# Patient Record
Sex: MALE | Race: WHITE | NOT HISPANIC OR LATINO | Employment: OTHER | URBAN - NONMETROPOLITAN AREA
[De-identification: names, ages, dates, MRNs, and addresses within clinical notes are randomized per-mention and may not be internally consistent; named-entity substitution may affect disease eponyms.]

---

## 2019-08-06 ENCOUNTER — OFFICE VISIT (OUTPATIENT)
Dept: INTERNAL MEDICINE | Facility: CLINIC | Age: 74
End: 2019-08-06

## 2019-08-06 VITALS
WEIGHT: 170 LBS | SYSTOLIC BLOOD PRESSURE: 136 MMHG | RESPIRATION RATE: 16 BRPM | HEART RATE: 50 BPM | BODY MASS INDEX: 22.53 KG/M2 | HEIGHT: 73 IN | OXYGEN SATURATION: 93 % | DIASTOLIC BLOOD PRESSURE: 86 MMHG

## 2019-08-06 DIAGNOSIS — I10 ESSENTIAL HYPERTENSION: Primary | ICD-10-CM

## 2019-08-06 DIAGNOSIS — E78.2 MIXED HYPERLIPIDEMIA: ICD-10-CM

## 2019-08-06 DIAGNOSIS — E55.9 VITAMIN D DEFICIENCY: ICD-10-CM

## 2019-08-06 DIAGNOSIS — N18.2 CKD (CHRONIC KIDNEY DISEASE) STAGE 2, GFR 60-89 ML/MIN: ICD-10-CM

## 2019-08-06 DIAGNOSIS — I77.811 ABDOMINAL AORTIC ECTASIA (HCC): ICD-10-CM

## 2019-08-06 DIAGNOSIS — Z87.891 PERSONAL HISTORY OF NICOTINE DEPENDENCE: ICD-10-CM

## 2019-08-06 DIAGNOSIS — F17.210 CIGARETTE SMOKER: ICD-10-CM

## 2019-08-06 DIAGNOSIS — K21.00 GASTRO-ESOPHAGEAL REFLUX DISEASE WITH ESOPHAGITIS: ICD-10-CM

## 2019-08-06 DIAGNOSIS — E03.9 ACQUIRED HYPOTHYROIDISM: ICD-10-CM

## 2019-08-06 DIAGNOSIS — R73.01 ELEVATED FASTING GLUCOSE: ICD-10-CM

## 2019-08-06 DIAGNOSIS — Z12.5 ENCOUNTER FOR PROSTATE CANCER SCREENING: ICD-10-CM

## 2019-08-06 DIAGNOSIS — M48.062 SPINAL STENOSIS OF LUMBAR REGION WITH NEUROGENIC CLAUDICATION: ICD-10-CM

## 2019-08-06 PROBLEM — M48.061 SPINAL STENOSIS OF LUMBAR REGION: Status: ACTIVE | Noted: 2019-08-06

## 2019-08-06 PROBLEM — M54.30 SCIATICA: Status: ACTIVE | Noted: 2019-08-06

## 2019-08-06 PROBLEM — E66.811 CLASS 1 OBESITY DUE TO EXCESS CALORIES WITH SERIOUS COMORBIDITY AND BODY MASS INDEX (BMI) OF 31.0 TO 31.9 IN ADULT: Status: ACTIVE | Noted: 2019-08-06

## 2019-08-06 PROBLEM — E66.09 CLASS 1 OBESITY DUE TO EXCESS CALORIES WITH SERIOUS COMORBIDITY AND BODY MASS INDEX (BMI) OF 31.0 TO 31.9 IN ADULT: Status: ACTIVE | Noted: 2019-08-06

## 2019-08-06 PROBLEM — E66.09 CLASS 1 OBESITY DUE TO EXCESS CALORIES WITH SERIOUS COMORBIDITY AND BODY MASS INDEX (BMI) OF 31.0 TO 31.9 IN ADULT: Status: RESOLVED | Noted: 2019-08-06 | Resolved: 2019-08-06

## 2019-08-06 PROBLEM — E66.811 CLASS 1 OBESITY DUE TO EXCESS CALORIES WITH SERIOUS COMORBIDITY AND BODY MASS INDEX (BMI) OF 31.0 TO 31.9 IN ADULT: Status: RESOLVED | Noted: 2019-08-06 | Resolved: 2019-08-06

## 2019-08-06 PROCEDURE — 99204 OFFICE O/P NEW MOD 45 MIN: CPT | Performed by: INTERNAL MEDICINE

## 2019-08-06 RX ORDER — BISOPROLOL FUMARATE 10 MG/1
10 TABLET, FILM COATED ORAL DAILY
Qty: 90 TABLET | Refills: 3 | Status: SHIPPED | OUTPATIENT
Start: 2019-08-06 | End: 2020-08-11 | Stop reason: SDUPTHER

## 2019-08-06 RX ORDER — OMEPRAZOLE 20 MG/1
1 CAPSULE, DELAYED RELEASE ORAL DAILY
COMMUNITY
Start: 2019-08-02 | End: 2019-08-06 | Stop reason: SDUPTHER

## 2019-08-06 RX ORDER — FENOFIBRATE 145 MG/1
1 TABLET, COATED ORAL DAILY
COMMUNITY
End: 2019-08-06

## 2019-08-06 RX ORDER — LEVOTHYROXINE SODIUM 0.1 MG/1
100 TABLET ORAL DAILY
Qty: 90 TABLET | Refills: 3 | Status: SHIPPED | OUTPATIENT
Start: 2019-08-06 | End: 2020-08-11 | Stop reason: SDUPTHER

## 2019-08-06 RX ORDER — ROSUVASTATIN CALCIUM 20 MG/1
1 TABLET, COATED ORAL DAILY
COMMUNITY
Start: 2019-06-28 | End: 2019-08-06 | Stop reason: SDUPTHER

## 2019-08-06 RX ORDER — LEVOTHYROXINE SODIUM 0.1 MG/1
1 TABLET ORAL DAILY
COMMUNITY
Start: 2019-07-09 | End: 2019-08-06 | Stop reason: SDUPTHER

## 2019-08-06 RX ORDER — DIMENHYDRINATE 50 MG
1 TABLET ORAL DAILY
COMMUNITY
End: 2021-10-07 | Stop reason: HOSPADM

## 2019-08-06 RX ORDER — ROSUVASTATIN CALCIUM 20 MG/1
20 TABLET, COATED ORAL DAILY
Qty: 90 TABLET | Refills: 3 | Status: SHIPPED | OUTPATIENT
Start: 2019-08-06 | End: 2020-08-11 | Stop reason: SDUPTHER

## 2019-08-06 RX ORDER — BISOPROLOL FUMARATE 10 MG/1
1 TABLET, FILM COATED ORAL DAILY
COMMUNITY
Start: 2019-08-02 | End: 2019-08-06 | Stop reason: SDUPTHER

## 2019-08-06 RX ORDER — AMOXICILLIN 500 MG
1 CAPSULE ORAL EVERY 24 HOURS
COMMUNITY
End: 2019-08-06

## 2019-08-06 RX ORDER — FLUTICASONE PROPIONATE 50 MCG
1 SPRAY, SUSPENSION (ML) NASAL DAILY
COMMUNITY
End: 2020-08-11 | Stop reason: SDUPTHER

## 2019-08-06 RX ORDER — OMEPRAZOLE 20 MG/1
20 CAPSULE, DELAYED RELEASE ORAL DAILY
Qty: 90 CAPSULE | Refills: 3 | Status: SHIPPED | OUTPATIENT
Start: 2019-08-06 | End: 2020-08-11 | Stop reason: SDUPTHER

## 2019-08-06 NOTE — PROGRESS NOTES
CC: establish care for hypertension    History:  Sonu Bravo is a 74 y.o. male who presents today for evaluation of the above problems.  He reports he has been doing well without acute illness. His BP has done well without side effects at this time on medications. He does have hypothyroidism, but has no symptoms of thyroid dysfunction on replacement with synthroid. He has ongoing issues with low back pain with ongoing right sided sciatica. He is maintaining function. He continues smoking and is not ready to quit at this time.     ROS:  Review of Systems   Constitutional: Negative for chills and fever.   HENT: Negative for congestion and sore throat.    Eyes: Negative for visual disturbance.   Respiratory: Negative for cough and shortness of breath.    Cardiovascular: Negative for chest pain, palpitations and leg swelling.   Gastrointestinal: Negative for abdominal pain, constipation and nausea.   Endocrine: Negative for cold intolerance and heat intolerance.   Genitourinary: Negative for decreased urine volume, difficulty urinating, frequency and urgency.   Musculoskeletal: Positive for back pain. Negative for arthralgias.   Skin: Negative for rash.   Neurological: Negative for dizziness and headaches.   Psychiatric/Behavioral: Negative for dysphoric mood. The patient is not nervous/anxious.        Allergies   Allergen Reactions   • Penicillins Hives   • Sulfa Antibiotics Hives, Itching and Rash   • Sulfamethoxazole-Trimethoprim Hives, Itching and Rash     Past Medical History:   Diagnosis Date   • Arthritis    • GERD (gastroesophageal reflux disease)    • Hyperlipidemia    • Hypertension      Past Surgical History:   Procedure Laterality Date   • HERNIA REPAIR       Family History   Problem Relation Age of Onset   • Lung cancer Mother    • Brain cancer Mother    • Heart disease Father    • Hyperlipidemia Father    • Hypertension Father    • Prostate cancer Maternal Aunt       reports that he has been smoking  "cigarettes.  He started smoking about 52 years ago. He has a 104.00 pack-year smoking history. He has never used smokeless tobacco. He reports that he drinks alcohol. He reports that he does not use drugs.      Current Outpatient Medications:   •  ASPIRIN 81 PO, Take 1 tablet by mouth Daily., Disp: , Rfl:   •  bisoprolol (ZEBeta) 10 MG tablet, Take 1 tablet by mouth Daily., Disp: , Rfl:   •  Cholecalciferol 4000 units capsule, Take 1 tablet by mouth Daily., Disp: , Rfl:   •  Coenzyme Q10 (CO Q-10) 100 MG capsule, Take 1 tablet by mouth Daily., Disp: , Rfl:   •  Cyanocobalamin (VITAMIN B 12 PO), Take 1 tablet by mouth Daily. 2,500 mg, Disp: , Rfl:   •  fenofibrate (TRICOR) 145 MG tablet, Take 1 tablet by mouth Daily., Disp: , Rfl:   •  fluticasone (FLONASE) 50 MCG/ACT nasal spray, 1 spray into the nostril(s) as directed by provider Daily. Each Nostral, Disp: , Rfl:   •  levothyroxine (SYNTHROID, LEVOTHROID) 100 MCG tablet, Take 1 tablet by mouth Daily., Disp: , Rfl:   •  Multiple Vitamin (MULTI-VITAMIN DAILY PO), Take 1 tablet by mouth Daily., Disp: , Rfl:   •  Omega-3 Fatty Acids (FISH OIL) 1200 MG capsule capsule, Take 1 Units by mouth Daily., Disp: , Rfl:   •  omeprazole (priLOSEC) 20 MG capsule, Take 1 capsule by mouth Daily., Disp: , Rfl:   •  rosuvastatin (CRESTOR) 20 MG tablet, Take 1 tablet by mouth Daily., Disp: , Rfl:     OBJECTIVE:  /86 (BP Location: Right arm, Patient Position: Sitting, Cuff Size: Adult)   Pulse 50   Resp 16   Ht 185.4 cm (73\")   Wt 77.1 kg (170 lb)   SpO2 93%   BMI 22.43 kg/m²    Physical Exam   Constitutional: He is oriented to person, place, and time. He appears well-developed and well-nourished. No distress.   HENT:   Head: Normocephalic and atraumatic.   Right Ear: External ear normal.   Left Ear: External ear normal.   Nose: Nose normal.   Mouth/Throat: Oropharynx is clear and moist. No oropharyngeal exudate.   Eyes: EOM are normal. No scleral icterus.   Neck: Normal " range of motion. No tracheal deviation present.   Cardiovascular: Normal rate, regular rhythm and normal heart sounds.   No murmur heard.  Pulmonary/Chest: Effort normal and breath sounds normal. No accessory muscle usage. No respiratory distress. He has no wheezes.   Abdominal: Soft. Bowel sounds are normal. He exhibits no distension. There is no tenderness.   Musculoskeletal: Normal range of motion. He exhibits no edema.   Neurological: He is alert and oriented to person, place, and time. Coordination and gait normal.   Skin: Skin is warm and dry. No cyanosis. Nails show no clubbing.   No jaundice   Psychiatric: He has a normal mood and affect. His mood appears not anxious. He does not exhibit a depressed mood.       Assessment/Plan    Diagnoses and all orders for this visit:    Essential hypertension  -     Comprehensive Metabolic Panel; Future  -     bisoprolol (ZEBeta) 10 MG tablet; Take 1 tablet by mouth Daily.  Well controlled, BP goal for age is <140/90 per JNC 8 guidelines and continue current medications    Spinal stenosis of lumbar region with neurogenic claudication  Fair control with epidural injections in the past. Managing without intervention nor meds at this time. Consider referral to pain management if symptoms worsen.     Mixed hyperlipidemia  -     Lipid Panel; Future  -     rosuvastatin (CRESTOR) 20 MG tablet; Take 1 tablet by mouth Daily.  Stable on moderate intensity statin therapy per ACC/AHA guidelines.    Acquired hypothyroidism  -     TSH; Future  -     levothyroxine (SYNTHROID, LEVOTHROID) 100 MCG tablet; Take 1 tablet by mouth Daily.  Check TSH and continue synthroid unless otherwise indicated to change dose.     Cigarette smoker  Patient was counseled on and understood the many dangers of continuing to use tobacco. Despite this, Mr. Bravo states quitting is not an immediate priority at this time. I reminded the patient that if quitting becomes an increased priority to contact us for help  with quitting including pharmacologic & nonpharmacologic options or any additional resources.    Personal history of nicotine dependence  -     CT Chest Low Dose Wo; Future  CT for lung cancer screening.     Gastro-esophageal reflux disease with esophagitis  -     omeprazole (priLOSEC) 20 MG capsule; Take 1 capsule by mouth Daily.  Stable without exacerbation on PPI.    Abdominal aortic ectasia (CMS/HCC)  Will seek records regarding previous imaging. Consider f/u if indicated.     Vitamin D deficiency  -     Vitamin D 25 Hydroxy; Future  Check Vitamin D level.     Elevated fasting glucose  -     Hemoglobin A1c; Future  A1c for further eval.     CKD (chronic kidney disease) stage 2, GFR 60-89 ml/min  -     CBC (No Diff); Future  -     Urinalysis With Microscopic If Indicated (No Culture) - Urine, Clean Catch; Future  -     Protein / Creatinine Ratio, Urine - Urine, Clean Catch; Future  Monitor with labs. Consider RAAS blockade.    Encounter for prostate cancer screening  -     PSA Screen; Future        An After Visit Summary was printed and given to the patient at discharge.  Return in about 6 months (around 2/6/2020) for Medicare Wellness.         Vaughn Kramer D.O. 8/6/2019   Electronically signed.

## 2019-08-13 ENCOUNTER — LAB (OUTPATIENT)
Dept: LAB | Facility: HOSPITAL | Age: 74
End: 2019-08-13

## 2019-08-13 DIAGNOSIS — R73.01 ELEVATED FASTING GLUCOSE: ICD-10-CM

## 2019-08-13 DIAGNOSIS — Z12.5 ENCOUNTER FOR PROSTATE CANCER SCREENING: ICD-10-CM

## 2019-08-13 DIAGNOSIS — N18.2 CKD (CHRONIC KIDNEY DISEASE) STAGE 2, GFR 60-89 ML/MIN: ICD-10-CM

## 2019-08-13 DIAGNOSIS — E55.9 VITAMIN D DEFICIENCY: ICD-10-CM

## 2019-08-13 DIAGNOSIS — E03.9 ACQUIRED HYPOTHYROIDISM: ICD-10-CM

## 2019-08-13 DIAGNOSIS — I10 ESSENTIAL HYPERTENSION: ICD-10-CM

## 2019-08-13 DIAGNOSIS — E78.2 MIXED HYPERLIPIDEMIA: ICD-10-CM

## 2019-08-13 LAB
25(OH)D3 SERPL-MCNC: 88.3 NG/ML (ref 30–100)
ALBUMIN SERPL-MCNC: 4.1 G/DL (ref 3.5–5)
ALBUMIN/GLOB SERPL: 1.6 G/DL (ref 1.1–2.5)
ALP SERPL-CCNC: 35 U/L (ref 24–120)
ALT SERPL W P-5'-P-CCNC: 22 U/L (ref 0–54)
ANION GAP SERPL CALCULATED.3IONS-SCNC: 4 MMOL/L (ref 4–13)
ARTICHOKE IGE QN: 70 MG/DL (ref 0–99)
AST SERPL-CCNC: 26 U/L (ref 7–45)
BILIRUB SERPL-MCNC: 0.5 MG/DL (ref 0.1–1)
BILIRUB UR QL STRIP: NEGATIVE
BUN BLD-MCNC: 20 MG/DL (ref 5–21)
BUN/CREAT SERPL: 16.3 (ref 7–25)
CALCIUM SPEC-SCNC: 9.1 MG/DL (ref 8.4–10.4)
CHLORIDE SERPL-SCNC: 107 MMOL/L (ref 98–110)
CHOLEST SERPL-MCNC: 122 MG/DL (ref 130–200)
CLARITY UR: CLEAR
CO2 SERPL-SCNC: 30 MMOL/L (ref 24–31)
COLOR UR: YELLOW
CREAT BLD-MCNC: 1.23 MG/DL (ref 0.5–1.4)
CREAT UR-MCNC: 98.9 MG/DL
DEPRECATED RDW RBC AUTO: 50.8 FL (ref 37–54)
ERYTHROCYTE [DISTWIDTH] IN BLOOD BY AUTOMATED COUNT: 14.8 % (ref 12.3–15.4)
GFR SERPL CREATININE-BSD FRML MDRD: 57 ML/MIN/1.73
GLOBULIN UR ELPH-MCNC: 2.6 GM/DL
GLUCOSE BLD-MCNC: 103 MG/DL (ref 70–100)
GLUCOSE UR STRIP-MCNC: NEGATIVE MG/DL
HBA1C MFR BLD: 5.5 % (ref 4.8–5.9)
HCT VFR BLD AUTO: 44.5 % (ref 37.5–51)
HDLC SERPL-MCNC: 40 MG/DL
HGB BLD-MCNC: 15.1 G/DL (ref 13–17.7)
HGB UR QL STRIP.AUTO: NEGATIVE
KETONES UR QL STRIP: NEGATIVE
LDLC/HDLC SERPL: 1.66 {RATIO}
LEUKOCYTE ESTERASE UR QL STRIP.AUTO: NEGATIVE
MCH RBC QN AUTO: 31.6 PG (ref 26.6–33)
MCHC RBC AUTO-ENTMCNC: 33.9 G/DL (ref 31.5–35.7)
MCV RBC AUTO: 93.1 FL (ref 79–97)
NITRITE UR QL STRIP: NEGATIVE
PH UR STRIP.AUTO: 6.5 [PH] (ref 5–8)
PLATELET # BLD AUTO: 195 10*3/MM3 (ref 140–450)
PMV BLD AUTO: 10.2 FL (ref 6–12)
POTASSIUM BLD-SCNC: 4.3 MMOL/L (ref 3.5–5.3)
PROT SERPL-MCNC: 6.7 G/DL (ref 6.3–8.7)
PROT UR QL STRIP: NEGATIVE
PROT UR-MCNC: 10 MG/DL (ref 0–13.5)
PROT/CREAT UR: 101.1 MG/G CREA
PSA SERPL-MCNC: 1.02 NG/ML (ref 0–4)
RBC # BLD AUTO: 4.78 10*6/MM3 (ref 4.14–5.8)
SODIUM BLD-SCNC: 141 MMOL/L (ref 135–145)
SP GR UR STRIP: 1.01 (ref 1–1.03)
TRIGL SERPL-MCNC: 78 MG/DL (ref 0–149)
TSH SERPL DL<=0.05 MIU/L-ACNC: 2.99 MIU/ML (ref 0.47–4.68)
UROBILINOGEN UR QL STRIP: NORMAL
WBC NRBC COR # BLD: 7.73 10*3/MM3 (ref 3.4–10.8)

## 2019-08-13 PROCEDURE — 81003 URINALYSIS AUTO W/O SCOPE: CPT | Performed by: INTERNAL MEDICINE

## 2019-08-13 PROCEDURE — 85027 COMPLETE CBC AUTOMATED: CPT | Performed by: INTERNAL MEDICINE

## 2019-08-13 PROCEDURE — 80061 LIPID PANEL: CPT | Performed by: INTERNAL MEDICINE

## 2019-08-13 PROCEDURE — 84443 ASSAY THYROID STIM HORMONE: CPT | Performed by: INTERNAL MEDICINE

## 2019-08-13 PROCEDURE — G0103 PSA SCREENING: HCPCS | Performed by: INTERNAL MEDICINE

## 2019-08-13 PROCEDURE — 83036 HEMOGLOBIN GLYCOSYLATED A1C: CPT | Performed by: INTERNAL MEDICINE

## 2019-08-13 PROCEDURE — 82306 VITAMIN D 25 HYDROXY: CPT | Performed by: INTERNAL MEDICINE

## 2019-08-13 PROCEDURE — 82570 ASSAY OF URINE CREATININE: CPT | Performed by: INTERNAL MEDICINE

## 2019-08-13 PROCEDURE — 80053 COMPREHEN METABOLIC PANEL: CPT | Performed by: INTERNAL MEDICINE

## 2019-08-13 PROCEDURE — 36415 COLL VENOUS BLD VENIPUNCTURE: CPT

## 2019-08-13 PROCEDURE — 84156 ASSAY OF PROTEIN URINE: CPT | Performed by: INTERNAL MEDICINE

## 2019-08-14 ENCOUNTER — HOSPITAL ENCOUNTER (OUTPATIENT)
Dept: CT IMAGING | Facility: HOSPITAL | Age: 74
Discharge: HOME | End: 2019-08-14
Admitting: INTERNAL MEDICINE

## 2019-08-14 DIAGNOSIS — Z87.891 PERSONAL HISTORY OF NICOTINE DEPENDENCE: ICD-10-CM

## 2019-08-14 PROCEDURE — G0297 LDCT FOR LUNG CA SCREEN: HCPCS

## 2019-08-30 ENCOUNTER — OFFICE VISIT (OUTPATIENT)
Dept: INTERNAL MEDICINE | Facility: CLINIC | Age: 74
End: 2019-08-30

## 2019-08-30 VITALS
WEIGHT: 171.5 LBS | BODY MASS INDEX: 22.73 KG/M2 | DIASTOLIC BLOOD PRESSURE: 70 MMHG | HEIGHT: 73 IN | SYSTOLIC BLOOD PRESSURE: 130 MMHG | HEART RATE: 61 BPM | RESPIRATION RATE: 16 BRPM | OXYGEN SATURATION: 98 %

## 2019-08-30 DIAGNOSIS — M48.062 SPINAL STENOSIS OF LUMBAR REGION WITH NEUROGENIC CLAUDICATION: Primary | ICD-10-CM

## 2019-08-30 DIAGNOSIS — F17.210 CIGARETTE SMOKER: ICD-10-CM

## 2019-08-30 PROCEDURE — 99213 OFFICE O/P EST LOW 20 MIN: CPT | Performed by: INTERNAL MEDICINE

## 2019-09-03 NOTE — PROGRESS NOTES
"CC: f/u chronic back pain    History:  Sonu Bravo is a 74 y.o. male   He notes he had been doing well, but has had worsening of his low back pain over the past few weeks since his last visit. He had previously undergone injection per pain management in [place], but has not required this in about 2.5 years. He does continue smoking and is not ready to quit.     ROS:  Review of Systems   Respiratory: Negative for shortness of breath.    Cardiovascular: Negative for chest pain.   Musculoskeletal: Positive for back pain. Negative for neck pain.        reports that he has been smoking cigarettes.  He started smoking about 52 years ago. He has a 104.00 pack-year smoking history. He has never used smokeless tobacco. He reports that he drinks alcohol. He reports that he does not use drugs.      Current Outpatient Medications:   •  bisoprolol (ZEBeta) 10 MG tablet, Take 1 tablet by mouth Daily., Disp: 90 tablet, Rfl: 3  •  Cholecalciferol 4000 units capsule, Take 1 tablet by mouth Daily., Disp: , Rfl:   •  Coenzyme Q10 (CO Q-10) 100 MG capsule, Take 1 tablet by mouth Daily., Disp: , Rfl:   •  Cyanocobalamin (VITAMIN B 12 PO), Take 1 tablet by mouth Daily. 2,500 mg, Disp: , Rfl:   •  fluticasone (FLONASE) 50 MCG/ACT nasal spray, 1 spray into the nostril(s) as directed by provider Daily. Each Nostral, Disp: , Rfl:   •  levothyroxine (SYNTHROID, LEVOTHROID) 100 MCG tablet, Take 1 tablet by mouth Daily., Disp: 90 tablet, Rfl: 3  •  Multiple Vitamin (MULTI-VITAMIN DAILY PO), Take 1 tablet by mouth Daily., Disp: , Rfl:   •  omeprazole (priLOSEC) 20 MG capsule, Take 1 capsule by mouth Daily., Disp: 90 capsule, Rfl: 3  •  rosuvastatin (CRESTOR) 20 MG tablet, Take 1 tablet by mouth Daily., Disp: 90 tablet, Rfl: 3    OBJECTIVE:  /70 (BP Location: Left arm, Patient Position: Sitting, Cuff Size: Adult)   Pulse 61   Resp 16   Ht 185.4 cm (73\")   Wt 77.8 kg (171 lb 8 oz)   SpO2 98%   BMI 22.63 kg/m²    Physical Exam "   Constitutional: He is oriented to person, place, and time. He appears well-nourished. No distress.   Neurological: He is alert and oriented to person, place, and time.       Assessment/Plan    Diagnoses and all orders for this visit:    Spinal stenosis of lumbar region with neurogenic claudication  -     MRI Lumbar Spine Without Contrast; Future  -     Ambulatory Referral to Pain Management  We will attempt to obtain records from his previous providers and procedures in [place]. Will order MRI given ongoing symptoms to provide comparison to previous studies. Refer to Dr. Saleem for evaluation and hopeful interventional pain management.     Cigarette smoker  Patient was counseled on and understood the many dangers of continuing to use tobacco. Despite this, Mr. Braov states quitting is not an immediate priority at this time. I reminded the patient that if quitting becomes an increased priority to contact us for help with quitting including pharmacologic & nonpharmacologic options or any additional resources.    An After Visit Summary was printed and given to the patient at discharge.  Return for Next scheduled follow up.         Vaughn Kramer D.O. 9/2/2019   Electronically signed.

## 2019-09-12 ENCOUNTER — HOSPITAL ENCOUNTER (OUTPATIENT)
Dept: MRI IMAGING | Facility: HOSPITAL | Age: 74
Discharge: HOME | End: 2019-09-12
Admitting: INTERNAL MEDICINE

## 2019-09-12 ENCOUNTER — TELEPHONE (OUTPATIENT)
Dept: INTERNAL MEDICINE | Facility: CLINIC | Age: 74
End: 2019-09-12

## 2019-09-12 DIAGNOSIS — M48.062 SPINAL STENOSIS OF LUMBAR REGION WITH NEUROGENIC CLAUDICATION: ICD-10-CM

## 2019-09-12 PROCEDURE — 72148 MRI LUMBAR SPINE W/O DYE: CPT

## 2019-09-12 NOTE — TELEPHONE ENCOUNTER
Patient was informed     ----- Message from Vaughn Kramer DO sent at 9/12/2019  2:24 PM CDT -----  We see ongoing degenerative changes in your back, but there is nothing alarming or indicative of a different course of action other than pain management. If you are able to  the disc with the MRI images from [facility] to take with you when you go to pain management, that will be helpful for them.

## 2020-02-10 ENCOUNTER — LAB (OUTPATIENT)
Dept: LAB | Facility: HOSPITAL | Age: 75
End: 2020-02-10

## 2020-02-10 ENCOUNTER — OFFICE VISIT (OUTPATIENT)
Dept: INTERNAL MEDICINE | Facility: CLINIC | Age: 75
End: 2020-02-10

## 2020-02-10 VITALS
TEMPERATURE: 97.6 F | RESPIRATION RATE: 16 BRPM | SYSTOLIC BLOOD PRESSURE: 134 MMHG | HEIGHT: 73 IN | BODY MASS INDEX: 22.8 KG/M2 | OXYGEN SATURATION: 96 % | WEIGHT: 172 LBS | HEART RATE: 54 BPM | DIASTOLIC BLOOD PRESSURE: 72 MMHG

## 2020-02-10 DIAGNOSIS — E03.9 ACQUIRED HYPOTHYROIDISM: ICD-10-CM

## 2020-02-10 DIAGNOSIS — I10 ESSENTIAL HYPERTENSION: Primary | ICD-10-CM

## 2020-02-10 DIAGNOSIS — L72.0 EPIDERMAL CYST OF NECK: ICD-10-CM

## 2020-02-10 DIAGNOSIS — F17.210 CIGARETTE SMOKER: ICD-10-CM

## 2020-02-10 DIAGNOSIS — L57.0 KERATOSIS, ACTINIC: ICD-10-CM

## 2020-02-10 DIAGNOSIS — J32.9 RECURRENT SINUSITIS: ICD-10-CM

## 2020-02-10 LAB — TSH SERPL DL<=0.05 MIU/L-ACNC: 2.38 UIU/ML (ref 0.27–4.2)

## 2020-02-10 PROCEDURE — 99214 OFFICE O/P EST MOD 30 MIN: CPT | Performed by: NURSE PRACTITIONER

## 2020-02-10 PROCEDURE — 36415 COLL VENOUS BLD VENIPUNCTURE: CPT

## 2020-02-10 PROCEDURE — 84443 ASSAY THYROID STIM HORMONE: CPT | Performed by: NURSE PRACTITIONER

## 2020-02-10 NOTE — PROGRESS NOTES
"CC: skin complaints, sinus congestion      History:  Sonu Bravo is a 74 y.o. male who presents today for follow-up for evaluation of the above:  Patient presents today with c/o sinus congestion. Sinus congestion for four months. Constant draining. Cough especially at night. Taking flonase at night but not improving.   He also c/o multiple skin lesions. Right hand \"spot\" and lesion on Left neck cyst. Neck lesion has been present for 2-3 weeks.  Seeing Dr. Saleem for pain management getting epidural injections.   He is a current smoker without plans to quit at this time.   BMI today is 22.7    ROS:  Review of Systems   Constitutional: Negative for activity change, appetite change, fatigue, fever and unexpected weight change.   HENT: Negative.    Respiratory: Negative for cough, chest tightness, shortness of breath and wheezing.    Cardiovascular: Negative for chest pain, palpitations and leg swelling.   Gastrointestinal: Negative.    Endocrine: Negative.    Genitourinary: Negative.    Musculoskeletal: Negative.    Skin: Negative.    Neurological: Negative for dizziness and headaches.   Psychiatric/Behavioral: Negative.        Mr. Bravo  reports that he has been smoking cigarettes. He started smoking about 53 years ago. He has a 104.00 pack-year smoking history. He has never used smokeless tobacco. He reports that he drinks alcohol. He reports that he does not use drugs.      Current Outpatient Medications:   •  bisoprolol (ZEBeta) 10 MG tablet, Take 1 tablet by mouth Daily., Disp: 90 tablet, Rfl: 3  •  Cholecalciferol 4000 units capsule, Take 1 tablet by mouth Daily., Disp: , Rfl:   •  Coenzyme Q10 (CO Q-10) 100 MG capsule, Take 1 tablet by mouth Daily., Disp: , Rfl:   •  Cyanocobalamin (VITAMIN B 12 PO), Take 1 tablet by mouth Daily. 2,500 mg, Disp: , Rfl:   •  fluticasone (FLONASE) 50 MCG/ACT nasal spray, 1 spray into the nostril(s) as directed by provider Daily. Each Nostral, Disp: , Rfl:   •  levothyroxine " "(SYNTHROID, LEVOTHROID) 100 MCG tablet, Take 1 tablet by mouth Daily., Disp: 90 tablet, Rfl: 3  •  Multiple Vitamin (MULTI-VITAMIN DAILY PO), Take 1 tablet by mouth Daily., Disp: , Rfl:   •  omeprazole (priLOSEC) 20 MG capsule, Take 1 capsule by mouth Daily., Disp: 90 capsule, Rfl: 3  •  rosuvastatin (CRESTOR) 20 MG tablet, Take 1 tablet by mouth Daily., Disp: 90 tablet, Rfl: 3      OBJECTIVE:  /72 (BP Location: Right arm, Patient Position: Sitting, Cuff Size: Adult)   Pulse 54   Temp 97.6 °F (36.4 °C) (Oral)   Resp 16   Ht 185.4 cm (73\")   Wt 78 kg (172 lb)   SpO2 96%   BMI 22.69 kg/m²    Physical Exam   Constitutional: He is oriented to person, place, and time. He appears well-developed and well-nourished.   HENT:   Head: Normocephalic and atraumatic.   Eyes: Pupils are equal, round, and reactive to light. Conjunctivae and EOM are normal.   Neck: Normal range of motion. Neck supple.   Cardiovascular: Normal rate, regular rhythm and normal heart sounds.   Pulmonary/Chest: Effort normal and breath sounds normal.   Abdominal: Soft. Bowel sounds are normal.   Neurological: He is alert and oriented to person, place, and time. He has normal reflexes.   Skin: Skin is warm and dry. Lesion noted.        Psychiatric: He has a normal mood and affect.   Vitals reviewed.      Assessment/Plan    Sonu was seen today for sinus problem and skin problem.    Diagnoses and all orders for this visit:    Essential hypertension  Well controlled, BP goal for age is <140/90 per JNC 8 guidelines and continue current medications    Acquired hypothyroidism  -     TSH; Future    Keratosis, actinic  -     Ambulatory Referral to Dermatology  Epidermal cyst of neck  -     Ambulatory Referral to Dermatology    Cigarette smoker  Patient was counseled on and understood the many dangers of continuing to use tobacco. Despite this patient states quitting is not an immediate priority at this time. I reminded the patient that if quitting " becomes an increased priority to contact us for help with quitting including pharmacologic & nonpharmacologic options or any additional resources.    Recurrent sinusitis   To add oral antihistamine to flonase. F/u if no improvement.       An After Visit Summary was printed and given to the patient at discharge.  Return in about 6 months (around 8/10/2020) for Medicare Wellness. Sooner if problems arise.         Lynnette Whaley APRN. 2/10/2020

## 2020-08-11 ENCOUNTER — LAB (OUTPATIENT)
Dept: LAB | Facility: HOSPITAL | Age: 75
End: 2020-08-11

## 2020-08-11 ENCOUNTER — OFFICE VISIT (OUTPATIENT)
Dept: INTERNAL MEDICINE | Facility: CLINIC | Age: 75
End: 2020-08-11

## 2020-08-11 VITALS
HEIGHT: 73 IN | SYSTOLIC BLOOD PRESSURE: 130 MMHG | TEMPERATURE: 97.4 F | DIASTOLIC BLOOD PRESSURE: 84 MMHG | HEART RATE: 54 BPM | WEIGHT: 177.7 LBS | OXYGEN SATURATION: 98 % | RESPIRATION RATE: 16 BRPM | BODY MASS INDEX: 23.55 KG/M2

## 2020-08-11 DIAGNOSIS — K21.00 GASTRO-ESOPHAGEAL REFLUX DISEASE WITH ESOPHAGITIS: ICD-10-CM

## 2020-08-11 DIAGNOSIS — N18.2 CKD (CHRONIC KIDNEY DISEASE) STAGE 2, GFR 60-89 ML/MIN: ICD-10-CM

## 2020-08-11 DIAGNOSIS — I77.811 ABDOMINAL AORTIC ECTASIA (HCC): ICD-10-CM

## 2020-08-11 DIAGNOSIS — Z00.00 ENCOUNTER FOR PREVENTIVE HEALTH EXAMINATION: ICD-10-CM

## 2020-08-11 DIAGNOSIS — E03.9 ACQUIRED HYPOTHYROIDISM: ICD-10-CM

## 2020-08-11 DIAGNOSIS — E78.2 MIXED HYPERLIPIDEMIA: ICD-10-CM

## 2020-08-11 DIAGNOSIS — Z87.891 PERSONAL HISTORY OF NICOTINE DEPENDENCE: ICD-10-CM

## 2020-08-11 DIAGNOSIS — I10 ESSENTIAL HYPERTENSION: Primary | ICD-10-CM

## 2020-08-11 DIAGNOSIS — R09.3 INCREASED SPUTUM PRODUCTION: ICD-10-CM

## 2020-08-11 DIAGNOSIS — F17.210 CIGARETTE SMOKER: ICD-10-CM

## 2020-08-11 LAB
ALBUMIN SERPL-MCNC: 4.2 G/DL (ref 3.5–5.2)
ALBUMIN/GLOB SERPL: 1.8 G/DL
ALP SERPL-CCNC: 47 U/L (ref 39–117)
ALT SERPL W P-5'-P-CCNC: 21 U/L (ref 1–41)
ANION GAP SERPL CALCULATED.3IONS-SCNC: 11.6 MMOL/L (ref 5–15)
AST SERPL-CCNC: 20 U/L (ref 1–40)
BILIRUB SERPL-MCNC: 0.3 MG/DL (ref 0–1.2)
BILIRUB UR QL STRIP: NEGATIVE
BUN SERPL-MCNC: 11 MG/DL (ref 8–23)
BUN/CREAT SERPL: 12.4 (ref 7–25)
CALCIUM SPEC-SCNC: 9.4 MG/DL (ref 8.6–10.5)
CHLORIDE SERPL-SCNC: 102 MMOL/L (ref 98–107)
CHOLEST SERPL-MCNC: 141 MG/DL (ref 0–200)
CLARITY UR: CLEAR
CO2 SERPL-SCNC: 24.4 MMOL/L (ref 22–29)
COLOR UR: YELLOW
CREAT SERPL-MCNC: 0.89 MG/DL (ref 0.76–1.27)
CREAT UR-MCNC: 99.2 MG/DL
DEPRECATED RDW RBC AUTO: 46.2 FL (ref 37–54)
ERYTHROCYTE [DISTWIDTH] IN BLOOD BY AUTOMATED COUNT: 13.7 % (ref 12.3–15.4)
GFR SERPL CREATININE-BSD FRML MDRD: 83 ML/MIN/1.73
GLOBULIN UR ELPH-MCNC: 2.4 GM/DL
GLUCOSE SERPL-MCNC: 96 MG/DL (ref 65–99)
GLUCOSE UR STRIP-MCNC: NEGATIVE MG/DL
HCT VFR BLD AUTO: 47.1 % (ref 37.5–51)
HCV AB SER DONR QL: NORMAL
HDLC SERPL-MCNC: 44 MG/DL (ref 40–60)
HGB BLD-MCNC: 16 G/DL (ref 13–17.7)
HGB UR QL STRIP.AUTO: NEGATIVE
KETONES UR QL STRIP: NEGATIVE
LDLC SERPL CALC-MCNC: 70 MG/DL (ref 0–100)
LDLC/HDLC SERPL: 1.59 {RATIO}
LEUKOCYTE ESTERASE UR QL STRIP.AUTO: NEGATIVE
MCH RBC QN AUTO: 31.1 PG (ref 26.6–33)
MCHC RBC AUTO-ENTMCNC: 34 G/DL (ref 31.5–35.7)
MCV RBC AUTO: 91.6 FL (ref 79–97)
NITRITE UR QL STRIP: NEGATIVE
PH UR STRIP.AUTO: 6.5 [PH] (ref 5–8)
PLATELET # BLD AUTO: 154 10*3/MM3 (ref 140–450)
PMV BLD AUTO: 10.6 FL (ref 6–12)
POTASSIUM SERPL-SCNC: 5 MMOL/L (ref 3.5–5.2)
PROT SERPL-MCNC: 6.6 G/DL (ref 6–8.5)
PROT UR QL STRIP: NEGATIVE
PROT UR-MCNC: 8 MG/DL
PROT/CREAT UR: 80.6 MG/G CREA (ref 0–200)
RBC # BLD AUTO: 5.14 10*6/MM3 (ref 4.14–5.8)
SODIUM SERPL-SCNC: 138 MMOL/L (ref 136–145)
SP GR UR STRIP: 1.01 (ref 1–1.03)
TRIGL SERPL-MCNC: 135 MG/DL (ref 0–150)
TSH SERPL DL<=0.05 MIU/L-ACNC: 4.01 UIU/ML (ref 0.27–4.2)
UROBILINOGEN UR QL STRIP: NORMAL
VLDLC SERPL-MCNC: 27 MG/DL (ref 5–40)
WBC # BLD AUTO: 7.3 10*3/MM3 (ref 3.4–10.8)

## 2020-08-11 PROCEDURE — 82570 ASSAY OF URINE CREATININE: CPT | Performed by: INTERNAL MEDICINE

## 2020-08-11 PROCEDURE — 96160 PT-FOCUSED HLTH RISK ASSMT: CPT | Performed by: INTERNAL MEDICINE

## 2020-08-11 PROCEDURE — 85027 COMPLETE CBC AUTOMATED: CPT | Performed by: INTERNAL MEDICINE

## 2020-08-11 PROCEDURE — 36415 COLL VENOUS BLD VENIPUNCTURE: CPT

## 2020-08-11 PROCEDURE — 81003 URINALYSIS AUTO W/O SCOPE: CPT | Performed by: INTERNAL MEDICINE

## 2020-08-11 PROCEDURE — 84443 ASSAY THYROID STIM HORMONE: CPT | Performed by: INTERNAL MEDICINE

## 2020-08-11 PROCEDURE — 86803 HEPATITIS C AB TEST: CPT | Performed by: INTERNAL MEDICINE

## 2020-08-11 PROCEDURE — 84156 ASSAY OF PROTEIN URINE: CPT | Performed by: INTERNAL MEDICINE

## 2020-08-11 PROCEDURE — 80053 COMPREHEN METABOLIC PANEL: CPT | Performed by: INTERNAL MEDICINE

## 2020-08-11 PROCEDURE — 99214 OFFICE O/P EST MOD 30 MIN: CPT | Performed by: INTERNAL MEDICINE

## 2020-08-11 PROCEDURE — 80061 LIPID PANEL: CPT | Performed by: INTERNAL MEDICINE

## 2020-08-11 PROCEDURE — G0438 PPPS, INITIAL VISIT: HCPCS | Performed by: INTERNAL MEDICINE

## 2020-08-11 RX ORDER — FEXOFENADINE HCL 180 MG/1
180 TABLET ORAL DAILY
COMMUNITY
End: 2021-10-07 | Stop reason: HOSPADM

## 2020-08-11 RX ORDER — OMEPRAZOLE 20 MG/1
20 CAPSULE, DELAYED RELEASE ORAL DAILY
Qty: 90 CAPSULE | Refills: 3 | Status: SHIPPED | OUTPATIENT
Start: 2020-08-11 | End: 2021-08-13 | Stop reason: SDUPTHER

## 2020-08-11 RX ORDER — ROSUVASTATIN CALCIUM 20 MG/1
20 TABLET, COATED ORAL DAILY
Qty: 90 TABLET | Refills: 3 | Status: SHIPPED | OUTPATIENT
Start: 2020-08-11 | End: 2021-08-13 | Stop reason: SDUPTHER

## 2020-08-11 RX ORDER — LEVOTHYROXINE SODIUM 0.1 MG/1
100 TABLET ORAL DAILY
Qty: 90 TABLET | Refills: 3 | Status: SHIPPED | OUTPATIENT
Start: 2020-08-11 | End: 2021-08-13 | Stop reason: SDUPTHER

## 2020-08-11 RX ORDER — FLUTICASONE PROPIONATE 50 MCG
1 SPRAY, SUSPENSION (ML) NASAL DAILY
Qty: 3 BOTTLE | Refills: 3 | Status: SHIPPED | OUTPATIENT
Start: 2020-08-11 | End: 2021-08-13 | Stop reason: SDUPTHER

## 2020-08-11 RX ORDER — BISOPROLOL FUMARATE 10 MG/1
10 TABLET, FILM COATED ORAL DAILY
Qty: 90 TABLET | Refills: 3 | Status: SHIPPED | OUTPATIENT
Start: 2020-08-11 | End: 2021-08-13 | Stop reason: SDUPTHER

## 2020-08-11 NOTE — PROGRESS NOTES
The ABCs of the Annual Wellness Visit  Initial Medicare Wellness Visit    No chief complaint on file.  See  note    Subjective   History of Present Illness:  Sonu Bravo is a 75 y.o. male who presents for an Initial Medicare Wellness Visit.    HEALTH RISK ASSESSMENT    Recent Hospitalizations:  No hospitalization(s) within the last year.    Current Medical Providers:  Patient Care Team:  Vaughn Kramer DO as PCP - General (Internal Medicine)    Smoking Status:  Social History     Tobacco Use   Smoking Status Current Every Day Smoker   • Packs/day: 2.00   • Years: 52.00   • Pack years: 104.00   • Types: Cigarettes   • Start date: 1967   Smokeless Tobacco Never Used       Alcohol Consumption:  Social History     Substance and Sexual Activity   Alcohol Use Yes       Depression Screen:   PHQ-2/PHQ-9 Depression Screening 2/10/2020   Little interest or pleasure in doing things 0   Feeling down, depressed, or hopeless 0   Total Score 0       Fall Risk Screen:  SANG Fall Risk Assessment was completed, and patient is at LOW risk for falls.Assessment completed on:8/11/2020    Health Habits and Functional and Cognitive Screening:  Functional & Cognitive Status 8/11/2020   Do you have difficulty preparing food and eating? No   Do you have difficulty bathing yourself, getting dressed or grooming yourself? No   Do you have difficulty using the toilet? No   Do you have difficulty moving around from place to place? No   Do you have trouble with steps or getting out of a bed or a chair? No   Current Diet Unhealthy Diet   Dental Exam Up to date   Eye Exam Up to date   Exercise (times per week) 3 times per week   Current Exercise Activities Include Walking   Do you need help using the phone?  No   Are you deaf or do you have serious difficulty hearing?  No   Do you need help with transportation? No   Do you need help shopping? No   Do you need help preparing meals?  No   Do you need help with housework?  No   Do you  need help with laundry? No   Do you need help taking your medications? No   Do you need help managing money? No   Do you ever drive or ride in a car without wearing a seat belt? No   Have you felt unusual stress, anger or loneliness in the last month? No   Who do you live with? Spouse   If you need help, do you have trouble finding someone available to you? No   Have you been bothered in the last four weeks by sexual problems? No   Do you have difficulty concentrating, remembering or making decisions? No         Does the patient have evidence of cognitive impairment? No    Asprin use counseling:Does not need ASA (and currently is not on it)    Age-appropriate Screening Schedule:  Refer to the list below for future screening recommendations based on patient's age, sex and/or medical conditions. Orders for these recommended tests are listed in the plan section. The patient has been provided with a written plan.    Health Maintenance   Topic Date Due   • INFLUENZA VACCINE  08/01/2020   • LIPID PANEL  08/13/2020   • TDAP/TD VACCINES (2 - Td) 11/01/2026   • ZOSTER VACCINE  Completed          The following portions of the patient's history were reviewed and updated as appropriate: allergies, current medications, past family history, past medical history, past social history, past surgical history and problem list.    Outpatient Medications Prior to Visit   Medication Sig Dispense Refill   • Cholecalciferol 4000 units capsule Take 1 tablet by mouth Daily.     • Coenzyme Q10 (CO Q-10) 100 MG capsule Take 1 tablet by mouth Daily.     • Cyanocobalamin (VITAMIN B 12 PO) Take 1 tablet by mouth Daily. 2,500 mg     • fexofenadine (ALLEGRA) 180 MG tablet Take 180 mg by mouth Daily.     • Multiple Vitamin (MULTI-VITAMIN DAILY PO) Take 1 tablet by mouth Daily.     • bisoprolol (ZEBeta) 10 MG tablet Take 1 tablet by mouth Daily. 90 tablet 3   • fluticasone (FLONASE) 50 MCG/ACT nasal spray 1 spray into the nostril(s) as directed by  "provider Daily. Each Nostral     • levothyroxine (SYNTHROID, LEVOTHROID) 100 MCG tablet Take 1 tablet by mouth Daily. 90 tablet 3   • omeprazole (priLOSEC) 20 MG capsule Take 1 capsule by mouth Daily. 90 capsule 3   • rosuvastatin (CRESTOR) 20 MG tablet Take 1 tablet by mouth Daily. 90 tablet 3     No facility-administered medications prior to visit.        Patient Active Problem List   Diagnosis   • Abdominal aortic ectasia (CMS/HCC)   • Vitamin D deficiency   • Spinal stenosis of lumbar region with neurogenic claudication   • Mixed hyperlipidemia   • Acquired hypothyroidism   • Gastro-esophageal reflux disease with esophagitis   • Essential hypertension   • Cigarette smoker       Advanced Care Planning:  ACP discussion was held with the patient during this visit. Patient has an advance directive (not in EMR), copy requested.    Review of Systems See  note    Compared to one year ago, the patient feels his physical health is the same.  Compared to one year ago, the patient feels his mental health is worse.    Reviewed chart for potential of high risk medication in the elderly: yes  Reviewed chart for potential of harmful drug interactions in the elderly:yes    Objective         Vitals:    08/11/20 0932   BP: 130/84   BP Location: Left arm   Patient Position: Sitting   Cuff Size: Adult   Pulse: 54   Resp: 16   Temp: 97.4 °F (36.3 °C)   SpO2: 98%   Weight: 80.6 kg (177 lb 11.2 oz)   Height: 185.4 cm (73\")       Body mass index is 23.44 kg/m².  Discussed the patient's BMI with him. The BMI is in the acceptable range.    Physical Exam See  note          Assessment/Plan   Medicare Risks and Personalized Health Plan  CMS Preventative Services Quick Reference  Cardiovascular risk  Depression/Dysphoria  Fall Risk  Immunizations Discussed/Encouraged (specific immunizations; Influenza )  Lung Cancer Risk    The above risks/problems have been discussed with the patient.  Pertinent information has been shared " with the patient in the After Visit Summary.  Follow up plans and orders are seen below in the Assessment/Plan Section.    Diagnoses and all orders for this visit:    1. CKD (chronic kidney disease) stage 2, GFR 60-89 ml/min (Primary)  -     Comprehensive Metabolic Panel; Future  -     CBC (No Diff); Future  -     Urinalysis With Microscopic If Indicated (No Culture) - Urine, Clean Catch; Future  -     Protein / Creatinine Ratio, Urine - Urine, Clean Catch; Future    2. Mixed hyperlipidemia  -     rosuvastatin (CRESTOR) 20 MG tablet; Take 1 tablet by mouth Daily.  Dispense: 90 tablet; Refill: 3  -     Lipid Panel; Future    3. Gastro-esophageal reflux disease with esophagitis  -     omeprazole (priLOSEC) 20 MG capsule; Take 1 capsule by mouth Daily.  Dispense: 90 capsule; Refill: 3    4. Acquired hypothyroidism  -     levothyroxine (SYNTHROID, LEVOTHROID) 100 MCG tablet; Take 1 tablet by mouth Daily.  Dispense: 90 tablet; Refill: 3  -     TSH; Future    5. Essential hypertension  -     bisoprolol (ZEBeta) 10 MG tablet; Take 1 tablet by mouth Daily.  Dispense: 90 tablet; Refill: 3    6. Encounter for preventive health examination  -     Hepatitis C Antibody; Future    7. Personal history of nicotine dependence  -     CT Chest Low Dose Wo; Future    8. Cigarette smoker  -     Full Pulmonary Function Test With Bronchodilator; Future    9. Abdominal aortic ectasia (CMS/HCC)    10. Increased sputum production  -     Full Pulmonary Function Test With Bronchodilator; Future    Other orders  -     fluticasone (FLONASE) 50 MCG/ACT nasal spray; 1 spray into the nostril(s) as directed by provider Daily. Each Nostral  Dispense: 3 bottle; Refill: 3      Follow Up:  Return in about 6 months (around 2/11/2021) for Recheck.     An After Visit Summary and PPPS were given to the patient.

## 2020-08-11 NOTE — PROGRESS NOTES
CC: f/u hypertension    History:  Sonu Bravo is a 75 y.o. male   He notes he has been doing reasonably well, but has had an increase in cough and production of sputum.  He notes this is worse with laying down or getting up in the morning.  He does continue smoking and is not ready to quit at this time, but does attribute his symptoms potentially to this habit.    He denies symptoms of thyroid dysfunction and continues on levothyroxine.    He has no acid reflux and continues on PPI.       ROS:  Review of Systems   Constitutional: Negative for chills and fever.   Respiratory: Positive for cough. Negative for shortness of breath.    Cardiovascular: Negative for chest pain and palpitations.        reports that he has been smoking cigarettes. He started smoking about 53 years ago. He has a 104.00 pack-year smoking history. He has never used smokeless tobacco. He reports that he drinks alcohol. He reports that he does not use drugs.      Current Outpatient Medications:   •  bisoprolol (ZEBeta) 10 MG tablet, Take 1 tablet by mouth Daily., Disp: 90 tablet, Rfl: 3  •  Cholecalciferol 4000 units capsule, Take 1 tablet by mouth Daily., Disp: , Rfl:   •  Coenzyme Q10 (CO Q-10) 100 MG capsule, Take 1 tablet by mouth Daily., Disp: , Rfl:   •  Cyanocobalamin (VITAMIN B 12 PO), Take 1 tablet by mouth Daily. 2,500 mg, Disp: , Rfl:   •  fexofenadine (ALLEGRA) 180 MG tablet, Take 180 mg by mouth Daily., Disp: , Rfl:   •  fluticasone (FLONASE) 50 MCG/ACT nasal spray, 1 spray into the nostril(s) as directed by provider Daily. Each Nostral, Disp: , Rfl:   •  levothyroxine (SYNTHROID, LEVOTHROID) 100 MCG tablet, Take 1 tablet by mouth Daily., Disp: 90 tablet, Rfl: 3  •  Multiple Vitamin (MULTI-VITAMIN DAILY PO), Take 1 tablet by mouth Daily., Disp: , Rfl:   •  omeprazole (priLOSEC) 20 MG capsule, Take 1 capsule by mouth Daily., Disp: 90 capsule, Rfl: 3  •  rosuvastatin (CRESTOR) 20 MG tablet, Take 1 tablet by mouth Daily., Disp: 90  "tablet, Rfl: 3    OBJECTIVE:  /84 (BP Location: Left arm, Patient Position: Sitting, Cuff Size: Adult)   Pulse 54   Temp 97.4 °F (36.3 °C)   Resp 16   Ht 185.4 cm (73\")   Wt 80.6 kg (177 lb 11.2 oz)   SpO2 98%   BMI 23.44 kg/m²    Physical Exam   Constitutional: He is oriented to person, place, and time. He appears well-nourished. No distress.   Cardiovascular: Normal rate, regular rhythm and normal heart sounds.   No murmur heard.  Pulmonary/Chest: Effort normal. He has decreased breath sounds. He has no wheezes.   Neurological: He is alert and oriented to person, place, and time.   Psychiatric: He has a normal mood and affect.       Assessment/Plan     Diagnoses and all orders for this visit:    Essential hypertension  -     bisoprolol (ZEBeta) 10 MG tablet; Take 1 tablet by mouth Daily.  Well controlled, BP goal for age is <140/90 per JNC 8 guidelines and continue current medications    Mixed hyperlipidemia  -     rosuvastatin (CRESTOR) 20 MG tablet; Take 1 tablet by mouth Daily.  -     Lipid Panel; Future  Stable on moderate intensity statin therapy per ACC/AHA guidelines.    Gastro-esophageal reflux disease with esophagitis  -     omeprazole (priLOSEC) 20 MG capsule; Take 1 capsule by mouth Daily.  Stable without exacerbation on PPI.    Acquired hypothyroidism  -     levothyroxine (SYNTHROID, LEVOTHROID) 100 MCG tablet; Take 1 tablet by mouth Daily.  -     TSH; Future  Check TSH and continue levothyroxine unless otherwise indicated.    CKD (chronic kidney disease) stage 2, GFR 60-89 ml/min  -     Comprehensive Metabolic Panel; Future  -     CBC (No Diff); Future  -     Urinalysis With Microscopic If Indicated (No Culture) - Urine, Clean Catch; Future  -     Protein / Creatinine Ratio, Urine - Urine, Clean Catch; Future  Check labs as ordered.  No change in symptoms at this time.    Personal history of nicotine dependence  -     CT Chest Low Dose Wo; Future  Lung cancer screening    Cigarette " smoker  -     Full Pulmonary Function Test With Bronchodilator; Future  Patient was counseled on and understood the many dangers of continuing to use tobacco. Despite this, Mr. Bravo states quitting is not an immediate priority at this time. I reminded the patient that if quitting becomes an increased priority to contact us for help with quitting including pharmacologic & nonpharmacologic options or any additional resources.    Abdominal aortic ectasia (CMS/HCC)  Asymptomatic at this time.    Increased sputum production  -     Full Pulmonary Function Test With Bronchodilator; Future  PFTs for further evaluation of possible COPD or emphysema.    Encounter for preventive health examination  -     Hepatitis C Antibody; Future    Other orders  -     fluticasone (FLONASE) 50 MCG/ACT nasal spray; 1 spray into the nostril(s) as directed by provider Daily. Each Nostral        An After Visit Summary was printed and given to the patient at discharge.  Return in about 6 months (around 2/11/2021) for Recheck.          Vaughn Kramer D.O. 8/11/2020   Electronically signed.

## 2020-08-11 NOTE — PATIENT INSTRUCTIONS
Medicare Wellness  Personal Prevention Plan of Service     Date of Office Visit:  2020  Encounter Provider:  Vaughn Kramer DO  Place of Service:  [facility]  Patient Name: Sonu Bravo  :  1945    As part of the Medicare Wellness portion of your visit today, we are providing you with this personalized preventive plan of services (PPPS). This plan is based upon recommendations of the [place] Preventive Services Task Force (USPSTF) and the Advisory Committee on Immunization Practices (ACIP).    This lists the preventive care services that should be considered, and provides dates of when you are due. Items listed as completed are up-to-date and do not require any further intervention.    Health Maintenance   Topic Date Due   • HEPATITIS C SCREENING  2019   • MEDICARE ANNUAL WELLNESS  2019   • INFLUENZA VACCINE  2020   • LIPID PANEL  2020   • LUNG CANCER SCREENING  2020   • TDAP/TD VACCINES (2 - Td) 2026   • Pneumococcal Vaccine Once at 65 Years Old  Completed   • ZOSTER VACCINE  Completed       Orders Placed This Encounter   Procedures   • CT Chest Low Dose Wo     Standing Status:   Future     Standing Expiration Date:   2021     Order Specific Question:   The patient is age 55-77:     Answer:   75     Order Specific Question:   The patient is a current smoker?     Answer:   Yes     Order Specific Question:   The patient has a smoking history of 30 pack-years or greater:     Answer:   Yes     Order Specific Question:   Actual pack - year smoking history (number):     Answer:   52     Order Specific Question:   Has the Patient had a Chest CT scan within the past 12 months?     Answer:   No     Order Specific Question:   Does the patient have any clinical signs/symptoms of lung cancer?     Answer:   Yes     Order Specific Question:   The patient was engaged in shared decision-making for this test:      Answer:   Yes   • Comprehensive Metabolic Panel     Standing Status:   Future     Standing Expiration Date:   8/11/2021   • Lipid Panel     Standing Status:   Future     Standing Expiration Date:   8/11/2021   • Hepatitis C Antibody     Standing Status:   Future     Standing Expiration Date:   8/11/2021   • TSH     Standing Status:   Future     Standing Expiration Date:   8/11/2021   • CBC (No Diff)     Standing Status:   Future     Standing Expiration Date:   8/11/2021   • Urinalysis With Microscopic If Indicated (No Culture) - Urine, Clean Catch     Standing Status:   Future     Standing Expiration Date:   8/11/2021   • Protein / Creatinine Ratio, Urine - Urine, Clean Catch     Standing Status:   Future     Standing Expiration Date:   8/11/2021   • Full Pulmonary Function Test With Bronchodilator     Standing Status:   Future     Standing Expiration Date:   8/11/2021     Order Specific Question:   PFT Procedures to Be Performed     Answer:   Spirometry Pre & Post Bronchodilator     Order Specific Question:   PFT Procedures to Be Performed     Answer:   Lung Volumes     Order Specific Question:   With:     Answer:   DLCO     Order Specific Question:   Bronchodilator Type:     Answer:   albuterol (PROVENTIL) nebulizer solution 0.083% 2.5 mg/3 mL       Return in about 6 months (around 2/11/2021) for Recheck.

## 2020-09-22 ENCOUNTER — HOSPITAL ENCOUNTER (OUTPATIENT)
Dept: CT IMAGING | Facility: HOSPITAL | Age: 75
Discharge: HOME | End: 2020-09-22
Admitting: INTERNAL MEDICINE

## 2020-09-22 ENCOUNTER — APPOINTMENT (OUTPATIENT)
Dept: PULMONOLOGY | Facility: HOSPITAL | Age: 75
End: 2020-09-22

## 2020-09-22 DIAGNOSIS — Z87.891 PERSONAL HISTORY OF NICOTINE DEPENDENCE: ICD-10-CM

## 2020-09-22 PROCEDURE — G0297 LDCT FOR LUNG CA SCREEN: HCPCS

## 2021-02-12 ENCOUNTER — OFFICE VISIT (OUTPATIENT)
Dept: INTERNAL MEDICINE | Facility: CLINIC | Age: 76
End: 2021-02-12

## 2021-02-12 VITALS
WEIGHT: 176.5 LBS | TEMPERATURE: 97.3 F | SYSTOLIC BLOOD PRESSURE: 138 MMHG | HEIGHT: 73 IN | RESPIRATION RATE: 16 BRPM | OXYGEN SATURATION: 100 % | DIASTOLIC BLOOD PRESSURE: 80 MMHG | BODY MASS INDEX: 23.39 KG/M2 | HEART RATE: 56 BPM

## 2021-02-12 DIAGNOSIS — I10 ESSENTIAL HYPERTENSION: Primary | ICD-10-CM

## 2021-02-12 DIAGNOSIS — J32.1 CHRONIC FRONTAL SINUSITIS: ICD-10-CM

## 2021-02-12 DIAGNOSIS — E78.2 MIXED HYPERLIPIDEMIA: ICD-10-CM

## 2021-02-12 DIAGNOSIS — F17.210 CIGARETTE SMOKER: ICD-10-CM

## 2021-02-12 DIAGNOSIS — E03.9 ACQUIRED HYPOTHYROIDISM: ICD-10-CM

## 2021-02-12 DIAGNOSIS — K21.00 GASTROESOPHAGEAL REFLUX DISEASE WITH ESOPHAGITIS, UNSPECIFIED WHETHER HEMORRHAGE: ICD-10-CM

## 2021-02-12 PROCEDURE — 99214 OFFICE O/P EST MOD 30 MIN: CPT | Performed by: NURSE PRACTITIONER

## 2021-02-12 NOTE — PATIENT INSTRUCTIONS
Allergic Rhinitis, Adult  Allergic rhinitis is a reaction to allergens in the air. Allergens are tiny specks (particles) in the air that cause your body to have an allergic reaction. This condition cannot be passed from person to person (is not contagious). Allergic rhinitis cannot be cured, but it can be controlled.  There are two types of allergic rhinitis:  · Seasonal. This type is also called hay fever. It happens only during certain times of the year.  · Perennial. This type can happen at any time of the year.  What are the causes?  This condition may be caused by:  · Pollen from grasses, trees, and weeds.  · House dust mites.  · Pet dander.  · Mold.  What are the signs or symptoms?  Symptoms of this condition include:  · Sneezing.  · Runny or stuffy nose (nasal congestion).  · A lot of mucus in the back of the throat (postnasal drip).  · Itchy nose.  · Tearing of the eyes.  · Trouble sleeping.  · Being sleepy during day.  How is this treated?  There is no cure for this condition. You should avoid things that trigger your symptoms (allergens). Treatment can help to relieve symptoms. This may include:  · Medicines that block allergy symptoms, such as antihistamines. These may be given as a shot, nasal spray, or pill.  · Shots that are given until your body becomes less sensitive to the allergen (desensitization).  · Stronger medicines, if all other treatments have not worked.  Follow these instructions at home:  Avoiding allergens    · Find out what you are allergic to. Common allergens include smoke, dust, and pollen.  · Avoid them if you can. These are some of the things that you can do to avoid allergens:  ? Replace carpet with wood, tile, or vinyl aj. Carpet can trap dander and dust.  ? Clean any mold found in the home.  ? Do not smoke. Do not allow smoking in your home.  ? Change your heating and air conditioning filter at least once a month.  ? During allergy season:  § Keep windows closed as much as  you can. If possible, use air conditioning when there is a lot of pollen in the air.  § Use a special filter for allergies with your furnace and air conditioner.  § Plan outdoor activities when pollen counts are lowest. This is usually during the early morning or evening hours.  § If you do go outdoors when pollen count is high, wear a special mask for people with allergies.  § When you come indoors, take a shower and change your clothes before sitting on furniture or bedding.  General instructions  · Do not use fans in your home.  · Do not hang clothes outside to dry.  · Wear sunglasses to keep pollen out of your eyes.  · Wash your hands right away after you touch household pets.  · Take over-the-counter and prescription medicines only as told by your doctor.  · Keep all follow-up visits as told by your doctor. This is important.  Contact a doctor if:  · You have a fever.  · You have a cough that does not go away (is persistent).  · You start to make whistling sounds when you breathe (wheeze).  · Your symptoms do not get better with treatment.  · You have thick fluid coming from your nose.  · You start to have nosebleeds.  Get help right away if:  · Your tongue or your lips are swollen.  · You have trouble breathing.  · You feel dizzy or you feel like you are going to pass out (faint).  · You have cold sweats.  Summary  · Allergic rhinitis is a reaction to allergens in the air.  · This condition may be caused by allergens. These include pollen, dust mites, pet dander, and mold.  · Symptoms include a runny, itchy nose, sneezing, or tearing eyes. You may also have trouble sleeping or feel sleepy during the day.  · Treatment includes taking medicines and avoiding allergens. You may also get shots or take stronger medicines.  · Get help if you have a fever or a cough that does not stop. Get help right away if you are short of breath.  This information is not intended to replace advice given to you by your health care  provider. Make sure you discuss any questions you have with your health care provider.  Document Revised: 04/07/2020 Document Reviewed: 07/09/2019  Elsevier Patient Education © 2020 [employer]  Sinusitis, Adult  Sinusitis is soreness and swelling (inflammation) of your sinuses. Sinuses are hollow spaces in the bones around your face. They are located:  · Around your eyes.  · In the middle of your forehead.  · Behind your nose.  · In your cheekbones.  Your sinuses and nasal passages are lined with a fluid called mucus. Mucus drains out of your sinuses. Swelling can trap mucus in your sinuses. This lets germs (bacteria, virus, or fungus) grow, which leads to infection. Most of the time, this condition is caused by a virus.  What are the causes?  This condition is caused by:  · Allergies.  · Asthma.  · Germs.  · Things that block your nose or sinuses.  · Growths in the nose (nasal polyps).  · Chemicals or irritants in the air.  · Fungus (rare).  What increases the risk?  You are more likely to develop this condition if:  · You have a weak body defense system (immune system).  · You do a lot of swimming or diving.  · You use nasal sprays too much.  · You smoke.  What are the signs or symptoms?  The main symptoms of this condition are pain and a feeling of pressure around the sinuses. Other symptoms include:  · Stuffy nose (congestion).  · Runny nose (drainage).  · Swelling and warmth in the sinuses.  · Headache.  · Toothache.  · A cough that may get worse at night.  · Mucus that collects in the throat or the back of the nose (postnasal drip).  · Being unable to smell and taste.  · Being very tired (fatigue).  · A fever.  · Sore throat.  · Bad breath.  How is this diagnosed?  This condition is diagnosed based on:  · Your symptoms.  · Your medical history.  · A physical exam.  · Tests to find out if your condition is short-term (acute) or long-term (chronic). Your doctor may:  ? Check your nose for growths  (polyps).  ? Check your sinuses using a tool that has a light (endoscope).  ? Check for allergies or germs.  ? Do imaging tests, such as an MRI or CT scan.  How is this treated?  Treatment for this condition depends on the cause and whether it is short-term or long-term.  · If caused by a virus, your symptoms should go away on their own within 10 days. You may be given medicines to relieve symptoms. They include:  ? Medicines that shrink swollen tissue in the nose.  ? Medicines that treat allergies (antihistamines).  ? A spray that treats swelling of the nostrils.   ? Rinses that help get rid of thick mucus in your nose (nasal saline washes).  · If caused by bacteria, your doctor may wait to see if you will get better without treatment. You may be given antibiotic medicine if you have:  ? A very bad infection.  ? A weak body defense system.  · If caused by growths in the nose, you may need to have surgery.  Follow these instructions at home:  Medicines  · Take, use, or apply over-the-counter and prescription medicines only as told by your doctor. These may include nasal sprays.  · If you were prescribed an antibiotic medicine, take it as told by your doctor. Do not stop taking the antibiotic even if you start to feel better.  Hydrate and humidify    · Drink enough water to keep your pee (urine) pale yellow.  · Use a cool mist humidifier to keep the humidity level in your home above 50%.  · Breathe in steam for 10-15 minutes, 3-4 times a day, or as told by your doctor. You can do this in the bathroom while a hot shower is running.  · Try not to spend time in cool or dry air.  Rest  · Rest as much as you can.  · Sleep with your head raised (elevated).  · Make sure you get enough sleep each night.  General instructions    · Put a warm, moist washcloth on your face 3-4 times a day, or as often as told by your doctor. This will help with discomfort.  · Wash your hands often with soap and water. If there is no soap and  water, use hand .  · Do not smoke. Avoid being around people who are smoking (secondhand smoke).  · Keep all follow-up visits as told by your doctor. This is important.  Contact a doctor if:  · You have a fever.  · Your symptoms get worse.  · Your symptoms do not get better within 10 days.  Get help right away if:  · You have a very bad headache.  · You cannot stop throwing up (vomiting).  · You have very bad pain or swelling around your face or eyes.  · You have trouble seeing.  · You feel confused.  · Your neck is stiff.  · You have trouble breathing.  Summary  · Sinusitis is swelling of your sinuses. Sinuses are hollow spaces in the bones around your face.  · This condition is caused by tissues in your nose that become inflamed or swollen. This traps germs. These can lead to infection.  · If you were prescribed an antibiotic medicine, take it as told by your doctor. Do not stop taking it even if you start to feel better.  · Keep all follow-up visits as told by your doctor. This is important.  This information is not intended to replace advice given to you by your health care provider. Make sure you discuss any questions you have with your health care provider.  Document Revised: 05/20/2019 Document Reviewed: 05/20/2019  [employer] Patient Education © 2020 [employer] Inc[employer]

## 2021-02-12 NOTE — PROGRESS NOTES
"Chief Complaint   Patient presents with   • Follow-up   • Sinus Problem     congestion and \"draining\" still with use of Flonase and Allegra       History:  Sonu Bravo is a 75 y.o. male who presents today for follow-up for evaluation of the above:    Patient reports compliance with blood pressure medications without adverse side effects.     Sinus Problem  This is a chronic problem. The current episode started more than 1 year ago. The problem is unchanged. There has been no fever. Associated symptoms include congestion, coughing, sinus pressure and sneezing. Pertinent negatives include no chills, ear pain, headaches, hoarse voice, neck pain, shortness of breath or sore throat. Past treatments include oral decongestants (antihistamine). The treatment provided mild relief.     He is a current smoker without plans to quit at this time  Having COVID vaccine next week.   Tolerating statin well without myalgia  GERD well controlled on current therapy.         ROS:  Review of Systems   Constitutional: Negative for activity change, appetite change, chills, fatigue, fever and unexpected weight change.   HENT: Positive for congestion, sinus pressure and sneezing. Negative for ear pain, hoarse voice and sore throat.    Respiratory: Positive for cough. Negative for chest tightness, shortness of breath and wheezing.    Cardiovascular: Negative for chest pain, palpitations and leg swelling.   Gastrointestinal: Negative.    Endocrine: Negative.    Genitourinary: Negative.    Musculoskeletal: Negative.  Negative for neck pain.   Skin: Negative.    Neurological: Negative for dizziness and headaches.   Psychiatric/Behavioral: Negative.        Mr. Bravo  reports that he has been smoking cigarettes. He started smoking about 54 years ago. He has a 104.00 pack-year smoking history. He has never used smokeless tobacco. He reports current alcohol use. He reports that he does not use drugs.      Current Outpatient Medications:   •  " "bisoprolol (ZEBeta) 10 MG tablet, Take 1 tablet by mouth Daily., Disp: 90 tablet, Rfl: 3  •  Cholecalciferol 4000 units capsule, Take 1 tablet by mouth Daily., Disp: , Rfl:   •  Coenzyme Q10 (CO Q-10) 100 MG capsule, Take 1 tablet by mouth Daily., Disp: , Rfl:   •  Cyanocobalamin (VITAMIN B 12 PO), Take 1 tablet by mouth Daily. 2,500 mg, Disp: , Rfl:   •  fexofenadine (ALLEGRA) 180 MG tablet, Take 180 mg by mouth Daily., Disp: , Rfl:   •  fluticasone (FLONASE) 50 MCG/ACT nasal spray, 1 spray into the nostril(s) as directed by provider Daily. Each Nostral, Disp: 3 bottle, Rfl: 3  •  levothyroxine (SYNTHROID, LEVOTHROID) 100 MCG tablet, Take 1 tablet by mouth Daily., Disp: 90 tablet, Rfl: 3  •  Multiple Vitamin (MULTI-VITAMIN DAILY PO), Take 1 tablet by mouth Daily., Disp: , Rfl:   •  omeprazole (priLOSEC) 20 MG capsule, Take 1 capsule by mouth Daily., Disp: 90 capsule, Rfl: 3  •  rosuvastatin (CRESTOR) 20 MG tablet, Take 1 tablet by mouth Daily., Disp: 90 tablet, Rfl: 3      OBJECTIVE:  /80 (BP Location: Left arm)   Pulse 56   Temp 97.3 °F (36.3 °C) (Temporal)   Resp 16   Ht 185.4 cm (73\")   Wt 80.1 kg (176 lb 8 oz)   SpO2 100%   BMI 23.29 kg/m²    Physical Exam  Vitals signs reviewed.   Constitutional:       Appearance: He is well-developed.   HENT:      Head: Normocephalic and atraumatic.   Eyes:      Conjunctiva/sclera: Conjunctivae normal.      Pupils: Pupils are equal, round, and reactive to light.   Neck:      Musculoskeletal: Normal range of motion and neck supple.   Cardiovascular:      Rate and Rhythm: Normal rate and regular rhythm.      Heart sounds: Normal heart sounds.   Pulmonary:      Effort: Pulmonary effort is normal.      Breath sounds: Normal breath sounds.   Abdominal:      General: Bowel sounds are normal.      Palpations: Abdomen is soft.   Skin:     General: Skin is warm and dry.   Neurological:      Mental Status: He is alert and oriented to person, place, and time.      Deep " Tendon Reflexes: Reflexes are normal and symmetric.         Assessment/Plan    Diagnoses and all orders for this visit:    1. Essential hypertension (Primary)  Well controlled, BP goal for age is <140/90 per JNC 8 guidelines and continue current medications    2. Cigarette smoker  Patient was counseled on and understood the many dangers of continuing to use tobacco. Despite this patient states quitting is not an immediate priority at this time. I reminded the patient that if quitting becomes an increased priority to contact us for help with quitting including pharmacologic & nonpharmacologic options or any additional resources.    3. Acquired hypothyroidism  Stable on current therapy    4. Mixed hyperlipidemia  Continue statin     5. Gastroesophageal reflux disease with esophagitis, unspecified whether hemorrhage  Stable on current therapy    6. Chronic frontal sinusitis  Continue flonase and allegra. Discussed nasal rinse and avoidance of allergens including smoking.   With covid vaccine within one week, would not recommend systemic steroids at this time. Patient VU       An After Visit Summary was printed and given to the patient at discharge.  Return in about 6 months (around 8/12/2021) for Medicare Wellness. Sooner if problems arise.          Lynnette Whaley APRN. 2/12/2021   Electronically Signed

## 2021-02-22 ENCOUNTER — IMMUNIZATION (OUTPATIENT)
Dept: VACCINE CLINIC | Facility: HOSPITAL | Age: 76
End: 2021-02-22

## 2021-02-22 PROCEDURE — 91301 HC SARSCO02 VAC 100MCG/0.5ML IM: CPT | Performed by: OBSTETRICS & GYNECOLOGY

## 2021-02-22 PROCEDURE — 0011A: CPT | Performed by: OBSTETRICS & GYNECOLOGY

## 2021-03-10 ENCOUNTER — TRANSCRIBE ORDERS (OUTPATIENT)
Dept: ADMINISTRATIVE | Facility: HOSPITAL | Age: 76
End: 2021-03-10

## 2021-03-10 ENCOUNTER — HOSPITAL ENCOUNTER (OUTPATIENT)
Dept: GENERAL RADIOLOGY | Facility: HOSPITAL | Age: 76
Discharge: HOME | End: 2021-03-10
Admitting: NURSE PRACTITIONER

## 2021-03-10 DIAGNOSIS — M54.16 LUMBAR RADICULOPATHY: Primary | ICD-10-CM

## 2021-03-10 PROCEDURE — 72110 X-RAY EXAM L-2 SPINE 4/>VWS: CPT

## 2021-03-22 ENCOUNTER — IMMUNIZATION (OUTPATIENT)
Dept: VACCINE CLINIC | Facility: HOSPITAL | Age: 76
End: 2021-03-22

## 2021-03-22 PROCEDURE — 91301 HC SARSCO02 VAC 100MCG/0.5ML IM: CPT | Performed by: OBSTETRICS & GYNECOLOGY

## 2021-03-22 PROCEDURE — 0012A: CPT | Performed by: OBSTETRICS & GYNECOLOGY

## 2021-08-13 ENCOUNTER — OFFICE VISIT (OUTPATIENT)
Dept: INTERNAL MEDICINE | Facility: CLINIC | Age: 76
End: 2021-08-13

## 2021-08-13 ENCOUNTER — LAB (OUTPATIENT)
Dept: LAB | Facility: HOSPITAL | Age: 76
End: 2021-08-13

## 2021-08-13 VITALS
OXYGEN SATURATION: 99 % | SYSTOLIC BLOOD PRESSURE: 150 MMHG | DIASTOLIC BLOOD PRESSURE: 86 MMHG | RESPIRATION RATE: 16 BRPM | HEIGHT: 73 IN | WEIGHT: 171 LBS | TEMPERATURE: 98 F | HEART RATE: 55 BPM | BODY MASS INDEX: 22.66 KG/M2

## 2021-08-13 DIAGNOSIS — E03.9 ACQUIRED HYPOTHYROIDISM: ICD-10-CM

## 2021-08-13 DIAGNOSIS — R09.82 PND (POST-NASAL DRIP): ICD-10-CM

## 2021-08-13 DIAGNOSIS — K21.00 GASTRO-ESOPHAGEAL REFLUX DISEASE WITH ESOPHAGITIS: ICD-10-CM

## 2021-08-13 DIAGNOSIS — Z12.5 SCREENING FOR MALIGNANT NEOPLASM OF PROSTATE: ICD-10-CM

## 2021-08-13 DIAGNOSIS — I10 ESSENTIAL HYPERTENSION: ICD-10-CM

## 2021-08-13 DIAGNOSIS — F17.210 CIGARETTE SMOKER: ICD-10-CM

## 2021-08-13 DIAGNOSIS — Z12.2 ENCOUNTER FOR SCREENING FOR LUNG CANCER: ICD-10-CM

## 2021-08-13 DIAGNOSIS — Z00.00 MEDICARE ANNUAL WELLNESS VISIT, SUBSEQUENT: Primary | ICD-10-CM

## 2021-08-13 DIAGNOSIS — E78.2 MIXED HYPERLIPIDEMIA: ICD-10-CM

## 2021-08-13 DIAGNOSIS — Z00.00 MEDICARE ANNUAL WELLNESS VISIT, SUBSEQUENT: ICD-10-CM

## 2021-08-13 DIAGNOSIS — Z87.891 PERSONAL HISTORY OF NICOTINE DEPENDENCE: ICD-10-CM

## 2021-08-13 LAB
ALBUMIN SERPL-MCNC: 4.1 G/DL (ref 3.5–5.2)
ALBUMIN/GLOB SERPL: 1.6 G/DL
ALP SERPL-CCNC: 57 U/L (ref 39–117)
ALT SERPL W P-5'-P-CCNC: 24 U/L (ref 1–41)
ANION GAP SERPL CALCULATED.3IONS-SCNC: 7.3 MMOL/L (ref 5–15)
AST SERPL-CCNC: 21 U/L (ref 1–40)
BILIRUB SERPL-MCNC: 0.4 MG/DL (ref 0–1.2)
BUN SERPL-MCNC: 15 MG/DL (ref 8–23)
BUN/CREAT SERPL: 15 (ref 7–25)
CALCIUM SPEC-SCNC: 8.9 MG/DL (ref 8.6–10.5)
CHLORIDE SERPL-SCNC: 101 MMOL/L (ref 98–107)
CHOLEST SERPL-MCNC: 127 MG/DL (ref 0–200)
CO2 SERPL-SCNC: 27.7 MMOL/L (ref 22–29)
CREAT SERPL-MCNC: 1 MG/DL (ref 0.76–1.27)
DEPRECATED RDW RBC AUTO: 50.1 FL (ref 37–54)
ERYTHROCYTE [DISTWIDTH] IN BLOOD BY AUTOMATED COUNT: 14.5 % (ref 12.3–15.4)
GFR SERPL CREATININE-BSD FRML MDRD: 72 ML/MIN/1.73
GLOBULIN UR ELPH-MCNC: 2.6 GM/DL
GLUCOSE SERPL-MCNC: 104 MG/DL (ref 65–99)
HCT VFR BLD AUTO: 48.5 % (ref 37.5–51)
HDLC SERPL-MCNC: 44 MG/DL (ref 40–60)
HGB BLD-MCNC: 16.2 G/DL (ref 13–17.7)
LDLC SERPL CALC-MCNC: 60 MG/DL (ref 0–100)
LDLC/HDLC SERPL: 1.28 {RATIO}
MCH RBC QN AUTO: 31.6 PG (ref 26.6–33)
MCHC RBC AUTO-ENTMCNC: 33.4 G/DL (ref 31.5–35.7)
MCV RBC AUTO: 94.7 FL (ref 79–97)
PLATELET # BLD AUTO: 156 10*3/MM3 (ref 140–450)
PMV BLD AUTO: 11.1 FL (ref 6–12)
POTASSIUM SERPL-SCNC: 4.3 MMOL/L (ref 3.5–5.2)
PROT SERPL-MCNC: 6.7 G/DL (ref 6–8.5)
PSA SERPL-MCNC: 2.12 NG/ML (ref 0–4)
RBC # BLD AUTO: 5.12 10*6/MM3 (ref 4.14–5.8)
SODIUM SERPL-SCNC: 136 MMOL/L (ref 136–145)
TRIGL SERPL-MCNC: 133 MG/DL (ref 0–150)
TSH SERPL DL<=0.05 MIU/L-ACNC: 5.34 UIU/ML (ref 0.27–4.2)
VLDLC SERPL-MCNC: 23 MG/DL (ref 5–40)
WBC # BLD AUTO: 9.2 10*3/MM3 (ref 3.4–10.8)

## 2021-08-13 PROCEDURE — 80053 COMPREHEN METABOLIC PANEL: CPT

## 2021-08-13 PROCEDURE — 1159F MED LIST DOCD IN RCRD: CPT | Performed by: NURSE PRACTITIONER

## 2021-08-13 PROCEDURE — G0439 PPPS, SUBSEQ VISIT: HCPCS | Performed by: NURSE PRACTITIONER

## 2021-08-13 PROCEDURE — 96160 PT-FOCUSED HLTH RISK ASSMT: CPT | Performed by: NURSE PRACTITIONER

## 2021-08-13 PROCEDURE — G0103 PSA SCREENING: HCPCS

## 2021-08-13 PROCEDURE — 99214 OFFICE O/P EST MOD 30 MIN: CPT | Performed by: NURSE PRACTITIONER

## 2021-08-13 PROCEDURE — 36415 COLL VENOUS BLD VENIPUNCTURE: CPT

## 2021-08-13 PROCEDURE — 80061 LIPID PANEL: CPT

## 2021-08-13 PROCEDURE — 85027 COMPLETE CBC AUTOMATED: CPT

## 2021-08-13 PROCEDURE — 84443 ASSAY THYROID STIM HORMONE: CPT

## 2021-08-13 PROCEDURE — 1170F FXNL STATUS ASSESSED: CPT | Performed by: NURSE PRACTITIONER

## 2021-08-13 RX ORDER — FLUTICASONE PROPIONATE 50 MCG
1 SPRAY, SUSPENSION (ML) NASAL DAILY
Qty: 16 G | Refills: 5 | Status: SHIPPED | OUTPATIENT
Start: 2021-08-13 | End: 2022-05-11

## 2021-08-13 RX ORDER — ROSUVASTATIN CALCIUM 20 MG/1
20 TABLET, COATED ORAL DAILY
Qty: 90 TABLET | Refills: 3 | Status: ON HOLD | OUTPATIENT
Start: 2021-08-13 | End: 2021-08-31 | Stop reason: SDUPTHER

## 2021-08-13 RX ORDER — LEVOTHYROXINE SODIUM 0.1 MG/1
100 TABLET ORAL DAILY
Qty: 90 TABLET | Refills: 3 | Status: SHIPPED | OUTPATIENT
Start: 2021-08-13 | End: 2022-05-04

## 2021-08-13 RX ORDER — BISOPROLOL FUMARATE 10 MG/1
10 TABLET, FILM COATED ORAL DAILY
Qty: 90 TABLET | Refills: 3 | Status: SHIPPED | OUTPATIENT
Start: 2021-08-13 | End: 2021-09-23

## 2021-08-13 RX ORDER — OMEPRAZOLE 20 MG/1
20 CAPSULE, DELAYED RELEASE ORAL DAILY
Qty: 90 CAPSULE | Refills: 3 | Status: SHIPPED | OUTPATIENT
Start: 2021-08-13 | End: 2022-09-06 | Stop reason: SDUPTHER

## 2021-08-13 NOTE — PROGRESS NOTES
The ABCs of the Annual Wellness Visit  Subsequent Medicare Wellness Visit    Chief Complaint   Patient presents with   • Medicare Wellness-subsequent   • Sinus Problem     runny nose and post nasal drip       Subjective   History of Present Illness:  Sonu Bravo is a 76 y.o. male who presents for a Subsequent Medicare Wellness Visit.    HEALTH RISK ASSESSMENT    Recent Hospitalizations:  No hospitalization(s) within the last year.    Current Medical Providers:  Patient Care Team:  Vaughn Kramer DO as PCP - General (Internal Medicine)    Smoking Status:  Social History     Tobacco Use   Smoking Status Current Every Day Smoker   • Packs/day: 2.00   • Years: 52.00   • Pack years: 104.00   • Types: Cigarettes   • Start date: 1967   Smokeless Tobacco Never Used       Alcohol Consumption:  Social History     Substance and Sexual Activity   Alcohol Use Yes       Depression Screen:   PHQ-2/PHQ-9 Depression Screening 8/13/2021   Little interest or pleasure in doing things 0   Feeling down, depressed, or hopeless 0   Total Score 0       Fall Risk Screen:  SANG Fall Risk Assessment was completed, and patient is at LOW risk for falls.Assessment completed on:8/13/2021    Health Habits and Functional and Cognitive Screening:  Functional & Cognitive Status 8/13/2021   Do you have difficulty preparing food and eating? No   Do you have difficulty bathing yourself, getting dressed or grooming yourself? No   Do you have difficulty using the toilet? No   Do you have difficulty moving around from place to place? No   Do you have trouble with steps or getting out of a bed or a chair? No   Current Diet Well Balanced Diet   Dental Exam Up to date   Eye Exam Up to date   Exercise (times per week) 4 times per week   Current Exercises Include Walking        Exercise Comment also yard work and plays golf at least once weekly   Current Exercise Activities Include -   Do you need help using the phone?  No   Are you deaf or do you have  serious difficulty hearing?  No   Do you need help with transportation? No   Do you need help shopping? No   Do you need help preparing meals?  No   Do you need help with housework?  No   Do you need help with laundry? No   Do you need help taking your medications? No   Do you need help managing money? No   Do you ever drive or ride in a car without wearing a seat belt? No   Have you felt unusual stress, anger or loneliness in the last month? No   Who do you live with? Spouse   If you need help, do you have trouble finding someone available to you? No   Have you been bothered in the last four weeks by sexual problems? No   Do you have difficulty concentrating, remembering or making decisions? Yes         Does the patient have evidence of cognitive impairment? No    Asprin use counseling:Does not need ASA (and currently is not on it)    Age-appropriate Screening Schedule:  Refer to the list below for future screening recommendations based on patient's age, sex and/or medical conditions. Orders for these recommended tests are listed in the plan section. The patient has been provided with a written plan.    Health Maintenance   Topic Date Due   • LIPID PANEL  08/11/2021   • INFLUENZA VACCINE  10/01/2021   • TDAP/TD VACCINES (2 - Td or Tdap) 11/01/2026   • ZOSTER VACCINE  Completed          The following portions of the patient's history were reviewed and updated as appropriate: allergies, current medications, past family history, past medical history, past social history, past surgical history and problem list.    Outpatient Medications Prior to Visit   Medication Sig Dispense Refill   • Cholecalciferol 4000 units capsule Take 1 tablet by mouth Daily.     • Coenzyme Q10 (CO Q-10) 100 MG capsule Take 1 tablet by mouth Daily.     • Cyanocobalamin (VITAMIN B 12 PO) Take 1 tablet by mouth Daily. 2,500 mg     • fexofenadine (ALLEGRA) 180 MG tablet Take 180 mg by mouth Daily.     • Multiple Vitamin (MULTI-VITAMIN DAILY PO)  "Take 1 tablet by mouth Daily.     • Multiple Vitamins-Minerals (ZINC PO) Take 1 tablet by mouth Daily.     • bisoprolol (ZEBeta) 10 MG tablet Take 1 tablet by mouth Daily. 90 tablet 3   • fluticasone (FLONASE) 50 MCG/ACT nasal spray 1 spray into the nostril(s) as directed by provider Daily. Each Nostral 3 bottle 3   • levothyroxine (SYNTHROID, LEVOTHROID) 100 MCG tablet Take 1 tablet by mouth Daily. 90 tablet 3   • omeprazole (priLOSEC) 20 MG capsule Take 1 capsule by mouth Daily. 90 capsule 3   • rosuvastatin (CRESTOR) 20 MG tablet Take 1 tablet by mouth Daily. 90 tablet 3     No facility-administered medications prior to visit.       Patient Active Problem List   Diagnosis   • Abdominal aortic ectasia (CMS/HCC)   • Vitamin D deficiency   • Spinal stenosis of lumbar region with neurogenic claudication   • Mixed hyperlipidemia   • Acquired hypothyroidism   • Gastro-esophageal reflux disease with esophagitis   • Essential hypertension   • Cigarette smoker       Advanced Care Planning:  ACP discussion was held with the patient during this visit. Patient has an advance directive in EMR which is still valid.     Review of Systems    Compared to one year ago, the patient feels his physical health is worse.  Compared to one year ago, the patient feels his mental health is the same.    Reviewed chart for potential of high risk medication in the elderly: yes  Reviewed chart for potential of harmful drug interactions in the elderly:yes    Objective         Vitals:    08/13/21 0939 08/13/21 1006   BP: 170/90 150/86   BP Location: Left arm Left arm   Patient Position: Sitting    Cuff Size: Adult    Pulse: 55    Resp: 16    Temp: 98 °F (36.7 °C)    TempSrc: Temporal    SpO2: 99%    Weight: 77.6 kg (171 lb)    Height: 185.4 cm (73\")        Body mass index is 22.56 kg/m².  Discussed the patient's BMI with him. The BMI is in the acceptable range.    Physical Exam          Assessment/Plan   Medicare Risks and Personalized Health " Plan  CMS Preventative Services Quick Reference  Chronic Pain   Immunizations Discussed/Encouraged (specific immunizations; Influenza, Pneumococcal 23 and COVID19 )    The above risks/problems have been discussed with the patient.  Pertinent information has been shared with the patient in the After Visit Summary.  Follow up plans and orders are seen below in the Assessment/Plan Section.    Diagnoses and all orders for this visit:    1. Medicare annual wellness visit, subsequent (Primary)  -     Lipid panel; Future  -     Comprehensive metabolic panel; Future  -     CBC No Differential; Future    2. Essential hypertension  -     bisoprolol (ZEBeta) 10 MG tablet; Take 1 tablet by mouth Daily.  Dispense: 90 tablet; Refill: 3    3. Acquired hypothyroidism  -     levothyroxine (SYNTHROID, LEVOTHROID) 100 MCG tablet; Take 1 tablet by mouth Daily.  Dispense: 90 tablet; Refill: 3  -     TSH; Future    4. Gastro-esophageal reflux disease with esophagitis  -     omeprazole (priLOSEC) 20 MG capsule; Take 1 capsule by mouth Daily.  Dispense: 90 capsule; Refill: 3    5. Mixed hyperlipidemia  -     rosuvastatin (CRESTOR) 20 MG tablet; Take 1 tablet by mouth Daily.  Dispense: 90 tablet; Refill: 3    6. Cigarette smoker    7. Screening for malignant neoplasm of prostate  -     PSA SCREENING; Future    8. Encounter for screening for lung cancer  -     CT Chest Low Dose Wo; Future    9. Personal history of nicotine dependence   -     CT Chest Low Dose Wo; Future    10. PND (post-nasal drip)    Other orders  -     fluticasone (FLONASE) 50 MCG/ACT nasal spray; 1 spray into the nostril(s) as directed by provider Daily. Each Nostral  Dispense: 16 g; Refill: 5      Follow Up:  Return in about 6 months (around 2/13/2022).     An After Visit Summary and PPPS were given to the patient.

## 2021-08-13 NOTE — PROGRESS NOTES
Chief Complaint   Patient presents with   • Medicare Wellness-subsequent   • Sinus Problem     runny nose and post nasal drip       History:  Sonu Bravo is a 76 y.o. male who presents today for follow-up for evaluation of the above:    HPI   Patient presents today for follow-up Medicare wellness and follow-up of hypertension.  He reports that his blood pressure is normally well controlled though it is elevated today.  He states yesterday he was notified that his brother in-law passes away from a sudden heart attack at home and he has been dealing with this with his wife. Has been feeling well        ROS:  Review of Systems   Constitutional: Negative for activity change, appetite change, fatigue, fever and unexpected weight change.   HENT: Positive for postnasal drip.    Respiratory: Negative for cough, chest tightness, shortness of breath and wheezing.    Cardiovascular: Negative for chest pain, palpitations and leg swelling.   Gastrointestinal: Negative.    Endocrine: Negative.    Genitourinary: Negative.    Musculoskeletal: Negative.    Skin: Negative.    Neurological: Negative for dizziness and headaches.   Psychiatric/Behavioral: Negative.        Mr. Bravo  reports that he has been smoking cigarettes. He started smoking about 54 years ago. He has a 104.00 pack-year smoking history. He has never used smokeless tobacco. He reports current alcohol use. He reports that he does not use drugs.      Current Outpatient Medications:   •  bisoprolol (ZEBeta) 10 MG tablet, Take 1 tablet by mouth Daily., Disp: 90 tablet, Rfl: 3  •  Cholecalciferol 4000 units capsule, Take 1 tablet by mouth Daily., Disp: , Rfl:   •  Coenzyme Q10 (CO Q-10) 100 MG capsule, Take 1 tablet by mouth Daily., Disp: , Rfl:   •  Cyanocobalamin (VITAMIN B 12 PO), Take 1 tablet by mouth Daily. 2,500 mg, Disp: , Rfl:   •  fexofenadine (ALLEGRA) 180 MG tablet, Take 180 mg by mouth Daily., Disp: , Rfl:   •  fluticasone (FLONASE) 50 MCG/ACT nasal spray, 1  "spray into the nostril(s) as directed by provider Daily. Each Nostral, Disp: 16 g, Rfl: 5  •  levothyroxine (SYNTHROID, LEVOTHROID) 100 MCG tablet, Take 1 tablet by mouth Daily., Disp: 90 tablet, Rfl: 3  •  Multiple Vitamin (MULTI-VITAMIN DAILY PO), Take 1 tablet by mouth Daily., Disp: , Rfl:   •  Multiple Vitamins-Minerals (ZINC PO), Take 1 tablet by mouth Daily., Disp: , Rfl:   •  omeprazole (priLOSEC) 20 MG capsule, Take 1 capsule by mouth Daily., Disp: 90 capsule, Rfl: 3  •  rosuvastatin (CRESTOR) 20 MG tablet, Take 1 tablet by mouth Daily., Disp: 90 tablet, Rfl: 3      OBJECTIVE:  /86 (BP Location: Left arm)   Pulse 55   Temp 98 °F (36.7 °C) (Temporal)   Resp 16   Ht 185.4 cm (73\")   Wt 77.6 kg (171 lb)   SpO2 99%   BMI 22.56 kg/m²    Physical Exam  Vitals reviewed.   Constitutional:       Appearance: He is well-developed.   HENT:      Head: Normocephalic and atraumatic.   Eyes:      Conjunctiva/sclera: Conjunctivae normal.      Pupils: Pupils are equal, round, and reactive to light.   Cardiovascular:      Rate and Rhythm: Normal rate and regular rhythm.      Heart sounds: Normal heart sounds.   Pulmonary:      Effort: Pulmonary effort is normal.      Breath sounds: Normal breath sounds.   Abdominal:      General: Bowel sounds are normal.      Palpations: Abdomen is soft.   Musculoskeletal:      Cervical back: Normal range of motion and neck supple.   Skin:     General: Skin is warm and dry.   Neurological:      Mental Status: He is alert and oriented to person, place, and time.      Deep Tendon Reflexes: Reflexes are normal and symmetric.         Assessment/Plan    Diagnoses and all orders for this visit:    1. Medicare annual wellness visit, subsequent (Primary)  -     Lipid panel; Future  -     Comprehensive metabolic panel; Future  -     CBC No Differential; Future  See associated note     2. Essential hypertension  -     bisoprolol (ZEBeta) 10 MG tablet; Take 1 tablet by mouth Daily.  " Dispense: 90 tablet; Refill: 3  Not well controlled. Patient will monitor at home given acute family stress.     3. Acquired hypothyroidism  -     levothyroxine (SYNTHROID, LEVOTHROID) 100 MCG tablet; Take 1 tablet by mouth Daily.  Dispense: 90 tablet; Refill: 3  -     TSH; Future  Labs to monitor    4. Gastro-esophageal reflux disease with esophagitis  -     omeprazole (priLOSEC) 20 MG capsule; Take 1 capsule by mouth Daily.  Dispense: 90 capsule; Refill: 3  Stable on current therapy    5. Mixed hyperlipidemia  -     rosuvastatin (CRESTOR) 20 MG tablet; Take 1 tablet by mouth Daily.  Dispense: 90 tablet; Refill: 3    6. Cigarette smoker  Patient was counseled on and understood the many dangers of continuing to use tobacco.  I reminded the patient that if quitting becomes an increased priority to contact us for help with quitting including pharmacologic & nonpharmacologic options or any additional resources.    7. Screening for malignant neoplasm of prostate  -     PSA SCREENING; Future    8. Encounter for screening for lung cancer  -     CT Chest Low Dose Wo; Future    9. Personal history of nicotine dependence   -     CT Chest Low Dose Wo; Future    10. PND (post-nasal drip)  -     fluticasone (FLONASE) 50 MCG/ACT nasal spray; 1 spray into the nostril(s) as directed by provider Daily. Each Nostral  Dispense: 16 g; Refill: 5  He will change to using his Flonase in the morning instead of at night.  Follow-up if not improving       An After Visit Summary was printed and given to the patient at discharge.  Return in about 6 months (around 2/13/2022). Sooner if problems arise.          Lynnette HERNANDEZ. 8/13/2021   Electronically Signed

## 2021-08-20 ENCOUNTER — OFFICE VISIT (OUTPATIENT)
Dept: INTERNAL MEDICINE | Facility: CLINIC | Age: 76
End: 2021-08-20

## 2021-08-20 VITALS
BODY MASS INDEX: 22.8 KG/M2 | DIASTOLIC BLOOD PRESSURE: 74 MMHG | HEART RATE: 55 BPM | SYSTOLIC BLOOD PRESSURE: 162 MMHG | WEIGHT: 172 LBS | TEMPERATURE: 98 F | HEIGHT: 73 IN | RESPIRATION RATE: 16 BRPM | OXYGEN SATURATION: 98 %

## 2021-08-20 DIAGNOSIS — I10 ESSENTIAL HYPERTENSION: Primary | ICD-10-CM

## 2021-08-20 PROCEDURE — 99213 OFFICE O/P EST LOW 20 MIN: CPT | Performed by: NURSE PRACTITIONER

## 2021-08-20 NOTE — PROGRESS NOTES
Chief Complaint   Patient presents with   • Hypertension       History:  Sonu Bravo is a 76 y.o. male who presents today for follow-up for evaluation of the above:    HPI   Patient presents today for follow-up hypertension.  He reports he has been monitoring his blood pressure since being in the office last week and has been seen elevated readings.  His most elevated blood 200/100.  He reports he has no symptoms when it is high.  Situational stress with death in the family.           ROS:  Review of Systems   Respiratory: Negative for shortness of breath.    Cardiovascular: Negative for chest pain and palpitations.   Musculoskeletal: Negative for neck pain.   Neurological: Negative for headaches.       Mr. Bravo  reports that he has been smoking cigarettes. He started smoking about 54 years ago. He has a 104.00 pack-year smoking history. He has never used smokeless tobacco. He reports current alcohol use. He reports that he does not use drugs.      Current Outpatient Medications:   •  bisoprolol (ZEBeta) 10 MG tablet, Take 1 tablet by mouth Daily., Disp: 90 tablet, Rfl: 3  •  Cholecalciferol 4000 units capsule, Take 1 tablet by mouth Daily., Disp: , Rfl:   •  Coenzyme Q10 (CO Q-10) 100 MG capsule, Take 1 tablet by mouth Daily., Disp: , Rfl:   •  Cyanocobalamin (VITAMIN B 12 PO), Take 1 tablet by mouth Daily. 2,500 mg, Disp: , Rfl:   •  fexofenadine (ALLEGRA) 180 MG tablet, Take 180 mg by mouth Daily., Disp: , Rfl:   •  fluticasone (FLONASE) 50 MCG/ACT nasal spray, 1 spray into the nostril(s) as directed by provider Daily. Each Nostral, Disp: 16 g, Rfl: 5  •  levothyroxine (SYNTHROID, LEVOTHROID) 100 MCG tablet, Take 1 tablet by mouth Daily., Disp: 90 tablet, Rfl: 3  •  Multiple Vitamin (MULTI-VITAMIN DAILY PO), Take 1 tablet by mouth Daily., Disp: , Rfl:   •  Multiple Vitamins-Minerals (ZINC PO), Take 1 tablet by mouth Daily., Disp: , Rfl:   •  omeprazole (priLOSEC) 20 MG capsule, Take 1 capsule by mouth Daily.,  "Disp: 90 capsule, Rfl: 3  •  rosuvastatin (CRESTOR) 20 MG tablet, Take 1 tablet by mouth Daily., Disp: 90 tablet, Rfl: 3      OBJECTIVE:  /74 (BP Location: Right arm, Patient Position: Sitting, Cuff Size: Adult)   Pulse 55   Temp 98 °F (36.7 °C) (Temporal)   Resp 16   Ht 185.4 cm (73\")   Wt 78 kg (172 lb)   SpO2 98%   BMI 22.69 kg/m²    Physical Exam  Vitals reviewed.   Constitutional:       Appearance: He is well-developed.   HENT:      Head: Normocephalic and atraumatic.   Eyes:      Conjunctiva/sclera: Conjunctivae normal.      Pupils: Pupils are equal, round, and reactive to light.   Cardiovascular:      Rate and Rhythm: Normal rate and regular rhythm.      Heart sounds: Normal heart sounds.   Pulmonary:      Effort: Pulmonary effort is normal.      Breath sounds: Normal breath sounds.   Abdominal:      General: Bowel sounds are normal.      Palpations: Abdomen is soft.   Musculoskeletal:      Cervical back: Normal range of motion and neck supple.   Skin:     General: Skin is warm and dry.   Neurological:      Mental Status: He is alert and oriented to person, place, and time.      Deep Tendon Reflexes: Reflexes are normal and symmetric.         Assessment/Plan    Diagnoses and all orders for this visit:    1. Essential hypertension (Primary)    Discussed starting lisinopril. He would like to continue to monitor given improvement in readings since checking at home. Will return next week for nurse visit and if remains above goal will start lisinopril at that time.      An After Visit Summary was printed and given to the patient at discharge.  Return in about 1 week (around 8/27/2021) for nurse visit b/p check. Sooner if problems arise.          Lynnette HERNANDEZ. 8/20/2021   Electronically Signed  "

## 2021-08-27 ENCOUNTER — CLINICAL SUPPORT (OUTPATIENT)
Dept: INTERNAL MEDICINE | Facility: CLINIC | Age: 76
End: 2021-08-27

## 2021-08-27 VITALS
RESPIRATION RATE: 16 BRPM | SYSTOLIC BLOOD PRESSURE: 160 MMHG | BODY MASS INDEX: 22.85 KG/M2 | OXYGEN SATURATION: 98 % | DIASTOLIC BLOOD PRESSURE: 80 MMHG | WEIGHT: 172.4 LBS | HEIGHT: 73 IN | HEART RATE: 70 BPM

## 2021-08-27 DIAGNOSIS — K59.00 CONSTIPATION, UNSPECIFIED CONSTIPATION TYPE: ICD-10-CM

## 2021-08-27 DIAGNOSIS — I10 ESSENTIAL HYPERTENSION: Primary | ICD-10-CM

## 2021-08-27 PROCEDURE — 99213 OFFICE O/P EST LOW 20 MIN: CPT | Performed by: NURSE PRACTITIONER

## 2021-08-27 RX ORDER — LISINOPRIL 10 MG/1
10 TABLET ORAL DAILY
Qty: 30 TABLET | Refills: 0 | Status: SHIPPED | OUTPATIENT
Start: 2021-08-27 | End: 2021-09-03 | Stop reason: SINTOL

## 2021-08-27 NOTE — PROGRESS NOTES
Chief Complaint   Patient presents with   • Hypertension       History:  Sonu Bravo is a 76 y.o. male who presents today for follow-up for evaluation of the above:    HPI     Patient presents today for f/u HTN  Patient reports compliance with blood pressure medications without adverse side effects.   Has been seeing elevated readings at home as well.  Also experiencing occasional constipation that did relieve with Miralax.           ROS:  Review of Systems   Gastrointestinal: Positive for constipation.   All other systems reviewed and are negative.      Mr. Bravo  reports that he has been smoking cigarettes. He started smoking about 54 years ago. He has a 104.00 pack-year smoking history. He has never used smokeless tobacco. He reports current alcohol use. He reports that he does not use drugs.      Current Outpatient Medications:   •  bisoprolol (ZEBeta) 10 MG tablet, Take 1 tablet by mouth Daily., Disp: 90 tablet, Rfl: 3  •  Cholecalciferol 4000 units capsule, Take 1 tablet by mouth Daily., Disp: , Rfl:   •  Coenzyme Q10 (CO Q-10) 100 MG capsule, Take 1 tablet by mouth Daily., Disp: , Rfl:   •  Cyanocobalamin (VITAMIN B 12 PO), Take 1 tablet by mouth Daily. 2,500 mg, Disp: , Rfl:   •  fexofenadine (ALLEGRA) 180 MG tablet, Take 180 mg by mouth Daily., Disp: , Rfl:   •  fluticasone (FLONASE) 50 MCG/ACT nasal spray, 1 spray into the nostril(s) as directed by provider Daily. Each Nostral, Disp: 16 g, Rfl: 5  •  levothyroxine (SYNTHROID, LEVOTHROID) 100 MCG tablet, Take 1 tablet by mouth Daily., Disp: 90 tablet, Rfl: 3  •  lisinopril (PRINIVIL,ZESTRIL) 10 MG tablet, Take 1 tablet by mouth Daily., Disp: 30 tablet, Rfl: 0  •  Multiple Vitamin (MULTI-VITAMIN DAILY PO), Take 1 tablet by mouth Daily., Disp: , Rfl:   •  Multiple Vitamins-Minerals (ZINC PO), Take 1 tablet by mouth Daily., Disp: , Rfl:   •  omeprazole (priLOSEC) 20 MG capsule, Take 1 capsule by mouth Daily., Disp: 90 capsule, Rfl: 3  •  rosuvastatin (CRESTOR)  "20 MG tablet, Take 1 tablet by mouth Daily., Disp: 90 tablet, Rfl: 3      OBJECTIVE:  /80 (BP Location: Left arm, Patient Position: Sitting, Cuff Size: Adult)   Pulse 70   Resp 16   Ht 185.4 cm (73\")   Wt 78.2 kg (172 lb 6.4 oz)   SpO2 98%   BMI 22.75 kg/m²    Physical Exam  Constitutional:       General: He is not in acute distress.  Pulmonary:      Effort: No respiratory distress.   Neurological:      Mental Status: He is oriented to person, place, and time.   Psychiatric:         Behavior: Behavior normal.         Assessment/Plan    Diagnoses and all orders for this visit:    1. Essential hypertension (Primary)  -     lisinopril (PRINIVIL,ZESTRIL) 10 MG tablet; Take 1 tablet by mouth Daily.  Dispense: 30 tablet; Refill: 0  Not well controlled and initiate ACE.  Continue BB    2. Constipation, unspecified constipation type  Recommended daily OTC stool softener.         An After Visit Summary was printed and given to the patient at discharge.  Return in about 1 week (around 9/3/2021) for nurse visit b/p check. Sooner if problems arise.          Lynnette Whaley APRN. 8/27/2021   Electronically Signed  "

## 2021-08-31 ENCOUNTER — APPOINTMENT (OUTPATIENT)
Dept: GENERAL RADIOLOGY | Facility: HOSPITAL | Age: 76
End: 2021-08-31

## 2021-08-31 ENCOUNTER — READMISSION MANAGEMENT (OUTPATIENT)
Dept: CALL CENTER | Facility: HOSPITAL | Age: 76
End: 2021-08-31

## 2021-08-31 ENCOUNTER — APPOINTMENT (OUTPATIENT)
Dept: CT IMAGING | Facility: HOSPITAL | Age: 76
End: 2021-08-31

## 2021-08-31 ENCOUNTER — HOSPITAL ENCOUNTER (INPATIENT)
Facility: HOSPITAL | Age: 76
LOS: 1 days | Discharge: HOME | End: 2021-08-31
Attending: STUDENT IN AN ORGANIZED HEALTH CARE EDUCATION/TRAINING PROGRAM

## 2021-08-31 ENCOUNTER — APPOINTMENT (OUTPATIENT)
Dept: CARDIOLOGY | Facility: HOSPITAL | Age: 76
End: 2021-08-31

## 2021-08-31 VITALS
HEIGHT: 60 IN | HEART RATE: 61 BPM | WEIGHT: 172.25 LBS | SYSTOLIC BLOOD PRESSURE: 158 MMHG | BODY MASS INDEX: 33.82 KG/M2 | RESPIRATION RATE: 18 BRPM | DIASTOLIC BLOOD PRESSURE: 72 MMHG | TEMPERATURE: 98.9 F | OXYGEN SATURATION: 98 %

## 2021-08-31 DIAGNOSIS — R74.01 TRANSAMINITIS: ICD-10-CM

## 2021-08-31 DIAGNOSIS — R07.9 CHEST PAIN, UNSPECIFIED TYPE: Primary | ICD-10-CM

## 2021-08-31 DIAGNOSIS — E78.2 MIXED HYPERLIPIDEMIA: ICD-10-CM

## 2021-08-31 DIAGNOSIS — D72.829 LEUKOCYTOSIS, UNSPECIFIED TYPE: ICD-10-CM

## 2021-08-31 DIAGNOSIS — I48.91 ATRIAL FIBRILLATION, UNSPECIFIED TYPE (HCC): ICD-10-CM

## 2021-08-31 DIAGNOSIS — J98.4 CAVITARY LESION OF LUNG: ICD-10-CM

## 2021-08-31 PROBLEM — I48.0 PAROXYSMAL ATRIAL FIBRILLATION WITH RAPID VENTRICULAR RESPONSE: Status: ACTIVE | Noted: 2021-08-31

## 2021-08-31 PROBLEM — R77.8 ELEVATED TROPONIN: Status: ACTIVE | Noted: 2021-08-31

## 2021-08-31 PROBLEM — R79.89 ELEVATED TROPONIN: Status: ACTIVE | Noted: 2021-08-31

## 2021-08-31 LAB
ALBUMIN SERPL-MCNC: 3.9 G/DL (ref 3.5–5.2)
ALBUMIN/GLOB SERPL: 1.3 G/DL
ALP SERPL-CCNC: 114 U/L (ref 39–117)
ALT SERPL W P-5'-P-CCNC: 140 U/L (ref 1–41)
ANION GAP SERPL CALCULATED.3IONS-SCNC: 18 MMOL/L (ref 5–15)
AST SERPL-CCNC: 77 U/L (ref 1–40)
BASOPHILS # BLD AUTO: 0.07 10*3/MM3 (ref 0–0.2)
BASOPHILS # BLD AUTO: 0.1 10*3/MM3 (ref 0–0.2)
BASOPHILS NFR BLD AUTO: 0.4 % (ref 0–1.5)
BASOPHILS NFR BLD AUTO: 0.5 % (ref 0–1.5)
BH CV ECHO MEAS - AO MAX PG (FULL): 1.6 MMHG
BH CV ECHO MEAS - AO MAX PG: 6.1 MMHG
BH CV ECHO MEAS - AO MEAN PG (FULL): 1 MMHG
BH CV ECHO MEAS - AO MEAN PG: 4 MMHG
BH CV ECHO MEAS - AO ROOT AREA (BSA CORRECTED): 1.9
BH CV ECHO MEAS - AO ROOT AREA: 10.8 CM^2
BH CV ECHO MEAS - AO ROOT DIAM: 3.7 CM
BH CV ECHO MEAS - AO V2 MAX: 123 CM/SEC
BH CV ECHO MEAS - AO V2 MEAN: 86.8 CM/SEC
BH CV ECHO MEAS - AO V2 VTI: 32.9 CM
BH CV ECHO MEAS - AVA(I,A): 2.5 CM^2
BH CV ECHO MEAS - AVA(I,D): 2.5 CM^2
BH CV ECHO MEAS - AVA(V,A): 2.7 CM^2
BH CV ECHO MEAS - AVA(V,D): 2.7 CM^2
BH CV ECHO MEAS - BSA(HAYCOCK): 2 M^2
BH CV ECHO MEAS - BSA: 2 M^2
BH CV ECHO MEAS - BZI_BMI: 24.5 KILOGRAMS/M^2
BH CV ECHO MEAS - BZI_METRIC_HEIGHT: 177.8 CM
BH CV ECHO MEAS - BZI_METRIC_WEIGHT: 77.6 KG
BH CV ECHO MEAS - EDV(CUBED): 107.9 ML
BH CV ECHO MEAS - EDV(MOD-SP4): 91.4 ML
BH CV ECHO MEAS - EDV(TEICH): 105.4 ML
BH CV ECHO MEAS - EF(MOD-SP4): 67.7 %
BH CV ECHO MEAS - ESV(MOD-SP4): 29.5 ML
BH CV ECHO MEAS - IVS/LVPW: 1.2
BH CV ECHO MEAS - IVSD: 1.6 CM
BH CV ECHO MEAS - LA DIMENSION: 3.8 CM
BH CV ECHO MEAS - LA/AO: 1
BH CV ECHO MEAS - LAT PEAK E' VEL: 11.1 CM/SEC
BH CV ECHO MEAS - LV DIASTOLIC VOL/BSA (35-75): 46.8 ML/M^2
BH CV ECHO MEAS - LV MASS(C)D: 296.7 GRAMS
BH CV ECHO MEAS - LV MASS(C)DI: 151.9 GRAMS/M^2
BH CV ECHO MEAS - LV MAX PG: 4.5 MMHG
BH CV ECHO MEAS - LV MEAN PG: 3 MMHG
BH CV ECHO MEAS - LV SYSTOLIC VOL/BSA (12-30): 15.1 ML/M^2
BH CV ECHO MEAS - LV V1 MAX: 106 CM/SEC
BH CV ECHO MEAS - LV V1 MEAN: 75.9 CM/SEC
BH CV ECHO MEAS - LV V1 VTI: 26.7 CM
BH CV ECHO MEAS - LVIDD: 4.8 CM
BH CV ECHO MEAS - LVLD AP4: 8.1 CM
BH CV ECHO MEAS - LVLS AP4: 7.9 CM
BH CV ECHO MEAS - LVOT AREA (M): 3.1 CM^2
BH CV ECHO MEAS - LVOT AREA: 3.1 CM^2
BH CV ECHO MEAS - LVOT DIAM: 2 CM
BH CV ECHO MEAS - LVPWD: 1.4 CM
BH CV ECHO MEAS - MED PEAK E' VEL: 6.9 CM/SEC
BH CV ECHO MEAS - MV A MAX VEL: 43.1 CM/SEC
BH CV ECHO MEAS - MV DEC SLOPE: 311 CM/SEC^2
BH CV ECHO MEAS - MV DEC TIME: 0.32 SEC
BH CV ECHO MEAS - MV E MAX VEL: 99.7 CM/SEC
BH CV ECHO MEAS - MV E/A: 2.3
BH CV ECHO MEAS - RAP SYSTOLE: 10 MMHG
BH CV ECHO MEAS - RVSP: 45.8 MMHG
BH CV ECHO MEAS - SI(AO): 181.1 ML/M^2
BH CV ECHO MEAS - SI(LVOT): 43 ML/M^2
BH CV ECHO MEAS - SI(MOD-SP4): 31.7 ML/M^2
BH CV ECHO MEAS - SV(AO): 353.7 ML
BH CV ECHO MEAS - SV(LVOT): 83.9 ML
BH CV ECHO MEAS - SV(MOD-SP4): 61.9 ML
BH CV ECHO MEAS - TR MAX VEL: 299 CM/SEC
BH CV ECHO MEASUREMENTS AVERAGE E/E' RATIO: 11.08
BILIRUB SERPL-MCNC: 0.3 MG/DL (ref 0–1.2)
BUN SERPL-MCNC: 14 MG/DL (ref 8–23)
BUN/CREAT SERPL: 15.4 (ref 7–25)
CALCIUM SPEC-SCNC: 9.1 MG/DL (ref 8.6–10.5)
CHLORIDE SERPL-SCNC: 100 MMOL/L (ref 98–107)
CO2 SERPL-SCNC: 24 MMOL/L (ref 22–29)
CREAT SERPL-MCNC: 0.91 MG/DL (ref 0.76–1.27)
DEPRECATED RDW RBC AUTO: 48.5 FL (ref 37–54)
DEPRECATED RDW RBC AUTO: 48.9 FL (ref 37–54)
EOSINOPHIL # BLD AUTO: 0.06 10*3/MM3 (ref 0–0.4)
EOSINOPHIL # BLD AUTO: 0.08 10*3/MM3 (ref 0–0.4)
EOSINOPHIL NFR BLD AUTO: 0.3 % (ref 0.3–6.2)
EOSINOPHIL NFR BLD AUTO: 0.4 % (ref 0.3–6.2)
ERYTHROCYTE [DISTWIDTH] IN BLOOD BY AUTOMATED COUNT: 14.4 % (ref 12.3–15.4)
ERYTHROCYTE [DISTWIDTH] IN BLOOD BY AUTOMATED COUNT: 14.4 % (ref 12.3–15.4)
GFR SERPL CREATININE-BSD FRML MDRD: 81 ML/MIN/1.73
GLOBULIN UR ELPH-MCNC: 3.1 GM/DL
GLUCOSE SERPL-MCNC: 161 MG/DL (ref 65–99)
HCT VFR BLD AUTO: 43.4 % (ref 37.5–51)
HCT VFR BLD AUTO: 45 % (ref 37.5–51)
HGB BLD-MCNC: 14.7 G/DL (ref 13–17.7)
HGB BLD-MCNC: 15.4 G/DL (ref 13–17.7)
HOLD SPECIMEN: NORMAL
HOLD SPECIMEN: NORMAL
IMM GRANULOCYTES # BLD AUTO: 0.11 10*3/MM3 (ref 0–0.05)
IMM GRANULOCYTES # BLD AUTO: 0.14 10*3/MM3 (ref 0–0.05)
IMM GRANULOCYTES NFR BLD AUTO: 0.6 % (ref 0–0.5)
IMM GRANULOCYTES NFR BLD AUTO: 0.7 % (ref 0–0.5)
LEFT ATRIUM VOLUME INDEX: 23.6 ML/M2
LEFT ATRIUM VOLUME: 46.1 CM3
LYMPHOCYTES # BLD AUTO: 1.69 10*3/MM3 (ref 0.7–3.1)
LYMPHOCYTES # BLD AUTO: 2.71 10*3/MM3 (ref 0.7–3.1)
LYMPHOCYTES NFR BLD AUTO: 13.7 % (ref 19.6–45.3)
LYMPHOCYTES NFR BLD AUTO: 8.9 % (ref 19.6–45.3)
MAGNESIUM SERPL-MCNC: 2.2 MG/DL (ref 1.6–2.4)
MAXIMAL PREDICTED HEART RATE: 143 BPM
MCH RBC QN AUTO: 31.3 PG (ref 26.6–33)
MCH RBC QN AUTO: 31.4 PG (ref 26.6–33)
MCHC RBC AUTO-ENTMCNC: 33.9 G/DL (ref 31.5–35.7)
MCHC RBC AUTO-ENTMCNC: 34.2 G/DL (ref 31.5–35.7)
MCV RBC AUTO: 91.6 FL (ref 79–97)
MCV RBC AUTO: 92.3 FL (ref 79–97)
MONOCYTES # BLD AUTO: 1.77 10*3/MM3 (ref 0.1–0.9)
MONOCYTES # BLD AUTO: 2.09 10*3/MM3 (ref 0.1–0.9)
MONOCYTES NFR BLD AUTO: 11 % (ref 5–12)
MONOCYTES NFR BLD AUTO: 9 % (ref 5–12)
NEUTROPHILS NFR BLD AUTO: 14.96 10*3/MM3 (ref 1.7–7)
NEUTROPHILS NFR BLD AUTO: 15.03 10*3/MM3 (ref 1.7–7)
NEUTROPHILS NFR BLD AUTO: 75.7 % (ref 42.7–76)
NEUTROPHILS NFR BLD AUTO: 78.8 % (ref 42.7–76)
NRBC BLD AUTO-RTO: 0 /100 WBC (ref 0–0.2)
NRBC BLD AUTO-RTO: 0 /100 WBC (ref 0–0.2)
PLATELET # BLD AUTO: 220 10*3/MM3 (ref 140–450)
PLATELET # BLD AUTO: 221 10*3/MM3 (ref 140–450)
PMV BLD AUTO: 9.5 FL (ref 6–12)
PMV BLD AUTO: 9.5 FL (ref 6–12)
POTASSIUM SERPL-SCNC: 4.1 MMOL/L (ref 3.5–5.2)
PROT SERPL-MCNC: 7 G/DL (ref 6–8.5)
RBC # BLD AUTO: 4.7 10*6/MM3 (ref 4.14–5.8)
RBC # BLD AUTO: 4.91 10*6/MM3 (ref 4.14–5.8)
SARS-COV-2 RNA PNL SPEC NAA+PROBE: NOT DETECTED
SODIUM SERPL-SCNC: 142 MMOL/L (ref 136–145)
STRESS TARGET HR: 122 BPM
T4 FREE SERPL-MCNC: 1.36 NG/DL (ref 0.93–1.7)
TROPONIN T SERPL-MCNC: 0.01 NG/ML (ref 0–0.03)
TROPONIN T SERPL-MCNC: 0.04 NG/ML (ref 0–0.03)
TROPONIN T SERPL-MCNC: 0.08 NG/ML (ref 0–0.03)
TROPONIN T SERPL-MCNC: <0.01 NG/ML (ref 0–0.03)
TSH SERPL DL<=0.05 MIU/L-ACNC: 2.55 UIU/ML (ref 0.27–4.2)
WBC # BLD AUTO: 19.05 10*3/MM3 (ref 3.4–10.8)
WBC # BLD AUTO: 19.76 10*3/MM3 (ref 3.4–10.8)
WHOLE BLOOD HOLD SPECIMEN: NORMAL
WHOLE BLOOD HOLD SPECIMEN: NORMAL

## 2021-08-31 PROCEDURE — 93005 ELECTROCARDIOGRAM TRACING: CPT | Performed by: INTERNAL MEDICINE

## 2021-08-31 PROCEDURE — 84443 ASSAY THYROID STIM HORMONE: CPT | Performed by: INTERNAL MEDICINE

## 2021-08-31 PROCEDURE — 93010 ELECTROCARDIOGRAM REPORT: CPT | Performed by: INTERNAL MEDICINE

## 2021-08-31 PROCEDURE — 0 IOPAMIDOL PER 1 ML: Performed by: STUDENT IN AN ORGANIZED HEALTH CARE EDUCATION/TRAINING PROGRAM

## 2021-08-31 PROCEDURE — 71045 X-RAY EXAM CHEST 1 VIEW: CPT

## 2021-08-31 PROCEDURE — 85025 COMPLETE CBC W/AUTO DIFF WBC: CPT | Performed by: STUDENT IN AN ORGANIZED HEALTH CARE EDUCATION/TRAINING PROGRAM

## 2021-08-31 PROCEDURE — 85025 COMPLETE CBC W/AUTO DIFF WBC: CPT

## 2021-08-31 PROCEDURE — 83735 ASSAY OF MAGNESIUM: CPT | Performed by: INTERNAL MEDICINE

## 2021-08-31 PROCEDURE — 84484 ASSAY OF TROPONIN QUANT: CPT | Performed by: STUDENT IN AN ORGANIZED HEALTH CARE EDUCATION/TRAINING PROGRAM

## 2021-08-31 PROCEDURE — 93306 TTE W/DOPPLER COMPLETE: CPT | Performed by: INTERNAL MEDICINE

## 2021-08-31 PROCEDURE — 93306 TTE W/DOPPLER COMPLETE: CPT

## 2021-08-31 PROCEDURE — 84484 ASSAY OF TROPONIN QUANT: CPT | Performed by: INTERNAL MEDICINE

## 2021-08-31 PROCEDURE — 80053 COMPREHEN METABOLIC PANEL: CPT

## 2021-08-31 PROCEDURE — 84484 ASSAY OF TROPONIN QUANT: CPT

## 2021-08-31 PROCEDURE — 93005 ELECTROCARDIOGRAM TRACING: CPT | Performed by: STUDENT IN AN ORGANIZED HEALTH CARE EDUCATION/TRAINING PROGRAM

## 2021-08-31 PROCEDURE — 25010000002 ENOXAPARIN PER 10 MG: Performed by: INTERNAL MEDICINE

## 2021-08-31 PROCEDURE — 99285 EMERGENCY DEPT VISIT HI MDM: CPT

## 2021-08-31 PROCEDURE — 71275 CT ANGIOGRAPHY CHEST: CPT

## 2021-08-31 PROCEDURE — 99222 1ST HOSP IP/OBS MODERATE 55: CPT | Performed by: INTERNAL MEDICINE

## 2021-08-31 PROCEDURE — 93005 ELECTROCARDIOGRAM TRACING: CPT

## 2021-08-31 PROCEDURE — 84439 ASSAY OF FREE THYROXINE: CPT | Performed by: INTERNAL MEDICINE

## 2021-08-31 PROCEDURE — 87635 SARS-COV-2 COVID-19 AMP PRB: CPT | Performed by: STUDENT IN AN ORGANIZED HEALTH CARE EDUCATION/TRAINING PROGRAM

## 2021-08-31 RX ORDER — ROSUVASTATIN CALCIUM 20 MG/1
20 TABLET, COATED ORAL DAILY
Qty: 90 TABLET | Refills: 3 | Status: SHIPPED | OUTPATIENT
Start: 2021-08-31 | End: 2021-09-24 | Stop reason: ALTCHOICE

## 2021-08-31 RX ORDER — CETIRIZINE HYDROCHLORIDE 10 MG/1
5 TABLET ORAL DAILY
Status: DISCONTINUED | OUTPATIENT
Start: 2021-08-31 | End: 2021-08-31 | Stop reason: HOSPADM

## 2021-08-31 RX ORDER — ONDANSETRON 4 MG/1
4 TABLET, FILM COATED ORAL EVERY 6 HOURS PRN
Status: DISCONTINUED | OUTPATIENT
Start: 2021-08-31 | End: 2021-08-31 | Stop reason: HOSPADM

## 2021-08-31 RX ORDER — FLUTICASONE PROPIONATE 50 MCG
1 SPRAY, SUSPENSION (ML) NASAL DAILY
Status: DISCONTINUED | OUTPATIENT
Start: 2021-08-31 | End: 2021-08-31 | Stop reason: HOSPADM

## 2021-08-31 RX ORDER — SODIUM CHLORIDE 0.9 % (FLUSH) 0.9 %
10 SYRINGE (ML) INJECTION AS NEEDED
Status: DISCONTINUED | OUTPATIENT
Start: 2021-08-31 | End: 2021-08-31 | Stop reason: SDUPTHER

## 2021-08-31 RX ORDER — DILTIAZEM HYDROCHLORIDE 5 MG/ML
20 INJECTION INTRAVENOUS ONCE
Status: COMPLETED | OUTPATIENT
Start: 2021-08-31 | End: 2021-08-31

## 2021-08-31 RX ORDER — ACETAMINOPHEN 650 MG/1
650 SUPPOSITORY RECTAL EVERY 4 HOURS PRN
Status: DISCONTINUED | OUTPATIENT
Start: 2021-08-31 | End: 2021-08-31 | Stop reason: HOSPADM

## 2021-08-31 RX ORDER — PANTOPRAZOLE SODIUM 40 MG/1
40 TABLET, DELAYED RELEASE ORAL
Status: DISCONTINUED | OUTPATIENT
Start: 2021-08-31 | End: 2021-08-31 | Stop reason: HOSPADM

## 2021-08-31 RX ORDER — ASPIRIN 81 MG/1
324 TABLET, CHEWABLE ORAL ONCE
Status: COMPLETED | OUTPATIENT
Start: 2021-08-31 | End: 2021-08-31

## 2021-08-31 RX ORDER — NITROGLYCERIN 0.4 MG/1
0.4 TABLET SUBLINGUAL
Status: DISCONTINUED | OUTPATIENT
Start: 2021-08-31 | End: 2021-08-31 | Stop reason: HOSPADM

## 2021-08-31 RX ORDER — SODIUM CHLORIDE 9 MG/ML
100 INJECTION, SOLUTION INTRAVENOUS CONTINUOUS
Status: DISCONTINUED | OUTPATIENT
Start: 2021-08-31 | End: 2021-08-31 | Stop reason: HOSPADM

## 2021-08-31 RX ORDER — ASPIRIN 81 MG/1
324 TABLET, CHEWABLE ORAL ONCE
Status: DISCONTINUED | OUTPATIENT
Start: 2021-08-31 | End: 2021-08-31 | Stop reason: SDUPTHER

## 2021-08-31 RX ORDER — LEVOFLOXACIN 750 MG/1
750 TABLET ORAL DAILY
Qty: 5 TABLET | Refills: 0 | Status: SHIPPED | OUTPATIENT
Start: 2021-08-31 | End: 2021-09-05

## 2021-08-31 RX ORDER — ACETAMINOPHEN 160 MG/5ML
650 SOLUTION ORAL EVERY 4 HOURS PRN
Status: DISCONTINUED | OUTPATIENT
Start: 2021-08-31 | End: 2021-08-31 | Stop reason: HOSPADM

## 2021-08-31 RX ORDER — DILTIAZEM HYDROCHLORIDE 5 MG/ML
INJECTION INTRAVENOUS
Status: COMPLETED
Start: 2021-08-31 | End: 2021-08-31

## 2021-08-31 RX ORDER — ROSUVASTATIN CALCIUM 20 MG/1
20 TABLET, COATED ORAL NIGHTLY
Status: DISCONTINUED | OUTPATIENT
Start: 2021-08-31 | End: 2021-08-31 | Stop reason: HOSPADM

## 2021-08-31 RX ORDER — BISOPROLOL FUMARATE 10 MG/1
10 TABLET, FILM COATED ORAL DAILY
Status: DISCONTINUED | OUTPATIENT
Start: 2021-08-31 | End: 2021-08-31 | Stop reason: HOSPADM

## 2021-08-31 RX ORDER — ASPIRIN 81 MG/1
81 TABLET ORAL DAILY
Status: DISCONTINUED | OUTPATIENT
Start: 2021-09-01 | End: 2021-08-31 | Stop reason: HOSPADM

## 2021-08-31 RX ORDER — CYANOCOBALAMIN (VITAMIN B-12) 500 MCG
2500 TABLET ORAL DAILY
Status: DISCONTINUED | OUTPATIENT
Start: 2021-08-31 | End: 2021-08-31 | Stop reason: HOSPADM

## 2021-08-31 RX ORDER — ONDANSETRON 2 MG/ML
4 INJECTION INTRAMUSCULAR; INTRAVENOUS EVERY 6 HOURS PRN
Status: DISCONTINUED | OUTPATIENT
Start: 2021-08-31 | End: 2021-08-31 | Stop reason: HOSPADM

## 2021-08-31 RX ORDER — ROSUVASTATIN CALCIUM 20 MG/1
20 TABLET, COATED ORAL DAILY
Status: DISCONTINUED | OUTPATIENT
Start: 2021-08-31 | End: 2021-08-31

## 2021-08-31 RX ORDER — ACETAMINOPHEN 325 MG/1
650 TABLET ORAL EVERY 4 HOURS PRN
Status: DISCONTINUED | OUTPATIENT
Start: 2021-08-31 | End: 2021-08-31 | Stop reason: HOSPADM

## 2021-08-31 RX ORDER — SODIUM CHLORIDE 0.9 % (FLUSH) 0.9 %
10 SYRINGE (ML) INJECTION EVERY 12 HOURS SCHEDULED
Status: DISCONTINUED | OUTPATIENT
Start: 2021-08-31 | End: 2021-08-31 | Stop reason: HOSPADM

## 2021-08-31 RX ORDER — LEVOTHYROXINE SODIUM 0.1 MG/1
100 TABLET ORAL
Status: DISCONTINUED | OUTPATIENT
Start: 2021-08-31 | End: 2021-08-31 | Stop reason: HOSPADM

## 2021-08-31 RX ORDER — LISINOPRIL 10 MG/1
10 TABLET ORAL DAILY
Status: DISCONTINUED | OUTPATIENT
Start: 2021-08-31 | End: 2021-08-31 | Stop reason: HOSPADM

## 2021-08-31 RX ORDER — SODIUM CHLORIDE 0.9 % (FLUSH) 0.9 %
10 SYRINGE (ML) INJECTION AS NEEDED
Status: DISCONTINUED | OUTPATIENT
Start: 2021-08-31 | End: 2021-08-31 | Stop reason: HOSPADM

## 2021-08-31 RX ORDER — MULTIPLE VITAMINS W/ MINERALS TAB 9MG-400MCG
1 TAB ORAL DAILY
Status: DISCONTINUED | OUTPATIENT
Start: 2021-08-31 | End: 2021-08-31 | Stop reason: HOSPADM

## 2021-08-31 RX ORDER — MELATONIN
1000 DAILY
Status: DISCONTINUED | OUTPATIENT
Start: 2021-08-31 | End: 2021-08-31 | Stop reason: HOSPADM

## 2021-08-31 RX ORDER — NICOTINE 21 MG/24HR
1 PATCH, TRANSDERMAL 24 HOURS TRANSDERMAL
Status: DISCONTINUED | OUTPATIENT
Start: 2021-08-31 | End: 2021-08-31 | Stop reason: HOSPADM

## 2021-08-31 RX ADMIN — DILTIAZEM HYDROCHLORIDE 20 MG: 5 INJECTION INTRAVENOUS at 02:46

## 2021-08-31 RX ADMIN — LISINOPRIL 10 MG: 10 TABLET ORAL at 12:45

## 2021-08-31 RX ADMIN — FLUTICASONE PROPIONATE 1 SPRAY: 50 SPRAY, METERED NASAL at 10:52

## 2021-08-31 RX ADMIN — BISOPROLOL FUMARATE 10 MG: 10 TABLET ORAL at 10:57

## 2021-08-31 RX ADMIN — CETIRIZINE HYDROCHLORIDE 5 MG: 10 TABLET ORAL at 10:53

## 2021-08-31 RX ADMIN — IOPAMIDOL 81 ML: 755 INJECTION, SOLUTION INTRAVENOUS at 04:24

## 2021-08-31 RX ADMIN — LEVOTHYROXINE SODIUM 100 MCG: 100 TABLET ORAL at 10:53

## 2021-08-31 RX ADMIN — SODIUM CHLORIDE 100 ML/HR: 9 INJECTION, SOLUTION INTRAVENOUS at 09:02

## 2021-08-31 RX ADMIN — ASPIRIN 324 MG: 81 TABLET, CHEWABLE ORAL at 02:36

## 2021-08-31 RX ADMIN — Medication 1000 UNITS: at 10:53

## 2021-08-31 RX ADMIN — ENOXAPARIN SODIUM 80 MG: 80 INJECTION, SOLUTION INTRAVENOUS; SUBCUTANEOUS at 10:51

## 2021-08-31 RX ADMIN — NICOTINE 1 PATCH: 21 PATCH, EXTENDED RELEASE TRANSDERMAL at 08:55

## 2021-08-31 RX ADMIN — SODIUM CHLORIDE, POTASSIUM CHLORIDE, SODIUM LACTATE AND CALCIUM CHLORIDE 1000 ML: 600; 310; 30; 20 INJECTION, SOLUTION INTRAVENOUS at 02:37

## 2021-08-31 RX ADMIN — Medication 2500 MCG: at 10:53

## 2021-08-31 RX ADMIN — PANTOPRAZOLE SODIUM 40 MG: 40 TABLET, DELAYED RELEASE ORAL at 10:53

## 2021-08-31 RX ADMIN — Medication 1 TABLET: at 10:53

## 2021-08-31 NOTE — OUTREACH NOTE
Prep Survey      Responses   [facility] patient discharged from?  [facility]   Is LACE score < 7 ?  Yes   Emergency Room discharge w/ pulse ox?  No   Eligibility  [facility]   Date of Admission  08/31/21   Date of Discharge  08/31/21   Discharge Disposition  Home or Self Care   Discharge diagnosis  Paroxysmal atrial fibrillation with rapid ventricular response    Does the patient have one of the following disease processes/diagnoses(primary or secondary)?  Other   Does the patient have Home health ordered?  No   Is there a DME ordered?  No   Prep survey completed?  Yes          Shira Jules RN

## 2021-09-01 ENCOUNTER — TRANSITIONAL CARE MANAGEMENT TELEPHONE ENCOUNTER (OUTPATIENT)
Dept: CALL CENTER | Facility: HOSPITAL | Age: 76
End: 2021-09-01

## 2021-09-01 LAB
QT INTERVAL: 310 MS
QT INTERVAL: 316 MS
QT INTERVAL: 382 MS
QT INTERVAL: 392 MS
QTC INTERVAL: 384 MS
QTC INTERVAL: 425 MS
QTC INTERVAL: 459 MS
QTC INTERVAL: 471 MS

## 2021-09-01 NOTE — PAYOR COMM NOTE
"9/1/21 [facility] [phone]  [phone]      PATIENT ER ADMISSION TO INPATIENT ON 8/31/21.    PENDING AUTH [id number]        Apurva Renee (76 y.o. Male)     Date of Birth Social Security Number Address Home Phone MRN    1945  [id number]   [facility] [address] [phone] [id number]    Jainism Marital Status          Religious        Admission Date Admission Type Admitting Provider Attending Provider Department, Room/Bed    8/31/21 Emergency   [facility] 4B, 408/1    Discharge Date Discharge Disposition Discharge Destination        8/31/2021 Home or Self Care Home             Attending Provider: (none)   Allergies: Penicillins, Sulfa Antibiotics, Sulfamethoxazole-trimethoprim    Isolation: None   Infection: None   Code Status: Prior    Ht: 152.4 cm (60\")   Wt: 78.1 kg (172 lb 4 oz)    Admission Cmt: None   Principal Problem: Paroxysmal atrial fibrillation with rapid ventricular response (CMS/HCC) [I48.0]                 Active Insurance as of 8/31/2021     Primary Coverage     Payor Plan Insurance Group Employer/Plan Group    HUMANA MEDICARE REPLACEMENT HUMANA MEDICARE REPLACEMENT [id number]     Payor Plan Address Payor Plan Phone Number Payor Plan Fax Number Effective Dates    [address] [address] [phone]  6/1/2019 - None Entered    [facility] [address]       Subscriber Name Subscriber Birth Date Member ID       APURVA RENEE 1945 [id number]                 Emergency Contacts      (Rel.) Home Phone Work Phone Mobile Phone    ISA RENEE (Spouse) [phone] -- [phone]        [facility] Encounter Date/Time: 8/31/2021 0225   Hospital Account: [id number]    MRN: [id number]   Patient:  Apurva Renee   Contact Serial #: [id number]   SSN:          ENCOUNTER             Patient Class: Inpatient   Unit: [address] Service: Medicine     Bed: 408/1   Admitting Provider:     Referring Physician: Juan Watts " C   Attending Provider:     Adm Diagnosis: Chest pain, unspecified *               PATIENT          Name: Apurva Renee : 1945 (76 yrs)   Address: [address] Sex: Male   City: [place] [address]   County: [facility]   Marital Status:  Ethnicity: NOT                                                                              Race: WHITE   Primary Care Provider: Vaughn Kramer DO Patients Phone: Home Phone: [phone]     Mobile Phone: [phone]   EMERGENCY CONTACT   Contact Name Legal Guardian? Relationship to Patient Home Phone Work Phone   1. VÍCTOR ISA  2. *No Contact Specified*      Spouse    [phone]              GUARANTOR            Guarantor: Apurva Renee     : 1945   Address: [address] Sex: [place]     [place] [address]     Relation to Patient: Self       Home Phone: [phone]   Guarantor ID: [id number]       Work Phone:     GUARANTOR EMPLOYER   Employer:           Status: RETIRED   COVERAGE          PRIMARY INSURANCE   Payor: HUMANA MEDICARE REPLACEMENT Plan: HUMANA MEDICARE REPLACEMENT   Group Number: [id number] Insurance Type: INDEMNITY   Subscriber Name: APURVA RENEE Subscriber : 1945   Subscriber ID: [id number] Coverage Address: [address] [address]   Pat. Rel. to Subscriber: Self Coverage Phone: [phone]   SECONDARY INSURANCE   Payor: N/A Plan: N/A   Group Number:   Insurance Type:     Subscriber Name:   Subscriber :     Subscriber ID:   Coverage Address:     Pat. Rel. to Subscriber:   Coverage Phone:        Contact Serial # ([id number])  `       2021    Chart ID ([id number] [id number])

## 2021-09-01 NOTE — OUTREACH NOTE
Call Center TCM Note      Responses   [facility] patient discharged from?  [facility]   Does the patient have one of the following disease processes/diagnoses(primary or secondary)?  Other   TCM attempt successful?  Yes   Call start time  1552   Call end time  1601   Discharge diagnosis  Paroxysmal atrial fibrillation with rapid ventricular response    Person spoke with today (if not patient) and relationship  Patient   Meds reviewed with patient/caregiver?  Yes   Is the patient having any side effects they believe may be caused by any medication additions or changes?  No   Does the patient have all medications ordered at discharge?  Yes   Is the patient taking all medications as directed (includes completed medication regime)?  Yes   Does the patient have a primary care provider?   Yes   Does the patient have an appointment with their PCP within 7 days of discharge?  Greater than 7 days   Comments regarding PCP  hospital dc fu appt on 9/9/21 at 9:30 AM   What is preventing the patient from scheduling follow up appointments within 7 days of discharge?  Earlier appointment not available   Nursing Interventions  Verified appointment date/time/provider   Has the patient kept scheduled appointments due by today?  N/A   Psychosocial issues?  No   Did the patient receive a copy of their discharge instructions?  Yes   Nursing interventions  Reviewed instructions with patient   What is the patient's perception of their health status since discharge?  Improving   Is the patient/caregiver able to teach back signs and symptoms related to disease process for when to call PCP?  Yes   Is the patient/caregiver able to teach back signs and symptoms related to disease process for when to call 911?  Yes   Is the patient/caregiver able to teach back the hierarchy of who to call/visit for symptoms/problems? PCP, Specialist, Home health nurse, Urgent Care, ED, 911  Yes   If the patient is a current smoker, are they able to teach back  resources for cessation?  [id number]   TCM call completed?  Yes   Wrap up additional comments  Pt states he is doing better,  still has a slight fever 98.9,  pt shares he usually has a temp of 97.6. Pt happy with his care in [facility]. No question/concerns.          Savannah Rosales RN    9/1/2021, 16:01 EDT

## 2021-09-03 ENCOUNTER — CLINICAL SUPPORT (OUTPATIENT)
Dept: INTERNAL MEDICINE | Facility: CLINIC | Age: 76
End: 2021-09-03

## 2021-09-03 VITALS
HEART RATE: 57 BPM | SYSTOLIC BLOOD PRESSURE: 118 MMHG | TEMPERATURE: 97.5 F | DIASTOLIC BLOOD PRESSURE: 60 MMHG | RESPIRATION RATE: 18 BRPM | OXYGEN SATURATION: 97 %

## 2021-09-03 DIAGNOSIS — E78.2 MIXED HYPERLIPIDEMIA: ICD-10-CM

## 2021-09-03 DIAGNOSIS — I10 ESSENTIAL HYPERTENSION: ICD-10-CM

## 2021-09-03 DIAGNOSIS — Z09 HOSPITAL DISCHARGE FOLLOW-UP: Primary | ICD-10-CM

## 2021-09-03 DIAGNOSIS — R07.9 CHEST PAIN, UNSPECIFIED TYPE: ICD-10-CM

## 2021-09-03 DIAGNOSIS — R74.8 ELEVATED LIVER ENZYMES: ICD-10-CM

## 2021-09-03 DIAGNOSIS — I48.0 PAROXYSMAL ATRIAL FIBRILLATION WITH RAPID VENTRICULAR RESPONSE (HCC): ICD-10-CM

## 2021-09-03 PROCEDURE — 99214 OFFICE O/P EST MOD 30 MIN: CPT | Performed by: NURSE PRACTITIONER

## 2021-09-03 PROCEDURE — 1111F DSCHRG MED/CURRENT MED MERGE: CPT | Performed by: NURSE PRACTITIONER

## 2021-09-03 RX ORDER — LOSARTAN POTASSIUM 25 MG/1
25 TABLET ORAL DAILY
Qty: 30 TABLET | Refills: 3 | Status: SHIPPED | OUTPATIENT
Start: 2021-09-03 | End: 2021-09-20 | Stop reason: SINTOL

## 2021-09-03 NOTE — ED PROVIDER NOTES
Subjective   Patient states that he was sleeping when he is awoken with sudden onset chest pain and palpitations.  He states that he has never had this kind of pain before.  States that he has never had a heart stent or heart attack before.  States that he was concerned and did not get better and so came in for further evaluation.  States that nothing is made it worse and nothing is made it better.  States that the pain does not radiate anywhere.  States that he is not having any numbness or tingling.  Denies any recent trauma.  Denies any recent fevers or chills, leg pain, leg swelling or any current abdominal pain, hematuria, hematochezia or melena.          Review of Systems   All other systems reviewed and are negative.      Past Medical History:   Diagnosis Date   • Arthritis    • GERD (gastroesophageal reflux disease)    • Hyperlipidemia    • Hypertension        Allergies   Allergen Reactions   • Penicillins Hives   • Sulfa Antibiotics Hives, Itching and Rash   • Sulfamethoxazole-Trimethoprim Hives, Itching and Rash       Past Surgical History:   Procedure Laterality Date   • HERNIA REPAIR         Family History   Problem Relation Age of Onset   • Lung cancer Mother    • Brain cancer Mother    • Heart disease Father    • Hyperlipidemia Father    • Hypertension Father    • Prostate cancer Maternal Aunt        Social History     Socioeconomic History   • Marital status:      Spouse name: Not on file   • Number of children: Not on file   • Years of education: Not on file   • Highest education level: Not on file   Tobacco Use   • Smoking status: Current Every Day Smoker     Packs/day: 2.00     Years: 52.00     Pack years: 104.00     Types: Cigarettes     Start date: 1967   • Smokeless tobacco: Never Used   Substance and Sexual Activity   • Alcohol use: Yes   • Drug use: No   • Sexual activity: Defer           Objective   Physical Exam  Vitals and nursing note reviewed.   Constitutional:       General: He is  in acute distress.      Appearance: He is well-developed. He is not diaphoretic.   HENT:      Head: Normocephalic and atraumatic.   Eyes:      General:         Right eye: No discharge.         Left eye: No discharge.      Conjunctiva/sclera: Conjunctivae normal.   Neck:      Vascular: No JVD.      Trachea: No tracheal deviation.   Cardiovascular:      Rate and Rhythm: Tachycardia present. Rhythm irregular.      Heart sounds: Normal heart sounds. No murmur heard.   No friction rub. No gallop.    Pulmonary:      Effort: Pulmonary effort is normal.      Breath sounds: Normal breath sounds. No stridor.   Abdominal:      General: Bowel sounds are normal. There is no distension.      Palpations: Abdomen is soft.      Tenderness: There is no abdominal tenderness.   Musculoskeletal:         General: No deformity. Normal range of motion.      Cervical back: Normal range of motion and neck supple.   Skin:     General: Skin is warm and dry.      Capillary Refill: Capillary refill takes less than 2 seconds.      Coloration: Skin is not pale.      Findings: No rash.   Neurological:      Mental Status: He is alert.   Psychiatric:         Behavior: Behavior normal.         Procedures           ED Course  ED Course as of Sep 02 2153   Tue Aug 31, 2021   0733 This patient was not seen by me documentation is being made to record the over read by the radiologist Dr. Grimaldo just called me and told me that the patient has got a pulmonary nodule patient's admitting physician Dr. Vaughn was informed  informed    [TS]      ED Course User Index  [TS] Perry Nicole MD                                           MDM  Number of Diagnoses or Management Options  Atrial fibrillation, unspecified type (CMS/HCC)  Cavitary lesion of lung  Chest pain, unspecified type  Leukocytosis, unspecified type  Transaminitis  Diagnosis management comments: This is a 76yoM presenting with chest pain. Patient arrived hemodynamically stable, although appears  to be in a dysrhythmia, and was afebrile. Patient placed on the monitor and IV access was established. Fingerstick within normal limits. No evidence of volume overload or shock on exam. EKG was reviewed and had no overt evidence of STEMI but did have evidence of a narrow complex irregular tachyarrhythmia consistent with Afib w/ RVR.    Differentials include, but are not limited to, electrolyte abnormalities, acute PE, pneumothorax, dissection. Patient appears well but has a history suspicious for possible ACS. HEART score is 6.    Plan to obtain CBC, CMP, EKG, troponin x2, CTA chest and plan to administer diltiazem bolus.  Patient was reassessed after the dog in the bolus and is found to be in sinus rhythm with a rate in the 70s.  Patient states that he feels a lot better. Patient received aspirin 324mg after onset of chest pain.     The imaging and other workup was reviewed and found to have evidence of an up-trending troponin (although possibly due to his recent Afib). He will require admission and further workup. CTA reviewed and was found to have no acutely actionable abnormalities and did not have a PE. Has evidence of leukocytosis as well.    I discussed the findings of the imaging studies and lab work with the patient. Patient expressed understanding of the workup and findings. I went over that the next step in treatment would be admission to the hospital. The patient verbalized agreement with this plan.     The hospitalist service was consulted for evaluation and admission. The hospitalist service assumed primary care of the patient and admitted the patient in stable condition.           Amount and/or Complexity of Data Reviewed  Clinical lab tests: reviewed and ordered  Tests in the radiology section of CPT®: ordered and reviewed  Tests in the medicine section of CPT®: reviewed  Decide to obtain previous medical records or to obtain history from someone other than the patient: yes    Risk of Complications,  Morbidity, and/or Mortality  Presenting problems: high  Diagnostic procedures: high  Management options: high        Final diagnoses:   Chest pain, unspecified type   Atrial fibrillation, unspecified type (CMS/HCC)   Transaminitis   Leukocytosis, unspecified type   Cavitary lesion of lung       ED Disposition  ED Disposition     ED Disposition Condition Comment    Decision to Admit  Level of Care: Telemetry [5]   Diagnosis: Paroxysmal atrial fibrillation with rapid ventricular response (CMS/HCC) [1710787]   Admitting Physician: BRIAN FREED [[id number]]   Attending Physician: BRIAN FREED [[id number]]   Certification: I Certify That Inpatient Hospital Services Are Medically Necessary For Greater Than 2 Midnights            Tien Canchola MD  [address] [facility] [address]  [phone]    Follow up  At your appointment with Lynnette Whaley, she will have to send a request to the [facility] to obtain an appointment.     John Diallo, APRN  [address]  [address]  [facility] [address]  [phone]    Follow up in 1 month(s)  [facility] office will call you with time and date of appointment     Lynnette Whaley, APRN  [address]   [facility] [id number] [phone]    Follow up on 9/9/2021  Your appointment is scheduled on September 9 at 9:30    Vaughn Kramer, DO  [address] [facility]  [address]   [facility] [id number] [phone]               Medication List      New Prescriptions    apixaban 5 MG tablet tablet  Commonly known as: ELIQUIS  Take 1 tablet by mouth Every 12 (Twelve) Hours.     levoFLOXacin 750 MG tablet  Commonly known as: Levaquin  Take 1 tablet by mouth Daily for 5 days.        Changed    rosuvastatin 20 MG tablet  Commonly known as: CRESTOR  Take 1 tablet by mouth Daily. HOLD until speaking to PCP at follow-up  What changed: additional instructions           Where to Get Your Medications      These medications were sent to [facility] [place] -  [address] [facility] [phone]  - [phone] FX  [address] [facility] [address]    Phone: [phone]   · apixaban 5 MG tablet tablet  · levoFLOXacin 750 MG tablet  · rosuvastatin 20 MG tablet          Radha Hinson MD  09/04/21 7233

## 2021-09-03 NOTE — PROGRESS NOTES
Chief Complaint   Patient presents with   • Hypertension     Recheck BP.  Patient states he has a cough from lisinopril.   • Hyperlipidemia     Was told to hold meds until FU appt with PCP.     Date of Admission: 8/31/2021  Date of Discharge:  8/31/2021  Primary Care Physician: Vaughn Kramer DO     Presenting Problem/History of Present Illness:  Chest pain            Final Discharge Diagnoses:       Active Hospital Problems     Diagnosis     • **Paroxysmal atrial fibrillation with rapid ventricular response (CMS/HCC)     • Chest pain     • Elevated troponin     • Cigarette smoker     • Acquired hypothyroidism     • Essential hypertension        History:  Sonu Bravo is a 76 y.o. male who presents today for follow-up for evaluation of the above:    HPI     Patient presents today for hospital discharge follow up for chest pain. He reports he has been feeling well since being discharged after restarting his BB.  He does note a new persistent cough since starting lisinopril though he reports his blood pressures have been much improved.     He has blood work pending for next week to monitor liver enzymes.   CTA of chest did show multiple pulmonary nodules and referral to pulmonology was placed.        ROS:  Review of Systems   Constitutional: Negative for activity change, appetite change, fatigue, fever and unexpected weight change.   HENT: Negative.    Respiratory: Negative for cough, chest tightness, shortness of breath and wheezing.    Cardiovascular: Negative for chest pain, palpitations and leg swelling.   Gastrointestinal: Negative.    Endocrine: Negative.    Genitourinary: Negative.    Musculoskeletal: Negative.    Skin: Negative.    Neurological: Negative for dizziness and headaches.   Psychiatric/Behavioral: Negative.        Mr. Bravo  reports that he has been smoking cigarettes. He started smoking about 54 years ago. He has a 104.00 pack-year smoking history. He has never used smokeless tobacco. He  reports current alcohol use. He reports that he does not use drugs.      Current Outpatient Medications:   •  apixaban (ELIQUIS) 5 MG tablet tablet, Take 1 tablet by mouth Every 12 (Twelve) Hours., Disp: 60 tablet, Rfl: 3  •  bisoprolol (ZEBeta) 10 MG tablet, Take 1 tablet by mouth Daily., Disp: 90 tablet, Rfl: 3  •  Cholecalciferol 4000 units capsule, Take 1 tablet by mouth Daily., Disp: , Rfl:   •  Coenzyme Q10 (CO Q-10) 100 MG capsule, Take 1 tablet by mouth Daily., Disp: , Rfl:   •  Cyanocobalamin (VITAMIN B 12 PO), Take 1 tablet by mouth Daily. 2,500 mg, Disp: , Rfl:   •  fexofenadine (ALLEGRA) 180 MG tablet, Take 180 mg by mouth Daily., Disp: , Rfl:   •  fluticasone (FLONASE) 50 MCG/ACT nasal spray, 1 spray into the nostril(s) as directed by provider Daily. Each Nostral, Disp: 16 g, Rfl: 5  •  levoFLOXacin (Levaquin) 750 MG tablet, Take 1 tablet by mouth Daily for 5 days., Disp: 5 tablet, Rfl: 0  •  levothyroxine (SYNTHROID, LEVOTHROID) 100 MCG tablet, Take 1 tablet by mouth Daily., Disp: 90 tablet, Rfl: 3  •  Multiple Vitamin (MULTI-VITAMIN DAILY PO), Take 1 tablet by mouth Daily., Disp: , Rfl:   •  omeprazole (priLOSEC) 20 MG capsule, Take 1 capsule by mouth Daily., Disp: 90 capsule, Rfl: 3  •  losartan (Cozaar) 25 MG tablet, Take 1 tablet by mouth Daily., Disp: 30 tablet, Rfl: 3  •  rosuvastatin (CRESTOR) 20 MG tablet, Take 1 tablet by mouth Daily. HOLD until speaking to PCP at follow-up, Disp: 90 tablet, Rfl: 3      OBJECTIVE:  /60 (BP Location: Left arm, Patient Position: Sitting, Cuff Size: Adult)   Pulse 57   Temp 97.5 °F (36.4 °C) (Infrared)   Resp 18   SpO2 97%    Physical Exam  Vitals reviewed.   Constitutional:       Appearance: He is well-developed.   HENT:      Head: Normocephalic and atraumatic.   Eyes:      Conjunctiva/sclera: Conjunctivae normal.      Pupils: Pupils are equal, round, and reactive to light.   Cardiovascular:      Rate and Rhythm: Normal rate and regular rhythm.       Heart sounds: Normal heart sounds.   Pulmonary:      Effort: Pulmonary effort is normal.      Breath sounds: Normal breath sounds.   Abdominal:      General: Bowel sounds are normal.      Palpations: Abdomen is soft.   Musculoskeletal:      Cervical back: Normal range of motion and neck supple.   Skin:     General: Skin is warm and dry.   Neurological:      Mental Status: He is alert and oriented to person, place, and time.      Deep Tendon Reflexes: Reflexes are normal and symmetric.         Assessment/Plan    Diagnoses and all orders for this visit:    1. Hospital discharge follow-up (Primary)  2. Chest pain, unspecified type  Resolved with restarting BB    3. Essential hypertension  -     losartan (Cozaar) 25 MG tablet; Take 1 tablet by mouth Daily.  Dispense: 30 tablet; Refill: 3  Change lisinopril to losartan due to cough.     4. Paroxysmal atrial fibrillation with rapid ventricular response (CMS/HCC)  On eliquis and continue BB.     5. Mixed hyperlipidemia  Continue statin at this time. CMP to monitor liver enzymes.     6. Elevated liver enzymes  Labs to monitor.        An After Visit Summary was printed and given to the patient at discharge.  Return if symptoms worsen or fail to improve, for Next scheduled follow up. Sooner if problems arise.          Lynnette Whaley APRN. 9/3/2021   Electronically Signed

## 2021-09-07 ENCOUNTER — LAB (OUTPATIENT)
Dept: LAB | Facility: HOSPITAL | Age: 76
End: 2021-09-07

## 2021-09-07 DIAGNOSIS — R74.01 TRANSAMINITIS: ICD-10-CM

## 2021-09-07 DIAGNOSIS — D72.829 LEUKOCYTOSIS, UNSPECIFIED TYPE: ICD-10-CM

## 2021-09-07 LAB
ALBUMIN SERPL-MCNC: 2.8 G/DL (ref 3.5–5.2)
ALBUMIN/GLOB SERPL: 0.8 G/DL
ALP SERPL-CCNC: 262 U/L (ref 39–117)
ALT SERPL W P-5'-P-CCNC: 319 U/L (ref 1–41)
ANION GAP SERPL CALCULATED.3IONS-SCNC: 8 MMOL/L (ref 5–15)
AST SERPL-CCNC: 215 U/L (ref 1–40)
BASOPHILS # BLD AUTO: 0.04 10*3/MM3 (ref 0–0.2)
BASOPHILS NFR BLD AUTO: 0.3 % (ref 0–1.5)
BILIRUB SERPL-MCNC: 0.7 MG/DL (ref 0–1.2)
BUN SERPL-MCNC: 19 MG/DL (ref 8–23)
BUN/CREAT SERPL: 22.9 (ref 7–25)
CALCIUM SPEC-SCNC: 8.6 MG/DL (ref 8.6–10.5)
CHLORIDE SERPL-SCNC: 103 MMOL/L (ref 98–107)
CO2 SERPL-SCNC: 28 MMOL/L (ref 22–29)
CREAT SERPL-MCNC: 0.83 MG/DL (ref 0.76–1.27)
DEPRECATED RDW RBC AUTO: 50.5 FL (ref 37–54)
EOSINOPHIL # BLD AUTO: 0.1 10*3/MM3 (ref 0–0.4)
EOSINOPHIL NFR BLD AUTO: 0.8 % (ref 0.3–6.2)
ERYTHROCYTE [DISTWIDTH] IN BLOOD BY AUTOMATED COUNT: 14.5 % (ref 12.3–15.4)
GFR SERPL CREATININE-BSD FRML MDRD: 90 ML/MIN/1.73
GLOBULIN UR ELPH-MCNC: 3.3 GM/DL
GLUCOSE SERPL-MCNC: 102 MG/DL (ref 65–99)
HCT VFR BLD AUTO: 42.6 % (ref 37.5–51)
HGB BLD-MCNC: 13.4 G/DL (ref 13–17.7)
IMM GRANULOCYTES # BLD AUTO: 0.16 10*3/MM3 (ref 0–0.05)
IMM GRANULOCYTES NFR BLD AUTO: 1.3 % (ref 0–0.5)
LYMPHOCYTES # BLD AUTO: 1.03 10*3/MM3 (ref 0.7–3.1)
LYMPHOCYTES NFR BLD AUTO: 8.6 % (ref 19.6–45.3)
MCH RBC QN AUTO: 29.6 PG (ref 26.6–33)
MCHC RBC AUTO-ENTMCNC: 31.5 G/DL (ref 31.5–35.7)
MCV RBC AUTO: 94.2 FL (ref 79–97)
MONOCYTES # BLD AUTO: 1.19 10*3/MM3 (ref 0.1–0.9)
MONOCYTES NFR BLD AUTO: 9.9 % (ref 5–12)
NEUTROPHILS NFR BLD AUTO: 79.1 % (ref 42.7–76)
NEUTROPHILS NFR BLD AUTO: 9.49 10*3/MM3 (ref 1.7–7)
NRBC BLD AUTO-RTO: 0 /100 WBC (ref 0–0.2)
PLATELET # BLD AUTO: 279 10*3/MM3 (ref 140–450)
PMV BLD AUTO: 9.7 FL (ref 6–12)
POTASSIUM SERPL-SCNC: 3.6 MMOL/L (ref 3.5–5.2)
PROT SERPL-MCNC: 6.1 G/DL (ref 6–8.5)
RBC # BLD AUTO: 4.52 10*6/MM3 (ref 4.14–5.8)
SODIUM SERPL-SCNC: 139 MMOL/L (ref 136–145)
WBC # BLD AUTO: 12.01 10*3/MM3 (ref 3.4–10.8)

## 2021-09-07 PROCEDURE — 80053 COMPREHEN METABOLIC PANEL: CPT

## 2021-09-07 PROCEDURE — 85025 COMPLETE CBC W/AUTO DIFF WBC: CPT

## 2021-09-09 ENCOUNTER — OFFICE VISIT (OUTPATIENT)
Dept: INTERNAL MEDICINE | Facility: CLINIC | Age: 76
End: 2021-09-09

## 2021-09-09 VITALS
DIASTOLIC BLOOD PRESSURE: 60 MMHG | TEMPERATURE: 97.5 F | SYSTOLIC BLOOD PRESSURE: 118 MMHG | RESPIRATION RATE: 16 BRPM | OXYGEN SATURATION: 96 % | HEART RATE: 54 BPM | BODY MASS INDEX: 22.66 KG/M2 | WEIGHT: 171 LBS | HEIGHT: 73 IN

## 2021-09-09 DIAGNOSIS — R91.1 LUNG NODULE SEEN ON IMAGING STUDY: ICD-10-CM

## 2021-09-09 DIAGNOSIS — I48.0 PAROXYSMAL ATRIAL FIBRILLATION WITH RAPID VENTRICULAR RESPONSE (HCC): ICD-10-CM

## 2021-09-09 DIAGNOSIS — F17.210 NICOTINE DEPENDENCE, CIGARETTES, UNCOMPLICATED: ICD-10-CM

## 2021-09-09 DIAGNOSIS — I10 ESSENTIAL HYPERTENSION: Primary | ICD-10-CM

## 2021-09-09 DIAGNOSIS — R74.8 ELEVATED LIVER ENZYMES: Primary | ICD-10-CM

## 2021-09-09 DIAGNOSIS — R53.83 OTHER FATIGUE: ICD-10-CM

## 2021-09-09 DIAGNOSIS — R74.8 ELEVATED LIVER ENZYMES: ICD-10-CM

## 2021-09-09 PROCEDURE — 99214 OFFICE O/P EST MOD 30 MIN: CPT | Performed by: NURSE PRACTITIONER

## 2021-09-09 NOTE — PROGRESS NOTES
Chief Complaint   Patient presents with   • Fatigue   • Leg Swelling     LEFT ANKLE   • Night Sweats       History:  Sonu Bravo is a 76 y.o. male who presents today for follow-up for evaluation of the above:    HPI     Patient presents today with c/o fatigue. Has been worsening over past week since starting new blood pressure medication   He did have and episode of chills and sweats one night over the weekend that resolved and no additional episodes.   Since his visit last week he had labs repeated on Tuesday that continue to show upward levels of AST and ALT.   Cough resolved after stopping lisinopril.          ROS:  Review of Systems   Constitutional: Positive for chills and fatigue.   Endocrine: Positive for heat intolerance.   All other systems reviewed and are negative.      Mr. Bravo  reports that he has been smoking cigarettes. He started smoking about 54 years ago. He has a 104.00 pack-year smoking history. He has never used smokeless tobacco. He reports current alcohol use. He reports that he does not use drugs.      Current Outpatient Medications:   •  apixaban (ELIQUIS) 5 MG tablet tablet, Take 1 tablet by mouth Every 12 (Twelve) Hours., Disp: 60 tablet, Rfl: 3  •  bisoprolol (ZEBeta) 10 MG tablet, Take 1 tablet by mouth Daily., Disp: 90 tablet, Rfl: 3  •  Cholecalciferol 4000 units capsule, Take 1 tablet by mouth Daily., Disp: , Rfl:   •  Coenzyme Q10 (CO Q-10) 100 MG capsule, Take 1 tablet by mouth Daily., Disp: , Rfl:   •  Cyanocobalamin (VITAMIN B 12 PO), Take 1 tablet by mouth Daily. 2,500 mg, Disp: , Rfl:   •  fexofenadine (ALLEGRA) 180 MG tablet, Take 180 mg by mouth Daily., Disp: , Rfl:   •  fluticasone (FLONASE) 50 MCG/ACT nasal spray, 1 spray into the nostril(s) as directed by provider Daily. Each Nostral, Disp: 16 g, Rfl: 5  •  levothyroxine (SYNTHROID, LEVOTHROID) 100 MCG tablet, Take 1 tablet by mouth Daily., Disp: 90 tablet, Rfl: 3  •  losartan (Cozaar) 25 MG tablet, Take 1 tablet by mouth  "Daily., Disp: 30 tablet, Rfl: 3  •  Multiple Vitamin (MULTI-VITAMIN DAILY PO), Take 1 tablet by mouth Daily., Disp: , Rfl:   •  omeprazole (priLOSEC) 20 MG capsule, Take 1 capsule by mouth Daily., Disp: 90 capsule, Rfl: 3  •  rosuvastatin (CRESTOR) 20 MG tablet, Take 1 tablet by mouth Daily. HOLD until speaking to PCP at follow-up, Disp: 90 tablet, Rfl: 3      OBJECTIVE:  /60 (BP Location: Left arm, Patient Position: Sitting, Cuff Size: Adult)   Pulse 54   Temp 97.5 °F (36.4 °C) (Temporal)   Resp 16   Ht 185.4 cm (73\")   Wt 77.6 kg (171 lb)   SpO2 96%   BMI 22.56 kg/m²    Physical Exam  Vitals reviewed.   Constitutional:       Appearance: He is well-developed.   HENT:      Head: Normocephalic and atraumatic.   Eyes:      Conjunctiva/sclera: Conjunctivae normal.      Pupils: Pupils are equal, round, and reactive to light.   Cardiovascular:      Rate and Rhythm: Normal rate and regular rhythm.      Heart sounds: Normal heart sounds.   Pulmonary:      Effort: Pulmonary effort is normal.      Breath sounds: Normal breath sounds.   Abdominal:      General: Bowel sounds are normal.      Palpations: Abdomen is soft.   Musculoskeletal:      Cervical back: Normal range of motion and neck supple.      Left lower leg: Left lower leg edema: mild ankle swelling, no redness or warmth.   Skin:     General: Skin is warm and dry.   Neurological:      Mental Status: He is alert and oriented to person, place, and time.      Deep Tendon Reflexes: Reflexes are normal and symmetric.         Assessment/Plan    Diagnoses and all orders for this visit:    1. Essential hypertension (Primary)  Well controlled, BP goal for age is <140/90 per JNC 8 guidelines and continue current medications    2. Other fatigue  Differential is wide at this time.     3. Elevated liver enzymes  -     US Liver; Future  4. Paroxysmal atrial fibrillation with rapid ventricular response (CMS/HCC)    Patient's blood pressure is well controlled.  He is " monitoring this at home as well.  He is encouraged to continue to monitor for possible hypotensive episodes with his recent complaint of night sweats.  He has had no additional episodes of it and went over the weekend of the sweating chills and night sweats.  His labs from 9/7/2021 do indicate his liver enzymes have continued to elevate.  This could be related to his recent hospitalization, antibiotics, and A. fib.  At this time we will hold his statin and plan to recheck labs in 2 weeks.  His thyroid was checked while inpatient on 8/31/2020 range.       An After Visit Summary was printed and given to the patient at discharge.  Return in about 2 weeks (around 9/23/2021). Sooner if problems arise.          Lynnette HERNANDEZ. 9/9/2021   Electronically Signed

## 2021-09-09 NOTE — PROGRESS NOTES
He has been recommended to hold statin.   US of liver ordered.  He is scheduled with pulm in November. By CT recommendations he would need repeat low dose in one month. Order has been placed.

## 2021-09-10 ENCOUNTER — HOSPITAL ENCOUNTER (OUTPATIENT)
Dept: ULTRASOUND IMAGING | Facility: HOSPITAL | Age: 76
Discharge: HOME | End: 2021-09-10

## 2021-09-10 ENCOUNTER — HOSPITAL ENCOUNTER (OUTPATIENT)
Dept: CT IMAGING | Facility: HOSPITAL | Age: 76
Discharge: HOME | End: 2021-09-10

## 2021-09-10 DIAGNOSIS — R16.0 LIVER MASSES: ICD-10-CM

## 2021-09-10 DIAGNOSIS — Z12.11 SCREENING FOR MALIGNANT NEOPLASM OF COLON: ICD-10-CM

## 2021-09-10 DIAGNOSIS — R16.0 LIVER MASSES: Primary | ICD-10-CM

## 2021-09-10 DIAGNOSIS — R74.8 ELEVATED LIVER ENZYMES: ICD-10-CM

## 2021-09-10 PROCEDURE — 0 IOPAMIDOL PER 1 ML: Performed by: NURSE PRACTITIONER

## 2021-09-10 PROCEDURE — 76705 ECHO EXAM OF ABDOMEN: CPT

## 2021-09-10 PROCEDURE — 74178 CT ABD&PLV WO CNTR FLWD CNTR: CPT

## 2021-09-10 RX ADMIN — IOPAMIDOL 100 ML: 755 INJECTION, SOLUTION INTRAVENOUS at 18:25

## 2021-09-13 ENCOUNTER — TELEPHONE (OUTPATIENT)
Dept: GASTROENTEROLOGY | Facility: CLINIC | Age: 76
End: 2021-09-13

## 2021-09-13 NOTE — TELEPHONE ENCOUNTER
Sonu Bravo has appointment with Dr Dc next Monday  Can you please contact him to see if he wants to come into office tomorrow to see me     Thank you

## 2021-09-17 ENCOUNTER — TELEPHONE (OUTPATIENT)
Dept: INTERNAL MEDICINE | Facility: CLINIC | Age: 76
End: 2021-09-17

## 2021-09-17 NOTE — TELEPHONE ENCOUNTER
Lynnette was informed of symptoms.  Patient informed he will need an office visit for evaluation. Patient stated he is out of state in [place] right now but he can come in on Monday morning.  Patient advised to get compression stockings to help with swelling and to seek care at the nearest UC or emergency room if swelling persists or worsens or he begins experiencing pain or SOA. Patient verbalized understanding and said he will do so.

## 2021-09-17 NOTE — TELEPHONE ENCOUNTER
Caller: Sonu Bravo    Relationship: Self     Best call back number:  [phone]      What medication are you requesting: unsure     What are your current symptoms: Ankles and stomach are swollen, He is retaining alot of fluid.     How long have you been experiencing symptoms: a couple of days     If a prescription is needed, what is your preferred pharmacy and phone number: [place], [place] - [address] [facility] [phone] [facility] [phone]      Additional notes:  Pt would like a call when/if something is sent to pharmacy.

## 2021-09-20 ENCOUNTER — LAB (OUTPATIENT)
Dept: LAB | Facility: HOSPITAL | Age: 76
End: 2021-09-20

## 2021-09-20 ENCOUNTER — OFFICE VISIT (OUTPATIENT)
Dept: GASTROENTEROLOGY | Facility: CLINIC | Age: 76
End: 2021-09-20

## 2021-09-20 ENCOUNTER — OFFICE VISIT (OUTPATIENT)
Dept: INTERNAL MEDICINE | Facility: CLINIC | Age: 76
End: 2021-09-20

## 2021-09-20 VITALS
TEMPERATURE: 97.5 F | HEART RATE: 59 BPM | BODY MASS INDEX: 23.59 KG/M2 | HEIGHT: 73 IN | OXYGEN SATURATION: 98 % | RESPIRATION RATE: 16 BRPM | WEIGHT: 178 LBS | DIASTOLIC BLOOD PRESSURE: 60 MMHG | SYSTOLIC BLOOD PRESSURE: 106 MMHG

## 2021-09-20 VITALS
OXYGEN SATURATION: 94 % | WEIGHT: 178 LBS | TEMPERATURE: 97 F | SYSTOLIC BLOOD PRESSURE: 110 MMHG | DIASTOLIC BLOOD PRESSURE: 60 MMHG | HEIGHT: 70 IN | HEART RATE: 61 BPM | BODY MASS INDEX: 25.48 KG/M2

## 2021-09-20 DIAGNOSIS — R34 DECREASED URINE OUTPUT: ICD-10-CM

## 2021-09-20 DIAGNOSIS — R93.5 ABNORMAL CT OF THE ABDOMEN: Primary | ICD-10-CM

## 2021-09-20 DIAGNOSIS — I10 ESSENTIAL HYPERTENSION: ICD-10-CM

## 2021-09-20 DIAGNOSIS — R74.8 ELEVATED LIVER ENZYMES: Primary | ICD-10-CM

## 2021-09-20 DIAGNOSIS — R19.00 ABDOMINAL SWELLING: ICD-10-CM

## 2021-09-20 DIAGNOSIS — R74.8 ELEVATED LIVER ENZYMES: ICD-10-CM

## 2021-09-20 DIAGNOSIS — R79.89 ELEVATED LFTS: ICD-10-CM

## 2021-09-20 DIAGNOSIS — L74.9 SWEATING ABNORMALITY: ICD-10-CM

## 2021-09-20 DIAGNOSIS — R60.0 LOWER EXTREMITY EDEMA: ICD-10-CM

## 2021-09-20 DIAGNOSIS — K76.9 LIVER LESION: ICD-10-CM

## 2021-09-20 LAB
ALBUMIN SERPL-MCNC: 2.8 G/DL (ref 3.5–5.2)
ALBUMIN/GLOB SERPL: 0.7 G/DL
ALP SERPL-CCNC: 239 U/L (ref 39–117)
ALT SERPL W P-5'-P-CCNC: 67 U/L (ref 1–41)
ANION GAP SERPL CALCULATED.3IONS-SCNC: 12 MMOL/L (ref 5–15)
AST SERPL-CCNC: 69 U/L (ref 1–40)
BILIRUB SERPL-MCNC: 1 MG/DL (ref 0–1.2)
BUN SERPL-MCNC: 25 MG/DL (ref 8–23)
BUN/CREAT SERPL: 19.1 (ref 7–25)
CALCIUM SPEC-SCNC: 8.6 MG/DL (ref 8.6–10.5)
CHLORIDE SERPL-SCNC: 100 MMOL/L (ref 98–107)
CO2 SERPL-SCNC: 25 MMOL/L (ref 22–29)
CREAT SERPL-MCNC: 1.31 MG/DL (ref 0.76–1.27)
GFR SERPL CREATININE-BSD FRML MDRD: 53 ML/MIN/1.73
GLOBULIN UR ELPH-MCNC: 4 GM/DL
GLUCOSE SERPL-MCNC: 120 MG/DL (ref 65–99)
POTASSIUM SERPL-SCNC: 3.9 MMOL/L (ref 3.5–5.2)
PROT SERPL-MCNC: 6.8 G/DL (ref 6–8.5)
SODIUM SERPL-SCNC: 137 MMOL/L (ref 136–145)

## 2021-09-20 PROCEDURE — 99204 OFFICE O/P NEW MOD 45 MIN: CPT | Performed by: INTERNAL MEDICINE

## 2021-09-20 PROCEDURE — 80053 COMPREHEN METABOLIC PANEL: CPT

## 2021-09-20 PROCEDURE — 99214 OFFICE O/P EST MOD 30 MIN: CPT | Performed by: NURSE PRACTITIONER

## 2021-09-20 PROCEDURE — 36415 COLL VENOUS BLD VENIPUNCTURE: CPT

## 2021-09-20 RX ORDER — SODIUM, POTASSIUM,MAG SULFATES 17.5-3.13G
SOLUTION, RECONSTITUTED, ORAL ORAL
Qty: 177 ML | Refills: 0 | Status: SHIPPED | OUTPATIENT
Start: 2021-09-20 | End: 2021-09-29

## 2021-09-20 RX ORDER — HYDROCHLOROTHIAZIDE 12.5 MG/1
12.5 TABLET ORAL DAILY
Qty: 30 TABLET | Refills: 3 | Status: SHIPPED | OUTPATIENT
Start: 2021-09-20 | End: 2021-12-03 | Stop reason: SDUPTHER

## 2021-09-20 RX ORDER — HYDROCHLOROTHIAZIDE 12.5 MG/1
12.5 TABLET ORAL DAILY
Qty: 30 TABLET | Refills: 3 | Status: SHIPPED | OUTPATIENT
Start: 2021-09-20 | End: 2021-09-20 | Stop reason: SDUPTHER

## 2021-09-20 NOTE — PROGRESS NOTES
This patient was only seen once during hospitalization 931 2021 for atrial fibrillation with RVR.  He has upcoming follow-up in cardiology clinic on September 30, 2021, at which time further decisions regarding anticoagulation can be made.

## 2021-09-20 NOTE — PROGRESS NOTES
Chief Complaint   Patient presents with   • Imaging Only     had ct showed liver and colon problems       PCP: Vaughn Kramer, DO  REFER: Lynnette Whaley APRN    Subjective     HPI    ER evaluation at [facility] Aug 2021 for chest pain.  CT scan showed abnormality to colon and questionable lesion to liver.  He did have US on 9/10/21.  Last colonoscopy apx 7-8 yr ago in [place].  He does not have difficulty with bowel changes.  He complains of retaining fluid and urinary retention.  He has experienced intermittent lower abdominal pain.   No previous hx of liver disease.  No weight  Loss, complains of weight  Gain but attributes to fluid.    Lab Results - Last 18 Months   Lab Units 09/07/21  0918 08/31/21  0252 08/31/21  0047 08/13/21  1040 08/11/20  1056   HEMOGLOBIN g/dL 13.4 14.7 15.4 16.2 16.0   HEMATOCRIT % 42.6 43.4 45.0 48.5 47.1   MCV fL 94.2 92.3 91.6 94.7 91.6   WBC 10*3/mm3 12.01* 19.05* 19.76* 9.20 7.30   PLATELETS 10*3/mm3 279 220 221 156 154       Lab Results - Last 18 Months   Lab Units 09/20/21  1026 09/07/21  0918 08/31/21  0047 08/13/21  1040 08/11/20  1056   GLUCOSE mg/dL 120* 102* 161* 104* 96   SODIUM mmol/L 137 139 142 136 138   POTASSIUM mmol/L 3.9 3.6 4.1 4.3 5.0   CREATININE mg/dL 1.31* 0.83 0.91 1.00 0.89   BUN mg/dL 25* 19 14 15 11   BUN / CREAT RATIO  19.1 22.9 15.4 15.0 12.4   ALK PHOS U/L 239* 262* 114 57 47   ALT (SGPT) U/L 67* 319* 140* 24 21   AST (SGOT) U/L 69* 215* 77* 21 20   BILIRUBIN mg/dL 1.0 0.7 0.3 0.4 0.3   ALBUMIN g/dL 2.80* 2.80* 3.90 4.10 4.20       Lab Results - Last 18 Months   Lab Units 08/31/21  0047 08/13/21  1040 08/11/20  1056   TSH uIU/mL 2.550 5.340* 4.010         Past Medical History:   Diagnosis Date   • Arthritis    • GERD (gastroesophageal reflux disease)    • Hyperlipidemia    • Hypertension        Past Surgical History:   Procedure Laterality Date   • HERNIA REPAIR         Outpatient Medications Marked as Taking for the 9/20/21 encounter (Office Visit) with  Thomas Dc W, DO   Medication Sig Dispense Refill   • apixaban (ELIQUIS) 5 MG tablet tablet Take 1 tablet by mouth Every 12 (Twelve) Hours. 60 tablet 3   • bisoprolol (ZEBeta) 10 MG tablet Take 1 tablet by mouth Daily. 90 tablet 3   • Cholecalciferol 4000 units capsule Take 1 tablet by mouth Daily.     • Coenzyme Q10 (CO Q-10) 100 MG capsule Take 1 tablet by mouth Daily.     • Cyanocobalamin (VITAMIN B 12 PO) Take 1 tablet by mouth Daily. 2,500 mg     • fexofenadine (ALLEGRA) 180 MG tablet Take 180 mg by mouth Daily.     • fluticasone (FLONASE) 50 MCG/ACT nasal spray 1 spray into the nostril(s) as directed by provider Daily. Each Nostral 16 g 5   • hydroCHLOROthiazide (HYDRODIURIL) 12.5 MG tablet Take 1 tablet by mouth Daily. 30 tablet 3   • levothyroxine (SYNTHROID, LEVOTHROID) 100 MCG tablet Take 1 tablet by mouth Daily. 90 tablet 3   • Multiple Vitamin (MULTI-VITAMIN DAILY PO) Take 1 tablet by mouth Daily.     • omeprazole (priLOSEC) 20 MG capsule Take 1 capsule by mouth Daily. 90 capsule 3   • rosuvastatin (CRESTOR) 20 MG tablet Take 1 tablet by mouth Daily. HOLD until speaking to PCP at follow-up 90 tablet 3       Allergies   Allergen Reactions   • Penicillins Hives   • Sulfa Antibiotics Hives, Itching and Rash   • Sulfamethoxazole-Trimethoprim Hives, Itching and Rash       Social History     Socioeconomic History   • Marital status:      Spouse name: Not on file   • Number of children: Not on file   • Years of education: Not on file   • Highest education level: Not on file   Tobacco Use   • Smoking status: Current Every Day Smoker     Packs/day: 0.50     Years: 52.00     Pack years: 26.00     Types: Cigarettes     Start date: 1967   • Smokeless tobacco: Never Used   Substance and Sexual Activity   • Alcohol use: Yes     Comment: rare   • Drug use: No   • Sexual activity: Defer       Review of Systems   Constitutional: Negative for unexpected weight change.   Respiratory: Negative for shortness of  "breath.    Cardiovascular: Negative for chest pain.   Gastrointestinal: Positive for abdominal pain. Negative for anal bleeding.       Objective     Vitals:    09/20/21 1322   BP: 110/60   Pulse: 61   Temp: 97 °F (36.1 °C)   SpO2: 94%   Weight: 80.7 kg (178 lb)   Height: 177.8 cm (70\")     Body mass index is 25.54 kg/m².    Physical Exam  Constitutional:       Appearance: Normal appearance. He is well-developed.   Eyes:      General: No scleral icterus.  Cardiovascular:      Rate and Rhythm: Regular rhythm.      Heart sounds: Normal heart sounds. No murmur heard.     Pulmonary:      Effort: Pulmonary effort is normal. No accessory muscle usage.      Breath sounds: Normal breath sounds.   Abdominal:      General: Bowel sounds are normal. There is no distension.      Palpations: Abdomen is soft. There is no mass.      Tenderness: There is abdominal tenderness (JANET, worse to right). There is no guarding or rebound.   Musculoskeletal:         General: Swelling (bilateral ankle, 2+) present.   Skin:     General: Skin is warm and dry.      Coloration: Skin is not jaundiced.   Neurological:      Mental Status: He is alert.   Psychiatric:         Behavior: Behavior is cooperative.         Imaging Results (Most Recent)     None          Body mass index is 25.54 kg/m².    Assessment/Plan     Diagnoses and all orders for this visit:    1. Abnormal CT of the abdomen (Primary)  -     Case Request; Standing  -     Implement Anesthesia Orders Day of Procedure; Standing  -     Obtain Informed Consent; Standing  -     Case Request    2. Liver lesion    3. Elevated LFTs    Other orders  -     sodium-potassium-magnesium sulfates (Suprep Bowel Prep Kit) 17.5-3.13-1.6 GM/177ML solution oral solution; Take as directed  Dispense: 177 mL; Refill: 0        COLONOSCOPY WITH ANESTHESIA (N/A)    Will discuss liver further after colonoscopy     Advised pt to stop use of NSAIDs, Fish Oil, and MV 5 days prior to procedure, per Dr Dc protocol. "  Tylenol based products are ok to take.  Pt verbalized understanding.      Thomas Dc, DO  09/20/21          There are no Patient Instructions on file for this visit.

## 2021-09-20 NOTE — PROGRESS NOTES
Chief Complaint   Patient presents with   • Edema     ankles and abdomen   • Medication Problem     would like to discuss possible side effects from losartan       History:  Sonu Bravo is a 76 y.o. male who presents today for follow-up for evaluation of the above:    HPI   Patient presents today with c/o bilateral lower extremity swelling and feeling of pain and swelling in his RUQ. This started last week while he was out of town on vacation. Not improving. No redness or warmth of extremities.   He also has been expereincing sweats and shakes since starting his losartan. This is consistently occurring in the evenings. Will last for about an hour and resolve without intervention. He also reports decreased urine output and loss of appetite.   She does have a visit with Dr. Dc this afternoon related to his recent CT findings.           ROS:  Review of Systems   Respiratory: Positive for cough.    Cardiovascular: Positive for leg swelling.   Gastrointestinal: Positive for abdominal distention and abdominal pain. Negative for diarrhea and nausea.        Appetite change   Endocrine: Positive for heat intolerance.   Genitourinary: Positive for decreased urine volume.   Neurological: Positive for tremors and weakness.   All other systems reviewed and are negative.      Mr. Bravo  reports that he has been smoking cigarettes. He started smoking about 54 years ago. He has a 104.00 pack-year smoking history. He has never used smokeless tobacco. He reports current alcohol use. He reports that he does not use drugs.      Current Outpatient Medications:   •  apixaban (ELIQUIS) 5 MG tablet tablet, Take 1 tablet by mouth Every 12 (Twelve) Hours., Disp: 60 tablet, Rfl: 3  •  bisoprolol (ZEBeta) 10 MG tablet, Take 1 tablet by mouth Daily., Disp: 90 tablet, Rfl: 3  •  Cholecalciferol 4000 units capsule, Take 1 tablet by mouth Daily., Disp: , Rfl:   •  Coenzyme Q10 (CO Q-10) 100 MG capsule, Take 1 tablet by mouth Daily., Disp: , Rfl:  "  •  Cyanocobalamin (VITAMIN B 12 PO), Take 1 tablet by mouth Daily. 2,500 mg, Disp: , Rfl:   •  fexofenadine (ALLEGRA) 180 MG tablet, Take 180 mg by mouth Daily., Disp: , Rfl:   •  fluticasone (FLONASE) 50 MCG/ACT nasal spray, 1 spray into the nostril(s) as directed by provider Daily. Each Nostral, Disp: 16 g, Rfl: 5  •  levothyroxine (SYNTHROID, LEVOTHROID) 100 MCG tablet, Take 1 tablet by mouth Daily., Disp: 90 tablet, Rfl: 3  •  Multiple Vitamin (MULTI-VITAMIN DAILY PO), Take 1 tablet by mouth Daily., Disp: , Rfl:   •  omeprazole (priLOSEC) 20 MG capsule, Take 1 capsule by mouth Daily., Disp: 90 capsule, Rfl: 3  •  hydroCHLOROthiazide (HYDRODIURIL) 12.5 MG tablet, Take 1 tablet by mouth Daily., Disp: 30 tablet, Rfl: 3  •  rosuvastatin (CRESTOR) 20 MG tablet, Take 1 tablet by mouth Daily. HOLD until speaking to PCP at follow-up, Disp: 90 tablet, Rfl: 3      OBJECTIVE:  /60 (BP Location: Left arm, Patient Position: Sitting, Cuff Size: Adult)   Pulse 59   Temp 97.5 °F (36.4 °C) (Temporal)   Resp 16   Ht 185.4 cm (73\")   Wt 80.7 kg (178 lb)   SpO2 98%   BMI 23.48 kg/m²    Physical Exam  Constitutional:       General: He is not in acute distress.  Pulmonary:      Effort: No respiratory distress.   Abdominal:      General: There is no distension.      Tenderness: There is abdominal tenderness (RUQ).   Musculoskeletal:      Right lower leg: Edema (pitting +2 BLE) present.      Left lower leg: Edema present.   Neurological:      Mental Status: He is oriented to person, place, and time.   Psychiatric:         Behavior: Behavior normal.         Assessment/Plan    Diagnoses and all orders for this visit:    1. Elevated liver enzymes (Primary)  -     Comprehensive metabolic panel; Future  With reports of episodes of sweating and shaking occurring often in the evenings will order labs to further evaluate electrolytes and monitor liver enzymes and kidney function    2. Abdominal swelling  -     Comprehensive " metabolic panel; Future  F/u with GI this afternoon.     3. Lower extremity edema  -     Discontinue: hydroCHLOROthiazide (HYDRODIURIL) 12.5 MG tablet; Take 1 tablet by mouth Daily.  Dispense: 30 tablet; Refill: 3  -     hydroCHLOROthiazide (HYDRODIURIL) 12.5 MG tablet; Take 1 tablet by mouth Daily.  Dispense: 30 tablet; Refill: 3  encounarged compression stockings.     4. Essential hypertension  -     hydroCHLOROthiazide (HYDRODIURIL) 12.5 MG tablet; Take 1 tablet by mouth Daily.  Dispense: 30 tablet; Refill: 3  Well controlled, BP goal for age is <140/90 per JNC 8 guidelines. Patient worries some of his symptoms are side effect from losartan. He is to continue BB and with hold losartan and add HCTZ with labs ordered as well.     5. Sweating abnormality  6. Decreased urine output  Labs as above.            An After Visit Summary was printed and given to the patient at discharge.  Return if symptoms worsen or fail to improve, for Next scheduled follow up. Sooner if problems arise.          Lynnette HERNANDEZ. 9/20/2021   Electronically Signed

## 2021-09-21 ENCOUNTER — TELEPHONE (OUTPATIENT)
Dept: GASTROENTEROLOGY | Facility: CLINIC | Age: 76
End: 2021-09-21

## 2021-09-21 NOTE — TELEPHONE ENCOUNTER
----- Message from Ivon JOE Henrik sent at 9/21/2021  9:08 AM CDT -----    ----- Message -----  From: Wyatt Recinos MD  Sent: 9/20/2021   3:58 PM CDT  To: Ivon JOE Henrik    This patient was only seen once during hospitalization 931 2021 for atrial fibrillation with RVR.  He has upcoming follow-up in cardiology clinic on September 30, 2021, at which time further decisions regarding anticoagulation can be made.    ----- Message -----  From: Ivon Cehster  Sent: 9/20/2021   2:17 PM CDT  To: Wyatt Recinos MD

## 2021-09-22 ENCOUNTER — TELEPHONE (OUTPATIENT)
Dept: GASTROENTEROLOGY | Facility: CLINIC | Age: 76
End: 2021-09-22

## 2021-09-22 NOTE — TELEPHONE ENCOUNTER
Spoke with patient - rescheduled Colonoscopy to 10/04/2021 at 11:15am due to clearance from Dr. Recinos asking us to wait till after his follow up appointment with them on 09/30/2021. Patient was upset about having to reschedule his colonoscopy. I told him he would have to contact Dr. Recinos office and ask them about changing his appointment with them. He voiced understanding.

## 2021-09-22 NOTE — TELEPHONE ENCOUNTER
Patient called back stating he changed his appointment with Dr. Recinos office to this Friday, 09/24/2021 - he changed his colonoscopy back to 09/28/2021 at 11:15am with Dr. Dc - Instructed patient to contact our office on Friday and let us know how his clearance appointment went.

## 2021-09-23 ENCOUNTER — TELEPHONE (OUTPATIENT)
Dept: INTERNAL MEDICINE | Facility: CLINIC | Age: 76
End: 2021-09-23

## 2021-09-23 ENCOUNTER — APPOINTMENT (OUTPATIENT)
Dept: CT IMAGING | Facility: HOSPITAL | Age: 76
End: 2021-09-23

## 2021-09-23 ENCOUNTER — OFFICE VISIT (OUTPATIENT)
Dept: INTERNAL MEDICINE | Facility: CLINIC | Age: 76
End: 2021-09-23

## 2021-09-23 ENCOUNTER — LAB (OUTPATIENT)
Dept: LAB | Facility: HOSPITAL | Age: 76
End: 2021-09-23

## 2021-09-23 VITALS
HEART RATE: 58 BPM | WEIGHT: 177 LBS | DIASTOLIC BLOOD PRESSURE: 62 MMHG | RESPIRATION RATE: 16 BRPM | OXYGEN SATURATION: 98 % | SYSTOLIC BLOOD PRESSURE: 106 MMHG | TEMPERATURE: 97.9 F | BODY MASS INDEX: 25.34 KG/M2 | HEIGHT: 70 IN

## 2021-09-23 DIAGNOSIS — D72.829 LEUKOCYTOSIS, UNSPECIFIED TYPE: ICD-10-CM

## 2021-09-23 DIAGNOSIS — I48.0 PAROXYSMAL ATRIAL FIBRILLATION WITH RAPID VENTRICULAR RESPONSE (HCC): ICD-10-CM

## 2021-09-23 DIAGNOSIS — R68.83 SHAKING CHILLS: Primary | ICD-10-CM

## 2021-09-23 DIAGNOSIS — R68.83 SHAKING CHILLS: ICD-10-CM

## 2021-09-23 DIAGNOSIS — F41.8 SITUATIONAL ANXIETY: ICD-10-CM

## 2021-09-23 DIAGNOSIS — M62.838 MUSCLE SPASM: ICD-10-CM

## 2021-09-23 DIAGNOSIS — I10 ESSENTIAL HYPERTENSION: ICD-10-CM

## 2021-09-23 LAB
ALBUMIN SERPL-MCNC: 2.7 G/DL (ref 3.5–5.2)
ALBUMIN/GLOB SERPL: 0.7 G/DL
ALP SERPL-CCNC: 197 U/L (ref 39–117)
ALT SERPL W P-5'-P-CCNC: 42 U/L (ref 1–41)
ANION GAP SERPL CALCULATED.3IONS-SCNC: 11 MMOL/L (ref 5–15)
AST SERPL-CCNC: 38 U/L (ref 1–40)
BASOPHILS # BLD AUTO: 0.09 10*3/MM3 (ref 0–0.2)
BASOPHILS NFR BLD AUTO: 0.3 % (ref 0–1.5)
BILIRUB SERPL-MCNC: 0.8 MG/DL (ref 0–1.2)
BUN SERPL-MCNC: 31 MG/DL (ref 8–23)
BUN/CREAT SERPL: 26.7 (ref 7–25)
CALCIUM SPEC-SCNC: 8.8 MG/DL (ref 8.6–10.5)
CHLORIDE SERPL-SCNC: 100 MMOL/L (ref 98–107)
CO2 SERPL-SCNC: 26 MMOL/L (ref 22–29)
CREAT SERPL-MCNC: 1.16 MG/DL (ref 0.76–1.27)
DEPRECATED RDW RBC AUTO: 48.1 FL (ref 37–54)
EOSINOPHIL # BLD AUTO: 0.01 10*3/MM3 (ref 0–0.4)
EOSINOPHIL NFR BLD AUTO: 0 % (ref 0.3–6.2)
ERYTHROCYTE [DISTWIDTH] IN BLOOD BY AUTOMATED COUNT: 14.8 % (ref 12.3–15.4)
ERYTHROCYTE [SEDIMENTATION RATE] IN BLOOD: 26 MM/HR (ref 0–15)
GFR SERPL CREATININE-BSD FRML MDRD: 61 ML/MIN/1.73
GLOBULIN UR ELPH-MCNC: 3.7 GM/DL
GLUCOSE SERPL-MCNC: 101 MG/DL (ref 65–99)
HCT VFR BLD AUTO: 37.7 % (ref 37.5–51)
HGB BLD-MCNC: 12.3 G/DL (ref 13–17.7)
IMM GRANULOCYTES # BLD AUTO: 0.42 10*3/MM3 (ref 0–0.05)
IMM GRANULOCYTES NFR BLD AUTO: 1.4 % (ref 0–0.5)
IRON 24H UR-MRATE: 19 MCG/DL (ref 59–158)
IRON SATN MFR SERPL: 9 % (ref 20–50)
LYMPHOCYTES # BLD AUTO: 1.45 10*3/MM3 (ref 0.7–3.1)
LYMPHOCYTES NFR BLD AUTO: 5 % (ref 19.6–45.3)
MCH RBC QN AUTO: 28.8 PG (ref 26.6–33)
MCHC RBC AUTO-ENTMCNC: 32.6 G/DL (ref 31.5–35.7)
MCV RBC AUTO: 88.3 FL (ref 79–97)
MONOCYTES # BLD AUTO: 1.36 10*3/MM3 (ref 0.1–0.9)
MONOCYTES NFR BLD AUTO: 4.7 % (ref 5–12)
NEUTROPHILS NFR BLD AUTO: 25.74 10*3/MM3 (ref 1.7–7)
NEUTROPHILS NFR BLD AUTO: 88.6 % (ref 42.7–76)
NRBC BLD AUTO-RTO: 0 /100 WBC (ref 0–0.2)
PLATELET # BLD AUTO: 280 10*3/MM3 (ref 140–450)
PMV BLD AUTO: 10.6 FL (ref 6–12)
POTASSIUM SERPL-SCNC: 3.8 MMOL/L (ref 3.5–5.2)
PROT SERPL-MCNC: 6.4 G/DL (ref 6–8.5)
RBC # BLD AUTO: 4.27 10*6/MM3 (ref 4.14–5.8)
SODIUM SERPL-SCNC: 137 MMOL/L (ref 136–145)
TIBC SERPL-MCNC: 203 MCG/DL (ref 298–536)
TRANSFERRIN SERPL-MCNC: 136 MG/DL (ref 200–360)
WBC # BLD AUTO: 29.07 10*3/MM3 (ref 3.4–10.8)

## 2021-09-23 PROCEDURE — 85651 RBC SED RATE NONAUTOMATED: CPT

## 2021-09-23 PROCEDURE — 84466 ASSAY OF TRANSFERRIN: CPT

## 2021-09-23 PROCEDURE — 85060 BLOOD SMEAR INTERPRETATION: CPT

## 2021-09-23 PROCEDURE — 99214 OFFICE O/P EST MOD 30 MIN: CPT | Performed by: INTERNAL MEDICINE

## 2021-09-23 PROCEDURE — 80053 COMPREHEN METABOLIC PANEL: CPT

## 2021-09-23 PROCEDURE — 85025 COMPLETE CBC W/AUTO DIFF WBC: CPT

## 2021-09-23 PROCEDURE — 83540 ASSAY OF IRON: CPT

## 2021-09-23 PROCEDURE — 36415 COLL VENOUS BLD VENIPUNCTURE: CPT

## 2021-09-23 RX ORDER — BISOPROLOL FUMARATE 10 MG/1
5 TABLET, FILM COATED ORAL DAILY
Qty: 90 TABLET | Refills: 3 | Status: ON HOLD | OUTPATIENT
Start: 2021-09-23 | End: 2021-10-01

## 2021-09-23 RX ORDER — ALPRAZOLAM 0.5 MG/1
0.5 TABLET ORAL DAILY PRN
Qty: 10 TABLET | Refills: 0 | Status: SHIPPED | OUTPATIENT
Start: 2021-09-23 | End: 2021-09-29

## 2021-09-23 NOTE — PROGRESS NOTES
CC: shaking episodes    History:  Sonu Bravo Jr. is a 76 y.o. male   He presents today as with his wife and they report that he has not been doing well and has had issues with shaking episodes that began in the evenings and are characterized by chills, shaking, worsening anxiety, and inability to calm these for 60 to 90 minutes.  He has had no worsening of his blood pressure, but he notes he has been feeling weak and is concerned that his blood pressure medications could be involved. He also has shortness of breath and a dry cough during those episodes.  Previous Covid test in the hospital was negative, and his cough remains episodic only.       ROS:  Review of Systems   Constitutional: Positive for chills. Negative for fatigue and fever.   Respiratory: Positive for shortness of breath. Negative for cough.    Cardiovascular: Positive for leg swelling. Negative for chest pain and palpitations.   Gastrointestinal: Negative for abdominal pain and constipation.   Musculoskeletal: Positive for myalgias.        reports that he has been smoking cigarettes. He started smoking about 54 years ago. He has a 26.00 pack-year smoking history. He has never used smokeless tobacco. He reports current alcohol use. He reports that he does not use drugs.      Current Outpatient Medications:   •  apixaban (ELIQUIS) 5 MG tablet tablet, Take 1 tablet by mouth Every 12 (Twelve) Hours., Disp: 60 tablet, Rfl: 3  •  bisoprolol (ZEBeta) 10 MG tablet, Take 0.5 tablets by mouth Daily., Disp: 90 tablet, Rfl: 3  •  fluticasone (FLONASE) 50 MCG/ACT nasal spray, 1 spray into the nostril(s) as directed by provider Daily. Each Nostral, Disp: 16 g, Rfl: 5  •  hydroCHLOROthiazide (HYDRODIURIL) 12.5 MG tablet, Take 1 tablet by mouth Daily., Disp: 30 tablet, Rfl: 3  •  levothyroxine (SYNTHROID, LEVOTHROID) 100 MCG tablet, Take 1 tablet by mouth Daily., Disp: 90 tablet, Rfl: 3  •  Multiple Vitamin (MULTI-VITAMIN DAILY PO), Take 1 tablet by mouth  "Daily., Disp: , Rfl:   •  omeprazole (priLOSEC) 20 MG capsule, Take 1 capsule by mouth Daily., Disp: 90 capsule, Rfl: 3  •  Cholecalciferol 4000 units capsule, Take 1 tablet by mouth Daily., Disp: , Rfl:   •  Coenzyme Q10 (CO Q-10) 100 MG capsule, Take 1 tablet by mouth Daily., Disp: , Rfl:   •  Cyanocobalamin (VITAMIN B 12 PO), Take 1 tablet by mouth Daily. 2,500 mg, Disp: , Rfl:   •  fexofenadine (ALLEGRA) 180 MG tablet, Take 180 mg by mouth Daily., Disp: , Rfl:   •  rosuvastatin (CRESTOR) 20 MG tablet, Take 1 tablet by mouth Daily. HOLD until speaking to PCP at follow-up, Disp: 90 tablet, Rfl: 3  •  sodium-potassium-magnesium sulfates (Suprep Bowel Prep Kit) 17.5-3.13-1.6 GM/177ML solution oral solution, Take as directed, Disp: 177 mL, Rfl: 0  Current outpatient and discharge medications have been reconciled for the patient.  Reviewed by: Vaughn Kramer DO      OBJECTIVE:  /62 (BP Location: Left arm, Patient Position: Sitting, Cuff Size: Adult)   Pulse 58   Temp 97.9 °F (36.6 °C)   Resp 16   Ht 177.8 cm (70\")   Wt 80.3 kg (177 lb)   SpO2 98%   BMI 25.40 kg/m²    Physical Exam  Constitutional:       General: He is not in acute distress.  Cardiovascular:      Rate and Rhythm: Regular rhythm. Bradycardia present.      Heart sounds: Normal heart sounds. No murmur heard.     Pulmonary:      Effort: Pulmonary effort is normal.      Breath sounds: Normal breath sounds. No wheezing.   Musculoskeletal:      Right lower leg: Edema (2+) present.      Left lower leg: Edema (2+) present.   Neurological:      Mental Status: He is alert and oriented to person, place, and time.      Gait: Gait normal.   Psychiatric:         Mood and Affect: Mood normal.         Behavior: Behavior normal.         Assessment/Plan     Diagnoses and all orders for this visit:    1. Shaking chills (Primary)  2. Muscle spasm  3. Situational anxiety  -     CBC & Differential; Future  -     Comprehensive metabolic panel; Future  -     " Sedimentation Rate; Future  -     ALPRAZolam (XANAX) 0.5 MG tablet; Take 1 tablet by mouth Daily As Needed (shaking episodes).  Dispense: 10 tablet; Refill: 0  Labs for evaluation for possible infection or inflammation. He has paradoxical effect with Benadryl, which could help calm. We will use a small dose of Xanax to help calm these spells. He certainly has increased anxiety during these episodes and some muscle spasms, which could all be helped with this medication. He has pending colonoscopy and is processing the potential outcomes of this with possibility of malignancy, but admits he has had a lot on his mind. PDMP data reviewed. Notably, CTA showed no PE just 4 weeks ago.     4. Paroxysmal atrial fibrillation with rapid ventricular response (CMS/HCC)  Decrease bisoprolol to be sure that his bradycardia is not making him symptomatic with fatigue, shortness of breath, etc. He is anticoagulated with Eliquis.     5. Essential hypertension  -     bisoprolol (ZEBeta) 10 MG tablet; Take 0.5 tablets by mouth Daily.  Dispense: 90 tablet; Refill: 3  Well controlled, BP goal for age is <140/90 per JNC 8 guidelines and decrease bisoprolol. If well controlled, would d/c HCTZ in the future.     An After Visit Summary was printed and given to the patient at discharge.  Return for Next scheduled follow up.          Vaughn Kramer D.O. 9/23/2021   Electronically signed.

## 2021-09-23 NOTE — TELEPHONE ENCOUNTER
D    Caller: Sonu Bravo Jr.    Relationship to patient: Self    Best call back number: [phone]    Patient is needing:   PATIENT CALLED AND ADVISED THAT HE WAS TOLD TO STOP TAKING rosuvastatin (CRESTOR) 20 MG tablet BY GUILLE WEBB.     PATIENT WANTED TO KNOW IF HE WOULD STILL NEED TO DISCONTINUE THIS MEDICATION OR WAS THIS TO BE RESUMED.

## 2021-09-23 NOTE — TELEPHONE ENCOUNTER
SPOKE WITH PATIENT PER SUKHJINDER WEBB PATIENT NEEDS TO STOP THE CRESTOR FOR NOW, PATIENT VOICE UNDERSTANDING

## 2021-09-24 ENCOUNTER — HOSPITAL ENCOUNTER (EMERGENCY)
Facility: HOSPITAL | Age: 76
Discharge: HOME | End: 2021-09-24
Admitting: EMERGENCY MEDICINE

## 2021-09-24 ENCOUNTER — OFFICE VISIT (OUTPATIENT)
Dept: CARDIOLOGY | Facility: CLINIC | Age: 76
End: 2021-09-24

## 2021-09-24 ENCOUNTER — APPOINTMENT (OUTPATIENT)
Dept: GENERAL RADIOLOGY | Facility: HOSPITAL | Age: 76
End: 2021-09-24

## 2021-09-24 ENCOUNTER — TELEPHONE (OUTPATIENT)
Dept: CT IMAGING | Facility: HOSPITAL | Age: 76
End: 2021-09-24

## 2021-09-24 ENCOUNTER — LAB (OUTPATIENT)
Dept: LAB | Facility: HOSPITAL | Age: 76
End: 2021-09-24

## 2021-09-24 ENCOUNTER — HOSPITAL ENCOUNTER (OUTPATIENT)
Dept: CT IMAGING | Facility: HOSPITAL | Age: 76
Discharge: HOME | End: 2021-09-24

## 2021-09-24 ENCOUNTER — TELEPHONE (OUTPATIENT)
Dept: INTERNAL MEDICINE | Facility: CLINIC | Age: 76
End: 2021-09-24

## 2021-09-24 VITALS
BODY MASS INDEX: 24.91 KG/M2 | RESPIRATION RATE: 18 BRPM | HEART RATE: 68 BPM | OXYGEN SATURATION: 96 % | TEMPERATURE: 99.9 F | HEIGHT: 70 IN | SYSTOLIC BLOOD PRESSURE: 162 MMHG | WEIGHT: 174 LBS | DIASTOLIC BLOOD PRESSURE: 68 MMHG

## 2021-09-24 VITALS
DIASTOLIC BLOOD PRESSURE: 80 MMHG | HEART RATE: 60 BPM | OXYGEN SATURATION: 96 % | HEIGHT: 70 IN | BODY MASS INDEX: 24.91 KG/M2 | RESPIRATION RATE: 18 BRPM | SYSTOLIC BLOOD PRESSURE: 138 MMHG | WEIGHT: 174 LBS

## 2021-09-24 DIAGNOSIS — I48.0 PAROXYSMAL ATRIAL FIBRILLATION (HCC): Primary | ICD-10-CM

## 2021-09-24 DIAGNOSIS — D72.829 LEUKOCYTOSIS, UNSPECIFIED TYPE: Primary | ICD-10-CM

## 2021-09-24 DIAGNOSIS — Z79.01 CURRENT USE OF LONG TERM ANTICOAGULATION: ICD-10-CM

## 2021-09-24 DIAGNOSIS — D72.829 LEUKOCYTOSIS, UNSPECIFIED TYPE: ICD-10-CM

## 2021-09-24 DIAGNOSIS — E78.2 MIXED HYPERLIPIDEMIA: ICD-10-CM

## 2021-09-24 DIAGNOSIS — F17.210 CIGARETTE SMOKER: ICD-10-CM

## 2021-09-24 DIAGNOSIS — F17.210 NICOTINE DEPENDENCE, CIGARETTES, UNCOMPLICATED: ICD-10-CM

## 2021-09-24 DIAGNOSIS — R07.9 CHEST PAIN, UNSPECIFIED TYPE: ICD-10-CM

## 2021-09-24 DIAGNOSIS — R91.1 LUNG NODULE SEEN ON IMAGING STUDY: ICD-10-CM

## 2021-09-24 DIAGNOSIS — I10 ESSENTIAL HYPERTENSION: ICD-10-CM

## 2021-09-24 LAB
ALBUMIN SERPL-MCNC: 2.4 G/DL (ref 3.5–5.2)
ALBUMIN/GLOB SERPL: 0.6 G/DL
ALP SERPL-CCNC: 185 U/L (ref 39–117)
ALT SERPL W P-5'-P-CCNC: 35 U/L (ref 1–41)
ANION GAP SERPL CALCULATED.3IONS-SCNC: 11 MMOL/L (ref 5–15)
AST SERPL-CCNC: 35 U/L (ref 1–40)
BASOPHILS # BLD AUTO: 0.05 10*3/MM3 (ref 0–0.2)
BASOPHILS NFR BLD AUTO: 0.3 % (ref 0–1.5)
BILIRUB SERPL-MCNC: 1 MG/DL (ref 0–1.2)
BILIRUB UR QL STRIP: NEGATIVE
BUN SERPL-MCNC: 24 MG/DL (ref 8–23)
BUN/CREAT SERPL: 26.4 (ref 7–25)
CALCIUM SPEC-SCNC: 8.4 MG/DL (ref 8.6–10.5)
CHLORIDE SERPL-SCNC: 95 MMOL/L (ref 98–107)
CLARITY UR: CLEAR
CO2 SERPL-SCNC: 27 MMOL/L (ref 22–29)
COLOR UR: ABNORMAL
CREAT SERPL-MCNC: 0.91 MG/DL (ref 0.76–1.27)
CYTOLOGIST CVX/VAG CYTO: NORMAL
D-LACTATE SERPL-SCNC: 1.7 MMOL/L (ref 0.5–2)
DEPRECATED RDW RBC AUTO: 44.8 FL (ref 37–54)
EOSINOPHIL # BLD AUTO: 0.02 10*3/MM3 (ref 0–0.4)
EOSINOPHIL NFR BLD AUTO: 0.1 % (ref 0.3–6.2)
ERYTHROCYTE [DISTWIDTH] IN BLOOD BY AUTOMATED COUNT: 14.5 % (ref 12.3–15.4)
GFR SERPL CREATININE-BSD FRML MDRD: 81 ML/MIN/1.73
GLOBULIN UR ELPH-MCNC: 3.9 GM/DL
GLUCOSE SERPL-MCNC: 93 MG/DL (ref 65–99)
GLUCOSE UR STRIP-MCNC: NEGATIVE MG/DL
HCT VFR BLD AUTO: 34.7 % (ref 37.5–51)
HGB BLD-MCNC: 11.8 G/DL (ref 13–17.7)
HGB UR QL STRIP.AUTO: NEGATIVE
HOLD SPECIMEN: NORMAL
IMM GRANULOCYTES # BLD AUTO: 0.34 10*3/MM3 (ref 0–0.05)
IMM GRANULOCYTES NFR BLD AUTO: 1.8 % (ref 0–0.5)
INR PPP: 1.51 (ref 0.91–1.09)
KETONES UR QL STRIP: NEGATIVE
LEUKOCYTE ESTERASE UR QL STRIP.AUTO: NEGATIVE
LYMPHOCYTES # BLD AUTO: 1.59 10*3/MM3 (ref 0.7–3.1)
LYMPHOCYTES NFR BLD AUTO: 8.7 % (ref 19.6–45.3)
MCH RBC QN AUTO: 29 PG (ref 26.6–33)
MCHC RBC AUTO-ENTMCNC: 34 G/DL (ref 31.5–35.7)
MCV RBC AUTO: 85.3 FL (ref 79–97)
MONOCYTES # BLD AUTO: 1.48 10*3/MM3 (ref 0.1–0.9)
MONOCYTES NFR BLD AUTO: 8.1 % (ref 5–12)
NEUTROPHILS NFR BLD AUTO: 14.9 10*3/MM3 (ref 1.7–7)
NEUTROPHILS NFR BLD AUTO: 81 % (ref 42.7–76)
NITRITE UR QL STRIP: NEGATIVE
NRBC BLD AUTO-RTO: 0 /100 WBC (ref 0–0.2)
PATH INTERP BLD-IMP: NORMAL
PH UR STRIP.AUTO: 6 [PH] (ref 5–8)
PLATELET # BLD AUTO: 262 10*3/MM3 (ref 140–450)
PMV BLD AUTO: 10.4 FL (ref 6–12)
POTASSIUM SERPL-SCNC: 3.3 MMOL/L (ref 3.5–5.2)
PROCALCITONIN SERPL-MCNC: 3.43 NG/ML (ref 0–0.25)
PROT SERPL-MCNC: 6.3 G/DL (ref 6–8.5)
PROT UR QL STRIP: ABNORMAL
PROTHROMBIN TIME: 17.6 SECONDS (ref 11.9–14.6)
RBC # BLD AUTO: 4.07 10*6/MM3 (ref 4.14–5.8)
SODIUM SERPL-SCNC: 133 MMOL/L (ref 136–145)
SP GR UR STRIP: 1.02 (ref 1–1.03)
UROBILINOGEN UR QL STRIP: ABNORMAL
WBC # BLD AUTO: 18.38 10*3/MM3 (ref 3.4–10.8)
WHOLE BLOOD HOLD SPECIMEN: NORMAL
WHOLE BLOOD HOLD SPECIMEN: NORMAL

## 2021-09-24 PROCEDURE — 71250 CT THORAX DX C-: CPT

## 2021-09-24 PROCEDURE — 93010 ELECTROCARDIOGRAM REPORT: CPT | Performed by: INTERNAL MEDICINE

## 2021-09-24 PROCEDURE — 84145 PROCALCITONIN (PCT): CPT | Performed by: NURSE PRACTITIONER

## 2021-09-24 PROCEDURE — 85025 COMPLETE CBC W/AUTO DIFF WBC: CPT | Performed by: NURSE PRACTITIONER

## 2021-09-24 PROCEDURE — 87076 CULTURE ANAEROBE IDENT EACH: CPT

## 2021-09-24 PROCEDURE — 81003 URINALYSIS AUTO W/O SCOPE: CPT | Performed by: NURSE PRACTITIONER

## 2021-09-24 PROCEDURE — 87040 BLOOD CULTURE FOR BACTERIA: CPT

## 2021-09-24 PROCEDURE — 25010000002 CEFTRIAXONE PER 250 MG: Performed by: NURSE PRACTITIONER

## 2021-09-24 PROCEDURE — 87185 SC STD ENZYME DETCJ PER NZM: CPT

## 2021-09-24 PROCEDURE — 83605 ASSAY OF LACTIC ACID: CPT | Performed by: NURSE PRACTITIONER

## 2021-09-24 PROCEDURE — 99283 EMERGENCY DEPT VISIT LOW MDM: CPT

## 2021-09-24 PROCEDURE — 36415 COLL VENOUS BLD VENIPUNCTURE: CPT

## 2021-09-24 PROCEDURE — 93005 ELECTROCARDIOGRAM TRACING: CPT | Performed by: NURSE PRACTITIONER

## 2021-09-24 PROCEDURE — 96372 THER/PROPH/DIAG INJ SC/IM: CPT

## 2021-09-24 PROCEDURE — 93000 ELECTROCARDIOGRAM COMPLETE: CPT | Performed by: NURSE PRACTITIONER

## 2021-09-24 PROCEDURE — 99214 OFFICE O/P EST MOD 30 MIN: CPT | Performed by: NURSE PRACTITIONER

## 2021-09-24 PROCEDURE — 71045 X-RAY EXAM CHEST 1 VIEW: CPT

## 2021-09-24 PROCEDURE — 25010000003 LIDOCAINE 1 % SOLUTION 20 ML VIAL: Performed by: NURSE PRACTITIONER

## 2021-09-24 PROCEDURE — 85610 PROTHROMBIN TIME: CPT | Performed by: NURSE PRACTITIONER

## 2021-09-24 PROCEDURE — 80053 COMPREHEN METABOLIC PANEL: CPT | Performed by: NURSE PRACTITIONER

## 2021-09-24 RX ORDER — SODIUM CHLORIDE 0.9 % (FLUSH) 0.9 %
10 SYRINGE (ML) INJECTION AS NEEDED
Status: DISCONTINUED | OUTPATIENT
Start: 2021-09-24 | End: 2021-09-24 | Stop reason: HOSPADM

## 2021-09-24 RX ORDER — ACETAMINOPHEN 500 MG
1000 TABLET ORAL ONCE
Status: COMPLETED | OUTPATIENT
Start: 2021-09-24 | End: 2021-09-24

## 2021-09-24 RX ADMIN — CEFTRIAXONE SODIUM 250 MG: 500 INJECTION, POWDER, FOR SOLUTION INTRAMUSCULAR; INTRAVENOUS at 19:29

## 2021-09-24 RX ADMIN — ACETAMINOPHEN 1000 MG: 500 TABLET, FILM COATED ORAL at 17:37

## 2021-09-24 NOTE — TELEPHONE ENCOUNTER
PATIENTS WIFE HAS RETURNED CALL, SHE STATED THAT PATIENT TOOK 1 TABLET OF ALPRAZOLAM FOR SEVERE SHAKING AND CHILLS AND WAS KNOCKED OUT FROM TAKING IT AND WAS STILL DAZED THIS MORNING.  IS THERE A DIFFERENT MEDICATION?  PATIENT ONLY TOOK THE 1 DOSE.   PATIENT IS GETTING READY TO HAVE A COLONOSCOPY ON Tuesday 09/28/21.

## 2021-09-24 NOTE — PROGRESS NOTES
Subjective:     Encounter Date: 09/24/2021      Patient ID: Sonu Bravo Jr. is a 76 y.o. male with a history of paroxysmal atrial fibrillation anticoagulated on Eliquis, hypertension, hyperlipidemia GERD and arthritis.     Chief Complaint: hospital follow up  Atrial Fibrillation  Presents for follow-up visit. Symptoms are negative for bradycardia, chest pain, dizziness, hemodynamic instability, hypertension, hypotension, palpitations, shortness of breath, syncope, tachycardia and weakness. The symptoms have been stable. Past medical history includes atrial fibrillation. There are no medication compliance problems.     Patient presents today for hospital follow up from paroxysmal atrial fibrillation. Patient woke up around 11 PM with feeling his heart rate going fast and having a discomfort in his chest.  He decided to come in for evaluation was found to be in atrial fibrillation with RVR with heart rates in the 130s.  He was started on a Cardizem drip and bolus and subsequently converted spontaneously back to sinus rhythm. Patient had chest discomfort and mild troponin elevation. Patients [id number] score is 3 and he was started on Eliquis prior to discharge.    Patient reports today that he has been dealing with some anxiety due to health concerns. He is being worked up for possible cancer; they are checking his lungs, liver and colon. He has some nodules in his lungs that have actually improved as well as elevated liver enzymes that are improving. He is scheduled for a colonoscopy to be done on 9/28/2021. Patient was seen by PCP yesterday for some shaking, chills and inability to calm down. PCP noted that he was bradycardic so they decreased his bisoprolol to 5 mg. He was started on xanax 0.5 mg as needed; he took one last night. Wife reports that he was very sedated; patient reports that he is still feeling drowsy this am. They have placed a call to PCP about potentially lowering the dose. Patient reports  that since being in the hospital he has not had any heart racing or palpitations. He reports that his blood pressure has remained controlled running 106-130/80. He states that he has also not had any further chest discomfort since the night in the ER. Patient is having some leg swelling. He denies any orthopnea or PND. Patient is on Eliquis and denies any bleeding issues. Patient follows with Dr Kramer as PCP.     Previous Cardiac Procedures and Testing:   -Echo (8/31/2021) mild LVH, EF 66-70%, diastolic dysfunction, normal RV size and function, no significant valve dysfunction    The following portions of the patient's history were reviewed and updated as appropriate: allergies, current medications, past family history, past medical history, past social history, past surgical history and problem list.    Allergies   Allergen Reactions   • Benadryl [Diphenhydramine] Mental Status Change   • Penicillins Hives   • Sulfa Antibiotics Hives, Itching and Rash   • Sulfamethoxazole-Trimethoprim Hives, Itching and Rash       Current Outpatient Medications:   •  ALPRAZolam (XANAX) 0.5 MG tablet, Take 1 tablet by mouth Daily As Needed (shaking episodes)., Disp: 10 tablet, Rfl: 0  •  apixaban (ELIQUIS) 5 MG tablet tablet, Take 1 tablet by mouth Every 12 (Twelve) Hours., Disp: 60 tablet, Rfl: 3  •  bisoprolol (ZEBeta) 10 MG tablet, Take 0.5 tablets by mouth Daily., Disp: 90 tablet, Rfl: 3  •  Cholecalciferol 4000 units capsule, Take 1 tablet by mouth Daily., Disp: , Rfl:   •  Coenzyme Q10 (CO Q-10) 100 MG capsule, Take 1 tablet by mouth Daily., Disp: , Rfl:   •  Cyanocobalamin (VITAMIN B 12 PO), Take 1 tablet by mouth Daily. 2,500 mg, Disp: , Rfl:   •  fexofenadine (ALLEGRA) 180 MG tablet, Take 180 mg by mouth Daily., Disp: , Rfl:   •  fluticasone (FLONASE) 50 MCG/ACT nasal spray, 1 spray into the nostril(s) as directed by provider Daily. Each Nostral, Disp: 16 g, Rfl: 5  •  hydroCHLOROthiazide (HYDRODIURIL) 12.5 MG tablet, Take  1 tablet by mouth Daily., Disp: 30 tablet, Rfl: 3  •  levothyroxine (SYNTHROID, LEVOTHROID) 100 MCG tablet, Take 1 tablet by mouth Daily., Disp: 90 tablet, Rfl: 3  •  Multiple Vitamin (MULTI-VITAMIN DAILY PO), Take 1 tablet by mouth Daily., Disp: , Rfl:   •  omeprazole (priLOSEC) 20 MG capsule, Take 1 capsule by mouth Daily., Disp: 90 capsule, Rfl: 3  •  rosuvastatin (CRESTOR) 20 MG tablet, Take 1 tablet by mouth Daily. HOLD until speaking to PCP at follow-up, Disp: 90 tablet, Rfl: 3  •  sodium-potassium-magnesium sulfates (Suprep Bowel Prep Kit) 17.5-3.13-1.6 GM/177ML solution oral solution, Take as directed, Disp: 177 mL, Rfl: 0  Past Medical History:   Diagnosis Date   • Arthritis    • GERD (gastroesophageal reflux disease)    • Hyperlipidemia    • Hypertension      Social History     Socioeconomic History   • Marital status:      Spouse name: Not on file   • Number of children: Not on file   • Years of education: Not on file   • Highest education level: Not on file   Tobacco Use   • Smoking status: Current Every Day Smoker     Packs/day: 0.50     Years: 52.00     Pack years: 26.00     Types: Cigarettes     Start date: 1967   • Smokeless tobacco: Never Used   • Tobacco comment: down to 4 cigs in the last 12 days    Substance and Sexual Activity   • Alcohol use: Yes     Comment: rare   • Drug use: No   • Sexual activity: Defer       Review of Systems   Constitutional: Positive for chills and malaise/fatigue.   Cardiovascular: Positive for leg swelling. Negative for chest pain, dyspnea on exertion, orthopnea, palpitations and syncope.   Respiratory: Positive for cough. Negative for shortness of breath.    Hematologic/Lymphatic: Bruises/bleeds easily.   Neurological: Negative for dizziness and weakness.   Psychiatric/Behavioral: The patient is nervous/anxious.    All other systems reviewed and are negative.         Objective:     Vitals reviewed.   Constitutional:       General: Not in acute distress.      Appearance: Normal appearance. Well-developed.   Eyes:      Pupils: Pupils are equal, round, and reactive to light.   HENT:      Head: Normocephalic and atraumatic.      Nose: Nose normal.   Neck:      Vascular: No carotid bruit.   Pulmonary:      Effort: Pulmonary effort is normal. No respiratory distress.      Breath sounds: Normal breath sounds. No wheezing. No rales.   Cardiovascular:      Normal rate. Regular rhythm.      Murmurs: There is no murmur.   Edema:     Peripheral edema absent.   Abdominal:      General: There is no distension.      Palpations: Abdomen is soft.   Musculoskeletal: Normal range of motion.      Cervical back: Normal range of motion and neck supple. Skin:     General: Skin is warm.      Findings: No erythema or rash.   Neurological:      General: No focal deficit present.      Mental Status: Alert and oriented to person, place, and time.   Psychiatric:         Attention and Perception: Attention normal.         Mood and Affect: Mood is anxious.         Speech: Speech normal.         Behavior: Behavior normal.         Thought Content: Thought content normal.         Judgment: Judgment normal.         There were no vitals taken for this visit.      ECG 12 Lead    Date/Time: 9/24/2021 10:29 AM  Performed by: John Diallo APRN  Authorized by: John Diallo APRN   Comparison: compared with previous ECG from 8/31/2021  Rhythm: sinus rhythm  Ectopy: atrial premature contractions  Rate: normal  BPM: 60              Lab Review:     Echo 8/31/2021:  Interpretation Summary  · Left ventricular wall thickness is consistent with mild concentric hypertrophy.  · Left ventricular ejection fraction appears to be 66 - 70%. Left ventricular systolic function is normal.  · Left ventricular diastolic dysfunction is noted.  · Normal right ventricular cavity size and systolic function noted.  · There is no significant (greater than mild) valvular dysfunction.     Lab Results   Component Value Date    CHOL  127 08/13/2021    TRIG 133 08/13/2021    HDL 44 08/13/2021    LDL 60 08/13/2021       I have personally reviewed echo, labs, hospitalization notes prior to patients visit  Assessment:          Diagnosis Plan   1. Paroxysmal atrial fibrillation (HCC)     2. Current use of long term anticoagulation     3. Essential hypertension     4. Mixed hyperlipidemia     5. Chest pain, unspecified type     6. Cigarette smoker            Plan:       1. PAF: EKG today shows maintaining NSR. [id number] score is 3. Continue bisoprolol. Patient is on Eliquis and denies any bleeding issues. It is acceptable to Eliquis to be held 48 hrs prior to colonoscopy scheduled for 9/28/2021. Resumption of Eliquis will be made by Dr Dc pending results of colonoscopy. Note will be sent to Dr Retana office today.     2. Anticoagulation: patient is on Eliquis and denies any bleeding issues    3. Hypertension: Well controlled. Continue current medications    4. Hyperlipidemia: LDL 60 8/13/2021. Controlled. Rosuvastatin is currently on held by PCP during current workup.     5. Chest pain: No further chest pain since in ER. Discussed recommendation for an outpatient stress test due to chest discomfort and mild elevation in troponin. Patient would like to defer this at the current time since he has so much other workup currently ongoing. He is to notify office of any recurrent chest pain to discuss further evaluation by a stress test.     6. Tobacco abuse: Sonu Bravo .  reports that he has been smoking cigarettes. He started smoking about 54 years ago. He has a 26.00 pack-year smoking history. He has never used smokeless tobacco.. I have educated him on the risk of diseases from using tobacco products such as cancer, COPD and heart disease.   I advised him to quit and he is not willing to quit.  I spent 3  minutes counseling the patient.    Current outpatient and discharge medications have been reconciled for the patient.  Reviewed by: John  MARY Diallo     I spent 37 minutes caring for Sonu on this date of service. This time includes time spent by me in the following activities: preparing for the visit, reviewing tests, obtaining and/or reviewing a separately obtained history, performing a medically appropriate examination and/or evaluation, counseling and educating the patient/family/caregiver and documenting information in the medical record     I spent 2 minutes on the separately reported service of EKG. This time is not included in the time used to support the E/M service also reported today.     Patient is to follow up in office in 6 months or sooner if needed

## 2021-09-24 NOTE — TELEPHONE ENCOUNTER
Caller: ISA RENEE    Relationship: Emergency Contact    Best call back number: [phone]    What is the best time to reach you:   ANYTIME    Who are you requesting to speak with (clinical staff, provider,  specific staff member):   PCP OR NURSE    Do you know the name of the person who called:   ISA RENEE    What was the call regarding:   PATIENT SPOUSE SAID PATIENT CAN NOT TAKE ALPRAZolam (XANAX) 0.5 MG tablet BECAUSE HE WAS LAID OUT AND HE NEEDS TO BE AWARE.        Do you require a callback:   YES

## 2021-09-24 NOTE — TELEPHONE ENCOUNTER
D/w his wife via phone at 1230. Shaking was improved with alprazolam, but he has been quite groggy. Can try a half tablet if needed to control. Also, leukocytosis worse on labs yesterday. Peripheral smear added and he will have blood cultures drawn while at the imaging center this afternoon to rule out bacteremia, though his shaking and chills have been more episodic than I would expect from a persistent bacteremia.

## 2021-09-24 NOTE — TELEPHONE ENCOUNTER
Spoke with patient wife, per Dr Kramer patient needs to go to the ER for treatment. Patients wife voice understanding

## 2021-09-26 LAB
QT INTERVAL: 428 MS
QTC INTERVAL: 475 MS

## 2021-09-28 ENCOUNTER — TELEPHONE (OUTPATIENT)
Dept: GASTROENTEROLOGY | Facility: CLINIC | Age: 76
End: 2021-09-28

## 2021-09-28 ENCOUNTER — HOSPITAL ENCOUNTER (OUTPATIENT)
Facility: HOSPITAL | Age: 76
Setting detail: HOSPITAL OUTPATIENT SURGERY
Discharge: HOME | End: 2021-09-28
Attending: INTERNAL MEDICINE | Admitting: INTERNAL MEDICINE

## 2021-09-28 ENCOUNTER — ANESTHESIA EVENT (OUTPATIENT)
Dept: GASTROENTEROLOGY | Facility: HOSPITAL | Age: 76
End: 2021-09-28

## 2021-09-28 ENCOUNTER — ANESTHESIA (OUTPATIENT)
Dept: GASTROENTEROLOGY | Facility: HOSPITAL | Age: 76
End: 2021-09-28

## 2021-09-28 VITALS
DIASTOLIC BLOOD PRESSURE: 59 MMHG | SYSTOLIC BLOOD PRESSURE: 107 MMHG | HEIGHT: 70 IN | OXYGEN SATURATION: 96 % | WEIGHT: 166 LBS | HEART RATE: 90 BPM | TEMPERATURE: 97.7 F | BODY MASS INDEX: 23.77 KG/M2 | RESPIRATION RATE: 20 BRPM

## 2021-09-28 DIAGNOSIS — R93.5 ABNORMAL CT OF THE ABDOMEN: ICD-10-CM

## 2021-09-28 PROCEDURE — 0DBL8ZZ EXCISION OF TRANSVERSE COLON, VIA NATURAL OR ARTIFICIAL OPENING ENDOSCOPIC: ICD-10-PCS | Performed by: INTERNAL MEDICINE

## 2021-09-28 PROCEDURE — 45385 COLONOSCOPY W/LESION REMOVAL: CPT | Performed by: INTERNAL MEDICINE

## 2021-09-28 PROCEDURE — 25010000002 PROPOFOL 10 MG/ML EMULSION: Performed by: NURSE ANESTHETIST, CERTIFIED REGISTERED

## 2021-09-28 PROCEDURE — 0DBP8ZZ EXCISION OF RECTUM, VIA NATURAL OR ARTIFICIAL OPENING ENDOSCOPIC: ICD-10-PCS | Performed by: INTERNAL MEDICINE

## 2021-09-28 RX ORDER — SODIUM CHLORIDE 9 MG/ML
500 INJECTION, SOLUTION INTRAVENOUS CONTINUOUS PRN
Status: DISCONTINUED | OUTPATIENT
Start: 2021-09-28 | End: 2021-09-28 | Stop reason: HOSPADM

## 2021-09-28 RX ORDER — LIDOCAINE HYDROCHLORIDE 10 MG/ML
0.5 INJECTION, SOLUTION EPIDURAL; INFILTRATION; INTRACAUDAL; PERINEURAL ONCE AS NEEDED
Status: DISCONTINUED | OUTPATIENT
Start: 2021-09-28 | End: 2021-09-28 | Stop reason: HOSPADM

## 2021-09-28 RX ORDER — PROPOFOL 10 MG/ML
VIAL (ML) INTRAVENOUS AS NEEDED
Status: DISCONTINUED | OUTPATIENT
Start: 2021-09-28 | End: 2021-09-28 | Stop reason: SURG

## 2021-09-28 RX ORDER — LIDOCAINE HYDROCHLORIDE 20 MG/ML
INJECTION, SOLUTION EPIDURAL; INFILTRATION; INTRACAUDAL; PERINEURAL AS NEEDED
Status: DISCONTINUED | OUTPATIENT
Start: 2021-09-28 | End: 2021-09-28 | Stop reason: SURG

## 2021-09-28 RX ORDER — SODIUM CHLORIDE 0.9 % (FLUSH) 0.9 %
10 SYRINGE (ML) INJECTION AS NEEDED
Status: DISCONTINUED | OUTPATIENT
Start: 2021-09-28 | End: 2021-09-28 | Stop reason: HOSPADM

## 2021-09-28 RX ADMIN — PROPOFOL 100 MG: 10 INJECTION, EMULSION INTRAVENOUS at 12:14

## 2021-09-28 RX ADMIN — PROPOFOL 50 MG: 10 INJECTION, EMULSION INTRAVENOUS at 12:18

## 2021-09-28 RX ADMIN — SODIUM CHLORIDE 500 ML: 9 INJECTION, SOLUTION INTRAVENOUS at 11:39

## 2021-09-28 RX ADMIN — LIDOCAINE HYDROCHLORIDE 100 MG: 20 INJECTION, SOLUTION EPIDURAL; INFILTRATION; INTRACAUDAL; PERINEURAL at 12:14

## 2021-09-28 RX ADMIN — PROPOFOL 50 MG: 10 INJECTION, EMULSION INTRAVENOUS at 12:22

## 2021-09-28 RX ADMIN — PROPOFOL 50 MG: 10 INJECTION, EMULSION INTRAVENOUS at 12:16

## 2021-09-28 NOTE — ANESTHESIA POSTPROCEDURE EVALUATION
Patient: Sonu Bravo Jr.    Procedure Summary     Date: 09/28/21 Room / Location: [facility] ENDOSCOPY 6 / [facility] END[facility]    Anesthesia Start: 1213 Anesthesia Stop: 1228    Procedure: COLONOSCOPY WITH ANESTHESIA (N/A ) Diagnosis:       Abnormal CT of the abdomen      (Abnormal CT of the abdomen [R93.5])    Surgeons: Thomas Dc DO Provider: ERIN Rodriguez CRNA    Anesthesia Type: MAC ASA Status: 3          Anesthesia Type: MAC    Vitals  No vitals data found for the desired time range.          Post Anesthesia Care and Evaluation    Patient location during evaluation: PACU  Patient participation: complete - patient participated  Level of consciousness: awake and alert  Pain score: 0  Pain management: adequate  Airway patency: patent  Anesthetic complications: No anesthetic complications    Cardiovascular status: acceptable and stable  Respiratory status: acceptable and unassisted  Hydration status: acceptable

## 2021-09-28 NOTE — ANESTHESIA PREPROCEDURE EVALUATION
Anesthesia Evaluation     Patient summary reviewed   no history of anesthetic complications:  NPO Solid Status: > 8 hours             Airway   Mallampati: II  Dental      Pulmonary    (+) a smoker Current, COPD,   Cardiovascular   Exercise tolerance: good (4-7 METS)    (+) hypertension, dysrhythmias Paroxysmal Atrial Fib, hyperlipidemia,       Neuro/Psych- negative ROS  GI/Hepatic/Renal/Endo    (+)  GERD,  thyroid problem hypothyroidism    Musculoskeletal     Abdominal    Substance History      OB/GYN          Other                        Anesthesia Plan    ASA 3     MAC       Anesthetic plan, all risks, benefits, and alternatives have been provided, discussed and informed consent has been obtained with: patient.

## 2021-09-29 ENCOUNTER — TELEPHONE (OUTPATIENT)
Dept: PULMONOLOGY | Facility: CLINIC | Age: 76
End: 2021-09-29

## 2021-09-29 ENCOUNTER — OFFICE VISIT (OUTPATIENT)
Dept: INTERNAL MEDICINE | Facility: CLINIC | Age: 76
End: 2021-09-29

## 2021-09-29 ENCOUNTER — APPOINTMENT (OUTPATIENT)
Dept: GENERAL RADIOLOGY | Facility: HOSPITAL | Age: 76
End: 2021-09-29

## 2021-09-29 ENCOUNTER — HOSPITAL ENCOUNTER (INPATIENT)
Facility: HOSPITAL | Age: 76
LOS: 8 days | Discharge: ANOTHER HOSPITAL | End: 2021-10-07
Attending: FAMILY MEDICINE | Admitting: INTERNAL MEDICINE

## 2021-09-29 VITALS
HEIGHT: 70 IN | BODY MASS INDEX: 24.12 KG/M2 | WEIGHT: 168.5 LBS | RESPIRATION RATE: 16 BRPM | DIASTOLIC BLOOD PRESSURE: 70 MMHG | TEMPERATURE: 98.2 F | OXYGEN SATURATION: 98 % | HEART RATE: 67 BPM | SYSTOLIC BLOOD PRESSURE: 112 MMHG

## 2021-09-29 DIAGNOSIS — Z74.09 IMPAIRED MOBILITY: ICD-10-CM

## 2021-09-29 DIAGNOSIS — K75.0 LIVER ABSCESS: Primary | ICD-10-CM

## 2021-09-29 DIAGNOSIS — R16.0 LIVER MASSES: Primary | ICD-10-CM

## 2021-09-29 DIAGNOSIS — R68.83 SHAKING CHILLS: ICD-10-CM

## 2021-09-29 DIAGNOSIS — D72.829 LEUKOCYTOSIS, UNSPECIFIED TYPE: ICD-10-CM

## 2021-09-29 DIAGNOSIS — R93.5 ABNORMAL CT SCAN, PELVIS: ICD-10-CM

## 2021-09-29 DIAGNOSIS — R26.9 GAIT ABNORMALITY: ICD-10-CM

## 2021-09-29 DIAGNOSIS — J98.4 CAVITARY LESION OF LUNG: ICD-10-CM

## 2021-09-29 DIAGNOSIS — Z23 FLU VACCINE NEED: ICD-10-CM

## 2021-09-29 LAB
ALBUMIN SERPL-MCNC: 2.4 G/DL (ref 3.5–5.2)
ALBUMIN/GLOB SERPL: 0.7 G/DL
ALP SERPL-CCNC: 151 U/L (ref 39–117)
ALT SERPL W P-5'-P-CCNC: 32 U/L (ref 1–41)
AMYLASE SERPL-CCNC: 21 U/L (ref 28–100)
ANION GAP SERPL CALCULATED.3IONS-SCNC: 13 MMOL/L (ref 5–15)
AST SERPL-CCNC: 32 U/L (ref 1–40)
BACTERIA SPEC AEROBE CULT: NORMAL
BASOPHILS # BLD AUTO: 0.04 10*3/MM3 (ref 0–0.2)
BASOPHILS NFR BLD AUTO: 0.1 % (ref 0–1.5)
BILIRUB SERPL-MCNC: 0.7 MG/DL (ref 0–1.2)
BUN SERPL-MCNC: 18 MG/DL (ref 8–23)
BUN/CREAT SERPL: 18.9 (ref 7–25)
CALCIUM SPEC-SCNC: 8.1 MG/DL (ref 8.6–10.5)
CHLORIDE SERPL-SCNC: 91 MMOL/L (ref 98–107)
CO2 SERPL-SCNC: 29 MMOL/L (ref 22–29)
CREAT SERPL-MCNC: 0.95 MG/DL (ref 0.76–1.27)
DEPRECATED RDW RBC AUTO: 47.9 FL (ref 37–54)
EOSINOPHIL # BLD AUTO: 0 10*3/MM3 (ref 0–0.4)
EOSINOPHIL NFR BLD AUTO: 0 % (ref 0.3–6.2)
ERYTHROCYTE [DISTWIDTH] IN BLOOD BY AUTOMATED COUNT: 14.8 % (ref 12.3–15.4)
GFR SERPL CREATININE-BSD FRML MDRD: 77 ML/MIN/1.73
GLOBULIN UR ELPH-MCNC: 3.6 GM/DL
GLUCOSE SERPL-MCNC: 84 MG/DL (ref 65–99)
HCT VFR BLD AUTO: 31.5 % (ref 37.5–51)
HGB BLD-MCNC: 10.4 G/DL (ref 13–17.7)
IMM GRANULOCYTES # BLD AUTO: 0.32 10*3/MM3 (ref 0–0.05)
IMM GRANULOCYTES NFR BLD AUTO: 1.2 % (ref 0–0.5)
LIPASE SERPL-CCNC: 30 U/L (ref 13–60)
LYMPHOCYTES # BLD AUTO: 1.68 10*3/MM3 (ref 0.7–3.1)
LYMPHOCYTES NFR BLD AUTO: 6.1 % (ref 19.6–45.3)
MCH RBC QN AUTO: 29.5 PG (ref 26.6–33)
MCHC RBC AUTO-ENTMCNC: 33 G/DL (ref 31.5–35.7)
MCV RBC AUTO: 89.2 FL (ref 79–97)
MONOCYTES # BLD AUTO: 1.75 10*3/MM3 (ref 0.1–0.9)
MONOCYTES NFR BLD AUTO: 6.4 % (ref 5–12)
NEUTROPHILS NFR BLD AUTO: 23.55 10*3/MM3 (ref 1.7–7)
NEUTROPHILS NFR BLD AUTO: 86.2 % (ref 42.7–76)
NRBC BLD AUTO-RTO: 0 /100 WBC (ref 0–0.2)
PLATELET # BLD AUTO: 371 10*3/MM3 (ref 140–450)
PMV BLD AUTO: 10.9 FL (ref 6–12)
POTASSIUM SERPL-SCNC: 3 MMOL/L (ref 3.5–5.2)
PROT SERPL-MCNC: 6 G/DL (ref 6–8.5)
RBC # BLD AUTO: 3.53 10*6/MM3 (ref 4.14–5.8)
SARS-COV-2 RNA PNL SPEC NAA+PROBE: NOT DETECTED
SODIUM SERPL-SCNC: 133 MMOL/L (ref 136–145)
TSH SERPL DL<=0.05 MIU/L-ACNC: 5.53 UIU/ML (ref 0.27–4.2)
WBC # BLD AUTO: 27.34 10*3/MM3 (ref 3.4–10.8)

## 2021-09-29 PROCEDURE — 82150 ASSAY OF AMYLASE: CPT | Performed by: FAMILY MEDICINE

## 2021-09-29 PROCEDURE — 85025 COMPLETE CBC W/AUTO DIFF WBC: CPT | Performed by: FAMILY MEDICINE

## 2021-09-29 PROCEDURE — G0378 HOSPITAL OBSERVATION PER HR: HCPCS

## 2021-09-29 PROCEDURE — 25010000002 CEFEPIME PER 500 MG: Performed by: FAMILY MEDICINE

## 2021-09-29 PROCEDURE — 87040 BLOOD CULTURE FOR BACTERIA: CPT | Performed by: FAMILY MEDICINE

## 2021-09-29 PROCEDURE — 84443 ASSAY THYROID STIM HORMONE: CPT | Performed by: FAMILY MEDICINE

## 2021-09-29 PROCEDURE — 80053 COMPREHEN METABOLIC PANEL: CPT | Performed by: FAMILY MEDICINE

## 2021-09-29 PROCEDURE — 83690 ASSAY OF LIPASE: CPT | Performed by: FAMILY MEDICINE

## 2021-09-29 PROCEDURE — 94799 UNLISTED PULMONARY SVC/PX: CPT

## 2021-09-29 PROCEDURE — 71046 X-RAY EXAM CHEST 2 VIEWS: CPT

## 2021-09-29 PROCEDURE — 25010000002 VANCOMYCIN 1 G RECONSTITUTED SOLUTION 1 EACH VIAL: Performed by: FAMILY MEDICINE

## 2021-09-29 PROCEDURE — 99215 OFFICE O/P EST HI 40 MIN: CPT | Performed by: INTERNAL MEDICINE

## 2021-09-29 PROCEDURE — 87150 DNA/RNA AMPLIFIED PROBE: CPT | Performed by: FAMILY MEDICINE

## 2021-09-29 PROCEDURE — 25010000002 ENOXAPARIN PER 10 MG: Performed by: FAMILY MEDICINE

## 2021-09-29 PROCEDURE — 87147 CULTURE TYPE IMMUNOLOGIC: CPT | Performed by: FAMILY MEDICINE

## 2021-09-29 PROCEDURE — 87635 SARS-COV-2 COVID-19 AMP PRB: CPT | Performed by: FAMILY MEDICINE

## 2021-09-29 RX ORDER — HYDROCHLOROTHIAZIDE 25 MG/1
12.5 TABLET ORAL DAILY
Status: DISCONTINUED | OUTPATIENT
Start: 2021-09-29 | End: 2021-10-07 | Stop reason: HOSPADM

## 2021-09-29 RX ORDER — LEVOTHYROXINE SODIUM 0.1 MG/1
100 TABLET ORAL DAILY
Status: DISCONTINUED | OUTPATIENT
Start: 2021-09-30 | End: 2021-10-07 | Stop reason: HOSPADM

## 2021-09-29 RX ORDER — ROSUVASTATIN CALCIUM 10 MG/1
10 TABLET, COATED ORAL NIGHTLY
Status: DISCONTINUED | OUTPATIENT
Start: 2021-09-29 | End: 2021-10-07 | Stop reason: HOSPADM

## 2021-09-29 RX ORDER — FAMOTIDINE 10 MG/ML
20 INJECTION, SOLUTION INTRAVENOUS 2 TIMES DAILY
Status: DISCONTINUED | OUTPATIENT
Start: 2021-09-29 | End: 2021-10-07 | Stop reason: HOSPADM

## 2021-09-29 RX ORDER — SODIUM CHLORIDE 0.9 % (FLUSH) 0.9 %
10 SYRINGE (ML) INJECTION EVERY 12 HOURS SCHEDULED
Status: DISCONTINUED | OUTPATIENT
Start: 2021-09-29 | End: 2021-10-07 | Stop reason: HOSPADM

## 2021-09-29 RX ORDER — SODIUM CHLORIDE, SODIUM LACTATE, POTASSIUM CHLORIDE, CALCIUM CHLORIDE 600; 310; 30; 20 MG/100ML; MG/100ML; MG/100ML; MG/100ML
75 INJECTION, SOLUTION INTRAVENOUS CONTINUOUS
Status: DISCONTINUED | OUTPATIENT
Start: 2021-09-29 | End: 2021-10-05

## 2021-09-29 RX ORDER — LANOLIN ALCOHOL/MO/W.PET/CERES
3 CREAM (GRAM) TOPICAL NIGHTLY
Status: DISCONTINUED | OUTPATIENT
Start: 2021-09-29 | End: 2021-10-05

## 2021-09-29 RX ORDER — ROSUVASTATIN CALCIUM 20 MG/1
20 TABLET, COATED ORAL NIGHTLY
COMMUNITY
End: 2022-08-31

## 2021-09-29 RX ORDER — SODIUM CHLORIDE 0.9 % (FLUSH) 0.9 %
10 SYRINGE (ML) INJECTION AS NEEDED
Status: DISCONTINUED | OUTPATIENT
Start: 2021-09-29 | End: 2021-10-07 | Stop reason: HOSPADM

## 2021-09-29 RX ORDER — DEXTROMETHORPHAN POLISTIREX 30 MG/5ML
60 SUSPENSION ORAL EVERY 12 HOURS SCHEDULED
Status: DISCONTINUED | OUTPATIENT
Start: 2021-09-29 | End: 2021-10-07 | Stop reason: HOSPADM

## 2021-09-29 RX ADMIN — SODIUM CHLORIDE, POTASSIUM CHLORIDE, SODIUM LACTATE AND CALCIUM CHLORIDE 75 ML/HR: 600; 310; 30; 20 INJECTION, SOLUTION INTRAVENOUS at 21:26

## 2021-09-29 RX ADMIN — ENOXAPARIN SODIUM 80 MG: 80 INJECTION, SOLUTION INTRAVENOUS; SUBCUTANEOUS at 21:14

## 2021-09-29 RX ADMIN — Medication 3 MG: at 21:12

## 2021-09-29 RX ADMIN — DEXTROMETHORPHAN 60 MG: 30 SUSPENSION, EXTENDED RELEASE ORAL at 21:11

## 2021-09-29 RX ADMIN — CEFEPIME HYDROCHLORIDE 1 G: 1 INJECTION, POWDER, FOR SOLUTION INTRAMUSCULAR; INTRAVENOUS at 22:47

## 2021-09-29 RX ADMIN — ROSUVASTATIN CALCIUM 10 MG: 10 TABLET, FILM COATED ORAL at 21:12

## 2021-09-29 RX ADMIN — VANCOMYCIN HYDROCHLORIDE 1000 MG: 1 INJECTION, POWDER, LYOPHILIZED, FOR SOLUTION INTRAVENOUS at 21:38

## 2021-09-29 RX ADMIN — FAMOTIDINE 20 MG: 10 INJECTION INTRAVENOUS at 21:45

## 2021-09-29 NOTE — PROGRESS NOTES
CC: f/u liver masses    History:  Sonu Bravo Jr. is a 76 y.o. male   He presents today with his wife and notes he has not been feeling well.  He did feel better on Friday into Saturday after receiving a dose of Rocephin in the ER, but was back to coughing, some chills, and feeling poor since then.  He had his colonoscopy yesterday with no evidence of colon cancer, with which he is very pleased.  However, he still has liver masses, a cavitary lung nodule, and findings in the sigmoid colon that are unexplained.       ROS:  Review of Systems   Constitutional: Positive for chills and fatigue. Negative for fever.   Respiratory: Positive for cough and shortness of breath.    Cardiovascular: Negative for chest pain and palpitations.   Gastrointestinal: Negative for blood in stool, constipation and diarrhea.   Neurological: Positive for weakness.        reports that he has been smoking cigarettes. He started smoking about 54 years ago. He has a 13.00 pack-year smoking history. He has never used smokeless tobacco. He reports current alcohol use. He reports that he does not use drugs.      Current Outpatient Medications:   •  apixaban (ELIQUIS) 5 MG tablet tablet, Take 1 tablet by mouth Every 12 (Twelve) Hours., Disp: 60 tablet, Rfl: 3  •  bisoprolol (ZEBeta) 10 MG tablet, Take 0.5 tablets by mouth Daily., Disp: 90 tablet, Rfl: 3  •  Cholecalciferol 4000 units capsule, Take 1 tablet by mouth Daily., Disp: , Rfl:   •  Coenzyme Q10 (CO Q-10) 100 MG capsule, Take 1 tablet by mouth Daily., Disp: , Rfl:   •  Cyanocobalamin (VITAMIN B 12 PO), Take 1 tablet by mouth Daily. 2,500 mg, Disp: , Rfl:   •  Dextromethorphan Polistirex (DELSYM PO), Take 2 teaspoon(s) by mouth 2 (two) times a day. OTC, Disp: , Rfl:   •  fexofenadine (ALLEGRA) 180 MG tablet, Take 180 mg by mouth Daily., Disp: , Rfl:   •  fluticasone (FLONASE) 50 MCG/ACT nasal spray, 1 spray into the nostril(s) as directed by provider Daily. Each Nostral, Disp: 16 g, Rfl:  "5  •  hydroCHLOROthiazide (HYDRODIURIL) 12.5 MG tablet, Take 1 tablet by mouth Daily., Disp: 30 tablet, Rfl: 3  •  levothyroxine (SYNTHROID, LEVOTHROID) 100 MCG tablet, Take 1 tablet by mouth Daily., Disp: 90 tablet, Rfl: 3  •  Multiple Vitamin (MULTI-VITAMIN DAILY PO), Take 1 tablet by mouth Daily., Disp: , Rfl:   •  omeprazole (priLOSEC) 20 MG capsule, Take 1 capsule by mouth Daily., Disp: 90 capsule, Rfl: 3  No current facility-administered medications for this visit.    OBJECTIVE:  /70 (BP Location: Left arm, Patient Position: Sitting, Cuff Size: Adult)   Pulse 67   Temp 98.2 °F (36.8 °C)   Resp 16   Ht 177.8 cm (70\")   Wt 76.4 kg (168 lb 8 oz)   SpO2 98%   BMI 24.18 kg/m²    Physical Exam  Constitutional:       General: He is not in acute distress.  Pulmonary:      Effort: Pulmonary effort is normal. No respiratory distress.   Neurological:      Mental Status: He is alert and oriented to person, place, and time.   Psychiatric:         Mood and Affect: Mood normal.         Behavior: Behavior normal.         Assessment/Plan     Diagnoses and all orders for this visit:    1. Liver masses (Primary)  2. Abnormal CT scan, pelvis  3. Cavitary lesion of lung  4. Leukocytosis, unspecified type  5. Shaking chills  Given his negative colonoscopy yesterday with no sign of malignancy, his liver masses, cavitary lung nodule, and symptoms persist without explanation.  I spoke to speak with the radiologist, Dr. Leon, who had a feeling that these masses in the liver could more likely be related to abscesses.  This would certainly fit with the clinical picture of his shaking chills, fatigue, etc. over the past couple of weeks.  This could also be associated with a possibility of infection in the colon that appeared as malignancy.  MRI could be helpful to further characterize, but it has also been about 3 weeks and a follow-up CT might be more helpful.  I spoke with Dr. English regarding possible approaches to " manage source control of his abscesses, but he felt that possible aspiration and more likely long-term antibiotic therapy would be warranted and would be best served and patient.  Subsequently, I spoke with Dr. Hancock, who has accepted admission, though no beds are available at this moment.  He was recommended to go through the ER, but prefers not to do this due to his long experience there on Friday.  As such, he prefers to go home at this time and await a call for direct admission ideally this afternoon.  If he has not received a call by 4 or 5 PM, I would recommend he go through the ER to ensure that his care is not delayed any further.  He does express understanding that not going to the ER at present does expose him to an increased risk of decompensation or worsening of his condition, but he is willing to accept this.      An After Visit Summary was printed and given to the patient at discharge.  Return for Next scheduled follow up.          Vaughn Kramer D.O. 9/29/2021   Electronically signed.

## 2021-09-29 NOTE — TELEPHONE ENCOUNTER
Left message for patient to call back to move his New Patient appointment up sooner with Dr. Canchola per Sandi's request.

## 2021-09-29 NOTE — TELEPHONE ENCOUNTER
This patient is schedule with Dr. Canchola for lung lesion. We need to find him a sooner appt. Does not have to be with Dr. Canchola. If Dr. Gloria or Dr. Meneses doesn't have an opening he can be put on with any of the 5 APRN's and we can discuss CT with Dr. Canchola before. Thank you.

## 2021-09-30 ENCOUNTER — APPOINTMENT (OUTPATIENT)
Dept: CT IMAGING | Facility: HOSPITAL | Age: 76
End: 2021-09-30

## 2021-09-30 ENCOUNTER — APPOINTMENT (OUTPATIENT)
Dept: MRI IMAGING | Facility: HOSPITAL | Age: 76
End: 2021-09-30

## 2021-09-30 LAB
ABO GROUP BLD: NORMAL
ALBUMIN SERPL-MCNC: 2 G/DL (ref 3.5–5.2)
ALBUMIN/GLOB SERPL: 0.6 G/DL
ALP SERPL-CCNC: 105 U/L (ref 39–117)
ALT SERPL W P-5'-P-CCNC: 23 U/L (ref 1–41)
ANION GAP SERPL CALCULATED.3IONS-SCNC: 8 MMOL/L (ref 5–15)
ANION GAP SERPL CALCULATED.3IONS-SCNC: 9 MMOL/L (ref 5–15)
AST SERPL-CCNC: 24 U/L (ref 1–40)
BACTERIA BLD CULT: ABNORMAL
BACTERIA SPEC AEROBE CULT: ABNORMAL
BASOPHILS # BLD AUTO: 0.02 10*3/MM3 (ref 0–0.2)
BASOPHILS NFR BLD AUTO: 0.1 % (ref 0–1.5)
BILIRUB SERPL-MCNC: 0.7 MG/DL (ref 0–1.2)
BLD GP AB SCN SERPL QL: NEGATIVE
BOTTLE TYPE: ABNORMAL
BUN SERPL-MCNC: 15 MG/DL (ref 8–23)
BUN SERPL-MCNC: 17 MG/DL (ref 8–23)
BUN/CREAT SERPL: 20.8 (ref 7–25)
BUN/CREAT SERPL: 22.1 (ref 7–25)
CALCIUM SPEC-SCNC: 7.5 MG/DL (ref 8.6–10.5)
CALCIUM SPEC-SCNC: 7.8 MG/DL (ref 8.6–10.5)
CEA SERPL-MCNC: 3.32 NG/ML
CHLORIDE SERPL-SCNC: 93 MMOL/L (ref 98–107)
CHLORIDE SERPL-SCNC: 94 MMOL/L (ref 98–107)
CHOLEST SERPL-MCNC: 97 MG/DL (ref 0–200)
CO2 SERPL-SCNC: 29 MMOL/L (ref 22–29)
CO2 SERPL-SCNC: 29 MMOL/L (ref 22–29)
CREAT SERPL-MCNC: 0.72 MG/DL (ref 0.76–1.27)
CREAT SERPL-MCNC: 0.77 MG/DL (ref 0.76–1.27)
D-LACTATE SERPL-SCNC: 1.3 MMOL/L (ref 0.5–2)
DEPRECATED RDW RBC AUTO: 47.2 FL (ref 37–54)
EOSINOPHIL # BLD AUTO: 0.01 10*3/MM3 (ref 0–0.4)
EOSINOPHIL NFR BLD AUTO: 0 % (ref 0.3–6.2)
ERYTHROCYTE [DISTWIDTH] IN BLOOD BY AUTOMATED COUNT: 14.6 % (ref 12.3–15.4)
FERRITIN SERPL-MCNC: 822.7 NG/ML (ref 30–400)
FOLATE SERPL-MCNC: 11.5 NG/ML (ref 4.78–24.2)
GFR SERPL CREATININE-BSD FRML MDRD: 106 ML/MIN/1.73
GFR SERPL CREATININE-BSD FRML MDRD: 98 ML/MIN/1.73
GLOBULIN UR ELPH-MCNC: 3.3 GM/DL
GLUCOSE SERPL-MCNC: 109 MG/DL (ref 65–99)
GLUCOSE SERPL-MCNC: 112 MG/DL (ref 65–99)
HBA1C MFR BLD: 6 % (ref 4.8–5.6)
HCT VFR BLD AUTO: 28.5 % (ref 37.5–51)
HDLC SERPL-MCNC: 19 MG/DL (ref 40–60)
HGB BLD-MCNC: 9.5 G/DL (ref 13–17.7)
IMM GRANULOCYTES # BLD AUTO: 0.33 10*3/MM3 (ref 0–0.05)
IMM GRANULOCYTES NFR BLD AUTO: 1.4 % (ref 0–0.5)
INR PPP: 1.44 (ref 0.91–1.09)
ISOLATED FROM: ABNORMAL
LDLC SERPL CALC-MCNC: 57 MG/DL (ref 0–100)
LDLC/HDLC SERPL: 2.94 {RATIO}
LYMPHOCYTES # BLD AUTO: 1.33 10*3/MM3 (ref 0.7–3.1)
LYMPHOCYTES NFR BLD AUTO: 5.6 % (ref 19.6–45.3)
MAGNESIUM SERPL-MCNC: 1.9 MG/DL (ref 1.6–2.4)
MCH RBC QN AUTO: 29.6 PG (ref 26.6–33)
MCHC RBC AUTO-ENTMCNC: 33.3 G/DL (ref 31.5–35.7)
MCV RBC AUTO: 88.8 FL (ref 79–97)
MONOCYTES # BLD AUTO: 1.56 10*3/MM3 (ref 0.1–0.9)
MONOCYTES NFR BLD AUTO: 6.6 % (ref 5–12)
NEUTROPHILS NFR BLD AUTO: 20.41 10*3/MM3 (ref 1.7–7)
NEUTROPHILS NFR BLD AUTO: 86.3 % (ref 42.7–76)
NRBC BLD AUTO-RTO: 0 /100 WBC (ref 0–0.2)
PLATELET # BLD AUTO: 376 10*3/MM3 (ref 140–450)
PMV BLD AUTO: 10.7 FL (ref 6–12)
POTASSIUM SERPL-SCNC: 2.4 MMOL/L (ref 3.5–5.2)
POTASSIUM SERPL-SCNC: 3.2 MMOL/L (ref 3.5–5.2)
PROCALCITONIN SERPL-MCNC: 0.54 NG/ML (ref 0–0.25)
PROT SERPL-MCNC: 5.3 G/DL (ref 6–8.5)
PROTHROMBIN TIME: 17 SECONDS (ref 11.9–14.6)
RBC # BLD AUTO: 3.21 10*6/MM3 (ref 4.14–5.8)
RH BLD: POSITIVE
SODIUM SERPL-SCNC: 131 MMOL/L (ref 136–145)
SODIUM SERPL-SCNC: 131 MMOL/L (ref 136–145)
T&S EXPIRATION DATE: NORMAL
TRIGL SERPL-MCNC: 111 MG/DL (ref 0–150)
VIT B12 BLD-MCNC: >2000 PG/ML (ref 211–946)
VLDLC SERPL-MCNC: 21 MG/DL (ref 5–40)
WBC # BLD AUTO: 23.66 10*3/MM3 (ref 3.4–10.8)

## 2021-09-30 PROCEDURE — 25010000002 CEFEPIME PER 500 MG: Performed by: FAMILY MEDICINE

## 2021-09-30 PROCEDURE — 94799 UNLISTED PULMONARY SVC/PX: CPT

## 2021-09-30 PROCEDURE — 97165 OT EVAL LOW COMPLEX 30 MIN: CPT | Performed by: OCCUPATIONAL THERAPIST

## 2021-09-30 PROCEDURE — 82607 VITAMIN B-12: CPT | Performed by: INTERNAL MEDICINE

## 2021-09-30 PROCEDURE — 82378 CARCINOEMBRYONIC ANTIGEN: CPT | Performed by: SPECIALIST

## 2021-09-30 PROCEDURE — 86900 BLOOD TYPING SEROLOGIC ABO: CPT | Performed by: SPECIALIST

## 2021-09-30 PROCEDURE — 83735 ASSAY OF MAGNESIUM: CPT | Performed by: FAMILY MEDICINE

## 2021-09-30 PROCEDURE — 25010000002 IOPAMIDOL 61 % SOLUTION: Performed by: FAMILY MEDICINE

## 2021-09-30 PROCEDURE — 25010000002 ENOXAPARIN PER 10 MG: Performed by: FAMILY MEDICINE

## 2021-09-30 PROCEDURE — 99222 1ST HOSP IP/OBS MODERATE 55: CPT | Performed by: INTERNAL MEDICINE

## 2021-09-30 PROCEDURE — 74183 MRI ABD W/O CNTR FLWD CNTR: CPT

## 2021-09-30 PROCEDURE — G0378 HOSPITAL OBSERVATION PER HR: HCPCS

## 2021-09-30 PROCEDURE — 25010000002 VANCOMYCIN 1 G RECONSTITUTED SOLUTION 1 EACH VIAL: Performed by: FAMILY MEDICINE

## 2021-09-30 PROCEDURE — 71260 CT THORAX DX C+: CPT

## 2021-09-30 PROCEDURE — 86140 C-REACTIVE PROTEIN: CPT | Performed by: FAMILY MEDICINE

## 2021-09-30 PROCEDURE — A9577 INJ MULTIHANCE: HCPCS | Performed by: FAMILY MEDICINE

## 2021-09-30 PROCEDURE — 83605 ASSAY OF LACTIC ACID: CPT | Performed by: FAMILY MEDICINE

## 2021-09-30 PROCEDURE — 94664 DEMO&/EVAL PT USE INHALER: CPT

## 2021-09-30 PROCEDURE — 25010000002 POTASSIUM CHLORIDE PER 2 MEQ: Performed by: SPECIALIST

## 2021-09-30 PROCEDURE — 85025 COMPLETE CBC W/AUTO DIFF WBC: CPT | Performed by: FAMILY MEDICINE

## 2021-09-30 PROCEDURE — 83036 HEMOGLOBIN GLYCOSYLATED A1C: CPT | Performed by: FAMILY MEDICINE

## 2021-09-30 PROCEDURE — 99204 OFFICE O/P NEW MOD 45 MIN: CPT | Performed by: INTERNAL MEDICINE

## 2021-09-30 PROCEDURE — 80061 LIPID PANEL: CPT | Performed by: FAMILY MEDICINE

## 2021-09-30 PROCEDURE — 82746 ASSAY OF FOLIC ACID SERUM: CPT | Performed by: INTERNAL MEDICINE

## 2021-09-30 PROCEDURE — 97162 PT EVAL MOD COMPLEX 30 MIN: CPT

## 2021-09-30 PROCEDURE — 80053 COMPREHEN METABOLIC PANEL: CPT | Performed by: FAMILY MEDICINE

## 2021-09-30 PROCEDURE — 86850 RBC ANTIBODY SCREEN: CPT | Performed by: SPECIALIST

## 2021-09-30 PROCEDURE — 97161 PT EVAL LOW COMPLEX 20 MIN: CPT

## 2021-09-30 PROCEDURE — 84145 PROCALCITONIN (PCT): CPT | Performed by: FAMILY MEDICINE

## 2021-09-30 PROCEDURE — 97116 GAIT TRAINING THERAPY: CPT

## 2021-09-30 PROCEDURE — 85610 PROTHROMBIN TIME: CPT | Performed by: SPECIALIST

## 2021-09-30 PROCEDURE — 86901 BLOOD TYPING SEROLOGIC RH(D): CPT | Performed by: SPECIALIST

## 2021-09-30 PROCEDURE — 94640 AIRWAY INHALATION TREATMENT: CPT

## 2021-09-30 PROCEDURE — 0 GADOBENATE DIMEGLUMINE 529 MG/ML SOLUTION: Performed by: FAMILY MEDICINE

## 2021-09-30 PROCEDURE — 82728 ASSAY OF FERRITIN: CPT | Performed by: INTERNAL MEDICINE

## 2021-09-30 RX ORDER — POTASSIUM CHLORIDE 14.9 MG/ML
20 INJECTION INTRAVENOUS
Status: COMPLETED | OUTPATIENT
Start: 2021-09-30 | End: 2021-09-30

## 2021-09-30 RX ORDER — BUDESONIDE AND FORMOTEROL FUMARATE DIHYDRATE 160; 4.5 UG/1; UG/1
2 AEROSOL RESPIRATORY (INHALATION)
Status: DISCONTINUED | OUTPATIENT
Start: 2021-09-30 | End: 2021-10-07 | Stop reason: HOSPADM

## 2021-09-30 RX ORDER — CETIRIZINE HYDROCHLORIDE 10 MG/1
10 TABLET ORAL DAILY
Status: DISCONTINUED | OUTPATIENT
Start: 2021-09-30 | End: 2021-10-07 | Stop reason: HOSPADM

## 2021-09-30 RX ORDER — POTASSIUM CHLORIDE 750 MG/1
40 CAPSULE, EXTENDED RELEASE ORAL 2 TIMES DAILY
Status: COMPLETED | OUTPATIENT
Start: 2021-09-30 | End: 2021-09-30

## 2021-09-30 RX ORDER — ACETAMINOPHEN 325 MG/1
650 TABLET ORAL EVERY 6 HOURS PRN
Status: DISCONTINUED | OUTPATIENT
Start: 2021-09-30 | End: 2021-10-07 | Stop reason: HOSPADM

## 2021-09-30 RX ORDER — FLUTICASONE PROPIONATE 50 MCG
2 SPRAY, SUSPENSION (ML) NASAL DAILY
Status: DISCONTINUED | OUTPATIENT
Start: 2021-10-01 | End: 2021-10-07 | Stop reason: HOSPADM

## 2021-09-30 RX ADMIN — ALBUTEROL SULFATE 1.25 MG: 2.5 SOLUTION RESPIRATORY (INHALATION) at 07:33

## 2021-09-30 RX ADMIN — ALBUTEROL SULFATE 1.25 MG: 2.5 SOLUTION RESPIRATORY (INHALATION) at 11:38

## 2021-09-30 RX ADMIN — HYDROCHLOROTHIAZIDE 12.5 MG: 25 TABLET ORAL at 08:24

## 2021-09-30 RX ADMIN — ENOXAPARIN SODIUM 80 MG: 80 INJECTION, SOLUTION INTRAVENOUS; SUBCUTANEOUS at 08:24

## 2021-09-30 RX ADMIN — CETIRIZINE HYDROCHLORIDE 10 MG: 10 TABLET ORAL at 16:30

## 2021-09-30 RX ADMIN — METRONIDAZOLE 500 MG: 500 INJECTION, SOLUTION INTRAVENOUS at 21:34

## 2021-09-30 RX ADMIN — SODIUM CHLORIDE, POTASSIUM CHLORIDE, SODIUM LACTATE AND CALCIUM CHLORIDE 125 ML/HR: 600; 310; 30; 20 INJECTION, SOLUTION INTRAVENOUS at 18:35

## 2021-09-30 RX ADMIN — GADOBENATE DIMEGLUMINE 15 ML: 529 INJECTION, SOLUTION INTRAVENOUS at 14:26

## 2021-09-30 RX ADMIN — POTASSIUM CHLORIDE 40 MEQ: 750 CAPSULE, EXTENDED RELEASE ORAL at 08:24

## 2021-09-30 RX ADMIN — IPRATROPIUM BROMIDE 0.5 MG: 0.5 SOLUTION RESPIRATORY (INHALATION) at 21:06

## 2021-09-30 RX ADMIN — ALBUTEROL SULFATE 1.25 MG: 2.5 SOLUTION RESPIRATORY (INHALATION) at 21:06

## 2021-09-30 RX ADMIN — HYDROCODONE BITARTRATE AND HOMATROPINE METHYLBROMIDE 10 ML: 5; 1.5 SOLUTION ORAL at 21:34

## 2021-09-30 RX ADMIN — IPRATROPIUM BROMIDE 0.5 MG: 0.5 SOLUTION RESPIRATORY (INHALATION) at 11:38

## 2021-09-30 RX ADMIN — LEVOTHYROXINE SODIUM 100 MCG: 100 TABLET ORAL at 08:24

## 2021-09-30 RX ADMIN — CEFEPIME HYDROCHLORIDE 2 G: 2 INJECTION, POWDER, FOR SOLUTION INTRAVENOUS at 16:39

## 2021-09-30 RX ADMIN — ALBUTEROL SULFATE 1.25 MG: 2.5 SOLUTION RESPIRATORY (INHALATION) at 15:47

## 2021-09-30 RX ADMIN — SODIUM CHLORIDE, PRESERVATIVE FREE 10 ML: 5 INJECTION INTRAVENOUS at 08:26

## 2021-09-30 RX ADMIN — IOPAMIDOL 100 ML: 612 INJECTION, SOLUTION INTRAVENOUS at 15:44

## 2021-09-30 RX ADMIN — BUDESONIDE AND FORMOTEROL FUMARATE DIHYDRATE 2 PUFF: 160; 4.5 AEROSOL RESPIRATORY (INHALATION) at 21:06

## 2021-09-30 RX ADMIN — HYDROCODONE BITARTRATE AND HOMATROPINE METHYLBROMIDE 10 ML: 5; 1.5 SOLUTION ORAL at 00:55

## 2021-09-30 RX ADMIN — FAMOTIDINE 20 MG: 10 INJECTION INTRAVENOUS at 21:35

## 2021-09-30 RX ADMIN — IPRATROPIUM BROMIDE 0.5 MG: 0.5 SOLUTION RESPIRATORY (INHALATION) at 15:47

## 2021-09-30 RX ADMIN — DEXTROMETHORPHAN 60 MG: 30 SUSPENSION, EXTENDED RELEASE ORAL at 08:25

## 2021-09-30 RX ADMIN — POTASSIUM CHLORIDE 20 MEQ: 14.9 INJECTION, SOLUTION INTRAVENOUS at 16:38

## 2021-09-30 RX ADMIN — Medication 3 MG: at 21:34

## 2021-09-30 RX ADMIN — CEFEPIME HYDROCHLORIDE 1 G: 1 INJECTION, POWDER, FOR SOLUTION INTRAMUSCULAR; INTRAVENOUS at 08:25

## 2021-09-30 RX ADMIN — IPRATROPIUM BROMIDE 0.5 MG: 0.5 SOLUTION RESPIRATORY (INHALATION) at 07:33

## 2021-09-30 RX ADMIN — FAMOTIDINE 20 MG: 10 INJECTION INTRAVENOUS at 08:25

## 2021-09-30 RX ADMIN — ROSUVASTATIN CALCIUM 10 MG: 10 TABLET, FILM COATED ORAL at 21:34

## 2021-09-30 RX ADMIN — ACETAMINOPHEN 650 MG: 325 TABLET, FILM COATED ORAL at 01:39

## 2021-09-30 RX ADMIN — POTASSIUM CHLORIDE 40 MEQ: 750 CAPSULE, EXTENDED RELEASE ORAL at 21:34

## 2021-09-30 RX ADMIN — SODIUM CHLORIDE, PRESERVATIVE FREE 10 ML: 5 INJECTION INTRAVENOUS at 21:37

## 2021-09-30 RX ADMIN — VANCOMYCIN HYDROCHLORIDE 1000 MG: 1 INJECTION, POWDER, LYOPHILIZED, FOR SOLUTION INTRAVENOUS at 08:25

## 2021-09-30 RX ADMIN — POTASSIUM CHLORIDE 20 MEQ: 14.9 INJECTION, SOLUTION INTRAVENOUS at 12:44

## 2021-10-01 ENCOUNTER — APPOINTMENT (OUTPATIENT)
Dept: CT IMAGING | Facility: HOSPITAL | Age: 76
End: 2021-10-01

## 2021-10-01 ENCOUNTER — APPOINTMENT (OUTPATIENT)
Dept: MRI IMAGING | Facility: HOSPITAL | Age: 76
End: 2021-10-01

## 2021-10-01 PROBLEM — J86.9 EMPYEMA LUNG: Status: ACTIVE | Noted: 2021-10-01

## 2021-10-01 PROBLEM — K65.9 ABDOMINAL INFECTION: Status: ACTIVE | Noted: 2021-10-01

## 2021-10-01 LAB
ALBUMIN SERPL-MCNC: 1.9 G/DL (ref 3.5–5.2)
ALBUMIN/GLOB SERPL: 0.6 G/DL
ALP SERPL-CCNC: 95 U/L (ref 39–117)
ALT SERPL W P-5'-P-CCNC: 19 U/L (ref 1–41)
ANION GAP SERPL CALCULATED.3IONS-SCNC: 9 MMOL/L (ref 5–15)
AST SERPL-CCNC: 24 U/L (ref 1–40)
BASOPHILS # BLD AUTO: 0.02 10*3/MM3 (ref 0–0.2)
BASOPHILS NFR BLD AUTO: 0.1 % (ref 0–1.5)
BILIRUB SERPL-MCNC: 0.6 MG/DL (ref 0–1.2)
BUN SERPL-MCNC: 13 MG/DL (ref 8–23)
BUN/CREAT SERPL: 20.6 (ref 7–25)
CALCIUM SPEC-SCNC: 7.6 MG/DL (ref 8.6–10.5)
CHLORIDE SERPL-SCNC: 95 MMOL/L (ref 98–107)
CO2 SERPL-SCNC: 26 MMOL/L (ref 22–29)
CREAT SERPL-MCNC: 0.63 MG/DL (ref 0.76–1.27)
CRP SERPL-MCNC: 22.08 MG/DL (ref 0–0.5)
DEPRECATED RDW RBC AUTO: 47 FL (ref 37–54)
EOSINOPHIL # BLD AUTO: 0.04 10*3/MM3 (ref 0–0.4)
EOSINOPHIL NFR BLD AUTO: 0.3 % (ref 0.3–6.2)
ERYTHROCYTE [DISTWIDTH] IN BLOOD BY AUTOMATED COUNT: 14.7 % (ref 12.3–15.4)
GFR SERPL CREATININE-BSD FRML MDRD: 123 ML/MIN/1.73
GLOBULIN UR ELPH-MCNC: 3.1 GM/DL
GLUCOSE SERPL-MCNC: 92 MG/DL (ref 65–99)
HCT VFR BLD AUTO: 26.8 % (ref 37.5–51)
HGB BLD-MCNC: 9 G/DL (ref 13–17.7)
IMM GRANULOCYTES # BLD AUTO: 0.11 10*3/MM3 (ref 0–0.05)
IMM GRANULOCYTES NFR BLD AUTO: 0.7 % (ref 0–0.5)
INR PPP: 1.38 (ref 0.91–1.09)
IRON 24H UR-MRATE: 17 MCG/DL (ref 59–158)
IRON SATN MFR SERPL: 12 % (ref 20–50)
LYMPHOCYTES # BLD AUTO: 1.32 10*3/MM3 (ref 0.7–3.1)
LYMPHOCYTES NFR BLD AUTO: 8.3 % (ref 19.6–45.3)
MCH RBC QN AUTO: 29.7 PG (ref 26.6–33)
MCHC RBC AUTO-ENTMCNC: 33.6 G/DL (ref 31.5–35.7)
MCV RBC AUTO: 88.4 FL (ref 79–97)
MONOCYTES # BLD AUTO: 1.21 10*3/MM3 (ref 0.1–0.9)
MONOCYTES NFR BLD AUTO: 7.6 % (ref 5–12)
NEUTROPHILS NFR BLD AUTO: 13.15 10*3/MM3 (ref 1.7–7)
NEUTROPHILS NFR BLD AUTO: 83 % (ref 42.7–76)
NRBC BLD AUTO-RTO: 0 /100 WBC (ref 0–0.2)
PLATELET # BLD AUTO: 380 10*3/MM3 (ref 140–450)
PMV BLD AUTO: 9.8 FL (ref 6–12)
POTASSIUM SERPL-SCNC: 3.2 MMOL/L (ref 3.5–5.2)
PROT SERPL-MCNC: 5 G/DL (ref 6–8.5)
PROTHROMBIN TIME: 16.4 SECONDS (ref 11.9–14.6)
RBC # BLD AUTO: 3.03 10*6/MM3 (ref 4.14–5.8)
SODIUM SERPL-SCNC: 130 MMOL/L (ref 136–145)
TIBC SERPL-MCNC: 140 MCG/DL (ref 298–536)
TRANSFERRIN SERPL-MCNC: 94 MG/DL (ref 200–360)
VANCOMYCIN TROUGH SERPL-MCNC: 4.8 MCG/ML (ref 5–20)
WBC # BLD AUTO: 15.85 10*3/MM3 (ref 3.4–10.8)

## 2021-10-01 PROCEDURE — 85025 COMPLETE CBC W/AUTO DIFF WBC: CPT | Performed by: FAMILY MEDICINE

## 2021-10-01 PROCEDURE — 80053 COMPREHEN METABOLIC PANEL: CPT | Performed by: FAMILY MEDICINE

## 2021-10-01 PROCEDURE — 99222 1ST HOSP IP/OBS MODERATE 55: CPT | Performed by: THORACIC SURGERY (CARDIOTHORACIC VASCULAR SURGERY)

## 2021-10-01 PROCEDURE — 0FB03ZX EXCISION OF LIVER, PERCUTANEOUS APPROACH, DIAGNOSTIC: ICD-10-PCS | Performed by: RADIOLOGY

## 2021-10-01 PROCEDURE — 94799 UNLISTED PULMONARY SVC/PX: CPT

## 2021-10-01 PROCEDURE — 99233 SBSQ HOSP IP/OBS HIGH 50: CPT | Performed by: INTERNAL MEDICINE

## 2021-10-01 PROCEDURE — 87205 SMEAR GRAM STAIN: CPT | Performed by: FAMILY MEDICINE

## 2021-10-01 PROCEDURE — 87102 FUNGUS ISOLATION CULTURE: CPT | Performed by: FAMILY MEDICINE

## 2021-10-01 PROCEDURE — 87220 TISSUE EXAM FOR FUNGI: CPT | Performed by: FAMILY MEDICINE

## 2021-10-01 PROCEDURE — 25010000002 CEFEPIME PER 500 MG: Performed by: FAMILY MEDICINE

## 2021-10-01 PROCEDURE — 94664 DEMO&/EVAL PT USE INHALER: CPT

## 2021-10-01 PROCEDURE — 84466 ASSAY OF TRANSFERRIN: CPT | Performed by: INTERNAL MEDICINE

## 2021-10-01 PROCEDURE — 75989 ABSCESS DRAINAGE UNDER X-RAY: CPT

## 2021-10-01 PROCEDURE — 87070 CULTURE OTHR SPECIMN AEROBIC: CPT | Performed by: FAMILY MEDICINE

## 2021-10-01 PROCEDURE — 80202 ASSAY OF VANCOMYCIN: CPT | Performed by: FAMILY MEDICINE

## 2021-10-01 PROCEDURE — 85610 PROTHROMBIN TIME: CPT | Performed by: SPECIALIST

## 2021-10-01 PROCEDURE — 83540 ASSAY OF IRON: CPT | Performed by: INTERNAL MEDICINE

## 2021-10-01 RX ORDER — POTASSIUM CHLORIDE 750 MG/1
40 CAPSULE, EXTENDED RELEASE ORAL 2 TIMES DAILY
Status: DISPENSED | OUTPATIENT
Start: 2021-10-01 | End: 2021-10-02

## 2021-10-01 RX ORDER — BISOPROLOL FUMARATE 10 MG/1
5 TABLET, FILM COATED ORAL DAILY
COMMUNITY
Start: 2021-10-11 | End: 2021-10-27

## 2021-10-01 RX ADMIN — CEFEPIME HYDROCHLORIDE 2 G: 2 INJECTION, POWDER, FOR SOLUTION INTRAVENOUS at 10:16

## 2021-10-01 RX ADMIN — FAMOTIDINE 20 MG: 10 INJECTION INTRAVENOUS at 20:44

## 2021-10-01 RX ADMIN — IPRATROPIUM BROMIDE 0.5 MG: 0.5 SOLUTION RESPIRATORY (INHALATION) at 11:47

## 2021-10-01 RX ADMIN — ALBUTEROL SULFATE 1.25 MG: 2.5 SOLUTION RESPIRATORY (INHALATION) at 15:59

## 2021-10-01 RX ADMIN — SODIUM CHLORIDE, PRESERVATIVE FREE 10 ML: 5 INJECTION INTRAVENOUS at 20:44

## 2021-10-01 RX ADMIN — METRONIDAZOLE 500 MG: 500 INJECTION, SOLUTION INTRAVENOUS at 11:33

## 2021-10-01 RX ADMIN — ALBUTEROL SULFATE 1.25 MG: 2.5 SOLUTION RESPIRATORY (INHALATION) at 20:35

## 2021-10-01 RX ADMIN — FLUTICASONE PROPIONATE 2 SPRAY: 50 SPRAY, METERED NASAL at 10:24

## 2021-10-01 RX ADMIN — BUDESONIDE AND FORMOTEROL FUMARATE DIHYDRATE 2 PUFF: 160; 4.5 AEROSOL RESPIRATORY (INHALATION) at 20:35

## 2021-10-01 RX ADMIN — IPRATROPIUM BROMIDE 0.5 MG: 0.5 SOLUTION RESPIRATORY (INHALATION) at 15:59

## 2021-10-01 RX ADMIN — METRONIDAZOLE 500 MG: 500 INJECTION, SOLUTION INTRAVENOUS at 20:43

## 2021-10-01 RX ADMIN — CEFEPIME HYDROCHLORIDE 2 G: 2 INJECTION, POWDER, FOR SOLUTION INTRAVENOUS at 18:07

## 2021-10-01 RX ADMIN — HYDROCODONE BITARTRATE AND HOMATROPINE METHYLBROMIDE 10 ML: 5; 1.5 SOLUTION ORAL at 20:54

## 2021-10-01 RX ADMIN — METRONIDAZOLE 500 MG: 500 INJECTION, SOLUTION INTRAVENOUS at 02:57

## 2021-10-01 RX ADMIN — ALBUTEROL SULFATE 1.25 MG: 2.5 SOLUTION RESPIRATORY (INHALATION) at 11:47

## 2021-10-01 RX ADMIN — Medication 3 MG: at 20:44

## 2021-10-01 RX ADMIN — ROSUVASTATIN CALCIUM 10 MG: 10 TABLET, FILM COATED ORAL at 20:44

## 2021-10-01 RX ADMIN — IPRATROPIUM BROMIDE 0.5 MG: 0.5 SOLUTION RESPIRATORY (INHALATION) at 20:35

## 2021-10-01 RX ADMIN — CEFEPIME HYDROCHLORIDE 2 G: 2 INJECTION, POWDER, FOR SOLUTION INTRAVENOUS at 00:31

## 2021-10-01 RX ADMIN — IPRATROPIUM BROMIDE 0.5 MG: 0.5 SOLUTION RESPIRATORY (INHALATION) at 07:58

## 2021-10-01 RX ADMIN — FAMOTIDINE 20 MG: 10 INJECTION INTRAVENOUS at 10:22

## 2021-10-01 RX ADMIN — SODIUM CHLORIDE, POTASSIUM CHLORIDE, SODIUM LACTATE AND CALCIUM CHLORIDE 125 ML/HR: 600; 310; 30; 20 INJECTION, SOLUTION INTRAVENOUS at 06:42

## 2021-10-01 RX ADMIN — ALBUTEROL SULFATE 1.25 MG: 2.5 SOLUTION RESPIRATORY (INHALATION) at 07:58

## 2021-10-01 RX ADMIN — POTASSIUM CHLORIDE 40 MEQ: 10 CAPSULE, COATED, EXTENDED RELEASE ORAL at 20:43

## 2021-10-01 RX ADMIN — SODIUM CHLORIDE, POTASSIUM CHLORIDE, SODIUM LACTATE AND CALCIUM CHLORIDE 125 ML/HR: 600; 310; 30; 20 INJECTION, SOLUTION INTRAVENOUS at 18:08

## 2021-10-01 RX ADMIN — BUDESONIDE AND FORMOTEROL FUMARATE DIHYDRATE 2 PUFF: 160; 4.5 AEROSOL RESPIRATORY (INHALATION) at 07:58

## 2021-10-01 NOTE — CASE MANAGEMENT/SOCIAL WORK
Continued Stay Note  LALO Munoz     Patient Name: Sonu Bravo Jr.  MRN: [id number]  Today's Date: 10/1/2021    Admit Date: 9/29/2021    Discharge Plan     Row Name 10/01/21 1217       Plan    Plan Patient is now on waiting list at [facility] per Dr Gu. Please follow.      CM asked to find tertiary care hospital for patient with liver mass. Denied bed at [facility]/ [facility]. MD Gu is attempting to find accepting physician at [facility]. Patient had friend that went there and was pleased with care. Pending accepting MD/bed availability.        Discharge Codes    No documentation.             Emilia Sanchez RN

## 2021-10-01 NOTE — CONSULTS
"Referring Provider: Dr. Esau Gu   Reason for Consultation: \"empyema\"    Patient Care Team:  Vaughn Kramer DO as PCP - General (Internal Medicine)  Thomas Dc DO as Consulting Physician (Gastroenterology)  Tien Canchola MD as Consulting Physician (Pulmonary Disease)    Chief complaint abdominal pain, cough    Subjective .     History of present illness:  Mr. Bravo is a 76 year old male with pertinent past medical history of arthritis, atrial fibrillation on eliquis, GERD, hyperlipidemia, and hypertension. Over the last month or so he had fatigue. He was hospitalized with atrial fibrillation and hypertension in August. At that time, he was found to have lung nodules and recommended follow up with pulmonology as well as liver masses. He also follows with Dr. Kramer. He was treated with levaquin for leukocytosis, chills and a cough. He was noted to have transaminitis as well and statin therapy was held. He was seen in the ER on 9/24 for leukocytosis. He was given a dose of rocephin. He had a colonoscopy on 9/28 with report of no evidence of colon cancer. On 9/29 he was seen by Dr. Kramer but still feeling poorly. Given the finding of liver masses and cavitary lung nodule along with his symptoms he was recommended inpatient admission with the working diagnoses of malignancy and infectious process. MRI abdomen shows an enlarging liver abscess with potentially developing empyema or subhepatic space abscess. CT scan chest shows liver abscesses worsening, lung emphysema with a left lower lobe lung nodule. Since admission he has been seen by multiple specialties including pulmonology, infectious disease, general surgery, oncology, and now cardiothoracic surgery has been consulted for concern of empyema. Mr. Bravo is resting in the bed with his wife at the bedside. He is non toxic. Reports frustrations that he still does not know what is going on. Says he was going to have a liver abscess drained today " but this has now been canceled. Blood cultures are positive and he was started on empiric antibiotic treatment.       History  There are no questions and answers to display.     Past Medical History:   Diagnosis Date   • Arthritis    • Atrial fibrillation (CMS/HCC) 08/31/2021   • GERD (gastroesophageal reflux disease)    • Hyperlipidemia    • Hypertension    ,   Past Surgical History:   Procedure Laterality Date   • COLONOSCOPY N/A 9/28/2021    Procedure: COLONOSCOPY WITH ANESTHESIA;  Surgeon: Thomas Dc DO;  Location: [facility] ENDOSCOPY;  Service: Gastroenterology;  Laterality: N/A;  pre op; abnormal ct scan  post op: diverticulosis ; polyps   PCP: Vaughn Kramer DO   • HERNIA REPAIR     ,   Family History   Problem Relation Age of Onset   • Lung cancer Mother    • Brain cancer Mother    • Heart disease Father    • Hyperlipidemia Father    • Hypertension Father    • Prostate cancer Maternal Aunt    • Colon cancer Maternal Uncle    • Colon polyps Neg Hx    • Esophageal cancer Neg Hx    • Liver cancer Neg Hx    ,   Social History     Tobacco Use   • Smoking status: Current Every Day Smoker     Packs/day: 0.25     Years: 52.00     Pack years: 13.00     Types: Cigarettes     Start date: 1967   • Smokeless tobacco: Never Used   • Tobacco comment: down to 2 cigs a day    Vaping Use   • Vaping Use: Never used   Substance Use Topics   • Alcohol use: Yes     Comment: rare   • Drug use: No   ,   Medications Prior to Admission   Medication Sig Dispense Refill Last Dose   • apixaban (ELIQUIS) 5 MG tablet tablet Take 1 tablet by mouth Every 12 (Twelve) Hours. 60 tablet 3 9/25/2021 at Unknown time   • bisoprolol (ZEBeta) 10 MG tablet Take 0.5 tablets by mouth Daily. 90 tablet 3 9/29/2021 at Unknown time   • Coenzyme Q10 (CO Q-10) 100 MG capsule Take 1 tablet by mouth Daily.   9/29/2021 at Unknown time   • Cyanocobalamin (VITAMIN B 12 PO) Take 1 tablet by mouth Daily. 2,500 mg   9/29/2021 at Unknown time   •  Dextromethorphan Polistirex (DELSYM PO) Take 2 teaspoon(s) by mouth 2 (two) times a day. OTC   9/29/2021 at Unknown time   • fexofenadine (ALLEGRA) 180 MG tablet Take 180 mg by mouth Daily.   9/28/2021 at Unknown time   • fluticasone (FLONASE) 50 MCG/ACT nasal spray 1 spray into the nostril(s) as directed by provider Daily. Each Nostral 16 g 5 9/29/2021 at Unknown time   • hydroCHLOROthiazide (HYDRODIURIL) 12.5 MG tablet Take 1 tablet by mouth Daily. 30 tablet 3 9/29/2021 at Unknown time   • levothyroxine (SYNTHROID, LEVOTHROID) 100 MCG tablet Take 1 tablet by mouth Daily. 90 tablet 3 9/29/2021 at Unknown time   • Multiple Vitamin (MULTI-VITAMIN DAILY PO) Take 1 tablet by mouth Daily.   9/29/2021 at Unknown time   • omeprazole (priLOSEC) 20 MG capsule Take 1 capsule by mouth Daily. 90 capsule 3 9/28/2021 at Unknown time   • rosuvastatin (CRESTOR) 10 MG tablet Take 10 mg by mouth Every Night.   9/28/2021 at Unknown time   • Cholecalciferol 4000 units capsule Take 1 tablet by mouth Daily.      , Allergies: Benadryl [diphenhydramine], Penicillins, Sulfa antibiotics, and Sulfamethoxazole-trimethoprim    Review of Systems  Review of Systems   Constitutional: Positive for activity change, chills, fatigue and fever. Negative for diaphoresis.   HENT: Negative for trouble swallowing and voice change.    Respiratory: Positive for cough. Negative for shortness of breath.    Cardiovascular: Positive for palpitations. Negative for chest pain and leg swelling.   Gastrointestinal: Positive for abdominal pain.   Genitourinary: Negative for difficulty urinating.   Musculoskeletal: Positive for arthralgias, back pain and myalgias.   Skin: Negative for rash and wound.   Neurological: Positive for weakness.   Psychiatric/Behavioral: Negative for agitation, behavioral problems and confusion.        Objective     Vital Signs   Visit Vitals  /61 (BP Location: Left arm, Patient Position: Lying)   Pulse 66   Temp 98.3 °F (36.8 °C)  "(Oral)   Resp 18   Ht 177.8 cm (70\")   Wt 77.3 kg (170 lb 8 oz)   SpO2 95%   BMI 24.46 kg/m²       Physical Exam  Vitals reviewed.   Constitutional:       General: He is not in acute distress.     Appearance: Normal appearance. He is well-developed. He is not diaphoretic.      Comments: Non toxic    HENT:      Head: Normocephalic and atraumatic.   Eyes:      Pupils: Pupils are equal, round, and reactive to light.   Cardiovascular:      Rate and Rhythm: Normal rate and regular rhythm.      Heart sounds: Normal heart sounds. No murmur heard.   No friction rub.   Pulmonary:      Effort: Pulmonary effort is normal. No respiratory distress.      Breath sounds: Normal breath sounds. No wheezing or rales.   Abdominal:      General: There is no distension.      Palpations: Abdomen is soft.      Tenderness: There is abdominal tenderness. There is no guarding.   Musculoskeletal:      Cervical back: Normal range of motion and neck supple.      Right lower leg: No edema.      Left lower leg: No edema.   Skin:     General: Skin is warm and dry.      Coloration: Skin is not pale.      Findings: No rash.   Neurological:      Mental Status: He is alert and oriented to person, place, and time.   Psychiatric:         Behavior: Behavior normal.           LAB:   CBC:  Results from last 7 days   Lab Units 10/01/21  0423 09/30/21  0526 09/29/21  1924   WBC 10*3/mm3 15.85* 23.66* 27.34*   HEMATOCRIT % 26.8* 28.5* 31.5*   PLATELETS 10*3/mm3 380 376 371          BMP:)  Results from last 7 days   Lab Units 10/01/21  0423 09/30/21  1843 09/30/21  0526   SODIUM mmol/L 130* 131* 131*   POTASSIUM mmol/L 3.2* 3.2* 2.4*   CHLORIDE mmol/L 95* 94* 93*   CO2 mmol/L 26.0 29.0 29.0   GLUCOSE mg/dL 92 109* 112*   BUN mg/dL 13 15 17   CREATININE mg/dL 0.63* 0.72* 0.77           COAG:  Results from last 7 days   Lab Units 10/01/21  0423   INR  1.38*           IMAGES:       Imaging Results (Last 24 Hours)     Procedure Component Value Units Date/Time    " MRI Abdomen With & Without Contrast [[id number]] Collected: 09/30/21 1703     Updated: 09/30/21 1805    Narrative:      MRI OF ABDOMEN WITH AND WITHOUT CONTRAST     HISTORY: Follow-up sigmoid lesion and liver masses/abscesses?;  Z74.09-Other reduced mobility      COMPARISON: CT scan dated 8/31/2021      TECHNIQUE: Multiplanar, multisequential MR imaging of the abdomen was  performed before and after the intravenous administration of contrast.     FINDINGS:     5.2 cm segment 7 rim-enhancing liver abscess showing prominent diffusion  restriction. There are 2 adjacent liver abscesses in liver segment 3,  the larger measuring 4.3 cm and the smaller measuring 1.2 cm, both  displaying prominent diffusion restriction. There appears to be  extension of these liver abscesses beyond the liver capsule, as there is  subcapsular fluid along the anterior, right lateral and dome of the  liver and there is also a developing right lower lobe pulmonary abscess  and empyema. There is also a what appears to be a subserosal abscess  along the lesser curvature and antrum of the stomach, again displaying a  prominent diffusion restriction. Differential would include subhepatic  space abscess although this does appear to follow the contour of the  stomach closely and appears to be within the wall.     No gross intraperitoneal free fluid. The gallbladder is nondistended.  The biliary tree is nondilated. No pancreatic lesions are identified.  The spleen, adrenal glands and kidneys are unremarkable. No suspicious  adenopathy in the upper abdomen.       Impression:         1. Enlarging liver abscesses in segments 3 and 7 which appear to have  extended beyond the liver capsule. In particular, there is a developing  empyema along the diaphragmatic pleura, posteriorly, as well as  extending up along the posterior costal pleura and there is also a  developing right lower lobe pulmonary abscess. There is also an  elongated subserosal abscess along  the lesser curvature and antrum of  the stomach. Differential would include a subhepatic space abscess  although this very closely follows the path and contour of the stomach  and this does appear to be within the wall of the stomach.     Findings and recommendations were discussed with Dr Gu on 9/30/2021  at 5:01 PM CDT.              This report was finalized on 09/30/2021 18:02 by Dr Arie Leon, .    CT Chest With Contrast Diagnostic [[id number]] Collected: 09/30/21 1628     Updated: 09/30/21 1704    Narrative:      CT CHEST W CONTRAST DIAGNOSTIC- 9/30/2021 3:37 PM CDT     HISTORY: Follow-up lung nodule; Z74.09-Other reduced mobility      COMPARISON: CT scan dated 8/31/2021      DOSE LENGTH PRODUCT: 192 mGy cm. Automated exposure control was also  utilized to decrease patient radiation dose.     TECHNIQUE: Serial helical tomographic images of the chest were acquired  following the intravenous infusion of contrast. Multiplanar reformatted  images were provided for review.     FINDINGS:      Visualized neck base: The imaged portion of the base of the neck appears  unremarkable.      Lungs: Advanced lung emphysema. 1.6 cm superior segment left lower lobe  pulmonary nodule, concerning for primary lung malignancy. There is a new  small empyema identified in the right lung base overlying the more  posterior aspect of the diaphragmatic pleura as well as extending up  along the posterior costal pleura. Additionally, there is a new cavitary  right lower lobe pneumonia, which appears to be developing into a  pulmonary abscess.     Heart: The heart is normal in size. There is no pericardial effusion.     Vasculature: Thoracic aorta is normal in caliber. No dissection  identified. No flow-limiting stenosis identified at the great vessel  origins. The pulmonary arteries are normal in appearance.     Mediastinum and lymph nodes: There is a mild reactive right hilar and  subcarinal adenopathy.     Skeletal and soft  tissues: Chest wall soft tissues are unremarkable. No  acute bony abnormality.       Upper abdomen: 4.9 cm segment 7 liver abscess with subcapsular extension  as well as extension above the diaphragm in the right pleural space.  Enlarging left lobe abscesses as well measuring 4.1 and 2.0 cm, in  segment 3, which also appear to be extended beyond the liver capsule.  There is a new subserosal abscess along the gastric wall, mostly along  the antrum, measuring over 10 cm in long axis..           Impression:      1. Worsening of the patient's liver abscesses with extension beyond the  liver capsule. In particular, there is extension of the right liver  abscess above the diaphragm with developing right empyema and right  lower lobe pulmonary abscess. There is also extension of the left liver  abscess into the subhepatic space with formation of a subserosal gastric  wall abscess.  2. Lung emphysema with 1.6 cm superior segment left lower lobe nodule  which is concerning for a primary lung malignancy.     Findings and recommendations were discussed with Dr Gu on 9/30/2021  at 5:01 PM CDT.     This report was finalized on 09/30/2021 17:01 by Dr Arie Leon, .                       Assessment/Plan            Liver mass    Cavitary lesion of lung  Sigmoid diverticulitis   Paroxysmal atrial fibrillation  Anticoagulated  Bacteremia       Discussed the patient's findings and recommendations with patient and wife. Recommend continued IV antibiotics at this time. No indication for surgical intervention. He is non toxic at this time. There is mention of transferring to tertiary care facility. Will continue to follow. Dr. Iniguez discussed with patient and wife and answered many questions.               Jesika Bender, MARY  10/01/21  11:39 CDT

## 2021-10-01 NOTE — PROGRESS NOTES
PULMONARY AND CRITICAL CARE PROGRESS NOTE - [facility]    Patient: Sonu Bravo Jr.  1945   MR# [id number]   Acct# [id number]  10/01/21   07:26 CDT  Referring Provider: Brody Gu MD    Chief Complaint: Liver abscess, lung nodule, infiltrates   Interval history:  Patient is awake and resting in bed with wife at bedside. Dr. Alva is present in the room and finishing up. Patient states he is feeling some better as far as energy wise. His appetite was improved yesterday however he is NPO for his abscess drainage. ID is following and has made some changes to his antibiotics and added Flagyl. He is afebrile and remains on room air with sat of 92%. Abnormal blood culture -BCID pcr with staph spp. Discussed CT results with patient. Will await abscess drainage results. His cough remains non-productive.   Meds:  albuterol, 1.25 mg, Nebulization, 4x Daily - RT   And  ipratropium, 0.5 mg, Nebulization, 4x Daily - RT  budesonide-formoterol, 2 puff, Inhalation, BID - RT  cefepime, 2 g, Intravenous, Q8H  cetirizine, 10 mg, Oral, Daily  dextromethorphan polistirex ER, 60 mg, Oral, Q12H  famotidine, 20 mg, Intravenous, BID  fluticasone, 2 spray, Each Nare, Daily  hydroCHLOROthiazide, 12.5 mg, Oral, Daily  levothyroxine, 100 mcg, Oral, Daily  melatonin, 3 mg, Oral, Nightly  metroNIDAZOLE, 500 mg, Intravenous, Q8H  rosuvastatin, 10 mg, Oral, Nightly  sodium chloride, 10 mL, Intravenous, Q12H      lactated ringers, 125 mL/hr, Last Rate: 125 mL/hr (10/01/21 0642)      Review of Systems:   Review of Systems   Constitutional: Positive for chills, fatigue and fever.   HENT: Negative for sore throat.         Negative hemoptysis    Eyes: Negative for discharge and itching.   Respiratory: Positive for cough and shortness of breath (exertional ).    Gastrointestinal: Positive for nausea. Negative for abdominal pain, diarrhea and vomiting.   Genitourinary: Negative for difficulty urinating and dysuria.    Musculoskeletal: Negative for gait problem and neck pain.   Skin: Negative for rash and wound.   Neurological: Negative for dizziness and syncope.   Psychiatric/Behavioral: Negative for agitation and behavioral problems.   Physical Exam:  SpO2 Percentage    09/30/21 2113 10/01/21 0032 10/01/21 0449   SpO2: 95% 92% 92%     Temp:  [97.7 °F (36.5 °C)-99 °F (37.2 °C)] 97.9 °F (36.6 °C)  Heart Rate:  [55-69] 64  Resp:  [16-18] 18  BP: (118-139)/(47-57) 132/54    Intake/Output Summary (Last 24 hours) at 10/1/2021 0726  Last data filed at 10/1/2021 0642  Gross per 24 hour   Intake 2410 ml   Output --   Net 2410 ml     Physical Exam   Vitals and nursing note reviewed.   Constitutional:       Appearance: Normal appearance.   HENT:      Head: Normocephalic and atraumatic.      Nose: Nose normal.      Mouth/Throat:      Mouth: Mucous membranes are moist.   Eyes:      Conjunctiva/sclera: Conjunctivae normal.      Pupils: Pupils are equal, round, and reactive to light.   Cardiovascular:      Rate and Rhythm: Normal rate and regular rhythm.      Heart sounds: No murmur heard.   No friction rub. No gallop.    Pulmonary:      Effort: Pulmonary effort is normal.      Breath sounds: Normal breath sounds.   Abdominal:      General: Bowel sounds are normal.      Palpations: Abdomen is soft.   Musculoskeletal:         General: No swelling.      Cervical back: Normal range of motion.      Right lower leg: No edema.      Left lower leg: No edema.   Skin:     General: Skin is warm and dry.   Neurological:      General: No focal deficit present.      Mental Status: He is oriented to person, place, and time.   Psychiatric:         Mood and Affect: Mood normal.         Behavior: Behavior normal.         Thought Content: Thought content normal.         Judgment: Judgment normal    Laboratory Data:  Results from last 7 days   Lab Units 10/01/21  0423 09/30/21  0526 09/29/21  1924   WBC 10*3/mm3 15.85* 23.66* 27.34*   HEMOGLOBIN g/dL 9.0* 9.5*  10.4*   PLATELETS 10*3/mm3 380 376 371     Results from last 7 days   Lab Units 10/01/21  0423 09/30/21  1843 09/30/21  1036 09/30/21  0526 09/29/21  1924 09/24/21  1627   SODIUM mmol/L 130* 131*  --  131*   < > 133*   POTASSIUM mmol/L 3.2* 3.2*  --  2.4*   < > 3.3*   BUN mg/dL 13 15  --  17   < > 24*   CREATININE mg/dL 0.63* 0.72*  --  0.77   < > 0.91   INR  1.38*  --  1.44*  --   --  1.51*    < > = values in this interval not displayed.         Blood Culture   Date Value Ref Range Status   09/29/2021 No growth at 24 hours  Preliminary   09/29/2021 Abnormal Stain (C)  Preliminary   09/24/2021 Fusobacterium species (C)  Corrected     Comment:     Beta lactamase negative    Appended report. These results have been appended to a previously final verified report.   09/24/2021 No growth at 5 days  Final     Recent films:  CT Chest With Contrast Diagnostic    Result Date: 9/30/2021  1. Worsening of the patient's liver abscesses with extension beyond the liver capsule. In particular, there is extension of the right liver abscess above the diaphragm with developing right empyema and right lower lobe pulmonary abscess. There is also extension of the left liver abscess into the subhepatic space with formation of a subserosal gastric wall abscess. 2. Lung emphysema with 1.6 cm superior segment left lower lobe nodule which is concerning for a primary lung malignancy.  Findings and recommendations were discussed with Dr Gu on 9/30/2021 at 5:01 PM CDT.  This report was finalized on 09/30/2021 17:01 by Dr Arie Leon, .    MRI Abdomen With & Without Contrast    Result Date: 9/30/2021   1. Enlarging liver abscesses in segments 3 and 7 which appear to have extended beyond the liver capsule. In particular, there is a developing empyema along the diaphragmatic pleura, posteriorly, as well as extending up along the posterior costal pleura and there is also a developing right lower lobe pulmonary abscess. There is also an  elongated subserosal abscess along the lesser curvature and antrum of the stomach. Differential would include a subhepatic space abscess although this very closely follows the path and contour of the stomach and this does appear to be within the wall of the stomach.  Findings and recommendations were discussed with Dr Gu on 9/30/2021 at 5:01 PM CDT.     This report was finalized on 09/30/2021 18:02 by Dr Arie Leon, .    XR Chest PA & Lateral    Result Date: 9/29/2021  1. New patchy right lower lobe opacities may be atelectasis versus pneumonia. Linear left basilar atelectasis. Stable partially cavitary superior segment left lower lobe nodule. This report was finalized on 09/29/2021 19:53 by Dr. Dyan Rashid MD.    Films reviewed personally by me.  My interpretation: Left 1.6 cm lower lobe nodule, no change compared to prior CTA 8/31/21, right pleural effusion with possible developing empyema with increasing consolidation     Pulmonary Assessment:  1. Lung nodule, 1.6 cm LLL  2. Right lung consolidation possible abscess/ empyema  3. Liver abnormalities probable abscess  4. Segmental thickening of the sigmoid colon  5. Paroxysmal atrial fib -xarelto on hold      Recommend/plan:   · Await abscess drainage from today  · ID managing abnormal blood culture, antibiotics, flagyl   · Unable to produce sputum cultures with non-productive cough.   · Not a candidate for a bronchoscopy for the left lung nodule. Now has developed consolidation on the right that could be abscess/ empyema. May need CT surgery consult to further evaluate.     Electronically signed by MARY Pablo, 10/01/21, 07:26 CDT     ATTESTATION OF CLINICAL NOTE:  I have reviewed the notes, assessments, and/or procedures performed by MARY Ferguson, I concur with her/his documentation of Sonu Bravo Jr..  He was seen in the follow-up visit in pulmonary rounds in medical floor today.  His chart was reviewed and current  events noted and I also talked to Dr. Harris hospitalist.    Patient had CT scan done yesterday showed patchy right lower lobe density which is most likely pneumonia and left basilar atelectasis there was single partially cavitary superior segment nodule noted in the left lower lobe.  The patient also noted to have multiple liver abscess which is waiting to be drained today in the interventional radiology this afternoon.  According to patient and the family sources and other physicians reports patient will be transferred to [facility] in [facility] later this evening or by tomorrow for further surgical intervention and further care due to complex liver abscess.  Patient remains on room air and is currently on antibiotic coverage and blood cultures are still pending.    Physical examination patient is a elderly  male getting ready to be transferred to the radiology for liver abscess drainage.  HEENT: Atraumatic normocephalic.  Neck supple no mass nuclear medicine.  Heart: Sounds normal rate and rhythm regular no murmur.  Lungs: Bibasilar crackles.  Abdomen: Soft nondistended nontender bowel sounds present.  Extremities: A few chronic changes noted no edema.  Neurologic intact weakness present.    Continue current antibiotic coverage and Flagyl blood cultures pending.  We will wait for the liver abscess..  Patient is scheduled for liver abscess drainage by interventional radiology this afternoon.  After the drainage he will be transferred to a tertiary [facility] when bed is available.  He is already in the waiting list.  He will continue Symbicort and albuterol nebulizer treatment as needed and according to his wife he already did quit smoking.  He will need outpatient pulmonary function test and follow-up in the pulmonary clinic when he is more stable.  Surgical management antibiotic management per general surgery and ID.  Patient will need outpatient pulmonary clinic  follow-up.  Pulmonary function test and office visit with me which will be arranged before my office visit.  Continue current treatment plan supportive care.  Repeat labs and imaging studies as needed.  CODE STATUS: Full.  Overall prognosis: Guarded but he appears to be stable.  If patient stays here we will continue following otherwise he will be transferred to the [facility] and will be followed as an outpatient.    I have seen and examined patient personally, performing a face-to-face diagnostic evaluation with plan of care reviewed and developed with APRN and nursing staff. I have addended and/or modified the above history of present illness, physical examination, and assessment and plan to reflect my findings and impressions. Essential elements of the care plan were discussed with APRN above.  Agree with findings and assessment/plan as documented above.    Jeannine Gloria MD  Pulmonologist/Intensivist  10/1/2021 16:04 CDT

## 2021-10-01 NOTE — PLAN OF CARE
Goal Outcome Evaluation:  Plan of Care Reviewed With: patient        Progress: no change  Outcome Summary: A & O, VSS, IVF continue, IV ABX continue, up ad shobha, voiding per BR, full liquid diet with boost continues, c/o cough, prn cough syrup given with relief, wife at bedside this am

## 2021-10-01 NOTE — PROGRESS NOTES
[facility] Progress Note    Progress Note    Patient:  Sonu Bravo Jr.  YOB: 1945  Date of Service: 10/1/2021  MRN: [id number]   Acct: [id number]   Primary Care Physician: Vaughn Kramer DO  Advance Directive:   There are no questions and answers to display.     Admit Date: 9/29/2021       Hospital Day: 2    847-    Subjective:     Chief Compliant: None    Subjective: Slept well.  No recurrent chills.  No fevers.  No shortness of breath at rest.  No worsening cough.  No abdominal pain.  Tolerated his breakfast yesterday.    Interval history: Sonu Bravo Jr. 76 y.o. male medical patient of Dr. Kramer, whose history includes arthritis, paroxysmal atrial fibrillation, GERD, hyperlipidemia, longstanding tobacco use/nicotine dependence, and COPD.     --08/31/2021-CT angiogram chest.  No PE.  Suspicious 1.5 cm cavitary left lower lobe pulmonary nodule in the superior segment.  Tiny 3 mm subpleural right lower lobe nodule.  Small lymph nodes in the lisa hepatis region in the upper abdomen.     --09/10/2021-liver ultrasound-2 suspicious masses in the liver, 1 in the left hepatic lobe 5.3 cm, 1 in the dome of the right hepatic lobe 6.4 cm.  Hepatic metastasis not excluded.  Unremarkable gallbladder, no biliary ductal dilatation identified.     --09/10/2021-CT abdomen/pelvis with and without contrast.  2 cm segmental thickening of the sigmoid colon concerning for malignancy.  2 large heterogenous liver lesions (right posterior liver 5.5 cm, predominantly centrally hypodense lesion; left liver lobe with similar-appearing 4.8 cm liver lesion) most concerning for metastatic disease     --09/23/2021-was seen at Dr. Kramer office complaining of chills and shaking in the evenings lasting 60 to 90 minutes at a time associated with weakness, shortness of breath and a dry cough.  Previous Covid testing hospital was negative.     --09/23/2021-hemoglobin 12.3, MCV 88.3, platelets 280,000, WBC  "29,000 with 88 point 6 segs.  BUN 31, creatinine 1.16, albumin 2.7, ALT 42, alk phos 197, GFR 61.  ESR 26 (elevated).  Manual blood smear showed leukocytosis with 45% segs, 31% bands, 8 lymphocytes, 1 monocyte, 11 metamyelocytes, 4 myelocytes, 1 nucleated red blood cell.  Comment: Significant leukocytosis with granulocytic left shift with vacuolated granulocytes concerning for bacterial infection     --09/24/2029-blood culture showed no growth at 5 days.     --09/28/2029-colonoscopy.  Impression: 3 diminutive polyps in the rectum removed with hot snare.  3 diminutive polyps at the splenic flexure, removed with hot snare.  Diverticulosis in the sigmoid colon and in the descending colon.  Distal rectum and anal verge are normal on retroflexion view.     --09/30/2021-hemoglobin 9.5; MCV 88.4, platelets 376,000, WBC 23.66 with 86 point 3 segs    Review of Systems  Review of Systems:   Constitutional: Lingering fatigue.  No fever / No chills / No sweats overnight.  HEENT:  No sore throat / No hoarseness / No vision changes  CV:  No chest pain / No palpitations / No orthopnea  Respiratory: Chronic intermittent cough / No shortness of breath at rest but admits to exertional dyspnea/scant clear sputum / No hemoptysis/smoker since age 22 (up to 2 packs/day), currently<1/2 pack/day.  GI: Currently with no nausea / No vomiting /currently with no abdominal pain / No diarrhea / No constipation.  Colonoscopy, 09/28/2021.  :  No dysuria / No hesitancy / No urgency / No hematuria  Neuro: Generalized muscle weakness / No dysphagia / No headache / No paresthesias  Musculoskeletal:  No edema / No cyanosis / No new arthralgias.    Vitals: /61 (BP Location: Left arm, Patient Position: Lying)   Pulse 66   Temp 98.3 °F (36.8 °C) (Oral)   Resp 18   Ht 177.8 cm (70\")   Wt 77.3 kg (170 lb 8 oz)   SpO2 95%   BMI 24.46 kg/m²     Physical Exam  Physical Exam:  General appearance: Pleasant, cooperative, obese, modestly kept " elderly male who appears comfortable while lying in his hospital bed.  His spouse Bertha is in attendance at the bedside   Skin:  Skin color is a bit pale. No rashes or lesions  HEENT:  Head: Normal, normocephalic, atraumatic.  Neck:  no adenopathy, no carotid bruit, no JVD, supple, symmetrical, trachea midline and thyroid not enlarged, symmetric, no tenderness/mass/nodules  Lungs:  clear to auscultation bilaterally  Heart:  regular rate and rhythm  Abdomen:  soft, obese, non-tender; bowel sounds hypoactive; no masses,  no organomegaly  Extremities:  extremities normal, atraumatic, no cyanosis or edema  Neurologic:  Mental status: Alert, oriented, thought content appropriate    24HR INTAKE/OUTPUT:      Intake/Output Summary (Last 24 hours) at 10/1/2021 0848  Last data filed at 10/1/2021 0642  Gross per 24 hour   Intake 1700 ml   Output --   Net 1700 ml        Black Catheter: None    Diet: NPO Diet      Medications:   Scheduled Meds:albuterol, 1.25 mg, Nebulization, 4x Daily - RT   And  ipratropium, 0.5 mg, Nebulization, 4x Daily - RT  budesonide-formoterol, 2 puff, Inhalation, BID - RT  cefepime, 2 g, Intravenous, Q8H  cetirizine, 10 mg, Oral, Daily  dextromethorphan polistirex ER, 60 mg, Oral, Q12H  famotidine, 20 mg, Intravenous, BID  fluticasone, 2 spray, Each Nare, Daily  hydroCHLOROthiazide, 12.5 mg, Oral, Daily  levothyroxine, 100 mcg, Oral, Daily  melatonin, 3 mg, Oral, Nightly  metroNIDAZOLE, 500 mg, Intravenous, Q8H  potassium chloride, 40 mEq, Oral, BID  rosuvastatin, 10 mg, Oral, Nightly  sodium chloride, 10 mL, Intravenous, Q12H        Continuous Infusions:lactated ringers, 125 mL/hr, Last Rate: 125 mL/hr (10/01/21 0642)        Labs:     Results from last 7 days   Lab Units 10/01/21  0423 09/30/21  0526 09/29/21  1924   WBC 10*3/mm3 15.85* 23.66* 27.34*   HEMOGLOBIN g/dL 9.0* 9.5* 10.4*   HEMATOCRIT % 26.8* 28.5* 31.5*   PLATELETS 10*3/mm3 380 376 371     09/30/2021 0526 09/30/2021 1231 Ferritin  [[id number]]    (Abnormal)   Blood    Final result Component Value Units   Ferritin 822.70High  ng/mL           09/30/2021 0526 09/30/2021 2331 Folate [[id number]]    Blood    Final result Component Value Units   Folate 11.50 ng/mL           09/30/2021 0526 09/30/2021 2331 Vitamin B12 [[id number]]    (Abnormal)   Blood    Final result Component Value Units   Vitamin B-12 >2,000High               10/01/2021 0423 10/01/2021 0534 Iron Profile [[id number]]   (Abnormal)   Blood    Final result Component Value Units   Iron 17Low  mcg/dL   Iron Saturation 12Low  %   Transferrin 94Low  mg/dL   TIBC 140Low  mcg/dL        09/30/2021 1036 09/30/2021 2237 CEA [[id number]]    Blood    Final result Component Value Units   CEA 3.32 ng/mL            Results from last 7 days   Lab Units 10/01/21  0423 09/30/21  1843 09/30/21  0526 09/29/21  1924 09/29/21  1924   SODIUM mmol/L 130* 131* 131*   < > 133*   POTASSIUM mmol/L 3.2* 3.2* 2.4*   < > 3.0*   CHLORIDE mmol/L 95* 94* 93*   < > 91*   CO2 mmol/L 26.0 29.0 29.0   < > 29.0   BUN mg/dL 13 15 17   < > 18   CREATININE mg/dL 0.63* 0.72* 0.77   < > 0.95   CALCIUM mg/dL 7.6* 7.8* 7.5*   < > 8.1*   BILIRUBIN mg/dL 0.6  --  0.7  --  0.7   ALK PHOS U/L 95  --  105  --  151*   ALT (SGPT) U/L 19  --  23  --  32   AST (SGOT) U/L 24  --  24  --  32   GLUCOSE mg/dL 92 109* 112*   < > 84    < > = values in this interval not displayed.       ABG:  No results found for: PHART, PO2ART, TNP3XVD    Culture Data:   Blood Culture   Date Value Ref Range Status   09/29/2021 No growth at 24 hours  Preliminary   09/29/2021 Abnormal Stain (C)  Preliminary   09/24/2021 Fusobacterium species (C)  Corrected     Comment:     Beta lactamase negative    Appended report. These results have been appended to a previously final verified report.   09/24/2021 No growth at 5 days  Final       Lab Results   Component Value Date    PATHINTERP  09/23/2021     Mild normochromic normocytic anemia    Leukocytosis with the following  manual differential:  Neutrophils 45%  Bands 31%  Lymphocytes 8%  Monocytes 1%  Metamyelocytes 11%  Myelocytes 4%  Nucleated red blood cells 1  Platelets within normal range    Comment: There is a significant leukocytosis with a granulocytic left shift with vacuolated granulocytes present concerning for bacterial infection.       Radiology:     Imaging Results (Last 7 Days)     Procedure Component Value Units Date/Time    MRI Abdomen With & Without Contrast [[id number]] Collected: 09/30/21 1703     Updated: 09/30/21 1805    Narrative:      MRI OF ABDOMEN WITH AND WITHOUT CONTRAST     HISTORY: Follow-up sigmoid lesion and liver masses/abscesses?;  Z74.09-Other reduced mobility      COMPARISON: CT scan dated 8/31/2021      TECHNIQUE: Multiplanar, multisequential MR imaging of the abdomen was  performed before and after the intravenous administration of contrast.     FINDINGS:     5.2 cm segment 7 rim-enhancing liver abscess showing prominent diffusion  restriction. There are 2 adjacent liver abscesses in liver segment 3,  the larger measuring 4.3 cm and the smaller measuring 1.2 cm, both  displaying prominent diffusion restriction. There appears to be  extension of these liver abscesses beyond the liver capsule, as there is  subcapsular fluid along the anterior, right lateral and dome of the  liver and there is also a developing right lower lobe pulmonary abscess  and empyema. There is also a what appears to be a subserosal abscess  along the lesser curvature and antrum of the stomach, again displaying a  prominent diffusion restriction. Differential would include subhepatic  space abscess although this does appear to follow the contour of the  stomach closely and appears to be within the wall.     No gross intraperitoneal free fluid. The gallbladder is nondistended.  The biliary tree is nondilated. No pancreatic lesions are identified.  The spleen, adrenal glands and kidneys are unremarkable. No suspicious  adenopathy  in the upper abdomen.       Impression:         1. Enlarging liver abscesses in segments 3 and 7 which appear to have  extended beyond the liver capsule. In particular, there is a developing  empyema along the diaphragmatic pleura, posteriorly, as well as  extending up along the posterior costal pleura and there is also a  developing right lower lobe pulmonary abscess. There is also an  elongated subserosal abscess along the lesser curvature and antrum of  the stomach. Differential would include a subhepatic space abscess  although this very closely follows the path and contour of the stomach  and this does appear to be within the wall of the stomach.     Findings and recommendations were discussed with Dr Gu on 9/30/2021  at 5:01 PM CDT.              This report was finalized on 09/30/2021 18:02 by Dr Arie Leon, .    CT Chest With Contrast Diagnostic [[id number]] Collected: 09/30/21 1628     Updated: 09/30/21 1704    Narrative:      CT CHEST W CONTRAST DIAGNOSTIC- 9/30/2021 3:37 PM CDT     HISTORY: Follow-up lung nodule; Z74.09-Other reduced mobility      COMPARISON: CT scan dated 8/31/2021      DOSE LENGTH PRODUCT: 192 mGy cm. Automated exposure control was also  utilized to decrease patient radiation dose.     TECHNIQUE: Serial helical tomographic images of the chest were acquired  following the intravenous infusion of contrast. Multiplanar reformatted  images were provided for review.     FINDINGS:      Visualized neck base: The imaged portion of the base of the neck appears  unremarkable.      Lungs: Advanced lung emphysema. 1.6 cm superior segment left lower lobe  pulmonary nodule, concerning for primary lung malignancy. There is a new  small empyema identified in the right lung base overlying the more  posterior aspect of the diaphragmatic pleura as well as extending up  along the posterior costal pleura. Additionally, there is a new cavitary  right lower lobe pneumonia, which appears to be developing  into a  pulmonary abscess.     Heart: The heart is normal in size. There is no pericardial effusion.     Vasculature: Thoracic aorta is normal in caliber. No dissection  identified. No flow-limiting stenosis identified at the great vessel  origins. The pulmonary arteries are normal in appearance.     Mediastinum and lymph nodes: There is a mild reactive right hilar and  subcarinal adenopathy.     Skeletal and soft tissues: Chest wall soft tissues are unremarkable. No  acute bony abnormality.       Upper abdomen: 4.9 cm segment 7 liver abscess with subcapsular extension  as well as extension above the diaphragm in the right pleural space.  Enlarging left lobe abscesses as well measuring 4.1 and 2.0 cm, in  segment 3, which also appear to be extended beyond the liver capsule.  There is a new subserosal abscess along the gastric wall, mostly along  the antrum, measuring over 10 cm in long axis..           Impression:      1. Worsening of the patient's liver abscesses with extension beyond the  liver capsule. In particular, there is extension of the right liver  abscess above the diaphragm with developing right empyema and right  lower lobe pulmonary abscess. There is also extension of the left liver  abscess into the subhepatic space with formation of a subserosal gastric  wall abscess.  2. Lung emphysema with 1.6 cm superior segment left lower lobe nodule  which is concerning for a primary lung malignancy.     Findings and recommendations were discussed with Dr Gu on 9/30/2021  at 5:01 PM CDT.     This report was finalized on 09/30/2021 17:01 by Dr Arie Leon, .    XR Chest PA & Lateral [[id number]] Collected: 09/29/21 1951     Updated: 09/29/21 1956    Narrative:      HISTORY: Cough     CXR: 2 views the chest obtained     COMPARISON: 9/24/2021     FINDINGS: There are hazy right lower lobe opacities which may be patchy  atelectasis or pneumonia. Left basilar atelectasis. There is a similar  1.5 cm superior  segment left lower lobe cavitary nodule. Cardiac  mediastinal contours normal. No pleural effusion pneumothorax.  Degenerative change of the thoracic spine.       Impression:      1. New patchy right lower lobe opacities may be atelectasis versus  pneumonia. Linear left basilar atelectasis. Stable partially cavitary  superior segment left lower lobe nodule.  This report was finalized on 09/29/2021 19:53 by Dr. Dyan Rashid MD.            Objective:       Problem list:     Liver mass    Cavitary lesion of lung        Assessment/Plan:       ASSESSMENT:   1.   Liver masses.  Radiographically suspicious for abscesses.  --09/10/2021-liver ultrasound-2 suspicious masses in the liver, 1 in the left hepatic lobe 5.3 cm, 1 in the dome of the right hepatic lobe 6.4 cm.  Hepatic metastasis not excluded.  Unremarkable gallbladder, no biliary ductal dilatation identified.  --09/10/2021-CT abdomen/pelvis with and without contrast.  2 cm segmental thickening of the sigmoid colon concerning for malignancy.  2 large heterogenous liver lesions (right posterior liver 5.5 cm, predominantly centrally hypodense lesion; left liver lobe with similar-appearing 4.8 cm liver lesion) most concerning for metastatic disease  --09/30/2021-MRI abdomen.  Enlarging liver abscesses which appear to have extended beyond the liver capsule.  In particular there is a developing empyema along the subdiaphragmatic pleura, posteriorly as well as extending up the posterior costal pleura and there is also developing right lower lobe pulmonary abscess.  There is an elongated subserosal abscess along the lesser curvature and antrum of the stomach.  Differential includes subhepatic space abscess although this very closely follows the path and contour of the stomach and does appear to be within the wall of the stomach.     2.   Segmental thickening of the sigmoid colon, low abdominal pain, chills and neutrophilic leukocytosis.  Acute  diverticulitis?  ----09/28/2029-colonoscopy.  Impression: 3 diminutive polyps in the rectum removed with hot snare.  3 diminutive polyps at the splenic flexure, removed with hot snare.  Diverticulosis in the sigmoid colon and in the descending colon.  Distal rectum and anal verge are normal on retroflexion view.  3.   Left lower lobe pulmonary nodule.  Needs clarification  --08/31/2021-CT angiogram chest.  No PE.  Suspicious 1.5 cm cavitary left lower lobe pulmonary nodule in the superior segment.  Tiny 3 mm subpleural right lower lobe nodule.  Small lymph nodes in the lisa hepatis region in the upper abdomen.  --09/30/2021-CT chest.  Worsening liver abscesses with extension beyond the liver capsule.  In particular there is extension of the right liver abscess above the diaphragm with developing right empyema and right lower lobe pulmonary abscess.  Also extension of the left liver abscess into the sub hepatic space with formation of a subserosal gastric wall abscess.  Lung emphysema with 1.6 cm superior segment left lower lobe nodule concerning for primary lung malignancy.  4.   Neutrophilic leukocytosis reactive to ongoing bacterial infection.  Improving.  5.     Positive blood cultures.  Dr. Garza (ID) now following:  --09/29/2021- staph species  --09/24/2021-Fusobacterium species.    6.   Normocytic anemia.  Components from iron deficiency, and iron under utilization/anemia of chronic disease.  --Hemoglobin 9.0; MCV 88.4, 10/01/2021 (prior range: Hemoglobin 9.5 -15.4; MCV 85.3-94.2)  7.   Longstanding tobacco use/nicotine dependence (beginning age 22-up to 2 packs/day, currently < 1/2 pack/day)  8.   Paroxysmal atrial fibrillation  9.   Hyperlipidemia     Recommendations:  1.   Apprised of the available diagnostic information, including the current labs showing improving leukocytosis but dwindling anemia, iron studies that indicate iron under utilization/anemia of chronic disease, blood cultures (09/29/2021)  growing staph species and Fusobacterium species (09/24/2021).  2.     Apprised of abdominal MRI and CT chest findings (above) indicating enlarging liver abscesses with extension beyond the liver capsule with developing empyema along the subdiaphragmatic pleura and possible developing right lower lobe pulmonary abscess.  1.6 cm superior segment left lower lobe nodule concerning for primary lung malignancy will need clarification down the road.  3.   Surgical, ID and pulmonary consults noted and appreciated.  4.   Plans for percutaneous liver abscess drainage noted.  5.   Continue other general medical management and support (including IV antibiotics).  6.   Transfuse RBCs if hemoglobin < 8 if symptomatic  7.   Care discussed with the patient, his spouse Bertha at the bedside         I spent 30 total minutes, face-to-face, caring for Sonu today.  Greater than 50% of this time involved counseling and/or coordination of care as documented within this note regarding the patient's illness(es), pros and cons of various treatment options, instructions and/or risk reduction.

## 2021-10-01 NOTE — NURSING NOTE
Spoke with radiologist Dr Underwood regarding abscess drain order and received verbal indication from Dr underwood that this would not be done after he evaluated the case as requested by ordering provider. I called and notified RAFY Hicks at 0506 that the drain placement would not be completed as directed by Radiologist.

## 2021-10-01 NOTE — PROGRESS NOTES
Yasmin Solorio MD FACS  Progress Note     LOS: 2 days   Patient Care Team:  Vaughn Kramer DO as PCP - General (Internal Medicine)  Thomas Dc DO as Consulting Physician (Gastroenterology)  Tien Canchola MD as Consulting Physician (Pulmonary Disease)      Subjective     Interval History:      no appetite.  No nausea.  No pain     Objective     Vital Signs  Temp:  [97.7 °F (36.5 °C)-99 °F (37.2 °C)] 98.3 °F (36.8 °C)  Heart Rate:  [61-69] 66  Resp:  [16-20] 18  BP: (132-140)/(47-61) 140/61    Physical Exam:  General appearance - alert, well appearing, and in no distress  Abdomen - soft, nondistended, no erythema      Results Review:    Lab Results (last 24 hours)     Procedure Component Value Units Date/Time    Vancomycin, Trough Please call results to Pharmacy prior to administering next dose [[id number]]  (Abnormal) Collected: 10/01/21 0734    Specimen: Blood Updated: 10/01/21 0809     Vancomycin Trough 4.80 mcg/mL     Comprehensive Metabolic Panel [[id number]]  (Abnormal) Collected: 10/01/21 0423    Specimen: Blood Updated: 10/01/21 0537     Glucose 92 mg/dL      BUN 13 mg/dL      Creatinine 0.63 mg/dL      Sodium 130 mmol/L      Potassium 3.2 mmol/L      Chloride 95 mmol/L      CO2 26.0 mmol/L      Calcium 7.6 mg/dL      Total Protein 5.0 g/dL      Albumin 1.90 g/dL      ALT (SGPT) 19 U/L      AST (SGOT) 24 U/L      Alkaline Phosphatase 95 U/L      Total Bilirubin 0.6 mg/dL      eGFR Non African Amer 123 mL/min/1.73      Globulin 3.1 gm/dL      A/G Ratio 0.6 g/dL      BUN/Creatinine Ratio 20.6     Anion Gap 9.0 mmol/L     Narrative:      GFR Normal >60  Chronic Kidney Disease <60  Kidney Failure <15      Iron Profile [[id number]]  (Abnormal) Collected: 10/01/21 0423    Specimen: Blood Updated: 10/01/21 0534     Iron 17 mcg/dL      Iron Saturation 12 %      Transferrin 94 mg/dL      TIBC 140 mcg/dL     Protime-INR [[id number]]  (Abnormal) Collected: 10/01/21 0423    Specimen: Blood Updated:  10/01/21 0507     Protime 16.4 Seconds      INR 1.38    CBC & Differential [[id number]]  (Abnormal) Collected: 10/01/21 0423    Specimen: Blood Updated: 10/01/21 0501    Narrative:      The following orders were created for panel order CBC & Differential.  Procedure                               Abnormality         Status                     ---------                               -----------         ------                     CBC Auto Differential[[id number]]        Abnormal            Final result                 Please view results for these tests on the individual orders.    CBC Auto Differential [[id number]]  (Abnormal) Collected: 10/01/21 0423    Specimen: Blood Updated: 10/01/21 0501     WBC 15.85 10*3/mm3      RBC 3.03 10*6/mm3      Hemoglobin 9.0 g/dL      Hematocrit 26.8 %      MCV 88.4 fL      MCH 29.7 pg      MCHC 33.6 g/dL      RDW 14.7 %      RDW-SD 47.0 fl      MPV 9.8 fL      Platelets 380 10*3/mm3      Neutrophil % 83.0 %      Lymphocyte % 8.3 %      Monocyte % 7.6 %      Eosinophil % 0.3 %      Basophil % 0.1 %      Immature Grans % 0.7 %      Neutrophils, Absolute 13.15 10*3/mm3      Lymphocytes, Absolute 1.32 10*3/mm3      Monocytes, Absolute 1.21 10*3/mm3      Eosinophils, Absolute 0.04 10*3/mm3      Basophils, Absolute 0.02 10*3/mm3      Immature Grans, Absolute 0.11 10*3/mm3      nRBC 0.0 /100 WBC     C-reactive Protein [[id number]]  (Abnormal) Collected: 09/30/21 1843    Specimen: Blood Updated: 10/01/21 0231     C-Reactive Protein 22.08 mg/dL     Folate [[id number]]  (Normal) Collected: 09/30/21 0526    Specimen: Blood Updated: 09/30/21 2331     Folate 11.50 ng/mL     Narrative:      Results may be falsely increased if patient taking Biotin.      Vitamin B12 [[id number]]  (Abnormal) Collected: 09/30/21 0526    Specimen: Blood Updated: 09/30/21 2331     Vitamin B-12 >2,000 pg/mL     Narrative:      Results may be falsely increased if patient taking Biotin.      CEA [[id number]] Collected: 09/30/21  1036    Specimen: Blood Updated: 09/30/21 2237     CEA 3.32 ng/mL     Narrative:      CEA Reference Range:    Non Smokers:   Less than 3 ng/mL  Smokers:       Less than 5 ng/mL  Results may be falsely decreased if patient taking Biotin.      Blood Culture With MICHAEL - Blood, Arm, Left [[id number]] Collected: 09/29/21 1924    Specimen: Blood from Arm, Left Updated: 09/30/21 2101     Blood Culture No growth at 24 hours    Blood Culture ID, PCR - Blood, Arm, Right [[id number]]  (Abnormal) Collected: 09/29/21 1924    Specimen: Blood from Arm, Right Updated: 09/30/21 2016     BCID, PCR Staph spp, not aureus or lugdunesis. Identification by BCID2 PCR.     BOTTLE TYPE Aerobic Bottle    Basic Metabolic Panel [[id number]]  (Abnormal) Collected: 09/30/21 1843    Specimen: Blood Updated: 09/30/21 1923     Glucose 109 mg/dL      BUN 15 mg/dL      Creatinine 0.72 mg/dL      Sodium 131 mmol/L      Potassium 3.2 mmol/L      Chloride 94 mmol/L      CO2 29.0 mmol/L      Calcium 7.8 mg/dL      eGFR Non African Amer 106 mL/min/1.73      BUN/Creatinine Ratio 20.8     Anion Gap 8.0 mmol/L     Narrative:      GFR Normal >60  Chronic Kidney Disease <60  Kidney Failure <15      Procalcitonin [[id number]]  (Abnormal) Collected: 09/30/21 1843    Specimen: Blood Updated: 09/30/21 1922     Procalcitonin 0.54 ng/mL     Narrative:      As a Marker for Sepsis (Non-Neonates):     1. <0.5 ng/mL represents a low risk of severe sepsis and/or septic shock.  2. >2 ng/mL represents a high risk of severe sepsis and/or septic shock.    As a Marker for Lower Respiratory Tract Infections that require antibiotic therapy:  PCT on Admission     Antibiotic Therapy             6-12 Hrs later  >0.5                          Strongly Recommended            >0.25 - <0.5             Recommended  0.1 - 0.25                  Discouraged                       Remeasure/reassess PCT  <0.1                         Strongly Discouraged         Remeasure/reassess PCT      As 28  "day mortality risk marker: \"Change in Procalcitonin Result\" (>80% or <=80%) if Day 0 (or Day 1) and Day 4 values are available. Refer to http://www.[facility]-pct-calculator.com/    Change in PCT <=80 %   A decrease of PCT levels below or equal to 80% defines a positive change in PCT test result representing a higher risk for 28-day all-cause mortality of patients diagnosed with severe sepsis or septic shock.    Change in PCT >80 %   A decrease of PCT levels of more than 80% defines a negative change in PCT result representing a lower risk for 28-day all-cause mortality of patients diagnosed with severe sepsis or septic shock.                Lactic Acid, Plasma [[id number]]  (Normal) Collected: 09/30/21 1843    Specimen: Blood Updated: 09/30/21 1913     Lactate 1.3 mmol/L     Blood Culture With MICHAEL - Blood, Arm, Right [[id number]]  (Abnormal) Collected: 09/29/21 1924    Specimen: Blood from Arm, Right Updated: 09/30/21 1838     Blood Culture Abnormal Stain     Gram Stain Aerobic Bottle Gram positive cocci in clusters        Imaging Results (Last 24 Hours)     Procedure Component Value Units Date/Time    MRI Abdomen With & Without Contrast [[id number]] Collected: 09/30/21 1703     Updated: 09/30/21 1805    Narrative:      MRI OF ABDOMEN WITH AND WITHOUT CONTRAST     HISTORY: Follow-up sigmoid lesion and liver masses/abscesses?;  Z74.09-Other reduced mobility      COMPARISON: CT scan dated 8/31/2021      TECHNIQUE: Multiplanar, multisequential MR imaging of the abdomen was  performed before and after the intravenous administration of contrast.     FINDINGS:     5.2 cm segment 7 rim-enhancing liver abscess showing prominent diffusion  restriction. There are 2 adjacent liver abscesses in liver segment 3,  the larger measuring 4.3 cm and the smaller measuring 1.2 cm, both  displaying prominent diffusion restriction. There appears to be  extension of these liver abscesses beyond the liver capsule, as there is  subcapsular fluid " along the anterior, right lateral and dome of the  liver and there is also a developing right lower lobe pulmonary abscess  and empyema. There is also a what appears to be a subserosal abscess  along the lesser curvature and antrum of the stomach, again displaying a  prominent diffusion restriction. Differential would include subhepatic  space abscess although this does appear to follow the contour of the  stomach closely and appears to be within the wall.     No gross intraperitoneal free fluid. The gallbladder is nondistended.  The biliary tree is nondilated. No pancreatic lesions are identified.  The spleen, adrenal glands and kidneys are unremarkable. No suspicious  adenopathy in the upper abdomen.       Impression:         1. Enlarging liver abscesses in segments 3 and 7 which appear to have  extended beyond the liver capsule. In particular, there is a developing  empyema along the diaphragmatic pleura, posteriorly, as well as  extending up along the posterior costal pleura and there is also a  developing right lower lobe pulmonary abscess. There is also an  elongated subserosal abscess along the lesser curvature and antrum of  the stomach. Differential would include a subhepatic space abscess  although this very closely follows the path and contour of the stomach  and this does appear to be within the wall of the stomach.     Findings and recommendations were discussed with Dr Gu on 9/30/2021  at 5:01 PM CDT.              This report was finalized on 09/30/2021 18:02 by Dr Arie Leon, .    CT Chest With Contrast Diagnostic [[id number]] Collected: 09/30/21 1628     Updated: 09/30/21 1704    Narrative:      CT CHEST W CONTRAST DIAGNOSTIC- 9/30/2021 3:37 PM CDT     HISTORY: Follow-up lung nodule; Z74.09-Other reduced mobility      COMPARISON: CT scan dated 8/31/2021      DOSE LENGTH PRODUCT: 192 mGy cm. Automated exposure control was also  utilized to decrease patient radiation dose.     TECHNIQUE: Serial  helical tomographic images of the chest were acquired  following the intravenous infusion of contrast. Multiplanar reformatted  images were provided for review.     FINDINGS:      Visualized neck base: The imaged portion of the base of the neck appears  unremarkable.      Lungs: Advanced lung emphysema. 1.6 cm superior segment left lower lobe  pulmonary nodule, concerning for primary lung malignancy. There is a new  small empyema identified in the right lung base overlying the more  posterior aspect of the diaphragmatic pleura as well as extending up  along the posterior costal pleura. Additionally, there is a new cavitary  right lower lobe pneumonia, which appears to be developing into a  pulmonary abscess.     Heart: The heart is normal in size. There is no pericardial effusion.     Vasculature: Thoracic aorta is normal in caliber. No dissection  identified. No flow-limiting stenosis identified at the great vessel  origins. The pulmonary arteries are normal in appearance.     Mediastinum and lymph nodes: There is a mild reactive right hilar and  subcarinal adenopathy.     Skeletal and soft tissues: Chest wall soft tissues are unremarkable. No  acute bony abnormality.       Upper abdomen: 4.9 cm segment 7 liver abscess with subcapsular extension  as well as extension above the diaphragm in the right pleural space.  Enlarging left lobe abscesses as well measuring 4.1 and 2.0 cm, in  segment 3, which also appear to be extended beyond the liver capsule.  There is a new subserosal abscess along the gastric wall, mostly along  the antrum, measuring over 10 cm in long axis..           Impression:      1. Worsening of the patient's liver abscesses with extension beyond the  liver capsule. In particular, there is extension of the right liver  abscess above the diaphragm with developing right empyema and right  lower lobe pulmonary abscess. There is also extension of the left liver  abscess into the subhepatic space with  formation of a subserosal gastric  wall abscess.  2. Lung emphysema with 1.6 cm superior segment left lower lobe nodule  which is concerning for a primary lung malignancy.     Findings and recommendations were discussed with Dr Gu on 9/30/2021  at 5:01 PM CDT.     This report was finalized on 09/30/2021 17:01 by Dr Arie Leon, .            Assessment/Plan       Sigmoid diverticulitis with liver abscesses, left lung nodule    The patient has not had a fever since admission, his WBC has decreased, and he has remained hemodynamically within normal limits and stable since admission.  The MRI that was performed yesterday afternoon, however has demonstrated progression of the size and number of abscess cavities compared with the CT from earlier in the day.  The right dome abscess now appears to have included the diaphragm and now thoracic cavity.  The left lobe abscess is larger and there is now another cavity near the lesser curvature of the stomach.  The patient was originally scheduled for percutaneous drainage of the left lobe collection for culture and drainage.  Now, with the enlargement and additional collection, radiology is concerned for safety of drainage.  The new collection was deemed not amenable to percutaneous drainage.  Hopefully, the patient continues to improve with iv abx administration and possible drainage by CTsurg of the right abscess.  I am concerned, though that with the advancement seen on the films he may begin to fail.  My concern was discussed with Dr. Gu.  Open liver abscess drainage, I would not suggest be performed in [place] especially in a patient who has been taking Xarelto.  Transfer to a tertiary care facility is my suggestion for definitve drainage.        Yasmin Solorio MD  10/01/21  10:39 CDT

## 2021-10-01 NOTE — CASE MANAGEMENT/SOCIAL WORK
Continued Stay Note  LALO Munoz     Patient Name: Sonu Bravo Jr.  MRN: [id number]  Today's Date: 10/1/2021    Admit Date: 9/29/2021    Discharge Plan     Row Name 10/01/21 0853       Plan    Plan On waiting list at     Patient/Family in Agreement with Plan  yes    Plan Comments  Pt now on waiting list for . Will follow.         Discharge Codes    No documentation.             YUKO Ahn

## 2021-10-01 NOTE — PROGRESS NOTES
[facility] Medicine Services  INPATIENT PROGRESS NOTE    Length of Stay: 2  Date of Admission: 9/29/2021  Primary Care Physician: Vaughn Kramer DO    Subjective   Chief Complaint: Liver abscess/empyema/left lung nodule/gastric wall abscess    HPI   Long discussion with patient about MRI results and CT scan results.  Recommendation from general surgery also discussed with patient.  Plan to transfer patient to high-level care.  [facility] refused and [facility] refused.  Social continue work guided until we find hospital willing to accept.    Review of Systems   Constitutional: Positive for activity change, appetite change and fatigue. Negative for chills and fever.   HENT: Negative for hearing loss, nosebleeds, tinnitus and trouble swallowing.    Eyes: Negative for visual disturbance.   Respiratory: Negative for cough, chest tightness, shortness of breath and wheezing.    Cardiovascular: Negative for chest pain, palpitations and leg swelling.   Gastrointestinal: Negative for abdominal distention, abdominal pain, blood in stool, constipation, diarrhea, nausea and vomiting.   Endocrine: Negative for cold intolerance, heat intolerance, polydipsia, polyphagia and polyuria.   Genitourinary: Negative for decreased urine volume, difficulty urinating, dysuria, flank pain, frequency and hematuria.   Musculoskeletal: Positive for arthralgias, gait problem and myalgias. Negative for joint swelling.   Skin: Negative for rash.   Allergic/Immunologic: Negative for immunocompromised state.   Neurological: Positive for weakness. Negative for dizziness, syncope, light-headedness and headaches.   Hematological: Negative for adenopathy. Does not bruise/bleed easily.   Psychiatric/Behavioral: Negative for confusion and sleep disturbance. The patient is not nervous/anxious.      All pertinent negatives and positives are as above. All other systems have been reviewed and are negative unless  otherwise stated.     Objective    Temp:  [97.7 °F (36.5 °C)-99 °F (37.2 °C)] 98.3 °F (36.8 °C)  Heart Rate:  [61-69] 66  Resp:  [16-20] 18  BP: (132-140)/(47-61) 140/61    Intake/Output Summary (Last 24 hours) at 10/1/2021 1153  Last data filed at 10/1/2021 0642  Gross per 24 hour   Intake 1700 ml   Output --   Net 1700 ml     Physical Exam  Vitals and nursing note reviewed.   Constitutional:       Appearance: He is well-developed.   HENT:      Head: Normocephalic and atraumatic.   Eyes:      Conjunctiva/sclera: Conjunctivae normal.      Pupils: Pupils are equal, round, and reactive to light.   Neck:      Vascular: No JVD.   Cardiovascular:      Rate and Rhythm: Normal rate and regular rhythm.      Heart sounds: Normal heart sounds. No murmur heard.   No friction rub. No gallop.    Pulmonary:      Effort: Pulmonary effort is normal. No respiratory distress.      Breath sounds: Normal breath sounds. No wheezing or rales.   Chest:      Chest wall: No tenderness.   Abdominal:      General: Bowel sounds are normal. There is no distension.      Palpations: Abdomen is soft.      Tenderness: There is no abdominal tenderness. There is no guarding or rebound.   Musculoskeletal:         General: No tenderness or deformity. Normal range of motion.      Cervical back: Neck supple.   Skin:     General: Skin is warm and dry.      Findings: No rash.   Neurological:      Mental Status: He is alert and oriented to person, place, and time.      Cranial Nerves: No cranial nerve deficit.      Motor: No abnormal muscle tone.      Deep Tendon Reflexes: Reflexes normal.   Psychiatric:         Behavior: Behavior normal.         Thought Content: Thought content normal.         Judgment: Judgment normal.         Results Review:  Lab Results (last 24 hours)     Procedure Component Value Units Date/Time    Vancomycin, Trough Please call results to Pharmacy prior to administering next dose [[id number]]  (Abnormal) Collected: 10/01/21 0638     Specimen: Blood Updated: 10/01/21 0809     Vancomycin Trough 4.80 mcg/mL     Comprehensive Metabolic Panel [[id number]]  (Abnormal) Collected: 10/01/21 0423    Specimen: Blood Updated: 10/01/21 0537     Glucose 92 mg/dL      BUN 13 mg/dL      Creatinine 0.63 mg/dL      Sodium 130 mmol/L      Potassium 3.2 mmol/L      Chloride 95 mmol/L      CO2 26.0 mmol/L      Calcium 7.6 mg/dL      Total Protein 5.0 g/dL      Albumin 1.90 g/dL      ALT (SGPT) 19 U/L      AST (SGOT) 24 U/L      Alkaline Phosphatase 95 U/L      Total Bilirubin 0.6 mg/dL      eGFR Non African Amer 123 mL/min/1.73      Globulin 3.1 gm/dL      A/G Ratio 0.6 g/dL      BUN/Creatinine Ratio 20.6     Anion Gap 9.0 mmol/L     Narrative:      GFR Normal >60  Chronic Kidney Disease <60  Kidney Failure <15      Iron Profile [[id number]]  (Abnormal) Collected: 10/01/21 0423    Specimen: Blood Updated: 10/01/21 0534     Iron 17 mcg/dL      Iron Saturation 12 %      Transferrin 94 mg/dL      TIBC 140 mcg/dL     Protime-INR [[id number]]  (Abnormal) Collected: 10/01/21 0423    Specimen: Blood Updated: 10/01/21 0507     Protime 16.4 Seconds      INR 1.38    CBC & Differential [[id number]]  (Abnormal) Collected: 10/01/21 0423    Specimen: Blood Updated: 10/01/21 0501    Narrative:      The following orders were created for panel order CBC & Differential.  Procedure                               Abnormality         Status                     ---------                               -----------         ------                     CBC Auto Differential[[id number]]        Abnormal            Final result                 Please view results for these tests on the individual orders.    CBC Auto Differential [[id number]]  (Abnormal) Collected: 10/01/21 0423    Specimen: Blood Updated: 10/01/21 0501     WBC 15.85 10*3/mm3      RBC 3.03 10*6/mm3      Hemoglobin 9.0 g/dL      Hematocrit 26.8 %      MCV 88.4 fL      MCH 29.7 pg      MCHC 33.6 g/dL      RDW 14.7 %      RDW-SD 47.0 fl       MPV 9.8 fL      Platelets 380 10*3/mm3      Neutrophil % 83.0 %      Lymphocyte % 8.3 %      Monocyte % 7.6 %      Eosinophil % 0.3 %      Basophil % 0.1 %      Immature Grans % 0.7 %      Neutrophils, Absolute 13.15 10*3/mm3      Lymphocytes, Absolute 1.32 10*3/mm3      Monocytes, Absolute 1.21 10*3/mm3      Eosinophils, Absolute 0.04 10*3/mm3      Basophils, Absolute 0.02 10*3/mm3      Immature Grans, Absolute 0.11 10*3/mm3      nRBC 0.0 /100 WBC     C-reactive Protein [[id number]]  (Abnormal) Collected: 09/30/21 1843    Specimen: Blood Updated: 10/01/21 0231     C-Reactive Protein 22.08 mg/dL     Folate [[id number]]  (Normal) Collected: 09/30/21 0526    Specimen: Blood Updated: 09/30/21 2331     Folate 11.50 ng/mL     Narrative:      Results may be falsely increased if patient taking Biotin.      Vitamin B12 [[id number]]  (Abnormal) Collected: 09/30/21 0526    Specimen: Blood Updated: 09/30/21 2331     Vitamin B-12 >2,000 pg/mL     Narrative:      Results may be falsely increased if patient taking Biotin.      CEA [[id number]] Collected: 09/30/21 1036    Specimen: Blood Updated: 09/30/21 2237     CEA 3.32 ng/mL     Narrative:      CEA Reference Range:    Non Smokers:   Less than 3 ng/mL  Smokers:       Less than 5 ng/mL  Results may be falsely decreased if patient taking Biotin.      Blood Culture With MICHAEL - Blood, Arm, Left [[id number]] Collected: 09/29/21 1924    Specimen: Blood from Arm, Left Updated: 09/30/21 2101     Blood Culture No growth at 24 hours    Blood Culture ID, PCR - Blood, Arm, Right [[id number]]  (Abnormal) Collected: 09/29/21 1924    Specimen: Blood from Arm, Right Updated: 09/30/21 2016     BCID, PCR Staph spp, not aureus or lugdunesis. Identification by BCID2 PCR.     BOTTLE TYPE Aerobic Bottle    Basic Metabolic Panel [[id number]]  (Abnormal) Collected: 09/30/21 1843    Specimen: Blood Updated: 09/30/21 1923     Glucose 109 mg/dL      BUN 15 mg/dL      Creatinine 0.72 mg/dL      Sodium 131  "mmol/L      Potassium 3.2 mmol/L      Chloride 94 mmol/L      CO2 29.0 mmol/L      Calcium 7.8 mg/dL      eGFR Non African Amer 106 mL/min/1.73      BUN/Creatinine Ratio 20.8     Anion Gap 8.0 mmol/L     Narrative:      GFR Normal >60  Chronic Kidney Disease <60  Kidney Failure <15      Procalcitonin [[id number]]  (Abnormal) Collected: 09/30/21 1843    Specimen: Blood Updated: 09/30/21 1922     Procalcitonin 0.54 ng/mL     Narrative:      As a Marker for Sepsis (Non-Neonates):     1. <0.5 ng/mL represents a low risk of severe sepsis and/or septic shock.  2. >2 ng/mL represents a high risk of severe sepsis and/or septic shock.    As a Marker for Lower Respiratory Tract Infections that require antibiotic therapy:  PCT on Admission     Antibiotic Therapy             6-12 Hrs later  >0.5                          Strongly Recommended            >0.25 - <0.5             Recommended  0.1 - 0.25                  Discouraged                       Remeasure/reassess PCT  <0.1                         Strongly Discouraged         Remeasure/reassess PCT      As 28 day mortality risk marker: \"Change in Procalcitonin Result\" (>80% or <=80%) if Day 0 (or Day 1) and Day 4 values are available. Refer to http://www.[employer]pct-calculator.com/    Change in PCT <=80 %   A decrease of PCT levels below or equal to 80% defines a positive change in PCT test result representing a higher risk for 28-day all-cause mortality of patients diagnosed with severe sepsis or septic shock.    Change in PCT >80 %   A decrease of PCT levels of more than 80% defines a negative change in PCT result representing a lower risk for 28-day all-cause mortality of patients diagnosed with severe sepsis or septic shock.                Lactic Acid, Plasma [[id number]]  (Normal) Collected: 09/30/21 1843    Specimen: Blood Updated: 09/30/21 1913     Lactate 1.3 mmol/L     Blood Culture With MICHAEL - Blood, Arm, Right [[id number]]  (Abnormal) Collected: 09/29/21 1924    " Specimen: Blood from Arm, Right Updated: 09/30/21 1838     Blood Culture Abnormal Stain     Gram Stain Aerobic Bottle Gram positive cocci in clusters           Cultures:  Blood Culture   Date Value Ref Range Status   09/29/2021 No growth at 24 hours  Preliminary   09/29/2021 Abnormal Stain (C)  Preliminary   09/24/2021 Fusobacterium species (C)  Corrected     Comment:     Beta lactamase negative    Appended report. These results have been appended to a previously final verified report.   09/24/2021 No growth at 5 days  Final       Radiology Data:    Imaging Results (Last 24 Hours)     Procedure Component Value Units Date/Time    MRI Abdomen With & Without Contrast [[id number]] Collected: 09/30/21 1703     Updated: 09/30/21 1805    Narrative:      MRI OF ABDOMEN WITH AND WITHOUT CONTRAST     HISTORY: Follow-up sigmoid lesion and liver masses/abscesses?;  Z74.09-Other reduced mobility      COMPARISON: CT scan dated 8/31/2021      TECHNIQUE: Multiplanar, multisequential MR imaging of the abdomen was  performed before and after the intravenous administration of contrast.     FINDINGS:     5.2 cm segment 7 rim-enhancing liver abscess showing prominent diffusion  restriction. There are 2 adjacent liver abscesses in liver segment 3,  the larger measuring 4.3 cm and the smaller measuring 1.2 cm, both  displaying prominent diffusion restriction. There appears to be  extension of these liver abscesses beyond the liver capsule, as there is  subcapsular fluid along the anterior, right lateral and dome of the  liver and there is also a developing right lower lobe pulmonary abscess  and empyema. There is also a what appears to be a subserosal abscess  along the lesser curvature and antrum of the stomach, again displaying a  prominent diffusion restriction. Differential would include subhepatic  space abscess although this does appear to follow the contour of the  stomach closely and appears to be within the wall.     No gross  intraperitoneal free fluid. The gallbladder is nondistended.  The biliary tree is nondilated. No pancreatic lesions are identified.  The spleen, adrenal glands and kidneys are unremarkable. No suspicious  adenopathy in the upper abdomen.       Impression:         1. Enlarging liver abscesses in segments 3 and 7 which appear to have  extended beyond the liver capsule. In particular, there is a developing  empyema along the diaphragmatic pleura, posteriorly, as well as  extending up along the posterior costal pleura and there is also a  developing right lower lobe pulmonary abscess. There is also an  elongated subserosal abscess along the lesser curvature and antrum of  the stomach. Differential would include a subhepatic space abscess  although this very closely follows the path and contour of the stomach  and this does appear to be within the wall of the stomach.     Findings and recommendations were discussed with Dr Gu on 9/30/2021  at 5:01 PM CDT.              This report was finalized on 09/30/2021 18:02 by Dr Arie Leon, .    CT Chest With Contrast Diagnostic [[id number]] Collected: 09/30/21 1628     Updated: 09/30/21 1704    Narrative:      CT CHEST W CONTRAST DIAGNOSTIC- 9/30/2021 3:37 PM CDT     HISTORY: Follow-up lung nodule; Z74.09-Other reduced mobility      COMPARISON: CT scan dated 8/31/2021      DOSE LENGTH PRODUCT: 192 mGy cm. Automated exposure control was also  utilized to decrease patient radiation dose.     TECHNIQUE: Serial helical tomographic images of the chest were acquired  following the intravenous infusion of contrast. Multiplanar reformatted  images were provided for review.     FINDINGS:      Visualized neck base: The imaged portion of the base of the neck appears  unremarkable.      Lungs: Advanced lung emphysema. 1.6 cm superior segment left lower lobe  pulmonary nodule, concerning for primary lung malignancy. There is a new  small empyema identified in the right lung base  overlying the more  posterior aspect of the diaphragmatic pleura as well as extending up  along the posterior costal pleura. Additionally, there is a new cavitary  right lower lobe pneumonia, which appears to be developing into a  pulmonary abscess.     Heart: The heart is normal in size. There is no pericardial effusion.     Vasculature: Thoracic aorta is normal in caliber. No dissection  identified. No flow-limiting stenosis identified at the great vessel  origins. The pulmonary arteries are normal in appearance.     Mediastinum and lymph nodes: There is a mild reactive right hilar and  subcarinal adenopathy.     Skeletal and soft tissues: Chest wall soft tissues are unremarkable. No  acute bony abnormality.       Upper abdomen: 4.9 cm segment 7 liver abscess with subcapsular extension  as well as extension above the diaphragm in the right pleural space.  Enlarging left lobe abscesses as well measuring 4.1 and 2.0 cm, in  segment 3, which also appear to be extended beyond the liver capsule.  There is a new subserosal abscess along the gastric wall, mostly along  the antrum, measuring over 10 cm in long axis..           Impression:      1. Worsening of the patient's liver abscesses with extension beyond the  liver capsule. In particular, there is extension of the right liver  abscess above the diaphragm with developing right empyema and right  lower lobe pulmonary abscess. There is also extension of the left liver  abscess into the subhepatic space with formation of a subserosal gastric  wall abscess.  2. Lung emphysema with 1.6 cm superior segment left lower lobe nodule  which is concerning for a primary lung malignancy.     Findings and recommendations were discussed with Dr Gu on 9/30/2021  at 5:01 PM CDT.     This report was finalized on 09/30/2021 17:01 by Dr Arie Leon, .          Allergies   Allergen Reactions   • Benadryl [Diphenhydramine] Mental Status Change   • Penicillins Hives   • Sulfa  Antibiotics Hives, Itching and Rash   • Sulfamethoxazole-Trimethoprim Hives, Itching and Rash       Scheduled meds:   albuterol, 1.25 mg, Nebulization, 4x Daily - RT   And  ipratropium, 0.5 mg, Nebulization, 4x Daily - RT  budesonide-formoterol, 2 puff, Inhalation, BID - RT  cefepime, 2 g, Intravenous, Q8H  cetirizine, 10 mg, Oral, Daily  dextromethorphan polistirex ER, 60 mg, Oral, Q12H  famotidine, 20 mg, Intravenous, BID  fluticasone, 2 spray, Each Nare, Daily  hydroCHLOROthiazide, 12.5 mg, Oral, Daily  levothyroxine, 100 mcg, Oral, Daily  melatonin, 3 mg, Oral, Nightly  metroNIDAZOLE, 500 mg, Intravenous, Q8H  potassium chloride, 40 mEq, Oral, BID  rosuvastatin, 10 mg, Oral, Nightly  sodium chloride, 10 mL, Intravenous, Q12H        PRN meds:  •  acetaminophen  •  HYDROcodone-homatropine  •  sodium chloride    Assessment/Plan       Liver mass    Cavitary lesion of lung    Abdominal infection (HCC)    Empyema lung (HCC)      Plan:  Liver abscess-extend beyond liver capsule-right liver abscess above the diaphragm developing right empyema and right lower lobe pulmonary abscess/left liver abscess into the subhepatic space with the formation of abscess subserosal gastric wall abscess/Questionable segmental thickness of the sigmoid colon.  General surgery consult.  Infectious disease consult.  Oncology consult.    Continue Flagyl and cefepime antibiotics.  Continue IV hydration  Recommendation from general surgery sigmoid diverticulitis complicated by bacteremia and liver abscess formation x2-continue IV antibiotics and percutaneous drainage.  MRI of abdomen-Enlarging liver abscesses in segments 3 and 7 which appear to have extended beyond the liver capsule- developing empyema along the diaphragmatic pleural- posteriorly- as well as  extending up along the posterior costal pleura and there is also a developing right lower lobe pulmonary abscess, also an elongated subserosal abscess along the lesser curvature and antrum  of  the stomach-subhepatic space abscess although this very closely follows the path and contour of the stomach and this does appear to be within the wall of the stomach.  Recommendation from general surgery-recommendation due to advancements of abscess, patient be transferred to tertiary care for definitive drainage-possible open liver abscess drainage.    History of atrial fib/hypertension/hyperlipidemia.  Eliquis has been on hold.  Continue Lovenox therapeutic. Continues bisoprolol.  Continue hydrochlorthiazide.  Continue Crestor.  Echocardiogram 8/31/2021-ejection fraction 66 to 70%, mild concentric hypertrophy, diastolic dysfunction, no significant valvular dysfunction.     Leukocytosis improving.    Cavitary lesion/left lower lobe nodule 1.6 cm/coughing.  DuoNebs 4 times daily.    Duo nebs.  Incentive spirometry.  Delsym.  Hycodan.    Pulmonary consult.  Chest x-ray-New patchy right lower lobe opacities may be atelectasis versus pneumonia- Linear left basilar atelectasis,  Stable partially cavitary superior segment left lower lobe nodule.  CT  scan of the chest-Worsening of the patient's liver abscesses with extension beyond the liver capsule- extension of the right liver abscess above the diaphragm with developing right empyema and right lower lobe pulmonary abscess- also extension of the left liver abscess into the subhepatic space with formation of a subserosal gastric wall abscess, Lung emphysema with 1.6 cm superior segment left lower lobe nodule which is concerning for a primary lung malignancy.     Hypokalemia.   Improving.  Continue P.o. potassium.  Magnesium level-normal.  Fluid resuscitation.     Hyponatremia.  Slight decrease.     Chronic smoker.  Patient does not want nicotine patch.  Discussed with patient stopping quitting.     Insomnia.  Melatonin.     Hypothyroidism.  Continue Synthroid.     Reflux.  Pepcid.  Zofran as needed.     Nutrition.  Full liquid diet.  Nutrition supplement.  N.p.o.  after midnight.     Deconditioning.  PT and OT consult.     Blood culture 9/29/2021 -no growth 24 hours in 1 bottle and other shows Staph spp, not aureus or lugdunesis.  Previous blood culture 9/24/2021 cells fusobacterium 1 out of 4 bottles.  Covid-19-negative.      Discharge Planning: 3 to 5 days.  We have [facility] by [facility] not accept does not establish patient and [employer] is not excepting anybody [place]..  Discussed with social work will continue to try to find somewhere to send patient for definitive care.    Electronically signed by Brody Gu MD, 10/01/21, 11:06 AM CDT.

## 2021-10-02 ENCOUNTER — APPOINTMENT (OUTPATIENT)
Dept: MRI IMAGING | Facility: HOSPITAL | Age: 76
End: 2021-10-02

## 2021-10-02 LAB
ALBUMIN SERPL-MCNC: 1.8 G/DL (ref 3.5–5.2)
ALBUMIN/GLOB SERPL: 0.5 G/DL
ALP SERPL-CCNC: 96 U/L (ref 39–117)
ALT SERPL W P-5'-P-CCNC: 19 U/L (ref 1–41)
ANION GAP SERPL CALCULATED.3IONS-SCNC: 9 MMOL/L (ref 5–15)
AST SERPL-CCNC: 29 U/L (ref 1–40)
BACTERIA SPEC AEROBE CULT: ABNORMAL
BASOPHILS # BLD AUTO: 0.03 10*3/MM3 (ref 0–0.2)
BASOPHILS NFR BLD AUTO: 0.2 % (ref 0–1.5)
BILIRUB SERPL-MCNC: 0.6 MG/DL (ref 0–1.2)
BUN SERPL-MCNC: 10 MG/DL (ref 8–23)
BUN/CREAT SERPL: 15.6 (ref 7–25)
CALCIUM SPEC-SCNC: 7.6 MG/DL (ref 8.6–10.5)
CHLORIDE SERPL-SCNC: 96 MMOL/L (ref 98–107)
CO2 SERPL-SCNC: 27 MMOL/L (ref 22–29)
CREAT SERPL-MCNC: 0.64 MG/DL (ref 0.76–1.27)
DEPRECATED RDW RBC AUTO: 46.9 FL (ref 37–54)
EOSINOPHIL # BLD AUTO: 0.05 10*3/MM3 (ref 0–0.4)
EOSINOPHIL NFR BLD AUTO: 0.3 % (ref 0.3–6.2)
ERYTHROCYTE [DISTWIDTH] IN BLOOD BY AUTOMATED COUNT: 14.6 % (ref 12.3–15.4)
GFR SERPL CREATININE-BSD FRML MDRD: 121 ML/MIN/1.73
GLOBULIN UR ELPH-MCNC: 3.3 GM/DL
GLUCOSE SERPL-MCNC: 92 MG/DL (ref 65–99)
GRAM STN SPEC: ABNORMAL
HCT VFR BLD AUTO: 29 % (ref 37.5–51)
HGB BLD-MCNC: 9.6 G/DL (ref 13–17.7)
IMM GRANULOCYTES # BLD AUTO: 0.13 10*3/MM3 (ref 0–0.05)
IMM GRANULOCYTES NFR BLD AUTO: 0.9 % (ref 0–0.5)
ISOLATED FROM: ABNORMAL
KOH PREP NAIL: NORMAL
LYMPHOCYTES # BLD AUTO: 1.09 10*3/MM3 (ref 0.7–3.1)
LYMPHOCYTES NFR BLD AUTO: 7.5 % (ref 19.6–45.3)
MCH RBC QN AUTO: 29.1 PG (ref 26.6–33)
MCHC RBC AUTO-ENTMCNC: 33.1 G/DL (ref 31.5–35.7)
MCV RBC AUTO: 87.9 FL (ref 79–97)
MONOCYTES # BLD AUTO: 1.3 10*3/MM3 (ref 0.1–0.9)
MONOCYTES NFR BLD AUTO: 9 % (ref 5–12)
NEUTROPHILS NFR BLD AUTO: 11.91 10*3/MM3 (ref 1.7–7)
NEUTROPHILS NFR BLD AUTO: 82.1 % (ref 42.7–76)
NRBC BLD AUTO-RTO: 0 /100 WBC (ref 0–0.2)
PLATELET # BLD AUTO: 418 10*3/MM3 (ref 140–450)
PMV BLD AUTO: 10.1 FL (ref 6–12)
POTASSIUM SERPL-SCNC: 3.6 MMOL/L (ref 3.5–5.2)
PROT SERPL-MCNC: 5.1 G/DL (ref 6–8.5)
RBC # BLD AUTO: 3.3 10*6/MM3 (ref 4.14–5.8)
SODIUM SERPL-SCNC: 132 MMOL/L (ref 136–145)
WBC # BLD AUTO: 14.51 10*3/MM3 (ref 3.4–10.8)

## 2021-10-02 PROCEDURE — 94799 UNLISTED PULMONARY SVC/PX: CPT

## 2021-10-02 PROCEDURE — 72197 MRI PELVIS W/O & W/DYE: CPT

## 2021-10-02 PROCEDURE — 25010000002 ENOXAPARIN PER 10 MG: Performed by: FAMILY MEDICINE

## 2021-10-02 PROCEDURE — 99233 SBSQ HOSP IP/OBS HIGH 50: CPT | Performed by: INTERNAL MEDICINE

## 2021-10-02 PROCEDURE — 80053 COMPREHEN METABOLIC PANEL: CPT | Performed by: FAMILY MEDICINE

## 2021-10-02 PROCEDURE — 85025 COMPLETE CBC W/AUTO DIFF WBC: CPT | Performed by: FAMILY MEDICINE

## 2021-10-02 PROCEDURE — 99232 SBSQ HOSP IP/OBS MODERATE 35: CPT | Performed by: INTERNAL MEDICINE

## 2021-10-02 PROCEDURE — 0 GADOBENATE DIMEGLUMINE 529 MG/ML SOLUTION: Performed by: FAMILY MEDICINE

## 2021-10-02 PROCEDURE — 94664 DEMO&/EVAL PT USE INHALER: CPT

## 2021-10-02 PROCEDURE — A9577 INJ MULTIHANCE: HCPCS | Performed by: FAMILY MEDICINE

## 2021-10-02 PROCEDURE — 25010000002 CEFEPIME PER 500 MG: Performed by: FAMILY MEDICINE

## 2021-10-02 RX ORDER — METOPROLOL TARTRATE 5 MG/5ML
2.5 INJECTION INTRAVENOUS EVERY 6 HOURS PRN
Status: DISCONTINUED | OUTPATIENT
Start: 2021-10-02 | End: 2021-10-07 | Stop reason: HOSPADM

## 2021-10-02 RX ORDER — BISOPROLOL FUMARATE 10 MG/1
10 TABLET, FILM COATED ORAL DAILY
Status: DISCONTINUED | OUTPATIENT
Start: 2021-10-02 | End: 2021-10-07 | Stop reason: HOSPADM

## 2021-10-02 RX ADMIN — ALBUTEROL SULFATE 1.25 MG: 2.5 SOLUTION RESPIRATORY (INHALATION) at 07:34

## 2021-10-02 RX ADMIN — ALBUTEROL SULFATE 1.25 MG: 2.5 SOLUTION RESPIRATORY (INHALATION) at 19:40

## 2021-10-02 RX ADMIN — METOPROLOL TARTRATE 2.5 MG: 5 INJECTION INTRAVENOUS at 19:59

## 2021-10-02 RX ADMIN — HYDROCHLOROTHIAZIDE 12.5 MG: 25 TABLET ORAL at 08:58

## 2021-10-02 RX ADMIN — CEFEPIME HYDROCHLORIDE 2 G: 2 INJECTION, POWDER, FOR SOLUTION INTRAVENOUS at 00:39

## 2021-10-02 RX ADMIN — ROSUVASTATIN CALCIUM 10 MG: 10 TABLET, FILM COATED ORAL at 21:08

## 2021-10-02 RX ADMIN — ALBUTEROL SULFATE 1.25 MG: 2.5 SOLUTION RESPIRATORY (INHALATION) at 15:23

## 2021-10-02 RX ADMIN — FAMOTIDINE 20 MG: 10 INJECTION INTRAVENOUS at 21:09

## 2021-10-02 RX ADMIN — IPRATROPIUM BROMIDE 0.5 MG: 0.5 SOLUTION RESPIRATORY (INHALATION) at 07:34

## 2021-10-02 RX ADMIN — FLUTICASONE PROPIONATE 2 SPRAY: 50 SPRAY, METERED NASAL at 10:03

## 2021-10-02 RX ADMIN — IPRATROPIUM BROMIDE 0.5 MG: 0.5 SOLUTION RESPIRATORY (INHALATION) at 15:23

## 2021-10-02 RX ADMIN — ACETAMINOPHEN 650 MG: 325 TABLET, FILM COATED ORAL at 11:34

## 2021-10-02 RX ADMIN — HYDROCODONE BITARTRATE AND HOMATROPINE METHYLBROMIDE 10 ML: 5; 1.5 SOLUTION ORAL at 21:18

## 2021-10-02 RX ADMIN — METRONIDAZOLE 500 MG: 500 INJECTION, SOLUTION INTRAVENOUS at 11:21

## 2021-10-02 RX ADMIN — ENOXAPARIN SODIUM 90 MG: 100 INJECTION SUBCUTANEOUS at 19:59

## 2021-10-02 RX ADMIN — Medication 3 MG: at 21:08

## 2021-10-02 RX ADMIN — SODIUM CHLORIDE, POTASSIUM CHLORIDE, SODIUM LACTATE AND CALCIUM CHLORIDE 125 ML/HR: 600; 310; 30; 20 INJECTION, SOLUTION INTRAVENOUS at 23:42

## 2021-10-02 RX ADMIN — LEVOTHYROXINE SODIUM 100 MCG: 100 TABLET ORAL at 08:58

## 2021-10-02 RX ADMIN — ALBUTEROL SULFATE 1.25 MG: 2.5 SOLUTION RESPIRATORY (INHALATION) at 11:12

## 2021-10-02 RX ADMIN — BUDESONIDE AND FORMOTEROL FUMARATE DIHYDRATE 2 PUFF: 160; 4.5 AEROSOL RESPIRATORY (INHALATION) at 07:34

## 2021-10-02 RX ADMIN — CETIRIZINE HYDROCHLORIDE 10 MG: 10 TABLET ORAL at 08:58

## 2021-10-02 RX ADMIN — SODIUM CHLORIDE, PRESERVATIVE FREE 10 ML: 5 INJECTION INTRAVENOUS at 10:04

## 2021-10-02 RX ADMIN — METRONIDAZOLE 500 MG: 500 INJECTION, SOLUTION INTRAVENOUS at 20:00

## 2021-10-02 RX ADMIN — IPRATROPIUM BROMIDE 0.5 MG: 0.5 SOLUTION RESPIRATORY (INHALATION) at 11:12

## 2021-10-02 RX ADMIN — GADOBENATE DIMEGLUMINE 15 ML: 529 INJECTION, SOLUTION INTRAVENOUS at 13:11

## 2021-10-02 RX ADMIN — BUDESONIDE AND FORMOTEROL FUMARATE DIHYDRATE 2 PUFF: 160; 4.5 AEROSOL RESPIRATORY (INHALATION) at 19:40

## 2021-10-02 RX ADMIN — CEFEPIME HYDROCHLORIDE 2 G: 2 INJECTION, POWDER, FOR SOLUTION INTRAVENOUS at 08:58

## 2021-10-02 RX ADMIN — BISOPROLOL FUMARATE 10 MG: 10 TABLET ORAL at 21:09

## 2021-10-02 RX ADMIN — METRONIDAZOLE 500 MG: 500 INJECTION, SOLUTION INTRAVENOUS at 04:36

## 2021-10-02 RX ADMIN — HYDROCODONE BITARTRATE AND HOMATROPINE METHYLBROMIDE 10 ML: 5; 1.5 SOLUTION ORAL at 10:02

## 2021-10-02 RX ADMIN — CEFEPIME HYDROCHLORIDE 2 G: 2 INJECTION, POWDER, FOR SOLUTION INTRAVENOUS at 17:02

## 2021-10-02 RX ADMIN — IPRATROPIUM BROMIDE 0.5 MG: 0.5 SOLUTION RESPIRATORY (INHALATION) at 19:40

## 2021-10-02 RX ADMIN — FAMOTIDINE 20 MG: 10 INJECTION INTRAVENOUS at 08:58

## 2021-10-02 RX ADMIN — SODIUM CHLORIDE, PRESERVATIVE FREE 10 ML: 5 INJECTION INTRAVENOUS at 21:08

## 2021-10-02 NOTE — PROGRESS NOTES
PULMONARY AND CRITICAL CARE PROGRESS NOTE - [facility]    Patient: Sonu Bravo Jr.  1945   MR# [id number]   Acct# [id number]  10/02/21   08:32 CDT  Referring Provider: Brody Gu MD    Chief Complaint: Liver abscess, lung nodule, infiltrates   Interval history:  Patient is awake and resting in bed with wife at bedside. Patient states he is feeling some better especially after having a shower this am. He had 20 cc removed from CT guided liver abscess drainage yesterday. Micro pending. He is afebrile and remains on room air.  Abnormal blood culture -BCID pcr with staph spp. His cough was a little productive this am. He is advised we can culture if he has any further productive cough. Per RN,  has said will likely be next week before bed available and they will call daily.    Meds:  albuterol, 1.25 mg, Nebulization, 4x Daily - RT   And  ipratropium, 0.5 mg, Nebulization, 4x Daily - RT  budesonide-formoterol, 2 puff, Inhalation, BID - RT  cefepime, 2 g, Intravenous, Q8H  cetirizine, 10 mg, Oral, Daily  dextromethorphan polistirex ER, 60 mg, Oral, Q12H  famotidine, 20 mg, Intravenous, BID  fluticasone, 2 spray, Each Nare, Daily  gadobenate dimeglumine, 15 mL, Intravenous, Once in imaging  hydroCHLOROthiazide, 12.5 mg, Oral, Daily  levothyroxine, 100 mcg, Oral, Daily  melatonin, 3 mg, Oral, Nightly  metroNIDAZOLE, 500 mg, Intravenous, Q8H  potassium chloride, 40 mEq, Oral, BID  rosuvastatin, 10 mg, Oral, Nightly  sodium chloride, 10 mL, Intravenous, Q12H      lactated ringers, 125 mL/hr, Last Rate: 125 mL/hr (10/01/21 1808)      Review of Systems:   Review of Systems   Constitutional: Positive for chills, fatigue and fever.   HENT: Negative for sore throat.         Negative hemoptysis    Eyes: Negative for discharge and itching.   Respiratory: Positive for cough and shortness of breath (exertional ).    Gastrointestinal: Positive for nausea. Negative for abdominal pain, diarrhea and  vomiting.   Genitourinary: Negative for difficulty urinating and dysuria.   Musculoskeletal: Negative for gait problem and neck pain.   Skin: Negative for rash and wound.   Neurological: Negative for dizziness and syncope.   Psychiatric/Behavioral: Negative for agitation and behavioral problems.   Physical Exam:  SpO2 Percentage    10/02/21 0348 10/02/21 0730 10/02/21 0734   SpO2: 93% 93% 95%     Temp:  [98.2 °F (36.8 °C)-99.4 °F (37.4 °C)] 98.2 °F (36.8 °C)  Heart Rate:  [60-80] 69  Resp:  [16-18] 18  BP: (140-164)/(53-78) 164/57  No intake or output data in the 24 hours ending 10/02/21 0832  Physical Exam   Vitals and nursing note reviewed.   Constitutional:       Appearance: Normal appearance.   HENT:      Head: Normocephalic and atraumatic.      Nose: Nose normal.      Mouth/Throat:      Mouth: Mucous membranes are moist.   Eyes:      Conjunctiva/sclera: Conjunctivae normal.      Pupils: Pupils are equal, round, and reactive to light.   Cardiovascular:      Rate and Rhythm: Normal rate and regular rhythm.      Heart sounds: No murmur heard.   No friction rub. No gallop.    Pulmonary:      Effort: Pulmonary effort is normal.      Breath sounds: Normal breath sounds.   Abdominal:      General: Bowel sounds are normal.      Palpations: Abdomen is soft.   Musculoskeletal:         General: No swelling.      Cervical back: Normal range of motion.      Right lower leg: No edema.      Left lower leg: No edema.   Skin:     General: Skin is warm and dry.   Neurological:      General: No focal deficit present.      Mental Status: He is oriented to person, place, and time.   Psychiatric:         Mood and Affect: Mood normal.         Behavior: Behavior normal.         Thought Content: Thought content normal.         Judgment: Judgment normal    Laboratory Data:  Results from last 7 days   Lab Units 10/02/21  0424 10/01/21  0423 09/30/21  0526   WBC 10*3/mm3 14.51* 15.85* 23.66*   HEMOGLOBIN g/dL 9.6* 9.0* 9.5*   PLATELETS  10*3/mm3 418 380 376     Results from last 7 days   Lab Units 10/02/21  0424 10/01/21  0423 09/30/21  1843 09/30/21  1036 09/30/21  0526   SODIUM mmol/L 132* 130* 131*  --    < >   POTASSIUM mmol/L 3.6 3.2* 3.2*  --    < >   BUN mg/dL 10 13 15  --    < >   CREATININE mg/dL 0.64* 0.63* 0.72*  --    < >   INR   --  1.38*  --  1.44*  --     < > = values in this interval not displayed.         Blood Culture   Date Value Ref Range Status   09/29/2021 No growth at 24 hours  Preliminary   09/29/2021 Abnormal Stain (C)  Preliminary   09/24/2021 Fusobacterium species (C)  Corrected     Comment:     Beta lactamase negative    Appended report. These results have been appended to a previously final verified report.   09/24/2021 No growth at 5 days  Final     Recent films:  CT Chest With Contrast Diagnostic    Result Date: 9/30/2021  1. Worsening of the patient's liver abscesses with extension beyond the liver capsule. In particular, there is extension of the right liver abscess above the diaphragm with developing right empyema and right lower lobe pulmonary abscess. There is also extension of the left liver abscess into the subhepatic space with formation of a subserosal gastric wall abscess. 2. Lung emphysema with 1.6 cm superior segment left lower lobe nodule which is concerning for a primary lung malignancy.  Findings and recommendations were discussed with Dr Gu on 9/30/2021 at 5:01 PM CDT.  This report was finalized on 09/30/2021 17:01 by Dr Arie Leon, .    MRI Abdomen With & Without Contrast    Result Date: 9/30/2021   1. Enlarging liver abscesses in segments 3 and 7 which appear to have extended beyond the liver capsule. In particular, there is a developing empyema along the diaphragmatic pleura, posteriorly, as well as extending up along the posterior costal pleura and there is also a developing right lower lobe pulmonary abscess. There is also an elongated subserosal abscess along the lesser curvature and  antrum of the stomach. Differential would include a subhepatic space abscess although this very closely follows the path and contour of the stomach and this does appear to be within the wall of the stomach.  Findings and recommendations were discussed with Dr Gu on 9/30/2021 at 5:01 PM CDT.     This report was finalized on 09/30/2021 18:02 by Dr Arie Leon, .    CT Guided Abscess Drain Liver    Result Date: 10/1/2021  Successful CT guided biopsy liver abscess aspiration, yielding approximately 20 cc frankly purulent material. This sample was set aside for microbiology evaluation.   This report was finalized on 10/01/2021 16:09 by Dr Arie Leon, .    Films reviewed personally by me.  My interpretation: no new imaging 10/2/2021    Pulmonary Assessment:  1. Lung nodule, 1.6 cm LLL  2. Right lung consolidation possible abscess/ empyema  3. Liver abnormalities probable abscess  4. Segmental thickening of the sigmoid colon  5. Paroxysmal atrial fib -xarelto on hold      Recommend/plan:   · ID managing positive blood culture, antibiotics, flagyl   · Will obtain respiratory culture if he has productive changes to cough   · Awaiting to here from  for possible higher level of care transfer. [facility] and [facility] have all declined.   · CT surgery-not candidate at this time- continue antibiotics   · S/P liver abscess CT needle biopsy 10/01/21- micro pending    Electronically signed by MARY Pablo, 10/02/21, 08:32 CDT     ATTESTATION OF CLINICAL NOTE:  I have reviewed the notes, assessments, and/or procedures performed by MARY Ferguson, I concur with her/his documentation of Sonu Bravo Jr.. Patient was seen in the follow-up visit in pulmonary rounds in medical floor today.    Had CT-guided aspiration done yesterday and 20 cc of liver abscess fluid was aspirated and was sent for culture. He is waiting to be transferred to the [facility] in [place] for  higher level of care but still did not have any bed available there. He is on room air and is feeling little better. He is on IV antibiotic coverage as per ID. His respiratory status is stable and denies any shortness of breath and is afebrile this morning. He has weakness but no other acute complaints.    On physical examination patient is comfortable lying in the bed in no distress but HEENT: Atraumatic normocephalic. Neck: Supple no mass no jugular venous distention. Heart: Sounds normal rate and rhythm regular. Lungs: Bibasilar crackles. Abdomen: Soft nondistended. Extremities: No edema normal pulse and color. Neurologic: Grossly intact. Skin: No breakdown.    Continue current antibiotic coverage. Patient is waiting for transfer to [facility] in [place] for higher level of care. Patient is not a surgical candidate and will be continued on the conservative antibiotic treatment. Continue monitor treatment routine respiratory care and current antibiotic coverage. Currently Flagyl has been added with other antibiotics. He is on cefepime. He will be continued on pain anxiety control DVT and stress ulcer prophylaxis. Nutritional support PT OT. Repeat labs and imaging studies. Most likely he will be transferred early next week when bed is available. He will need to continue incentive spirometry and flutter valve to improve pulmonary compliance and clearance. He will need outpatient pulmonary clinic evaluation for underlying COPD and tobacco abuse. Continue current treatment plan and supportive care. CODE STATUS: Full. Overall prognosis: Guarded. Pulmonary team will continue following him and make further recommendations.    I have seen and examined patient personally, performing a face-to-face diagnostic evaluation with plan of care reviewed and developed with APRN and nursing staff. I have addended and/or modified the above history of present illness, physical examination, and assessment and plan to  reflect my findings and impressions. Essential elements of the care plan were discussed with APRN above.  Agree with findings and assessment/plan as documented above.    Jeannine Gloria MD  Pulmonologist/Intensivist  10/2/2021 13:55 CDT

## 2021-10-02 NOTE — PROGRESS NOTES
Shanna English MD Progress Note     LOS: 3 days   Patient Care Team:  Vaughn Kramer DO as PCP - General (Internal Medicine)  Thomas Dc DO as Consulting Physician (Gastroenterology)  Tien Canchola MD as Consulting Physician (Pulmonary Disease)        Subjective     He states he is feeling some better.  Bowels moving.    Objective     Vital Signs  Temp:  [98.2 °F (36.8 °C)-99.4 °F (37.4 °C)] 98.2 °F (36.8 °C)  Heart Rate:  [60-80] 69  Resp:  [16-18] 18  BP: (140-164)/(53-78) 164/57    Intake & Output (last 3 days)       09/29 0701 - 09/30 0700 09/30 0701 - 10/01 0700 10/01 0701 - 10/02 0700 10/02 0701 - 10/03 0700    P.O. 120 560      I.V. (mL/kg)  1000 (12.9)      IV Piggyback  850  100    Total Intake(mL/kg) 120 (1.6) 2410 (31.2)  100 (1.2)    Urine (mL/kg/hr) 0       Total Output 0       Net +120 +2410  +100            Urine Unmeasured Occurrence   1 x           Physical Exam:     General Appearance:    Alert, cooperative, in no acute distress   Lungs:     respirations regular, even and unlabored    Heart:    Regular rhythm and normal rate, normal S1 and S2, no            murmur, no gallop, no rub   Chest Wall:    No abnormalities observed   Abdomen:      Soft   Extremities: No edema,    Results Review:     I reviewed the patient's new clinical results.    Lab Results (last 72 hours)     Procedure Component Value Units Date/Time    Blood Culture With MICHAEL - Blood, Arm, Right [[id number]]  (Abnormal) Collected: 09/29/21 1924    Specimen: Blood from Arm, Right Updated: 10/02/21 0715     Blood Culture Staphylococcus, coagulase negative     Comment: Probable contaminant requires clinical correlation, susceptibility not performed unless requested by physician.          Isolated from Aerobic Bottle     Gram Stain Aerobic Bottle Gram positive cocci in clusters    Comprehensive Metabolic Panel [[id number]]  (Abnormal) Collected: 10/02/21 0424    Specimen: Blood Updated: 10/02/21 0536     Glucose 92 mg/dL       BUN 10 mg/dL      Creatinine 0.64 mg/dL      Sodium 132 mmol/L      Potassium 3.6 mmol/L      Chloride 96 mmol/L      CO2 27.0 mmol/L      Calcium 7.6 mg/dL      Total Protein 5.1 g/dL      Albumin 1.80 g/dL      ALT (SGPT) 19 U/L      AST (SGOT) 29 U/L      Alkaline Phosphatase 96 U/L      Total Bilirubin 0.6 mg/dL      eGFR Non African Amer 121 mL/min/1.73      Globulin 3.3 gm/dL      A/G Ratio 0.5 g/dL      BUN/Creatinine Ratio 15.6     Anion Gap 9.0 mmol/L     Narrative:      GFR Normal >60  Chronic Kidney Disease <60  Kidney Failure <15      CBC & Differential [[id number]]  (Abnormal) Collected: 10/02/21 0424    Specimen: Blood Updated: 10/02/21 0509    Narrative:      The following orders were created for panel order CBC & Differential.  Procedure                               Abnormality         Status                     ---------                               -----------         ------                     CBC Auto Differential[[id number]]        Abnormal            Final result                 Please view results for these tests on the individual orders.    CBC Auto Differential [[id number]]  (Abnormal) Collected: 10/02/21 0424    Specimen: Blood Updated: 10/02/21 0509     WBC 14.51 10*3/mm3      RBC 3.30 10*6/mm3      Hemoglobin 9.6 g/dL      Hematocrit 29.0 %      MCV 87.9 fL      MCH 29.1 pg      MCHC 33.1 g/dL      RDW 14.6 %      RDW-SD 46.9 fl      MPV 10.1 fL      Platelets 418 10*3/mm3      Neutrophil % 82.1 %      Lymphocyte % 7.5 %      Monocyte % 9.0 %      Eosinophil % 0.3 %      Basophil % 0.2 %      Immature Grans % 0.9 %      Neutrophils, Absolute 11.91 10*3/mm3      Lymphocytes, Absolute 1.09 10*3/mm3      Monocytes, Absolute 1.30 10*3/mm3      Eosinophils, Absolute 0.05 10*3/mm3      Basophils, Absolute 0.03 10*3/mm3      Immature Grans, Absolute 0.13 10*3/mm3      nRBC 0.0 /100 WBC     Blood Culture With MICHAEL - Blood, Arm, Left [[id number]] Collected: 09/29/21 1924    Specimen: Blood from  Arm, Left Updated: 10/01/21 2101     Blood Culture No growth at 2 days    Vancomycin, Trough Please call results to Pharmacy prior to administering next dose [[id number]]  (Abnormal) Collected: 10/01/21 0734    Specimen: Blood Updated: 10/01/21 0809     Vancomycin Trough 4.80 mcg/mL     Comprehensive Metabolic Panel [[id number]]  (Abnormal) Collected: 10/01/21 0423    Specimen: Blood Updated: 10/01/21 0537     Glucose 92 mg/dL      BUN 13 mg/dL      Creatinine 0.63 mg/dL      Sodium 130 mmol/L      Potassium 3.2 mmol/L      Chloride 95 mmol/L      CO2 26.0 mmol/L      Calcium 7.6 mg/dL      Total Protein 5.0 g/dL      Albumin 1.90 g/dL      ALT (SGPT) 19 U/L      AST (SGOT) 24 U/L      Alkaline Phosphatase 95 U/L      Total Bilirubin 0.6 mg/dL      eGFR Non African Amer 123 mL/min/1.73      Globulin 3.1 gm/dL      A/G Ratio 0.6 g/dL      BUN/Creatinine Ratio 20.6     Anion Gap 9.0 mmol/L     Narrative:      GFR Normal >60  Chronic Kidney Disease <60  Kidney Failure <15      Iron Profile [[id number]]  (Abnormal) Collected: 10/01/21 0423    Specimen: Blood Updated: 10/01/21 0534     Iron 17 mcg/dL      Iron Saturation 12 %      Transferrin 94 mg/dL      TIBC 140 mcg/dL     Protime-INR [[id number]]  (Abnormal) Collected: 10/01/21 0423    Specimen: Blood Updated: 10/01/21 0507     Protime 16.4 Seconds      INR 1.38    CBC & Differential [[id number]]  (Abnormal) Collected: 10/01/21 0423    Specimen: Blood Updated: 10/01/21 0501    Narrative:      The following orders were created for panel order CBC & Differential.  Procedure                               Abnormality         Status                     ---------                               -----------         ------                     CBC Auto Differential[[id number]]        Abnormal            Final result                 Please view results for these tests on the individual orders.    CBC Auto Differential [[id number]]  (Abnormal) Collected: 10/01/21 0423    Specimen: Blood  Updated: 10/01/21 0501     WBC 15.85 10*3/mm3      RBC 3.03 10*6/mm3      Hemoglobin 9.0 g/dL      Hematocrit 26.8 %      MCV 88.4 fL      MCH 29.7 pg      MCHC 33.6 g/dL      RDW 14.7 %      RDW-SD 47.0 fl      MPV 9.8 fL      Platelets 380 10*3/mm3      Neutrophil % 83.0 %      Lymphocyte % 8.3 %      Monocyte % 7.6 %      Eosinophil % 0.3 %      Basophil % 0.1 %      Immature Grans % 0.7 %      Neutrophils, Absolute 13.15 10*3/mm3      Lymphocytes, Absolute 1.32 10*3/mm3      Monocytes, Absolute 1.21 10*3/mm3      Eosinophils, Absolute 0.04 10*3/mm3      Basophils, Absolute 0.02 10*3/mm3      Immature Grans, Absolute 0.11 10*3/mm3      nRBC 0.0 /100 WBC     C-reactive Protein [[id number]]  (Abnormal) Collected: 09/30/21 1843    Specimen: Blood Updated: 10/01/21 0231     C-Reactive Protein 22.08 mg/dL     Folate [[id number]]  (Normal) Collected: 09/30/21 0526    Specimen: Blood Updated: 09/30/21 2331     Folate 11.50 ng/mL     Narrative:      Results may be falsely increased if patient taking Biotin.      Vitamin B12 [[id number]]  (Abnormal) Collected: 09/30/21 0526    Specimen: Blood Updated: 09/30/21 2331     Vitamin B-12 >2,000 pg/mL     Narrative:      Results may be falsely increased if patient taking Biotin.      CEA [[id number]] Collected: 09/30/21 1036    Specimen: Blood Updated: 09/30/21 2237     CEA 3.32 ng/mL     Narrative:      CEA Reference Range:    Non Smokers:   Less than 3 ng/mL  Smokers:       Less than 5 ng/mL  Results may be falsely decreased if patient taking Biotin.      Blood Culture ID, PCR - Blood, Arm, Right [[id number]]  (Abnormal) Collected: 09/29/21 1924    Specimen: Blood from Arm, Right Updated: 09/30/21 2016     BCID, PCR Staph spp, not aureus or lugdunesis. Identification by BCID2 PCR.     BOTTLE TYPE Aerobic Bottle    Basic Metabolic Panel [[id number]]  (Abnormal) Collected: 09/30/21 1843    Specimen: Blood Updated: 09/30/21 1923     Glucose 109 mg/dL      BUN 15 mg/dL      Creatinine  "0.72 mg/dL      Sodium 131 mmol/L      Potassium 3.2 mmol/L      Chloride 94 mmol/L      CO2 29.0 mmol/L      Calcium 7.8 mg/dL      eGFR Non African Amer 106 mL/min/1.73      BUN/Creatinine Ratio 20.8     Anion Gap 8.0 mmol/L     Narrative:      GFR Normal >60  Chronic Kidney Disease <60  Kidney Failure <15      Procalcitonin [[id number]]  (Abnormal) Collected: 09/30/21 1843    Specimen: Blood Updated: 09/30/21 1922     Procalcitonin 0.54 ng/mL     Narrative:      As a Marker for Sepsis (Non-Neonates):     1. <0.5 ng/mL represents a low risk of severe sepsis and/or septic shock.  2. >2 ng/mL represents a high risk of severe sepsis and/or septic shock.    As a Marker for Lower Respiratory Tract Infections that require antibiotic therapy:  PCT on Admission     Antibiotic Therapy             6-12 Hrs later  >0.5                          Strongly Recommended            >0.25 - <0.5             Recommended  0.1 - 0.25                  Discouraged                       Remeasure/reassess PCT  <0.1                         Strongly Discouraged         Remeasure/reassess PCT      As 28 day mortality risk marker: \"Change in Procalcitonin Result\" (>80% or <=80%) if Day 0 (or Day 1) and Day 4 values are available. Refer to http://www.[employer]-pct-calculator.com/    Change in PCT <=80 %   A decrease of PCT levels below or equal to 80% defines a positive change in PCT test result representing a higher risk for 28-day all-cause mortality of patients diagnosed with severe sepsis or septic shock.    Change in PCT >80 %   A decrease of PCT levels of more than 80% defines a negative change in PCT result representing a lower risk for 28-day all-cause mortality of patients diagnosed with severe sepsis or septic shock.                Lactic Acid, Plasma [[id number]]  (Normal) Collected: 09/30/21 1843    Specimen: Blood Updated: 09/30/21 1913     Lactate 1.3 mmol/L     Ferritin [[id number]]  (Abnormal) Collected: 09/30/21 0526    Specimen: " Blood Updated: 09/30/21 1231     Ferritin 822.70 ng/mL     Narrative:      Results may be falsely decreased if patient taking Biotin.      Protime-INR [[id number]]  (Abnormal) Collected: 09/30/21 1036    Specimen: Blood Updated: 09/30/21 1058     Protime 17.0 Seconds      INR 1.44    Magnesium [[id number]]  (Normal) Collected: 09/30/21 0526    Specimen: Blood Updated: 09/30/21 0733     Magnesium 1.9 mg/dL     Comprehensive Metabolic Panel [[id number]]  (Abnormal) Collected: 09/30/21 0526    Specimen: Blood Updated: 09/30/21 0709     Glucose 112 mg/dL      BUN 17 mg/dL      Creatinine 0.77 mg/dL      Sodium 131 mmol/L      Potassium 2.4 mmol/L      Chloride 93 mmol/L      CO2 29.0 mmol/L      Calcium 7.5 mg/dL      Total Protein 5.3 g/dL      Albumin 2.00 g/dL      ALT (SGPT) 23 U/L      AST (SGOT) 24 U/L      Alkaline Phosphatase 105 U/L      Total Bilirubin 0.7 mg/dL      eGFR Non African Amer 98 mL/min/1.73      Globulin 3.3 gm/dL      A/G Ratio 0.6 g/dL      BUN/Creatinine Ratio 22.1     Anion Gap 9.0 mmol/L     Narrative:      GFR Normal >60  Chronic Kidney Disease <60  Kidney Failure <15      Lipid Panel [[id number]]  (Abnormal) Collected: 09/30/21 0526    Specimen: Blood Updated: 09/30/21 0702     Total Cholesterol 97 mg/dL      Triglycerides 111 mg/dL      HDL Cholesterol 19 mg/dL      LDL Cholesterol  57 mg/dL      VLDL Cholesterol 21 mg/dL      LDL/HDL Ratio 2.94    Narrative:      Cholesterol Reference Ranges  (U.S. Department of Health and Human Services ATP III Classifications)    Desirable          <200 mg/dL  Borderline High    200-239 mg/dL  High Risk          >240 mg/dL      Triglyceride Reference Ranges  (U.S. Department of Health and Human Services ATP III Classifications)    Normal           <150 mg/dL  Borderline High  150-199 mg/dL  High             200-499 mg/dL  Very High        >500 mg/dL    HDL Reference Ranges  (U.S. Department of Health and Human Services ATP III Classifcations)    Low     <40  mg/dl (major risk factor for CHD)  High    >60 mg/dl ('negative' risk factor for CHD)        LDL Reference Ranges  (U.S. Department of Health and Human Services ATP III Classifcations)    Optimal          <100 mg/dL  Near Optimal     100-129 mg/dL  Borderline High  130-159 mg/dL  High             160-189 mg/dL  Very High        >189 mg/dL    Hemoglobin A1c [[id number]]  (Abnormal) Collected: 09/30/21 0526    Specimen: Blood Updated: 09/30/21 0702     Hemoglobin A1C 6.00 %     Narrative:      Hemoglobin A1C Ranges:    Increased Risk for Diabetes  5.7% to 6.4%  Diabetes                     >= 6.5%  Diabetic Goal                < 7.0%    CBC Auto Differential [[id number]]  (Abnormal) Collected: 09/30/21 0526    Specimen: Blood Updated: 09/30/21 0639     WBC 23.66 10*3/mm3      RBC 3.21 10*6/mm3      Hemoglobin 9.5 g/dL      Hematocrit 28.5 %      MCV 88.8 fL      MCH 29.6 pg      MCHC 33.3 g/dL      RDW 14.6 %      RDW-SD 47.2 fl      MPV 10.7 fL      Platelets 376 10*3/mm3      Neutrophil % 86.3 %      Lymphocyte % 5.6 %      Monocyte % 6.6 %      Eosinophil % 0.0 %      Basophil % 0.1 %      Immature Grans % 1.4 %      Neutrophils, Absolute 20.41 10*3/mm3      Lymphocytes, Absolute 1.33 10*3/mm3      Monocytes, Absolute 1.56 10*3/mm3      Eosinophils, Absolute 0.01 10*3/mm3      Basophils, Absolute 0.02 10*3/mm3      Immature Grans, Absolute 0.33 10*3/mm3      nRBC 0.0 /100 WBC     TSH [[id number]]  (Abnormal) Collected: 09/29/21 1924    Specimen: Blood Updated: 09/29/21 2033     TSH 5.530 uIU/mL     Comprehensive Metabolic Panel [[id number]]  (Abnormal) Collected: 09/29/21 1924    Specimen: Blood Updated: 09/29/21 2029     Glucose 84 mg/dL      BUN 18 mg/dL      Creatinine 0.95 mg/dL      Sodium 133 mmol/L      Potassium 3.0 mmol/L      Chloride 91 mmol/L      CO2 29.0 mmol/L      Calcium 8.1 mg/dL      Total Protein 6.0 g/dL      Albumin 2.40 g/dL      ALT (SGPT) 32 U/L      AST (SGOT) 32 U/L      Alkaline Phosphatase  151 U/L      Total Bilirubin 0.7 mg/dL      eGFR Non African Amer 77 mL/min/1.73      Globulin 3.6 gm/dL      A/G Ratio 0.7 g/dL      BUN/Creatinine Ratio 18.9     Anion Gap 13.0 mmol/L     Narrative:      GFR Normal >60  Chronic Kidney Disease <60  Kidney Failure <15      Amylase [[id number]]  (Abnormal) Collected: 09/29/21 1924    Specimen: Blood Updated: 09/29/21 2026     Amylase 21 U/L     Lipase [[id number]]  (Normal) Collected: 09/29/21 1924    Specimen: Blood Updated: 09/29/21 2024     Lipase 30 U/L     CBC Auto Differential [[id number]]  (Abnormal) Collected: 09/29/21 1924    Specimen: Blood Updated: 09/29/21 2008     WBC 27.34 10*3/mm3      RBC 3.53 10*6/mm3      Hemoglobin 10.4 g/dL      Hematocrit 31.5 %      MCV 89.2 fL      MCH 29.5 pg      MCHC 33.0 g/dL      RDW 14.8 %      RDW-SD 47.9 fl      MPV 10.9 fL      Platelets 371 10*3/mm3      Neutrophil % 86.2 %      Lymphocyte % 6.1 %      Monocyte % 6.4 %      Eosinophil % 0.0 %      Basophil % 0.1 %      Immature Grans % 1.2 %      Neutrophils, Absolute 23.55 10*3/mm3      Lymphocytes, Absolute 1.68 10*3/mm3      Monocytes, Absolute 1.75 10*3/mm3      Eosinophils, Absolute 0.00 10*3/mm3      Basophils, Absolute 0.04 10*3/mm3      Immature Grans, Absolute 0.32 10*3/mm3      nRBC 0.0 /100 WBC     COVID PRE-OP / PRE-PROCEDURE SCREENING ORDER (NO ISOLATION) - Swab, Nasal Cavity [[id number]]  (Normal) Collected: 09/29/21 1638    Specimen: Swab from Nasal Cavity Updated: 09/29/21 1756    Narrative:      The following orders were created for panel order COVID PRE-OP / PRE-PROCEDURE SCREENING ORDER (NO ISOLATION) - Swab, Nasal Cavity.  Procedure                               Abnormality         Status                     ---------                               -----------         ------                     COVID-19,[facility] IN-[facility]...[[id number]]  Normal              Final result                 Please view results for these tests on the individual orders.     COVID-19,[facility] IN-HOUSE,Nasal Swab No Transport Media 3-4 HR TAT - Swab, Nasal Cavity [[id number]]  (Normal) Collected: 09/29/21 1638    Specimen: Swab from Nasal Cavity Updated: 09/29/21 1756     COVID19 Not Detected    Narrative:      Fact sheet for providers: https://www.fda.gov/media/157145/download     Fact sheet for patients: https://www.fda.gov/media/529042/download    Test performed by PCR.    Consider negative results in combination with clinical observations, patient history, and epidemiological information.  Fact sheet for providers: https://www.fda.gov/media/101512/download     Fact sheet for patients: https://www.[place].gov/media/239628/download    Test performed by PCR.    Consider negative results in combination with clinical observations, patient history, and epidemiological information.        Imaging Results (Last 72 Hours)     Procedure Component Value Units Date/Time    MRI Pelvis With & Without Contrast [[id number]] Resulted: 10/01/21 1824     Updated: 10/01/21 1907    CT Guided Abscess Drain Liver [[id number]] Collected: 10/01/21 1606     Updated: 10/01/21 1612    Narrative:      CT GUIDED ABSCESS DRAIN LIVER- 10/1/2021 2:27 PM CDT     INDICATION: abscess; Z74.09-Other reduced mobility; R26.9-Unspecified  abnormalities of gait and mobility     CONSENT: An informed written consent was obtained from the patient after  discussing the risks, benefits, potential complications and  alternatives. All questions answered to the patient's satisfaction.  Automated exposure control (AEC) protocols are utilized on the scanner  to ensure dose lowered technique.      COMPLICATIONS: None.      MEDICATIONS: Local analgesia only     CONTRAST: None.      ESTIMATED BLOOD LOSS: Less than 5 ml.      DOSE LENGTH PRODUCT: 1016 mGy cm. Automated exposure control was also  utilized to decrease patient radiation dose.     PROCEDURE: An informed consent was obtained as above. A formal timeout  was performed prior to the  procedure. Prior to sterile preparation and  local anesthetic, noninfused axial CT scans were obtained  redemonstrating the 2 dominant liver abscesses. The optimal site for  needle aspiration of the left lobe abscess was marked at the skin. The  skin was cleansed with Betadine swabs and sterilely draped. 1% buffered  lidocaine was used to anesthetize the skin and soft tissues down to the  liver capsule. A tiny dermatotomy was made. A 5 [place] 10 cm [employer]  catheter was advanced into the left lobe liver abscess using CT for  guidance. Approximately 20 cc frankly purulent material was aspirated  from this abscess and set aside for microbiology evaluation.     No immediate complications.        Impression:      Successful CT guided biopsy liver abscess aspiration, yielding  approximately 20 cc frankly purulent material. This sample was set aside  for microbiology evaluation.        This report was finalized on 10/01/2021 16:09 by Dr Arie Leon, .    MRI Abdomen With & Without Contrast [[id number]] Collected: 09/30/21 1703     Updated: 09/30/21 1805    Narrative:      MRI OF ABDOMEN WITH AND WITHOUT CONTRAST     HISTORY: Follow-up sigmoid lesion and liver masses/abscesses?;  Z74.09-Other reduced mobility      COMPARISON: CT scan dated 8/31/2021      TECHNIQUE: Multiplanar, multisequential MR imaging of the abdomen was  performed before and after the intravenous administration of contrast.     FINDINGS:     5.2 cm segment 7 rim-enhancing liver abscess showing prominent diffusion  restriction. There are 2 adjacent liver abscesses in liver segment 3,  the larger measuring 4.3 cm and the smaller measuring 1.2 cm, both  displaying prominent diffusion restriction. There appears to be  extension of these liver abscesses beyond the liver capsule, as there is  subcapsular fluid along the anterior, right lateral and dome of the  liver and there is also a developing right lower lobe pulmonary abscess  and empyema. There is also a  what appears to be a subserosal abscess  along the lesser curvature and antrum of the stomach, again displaying a  prominent diffusion restriction. Differential would include subhepatic  space abscess although this does appear to follow the contour of the  stomach closely and appears to be within the wall.     No gross intraperitoneal free fluid. The gallbladder is nondistended.  The biliary tree is nondilated. No pancreatic lesions are identified.  The spleen, adrenal glands and kidneys are unremarkable. No suspicious  adenopathy in the upper abdomen.       Impression:         1. Enlarging liver abscesses in segments 3 and 7 which appear to have  extended beyond the liver capsule. In particular, there is a developing  empyema along the diaphragmatic pleura, posteriorly, as well as  extending up along the posterior costal pleura and there is also a  developing right lower lobe pulmonary abscess. There is also an  elongated subserosal abscess along the lesser curvature and antrum of  the stomach. Differential would include a subhepatic space abscess  although this very closely follows the path and contour of the stomach  and this does appear to be within the wall of the stomach.     Findings and recommendations were discussed with Dr Gu on 9/30/2021  at 5:01 PM CDT.              This report was finalized on 09/30/2021 18:02 by Dr Arie Leon, .    CT Chest With Contrast Diagnostic [[id number]] Collected: 09/30/21 1628     Updated: 09/30/21 1704    Narrative:      CT CHEST W CONTRAST DIAGNOSTIC- 9/30/2021 3:37 PM CDT     HISTORY: Follow-up lung nodule; Z74.09-Other reduced mobility      COMPARISON: CT scan dated 8/31/2021      DOSE LENGTH PRODUCT: 192 mGy cm. Automated exposure control was also  utilized to decrease patient radiation dose.     TECHNIQUE: Serial helical tomographic images of the chest were acquired  following the intravenous infusion of contrast. Multiplanar reformatted  images were provided for  review.     FINDINGS:      Visualized neck base: The imaged portion of the base of the neck appears  unremarkable.      Lungs: Advanced lung emphysema. 1.6 cm superior segment left lower lobe  pulmonary nodule, concerning for primary lung malignancy. There is a new  small empyema identified in the right lung base overlying the more  posterior aspect of the diaphragmatic pleura as well as extending up  along the posterior costal pleura. Additionally, there is a new cavitary  right lower lobe pneumonia, which appears to be developing into a  pulmonary abscess.     Heart: The heart is normal in size. There is no pericardial effusion.     Vasculature: Thoracic aorta is normal in caliber. No dissection  identified. No flow-limiting stenosis identified at the great vessel  origins. The pulmonary arteries are normal in appearance.     Mediastinum and lymph nodes: There is a mild reactive right hilar and  subcarinal adenopathy.     Skeletal and soft tissues: Chest wall soft tissues are unremarkable. No  acute bony abnormality.       Upper abdomen: 4.9 cm segment 7 liver abscess with subcapsular extension  as well as extension above the diaphragm in the right pleural space.  Enlarging left lobe abscesses as well measuring 4.1 and 2.0 cm, in  segment 3, which also appear to be extended beyond the liver capsule.  There is a new subserosal abscess along the gastric wall, mostly along  the antrum, measuring over 10 cm in long axis..           Impression:      1. Worsening of the patient's liver abscesses with extension beyond the  liver capsule. In particular, there is extension of the right liver  abscess above the diaphragm with developing right empyema and right  lower lobe pulmonary abscess. There is also extension of the left liver  abscess into the subhepatic space with formation of a subserosal gastric  wall abscess.  2. Lung emphysema with 1.6 cm superior segment left lower lobe nodule  which is concerning for a primary  lung malignancy.     Findings and recommendations were discussed with Dr Gu on 9/30/2021  at 5:01 PM CDT.     This report was finalized on 09/30/2021 17:01 by Dr Arie Leon, .    XR Chest PA & Lateral [[id number]] Collected: 09/29/21 1951     Updated: 09/29/21 1956    Narrative:      HISTORY: Cough     CXR: 2 views the chest obtained     COMPARISON: 9/24/2021     FINDINGS: There are hazy right lower lobe opacities which may be patchy  atelectasis or pneumonia. Left basilar atelectasis. There is a similar  1.5 cm superior segment left lower lobe cavitary nodule. Cardiac  mediastinal contours normal. No pleural effusion pneumothorax.  Degenerative change of the thoracic spine.       Impression:      1. New patchy right lower lobe opacities may be atelectasis versus  pneumonia. Linear left basilar atelectasis. Stable partially cavitary  superior segment left lower lobe nodule.  This report was finalized on 09/29/2021 19:53 by Dr. Dyan Rashid MD.              Assessment/Plan       Liver mass    Cavitary lesion of lung    Abdominal infection (HCC)    Empyema lung (HCC)      Continue antibiotics.  Support.  Patient in line to transfer to higher level of care.      Shanna English MD  10/02/21  09:37 CDT

## 2021-10-02 NOTE — PROGRESS NOTES
"Infectious Diseases Progress Note    Patient:  Sonu Bravo Jr.  YOB: 1945  MRN: [id number]   Admit date: 9/29/2021   Admitting Physician: Brody Gu MD  Primary Care Physician: Vaughn Kramer DO    Chief Complaint/Interval History: 76-year-old man.  He was seen shortly after head CT.  Discussed with radiologist.  They were able to aspirate some purulent material from the liver.  He is comfortable at present.  He is not describing pain or discomfort.  No cough or shortness of breath.  See HPI    Intake/Output Summary (Last 24 hours) at 10/2/2021 1439  Last data filed at 10/2/2021 1121  Gross per 24 hour   Intake 200 ml   Output --   Net 200 ml     Allergies:   Allergies   Allergen Reactions   • Benadryl [Diphenhydramine] Mental Status Change   • Penicillins Hives   • Sulfa Antibiotics Hives, Itching and Rash   • Sulfamethoxazole-Trimethoprim Hives, Itching and Rash     Current Scheduled Medications:   albuterol, 1.25 mg, Nebulization, 4x Daily - RT   And  ipratropium, 0.5 mg, Nebulization, 4x Daily - RT  budesonide-formoterol, 2 puff, Inhalation, BID - RT  cefepime, 2 g, Intravenous, Q8H  cetirizine, 10 mg, Oral, Daily  dextromethorphan polistirex ER, 60 mg, Oral, Q12H  famotidine, 20 mg, Intravenous, BID  fluticasone, 2 spray, Each Nare, Daily  hydroCHLOROthiazide, 12.5 mg, Oral, Daily  levothyroxine, 100 mcg, Oral, Daily  melatonin, 3 mg, Oral, Nightly  metroNIDAZOLE, 500 mg, Intravenous, Q8H  rosuvastatin, 10 mg, Oral, Nightly  sodium chloride, 10 mL, Intravenous, Q12H      Current PRN Medications:  •  acetaminophen  •  HYDROcodone-homatropine  •  sodium chloride    Review of Systems see     Vital Signs:  /70 (BP Location: Left arm, Patient Position: Lying)   Pulse 67   Temp 97.3 °F (36.3 °C) (Oral)   Resp 20   Ht 177.8 cm (70\")   Wt 85.1 kg (187 lb 11.2 oz)   SpO2 93%   BMI 26.93 kg/m²     Physical Exam  Vital signs - reviewed.  Line/IV site - No erythema, warmth, " induration, or tenderness.  Lungs without crackles or wheezes  Abdomen without significant tenderness    Lab Results:  CBC:   Results from last 7 days   Lab Units 10/02/21  0424 10/01/21  0423 09/30/21  0526 09/29/21  1924   WBC 10*3/mm3 14.51* 15.85* 23.66* 27.34*   HEMOGLOBIN g/dL 9.6* 9.0* 9.5* 10.4*   HEMATOCRIT % 29.0* 26.8* 28.5* 31.5*   PLATELETS 10*3/mm3 418 380 376 371     BMP:  Results from last 7 days   Lab Units 10/02/21  0424 10/01/21  0423 10/01/21  0423 09/30/21  1843 09/30/21  1843 09/30/21  0526 09/30/21  0526 09/29/21  1924 09/29/21  1924   SODIUM mmol/L 132*  --  130*  --  131*  --  131*  --  133*   POTASSIUM mmol/L 3.6  --  3.2*  --  3.2*  --  2.4*  --  3.0*   CHLORIDE mmol/L 96*  --  95*  --  94*  --  93*  --  91*   CO2 mmol/L 27.0  --  26.0  --  29.0  --  29.0  --  29.0   BUN mg/dL 10  --  13  --  15  --  17  --  18   CREATININE mg/dL 0.64*  --  0.63*  --  0.72*  --  0.77  --  0.95   GLUCOSE mg/dL 92   < > 92   < > 109*   < > 112*   < > 84   CALCIUM mg/dL 7.6*  --  7.6*  --  7.8*  --  7.5*  --  8.1*   ALT (SGPT) U/L 19  --  19  --   --   --  23  --  32    < > = values in this interval not displayed.     Culture Results:   Blood Culture   Date Value Ref Range Status   09/29/2021 No growth at 2 days  Preliminary   09/29/2021 Staphylococcus, coagulase negative (C)  Final     Comment:     Probable contaminant requires clinical correlation, susceptibility not performed unless requested by physician.       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

## 2021-10-02 NOTE — PLAN OF CARE
Goal Outcome Evaluation:  Plan of Care Reviewed With: patient        Progress: no change  Outcome Summary: Pt alert and oriented x4, denies pain, VSS, Continue antibiotics. Possible transfer to [facility] next when a bed is available. Wife at bedside.

## 2021-10-02 NOTE — PLAN OF CARE
Goal Outcome Evaluation:  Plan of Care Reviewed With: (P) patient        Progress: (P) no change  Outcome Summary: (P) At beginning of shift pt was irritable; he did not understand why some of his medications from home were being given differently (for example instead of receiving omeprazole at nighttime he is getting pepcid BID).  Wife came out to nurses station asking about the omeprazole.  Explained new order of pepcid.  She voiced understanding, though may need reinforcement.  As night has progressed pt's mood has improved.  No c/o N/V.  Pt states he has not had a bm since 9/23.  Bowel sounds hypoactive. Biopsy puncture alyx MEHTA.  JESUS.

## 2021-10-02 NOTE — PROGRESS NOTES
"[facility] Progress Note    Progress Note    Patient:  Sonu Bravo Jr.  YOB: 1945  Date of Service: 10/2/2021  MRN: [id number]   Acct: [id number]   Primary Care Physician: Vaughn Kramer DO  Advance Directive:   There are no questions and answers to display.     Admit Date: 9/29/2021       Hospital Day: 3      Subjective:     Chief Compliant: No new complaints.    Subjective:   States he had episodes of confusion last evening but is, \"much better this morning.\"  Has eaten breakfast and is now wanting to take a shower.  No recurrent chills.  No fevers.  No shortness of breath at rest.  No worsening cough.  No abdominal pain.  Arrangements for transfer to tertiary care center noted.    Interval history: Sonu Bravo Jr. 76 y.o. male medical patient of Dr. Kramer, whose history includes arthritis, paroxysmal atrial fibrillation, GERD, hyperlipidemia, longstanding tobacco use/nicotine dependence, and COPD.     --08/31/2021-CT angiogram chest.  No PE.  Suspicious 1.5 cm cavitary left lower lobe pulmonary nodule in the superior segment.  Tiny 3 mm subpleural right lower lobe nodule.  Small lymph nodes in the lisa hepatis region in the upper abdomen.     --09/10/2021-liver ultrasound-2 suspicious masses in the liver, 1 in the left hepatic lobe 5.3 cm, 1 in the dome of the right hepatic lobe 6.4 cm.  Hepatic metastasis not excluded.  Unremarkable gallbladder, no biliary ductal dilatation identified.     --09/10/2021-CT abdomen/pelvis with and without contrast.  2 cm segmental thickening of the sigmoid colon concerning for malignancy.  2 large heterogenous liver lesions (right posterior liver 5.5 cm, predominantly centrally hypodense lesion; left liver lobe with similar-appearing 4.8 cm liver lesion) most concerning for metastatic disease     --09/23/2021-was seen at Dr. Kramer office complaining of chills and shaking in the evenings lasting 60 to 90 minutes at a time associated with " weakness, shortness of breath and a dry cough.  Previous Covid testing hospital was negative.     --09/23/2021-hemoglobin 12.3, MCV 88.3, platelets 280,000, WBC 29,000 with 88 point 6 segs.  BUN 31, creatinine 1.16, albumin 2.7, ALT 42, alk phos 197, GFR 61.  ESR 26 (elevated).  Manual blood smear showed leukocytosis with 45% segs, 31% bands, 8 lymphocytes, 1 monocyte, 11 metamyelocytes, 4 myelocytes, 1 nucleated red blood cell.  Comment: Significant leukocytosis with granulocytic left shift with vacuolated granulocytes concerning for bacterial infection     --09/24/2029-blood culture showed no growth at 5 days.     --09/28/2029-colonoscopy.  Impression: 3 diminutive polyps in the rectum removed with hot snare.  3 diminutive polyps at the splenic flexure, removed with hot snare.  Diverticulosis in the sigmoid colon and in the descending colon.  Distal rectum and anal verge are normal on retroflexion view.     --09/30/2021-hemoglobin 9.5; MCV 88.4, platelets 376,000, WBC 23.66 with 86 point 3 segs     --09/30/2021-CT chest with contrast--worsening patient's liver abscess cyst with extension beyond the liver capsule.  In particular extension of the right liver abscess above the diaphragm with developing right empyema and right lower lobe pulmonary abscess.  Also extension of the left liver abscess into the subhepatic space with formation of a subserosal gastric wall abscess.  Lung emphysema with 1.6 cm.  Segment left lower lobe nodule concerning for primary lung malignancy.    --09/30/2021-MRI abdomen with and without contrast-enlarging liver abscesses in segment 3 and 7 which appear to have extended beyond the liver capsule.  In particular there is a developing empyema along the subdiaphragmatic pleura posteriorly as well as extending up along the posterior costal pleura and there is also developing right lower lobe pulmonary abscess.  There is also an elongated subserosal abscess along the lesser curvature and  "antrum of the stomach.  Differential includes subhepatic space abscess although this very closely follows the path and contour of the stomach and appears to be within the wall of the stomach.    --10/01/8971-XJ-uhccvo abscess drain liver-successful CT-guided biopsy liver abscess aspiration yielding approximately 20 cc of frankly purulent material.    Review of Systems  Review of Systems:   Constitutional: Lingering fatigue.  No fever / No chills nor sweats overnight.  HEENT:  No sore throat / No hoarseness / No vision changes  CV:  No chest pain / No palpitations / No orthopnea  Respiratory: Chronic intermittent cough with scant clear phlegm/ No shortness of breath at rest/(+) exertional dyspnea/ No hemoptysis/smoker since age 22 (up to 2 packs/day), currently<1/2 pack/day.  GI: Currently with no nausea / No vomiting /currently with no abdominal pain / No diarrhea / No constipation.  Colonoscopy, 09/28/2021.  :  No dysuria / No hesitancy / No urgency / No hematuria  Neuro: Lingering generalized muscle weakness / No dysphagia / No headache / No paresthesias  Musculoskeletal:  No edema / No cyanosis / No new arthralgias.    Vitals: /57 (BP Location: Left arm, Patient Position: Lying)   Pulse 69   Temp 98.2 °F (36.8 °C) (Oral)   Resp 18   Ht 177.8 cm (70\")   Wt 85.1 kg (187 lb 11.2 oz)   SpO2 95%   BMI 26.93 kg/m²     Physical Exam  Physical Exam:  General appearance: Pleasant, cooperative, obese, modestly kept elderly male who is ambulatory to the bathroom with assistance from his spouse.  \"I am trying to take shower.\"  His spouse Bertha is at the bedside   Skin:  Skin color is a bit pale. No rashes or lesions  HEENT:  Head: Normal, normocephalic, atraumatic.  Neck:  no adenopathy  Lungs:  clear to auscultation bilaterally with overall diminished breath sounds  Heart:  regular rate and rhythm  Abdomen:  soft, obese, non-tender; bowel sounds hypoactive; no obvious masses,  no overt " organomegaly  Extremities:  extremities normal, atraumatic, no cyanosis or edema  Neurologic:  Mental status: Alert, oriented, thought content appropriate    24HR INTAKE/OUTPUT:      Intake/Output Summary (Last 24 hours) at 10/2/2021 1034  Last data filed at 10/2/2021 0858  Gross per 24 hour   Intake 100 ml   Output --   Net 100 ml        Black Catheter: None    Diet: Diet Full Liquid      Medications:   Scheduled Meds:albuterol, 1.25 mg, Nebulization, 4x Daily - RT   And  ipratropium, 0.5 mg, Nebulization, 4x Daily - RT  budesonide-formoterol, 2 puff, Inhalation, BID - RT  cefepime, 2 g, Intravenous, Q8H  cetirizine, 10 mg, Oral, Daily  dextromethorphan polistirex ER, 60 mg, Oral, Q12H  famotidine, 20 mg, Intravenous, BID  fluticasone, 2 spray, Each Nare, Daily  gadobenate dimeglumine, 15 mL, Intravenous, Once in imaging  hydroCHLOROthiazide, 12.5 mg, Oral, Daily  levothyroxine, 100 mcg, Oral, Daily  melatonin, 3 mg, Oral, Nightly  metroNIDAZOLE, 500 mg, Intravenous, Q8H  rosuvastatin, 10 mg, Oral, Nightly  sodium chloride, 10 mL, Intravenous, Q12H        Continuous Infusions:lactated ringers, 125 mL/hr, Last Rate: 125 mL/hr (10/01/21 1808)        Labs:     Results from last 7 days   Lab Units 10/02/21  0424 10/01/21  0423 09/30/21  0526   WBC 10*3/mm3 14.51* 15.85* 23.66*   HEMOGLOBIN g/dL 9.6* 9.0* 9.5*   HEMATOCRIT % 29.0* 26.8* 28.5*   PLATELETS 10*3/mm3 418 380 376     09/30/2021 0526 09/30/2021 1231 Ferritin [[id number]]    (Abnormal)   Blood    Final result Component Value Units   Ferritin 822.70High  ng/mL           09/30/2021 0526 09/30/2021 2331 Folate [[id number]]    Blood    Final result Component Value Units   Folate 11.50 ng/mL           09/30/2021 0526 09/30/2021 2331 Vitamin B12 [[id number]]    (Abnormal)   Blood    Final result Component Value Units   Vitamin B-12 >2,000High               10/01/2021 0423 10/01/2021 0534 Iron Profile [[id number]]   (Abnormal)   Blood    Final result Component Value  Units   Iron 17Low  mcg/dL   Iron Saturation 12Low  %   Transferrin 94Low  mg/dL   TIBC 140Low  mcg/dL        09/30/2021 1036 09/30/2021 2237 CEA [[id number]]    Blood    Final result Component Value Units   CEA 3.32 ng/mL            Results from last 7 days   Lab Units 10/02/21  0424 10/01/21  0423 09/30/21  1843 09/30/21  0526 09/30/21  0526   SODIUM mmol/L 132* 130* 131*   < > 131*   POTASSIUM mmol/L 3.6 3.2* 3.2*   < > 2.4*   CHLORIDE mmol/L 96* 95* 94*   < > 93*   CO2 mmol/L 27.0 26.0 29.0   < > 29.0   BUN mg/dL 10 13 15   < > 17   CREATININE mg/dL 0.64* 0.63* 0.72*   < > 0.77   CALCIUM mg/dL 7.6* 7.6* 7.8*   < > 7.5*   BILIRUBIN mg/dL 0.6 0.6  --   --  0.7   ALK PHOS U/L 96 95  --   --  105   ALT (SGPT) U/L 19 19  --   --  23   AST (SGOT) U/L 29 24  --   --  24   GLUCOSE mg/dL 92 92 109*   < > 112*    < > = values in this interval not displayed.       ABG:  No results found for: PHART, PO2ART, QZS3KJV    Culture Data:   Blood Culture   Date Value Ref Range Status   09/29/2021 No growth at 24 hours  Preliminary   09/29/2021 Abnormal Stain (C)  Preliminary   09/24/2021 Fusobacterium species (C)  Corrected     Comment:     Beta lactamase negative    Appended report. These results have been appended to a previously final verified report.   09/24/2021 No growth at 5 days  Final       Lab Results   Component Value Date    PATHINTERP  09/23/2021     Mild normochromic normocytic anemia    Leukocytosis with the following manual differential:  Neutrophils 45%  Bands 31%  Lymphocytes 8%  Monocytes 1%  Metamyelocytes 11%  Myelocytes 4%  Nucleated red blood cells 1  Platelets within normal range    Comment: There is a significant leukocytosis with a granulocytic left shift with vacuolated granulocytes present concerning for bacterial infection.       Radiology:     Imaging Results (Last 7 Days)     Procedure Component Value Units Date/Time    MRI Pelvis With & Without Contrast [[id number]] Resulted: 10/01/21 1829      Updated: 10/01/21 1907    CT Guided Abscess Drain Liver [[id number]] Collected: 10/01/21 1606     Updated: 10/01/21 1612    Narrative:      CT GUIDED ABSCESS DRAIN LIVER- 10/1/2021 2:27 PM CDT     INDICATION: abscess; Z74.09-Other reduced mobility; R26.9-Unspecified  abnormalities of gait and mobility     CONSENT: An informed written consent was obtained from the patient after  discussing the risks, benefits, potential complications and  alternatives. All questions answered to the patient's satisfaction.  Automated exposure control (AEC) protocols are utilized on the scanner  to ensure dose lowered technique.      COMPLICATIONS: None.      MEDICATIONS: Local analgesia only     CONTRAST: None.      ESTIMATED BLOOD LOSS: Less than 5 ml.      DOSE LENGTH PRODUCT: 1016 mGy cm. Automated exposure control was also  utilized to decrease patient radiation dose.     PROCEDURE: An informed consent was obtained as above. A formal timeout  was performed prior to the procedure. Prior to sterile preparation and  local anesthetic, noninfused axial CT scans were obtained  redemonstrating the 2 dominant liver abscesses. The optimal site for  needle aspiration of the left lobe abscess was marked at the skin. The  skin was cleansed with Betadine swabs and sterilely draped. 1% buffered  lidocaine was used to anesthetize the skin and soft tissues down to the  liver capsule. A tiny dermatotomy was made. A 5 Polish 10 cm Curious Hateh  catheter was advanced into the left lobe liver abscess using CT for  guidance. Approximately 20 cc frankly purulent material was aspirated  from this abscess and set aside for microbiology evaluation.     No immediate complications.        Impression:      Successful CT guided biopsy liver abscess aspiration, yielding  approximately 20 cc frankly purulent material. This sample was set aside  for microbiology evaluation.        This report was finalized on 10/01/2021 16:09 by Dr Arie Leon, .    MRI Abdomen With &  Without Contrast [[id number]] Collected: 09/30/21 1703     Updated: 09/30/21 1805    Narrative:      MRI OF ABDOMEN WITH AND WITHOUT CONTRAST     HISTORY: Follow-up sigmoid lesion and liver masses/abscesses?;  Z74.09-Other reduced mobility      COMPARISON: CT scan dated 8/31/2021      TECHNIQUE: Multiplanar, multisequential MR imaging of the abdomen was  performed before and after the intravenous administration of contrast.     FINDINGS:     5.2 cm segment 7 rim-enhancing liver abscess showing prominent diffusion  restriction. There are 2 adjacent liver abscesses in liver segment 3,  the larger measuring 4.3 cm and the smaller measuring 1.2 cm, both  displaying prominent diffusion restriction. There appears to be  extension of these liver abscesses beyond the liver capsule, as there is  subcapsular fluid along the anterior, right lateral and dome of the  liver and there is also a developing right lower lobe pulmonary abscess  and empyema. There is also a what appears to be a subserosal abscess  along the lesser curvature and antrum of the stomach, again displaying a  prominent diffusion restriction. Differential would include subhepatic  space abscess although this does appear to follow the contour of the  stomach closely and appears to be within the wall.     No gross intraperitoneal free fluid. The gallbladder is nondistended.  The biliary tree is nondilated. No pancreatic lesions are identified.  The spleen, adrenal glands and kidneys are unremarkable. No suspicious  adenopathy in the upper abdomen.       Impression:         1. Enlarging liver abscesses in segments 3 and 7 which appear to have  extended beyond the liver capsule. In particular, there is a developing  empyema along the diaphragmatic pleura, posteriorly, as well as  extending up along the posterior costal pleura and there is also a  developing right lower lobe pulmonary abscess. There is also an  elongated subserosal abscess along the lesser  curvature and antrum of  the stomach. Differential would include a subhepatic space abscess  although this very closely follows the path and contour of the stomach  and this does appear to be within the wall of the stomach.     Findings and recommendations were discussed with Dr Gu on 9/30/2021  at 5:01 PM CDT.              This report was finalized on 09/30/2021 18:02 by Dr Arie Leon, .    CT Chest With Contrast Diagnostic [[id number]] Collected: 09/30/21 1628     Updated: 09/30/21 1704    Narrative:      CT CHEST W CONTRAST DIAGNOSTIC- 9/30/2021 3:37 PM CDT     HISTORY: Follow-up lung nodule; Z74.09-Other reduced mobility      COMPARISON: CT scan dated 8/31/2021      DOSE LENGTH PRODUCT: 192 mGy cm. Automated exposure control was also  utilized to decrease patient radiation dose.     TECHNIQUE: Serial helical tomographic images of the chest were acquired  following the intravenous infusion of contrast. Multiplanar reformatted  images were provided for review.     FINDINGS:      Visualized neck base: The imaged portion of the base of the neck appears  unremarkable.      Lungs: Advanced lung emphysema. 1.6 cm superior segment left lower lobe  pulmonary nodule, concerning for primary lung malignancy. There is a new  small empyema identified in the right lung base overlying the more  posterior aspect of the diaphragmatic pleura as well as extending up  along the posterior costal pleura. Additionally, there is a new cavitary  right lower lobe pneumonia, which appears to be developing into a  pulmonary abscess.     Heart: The heart is normal in size. There is no pericardial effusion.     Vasculature: Thoracic aorta is normal in caliber. No dissection  identified. No flow-limiting stenosis identified at the great vessel  origins. The pulmonary arteries are normal in appearance.     Mediastinum and lymph nodes: There is a mild reactive right hilar and  subcarinal adenopathy.     Skeletal and soft tissues: Chest  wall soft tissues are unremarkable. No  acute bony abnormality.       Upper abdomen: 4.9 cm segment 7 liver abscess with subcapsular extension  as well as extension above the diaphragm in the right pleural space.  Enlarging left lobe abscesses as well measuring 4.1 and 2.0 cm, in  segment 3, which also appear to be extended beyond the liver capsule.  There is a new subserosal abscess along the gastric wall, mostly along  the antrum, measuring over 10 cm in long axis..           Impression:      1. Worsening of the patient's liver abscesses with extension beyond the  liver capsule. In particular, there is extension of the right liver  abscess above the diaphragm with developing right empyema and right  lower lobe pulmonary abscess. There is also extension of the left liver  abscess into the subhepatic space with formation of a subserosal gastric  wall abscess.  2. Lung emphysema with 1.6 cm superior segment left lower lobe nodule  which is concerning for a primary lung malignancy.     Findings and recommendations were discussed with Dr Gu on 9/30/2021  at 5:01 PM CDT.     This report was finalized on 09/30/2021 17:01 by Dr Arie Leon, .    XR Chest PA & Lateral [[id number]] Collected: 09/29/21 1951     Updated: 09/29/21 1956    Narrative:      HISTORY: Cough     CXR: 2 views the chest obtained     COMPARISON: 9/24/2021     FINDINGS: There are hazy right lower lobe opacities which may be patchy  atelectasis or pneumonia. Left basilar atelectasis. There is a similar  1.5 cm superior segment left lower lobe cavitary nodule. Cardiac  mediastinal contours normal. No pleural effusion pneumothorax.  Degenerative change of the thoracic spine.       Impression:      1. New patchy right lower lobe opacities may be atelectasis versus  pneumonia. Linear left basilar atelectasis. Stable partially cavitary  superior segment left lower lobe nodule.  This report was finalized on 09/29/2021 19:53 by Dr. Dyan Rashid MD.             Objective:       Problem list:     Liver mass    Cavitary lesion of lung    Abdominal infection (HCC)    Empyema lung (HCC)        Assessment/Plan:       ASSESSMENT:   1.   Liver masses.  Radiographically suspicious for abscesses.  --09/10/2021-liver ultrasound-2 suspicious masses in the liver, 1 in the left hepatic lobe 5.3 cm, 1 in the dome of the right hepatic lobe 6.4 cm.  Hepatic metastasis not excluded.  Unremarkable gallbladder, no biliary ductal dilatation identified.  --09/10/2021-CT abdomen/pelvis with and without contrast.  2 cm segmental thickening of the sigmoid colon concerning for malignancy.  2 large heterogenous liver lesions (right posterior liver 5.5 cm, predominantly centrally hypodense lesion; left liver lobe with similar-appearing 4.8 cm liver lesion) most concerning for metastatic disease  --09/30/2021-MRI abdomen.  Enlarging liver abscesses which appear to have extended beyond the liver capsule.  In particular there is a developing empyema along the subdiaphragmatic pleura, posteriorly as well as extending up the posterior costal pleura and there is also developing right lower lobe pulmonary abscess.  There is an elongated subserosal abscess along the lesser curvature and antrum of the stomach.  Differential includes subhepatic space abscess although this very closely follows the path and contour of the stomach and does appear to be within the wall of the stomach.  --10/01/7783-TD-ptjoac abscess drain liver-successful CT-guided biopsy liver abscess aspiration yielding approximately 20 cc of frankly purulent material.       2.   Segmental thickening of the sigmoid colon, low abdominal pain, chills and neutrophilic leukocytosis.  Acute diverticulitis?  ----09/28/2029-colonoscopy.  Impression: 3 diminutive polyps in the rectum removed with hot snare.  3 diminutive polyps at the splenic flexure, removed with hot snare.  Diverticulosis in the sigmoid colon and in the descending colon.   Distal rectum and anal verge are normal on retroflexion view.  3.   Left lower lobe pulmonary nodule.    Will need clarification down the road.  --08/31/2021-CT angiogram chest.  No PE.  Suspicious 1.5 cm cavitary left lower lobe pulmonary nodule in the superior segment.  Tiny 3 mm subpleural right lower lobe nodule.  Small lymph nodes in the lisa hepatis region in the upper abdomen.  --09/30/2021-CT chest.  Worsening liver abscesses with extension beyond the liver capsule.  In particular there is extension of the right liver abscess above the diaphragm with developing right empyema and right lower lobe pulmonary abscess.  Also extension of the left liver abscess into the sub hepatic space with formation of a subserosal gastric wall abscess.  Lung emphysema with 1.6 cm superior segment left lower lobe nodule concerning for primary lung malignancy.  4.   Neutrophilic leukocytosis reactive to ongoing bacterial infection.  Improving.  5.    Positive blood cultures.  Dr. Garza (ID) now following:  --09/29/2021- staph coagulase-negative (possible contaminant)   --09/24/2021-Fusobacterium species.    6.   Normocytic anemia.  Components from iron deficiency, and iron under utilization/anemia of chronic disease.  --Hemoglobin 9.6; MCV 87.9, 10/02/2021 (prior range: Hemoglobin 9.0 -15.4; MCV 85.3-94.2)  7.    Neutrophilic leukocytosis, reactive.  Improving   8.    Longstanding tobacco use/nicotine dependence (beginning age 22-up to 2 packs/day, currently < 1/2 pack/day)  9.   Paroxysmal atrial fibrillation  10.   Hyperlipidemia     Recommendations:  1.   Apprised of the available diagnostic information, including the current labs showing improving leukocytosis, stable/improving anemia,  2.    Note the percutaneous liver abscess drainage, 10/01/2021 (above).  Cultures pending.    3.    Previously apprised of abdominal MRI and CT chest findings (above) indicating enlarging liver abscesses with extension beyond the liver  capsule with developing empyema along the subdiaphragmatic pleura and possible developing right lower lobe pulmonary abscess.  1.6 cm superior segment left lower lobe nodule concerning for primary lung malignancy will need clarification down the road.  4.   Surgical, ID and pulmonary consults noted and appreciated.  Recommendations for tertiary care transfer for open drainage of abscesses noted.  In line to transfer to higher level of care.  5.   Continue other general medical management and support (including IV antibiotics).  6.   Transfuse RBCs if hemoglobin < 8 if symptomatic  7.   Care discussed with the patient, and his spouse Bertha at the bedside         I spent 30 total minutes, face-to-face, caring for Sonu today.  Greater than 50% of this time involved counseling and/or coordination of care as documented within this note regarding the patient's illness(es), pros and cons of various treatment options, instructions and/or risk reduction.

## 2021-10-02 NOTE — PROGRESS NOTES
"Infectious Diseases Progress Note    Patient:  Sonu Bravo Jr.  YOB: 1945  MRN: [id number]   Admit date: 9/29/2021   Admitting Physician: Brody Gu MD  Primary Care Physician: Vaughn Kramer DO    Chief Complaint/Interval History: He feels okay.  He has no pain or discomfort.  No fevers or chills.  No nausea or vomiting.  No diarrhea.  Breathing comfortably at present.    Intake/Output Summary (Last 24 hours) at 10/2/2021 1531  Last data filed at 10/2/2021 1121  Gross per 24 hour   Intake 200 ml   Output --   Net 200 ml     Allergies:   Allergies   Allergen Reactions   • Benadryl [Diphenhydramine] Mental Status Change   • Penicillins Hives   • Sulfa Antibiotics Hives, Itching and Rash   • Sulfamethoxazole-Trimethoprim Hives, Itching and Rash     Current Scheduled Medications:   albuterol, 1.25 mg, Nebulization, 4x Daily - RT   And  ipratropium, 0.5 mg, Nebulization, 4x Daily - RT  budesonide-formoterol, 2 puff, Inhalation, BID - RT  cefepime, 2 g, Intravenous, Q8H  cetirizine, 10 mg, Oral, Daily  dextromethorphan polistirex ER, 60 mg, Oral, Q12H  famotidine, 20 mg, Intravenous, BID  fluticasone, 2 spray, Each Nare, Daily  hydroCHLOROthiazide, 12.5 mg, Oral, Daily  levothyroxine, 100 mcg, Oral, Daily  melatonin, 3 mg, Oral, Nightly  metroNIDAZOLE, 500 mg, Intravenous, Q8H  rosuvastatin, 10 mg, Oral, Nightly  sodium chloride, 10 mL, Intravenous, Q12H      Current PRN Medications:  •  acetaminophen  •  HYDROcodone-homatropine  •  sodium chloride    Review of Systems see HPI    Vital Signs:  /70 (BP Location: Left arm, Patient Position: Lying)   Pulse 62   Temp 97.3 °F (36.3 °C) (Oral)   Resp 18   Ht 177.8 cm (70\")   Wt 85.1 kg (187 lb 11.2 oz)   SpO2 92%   BMI 26.93 kg/m²     Physical Exam  Vital signs - reviewed.  Line/IV site - No erythema, warmth, induration, or tenderness.  Abdomen soft and nontender.  No guarding or rebound.  Lungs clear without crackles.    Lab " Results:  CBC:   Results from last 7 days   Lab Units 10/02/21  0424 10/01/21  0423 09/30/21  0526 09/29/21  1924   WBC 10*3/mm3 14.51* 15.85* 23.66* 27.34*   HEMOGLOBIN g/dL 9.6* 9.0* 9.5* 10.4*   HEMATOCRIT % 29.0* 26.8* 28.5* 31.5*   PLATELETS 10*3/mm3 418 380 376 371     BMP:  Results from last 7 days   Lab Units 10/02/21  0424 10/01/21  0423 10/01/21  0423 09/30/21  1843 09/30/21  1843 09/30/21  0526 09/30/21  0526 09/29/21  1924 09/29/21  1924   SODIUM mmol/L 132*  --  130*  --  131*  --  131*  --  133*   POTASSIUM mmol/L 3.6  --  3.2*  --  3.2*  --  2.4*  --  3.0*   CHLORIDE mmol/L 96*  --  95*  --  94*  --  93*  --  91*   CO2 mmol/L 27.0  --  26.0  --  29.0  --  29.0  --  29.0   BUN mg/dL 10  --  13  --  15  --  17  --  18   CREATININE mg/dL 0.64*  --  0.63*  --  0.72*  --  0.77  --  0.95   GLUCOSE mg/dL 92   < > 92   < > 109*   < > 112*   < > 84   CALCIUM mg/dL 7.6*  --  7.6*  --  7.8*  --  7.5*  --  8.1*   ALT (SGPT) U/L 19  --  19  --   --   --  23  --  32    < > = values in this interval not displayed.     Culture Results:   Blood Culture   Date Value Ref Range Status   09/29/2021 No growth at 2 days  Preliminary   09/29/2021 Staphylococcus, coagulase negative (C)  Final     Comment:     Probable contaminant requires clinical correlation, susceptibility not performed unless requested by physician.       Liver lesion aspirate collected yesterday-culture pending    Radiology:     MRI of pelvis done this morning:  IMPRESSION:  1. Sigmoid colon diverticulosis and nonspecific wall thickening.  This report was finalized on 10/02/2021 15:28 by Dr. Cb Elliott MD.    CT-guided aspirate of liver lesion done yesterday:  IMPRESSION:  Successful CT guided biopsy liver abscess aspiration, yielding  approximately 20 cc frankly purulent material. This sample was set aside  for microbiology evaluation.  This report was finalized on 10/01/2021 16:09 by Dr Arie Leon, .    Additional Studies Reviewed:  None    Impression:   Liver abnormality-probable abscess  Right lung process-likely empyema  Positive blood culture for fusobacterium-emanating from above  Left lung nodule concerning for malignancy on CT    Recommendations:   Continue cefepime  Continue metronidazole  Await liver aspirate cultures  Continue to follow    Julien Sharma MD

## 2021-10-02 NOTE — PROGRESS NOTES
[facility]  INPATIENT PROGRESS NOTE    Length of Stay: 3  Date of Admission: 9/29/2021  Primary Care Physician: Vaughn Kramer DO    Subjective   Chief Complaint: Liver abscess/empyema/left lung nodule/gastric wall abscess    HPI   Again a long discussion MRI CT scan result with wife.  Patient no acute distress.  Patient not require any oxygen.  Patient having chest pain or abdomen pain.  T-max 99.4.  T-current 97.3.  Blood pressure stable.  Leukocytes continue to improve.    Review of Systems   Constitutional: Positive for activity change, appetite change and fatigue. Negative for chills and fever.   HENT: Negative for hearing loss, nosebleeds, tinnitus and trouble swallowing.    Eyes: Negative for visual disturbance.   Respiratory: Negative for cough, chest tightness, shortness of breath and wheezing.    Cardiovascular: Negative for chest pain, palpitations and leg swelling.   Gastrointestinal: Negative for abdominal distention, abdominal pain, blood in stool, constipation, diarrhea, nausea and vomiting.   Endocrine: Negative for cold intolerance, heat intolerance, polydipsia, polyphagia and polyuria.   Genitourinary: Negative for decreased urine volume, difficulty urinating, dysuria, flank pain, frequency and hematuria.   Musculoskeletal: Positive for arthralgias, gait problem and myalgias. Negative for joint swelling.   Skin: Negative for rash.   Allergic/Immunologic: Negative for immunocompromised state.   Neurological: Positive for weakness. Negative for dizziness, syncope, light-headedness and headaches.   Hematological: Negative for adenopathy. Does not bruise/bleed easily.   Psychiatric/Behavioral: Negative for confusion and sleep disturbance. The patient is not nervous/anxious.      All pertinent negatives and positives are as above. All other systems have been reviewed and are negative unless otherwise stated.     Objective    Temp:  [97.3 °F  (36.3 °C)-99.4 °F (37.4 °C)] 97.3 °F (36.3 °C)  Heart Rate:  [60-80] 67  Resp:  [16-20] 20  BP: (138-164)/(57-78) 138/70    Intake/Output Summary (Last 24 hours) at 10/2/2021 1303  Last data filed at 10/2/2021 1121  Gross per 24 hour   Intake 200 ml   Output --   Net 200 ml     Physical Exam  Vitals and nursing note reviewed.   Constitutional:       Appearance: He is well-developed.   HENT:      Head: Normocephalic and atraumatic.   Eyes:      Conjunctiva/sclera: Conjunctivae normal.      Pupils: Pupils are equal, round, and reactive to light.   Neck:      Vascular: No JVD.   Cardiovascular:      Rate and Rhythm: Normal rate and regular rhythm.      Heart sounds: Normal heart sounds. No murmur heard.   No friction rub. No gallop.    Pulmonary:      Effort: Pulmonary effort is normal. No respiratory distress.      Breath sounds: Normal breath sounds. No wheezing or rales.   Chest:      Chest wall: No tenderness.   Abdominal:      General: Bowel sounds are normal. There is no distension.      Palpations: Abdomen is soft.      Tenderness: There is no abdominal tenderness. There is no guarding or rebound.   Musculoskeletal:         General: No tenderness or deformity. Normal range of motion.      Cervical back: Neck supple.   Skin:     General: Skin is warm and dry.      Findings: No rash.   Neurological:      Mental Status: He is alert and oriented to person, place, and time.      Cranial Nerves: No cranial nerve deficit.      Motor: No abnormal muscle tone.      Deep Tendon Reflexes: Reflexes normal.   Psychiatric:         Behavior: Behavior normal.         Thought Content: Thought content normal.         Judgment: Judgment normal.   Results Review:  Lab Results (last 24 hours)     Procedure Component Value Units Date/Time    KOH Prep - Body Fluid, Liver [[id number]] Collected: 10/01/21 1538    Specimen: Body Fluid from Liver Updated: 10/02/21 1037     KOH Prep No yeast or hyphal elements seen    Body Fluid Culture -  Body Fluid, Liver [[id number]] Collected: 10/01/21 1528    Specimen: Body Fluid from Liver Updated: 10/02/21 1037     Gram Stain Many (4+) WBCs per low power field      No organisms seen    Anaerobic Culture - Aspirate, Liver [[id number]] Collected: 10/01/21 1528    Specimen: Aspirate from Liver Updated: 10/02/21 0958    Fungus Culture - Body Fluid, Liver [[id number]] Collected: 10/01/21 1538    Specimen: Body Fluid from Liver Updated: 10/02/21 0956    Blood Culture With MICHAEL - Blood, Arm, Right [[id number]]  (Abnormal) Collected: 09/29/21 1924    Specimen: Blood from Arm, Right Updated: 10/02/21 0715     Blood Culture Staphylococcus, coagulase negative     Comment: Probable contaminant requires clinical correlation, susceptibility not performed unless requested by physician.          Isolated from Aerobic Bottle     Gram Stain Aerobic Bottle Gram positive cocci in clusters    Comprehensive Metabolic Panel [[id number]]  (Abnormal) Collected: 10/02/21 0424    Specimen: Blood Updated: 10/02/21 0536     Glucose 92 mg/dL      BUN 10 mg/dL      Creatinine 0.64 mg/dL      Sodium 132 mmol/L      Potassium 3.6 mmol/L      Chloride 96 mmol/L      CO2 27.0 mmol/L      Calcium 7.6 mg/dL      Total Protein 5.1 g/dL      Albumin 1.80 g/dL      ALT (SGPT) 19 U/L      AST (SGOT) 29 U/L      Alkaline Phosphatase 96 U/L      Total Bilirubin 0.6 mg/dL      eGFR Non African Amer 121 mL/min/1.73      Globulin 3.3 gm/dL      A/G Ratio 0.5 g/dL      BUN/Creatinine Ratio 15.6     Anion Gap 9.0 mmol/L     Narrative:      GFR Normal >60  Chronic Kidney Disease <60  Kidney Failure <15      CBC & Differential [[id number]]  (Abnormal) Collected: 10/02/21 0424    Specimen: Blood Updated: 10/02/21 0509    Narrative:      The following orders were created for panel order CBC & Differential.  Procedure                               Abnormality         Status                     ---------                               -----------         ------                      CBC Auto Differential[[id number]]        Abnormal            Final result                 Please view results for these tests on the individual orders.    CBC Auto Differential [[id number]]  (Abnormal) Collected: 10/02/21 0424    Specimen: Blood Updated: 10/02/21 0509     WBC 14.51 10*3/mm3      RBC 3.30 10*6/mm3      Hemoglobin 9.6 g/dL      Hematocrit 29.0 %      MCV 87.9 fL      MCH 29.1 pg      MCHC 33.1 g/dL      RDW 14.6 %      RDW-SD 46.9 fl      MPV 10.1 fL      Platelets 418 10*3/mm3      Neutrophil % 82.1 %      Lymphocyte % 7.5 %      Monocyte % 9.0 %      Eosinophil % 0.3 %      Basophil % 0.2 %      Immature Grans % 0.9 %      Neutrophils, Absolute 11.91 10*3/mm3      Lymphocytes, Absolute 1.09 10*3/mm3      Monocytes, Absolute 1.30 10*3/mm3      Eosinophils, Absolute 0.05 10*3/mm3      Basophils, Absolute 0.03 10*3/mm3      Immature Grans, Absolute 0.13 10*3/mm3      nRBC 0.0 /100 WBC     Blood Culture With MICHAEL - Blood, Arm, Left [[id number]] Collected: 09/29/21 1924    Specimen: Blood from Arm, Left Updated: 10/01/21 2101     Blood Culture No growth at 2 days           Cultures:  Blood Culture   Date Value Ref Range Status   09/29/2021 No growth at 2 days  Preliminary   09/29/2021 Staphylococcus, coagulase negative (C)  Final     Comment:     Probable contaminant requires clinical correlation, susceptibility not performed unless requested by physician.         Radiology Data:    Imaging Results (Last 24 Hours)     Procedure Component Value Units Date/Time    MRI Pelvis With & Without Contrast [[id number]] Resulted: 10/01/21 1824     Updated: 10/01/21 1907          Allergies   Allergen Reactions   • Benadryl [Diphenhydramine] Mental Status Change   • Penicillins Hives   • Sulfa Antibiotics Hives, Itching and Rash   • Sulfamethoxazole-Trimethoprim Hives, Itching and Rash       Scheduled meds:   albuterol, 1.25 mg, Nebulization, 4x Daily - RT   And  ipratropium, 0.5 mg, Nebulization, 4x Daily -  RT  budesonide-formoterol, 2 puff, Inhalation, BID - RT  cefepime, 2 g, Intravenous, Q8H  cetirizine, 10 mg, Oral, Daily  dextromethorphan polistirex ER, 60 mg, Oral, Q12H  famotidine, 20 mg, Intravenous, BID  fluticasone, 2 spray, Each Nare, Daily  gadobenate dimeglumine, 15 mL, Intravenous, Once in imaging  hydroCHLOROthiazide, 12.5 mg, Oral, Daily  levothyroxine, 100 mcg, Oral, Daily  melatonin, 3 mg, Oral, Nightly  metroNIDAZOLE, 500 mg, Intravenous, Q8H  rosuvastatin, 10 mg, Oral, Nightly  sodium chloride, 10 mL, Intravenous, Q12H        PRN meds:  •  acetaminophen  •  HYDROcodone-homatropine  •  sodium chloride    Assessment/Plan       Liver mass    Cavitary lesion of lung    Abdominal infection (HCC)    Empyema lung (HCC)      Plan:  Liver abscess-extend beyond liver capsule-right liver abscess above the diaphragm developing right empyema and right lower lobe pulmonary abscess/left liver abscess into the subhepatic space with the formation of abscess subserosal gastric wall abscess/Questionable segmental thickness of the sigmoid colon.  General surgery consult.  Infectious disease consult.  Oncology consult.    Continue Flagyl and cefepime antibiotics.  Continue IV hydration  Recommendation from general surgery sigmoid diverticulitis complicated by bacteremia and liver abscess formation x2-continue IV antibiotics and percutaneous drainage.  MRI of abdomen-Enlarging liver abscesses in segments 3 and 7 which appear to have extended beyond the liver capsule- developing empyema along the diaphragmatic pleural- posteriorly- as well as  extending up along the posterior costal pleura and there is also a developing right lower lobe pulmonary abscess, also an elongated subserosal abscess along the lesser curvature and antrum of  the stomach-subhepatic space abscess although this very closely follows the path and contour of the stomach and this does appear to be within the wall of the stomach.  Status post abscess  drainage 10/1/2020 1-20 cc purulent discharge removed.  Recommendation from general surgery-recommendation due to advancements of abscess, patient be transferred to tertiary care for definitive drainage-possible open liver abscess drainage.  Waiting list for transfer to .     History of atrial fib/hypertension/hyperlipidemia.  Eliquis has been on hold.  Continue Lovenox therapeutic. Continues bisoprolol.  Continue hydrochlorthiazide.  Continue Crestor.  Lopressor as needed.  Echocardiogram 8/31/2021-ejection fraction 66 to 70%, mild concentric hypertrophy, diastolic dysfunction, no significant valvular dysfunction.      Leukocytosis.  Improving.     Cavitary lesion/left lower lobe nodule 1.6 cm/coughing.  DuoNebs 4 times daily.    Duo nebs.  Incentive spirometry.  Delsym.  Hycodan.    Pulmonary consult.  Chest x-ray-New patchy right lower lobe opacities may be atelectasis versus pneumonia- Linear left basilar atelectasis,  Stable partially cavitary superior segment left lower lobe nodule.  CT  scan of the chest-Worsening of the patient's liver abscesses with extension beyond the liver capsule- extension of the right liver abscess above the diaphragm with developing right empyema and right lower lobe pulmonary abscess- also extension of the left liver abscess into the subhepatic space with formation of a subserosal gastric wall abscess, Lung emphysema with 1.6 cm superior segment left lower lobe nodule which is concerning for a primary lung malignancy.     Hypokalemia.  Resolved.  Magnesium level-normal.       Hyponatremia.   Stable.     Chronic smoker.  Patient does not want nicotine patch.  Discussed with patient stopping quitting.     Insomnia.  Melatonin.     Hypothyroidism.  Continue Synthroid.     Reflux.  Pepcid.  Zofran as needed.     Nutrition.  Regular diet.  Nutrition supplement.      Deconditioning.  PT and OT consult.     Blood culture 9/29/2021 -no growth 2 days in 1 bottle and other shows   coagulase-negative staph coccus-probably contamination.  Previous blood culture 9/24/2021 cells fusobacterium 1 out of 4 bottles.  Covid-19-negative.  Anaerobic culture pending.  Body fluid culture pending.  Fungal culture pending.  KH O prep negative.     Discharge Planning: Waiting for transfer to . [facility] and [facility] have all declined.  Patient is currently on the waiting list at  for transfer.    Electronically signed by Brody Gu MD, 10/02/21, 1:03 PM CDT.

## 2021-10-03 LAB
ALBUMIN SERPL-MCNC: 1.9 G/DL (ref 3.5–5.2)
ALBUMIN/GLOB SERPL: 0.6 G/DL
ALP SERPL-CCNC: 104 U/L (ref 39–117)
ALT SERPL W P-5'-P-CCNC: 19 U/L (ref 1–41)
ANION GAP SERPL CALCULATED.3IONS-SCNC: 8 MMOL/L (ref 5–15)
AST SERPL-CCNC: 29 U/L (ref 1–40)
BASOPHILS # BLD AUTO: 0.06 10*3/MM3 (ref 0–0.2)
BASOPHILS NFR BLD AUTO: 0.5 % (ref 0–1.5)
BILIRUB SERPL-MCNC: 0.4 MG/DL (ref 0–1.2)
BUN SERPL-MCNC: 10 MG/DL (ref 8–23)
BUN/CREAT SERPL: 13.5 (ref 7–25)
CALCIUM SPEC-SCNC: 7.5 MG/DL (ref 8.6–10.5)
CHLORIDE SERPL-SCNC: 98 MMOL/L (ref 98–107)
CO2 SERPL-SCNC: 27 MMOL/L (ref 22–29)
CREAT SERPL-MCNC: 0.74 MG/DL (ref 0.76–1.27)
DEPRECATED RDW RBC AUTO: 46.5 FL (ref 37–54)
EOSINOPHIL # BLD AUTO: 0.08 10*3/MM3 (ref 0–0.4)
EOSINOPHIL NFR BLD AUTO: 0.7 % (ref 0.3–6.2)
ERYTHROCYTE [DISTWIDTH] IN BLOOD BY AUTOMATED COUNT: 14.9 % (ref 12.3–15.4)
GFR SERPL CREATININE-BSD FRML MDRD: 103 ML/MIN/1.73
GLOBULIN UR ELPH-MCNC: 3.1 GM/DL
GLUCOSE SERPL-MCNC: 140 MG/DL (ref 65–99)
HCT VFR BLD AUTO: 28.6 % (ref 37.5–51)
HGB BLD-MCNC: 9.4 G/DL (ref 13–17.7)
IMM GRANULOCYTES # BLD AUTO: 0.13 10*3/MM3 (ref 0–0.05)
IMM GRANULOCYTES NFR BLD AUTO: 1.1 % (ref 0–0.5)
LYMPHOCYTES # BLD AUTO: 1.24 10*3/MM3 (ref 0.7–3.1)
LYMPHOCYTES NFR BLD AUTO: 10.9 % (ref 19.6–45.3)
MCH RBC QN AUTO: 28.2 PG (ref 26.6–33)
MCHC RBC AUTO-ENTMCNC: 32.9 G/DL (ref 31.5–35.7)
MCV RBC AUTO: 85.9 FL (ref 79–97)
MONOCYTES # BLD AUTO: 1.34 10*3/MM3 (ref 0.1–0.9)
MONOCYTES NFR BLD AUTO: 11.7 % (ref 5–12)
NEUTROPHILS NFR BLD AUTO: 75.1 % (ref 42.7–76)
NEUTROPHILS NFR BLD AUTO: 8.57 10*3/MM3 (ref 1.7–7)
NRBC BLD AUTO-RTO: 0 /100 WBC (ref 0–0.2)
PLATELET # BLD AUTO: 459 10*3/MM3 (ref 140–450)
PMV BLD AUTO: 9.6 FL (ref 6–12)
POTASSIUM SERPL-SCNC: 3.2 MMOL/L (ref 3.5–5.2)
PROT SERPL-MCNC: 5 G/DL (ref 6–8.5)
RBC # BLD AUTO: 3.33 10*6/MM3 (ref 4.14–5.8)
SODIUM SERPL-SCNC: 133 MMOL/L (ref 136–145)
WBC # BLD AUTO: 11.42 10*3/MM3 (ref 3.4–10.8)

## 2021-10-03 PROCEDURE — 99232 SBSQ HOSP IP/OBS MODERATE 35: CPT | Performed by: INTERNAL MEDICINE

## 2021-10-03 PROCEDURE — 99233 SBSQ HOSP IP/OBS HIGH 50: CPT | Performed by: INTERNAL MEDICINE

## 2021-10-03 PROCEDURE — 25010000002 CEFEPIME PER 500 MG: Performed by: FAMILY MEDICINE

## 2021-10-03 PROCEDURE — 85025 COMPLETE CBC W/AUTO DIFF WBC: CPT | Performed by: FAMILY MEDICINE

## 2021-10-03 PROCEDURE — 94799 UNLISTED PULMONARY SVC/PX: CPT

## 2021-10-03 PROCEDURE — 94664 DEMO&/EVAL PT USE INHALER: CPT

## 2021-10-03 PROCEDURE — 80053 COMPREHEN METABOLIC PANEL: CPT | Performed by: FAMILY MEDICINE

## 2021-10-03 PROCEDURE — 25010000002 ENOXAPARIN PER 10 MG: Performed by: FAMILY MEDICINE

## 2021-10-03 RX ORDER — POTASSIUM CHLORIDE 750 MG/1
40 CAPSULE, EXTENDED RELEASE ORAL 2 TIMES DAILY
Status: COMPLETED | OUTPATIENT
Start: 2021-10-03 | End: 2021-10-03

## 2021-10-03 RX ADMIN — POTASSIUM CHLORIDE 40 MEQ: 10 CAPSULE, COATED, EXTENDED RELEASE ORAL at 09:06

## 2021-10-03 RX ADMIN — ROSUVASTATIN CALCIUM 10 MG: 10 TABLET, FILM COATED ORAL at 20:59

## 2021-10-03 RX ADMIN — ENOXAPARIN SODIUM 90 MG: 100 INJECTION SUBCUTANEOUS at 17:04

## 2021-10-03 RX ADMIN — FAMOTIDINE 20 MG: 10 INJECTION INTRAVENOUS at 09:00

## 2021-10-03 RX ADMIN — ALBUTEROL SULFATE 1.25 MG: 2.5 SOLUTION RESPIRATORY (INHALATION) at 10:48

## 2021-10-03 RX ADMIN — BUDESONIDE AND FORMOTEROL FUMARATE DIHYDRATE 2 PUFF: 160; 4.5 AEROSOL RESPIRATORY (INHALATION) at 21:35

## 2021-10-03 RX ADMIN — IPRATROPIUM BROMIDE 0.5 MG: 0.5 SOLUTION RESPIRATORY (INHALATION) at 21:34

## 2021-10-03 RX ADMIN — CETIRIZINE HYDROCHLORIDE 10 MG: 10 TABLET ORAL at 09:00

## 2021-10-03 RX ADMIN — Medication 3 MG: at 20:59

## 2021-10-03 RX ADMIN — ALBUTEROL SULFATE 1.25 MG: 2.5 SOLUTION RESPIRATORY (INHALATION) at 21:34

## 2021-10-03 RX ADMIN — IPRATROPIUM BROMIDE 0.5 MG: 0.5 SOLUTION RESPIRATORY (INHALATION) at 06:56

## 2021-10-03 RX ADMIN — CEFEPIME HYDROCHLORIDE 2 G: 2 INJECTION, POWDER, FOR SOLUTION INTRAVENOUS at 01:38

## 2021-10-03 RX ADMIN — SODIUM CHLORIDE, PRESERVATIVE FREE 10 ML: 5 INJECTION INTRAVENOUS at 21:03

## 2021-10-03 RX ADMIN — HYDROCHLOROTHIAZIDE 12.5 MG: 25 TABLET ORAL at 09:00

## 2021-10-03 RX ADMIN — FLUTICASONE PROPIONATE 2 SPRAY: 50 SPRAY, METERED NASAL at 09:00

## 2021-10-03 RX ADMIN — BISOPROLOL FUMARATE 10 MG: 10 TABLET ORAL at 08:59

## 2021-10-03 RX ADMIN — SODIUM CHLORIDE, PRESERVATIVE FREE 10 ML: 5 INJECTION INTRAVENOUS at 09:00

## 2021-10-03 RX ADMIN — ENOXAPARIN SODIUM 90 MG: 100 INJECTION SUBCUTANEOUS at 06:03

## 2021-10-03 RX ADMIN — FAMOTIDINE 20 MG: 10 INJECTION INTRAVENOUS at 21:02

## 2021-10-03 RX ADMIN — METRONIDAZOLE 500 MG: 500 INJECTION, SOLUTION INTRAVENOUS at 03:17

## 2021-10-03 RX ADMIN — SODIUM CHLORIDE, POTASSIUM CHLORIDE, SODIUM LACTATE AND CALCIUM CHLORIDE 125 ML/HR: 600; 310; 30; 20 INJECTION, SOLUTION INTRAVENOUS at 20:59

## 2021-10-03 RX ADMIN — HYDROCODONE BITARTRATE AND HOMATROPINE METHYLBROMIDE 10 ML: 5; 1.5 SOLUTION ORAL at 09:00

## 2021-10-03 RX ADMIN — IPRATROPIUM BROMIDE 0.5 MG: 0.5 SOLUTION RESPIRATORY (INHALATION) at 10:48

## 2021-10-03 RX ADMIN — ALBUTEROL SULFATE 1.25 MG: 2.5 SOLUTION RESPIRATORY (INHALATION) at 14:57

## 2021-10-03 RX ADMIN — ALBUTEROL SULFATE 1.25 MG: 2.5 SOLUTION RESPIRATORY (INHALATION) at 06:56

## 2021-10-03 RX ADMIN — BUDESONIDE AND FORMOTEROL FUMARATE DIHYDRATE 2 PUFF: 160; 4.5 AEROSOL RESPIRATORY (INHALATION) at 06:56

## 2021-10-03 RX ADMIN — IPRATROPIUM BROMIDE 0.5 MG: 0.5 SOLUTION RESPIRATORY (INHALATION) at 14:57

## 2021-10-03 RX ADMIN — CEFEPIME HYDROCHLORIDE 2 G: 2 INJECTION, POWDER, FOR SOLUTION INTRAVENOUS at 09:00

## 2021-10-03 RX ADMIN — HYDROCODONE BITARTRATE AND HOMATROPINE METHYLBROMIDE 10 ML: 5; 1.5 SOLUTION ORAL at 20:59

## 2021-10-03 RX ADMIN — CEFEPIME HYDROCHLORIDE 2 G: 2 INJECTION, POWDER, FOR SOLUTION INTRAVENOUS at 16:18

## 2021-10-03 RX ADMIN — POTASSIUM CHLORIDE 40 MEQ: 10 CAPSULE, COATED, EXTENDED RELEASE ORAL at 20:59

## 2021-10-03 RX ADMIN — METRONIDAZOLE 500 MG: 500 INJECTION, SOLUTION INTRAVENOUS at 13:06

## 2021-10-03 RX ADMIN — LEVOTHYROXINE SODIUM 100 MCG: 100 TABLET ORAL at 09:00

## 2021-10-03 RX ADMIN — METRONIDAZOLE 500 MG: 500 INJECTION, SOLUTION INTRAVENOUS at 19:35

## 2021-10-03 NOTE — PROGRESS NOTES
PULMONARY AND CRITICAL CARE PROGRESS NOTE - [facility]    Patient: Sonu Bravo Jr.  1945   MR# [id number]   Acct# [id number]  10/03/21   07:36 CDT  Referring Provider: Brody Gu MD    Chief Complaint: Liver abscess, lung nodule, infiltrates   Interval history:  Patient is awake and resting in bedside chair with wife at bedside. He is a little more fatigued today. He had a run of afib last night however he states he did not feel it. He converted at ~0100. Micro pending from liver aspirate. He is afebrile and remains on room air.   Meds:  albuterol, 1.25 mg, Nebulization, 4x Daily - RT   And  ipratropium, 0.5 mg, Nebulization, 4x Daily - RT  bisoprolol, 10 mg, Oral, Daily  budesonide-formoterol, 2 puff, Inhalation, BID - RT  cefepime, 2 g, Intravenous, Q8H  cetirizine, 10 mg, Oral, Daily  dextromethorphan polistirex ER, 60 mg, Oral, Q12H  enoxaparin, 1 mg/kg, Subcutaneous, Q12H  famotidine, 20 mg, Intravenous, BID  fluticasone, 2 spray, Each Nare, Daily  hydroCHLOROthiazide, 12.5 mg, Oral, Daily  levothyroxine, 100 mcg, Oral, Daily  melatonin, 3 mg, Oral, Nightly  metroNIDAZOLE, 500 mg, Intravenous, Q8H  potassium chloride, 40 mEq, Oral, BID  rosuvastatin, 10 mg, Oral, Nightly  sodium chloride, 10 mL, Intravenous, Q12H      lactated ringers, 125 mL/hr, Last Rate: 125 mL/hr (10/02/21 2342)      Review of Systems:   Review of Systems   Constitutional: Positive for chills, fatigue and fever-resolved  HENT: Negative for sore throat.         Negative hemoptysis    Eyes: Negative for discharge and itching.   Respiratory: Positive for cough and shortness of breath (exertional ).    Gastrointestinal: Positive for nausea. Negative for abdominal pain, diarrhea and vomiting.   Genitourinary: Negative for difficulty urinating and dysuria.   Musculoskeletal: Negative for gait problem and neck pain.   Skin: Negative for rash and wound.   Neurological: Negative for dizziness and syncope.    Psychiatric/Behavioral: Negative for agitation and behavioral problems.   Physical Exam:  SpO2 Percentage    10/03/21 0047 10/03/21 0416 10/03/21 0656   SpO2: 94% 92% 92%     Temp:  [97.3 °F (36.3 °C)-98.8 °F (37.1 °C)] 98.8 °F (37.1 °C)  Heart Rate:  [] 68  Resp:  [16-20] 16  BP: (134-148)/(61-78) 134/67    Intake/Output Summary (Last 24 hours) at 10/3/2021 0736  Last data filed at 10/2/2021 1702  Gross per 24 hour   Intake 300 ml   Output --   Net 300 ml     Physical Exam   Vitals and nursing note reviewed.   Constitutional:       Appearance: Normal appearance.   HENT:      Head: Normocephalic and atraumatic.      Nose: Nose normal.      Mouth/Throat:      Mouth: Mucous membranes are moist.   Eyes:      Conjunctiva/sclera: Conjunctivae normal.      Pupils: Pupils are equal, round, and reactive to light.   Cardiovascular:      Rate and Rhythm: Normal rate and regular rhythm.      Heart sounds: No murmur heard.   No friction rub. No gallop.    Pulmonary:      Effort: Pulmonary effort is normal.      Breath sounds: Normal breath sounds.   Abdominal:      General: Bowel sounds are normal.      Palpations: Abdomen is soft.   Musculoskeletal:         General: No swelling.      Cervical back: Normal range of motion.      Right lower leg: No edema.      Left lower leg: No edema.   Skin:     General: Skin is warm and dry.   Neurological:      General: No focal deficit present.      Mental Status: He is oriented to person, place, and time.   Psychiatric:         Mood and Affect: Mood normal.         Behavior: Behavior normal.         Thought Content: Thought content normal.         Judgment: Judgment normal    Laboratory Data:  Results from last 7 days   Lab Units 10/03/21  0409 10/02/21  0424 10/01/21  0423   WBC 10*3/mm3 11.42* 14.51* 15.85*   HEMOGLOBIN g/dL 9.4* 9.6* 9.0*   PLATELETS 10*3/mm3 459* 418 380     Results from last 7 days   Lab Units 10/03/21  0409 10/02/21  0424 10/01/21  0423 09/30/21  1841  09/30/21  1036   SODIUM mmol/L 133* 132* 130*   < >  --    POTASSIUM mmol/L 3.2* 3.6 3.2*   < >  --    BUN mg/dL 10 10 13   < >  --    CREATININE mg/dL 0.74* 0.64* 0.63*   < >  --    INR   --   --  1.38*  --  1.44*    < > = values in this interval not displayed.         Blood Culture   Date Value Ref Range Status   09/29/2021 No growth at 24 hours  Preliminary   09/29/2021 Abnormal Stain (C)  Preliminary   09/24/2021 Fusobacterium species (C)  Corrected     Comment:     Beta lactamase negative    Appended report. These results have been appended to a previously final verified report.   09/24/2021 No growth at 5 days  Final     Recent films:  MRI Pelvis With & Without Contrast    Result Date: 10/2/2021  1. Sigmoid colon diverticulosis and nonspecific wall thickening. This report was finalized on 10/02/2021 15:28 by Dr. Cb Elliott MD.    CT Guided Abscess Drain Liver    Result Date: 10/1/2021  Successful CT guided biopsy liver abscess aspiration, yielding approximately 20 cc frankly purulent material. This sample was set aside for microbiology evaluation.   This report was finalized on 10/01/2021 16:09 by Dr Arie Leon, .    Films reviewed personally by me.  My interpretation: no new imaging 10/3/2021    Pulmonary Assessment:  1. Lung nodule, 1.6 cm LLL  2. Right lung consolidation possible abscess/ empyema  3. Liver abnormalities probable abscess  4. Segmental thickening of the sigmoid colon  5. Paroxysmal atrial fib -xarelto on hold   6. Tobacco abuse, quitting with this hospitalization   7. Hypokalemia   8. Hyponatremia      Recommend/plan:   · ID managing positive blood culture, antibiotics, flagyl   · Will obtain respiratory culture if he has productive changes to cough   · Awaiting to here from  for possible higher level of care transfer. [facility] and [facility] have all declined.   · CT surgery-no surgery at this time  · Continue antibiotics    · S/P liver abscess CT needle biopsy  10/01/21- micro pending, KOH negative     Electronically signed by MARY Pablo, 10/03/21, 07:36 CDT     ATTESTATION OF CLINICAL NOTE:  I have reviewed the notes, assessments, and/or procedures performed by MARY Ferguson, I concur with her/his documentation of Sonu Bravo Jr..  Patient was seen in the follow-up visit in pulmonary rounds in medical floor today.    He is doing overall well and remains afebrile but is very tired and lethargic.  He is on room air and respiratory status stable.  He has positive blood culture and is currently on IV antibiotics and Flagyl.  He has multiple abscess and there is abscess in the liver and patient is recommended to be transferred to a higher care center in [facility] or [place] and he is currently waiting for a bed.  The CT surgery seen the patient there is no indication for surgery at this time.  Patient had a cavitary lung nodule which is probably going to be followed by outpatient clinic follow-up but no needed intervention is planned.  He is a smoker but his respiratory status is stable and he is currently not requiring any supplemental oxygen.    Physical examination patient is an elderly  male lying in the bed looks tired and lethargic.HEENT: Atraumatic normocephalic.  Neck: Supple no mass nuclear medicine mention.  Heart: Sounds normal rate and rhythm regular no murmur.  Lungs: Bibasilar crackles.  Abdomen: Soft nondistended nontender.  Extremities: No edema normal pulses normal color.  Neurologic: Grossly intact.  Skin: No breakdown.    Continue current treatment plan and routine respiratory care and bronchodilator treatment.  Patient is on room air.  Incentive spirometry encouraged.  Continue IV antibiotics and Flagyl per ID recommendations.  Patient has liver abscess and is to transfer to a higher care center for further intervention and patient is waiting for bed.  Plan for outpatient work-up for COPD.  The cavitary  lung lesion will most likely be followed by serial CT scan and no interventions planned at this moment.  CT-guided needle biopsy of the liver abscess is currently in the lab and further work-up in progress.  Continue lab work DVT and cephalexin pain anxiety control.  Continue nutritional support and physical activity as tolerated.  Status: Full.  Overall prognosis: Guarded.  Pulmonary team will continue following and further recommendations.    I have seen and examined patient personally, performing a face-to-face diagnostic evaluation with plan of care reviewed and developed with APRN and nursing staff. I have addended and/or modified the above history of present illness, physical examination, and assessment and plan to reflect my findings and impressions. Essential elements of the care plan were discussed with APRN above.  Agree with findings and assessment/plan as documented above.    Jeannine Gloria MD  Pulmonologist/Intensivist  10/3/2021 15:15 CDT

## 2021-10-03 NOTE — PROGRESS NOTES
[facility] Medicine Services  INPATIENT PROGRESS NOTE    Length of Stay: 4  Date of Admission: 9/29/2021  Primary Care Physician: Vaughn Kramer DO    Subjective   Chief Complaint: Liver abscess/empyema/left lung nodule/gastric wall abscess    HPI   Patient is doing well.  Afebrile.  Leukocytosis continue to improve.  Blood pressure stable.  Patient had episode atrial last night, patient was started back on Lovenox, started back on beta-blocker.  Patient still remain weak.    Review of Systems   Constitutional: Positive for activity change, appetite change and fatigue. Negative for chills and fever.   HENT: Negative for hearing loss, nosebleeds, tinnitus and trouble swallowing.    Eyes: Negative for visual disturbance.   Respiratory: Negative for cough, chest tightness, shortness of breath and wheezing.    Cardiovascular: Negative for chest pain, palpitations and leg swelling.   Gastrointestinal: Negative for abdominal distention, abdominal pain, blood in stool, constipation, diarrhea, nausea and vomiting.   Endocrine: Negative for cold intolerance, heat intolerance, polydipsia, polyphagia and polyuria.   Genitourinary: Negative for decreased urine volume, difficulty urinating, dysuria, flank pain, frequency and hematuria.   Musculoskeletal: Positive for arthralgias, gait problem and myalgias. Negative for joint swelling.   Skin: Negative for rash.   Allergic/Immunologic: Negative for immunocompromised state.   Neurological: Positive for weakness. Negative for dizziness, syncope, light-headedness and headaches.   Hematological: Negative for adenopathy. Does not bruise/bleed easily.   Psychiatric/Behavioral: Negative for confusion and sleep disturbance. The patient is not nervous/anxious.         All pertinent negatives and positives are as above. All other systems have been reviewed and are negative unless otherwise stated.     Objective    Temp:  [97.5 °F (36.4 °C)-98.8  °F (37.1 °C)] 98.1 °F (36.7 °C)  Heart Rate:  [] 60  Resp:  [16-20] 18  BP: (134-148)/(53-78) 145/53    Intake/Output Summary (Last 24 hours) at 10/3/2021 1147  Last data filed at 10/3/2021 0859  Gross per 24 hour   Intake 200 ml   Output --   Net 200 ml     Physical Exam  Vitals and nursing note reviewed.   Constitutional:       Appearance: He is well-developed.   HENT:      Head: Normocephalic and atraumatic.   Eyes:      Conjunctiva/sclera: Conjunctivae normal.      Pupils: Pupils are equal, round, and reactive to light.   Neck:      Vascular: No JVD.   Cardiovascular:      Rate and Rhythm: Normal rate and regular rhythm.      Heart sounds: Normal heart sounds. No murmur heard.   No friction rub. No gallop.    Pulmonary:      Effort: Pulmonary effort is normal. No respiratory distress.      Breath sounds: Normal breath sounds. No wheezing or rales.   Chest:      Chest wall: No tenderness.   Abdominal:      General: Bowel sounds are normal. There is no distension.      Palpations: Abdomen is soft.      Tenderness: There is no abdominal tenderness. There is no guarding or rebound.   Musculoskeletal:         General: No tenderness or deformity. Normal range of motion.      Cervical back: Neck supple.   Skin:     General: Skin is warm and dry.      Findings: No rash.   Neurological:      Mental Status: He is alert and oriented to person, place, and time.      Cranial Nerves: No cranial nerve deficit.      Motor: No abnormal muscle tone.      Deep Tendon Reflexes: Reflexes normal.   Psychiatric:         Behavior: Behavior normal.         Thought Content: Thought content normal.         Judgment: Judgment normal.   Results Review:  Lab Results (last 24 hours)     Procedure Component Value Units Date/Time    Body Fluid Culture - Body Fluid, Liver [[id number]] Collected: 10/01/21 1528    Specimen: Body Fluid from Liver Updated: 10/03/21 0937     Body Fluid Culture No growth     Gram Stain Many (4+) WBCs per low  power field      No organisms seen    Comprehensive Metabolic Panel [[id number]]  (Abnormal) Collected: 10/03/21 0409    Specimen: Blood Updated: 10/03/21 0508     Glucose 140 mg/dL      BUN 10 mg/dL      Creatinine 0.74 mg/dL      Sodium 133 mmol/L      Potassium 3.2 mmol/L      Chloride 98 mmol/L      CO2 27.0 mmol/L      Calcium 7.5 mg/dL      Total Protein 5.0 g/dL      Albumin 1.90 g/dL      ALT (SGPT) 19 U/L      AST (SGOT) 29 U/L      Alkaline Phosphatase 104 U/L      Total Bilirubin 0.4 mg/dL      eGFR Non African Amer 103 mL/min/1.73      Globulin 3.1 gm/dL      A/G Ratio 0.6 g/dL      BUN/Creatinine Ratio 13.5     Anion Gap 8.0 mmol/L     Narrative:      GFR Normal >60  Chronic Kidney Disease <60  Kidney Failure <15      CBC & Differential [[id number]]  (Abnormal) Collected: 10/03/21 0409    Specimen: Blood Updated: 10/03/21 0448    Narrative:      The following orders were created for panel order CBC & Differential.  Procedure                               Abnormality         Status                     ---------                               -----------         ------                     CBC Auto Differential[[id number]]        Abnormal            Final result                 Please view results for these tests on the individual orders.    CBC Auto Differential [[id number]]  (Abnormal) Collected: 10/03/21 0409    Specimen: Blood Updated: 10/03/21 0448     WBC 11.42 10*3/mm3      RBC 3.33 10*6/mm3      Hemoglobin 9.4 g/dL      Hematocrit 28.6 %      MCV 85.9 fL      MCH 28.2 pg      MCHC 32.9 g/dL      RDW 14.9 %      RDW-SD 46.5 fl      MPV 9.6 fL      Platelets 459 10*3/mm3      Neutrophil % 75.1 %      Lymphocyte % 10.9 %      Monocyte % 11.7 %      Eosinophil % 0.7 %      Basophil % 0.5 %      Immature Grans % 1.1 %      Neutrophils, Absolute 8.57 10*3/mm3      Lymphocytes, Absolute 1.24 10*3/mm3      Monocytes, Absolute 1.34 10*3/mm3      Eosinophils, Absolute 0.08 10*3/mm3      Basophils, Absolute 0.06  10*3/mm3      Immature Grans, Absolute 0.13 10*3/mm3      nRBC 0.0 /100 WBC     Blood Culture With MICHAEL - Blood, Arm, Left [[id number]] Collected: 09/29/21 1924    Specimen: Blood from Arm, Left Updated: 10/02/21 2100     Blood Culture No growth at 3 days           Cultures:  Blood Culture   Date Value Ref Range Status   09/29/2021 No growth at 3 days  Preliminary   09/29/2021 Staphylococcus, coagulase negative (C)  Final     Comment:     Probable contaminant requires clinical correlation, susceptibility not performed unless requested by physician.         Radiology Data:    Imaging Results (Last 24 Hours)     ** No results found for the last 24 hours. **          Allergies   Allergen Reactions   • Benadryl [Diphenhydramine] Mental Status Change   • Penicillins Hives   • Sulfa Antibiotics Hives, Itching and Rash   • Sulfamethoxazole-Trimethoprim Hives, Itching and Rash       Scheduled meds:   albuterol, 1.25 mg, Nebulization, 4x Daily - RT   And  ipratropium, 0.5 mg, Nebulization, 4x Daily - RT  bisoprolol, 10 mg, Oral, Daily  budesonide-formoterol, 2 puff, Inhalation, BID - RT  cefepime, 2 g, Intravenous, Q8H  cetirizine, 10 mg, Oral, Daily  dextromethorphan polistirex ER, 60 mg, Oral, Q12H  enoxaparin, 1 mg/kg, Subcutaneous, Q12H  famotidine, 20 mg, Intravenous, BID  fluticasone, 2 spray, Each Nare, Daily  hydroCHLOROthiazide, 12.5 mg, Oral, Daily  levothyroxine, 100 mcg, Oral, Daily  melatonin, 3 mg, Oral, Nightly  metroNIDAZOLE, 500 mg, Intravenous, Q8H  potassium chloride, 40 mEq, Oral, BID  rosuvastatin, 10 mg, Oral, Nightly  sodium chloride, 10 mL, Intravenous, Q12H        PRN meds:  •  acetaminophen  •  HYDROcodone-homatropine  •  metoprolol tartrate  •  sodium chloride    Assessment/Plan       Liver mass    Cavitary lesion of lung    Abdominal infection (HCC)    Empyema lung (HCC)      Plan:  Liver abscess-extend beyond liver capsule-right liver abscess above the diaphragm developing right empyema and right  lower lobe pulmonary abscess/left liver abscess into the subhepatic space with the formation of abscess subserosal gastric wall abscess/Questionable segmental thickness of the sigmoid colon.  General surgery consult.  Infectious disease consult.  Oncology consult.    Continue Flagyl and cefepime antibiotics.  Continue IV hydration  Recommendation from general surgery sigmoid diverticulitis complicated by bacteremia and liver abscess formation x2-continue IV antibiotics and percutaneous drainage.  MRI of abdomen-Enlarging liver abscesses in segments 3 and 7 which appear to have extended beyond the liver capsule- developing empyema along the diaphragmatic pleural- posteriorly- as well as  extending up along the posterior costal pleura and there is also a developing right lower lobe pulmonary abscess, also an elongated subserosal abscess along the lesser curvature and antrum of  the stomach-subhepatic space abscess although this very closely follows the path and contour of the stomach and this does appear to be within the wall of the stomach.  Status post abscess drainage 10/1/2020 1-20 cc purulent discharge removed.  Recommendation from general surgery-recommendation due to advancements of abscess, patient be transferred to tertiary care for definitive drainage-possible open liver abscess drainage.  Waiting list for transfer to .     History of atrial fib/hypertension/hyperlipidemia.  Eliquis has been on hold.  Continue Lovenox therapeutic. Continues bisoprolol.  Continue hydrochlorthiazide.  Continue Crestor.  Lopressor as needed.  Echocardiogram 8/31/2021-ejection fraction 66 to 70%, mild concentric hypertrophy, diastolic dysfunction, no significant valvular dysfunction.      Leukocytosis.  Improving.     Cavitary lesion/left lower lobe nodule 1.6 cm/coughing.  DuoNebs 4 times daily.    Duo nebs.  Incentive spirometry.  Delsym.  Hycodan.    Pulmonary consult.  Chest x-ray-New patchy right lower lobe opacities may  be atelectasis versus pneumonia- Linear left basilar atelectasis,  Stable partially cavitary superior segment left lower lobe nodule.  CT  scan of the chest-Worsening of the patient's liver abscesses with extension beyond the liver capsule- extension of the right liver abscess above the diaphragm with developing right empyema and right lower lobe pulmonary abscess- also extension of the left liver abscess into the subhepatic space with formation of a subserosal gastric wall abscess, Lung emphysema with 1.6 cm superior segment left lower lobe nodule which is concerning for a primary lung malignancy.     Hypokalemia.  P.o. potassium.  Magnesium level-normal.      Anemia.  Hemoglobin stable.  No sign of acute bleed.     Hyponatremia.   Stable.     Chronic smoker.  Patient does not want nicotine patch.  Discussed with patient stopping quitting.     Insomnia.  Melatonin.     Hypothyroidism.  Continue Synthroid.     Reflux.  Pepcid.  Zofran as needed.     Nutrition.  Regular diet.  Nutrition supplement.       Deconditioning.  PT and OT consult.     Blood culture 9/29/2021 -no growth 2 days in 1 bottle and other shows  coagulase-negative staph coccus-probably contamination.  Previous blood culture 9/24/2021 cells fusobacterium 1 out of 4 bottles.  Covid-19-negative.  Anaerobic culture pending.  Body fluid-no growth. Fungal culture pending.  Koh prep negative.     Discharge Planning: Waiting for transfer to . [facility] and [facility] have all declined.  Patient is currently on the waiting list at  for transfer.    Electronically signed by Brody Gu MD, 10/03/21, 11:47 AM CDT.

## 2021-10-03 NOTE — PLAN OF CARE
Goal Outcome Evaluation:  Plan of Care Reviewed With: patient           Outcome Summary: Patient is alert and oriented times 4, up ad shobha, denies any pain or discomfort, wife at bedside, converted to AFIB at the start of this shift new orders implemented, converted back to NSR at 0102, VSS, Tele in place, safety maintained, will continue to monitor.

## 2021-10-03 NOTE — PROGRESS NOTES
Shanna English MD Progress Note     LOS: 4 days   Patient Care Team:  Vaughn Kramer DO as PCP - General (Internal Medicine)  Thomas Dc DO as Consulting Physician (Gastroenterology)  Tien Canchola MD as Consulting Physician (Pulmonary Disease)        Subjective     Little more fatigued today.    Objective     Vital Signs  Temp:  [97.3 °F (36.3 °C)-98.8 °F (37.1 °C)] 98.6 °F (37 °C)  Heart Rate:  [] 70  Resp:  [16-20] 18  BP: (134-148)/(61-78) 141/66    Intake & Output (last 3 days)       09/30 0701 - 10/01 0700 10/01 0701 - 10/02 0700 10/02 0701 - 10/03 0700 10/03 0701 - 10/04 0700    P.O. 560       I.V. (mL/kg) 1000 (12.9)       IV Piggyback 850  300 100    Total Intake(mL/kg) 2410 (31.2)  300 (3.5) 100 (1.2)    Urine (mL/kg/hr)        Total Output        Net +2410  +300 +100            Urine Unmeasured Occurrence  1 x            Physical Exam:     General Appearance:    Alert, cooperative, in no acute distress   Lungs:     respirations regular, even and unlabored    Heart:    Regular rhythm and normal rate, normal S1 and S2, no            murmur, no gallop, no rub   Chest Wall:    No abnormalities observed   Abdomen:      Minimal to no tenderness in the lower abdomen or upper abdomen nondistended   Extremities: No edema,    Results Review:     I reviewed the patient's new clinical results.    Lab Results (last 72 hours)     Procedure Component Value Units Date/Time    Body Fluid Culture - Body Fluid, Liver [[id number]] Collected: 10/01/21 1528    Specimen: Body Fluid from Liver Updated: 10/03/21 0937     Body Fluid Culture No growth     Gram Stain Many (4+) WBCs per low power field      No organisms seen    Comprehensive Metabolic Panel [[id number]]  (Abnormal) Collected: 10/03/21 0409    Specimen: Blood Updated: 10/03/21 0508     Glucose 140 mg/dL      BUN 10 mg/dL      Creatinine 0.74 mg/dL      Sodium 133 mmol/L      Potassium 3.2 mmol/L      Chloride 98 mmol/L      CO2 27.0 mmol/L       Calcium 7.5 mg/dL      Total Protein 5.0 g/dL      Albumin 1.90 g/dL      ALT (SGPT) 19 U/L      AST (SGOT) 29 U/L      Alkaline Phosphatase 104 U/L      Total Bilirubin 0.4 mg/dL      eGFR Non African Amer 103 mL/min/1.73      Globulin 3.1 gm/dL      A/G Ratio 0.6 g/dL      BUN/Creatinine Ratio 13.5     Anion Gap 8.0 mmol/L     Narrative:      GFR Normal >60  Chronic Kidney Disease <60  Kidney Failure <15      CBC & Differential [[id number]]  (Abnormal) Collected: 10/03/21 0409    Specimen: Blood Updated: 10/03/21 0448    Narrative:      The following orders were created for panel order CBC & Differential.  Procedure                               Abnormality         Status                     ---------                               -----------         ------                     CBC Auto Differential[[id number]]        Abnormal            Final result                 Please view results for these tests on the individual orders.    CBC Auto Differential [[id number]]  (Abnormal) Collected: 10/03/21 0409    Specimen: Blood Updated: 10/03/21 0448     WBC 11.42 10*3/mm3      RBC 3.33 10*6/mm3      Hemoglobin 9.4 g/dL      Hematocrit 28.6 %      MCV 85.9 fL      MCH 28.2 pg      MCHC 32.9 g/dL      RDW 14.9 %      RDW-SD 46.5 fl      MPV 9.6 fL      Platelets 459 10*3/mm3      Neutrophil % 75.1 %      Lymphocyte % 10.9 %      Monocyte % 11.7 %      Eosinophil % 0.7 %      Basophil % 0.5 %      Immature Grans % 1.1 %      Neutrophils, Absolute 8.57 10*3/mm3      Lymphocytes, Absolute 1.24 10*3/mm3      Monocytes, Absolute 1.34 10*3/mm3      Eosinophils, Absolute 0.08 10*3/mm3      Basophils, Absolute 0.06 10*3/mm3      Immature Grans, Absolute 0.13 10*3/mm3      nRBC 0.0 /100 WBC     Blood Culture With MICHAEL - Blood, Arm, Left [[id number]] Collected: 09/29/21 1924    Specimen: Blood from Arm, Left Updated: 10/02/21 2100     Blood Culture No growth at 3 days    KOH Prep - Body Fluid, Liver [[id number]] Collected: 10/01/21 3298     Specimen: Body Fluid from Liver Updated: 10/02/21 1037     KOH Prep No yeast or hyphal elements seen    Anaerobic Culture - Aspirate, Liver [[id number]] Collected: 10/01/21 1528    Specimen: Aspirate from Liver Updated: 10/02/21 0958    Fungus Culture - Body Fluid, Liver [[id number]] Collected: 10/01/21 1538    Specimen: Body Fluid from Liver Updated: 10/02/21 0956    Blood Culture With MICHAEL - Blood, Arm, Right [[id number]]  (Abnormal) Collected: 09/29/21 1924    Specimen: Blood from Arm, Right Updated: 10/02/21 0715     Blood Culture Staphylococcus, coagulase negative     Comment: Probable contaminant requires clinical correlation, susceptibility not performed unless requested by physician.          Isolated from Aerobic Bottle     Gram Stain Aerobic Bottle Gram positive cocci in clusters    Comprehensive Metabolic Panel [[id number]]  (Abnormal) Collected: 10/02/21 0424    Specimen: Blood Updated: 10/02/21 0536     Glucose 92 mg/dL      BUN 10 mg/dL      Creatinine 0.64 mg/dL      Sodium 132 mmol/L      Potassium 3.6 mmol/L      Chloride 96 mmol/L      CO2 27.0 mmol/L      Calcium 7.6 mg/dL      Total Protein 5.1 g/dL      Albumin 1.80 g/dL      ALT (SGPT) 19 U/L      AST (SGOT) 29 U/L      Alkaline Phosphatase 96 U/L      Total Bilirubin 0.6 mg/dL      eGFR Non African Amer 121 mL/min/1.73      Globulin 3.3 gm/dL      A/G Ratio 0.5 g/dL      BUN/Creatinine Ratio 15.6     Anion Gap 9.0 mmol/L     Narrative:      GFR Normal >60  Chronic Kidney Disease <60  Kidney Failure <15      CBC & Differential [[id number]]  (Abnormal) Collected: 10/02/21 0424    Specimen: Blood Updated: 10/02/21 0509    Narrative:      The following orders were created for panel order CBC & Differential.  Procedure                               Abnormality         Status                     ---------                               -----------         ------                     CBC Auto Differential[[id number]]        Abnormal            Final result                  Please view results for these tests on the individual orders.    CBC Auto Differential [[id number]]  (Abnormal) Collected: 10/02/21 0424    Specimen: Blood Updated: 10/02/21 0509     WBC 14.51 10*3/mm3      RBC 3.30 10*6/mm3      Hemoglobin 9.6 g/dL      Hematocrit 29.0 %      MCV 87.9 fL      MCH 29.1 pg      MCHC 33.1 g/dL      RDW 14.6 %      RDW-SD 46.9 fl      MPV 10.1 fL      Platelets 418 10*3/mm3      Neutrophil % 82.1 %      Lymphocyte % 7.5 %      Monocyte % 9.0 %      Eosinophil % 0.3 %      Basophil % 0.2 %      Immature Grans % 0.9 %      Neutrophils, Absolute 11.91 10*3/mm3      Lymphocytes, Absolute 1.09 10*3/mm3      Monocytes, Absolute 1.30 10*3/mm3      Eosinophils, Absolute 0.05 10*3/mm3      Basophils, Absolute 0.03 10*3/mm3      Immature Grans, Absolute 0.13 10*3/mm3      nRBC 0.0 /100 WBC     Vancomycin, Trough Please call results to Pharmacy prior to administering next dose [[id number]]  (Abnormal) Collected: 10/01/21 0734    Specimen: Blood Updated: 10/01/21 0809     Vancomycin Trough 4.80 mcg/mL     Comprehensive Metabolic Panel [[id number]]  (Abnormal) Collected: 10/01/21 0423    Specimen: Blood Updated: 10/01/21 0537     Glucose 92 mg/dL      BUN 13 mg/dL      Creatinine 0.63 mg/dL      Sodium 130 mmol/L      Potassium 3.2 mmol/L      Chloride 95 mmol/L      CO2 26.0 mmol/L      Calcium 7.6 mg/dL      Total Protein 5.0 g/dL      Albumin 1.90 g/dL      ALT (SGPT) 19 U/L      AST (SGOT) 24 U/L      Alkaline Phosphatase 95 U/L      Total Bilirubin 0.6 mg/dL      eGFR Non African Amer 123 mL/min/1.73      Globulin 3.1 gm/dL      A/G Ratio 0.6 g/dL      BUN/Creatinine Ratio 20.6     Anion Gap 9.0 mmol/L     Narrative:      GFR Normal >60  Chronic Kidney Disease <60  Kidney Failure <15      Iron Profile [[id number]]  (Abnormal) Collected: 10/01/21 0423    Specimen: Blood Updated: 10/01/21 0534     Iron 17 mcg/dL      Iron Saturation 12 %      Transferrin 94 mg/dL      TIBC 140 mcg/dL      Protime-INR [[id number]]  (Abnormal) Collected: 10/01/21 0423    Specimen: Blood Updated: 10/01/21 0507     Protime 16.4 Seconds      INR 1.38    CBC & Differential [[id number]]  (Abnormal) Collected: 10/01/21 0423    Specimen: Blood Updated: 10/01/21 0501    Narrative:      The following orders were created for panel order CBC & Differential.  Procedure                               Abnormality         Status                     ---------                               -----------         ------                     CBC Auto Differential[[id number]]        Abnormal            Final result                 Please view results for these tests on the individual orders.    CBC Auto Differential [[id number]]  (Abnormal) Collected: 10/01/21 0423    Specimen: Blood Updated: 10/01/21 0501     WBC 15.85 10*3/mm3      RBC 3.03 10*6/mm3      Hemoglobin 9.0 g/dL      Hematocrit 26.8 %      MCV 88.4 fL      MCH 29.7 pg      MCHC 33.6 g/dL      RDW 14.7 %      RDW-SD 47.0 fl      MPV 9.8 fL      Platelets 380 10*3/mm3      Neutrophil % 83.0 %      Lymphocyte % 8.3 %      Monocyte % 7.6 %      Eosinophil % 0.3 %      Basophil % 0.1 %      Immature Grans % 0.7 %      Neutrophils, Absolute 13.15 10*3/mm3      Lymphocytes, Absolute 1.32 10*3/mm3      Monocytes, Absolute 1.21 10*3/mm3      Eosinophils, Absolute 0.04 10*3/mm3      Basophils, Absolute 0.02 10*3/mm3      Immature Grans, Absolute 0.11 10*3/mm3      nRBC 0.0 /100 WBC     C-reactive Protein [[id number]]  (Abnormal) Collected: 09/30/21 1843    Specimen: Blood Updated: 10/01/21 0231     C-Reactive Protein 22.08 mg/dL     Folate [[id number]]  (Normal) Collected: 09/30/21 0526    Specimen: Blood Updated: 09/30/21 2331     Folate 11.50 ng/mL     Narrative:      Results may be falsely increased if patient taking Biotin.      Vitamin B12 [[id number]]  (Abnormal) Collected: 09/30/21 0526    Specimen: Blood Updated: 09/30/21 2331     Vitamin B-12 >2,000 pg/mL     Narrative:      Results  "may be falsely increased if patient taking Biotin.      CEA [[id number]] Collected: 09/30/21 1036    Specimen: Blood Updated: 09/30/21 2237     CEA 3.32 ng/mL     Narrative:      CEA Reference Range:    Non Smokers:   Less than 3 ng/mL  Smokers:       Less than 5 ng/mL  Results may be falsely decreased if patient taking Biotin.      Blood Culture ID, PCR - Blood, Arm, Right [[id number]]  (Abnormal) Collected: 09/29/21 1924    Specimen: Blood from Arm, Right Updated: 09/30/21 2016     BCID, PCR Staph spp, not aureus or lugdunesis. Identification by BCID2 PCR.     BOTTLE TYPE Aerobic Bottle    Basic Metabolic Panel [[id number]]  (Abnormal) Collected: 09/30/21 1843    Specimen: Blood Updated: 09/30/21 1923     Glucose 109 mg/dL      BUN 15 mg/dL      Creatinine 0.72 mg/dL      Sodium 131 mmol/L      Potassium 3.2 mmol/L      Chloride 94 mmol/L      CO2 29.0 mmol/L      Calcium 7.8 mg/dL      eGFR Non African Amer 106 mL/min/1.73      BUN/Creatinine Ratio 20.8     Anion Gap 8.0 mmol/L     Narrative:      GFR Normal >60  Chronic Kidney Disease <60  Kidney Failure <15      Procalcitonin [[id number]]  (Abnormal) Collected: 09/30/21 1843    Specimen: Blood Updated: 09/30/21 1922     Procalcitonin 0.54 ng/mL     Narrative:      As a Marker for Sepsis (Non-Neonates):     1. <0.5 ng/mL represents a low risk of severe sepsis and/or septic shock.  2. >2 ng/mL represents a high risk of severe sepsis and/or septic shock.    As a Marker for Lower Respiratory Tract Infections that require antibiotic therapy:  PCT on Admission     Antibiotic Therapy             6-12 Hrs later  >0.5                          Strongly Recommended            >0.25 - <0.5             Recommended  0.1 - 0.25                  Discouraged                       Remeasure/reassess PCT  <0.1                         Strongly Discouraged         Remeasure/reassess PCT      As 28 day mortality risk marker: \"Change in Procalcitonin Result\" (>80% or <=80%) if Day 0 (or " Day 1) and Day 4 values are available. Refer to http://[email or web address]-pct-[email or web address]/    Change in PCT <=80 %   A decrease of PCT levels below or equal to 80% defines a positive change in PCT test result representing a higher risk for 28-day all-cause mortality of patients diagnosed with severe sepsis or septic shock.    Change in PCT >80 %   A decrease of PCT levels of more than 80% defines a negative change in PCT result representing a lower risk for 28-day all-cause mortality of patients diagnosed with severe sepsis or septic shock.                Lactic Acid, Plasma [[id number]]  (Normal) Collected: 09/30/21 1843    Specimen: Blood Updated: 09/30/21 1913     Lactate 1.3 mmol/L         Imaging Results (Last 72 Hours)     Procedure Component Value Units Date/Time    MRI Pelvis With & Without Contrast [[id number]] Collected: 10/02/21 1521     Updated: 10/02/21 1532    Narrative:      EXAMINATION:  MRI PELVIS W WO CONTRAST-  10/2/2021 12:20 PM CDT     HISTORY: Follow-up sigmoid lesion; Z74.09-Other reduced mobility;  R26.9-Unspecified abnormalities of gait and mobility      COMPARISON: Abdomen pelvis CT dated 9/10/2021     TECHNIQUE: Multiplanar MR imaging of the pelvis was performed.  Gadolinium enhanced imaging obtained.     FINDINGS:     There is image quality degradation with motion.     As noted on the CT examination there is diffuse segmental wall  thickening of the sigmoid colon with scattered diverticuli.  Circumferential wall thickening associated with diverticular disease can  certainly have this appearance. Underlying neoplasm is not excluded.  Endoscopic characterization suggested.     There is free fluid identified in the pelvis.     No pelvic lymphadenopathy identified.     No acute osseous abnormalities observed.     Urinary bladder is moderately distended.     Prostate gland is mildly prominent.       Impression:      1. Sigmoid colon diverticulosis and nonspecific wall thickening.  This report was  finalized on 10/02/2021 15:28 by Dr. Cb Elliott MD.    CT Guided Abscess Drain Liver [[id number]] Collected: 10/01/21 1606     Updated: 10/01/21 1612    Narrative:      CT GUIDED ABSCESS DRAIN LIVER- 10/1/2021 2:27 PM CDT     INDICATION: abscess; Z74.09-Other reduced mobility; R26.9-Unspecified  abnormalities of gait and mobility     CONSENT: An informed written consent was obtained from the patient after  discussing the risks, benefits, potential complications and  alternatives. All questions answered to the patient's satisfaction.  Automated exposure control (AEC) protocols are utilized on the scanner  to ensure dose lowered technique.      COMPLICATIONS: None.      MEDICATIONS: Local analgesia only     CONTRAST: None.      ESTIMATED BLOOD LOSS: Less than 5 ml.      DOSE LENGTH PRODUCT: 1016 mGy cm. Automated exposure control was also  utilized to decrease patient radiation dose.     PROCEDURE: An informed consent was obtained as above. A formal timeout  was performed prior to the procedure. Prior to sterile preparation and  local anesthetic, noninfused axial CT scans were obtained  redemonstrating the 2 dominant liver abscesses. The optimal site for  needle aspiration of the left lobe abscess was marked at the skin. The  skin was cleansed with Betadine swabs and sterilely draped. 1% buffered  lidocaine was used to anesthetize the skin and soft tissues down to the  liver capsule. A tiny dermatotomy was made. A 5 Urdu 10 cm Breezy Gardenseh  catheter was advanced into the left lobe liver abscess using CT for  guidance. Approximately 20 cc frankly purulent material was aspirated  from this abscess and set aside for microbiology evaluation.     No immediate complications.        Impression:      Successful CT guided biopsy liver abscess aspiration, yielding  approximately 20 cc frankly purulent material. This sample was set aside  for microbiology evaluation.        This report was finalized on 10/01/2021 16:09 by Dr Sargent  Carolyn, .    MRI Abdomen With & Without Contrast [[id number]] Collected: 09/30/21 1703     Updated: 09/30/21 1805    Narrative:      MRI OF ABDOMEN WITH AND WITHOUT CONTRAST     HISTORY: Follow-up sigmoid lesion and liver masses/abscesses?;  Z74.09-Other reduced mobility      COMPARISON: CT scan dated 8/31/2021      TECHNIQUE: Multiplanar, multisequential MR imaging of the abdomen was  performed before and after the intravenous administration of contrast.     FINDINGS:     5.2 cm segment 7 rim-enhancing liver abscess showing prominent diffusion  restriction. There are 2 adjacent liver abscesses in liver segment 3,  the larger measuring 4.3 cm and the smaller measuring 1.2 cm, both  displaying prominent diffusion restriction. There appears to be  extension of these liver abscesses beyond the liver capsule, as there is  subcapsular fluid along the anterior, right lateral and dome of the  liver and there is also a developing right lower lobe pulmonary abscess  and empyema. There is also a what appears to be a subserosal abscess  along the lesser curvature and antrum of the stomach, again displaying a  prominent diffusion restriction. Differential would include subhepatic  space abscess although this does appear to follow the contour of the  stomach closely and appears to be within the wall.     No gross intraperitoneal free fluid. The gallbladder is nondistended.  The biliary tree is nondilated. No pancreatic lesions are identified.  The spleen, adrenal glands and kidneys are unremarkable. No suspicious  adenopathy in the upper abdomen.       Impression:         1. Enlarging liver abscesses in segments 3 and 7 which appear to have  extended beyond the liver capsule. In particular, there is a developing  empyema along the diaphragmatic pleura, posteriorly, as well as  extending up along the posterior costal pleura and there is also a  developing right lower lobe pulmonary abscess. There is also an  elongated subserosal  abscess along the lesser curvature and antrum of  the stomach. Differential would include a subhepatic space abscess  although this very closely follows the path and contour of the stomach  and this does appear to be within the wall of the stomach.     Findings and recommendations were discussed with Dr Gu on 9/30/2021  at 5:01 PM CDT.              This report was finalized on 09/30/2021 18:02 by Dr Arie Leon, .    CT Chest With Contrast Diagnostic [[id number]] Collected: 09/30/21 1628     Updated: 09/30/21 1704    Narrative:      CT CHEST W CONTRAST DIAGNOSTIC- 9/30/2021 3:37 PM CDT     HISTORY: Follow-up lung nodule; Z74.09-Other reduced mobility      COMPARISON: CT scan dated 8/31/2021      DOSE LENGTH PRODUCT: 192 mGy cm. Automated exposure control was also  utilized to decrease patient radiation dose.     TECHNIQUE: Serial helical tomographic images of the chest were acquired  following the intravenous infusion of contrast. Multiplanar reformatted  images were provided for review.     FINDINGS:      Visualized neck base: The imaged portion of the base of the neck appears  unremarkable.      Lungs: Advanced lung emphysema. 1.6 cm superior segment left lower lobe  pulmonary nodule, concerning for primary lung malignancy. There is a new  small empyema identified in the right lung base overlying the more  posterior aspect of the diaphragmatic pleura as well as extending up  along the posterior costal pleura. Additionally, there is a new cavitary  right lower lobe pneumonia, which appears to be developing into a  pulmonary abscess.     Heart: The heart is normal in size. There is no pericardial effusion.     Vasculature: Thoracic aorta is normal in caliber. No dissection  identified. No flow-limiting stenosis identified at the great vessel  origins. The pulmonary arteries are normal in appearance.     Mediastinum and lymph nodes: There is a mild reactive right hilar and  subcarinal adenopathy.     Skeletal  and soft tissues: Chest wall soft tissues are unremarkable. No  acute bony abnormality.       Upper abdomen: 4.9 cm segment 7 liver abscess with subcapsular extension  as well as extension above the diaphragm in the right pleural space.  Enlarging left lobe abscesses as well measuring 4.1 and 2.0 cm, in  segment 3, which also appear to be extended beyond the liver capsule.  There is a new subserosal abscess along the gastric wall, mostly along  the antrum, measuring over 10 cm in long axis..           Impression:      1. Worsening of the patient's liver abscesses with extension beyond the  liver capsule. In particular, there is extension of the right liver  abscess above the diaphragm with developing right empyema and right  lower lobe pulmonary abscess. There is also extension of the left liver  abscess into the subhepatic space with formation of a subserosal gastric  wall abscess.  2. Lung emphysema with 1.6 cm superior segment left lower lobe nodule  which is concerning for a primary lung malignancy.     Findings and recommendations were discussed with Dr Gu on 9/30/2021  at 5:01 PM CDT.     This report was finalized on 09/30/2021 17:01 by Dr Arie Leon, .              Assessment/Plan       Liver mass    Cavitary lesion of lung    Abdominal infection (HCC)    Empyema lung (HCC)      Continue IV antibiotics.  Await cultures from liver aspirate.  Awaiting bed availability at .      Shanna English MD  10/03/21  09:39 CDT

## 2021-10-03 NOTE — PROGRESS NOTES
"[facility] Progress Note    Progress Note    Patient:  Sonu Bravo Jr.  YOB: 1945  Date of Service: 10/3/2021  MRN: [id number]   Acct: [id number]   Primary Care Physician: Vaughn Kramer DO  Advance Directive:   There are no questions and answers to display.     Admit Date: 9/29/2021       Hospital Day: 4      Subjective:     Chief Compliant:  \"Real tired.\"    Subjective:   Excess fatigue.  No recurrent chills.  No fevers.  No shortness of breath at rest.  No worsening cough.  No abdominal pain.      Interval history: Sonu Bravo Jr. 76 y.o. male medical patient of Dr. Kramer, whose history includes arthritis, paroxysmal atrial fibrillation, GERD, hyperlipidemia, longstanding tobacco use/nicotine dependence, and COPD.     --08/31/2021-CT angiogram chest.  No PE.  Suspicious 1.5 cm cavitary left lower lobe pulmonary nodule in the superior segment.  Tiny 3 mm subpleural right lower lobe nodule.  Small lymph nodes in the lisa hepatis region in the upper abdomen.     --09/10/2021-liver ultrasound-2 suspicious masses in the liver, 1 in the left hepatic lobe 5.3 cm, 1 in the dome of the right hepatic lobe 6.4 cm.  Hepatic metastasis not excluded.  Unremarkable gallbladder, no biliary ductal dilatation identified.     --09/10/2021-CT abdomen/pelvis with and without contrast.  2 cm segmental thickening of the sigmoid colon concerning for malignancy.  2 large heterogenous liver lesions (right posterior liver 5.5 cm, predominantly centrally hypodense lesion; left liver lobe with similar-appearing 4.8 cm liver lesion) most concerning for metastatic disease     --09/23/2021-was seen at Dr. Kramer office complaining of chills and shaking in the evenings lasting 60 to 90 minutes at a time associated with weakness, shortness of breath and a dry cough.  Previous Covid testing hospital was negative.     --09/23/2021-hemoglobin 12.3, MCV 88.3, platelets 280,000, WBC 29,000 with 88 point 6 " segs.  BUN 31, creatinine 1.16, albumin 2.7, ALT 42, alk phos 197, GFR 61.  ESR 26 (elevated).  Manual blood smear showed leukocytosis with 45% segs, 31% bands, 8 lymphocytes, 1 monocyte, 11 metamyelocytes, 4 myelocytes, 1 nucleated red blood cell.  Comment: Significant leukocytosis with granulocytic left shift with vacuolated granulocytes concerning for bacterial infection     --09/24/2029-blood culture showed no growth at 5 days.     --09/28/2029-colonoscopy.  Impression: 3 diminutive polyps in the rectum removed with hot snare.  3 diminutive polyps at the splenic flexure, removed with hot snare.  Diverticulosis in the sigmoid colon and in the descending colon.  Distal rectum and anal verge are normal on retroflexion view.     --09/30/2021-hemoglobin 9.5; MCV 88.4, platelets 376,000, WBC 23.66 with 86 point 3 segs     --09/30/2021-CT chest with contrast--worsening patient's liver abscess cyst with extension beyond the liver capsule.  In particular extension of the right liver abscess above the diaphragm with developing right empyema and right lower lobe pulmonary abscess.  Also extension of the left liver abscess into the subhepatic space with formation of a subserosal gastric wall abscess.  Lung emphysema with 1.6 cm.  Segment left lower lobe nodule concerning for primary lung malignancy.    --09/30/2021-MRI abdomen with and without contrast-enlarging liver abscesses in segment 3 and 7 which appear to have extended beyond the liver capsule.  In particular there is a developing empyema along the subdiaphragmatic pleura posteriorly as well as extending up along the posterior costal pleura and there is also developing right lower lobe pulmonary abscess.  There is also an elongated subserosal abscess along the lesser curvature and antrum of the stomach.  Differential includes subhepatic space abscess although this very closely follows the path and contour of the stomach and appears to be within the wall of the  "stomach.    --10/01/1392-[facility]-nnhswb abscess drain liver-successful CT-guided biopsy liver abscess aspiration yielding approximately 20 cc of frankly purulent material.    Review of Systems  Review of Systems:   Constitutional: Lingering fatigue.  \"Real tired.\"  No fever / No recurrent chills nor sweats.  HEENT:  No sore throat / No hoarseness / No vision changes  CV:  No chest pain / No palpitations / No orthopnea  Respiratory: Chronic intermittent cough with scant clear phlegm/ No shortness of breath at rest/(+) exertional dyspnea/ No hemoptysis/smoker since age 22 (up to 2 packs/day), currently<1/2 pack/day.  GI: Currently with no nausea / No vomiting /currently with no abdominal pain / No diarrhea / No constipation.  Colonoscopy, 09/28/2021.  :  No dysuria / No hesitancy / No urgency / No hematuria  Neuro:   Generalized muscle weakness / No dysphagia / No headache / No paresthesias  Musculoskeletal:  No edema / No cyanosis / No new arthralgias.    Vitals: /53 (BP Location: Right arm, Patient Position: Sitting)   Pulse 60   Temp 98.1 °F (36.7 °C) (Oral)   Resp 18   Ht 177.8 cm (70\")   Wt 85.1 kg (187 lb 11.2 oz)   SpO2 92%   BMI 26.93 kg/m²     Physical Exam  Physical Exam:  General appearance: Pleasant, cooperative, heavyset, modestly kept elderly male who appears comfortable while lying in a recliner at his bedside.  His spouse Bertha is at the bedside   Skin:  Skin color is a bit pale. No rashes or lesions  HEENT:  Head: Normal, normocephalic, atraumatic.  Neck:  no adenopathy  Lungs:  clear to auscultation bilaterally with overall diminished breath sounds  Heart:  regular rate and rhythm  Abdomen:  soft, obese, non-tender; bowel sounds hypoactive; no obvious masses,  no overt organomegaly  Extremities:  extremities normal, atraumatic, no cyanosis or edema  Neurologic:  Mental status: Alert, oriented, thought content appropriate    24HR INTAKE/OUTPUT:      Intake/Output Summary (Last 24 hours) at " 10/3/2021 1121  Last data filed at 10/3/2021 0859  Gross per 24 hour   Intake 200 ml   Output --   Net 200 ml        Black Catheter: None    Diet: Diet Regular      Medications:   Scheduled Meds:albuterol, 1.25 mg, Nebulization, 4x Daily - RT   And  ipratropium, 0.5 mg, Nebulization, 4x Daily - RT  bisoprolol, 10 mg, Oral, Daily  budesonide-formoterol, 2 puff, Inhalation, BID - RT  cefepime, 2 g, Intravenous, Q8H  cetirizine, 10 mg, Oral, Daily  dextromethorphan polistirex ER, 60 mg, Oral, Q12H  enoxaparin, 1 mg/kg, Subcutaneous, Q12H  famotidine, 20 mg, Intravenous, BID  fluticasone, 2 spray, Each Nare, Daily  hydroCHLOROthiazide, 12.5 mg, Oral, Daily  levothyroxine, 100 mcg, Oral, Daily  melatonin, 3 mg, Oral, Nightly  metroNIDAZOLE, 500 mg, Intravenous, Q8H  potassium chloride, 40 mEq, Oral, BID  rosuvastatin, 10 mg, Oral, Nightly  sodium chloride, 10 mL, Intravenous, Q12H        Continuous Infusions:lactated ringers, 125 mL/hr, Last Rate: 125 mL/hr (10/02/21 2342)        Labs:     Results from last 7 days   Lab Units 10/03/21  0409 10/02/21  0424 10/01/21  0423   WBC 10*3/mm3 11.42* 14.51* 15.85*   HEMOGLOBIN g/dL 9.4* 9.6* 9.0*   HEMATOCRIT % 28.6* 29.0* 26.8*   PLATELETS 10*3/mm3 459* 418 380     09/30/2021 0526 09/30/2021 1231 Ferritin [[id number]]    (Abnormal)   Blood    Final result Component Value Units   Ferritin 822.70High  ng/mL           09/30/2021 0526 09/30/2021 2331 Folate [[id number]]    Blood    Final result Component Value Units   Folate 11.50 ng/mL           09/30/2021 0526 09/30/2021 2331 Vitamin B12 [[id number]]    (Abnormal)   Blood    Final result Component Value Units   Vitamin B-12 >2,000High               10/01/2021 0423 10/01/2021 0534 Iron Profile [[id number]]   (Abnormal)   Blood    Final result Component Value Units   Iron 17Low  mcg/dL   Iron Saturation 12Low  %   Transferrin 94Low  mg/dL   TIBC 140Low  mcg/dL        09/30/2021 1036 09/30/2021 2237 CEA [[id number]]    Blood    Final  result Component Value Units   CEA 3.32 ng/mL            Results from last 7 days   Lab Units 10/03/21  0409 10/02/21  0424 10/01/21  0423   SODIUM mmol/L 133* 132* 130*   POTASSIUM mmol/L 3.2* 3.6 3.2*   CHLORIDE mmol/L 98 96* 95*   CO2 mmol/L 27.0 27.0 26.0   BUN mg/dL 10 10 13   CREATININE mg/dL 0.74* 0.64* 0.63*   CALCIUM mg/dL 7.5* 7.6* 7.6*   BILIRUBIN mg/dL 0.4 0.6 0.6   ALK PHOS U/L 104 96 95   ALT (SGPT) U/L 19 19 19   AST (SGOT) U/L 29 29 24   GLUCOSE mg/dL 140* 92 92       ABG:  No results found for: PHART, PO2ART, ASM2EKB    Culture Data:   Blood Culture   Date Value Ref Range Status   09/29/2021 No growth at 24 hours  Preliminary   09/29/2021 Abnormal Stain (C)  Preliminary   09/24/2021 Fusobacterium species (C)  Corrected     Comment:     Beta lactamase negative    Appended report. These results have been appended to a previously final verified report.   09/24/2021 No growth at 5 days  Final       Lab Results   Component Value Date    PATHINTERP  09/23/2021     Mild normochromic normocytic anemia    Leukocytosis with the following manual differential:  Neutrophils 45%  Bands 31%  Lymphocytes 8%  Monocytes 1%  Metamyelocytes 11%  Myelocytes 4%  Nucleated red blood cells 1  Platelets within normal range    Comment: There is a significant leukocytosis with a granulocytic left shift with vacuolated granulocytes present concerning for bacterial infection.       Radiology:     Imaging Results (Last 7 Days)     Procedure Component Value Units Date/Time    MRI Pelvis With & Without Contrast [[id number]] Collected: 10/02/21 1521     Updated: 10/02/21 1532    Narrative:      EXAMINATION:  MRI PELVIS W WO CONTRAST-  10/2/2021 12:20 PM CDT     HISTORY: Follow-up sigmoid lesion; Z74.09-Other reduced mobility;  R26.9-Unspecified abnormalities of gait and mobility      COMPARISON: Abdomen pelvis CT dated 9/10/2021     TECHNIQUE: Multiplanar MR imaging of the pelvis was performed.  Gadolinium enhanced imaging  obtained.     FINDINGS:     There is image quality degradation with motion.     As noted on the CT examination there is diffuse segmental wall  thickening of the sigmoid colon with scattered diverticuli.  Circumferential wall thickening associated with diverticular disease can  certainly have this appearance. Underlying neoplasm is not excluded.  Endoscopic characterization suggested.     There is free fluid identified in the pelvis.     No pelvic lymphadenopathy identified.     No acute osseous abnormalities observed.     Urinary bladder is moderately distended.     Prostate gland is mildly prominent.       Impression:      1. Sigmoid colon diverticulosis and nonspecific wall thickening.  This report was finalized on 10/02/2021 15:28 by Dr. Cb Elliott MD.    CT Guided Abscess Drain Liver [[id number]] Collected: 10/01/21 1606     Updated: 10/01/21 1612    Narrative:      CT GUIDED ABSCESS DRAIN LIVER- 10/1/2021 2:27 PM CDT     INDICATION: abscess; Z74.09-Other reduced mobility; R26.9-Unspecified  abnormalities of gait and mobility     CONSENT: An informed written consent was obtained from the patient after  discussing the risks, benefits, potential complications and  alternatives. All questions answered to the patient's satisfaction.  Automated exposure control (AEC) protocols are utilized on the scanner  to ensure dose lowered technique.      COMPLICATIONS: None.      MEDICATIONS: Local analgesia only     CONTRAST: None.      ESTIMATED BLOOD LOSS: Less than 5 ml.      DOSE LENGTH PRODUCT: 1016 mGy cm. Automated exposure control was also  utilized to decrease patient radiation dose.     PROCEDURE: An informed consent was obtained as above. A formal timeout  was performed prior to the procedure. Prior to sterile preparation and  local anesthetic, noninfused axial CT scans were obtained  redemonstrating the 2 dominant liver abscesses. The optimal site for  needle aspiration of the left lobe abscess was marked at the  skin. The  skin was cleansed with Betadine swabs and sterilely draped. 1% buffered  lidocaine was used to anesthetize the skin and soft tissues down to the  liver capsule. A tiny dermatotomy was made. A 5 Vietnamese 10 cm Yueh  catheter was advanced into the left lobe liver abscess using CT for  guidance. Approximately 20 cc frankly purulent material was aspirated  from this abscess and set aside for microbiology evaluation.     No immediate complications.        Impression:      Successful CT guided biopsy liver abscess aspiration, yielding  approximately 20 cc frankly purulent material. This sample was set aside  for microbiology evaluation.        This report was finalized on 10/01/2021 16:09 by Dr Arie Leon, .    MRI Abdomen With & Without Contrast [[id number]] Collected: 09/30/21 1703     Updated: 09/30/21 1805    Narrative:      MRI OF ABDOMEN WITH AND WITHOUT CONTRAST     HISTORY: Follow-up sigmoid lesion and liver masses/abscesses?;  Z74.09-Other reduced mobility      COMPARISON: CT scan dated 8/31/2021      TECHNIQUE: Multiplanar, multisequential MR imaging of the abdomen was  performed before and after the intravenous administration of contrast.     FINDINGS:     5.2 cm segment 7 rim-enhancing liver abscess showing prominent diffusion  restriction. There are 2 adjacent liver abscesses in liver segment 3,  the larger measuring 4.3 cm and the smaller measuring 1.2 cm, both  displaying prominent diffusion restriction. There appears to be  extension of these liver abscesses beyond the liver capsule, as there is  subcapsular fluid along the anterior, right lateral and dome of the  liver and there is also a developing right lower lobe pulmonary abscess  and empyema. There is also a what appears to be a subserosal abscess  along the lesser curvature and antrum of the stomach, again displaying a  prominent diffusion restriction. Differential would include subhepatic  space abscess although this does appear to  follow the contour of the  stomach closely and appears to be within the wall.     No gross intraperitoneal free fluid. The gallbladder is nondistended.  The biliary tree is nondilated. No pancreatic lesions are identified.  The spleen, adrenal glands and kidneys are unremarkable. No suspicious  adenopathy in the upper abdomen.       Impression:         1. Enlarging liver abscesses in segments 3 and 7 which appear to have  extended beyond the liver capsule. In particular, there is a developing  empyema along the diaphragmatic pleura, posteriorly, as well as  extending up along the posterior costal pleura and there is also a  developing right lower lobe pulmonary abscess. There is also an  elongated subserosal abscess along the lesser curvature and antrum of  the stomach. Differential would include a subhepatic space abscess  although this very closely follows the path and contour of the stomach  and this does appear to be within the wall of the stomach.     Findings and recommendations were discussed with Dr Gu on 9/30/2021  at 5:01 PM CDT.              This report was finalized on 09/30/2021 18:02 by Dr Arie Leon, .    CT Chest With Contrast Diagnostic [[id number]] Collected: 09/30/21 1628     Updated: 09/30/21 1704    Narrative:      CT CHEST W CONTRAST DIAGNOSTIC- 9/30/2021 3:37 PM CDT     HISTORY: Follow-up lung nodule; Z74.09-Other reduced mobility      COMPARISON: CT scan dated 8/31/2021      DOSE LENGTH PRODUCT: 192 mGy cm. Automated exposure control was also  utilized to decrease patient radiation dose.     TECHNIQUE: Serial helical tomographic images of the chest were acquired  following the intravenous infusion of contrast. Multiplanar reformatted  images were provided for review.     FINDINGS:      Visualized neck base: The imaged portion of the base of the neck appears  unremarkable.      Lungs: Advanced lung emphysema. 1.6 cm superior segment left lower lobe  pulmonary nodule, concerning for  primary lung malignancy. There is a new  small empyema identified in the right lung base overlying the more  posterior aspect of the diaphragmatic pleura as well as extending up  along the posterior costal pleura. Additionally, there is a new cavitary  right lower lobe pneumonia, which appears to be developing into a  pulmonary abscess.     Heart: The heart is normal in size. There is no pericardial effusion.     Vasculature: Thoracic aorta is normal in caliber. No dissection  identified. No flow-limiting stenosis identified at the great vessel  origins. The pulmonary arteries are normal in appearance.     Mediastinum and lymph nodes: There is a mild reactive right hilar and  subcarinal adenopathy.     Skeletal and soft tissues: Chest wall soft tissues are unremarkable. No  acute bony abnormality.       Upper abdomen: 4.9 cm segment 7 liver abscess with subcapsular extension  as well as extension above the diaphragm in the right pleural space.  Enlarging left lobe abscesses as well measuring 4.1 and 2.0 cm, in  segment 3, which also appear to be extended beyond the liver capsule.  There is a new subserosal abscess along the gastric wall, mostly along  the antrum, measuring over 10 cm in long axis..           Impression:      1. Worsening of the patient's liver abscesses with extension beyond the  liver capsule. In particular, there is extension of the right liver  abscess above the diaphragm with developing right empyema and right  lower lobe pulmonary abscess. There is also extension of the left liver  abscess into the subhepatic space with formation of a subserosal gastric  wall abscess.  2. Lung emphysema with 1.6 cm superior segment left lower lobe nodule  which is concerning for a primary lung malignancy.     Findings and recommendations were discussed with Dr Gu on 9/30/2021  at 5:01 PM CDT.     This report was finalized on 09/30/2021 17:01 by Dr Arie Leon, .    XR Chest PA & Lateral [[id number]]  Collected: 09/29/21 1951     Updated: 09/29/21 1956    Narrative:      HISTORY: Cough     CXR: 2 views the chest obtained     COMPARISON: 9/24/2021     FINDINGS: There are hazy right lower lobe opacities which may be patchy  atelectasis or pneumonia. Left basilar atelectasis. There is a similar  1.5 cm superior segment left lower lobe cavitary nodule. Cardiac  mediastinal contours normal. No pleural effusion pneumothorax.  Degenerative change of the thoracic spine.       Impression:      1. New patchy right lower lobe opacities may be atelectasis versus  pneumonia. Linear left basilar atelectasis. Stable partially cavitary  superior segment left lower lobe nodule.  This report was finalized on 09/29/2021 19:53 by Dr. Dyan Rashid MD.            Objective:       Problem list:     Liver mass    Cavitary lesion of lung    Abdominal infection (HCC)    Empyema lung (HCC)        Assessment/Plan:       ASSESSMENT:   1.   Liver masses.  Radiographically suspicious for abscesses.  --09/10/2021-liver ultrasound-2 suspicious masses in the liver, 1 in the left hepatic lobe 5.3 cm, 1 in the dome of the right hepatic lobe 6.4 cm.  Hepatic metastasis not excluded.  Unremarkable gallbladder, no biliary ductal dilatation identified.  --09/10/2021-CT abdomen/pelvis with and without contrast.  2 cm segmental thickening of the sigmoid colon concerning for malignancy.  2 large heterogenous liver lesions (right posterior liver 5.5 cm, predominantly centrally hypodense lesion; left liver lobe with similar-appearing 4.8 cm liver lesion) most concerning for metastatic disease  --09/30/2021-MRI abdomen.  Enlarging liver abscesses which appear to have extended beyond the liver capsule.  In particular there is a developing empyema along the subdiaphragmatic pleura, posteriorly as well as extending up the posterior costal pleura and there is also developing right lower lobe pulmonary abscess.  There is an elongated subserosal abscess along  the lesser curvature and antrum of the stomach.  Differential includes subhepatic space abscess although this very closely follows the path and contour of the stomach and does appear to be within the wall of the stomach.  --10/01/1445-[facility]-nngftc abscess drain liver-successful CT-guided biopsy liver abscess aspiration yielding approximately 20 cc of frankly purulent material.  Gram stain: Many (4+) WBCs per low powered field.  No organisms seen.  Cultures no growth to date.       2.   Segmental thickening of the sigmoid colon, low abdominal pain, chills and neutrophilic leukocytosis.  Acute diverticulitis?  ----09/28/2029-colonoscopy.  Impression: 3 diminutive polyps in the rectum removed with hot snare.  3 diminutive polyps at the splenic flexure, removed with hot snare.  Diverticulosis in the sigmoid colon and in the descending colon.  Distal rectum and anal verge are normal on retroflexion view.  3.   Left lower lobe pulmonary nodule.    Will need clarification down the road.  --08/31/2021-CT angiogram chest.  No PE.  Suspicious 1.5 cm cavitary left lower lobe pulmonary nodule in the superior segment.  Tiny 3 mm subpleural right lower lobe nodule.  Small lymph nodes in the lisa hepatis region in the upper abdomen.  --09/30/2021-CT chest.  Worsening liver abscesses with extension beyond the liver capsule.  In particular there is extension of the right liver abscess above the diaphragm with developing right empyema and right lower lobe pulmonary abscess.  Also extension of the left liver abscess into the sub hepatic space with formation of a subserosal gastric wall abscess.  Lung emphysema with 1.6 cm superior segment left lower lobe nodule concerning for primary lung malignancy.  4.   Neutrophilic leukocytosis reactive to ongoing bacterial infection.  Improving.  5.    Positive blood cultures.  Dr. Garza (ID) now following:  --09/29/2021- staph coagulase-negative from aerobic bottle (possible contaminant)    --09/24/2021-Fusobacterium species from anaerobic bottle.    6.   Normocytic anemia.  Components from iron deficiency, and iron under utilization/anemia of chronic disease.  --Hemoglobin 9.4; MCV 85.9, 10/03/2021 (prior range: Hemoglobin 9.0 -15.4; MCV 85.3-94.2)  7.    Neutrophilic leukocytosis, reactive.  Improving   8.    Longstanding tobacco use/nicotine dependence (beginning age 22-up to 2 packs/day, currently < 1/2 pack/day)  9.   Paroxysmal atrial fibrillation  10.   Hyperlipidemia     Recommendations:  1.   Apprised of current labs showing improving leukocytosis, stable/improving anemia, hyponatremia, hypokalemia, hypocalcemia, depressed total protein/albumin, normal GFR, normal LFTs.  2.    Noted percutaneous liver abscess drainage, 10/01/2021 (above).  Cultures NGTD.    3.    Previously apprised of abdominal MRI and CT chest findings (above) indicating enlarging liver abscesses with extension beyond the liver capsule with developing empyema along the subdiaphragmatic pleura and possible developing right lower lobe pulmonary abscess.  1.6 cm superior segment left lower lobe nodule concerning for primary lung malignancy will need clarification down the road.  4.   In line to transfer to higher level of care.  5.   Continue other general medical management and support (including IV antibiotics).  6.   Transfuse RBCs if hemoglobin < 8 if symptomatic  7.   Care discussed with the patient, and his spouse Bertha at the bedside         I spent at least 30 total minutes, face-to-face, caring for Sonu today.  Greater than 50% of this time involved counseling and/or coordination of care as documented within this note regarding the patient's illness(es), pros and cons of various treatment options, instructions and/or risk reduction.

## 2021-10-03 NOTE — PLAN OF CARE
Goal Outcome Evaluation:  Plan of Care Reviewed With: patient, spouse        Progress: no change  Outcome Summary: Pt sage alert and oriented x4, denies pain but c/o being sleepy today. Continue antibiotics and assist when out of bed. Abebrile. Wife at bedside.

## 2021-10-04 ENCOUNTER — APPOINTMENT (OUTPATIENT)
Dept: CT IMAGING | Facility: HOSPITAL | Age: 76
End: 2021-10-04

## 2021-10-04 PROBLEM — E87.1 HYPONATREMIA: Status: ACTIVE | Noted: 2021-10-04

## 2021-10-04 PROBLEM — E87.6 HYPOKALEMIA: Status: ACTIVE | Noted: 2021-10-04

## 2021-10-04 PROBLEM — K75.0 LIVER ABSCESS: Status: ACTIVE | Noted: 2021-09-29

## 2021-10-04 LAB
ALBUMIN SERPL-MCNC: 2.1 G/DL (ref 3.5–5.2)
ALBUMIN/GLOB SERPL: 0.6 G/DL
ALP SERPL-CCNC: 92 U/L (ref 39–117)
ALT SERPL W P-5'-P-CCNC: 17 U/L (ref 1–41)
ANION GAP SERPL CALCULATED.3IONS-SCNC: 8 MMOL/L (ref 5–15)
AST SERPL-CCNC: 24 U/L (ref 1–40)
BACTERIA SPEC AEROBE CULT: NORMAL
BASOPHILS # BLD AUTO: 0.05 10*3/MM3 (ref 0–0.2)
BASOPHILS NFR BLD AUTO: 0.4 % (ref 0–1.5)
BILIRUB SERPL-MCNC: 0.5 MG/DL (ref 0–1.2)
BUN SERPL-MCNC: 9 MG/DL (ref 8–23)
BUN/CREAT SERPL: 12.2 (ref 7–25)
CALCIUM SPEC-SCNC: 7.6 MG/DL (ref 8.6–10.5)
CHLORIDE SERPL-SCNC: 94 MMOL/L (ref 98–107)
CO2 SERPL-SCNC: 27 MMOL/L (ref 22–29)
CREAT SERPL-MCNC: 0.74 MG/DL (ref 0.76–1.27)
DEPRECATED RDW RBC AUTO: 46.5 FL (ref 37–54)
EOSINOPHIL # BLD AUTO: 0.09 10*3/MM3 (ref 0–0.4)
EOSINOPHIL NFR BLD AUTO: 0.7 % (ref 0.3–6.2)
ERYTHROCYTE [DISTWIDTH] IN BLOOD BY AUTOMATED COUNT: 15 % (ref 12.3–15.4)
GFR SERPL CREATININE-BSD FRML MDRD: 103 ML/MIN/1.73
GLOBULIN UR ELPH-MCNC: 3.3 GM/DL
GLUCOSE SERPL-MCNC: 96 MG/DL (ref 65–99)
HCT VFR BLD AUTO: 30.8 % (ref 37.5–51)
HGB BLD-MCNC: 10.1 G/DL (ref 13–17.7)
IMM GRANULOCYTES # BLD AUTO: 0.15 10*3/MM3 (ref 0–0.05)
IMM GRANULOCYTES NFR BLD AUTO: 1.1 % (ref 0–0.5)
LYMPHOCYTES # BLD AUTO: 1.26 10*3/MM3 (ref 0.7–3.1)
LYMPHOCYTES NFR BLD AUTO: 9.6 % (ref 19.6–45.3)
MCH RBC QN AUTO: 28.3 PG (ref 26.6–33)
MCHC RBC AUTO-ENTMCNC: 32.8 G/DL (ref 31.5–35.7)
MCV RBC AUTO: 86.3 FL (ref 79–97)
MONOCYTES # BLD AUTO: 1.6 10*3/MM3 (ref 0.1–0.9)
MONOCYTES NFR BLD AUTO: 12.2 % (ref 5–12)
NEUTROPHILS NFR BLD AUTO: 10.01 10*3/MM3 (ref 1.7–7)
NEUTROPHILS NFR BLD AUTO: 76 % (ref 42.7–76)
NRBC BLD AUTO-RTO: 0 /100 WBC (ref 0–0.2)
PLATELET # BLD AUTO: 454 10*3/MM3 (ref 140–450)
PMV BLD AUTO: 9.6 FL (ref 6–12)
POTASSIUM SERPL-SCNC: 3.6 MMOL/L (ref 3.5–5.2)
PROT SERPL-MCNC: 5.4 G/DL (ref 6–8.5)
RBC # BLD AUTO: 3.57 10*6/MM3 (ref 4.14–5.8)
SODIUM SERPL-SCNC: 129 MMOL/L (ref 136–145)
WBC # BLD AUTO: 13.16 10*3/MM3 (ref 3.4–10.8)

## 2021-10-04 PROCEDURE — 94664 DEMO&/EVAL PT USE INHALER: CPT

## 2021-10-04 PROCEDURE — 05HY33Z INSERTION OF INFUSION DEVICE INTO UPPER VEIN, PERCUTANEOUS APPROACH: ICD-10-PCS | Performed by: SPECIALIST

## 2021-10-04 PROCEDURE — 25010000002 ENOXAPARIN PER 10 MG: Performed by: FAMILY MEDICINE

## 2021-10-04 PROCEDURE — 25010000002 CEFEPIME PER 500 MG: Performed by: FAMILY MEDICINE

## 2021-10-04 PROCEDURE — 3E0336Z INTRODUCTION OF NUTRITIONAL SUBSTANCE INTO PERIPHERAL VEIN, PERCUTANEOUS APPROACH: ICD-10-PCS | Performed by: SPECIALIST

## 2021-10-04 PROCEDURE — 94799 UNLISTED PULMONARY SVC/PX: CPT

## 2021-10-04 PROCEDURE — 25010000002 IOPAMIDOL 61 % SOLUTION: Performed by: INTERNAL MEDICINE

## 2021-10-04 PROCEDURE — C1751 CATH, INF, PER/CENT/MIDLINE: HCPCS

## 2021-10-04 PROCEDURE — 25010000002 CEFEPIME PER 500 MG: Performed by: SPECIALIST

## 2021-10-04 PROCEDURE — 99233 SBSQ HOSP IP/OBS HIGH 50: CPT | Performed by: INTERNAL MEDICINE

## 2021-10-04 PROCEDURE — 99232 SBSQ HOSP IP/OBS MODERATE 35: CPT | Performed by: INTERNAL MEDICINE

## 2021-10-04 PROCEDURE — 85025 COMPLETE CBC W/AUTO DIFF WBC: CPT | Performed by: FAMILY MEDICINE

## 2021-10-04 PROCEDURE — 80053 COMPREHEN METABOLIC PANEL: CPT | Performed by: FAMILY MEDICINE

## 2021-10-04 PROCEDURE — 71260 CT THORAX DX C+: CPT

## 2021-10-04 RX ORDER — LIDOCAINE HYDROCHLORIDE 10 MG/ML
1 INJECTION, SOLUTION EPIDURAL; INFILTRATION; INTRACAUDAL; PERINEURAL ONCE
Status: COMPLETED | OUTPATIENT
Start: 2021-10-04 | End: 2021-10-04

## 2021-10-04 RX ADMIN — LIDOCAINE HYDROCHLORIDE ANHYDROUS 1 ML: 10 INJECTION, SOLUTION INFILTRATION at 16:54

## 2021-10-04 RX ADMIN — METRONIDAZOLE 500 MG: 500 INJECTION, SOLUTION INTRAVENOUS at 22:05

## 2021-10-04 RX ADMIN — ALBUTEROL SULFATE 1.25 MG: 2.5 SOLUTION RESPIRATORY (INHALATION) at 07:11

## 2021-10-04 RX ADMIN — DEXTROMETHORPHAN 60 MG: 30 SUSPENSION, EXTENDED RELEASE ORAL at 08:17

## 2021-10-04 RX ADMIN — HYDROCODONE BITARTRATE AND HOMATROPINE METHYLBROMIDE 10 ML: 5; 1.5 SOLUTION ORAL at 22:15

## 2021-10-04 RX ADMIN — IPRATROPIUM BROMIDE 0.5 MG: 0.5 SOLUTION RESPIRATORY (INHALATION) at 15:45

## 2021-10-04 RX ADMIN — CEFEPIME HYDROCHLORIDE 2 G: 2 INJECTION, POWDER, FOR SOLUTION INTRAVENOUS at 08:16

## 2021-10-04 RX ADMIN — METRONIDAZOLE 500 MG: 500 INJECTION, SOLUTION INTRAVENOUS at 03:33

## 2021-10-04 RX ADMIN — ALBUTEROL SULFATE 1.25 MG: 2.5 SOLUTION RESPIRATORY (INHALATION) at 11:54

## 2021-10-04 RX ADMIN — IPRATROPIUM BROMIDE 0.5 MG: 0.5 SOLUTION RESPIRATORY (INHALATION) at 20:34

## 2021-10-04 RX ADMIN — CETIRIZINE HYDROCHLORIDE 10 MG: 10 TABLET ORAL at 08:16

## 2021-10-04 RX ADMIN — ENOXAPARIN SODIUM 90 MG: 100 INJECTION SUBCUTANEOUS at 18:05

## 2021-10-04 RX ADMIN — IPRATROPIUM BROMIDE 0.5 MG: 0.5 SOLUTION RESPIRATORY (INHALATION) at 11:54

## 2021-10-04 RX ADMIN — ENOXAPARIN SODIUM 90 MG: 100 INJECTION SUBCUTANEOUS at 06:21

## 2021-10-04 RX ADMIN — IOPAMIDOL 100 ML: 612 INJECTION, SOLUTION INTRAVENOUS at 13:26

## 2021-10-04 RX ADMIN — FAMOTIDINE 20 MG: 10 INJECTION INTRAVENOUS at 08:16

## 2021-10-04 RX ADMIN — FAMOTIDINE 20 MG: 10 INJECTION INTRAVENOUS at 22:06

## 2021-10-04 RX ADMIN — CEFEPIME HYDROCHLORIDE 2 G: 2 INJECTION, POWDER, FOR SOLUTION INTRAVENOUS at 17:24

## 2021-10-04 RX ADMIN — BUDESONIDE AND FORMOTEROL FUMARATE DIHYDRATE 2 PUFF: 160; 4.5 AEROSOL RESPIRATORY (INHALATION) at 07:11

## 2021-10-04 RX ADMIN — IPRATROPIUM BROMIDE 0.5 MG: 0.5 SOLUTION RESPIRATORY (INHALATION) at 07:11

## 2021-10-04 RX ADMIN — ALBUTEROL SULFATE 1.25 MG: 2.5 SOLUTION RESPIRATORY (INHALATION) at 15:45

## 2021-10-04 RX ADMIN — METRONIDAZOLE 500 MG: 500 INJECTION, SOLUTION INTRAVENOUS at 11:35

## 2021-10-04 RX ADMIN — BISOPROLOL FUMARATE 10 MG: 10 TABLET ORAL at 08:17

## 2021-10-04 RX ADMIN — BUDESONIDE AND FORMOTEROL FUMARATE DIHYDRATE 2 PUFF: 160; 4.5 AEROSOL RESPIRATORY (INHALATION) at 20:34

## 2021-10-04 RX ADMIN — ROSUVASTATIN CALCIUM 10 MG: 10 TABLET, FILM COATED ORAL at 22:07

## 2021-10-04 RX ADMIN — FLUTICASONE PROPIONATE 2 SPRAY: 50 SPRAY, METERED NASAL at 08:17

## 2021-10-04 RX ADMIN — LEVOTHYROXINE SODIUM 100 MCG: 100 TABLET ORAL at 08:16

## 2021-10-04 RX ADMIN — Medication 3 MG: at 22:07

## 2021-10-04 RX ADMIN — ALBUTEROL SULFATE 1.25 MG: 2.5 SOLUTION RESPIRATORY (INHALATION) at 20:34

## 2021-10-04 RX ADMIN — CEFEPIME HYDROCHLORIDE 2 G: 2 INJECTION, POWDER, FOR SOLUTION INTRAVENOUS at 00:02

## 2021-10-04 RX ADMIN — HYDROCHLOROTHIAZIDE 12.5 MG: 25 TABLET ORAL at 08:16

## 2021-10-04 NOTE — PROGRESS NOTES
"Patient name: Sonu Bravo Jr.  Patient : 1945  VISIT # [id number]  MR #[id number]    Procedure:  Procedure Date:  POD:* No surgery found *    Subjective    Chief complaint: abdominal pain, cough    Patient resting in bed with wife at bedside.  Wife expresses concern about patient's confusion overnight.  Nursing reports he was given cough suppressant with codeine but has been oriented this morning.  He has no complaints today and he states he feels well.  Awaiting transfer to .       Objective     Visit Vitals  /67 (BP Location: Left arm, Patient Position: Lying)   Pulse 62   Temp 97.8 °F (36.6 °C) (Oral)   Resp 18   Ht 177.8 cm (70\")   Wt 85.1 kg (187 lb 11.2 oz)   SpO2 93%   BMI 26.93 kg/m²       Intake/Output Summary (Last 24 hours) at 10/4/2021 0924  Last data filed at 10/3/2021 1618  Gross per 24 hour   Intake 200 ml   Output --   Net 200 ml       Lab:     CBC:  Results from last 7 days   Lab Units 10/04/21  0435 10/03/21  0409 10/02/21  0424   WBC 10*3/mm3 13.16* 11.42* 14.51*   HEMATOCRIT % 30.8* 28.6* 29.0*   PLATELETS 10*3/mm3 454* 459* 418          BMP:  Results from last 7 days   Lab Units 10/04/21  0435 10/03/21  0409 10/02/21  0424   SODIUM mmol/L 129* 133* 132*   POTASSIUM mmol/L 3.6 3.2* 3.6   CHLORIDE mmol/L 94* 98 96*   CO2 mmol/L 27.0 27.0 27.0   GLUCOSE mg/dL 96 140* 92   BUN mg/dL 9 10 10   CREATININE mg/dL 0.74* 0.74* 0.64*          COAG:  Results from last 7 days   Lab Units 10/01/21  0423   INR  1.38*       IMAGES:       Imaging Results (Last 24 Hours)     ** No results found for the last 24 hours. **            Physical Exam:  General: Alert, oriented. No apparent distress.   Cardiovascular: Regular rate and rhythm without murmur, rubs, or gallops.    Pulmonary: Clear to auscultation bilaterally without wheezing, rubs, or rales.  Abdomen: Soft, non distended, and tender.  Extremities: Warm, moves all extremities. No edema.   Neurologic:  Grossly intact with no focal " deficits.            Impression:       Liver mass    Cavitary lesion of lung  Sigmoid diverticulitis   Paroxysmal atrial fibrillation  Anticoagulated  Bacteremia       Plan:  Plan for repeat CT chest today  Await transfer to [facility]  We will continue to follow  [facility] patient, his wife, and nursing      Jesika Cohen, APRN  10/04/21  09:24 CDT

## 2021-10-04 NOTE — PROGRESS NOTES
"    [facility] Medicine Services  INPATIENT PROGRESS NOTE    Patient Name: Sonu Bravo Jr.  Date of Admission: 9/29/2021  Today's Date: 10/04/21  Length of Stay: 5  Primary Care Physician: Vaughn Kramer DO    Subjective   Chief Complaint: \"bored\"  HPI   Patient was seen and examined at bedside.  Overall the patient remains clinically stable.  Patient remains afebrile and hemodynamically stable.  Patient indicates that he is \"bored\".  I discussed with them at length, that the most important thing is to focus on continuing the IV antibiotics for now, while awaiting cultures.  Patient's wife is very anxious asking if there is anything that we can do to move him up the list to .  I discussed with her at length, that he is in no acute distress right now, and currently stable.  But we will continue working on getting him a bed.  She is under the impression, that when the patient gets there, he is going to immediately undergo a procedure.  I tried to set the expectation, that when they get there, the plan may be similar to what we are doing now, with continued IV antibiotics.  But the benefit will be that they are at a facility with the capabilities to intervene if necessary.  I encouraged the patient to ambulate in the hallway.  They are going to try to play cards today to try to keep him up and active.    The patient had a mild amount of confusion last night, suspect that the patient is having some day/night cycle problems.  Reinforced with the nurse as well as the patient's wife to focus on staying awake during the day, and resting at night and avoid interruptions of sleep if possible.        Review of Systems   Constitutional: Positive for fatigue. Negative for activity change, chills and fever.   Respiratory: Negative for cough and shortness of breath.    Cardiovascular: Negative for chest pain and palpitations.   Gastrointestinal: Negative for abdominal distention, " abdominal pain, diarrhea, nausea and vomiting.   Psychiatric/Behavioral: Positive for dysphoric mood.        All pertinent negatives and positives are as above. All other systems have been reviewed and are negative unless otherwise stated.     Objective    Temp:  [97.8 °F (36.6 °C)-99 °F (37.2 °C)] 97.8 °F (36.6 °C)  Heart Rate:  [55-65] 59  Resp:  [16-18] 16  BP: (139-177)/(65-82) 139/67  Physical Exam  Vitals and nursing note reviewed.   Constitutional:       Appearance: Normal appearance.   HENT:      Head: Normocephalic and atraumatic.      Nose: No congestion or rhinorrhea.      Mouth/Throat:      Mouth: Mucous membranes are dry.      Pharynx: Oropharynx is clear.   Eyes:      General: No scleral icterus.     Conjunctiva/sclera: Conjunctivae normal.   Cardiovascular:      Rate and Rhythm: Normal rate and regular rhythm.   Pulmonary:      Effort: Pulmonary effort is normal. No respiratory distress.      Breath sounds: Normal breath sounds.   Abdominal:      General: Abdomen is flat. Bowel sounds are normal. There is no distension.      Palpations: Abdomen is soft. There is no mass.   Skin:     General: Skin is warm and dry.      Coloration: Skin is not jaundiced or pale.   Neurological:      General: No focal deficit present.      Mental Status: He is alert and oriented to person, place, and time.   Psychiatric:         Mood and Affect: Mood normal.         Behavior: Behavior normal.       Results Review:  I have reviewed the labs, radiology results, and diagnostic studies.    Laboratory Data:   Results from last 7 days   Lab Units 10/04/21  0435 10/03/21  0409 10/02/21  0424   WBC 10*3/mm3 13.16* 11.42* 14.51*   HEMOGLOBIN g/dL 10.1* 9.4* 9.6*   HEMATOCRIT % 30.8* 28.6* 29.0*   PLATELETS 10*3/mm3 454* 459* 418        Results from last 7 days   Lab Units 10/04/21  0435 10/03/21  0409 10/02/21  0424   SODIUM mmol/L 129* 133* 132*   POTASSIUM mmol/L 3.6 3.2* 3.6   CHLORIDE mmol/L 94* 98 96*   CO2 mmol/L 27.0 27.0  27.0   BUN mg/dL 9 10 10   CREATININE mg/dL 0.74* 0.74* 0.64*   CALCIUM mg/dL 7.6* 7.5* 7.6*   BILIRUBIN mg/dL 0.5 0.4 0.6   ALK PHOS U/L 92 104 96   ALT (SGPT) U/L 17 19 19   AST (SGOT) U/L 24 29 29   GLUCOSE mg/dL 96 140* 92       Culture Data:   Blood Culture   Date Value Ref Range Status   09/29/2021 No growth at 4 days  Preliminary   09/29/2021 Staphylococcus, coagulase negative (C)  Final     Comment:     Probable contaminant requires clinical correlation, susceptibility not performed unless requested by physician.         Radiology Data:   Imaging Results (Last 24 Hours)     ** No results found for the last 24 hours. **          I have reviewed the patient's current medications.     Assessment/Plan     Active Hospital Problems    Diagnosis    • **Liver abscess    • Hyponatremia    • Hypokalemia    • Empyema lung (HCC)    • Cavitary lesion of lung    • Current use of long term anticoagulation    • Paroxysmal atrial fibrillation (HCC)    • Essential hypertension    • Acquired hypothyroidism    • Gastro-esophageal reflux disease with esophagitis        Plan:    Patient was admitted on 9/29 for cavitary lesion in the lung as well as concern for liver abscess.  Patient reportedly had fever/chills, night sweats.    General surgery, oncology, pulmonary, CT surgery infectious disease consulted, notes reviewed, appreciate assistance    Patient underwent CT guided abscess drainage on 10/1 with IR that showed frankly purulent drainage.  Cultures are pending.    Empyema on the right being followed and monitored by CT surgery, repeat CT chest pending.    Antibiotics per infectious disease.  Currently on cefepime and flagyl.  Antibiotics pending.  Previous blood cultures likely contaminant.      Mildy hyponatremia, little clinical significance at this point.  Will monitor.  Monitor WBC.    Pulmonary treating with bronchodilators and inhaled corticosteroids.    Ambulate TID.  Incentive spirometer.    Patient also has a  concerning pulmonary nodule 1.6-1.8 cm, will need to follow-up this as an outpatient.    Continue IVF for now, likely dc soon.      Encourage PO intake.     Could consider placing a IR guided chest tube into pleural drain and also could consider drain placement into the extracapsular fluid collection.  These small areas in the liver would may improve with antibiotics alone but source control would likely be beneficial.      Awaiting placement at , agree with tertiary care referral as the patients needs exceed what we are capable of in regards to his liver abscess and IR capabilities.    Discharge Planning: I expect the patient to be discharged to  in ? days.    Electronically signed by Jan Mcintyre MD, 10/04/21, 12:30 CDT.

## 2021-10-04 NOTE — PROGRESS NOTES
"Infectious Diseases Progress Note    Patient:  Sonu Bravo Jr.  YOB: 1945  MRN: [id number]   Admit date: 9/29/2021   Admitting Physician: Jan Mcintyre MD  Primary Care Physician: Vaughn Kramer DO    Chief Complaint/Interval History: He is without fever.  No abdominal pain.  He is up to chair.  He is a little discouraged.  Is not used to having to be in the hospital or being ill.  He is hemodynamically stable.    Intake/Output Summary (Last 24 hours) at 10/4/2021 1018  Last data filed at 10/3/2021 1618  Gross per 24 hour   Intake 200 ml   Output --   Net 200 ml     Allergies:   Allergies   Allergen Reactions   • Benadryl [Diphenhydramine] Mental Status Change   • Penicillins Hives   • Sulfa Antibiotics Hives, Itching and Rash   • Sulfamethoxazole-Trimethoprim Hives, Itching and Rash     Current Scheduled Medications:   albuterol, 1.25 mg, Nebulization, 4x Daily - RT   And  ipratropium, 0.5 mg, Nebulization, 4x Daily - RT  bisoprolol, 10 mg, Oral, Daily  budesonide-formoterol, 2 puff, Inhalation, BID - RT  cefepime, 2 g, Intravenous, Q8H  cetirizine, 10 mg, Oral, Daily  dextromethorphan polistirex ER, 60 mg, Oral, Q12H  enoxaparin, 1 mg/kg, Subcutaneous, Q12H  famotidine, 20 mg, Intravenous, BID  fluticasone, 2 spray, Each Nare, Daily  hydroCHLOROthiazide, 12.5 mg, Oral, Daily  levothyroxine, 100 mcg, Oral, Daily  melatonin, 3 mg, Oral, Nightly  metroNIDAZOLE, 500 mg, Intravenous, Q8H  rosuvastatin, 10 mg, Oral, Nightly  sodium chloride, 10 mL, Intravenous, Q12H      Current PRN Medications:  •  acetaminophen  •  HYDROcodone-homatropine  •  metoprolol tartrate  •  sodium chloride    Review of Systems see HPI    Vital Signs:  /67 (BP Location: Left arm, Patient Position: Lying)   Pulse 62   Temp 97.8 °F (36.6 °C) (Oral)   Resp 18   Ht 177.8 cm (70\")   Wt 85.1 kg (187 lb 11.2 oz)   SpO2 93%   BMI 26.93 kg/m²     Physical Exam  Vital signs - reviewed.  Line/IV site - No " erythema, warmth, induration, or tenderness.  Abdomen is soft and nontender.  No guarding or rebound.    Lab Results:  CBC:   Results from last 7 days   Lab Units 10/04/21  0435 10/03/21  0409 10/02/21  0424 10/01/21  0423 09/30/21  0526 09/29/21  1924   WBC 10*3/mm3 13.16* 11.42* 14.51* 15.85* 23.66* 27.34*   HEMOGLOBIN g/dL 10.1* 9.4* 9.6* 9.0* 9.5* 10.4*   HEMATOCRIT % 30.8* 28.6* 29.0* 26.8* 28.5* 31.5*   PLATELETS 10*3/mm3 454* 459* 418 380 376 371     BMP:  Results from last 7 days   Lab Units 10/04/21  0435 10/03/21  0409 10/03/21  0409 10/02/21  0424 10/02/21  0424 10/01/21  0423 10/01/21  0423 09/30/21  1843 09/30/21  1843 09/30/21  0526 09/30/21  0526 09/29/21  1924 09/29/21  1924   SODIUM mmol/L 129*  --  133*  --  132*  --  130*  --  131*  --  131*  --  133*   POTASSIUM mmol/L 3.6  --  3.2*  --  3.6  --  3.2*  --  3.2*  --  2.4*  --  3.0*   CHLORIDE mmol/L 94*  --  98  --  96*  --  95*  --  94*  --  93*  --  91*   CO2 mmol/L 27.0  --  27.0  --  27.0  --  26.0  --  29.0  --  29.0  --  29.0   BUN mg/dL 9  --  10  --  10  --  13  --  15  --  17  --  18   CREATININE mg/dL 0.74*  --  0.74*  --  0.64*  --  0.63*  --  0.72*  --  0.77  --  0.95   GLUCOSE mg/dL 96   < > 140*   < > 92   < > 92   < > 109*   < > 112*   < > 84   CALCIUM mg/dL 7.6*  --  7.5*  --  7.6*  --  7.6*  --  7.8*  --  7.5*  --  8.1*   ALT (SGPT) U/L 17  --  19  --  19  --  19  --   --   --  23  --  32    < > = values in this interval not displayed.     Culture Results:   Blood Culture   Date Value Ref Range Status   09/29/2021 No growth at 4 days  Preliminary   09/29/2021 Staphylococcus, coagulase negative (C)  Final     Comment:     Probable contaminant requires clinical correlation, susceptibility not performed unless requested by physician.       Radiology: None  Additional Studies Reviewed: None    Impression:   Liver abnormality/probable abscess  Right lung pleural process  Positive blood culture for Fusobacterium  Left lung  nodule    Recommendations:   He is stable on current antibiotic treatment with cefepime and metronidazole  White blood cell count overall is trended down  Continue current antibiotic treatment  He is undergoing additional imaging today  Continue to follow    Julien Sharma MD

## 2021-10-04 NOTE — PROGRESS NOTES
"Infectious Diseases Progress Note    Patient:  Sonu Bravo Jr.  YOB: 1945  MRN: [id number]   Admit date: 9/29/2021   Admitting Physician: Brody Gu MD  Primary Care Physician: Vaugnh Kramer DO    Chief Complaint/Interval History: He feels okay.  Is up to chair.  He has no apparent discomfort.  No nausea.  No cardiopulmonary symptoms.    Intake/Output Summary (Last 24 hours) at 10/3/2021 1926  Last data filed at 10/3/2021 1618  Gross per 24 hour   Intake 300 ml   Output --   Net 300 ml     Allergies:   Allergies   Allergen Reactions   • Benadryl [Diphenhydramine] Mental Status Change   • Penicillins Hives   • Sulfa Antibiotics Hives, Itching and Rash   • Sulfamethoxazole-Trimethoprim Hives, Itching and Rash     Current Scheduled Medications:   albuterol, 1.25 mg, Nebulization, 4x Daily - RT   And  ipratropium, 0.5 mg, Nebulization, 4x Daily - RT  bisoprolol, 10 mg, Oral, Daily  budesonide-formoterol, 2 puff, Inhalation, BID - RT  cefepime, 2 g, Intravenous, Q8H  cetirizine, 10 mg, Oral, Daily  dextromethorphan polistirex ER, 60 mg, Oral, Q12H  enoxaparin, 1 mg/kg, Subcutaneous, Q12H  famotidine, 20 mg, Intravenous, BID  fluticasone, 2 spray, Each Nare, Daily  hydroCHLOROthiazide, 12.5 mg, Oral, Daily  levothyroxine, 100 mcg, Oral, Daily  melatonin, 3 mg, Oral, Nightly  metroNIDAZOLE, 500 mg, Intravenous, Q8H  potassium chloride, 40 mEq, Oral, BID  rosuvastatin, 10 mg, Oral, Nightly  sodium chloride, 10 mL, Intravenous, Q12H      Current PRN Medications:  •  acetaminophen  •  HYDROcodone-homatropine  •  metoprolol tartrate  •  sodium chloride    Review of Systems see HPI    Vital Signs:  /82 (BP Location: Left arm, Patient Position: Lying)   Pulse 55   Temp 98 °F (36.7 °C) (Oral)   Resp 18   Ht 177.8 cm (70\")   Wt 85.1 kg (187 lb 11.2 oz)   SpO2 94%   BMI 26.93 kg/m²     Physical Exam  Vital signs - reviewed.  Line/IV site - No erythema, warmth, induration, or " tenderness.  Lungs clear without crackles  Abdomen soft.  No tenderness.  No palpable mass.    Lab Results:  CBC:   Results from last 7 days   Lab Units 10/03/21  0409 10/02/21  0424 10/01/21  0423 09/30/21  0526 09/29/21  1924   WBC 10*3/mm3 11.42* 14.51* 15.85* 23.66* 27.34*   HEMOGLOBIN g/dL 9.4* 9.6* 9.0* 9.5* 10.4*   HEMATOCRIT % 28.6* 29.0* 26.8* 28.5* 31.5*   PLATELETS 10*3/mm3 459* 418 380 376 371     BMP:  Results from last 7 days   Lab Units 10/03/21  0409 10/02/21  0424 10/02/21  0424 10/01/21  0423 10/01/21  0423 09/30/21  1843 09/30/21  1843 09/30/21  0526 09/30/21  0526 09/29/21  1924 09/29/21  1924   SODIUM mmol/L 133*  --  132*  --  130*  --  131*  --  131*  --  133*   POTASSIUM mmol/L 3.2*  --  3.6  --  3.2*  --  3.2*  --  2.4*  --  3.0*   CHLORIDE mmol/L 98  --  96*  --  95*  --  94*  --  93*  --  91*   CO2 mmol/L 27.0  --  27.0  --  26.0  --  29.0  --  29.0  --  29.0   BUN mg/dL 10  --  10  --  13  --  15  --  17  --  18   CREATININE mg/dL 0.74*  --  0.64*  --  0.63*  --  0.72*  --  0.77  --  0.95   GLUCOSE mg/dL 140*   < > 92   < > 92   < > 109*   < > 112*   < > 84   CALCIUM mg/dL 7.5*  --  7.6*  --  7.6*  --  7.8*  --  7.5*  --  8.1*   ALT (SGPT) U/L 19  --  19  --  19  --   --   --  23  --  32    < > = values in this interval not displayed.     Culture Results:   Blood Culture   Date Value Ref Range Status   09/29/2021 No growth at 3 days  Preliminary   09/29/2021 Staphylococcus, coagulase negative (C)  Final     Comment:     Probable contaminant requires clinical correlation, susceptibility not performed unless requested by physician.       Radiology: None  Additional Studies Reviewed: None    Impression:   Liver abnormality probable abscess  Right lung/pleural process  Positive blood culture for Fusobacterium  Left lung nodule    Recommendations:   Seems to be responding to current treatment  White blood cell count trending downward  Continue cefepime and metronidazole    Julien Sharma MD

## 2021-10-04 NOTE — PROGRESS NOTES
[facility] Progress Note    Progress Note    Patient:  Sonu Bravo Jr.  YOB: 1945  Date of Service: 10/4/2021  MRN: [id number]   Acct: [id number]   Primary Care Physician: Vaughn Kramer DO  Advance Directive:   There are no questions and answers to display.     Admit Date: 9/29/2021       Hospital Day: 5      Subjective:     Chief Compliant: No new complaints    Subjective:   Lingering fatigue.  Wants to take a walk.  No recurrent chills.  No fevers.  No shortness of breath at rest.  No worsening cough.  No abdominal pain.      Interval history: Sonu Bravo Jr. 76 y.o. male medical patient of Dr. Kramer, whose history includes arthritis, paroxysmal atrial fibrillation, GERD, hyperlipidemia, longstanding tobacco use/nicotine dependence, and COPD.     --08/31/2021-CT angiogram chest.  No PE.  Suspicious 1.5 cm cavitary left lower lobe pulmonary nodule in the superior segment.  Tiny 3 mm subpleural right lower lobe nodule.  Small lymph nodes in the lisa hepatis region in the upper abdomen.     --09/10/2021-liver ultrasound-2 suspicious masses in the liver, 1 in the left hepatic lobe 5.3 cm, 1 in the dome of the right hepatic lobe 6.4 cm.  Hepatic metastasis not excluded.  Unremarkable gallbladder, no biliary ductal dilatation identified.     --09/10/2021-CT abdomen/pelvis with and without contrast.  2 cm segmental thickening of the sigmoid colon concerning for malignancy.  2 large heterogenous liver lesions (right posterior liver 5.5 cm, predominantly centrally hypodense lesion; left liver lobe with similar-appearing 4.8 cm liver lesion) most concerning for metastatic disease     --09/23/2021-was seen at Dr. Kramer office complaining of chills and shaking in the evenings lasting 60 to 90 minutes at a time associated with weakness, shortness of breath and a dry cough.  Previous Covid testing hospital was negative.     --09/23/2021-hemoglobin 12.3, MCV 88.3, platelets 280,000,  WBC 29,000 with 88 point 6 segs.  BUN 31, creatinine 1.16, albumin 2.7, ALT 42, alk phos 197, GFR 61.  ESR 26 (elevated).  Manual blood smear showed leukocytosis with 45% segs, 31% bands, 8 lymphocytes, 1 monocyte, 11 metamyelocytes, 4 myelocytes, 1 nucleated red blood cell.  Comment: Significant leukocytosis with granulocytic left shift with vacuolated granulocytes concerning for bacterial infection     --09/24/2029-blood culture showed no growth at 5 days.     --09/28/2029-colonoscopy.  Impression: 3 diminutive polyps in the rectum removed with hot snare.  3 diminutive polyps at the splenic flexure, removed with hot snare.  Diverticulosis in the sigmoid colon and in the descending colon.  Distal rectum and anal verge are normal on retroflexion view.     --09/30/2021-hemoglobin 9.5; MCV 88.4, platelets 376,000, WBC 23.66 with 86 point 3 segs     --09/30/2021-CT chest with contrast--worsening patient's liver abscess cyst with extension beyond the liver capsule.  In particular extension of the right liver abscess above the diaphragm with developing right empyema and right lower lobe pulmonary abscess.  Also extension of the left liver abscess into the subhepatic space with formation of a subserosal gastric wall abscess.  Lung emphysema with 1.6 cm.  Segment left lower lobe nodule concerning for primary lung malignancy.    --09/30/2021-MRI abdomen with and without contrast-enlarging liver abscesses in segment 3 and 7 which appear to have extended beyond the liver capsule.  In particular there is a developing empyema along the subdiaphragmatic pleura posteriorly as well as extending up along the posterior costal pleura and there is also developing right lower lobe pulmonary abscess.  There is also an elongated subserosal abscess along the lesser curvature and antrum of the stomach.  Differential includes subhepatic space abscess although this very closely follows the path and contour of the stomach and appears to be  "within the wall of the stomach.    --10/01/8081-OU-vvwkxi abscess drain liver-successful CT-guided biopsy liver abscess aspiration yielding approximately 20 cc of frankly purulent material.    Review of Systems  Review of Systems:   Constitutional: Lingering fatigue.  \"Just tired.\"  No fever / No recurrent chills nor sweats.  HEENT:  No sore throat / No hoarseness / No vision changes  CV:  No chest pain / No palpitations / No orthopnea  Respiratory: Chronic intermittent cough with scant clear phlegm/ No shortness of breath at rest/(+) exertional dyspnea/ No hemoptysis/smoker since age 22 (up to 2 packs/day), currently<1/2 pack/day.  GI: Has not had nausea / No vomiting /No abdominal pain / No diarrhea / No constipation.  Colonoscopy, 09/28/2021.  :  No dysuria / No hesitancy / No urgency / No hematuria  Neuro:   Generalized muscle weakness / No dysphagia / No headache / No paresthesias  Musculoskeletal:  No edema / No cyanosis / No new arthralgias.    Vitals: /67 (BP Location: Left arm, Patient Position: Lying)   Pulse 62   Temp 97.8 °F (36.6 °C) (Oral)   Resp 18   Ht 177.8 cm (70\")   Wt 85.1 kg (187 lb 11.2 oz)   SpO2 93%   BMI 26.93 kg/m²     Physical Exam  Physical Exam:  General appearance: Pleasant, cooperative, heavyset, modestly kept elderly male who appears comfortable while lying in his hospital bed.  His spouse Bertha is at the bedside   Skin:  Skin color is a bit pale. No rashes or lesions  HEENT:  Head: Normal, normocephalic, atraumatic.  Neck:  no adenopathy  Lungs:  clear to auscultation bilaterally with overall diminished breath sounds  Heart:  regular rate and rhythm  Abdomen:  soft, obese, non-tender; bowel sounds hypoactive; no obvious masses,  no overt organomegaly  Extremities:  extremities normal, atraumatic, no cyanosis or edema  Neurologic:  Mental status: Alert, oriented, thought content appropriate    24HR INTAKE/OUTPUT:      Intake/Output Summary (Last 24 hours) at 10/4/2021 " 1036  Last data filed at 10/3/2021 1618  Gross per 24 hour   Intake 200 ml   Output --   Net 200 ml        Black Catheter: None    Diet: Diet Regular      Medications:   Scheduled Meds:albuterol, 1.25 mg, Nebulization, 4x Daily - RT   And  ipratropium, 0.5 mg, Nebulization, 4x Daily - RT  bisoprolol, 10 mg, Oral, Daily  budesonide-formoterol, 2 puff, Inhalation, BID - RT  cefepime, 2 g, Intravenous, Q8H  cetirizine, 10 mg, Oral, Daily  dextromethorphan polistirex ER, 60 mg, Oral, Q12H  enoxaparin, 1 mg/kg, Subcutaneous, Q12H  famotidine, 20 mg, Intravenous, BID  fluticasone, 2 spray, Each Nare, Daily  hydroCHLOROthiazide, 12.5 mg, Oral, Daily  levothyroxine, 100 mcg, Oral, Daily  melatonin, 3 mg, Oral, Nightly  metroNIDAZOLE, 500 mg, Intravenous, Q8H  rosuvastatin, 10 mg, Oral, Nightly  sodium chloride, 10 mL, Intravenous, Q12H        Continuous Infusions:lactated ringers, 125 mL/hr, Last Rate: 125 mL/hr (10/03/21 2059)        Labs:     Results from last 7 days   Lab Units 10/04/21  0435 10/03/21  0409 10/02/21  0424   WBC 10*3/mm3 13.16* 11.42* 14.51*   HEMOGLOBIN g/dL 10.1* 9.4* 9.6*   HEMATOCRIT % 30.8* 28.6* 29.0*   PLATELETS 10*3/mm3 454* 459* 418     09/30/2021 0526 09/30/2021 1231 Ferritin [[id number]]    (Abnormal)   Blood    Final result Component Value Units   Ferritin 822.70High  ng/mL           09/30/2021 0526 09/30/2021 2331 Folate [[id number]]    Blood    Final result Component Value Units   Folate 11.50 ng/mL           09/30/2021 0526 09/30/2021 2331 Vitamin B12 [[id number]]    (Abnormal)   Blood    Final result Component Value Units   Vitamin B-12 >2,000High               10/01/2021 0423 10/01/2021 0534 Iron Profile [[id number]]   (Abnormal)   Blood    Final result Component Value Units   Iron 17Low  mcg/dL   Iron Saturation 12Low  %   Transferrin 94Low  mg/dL   TIBC 140Low  mcg/dL        09/30/2021 1036 09/30/2021 2237 CEA [[id number]]    Blood    Final result Component Value Units   CEA 3.32 ng/mL             Results from last 7 days   Lab Units 10/04/21  0435 10/03/21  0409 10/02/21  0424   SODIUM mmol/L 129* 133* 132*   POTASSIUM mmol/L 3.6 3.2* 3.6   CHLORIDE mmol/L 94* 98 96*   CO2 mmol/L 27.0 27.0 27.0   BUN mg/dL 9 10 10   CREATININE mg/dL 0.74* 0.74* 0.64*   CALCIUM mg/dL 7.6* 7.5* 7.6*   BILIRUBIN mg/dL 0.5 0.4 0.6   ALK PHOS U/L 92 104 96   ALT (SGPT) U/L 17 19 19   AST (SGOT) U/L 24 29 29   GLUCOSE mg/dL 96 140* 92       ABG:  No results found for: PHART, PO2ART, BBG3DPR    Culture Data:   Blood Culture   Date Value Ref Range Status   09/29/2021 No growth at 24 hours  Preliminary   09/29/2021 Abnormal Stain (C)  Preliminary   09/24/2021 Fusobacterium species (C)  Corrected     Comment:     Beta lactamase negative    Appended report. These results have been appended to a previously final verified report.   09/24/2021 No growth at 5 days  Final       Lab Results   Component Value Date    PATHINTERP  09/23/2021     Mild normochromic normocytic anemia    Leukocytosis with the following manual differential:  Neutrophils 45%  Bands 31%  Lymphocytes 8%  Monocytes 1%  Metamyelocytes 11%  Myelocytes 4%  Nucleated red blood cells 1  Platelets within normal range    Comment: There is a significant leukocytosis with a granulocytic left shift with vacuolated granulocytes present concerning for bacterial infection.       Radiology:     Imaging Results (Last 7 Days)     Procedure Component Value Units Date/Time    MRI Pelvis With & Without Contrast [[id number]] Collected: 10/02/21 1521     Updated: 10/02/21 1532    Narrative:      EXAMINATION:  MRI PELVIS W WO CONTRAST-  10/2/2021 12:20 PM CDT     HISTORY: Follow-up sigmoid lesion; Z74.09-Other reduced mobility;  R26.9-Unspecified abnormalities of gait and mobility      COMPARISON: Abdomen pelvis CT dated 9/10/2021     TECHNIQUE: Multiplanar MR imaging of the pelvis was performed.  Gadolinium enhanced imaging obtained.     FINDINGS:     There is image quality  degradation with motion.     As noted on the CT examination there is diffuse segmental wall  thickening of the sigmoid colon with scattered diverticuli.  Circumferential wall thickening associated with diverticular disease can  certainly have this appearance. Underlying neoplasm is not excluded.  Endoscopic characterization suggested.     There is free fluid identified in the pelvis.     No pelvic lymphadenopathy identified.     No acute osseous abnormalities observed.     Urinary bladder is moderately distended.     Prostate gland is mildly prominent.       Impression:      1. Sigmoid colon diverticulosis and nonspecific wall thickening.  This report was finalized on 10/02/2021 15:28 by Dr. Cb Elliott MD.    CT Guided Abscess Drain Liver [[id number]] Collected: 10/01/21 1606     Updated: 10/01/21 1612    Narrative:      CT GUIDED ABSCESS DRAIN LIVER- 10/1/2021 2:27 PM CDT     INDICATION: abscess; Z74.09-Other reduced mobility; R26.9-Unspecified  abnormalities of gait and mobility     CONSENT: An informed written consent was obtained from the patient after  discussing the risks, benefits, potential complications and  alternatives. All questions answered to the patient's satisfaction.  Automated exposure control (AEC) protocols are utilized on the scanner  to ensure dose lowered technique.      COMPLICATIONS: None.      MEDICATIONS: Local analgesia only     CONTRAST: None.      ESTIMATED BLOOD LOSS: Less than 5 ml.      DOSE LENGTH PRODUCT: 1016 mGy cm. Automated exposure control was also  utilized to decrease patient radiation dose.     PROCEDURE: An informed consent was obtained as above. A formal timeout  was performed prior to the procedure. Prior to sterile preparation and  local anesthetic, noninfused axial CT scans were obtained  redemonstrating the 2 dominant liver abscesses. The optimal site for  needle aspiration of the left lobe abscess was marked at the skin. The  skin was cleansed with Betadine swabs  and sterilely draped. 1% buffered  lidocaine was used to anesthetize the skin and soft tissues down to the  liver capsule. A tiny dermatotomy was made. A 5 [place] 10 cm Yueh  catheter was advanced into the left lobe liver abscess using CT for  guidance. Approximately 20 cc frankly purulent material was aspirated  from this abscess and set aside for microbiology evaluation.     No immediate complications.        Impression:      Successful CT guided biopsy liver abscess aspiration, yielding  approximately 20 cc frankly purulent material. This sample was set aside  for microbiology evaluation.        This report was finalized on 10/01/2021 16:09 by Dr Arie Leon, .    MRI Abdomen With & Without Contrast [[id number]] Collected: 09/30/21 1703     Updated: 09/30/21 1805    Narrative:      MRI OF ABDOMEN WITH AND WITHOUT CONTRAST     HISTORY: Follow-up sigmoid lesion and liver masses/abscesses?;  Z74.09-Other reduced mobility      COMPARISON: CT scan dated 8/31/2021      TECHNIQUE: Multiplanar, multisequential MR imaging of the abdomen was  performed before and after the intravenous administration of contrast.     FINDINGS:     5.2 cm segment 7 rim-enhancing liver abscess showing prominent diffusion  restriction. There are 2 adjacent liver abscesses in liver segment 3,  the larger measuring 4.3 cm and the smaller measuring 1.2 cm, both  displaying prominent diffusion restriction. There appears to be  extension of these liver abscesses beyond the liver capsule, as there is  subcapsular fluid along the anterior, right lateral and dome of the  liver and there is also a developing right lower lobe pulmonary abscess  and empyema. There is also a what appears to be a subserosal abscess  along the lesser curvature and antrum of the stomach, again displaying a  prominent diffusion restriction. Differential would include subhepatic  space abscess although this does appear to follow the contour of the  stomach closely and appears  to be within the wall.     No gross intraperitoneal free fluid. The gallbladder is nondistended.  The biliary tree is nondilated. No pancreatic lesions are identified.  The spleen, adrenal glands and kidneys are unremarkable. No suspicious  adenopathy in the upper abdomen.       Impression:         1. Enlarging liver abscesses in segments 3 and 7 which appear to have  extended beyond the liver capsule. In particular, there is a developing  empyema along the diaphragmatic pleura, posteriorly, as well as  extending up along the posterior costal pleura and there is also a  developing right lower lobe pulmonary abscess. There is also an  elongated subserosal abscess along the lesser curvature and antrum of  the stomach. Differential would include a subhepatic space abscess  although this very closely follows the path and contour of the stomach  and this does appear to be within the wall of the stomach.     Findings and recommendations were discussed with Dr Gu on 9/30/2021  at 5:01 PM CDT.              This report was finalized on 09/30/2021 18:02 by Dr Arie Leon, .    CT Chest With Contrast Diagnostic [[id number]] Collected: 09/30/21 1628     Updated: 09/30/21 1704    Narrative:      CT CHEST W CONTRAST DIAGNOSTIC- 9/30/2021 3:37 PM CDT     HISTORY: Follow-up lung nodule; Z74.09-Other reduced mobility      COMPARISON: CT scan dated 8/31/2021      DOSE LENGTH PRODUCT: 192 mGy cm. Automated exposure control was also  utilized to decrease patient radiation dose.     TECHNIQUE: Serial helical tomographic images of the chest were acquired  following the intravenous infusion of contrast. Multiplanar reformatted  images were provided for review.     FINDINGS:      Visualized neck base: The imaged portion of the base of the neck appears  unremarkable.      Lungs: Advanced lung emphysema. 1.6 cm superior segment left lower lobe  pulmonary nodule, concerning for primary lung malignancy. There is a new  small empyema  identified in the right lung base overlying the more  posterior aspect of the diaphragmatic pleura as well as extending up  along the posterior costal pleura. Additionally, there is a new cavitary  right lower lobe pneumonia, which appears to be developing into a  pulmonary abscess.     Heart: The heart is normal in size. There is no pericardial effusion.     Vasculature: Thoracic aorta is normal in caliber. No dissection  identified. No flow-limiting stenosis identified at the great vessel  origins. The pulmonary arteries are normal in appearance.     Mediastinum and lymph nodes: There is a mild reactive right hilar and  subcarinal adenopathy.     Skeletal and soft tissues: Chest wall soft tissues are unremarkable. No  acute bony abnormality.       Upper abdomen: 4.9 cm segment 7 liver abscess with subcapsular extension  as well as extension above the diaphragm in the right pleural space.  Enlarging left lobe abscesses as well measuring 4.1 and 2.0 cm, in  segment 3, which also appear to be extended beyond the liver capsule.  There is a new subserosal abscess along the gastric wall, mostly along  the antrum, measuring over 10 cm in long axis..           Impression:      1. Worsening of the patient's liver abscesses with extension beyond the  liver capsule. In particular, there is extension of the right liver  abscess above the diaphragm with developing right empyema and right  lower lobe pulmonary abscess. There is also extension of the left liver  abscess into the subhepatic space with formation of a subserosal gastric  wall abscess.  2. Lung emphysema with 1.6 cm superior segment left lower lobe nodule  which is concerning for a primary lung malignancy.     Findings and recommendations were discussed with Dr Gu on 9/30/2021  at 5:01 PM CDT.     This report was finalized on 09/30/2021 17:01 by Dr Arie Leon, .    XR Chest PA & Lateral [[id number]] Collected: 09/29/21 1951     Updated: 09/29/21 1956     Narrative:      HISTORY: Cough     CXR: 2 views the chest obtained     COMPARISON: 9/24/2021     FINDINGS: There are hazy right lower lobe opacities which may be patchy  atelectasis or pneumonia. Left basilar atelectasis. There is a similar  1.5 cm superior segment left lower lobe cavitary nodule. Cardiac  mediastinal contours normal. No pleural effusion pneumothorax.  Degenerative change of the thoracic spine.       Impression:      1. New patchy right lower lobe opacities may be atelectasis versus  pneumonia. Linear left basilar atelectasis. Stable partially cavitary  superior segment left lower lobe nodule.  This report was finalized on 09/29/2021 19:53 by Dr. Dyan Rashid MD.            Objective:       Problem list:     Liver mass    Cavitary lesion of lung    Abdominal infection (HCC)    Empyema lung (HCC)        Assessment/Plan:       ASSESSMENT:   1.   Liver masses.  Radiographically suspicious for abscesses.  --09/10/2021-liver ultrasound-2 suspicious masses in the liver, 1 in the left hepatic lobe 5.3 cm, 1 in the dome of the right hepatic lobe 6.4 cm.  Hepatic metastasis not excluded.  Unremarkable gallbladder, no biliary ductal dilatation identified.  --09/10/2021-CT abdomen/pelvis with and without contrast.  2 cm segmental thickening of the sigmoid colon concerning for malignancy.  2 large heterogenous liver lesions (right posterior liver 5.5 cm, predominantly centrally hypodense lesion; left liver lobe with similar-appearing 4.8 cm liver lesion) most concerning for metastatic disease  --09/30/2021-MRI abdomen.  Enlarging liver abscesses which appear to have extended beyond the liver capsule.  In particular there is a developing empyema along the subdiaphragmatic pleura, posteriorly as well as extending up the posterior costal pleura and there is also developing right lower lobe pulmonary abscess.  There is an elongated subserosal abscess along the lesser curvature and antrum of the stomach.   Differential includes subhepatic space abscess although this very closely follows the path and contour of the stomach and does appear to be within the wall of the stomach.  --10/01/3458-GT-lccmyb abscess drain liver-successful CT-guided biopsy liver abscess aspiration yielding approximately 20 cc of frankly purulent material.  Gram stain: Many (4+) WBCs per low powered field.  No organisms seen.  Cultures no growth to date.       2.   Segmental thickening of the sigmoid colon, low abdominal pain, chills and neutrophilic leukocytosis.  Acute diverticulitis?  ----09/28/2029-colonoscopy.  Impression: 3 diminutive polyps in the rectum removed with hot snare.  3 diminutive polyps at the splenic flexure, removed with hot snare.  Diverticulosis in the sigmoid colon and in the descending colon.  Distal rectum and anal verge are normal on retroflexion view.  3.   Left lower lobe pulmonary nodule.    Will need clarification down the road.  --08/31/2021-CT angiogram chest.  No PE.  Suspicious 1.5 cm cavitary left lower lobe pulmonary nodule in the superior segment.  Tiny 3 mm subpleural right lower lobe nodule.  Small lymph nodes in the lisa hepatis region in the upper abdomen.  --09/30/2021-CT chest.  Worsening liver abscesses with extension beyond the liver capsule.  In particular there is extension of the right liver abscess above the diaphragm with developing right empyema and right lower lobe pulmonary abscess.  Also extension of the left liver abscess into the sub hepatic space with formation of a subserosal gastric wall abscess.  Lung emphysema with 1.6 cm superior segment left lower lobe nodule concerning for primary lung malignancy.  4.   Neutrophilic leukocytosis reactive to ongoing bacterial infection.  5.    Positive blood cultures.  Dr. Garza (ID) now following:  --09/29/2021- staph coagulase-negative from aerobic bottle (possible contaminant)   --09/24/2021-Fusobacterium species from anaerobic bottle.    6.    Normocytic anemia.  Components from iron deficiency, and iron under utilization/anemia of chronic disease.  --Hemoglobin 10.1; MCV 86.3, 10/04/2021 (prior range: Hemoglobin 9.0 -15.4; MCV 85.3-94.2)  7.     Mild thrombocytosis.  Reactive  8.    Longstanding tobacco use/nicotine dependence (beginning age 22-up to 2 packs/day, currently < 1/2 pack/day)  9.   Paroxysmal atrial fibrillation  10.   Hyperlipidemia     Recommendations:  1.   Apprised of current labs with persistent (improved overall) neutrophilic leukocytosis, stable/improving anemia, hyponatremia, hypokalemia, hypocalcemia, depressed total protein/albumin, normal GFR, normal LFTs.  2.    Noted percutaneous liver abscess drainage, 10/01/2021 (above).  Gram stain with many (4+) WBCs per low powered field.  No organisms seen.  KOH with no yeast or hyphal elements seen.  Cultures NGTD.    3.    Previously apprised of abdominal MRI and CT chest findings (above) indicating enlarging liver abscesses with extension beyond the liver capsule with developing empyema along the subdiaphragmatic pleura and possible developing right lower lobe pulmonary abscess.  1.6 cm superior segment left lower lobe nodule concerning for primary lung malignancy will need clarification down the road.  4.   In line to transfer to higher level of care.  5.   Continue other general medical management and support (including IV antibiotics).  6.   Transfuse RBCs if hemoglobin < 8 if symptomatic  7.   Care discussed with the patient, and his spouse Bertha at the bedside         I spent at least 25 total minutes, face-to-face, caring for Sonu today.  Greater than 50% of this time involved counseling and/or coordination of care as documented within this note regarding the patient's illness(es), pros and cons of various treatment options, instructions and/or risk reduction.

## 2021-10-04 NOTE — CONSULTS
Patient or patient's representative gives informed consent after an explanation of the risks (air embolism; catheter occlusion; phlebitis; catheter infection; bloodstream infection; vein thrombosis; hematoma/bleeding at the insertion site; slight discomfort; accidental puncture of an artery, nerve, or tendon; dislodging of device), benefits (longer dwell time, outpatient treatment, medications that cannot run peripherally), and alternatives (short peripheral catheter, centrally inserted central line, or permanent catheter) of PICC placement. Time provided to ask and answer questions.    Pt had 4 [place] dual lumen PICC placed in left basilic vein. Pt tolerated procedure well. 45 cm of catheter internal and 0 cm external. Tip confirmation by 3cg. All lumens flush and draw well. Sterile dressing applied.

## 2021-10-04 NOTE — PLAN OF CARE
Goal Outcome Evaluation:  Plan of Care Reviewed With: patient           Outcome Summary: Patient is alert x4, denies pain or discomfort, up ad shobha, Tele, VSS, safety maintained, will continue to monitor.

## 2021-10-04 NOTE — PROGRESS NOTES
Yasmin Solorio MD FACS  Progress Note     LOS: 5 days   Patient Care Team:  Vaughn Kramer DO as PCP - General (Internal Medicine)  Thomas Dc DO as Consulting Physician (Gastroenterology)  Tien Canchola MD as Consulting Physician (Pulmonary Disease)      Subjective     Interval History:      poor appetite.  No nausea  No pain     Objective     Vital Signs  Temp:  [97.8 °F (36.6 °C)-99 °F (37.2 °C)] 97.8 °F (36.6 °C)  Heart Rate:  [55-65] 59  Resp:  [16-18] 16  BP: (139-177)/(65-82) 139/67    Physical Exam:  General appearance - alert, well appearing, and in no distress  Abdomen - soft, nontender, nondistended      Results Review:    Lab Results (last 24 hours)     Procedure Component Value Units Date/Time    Anaerobic Culture - Aspirate, Liver [[id number]] Collected: 10/01/21 1528    Specimen: Aspirate from Liver Updated: 10/04/21 1349     Anaerobic Culture No anaerobes isolated at 3 days    Body Fluid Culture - Body Fluid, Liver [[id number]] Collected: 10/01/21 1528    Specimen: Body Fluid from Liver Updated: 10/04/21 0957     Body Fluid Culture No growth at 2 days     Gram Stain Many (4+) WBCs per low power field      No organisms seen    Comprehensive Metabolic Panel [[id number]]  (Abnormal) Collected: 10/04/21 0435    Specimen: Blood Updated: 10/04/21 0538     Glucose 96 mg/dL      BUN 9 mg/dL      Creatinine 0.74 mg/dL      Sodium 129 mmol/L      Potassium 3.6 mmol/L      Chloride 94 mmol/L      CO2 27.0 mmol/L      Calcium 7.6 mg/dL      Total Protein 5.4 g/dL      Albumin 2.10 g/dL      ALT (SGPT) 17 U/L      AST (SGOT) 24 U/L      Alkaline Phosphatase 92 U/L      Total Bilirubin 0.5 mg/dL      eGFR Non African Amer 103 mL/min/1.73      Globulin 3.3 gm/dL      A/G Ratio 0.6 g/dL      BUN/Creatinine Ratio 12.2     Anion Gap 8.0 mmol/L     Narrative:      GFR Normal >60  Chronic Kidney Disease <60  Kidney Failure <15      CBC & Differential [[id number]]  (Abnormal) Collected: 10/04/21 0435     Specimen: Blood Updated: 10/04/21 0521    Narrative:      The following orders were created for panel order CBC & Differential.  Procedure                               Abnormality         Status                     ---------                               -----------         ------                     CBC Auto Differential[[id number]]        Abnormal            Final result                 Please view results for these tests on the individual orders.    CBC Auto Differential [[id number]]  (Abnormal) Collected: 10/04/21 0435    Specimen: Blood Updated: 10/04/21 0521     WBC 13.16 10*3/mm3      RBC 3.57 10*6/mm3      Hemoglobin 10.1 g/dL      Hematocrit 30.8 %      MCV 86.3 fL      MCH 28.3 pg      MCHC 32.8 g/dL      RDW 15.0 %      RDW-SD 46.5 fl      MPV 9.6 fL      Platelets 454 10*3/mm3      Neutrophil % 76.0 %      Lymphocyte % 9.6 %      Monocyte % 12.2 %      Eosinophil % 0.7 %      Basophil % 0.4 %      Immature Grans % 1.1 %      Neutrophils, Absolute 10.01 10*3/mm3      Lymphocytes, Absolute 1.26 10*3/mm3      Monocytes, Absolute 1.60 10*3/mm3      Eosinophils, Absolute 0.09 10*3/mm3      Basophils, Absolute 0.05 10*3/mm3      Immature Grans, Absolute 0.15 10*3/mm3      nRBC 0.0 /100 WBC     Blood Culture With MICHAEL - Blood, Arm, Left [[id number]] Collected: 09/29/21 1924    Specimen: Blood from Arm, Left Updated: 10/03/21 2100     Blood Culture No growth at 4 days        Imaging Results (Last 24 Hours)     Procedure Component Value Units Date/Time    CT Chest With Contrast Diagnostic [[id number]] Resulted: 10/04/21 1424     Updated: 10/04/21 1328            Assessment/Plan     Sigmoid diverticulitis with liver abscesses     Diet as tolerated.  Eating minimally.  Will place PICC and start TPN.  Continue iv abx.  Will wait CT surg plans.  Hopefully, transfer to tertiary care center will occur soon.      Yasmin Solorio MD  10/04/21  14:41 CDT

## 2021-10-04 NOTE — CASE MANAGEMENT/SOCIAL WORK
Continued Stay Note   Tammy     Patient Name: Sonu Bravo Jr.  MRN: [id number]  Today's Date: 10/4/2021    Admit Date: 9/29/2021    Discharge Plan     Row Name 10/04/21 0956       Plan    Plan Comments  PT continues to await transfer to . Will continue to follow and assist as needed.        Discharge Codes    No documentation.             MARIELA Granda

## 2021-10-04 NOTE — PROGRESS NOTES
PULMONARY AND CRITICAL CARE PROGRESS NOTE - [facility]    Patient: Sonu Bravo Jr.  1945   MR# [id number]   Acct# [id number]  10/04/21   07:44 CDT  Referring Provider: Jan Mcintyre MD    Chief Complaint: Liver abscess, lung nodule, infiltrates   Interval history:  Patient is awake and resting in bed. Wife with concerns about some confusion this am when he placed a call to her. He is alert and oriented at this time. She is also concerned about him sleeping a lot. He wants to have a shower and ambulate today. Micro pending from liver aspirate and what has resulted thus far is negative. He is afebrile and remains on room air. Rn notified.   Meds:  albuterol, 1.25 mg, Nebulization, 4x Daily - RT   And  ipratropium, 0.5 mg, Nebulization, 4x Daily - RT  bisoprolol, 10 mg, Oral, Daily  budesonide-formoterol, 2 puff, Inhalation, BID - RT  cefepime, 2 g, Intravenous, Q8H  cetirizine, 10 mg, Oral, Daily  dextromethorphan polistirex ER, 60 mg, Oral, Q12H  enoxaparin, 1 mg/kg, Subcutaneous, Q12H  famotidine, 20 mg, Intravenous, BID  fluticasone, 2 spray, Each Nare, Daily  hydroCHLOROthiazide, 12.5 mg, Oral, Daily  levothyroxine, 100 mcg, Oral, Daily  melatonin, 3 mg, Oral, Nightly  metroNIDAZOLE, 500 mg, Intravenous, Q8H  rosuvastatin, 10 mg, Oral, Nightly  sodium chloride, 10 mL, Intravenous, Q12H      lactated ringers, 125 mL/hr, Last Rate: 125 mL/hr (10/03/21 2059)      Review of Systems:   Review of Systems   Constitutional: Positive for chills, fatigue and fever-resolved  HENT: Negative for sore throat.         Negative hemoptysis    Eyes: Negative for discharge and itching.   Respiratory: Positive for cough and shortness of breath (exertional ).    Gastrointestinal: Positive for nausea. Negative for abdominal pain, diarrhea and vomiting.   Genitourinary: Negative for difficulty urinating and dysuria.   Musculoskeletal: Negative for gait problem and neck pain.   Skin: Negative for rash and  wound.   Neurological: Negative for dizziness and syncope. +confusion   Psychiatric/Behavioral: Negative for agitation and behavioral problems.   Physical Exam:  SpO2 Percentage    10/04/21 0711 10/04/21 0719 10/04/21 0741   SpO2: 90% 93% 93%     Temp:  [97.8 °F (36.6 °C)-99 °F (37.2 °C)] 97.8 °F (36.6 °C)  Heart Rate:  [55-70] 62  Resp:  [16-18] 18  BP: (139-177)/(53-82) 139/67    Intake/Output Summary (Last 24 hours) at 10/4/2021 0744  Last data filed at 10/3/2021 1618  Gross per 24 hour   Intake 300 ml   Output --   Net 300 ml     Physical Exam   Vitals and nursing note reviewed.   Constitutional:       Appearance: Normal appearance.   HENT:      Head: Normocephalic and atraumatic.      Nose: Nose normal.      Mouth/Throat:      Mouth: Mucous membranes are moist.   Eyes:      Conjunctiva/sclera: Conjunctivae normal.      Pupils: Pupils are equal, round, and reactive to light.   Cardiovascular:      Rate and Rhythm: Normal rate and regular rhythm.      Heart sounds: No murmur heard.   No friction rub. No gallop.    Pulmonary:      Effort: Pulmonary effort is normal.      Breath sounds: Normal breath sounds.   Abdominal:      General: Bowel sounds are normal.      Palpations: Abdomen is soft.   Musculoskeletal:         General: No swelling.      Cervical back: Normal range of motion.      Right lower leg: No edema.      Left lower leg: No edema.   Skin:     General: Skin is warm and dry.   Neurological:      General: No focal deficit present.      Mental Status: He is oriented to person, place, and time.   Psychiatric:         Mood and Affect: Mood normal.         Behavior: Behavior normal.         Thought Content: Thought content normal.         Judgment: Judgment normal    Laboratory Data:  Results from last 7 days   Lab Units 10/04/21  0435 10/03/21  0409 10/02/21  0424   WBC 10*3/mm3 13.16* 11.42* 14.51*   HEMOGLOBIN g/dL 10.1* 9.4* 9.6*   PLATELETS 10*3/mm3 454* 459* 418     Results from last 7 days   Lab  Units 10/04/21  0435 10/03/21  0409 10/02/21  0424 10/01/21  0423 10/01/21  0423 09/30/21  1843 09/30/21  1036   SODIUM mmol/L 129* 133* 132*   < > 130*   < >  --    POTASSIUM mmol/L 3.6 3.2* 3.6   < > 3.2*   < >  --    BUN mg/dL 9 10 10   < > 13   < >  --    CREATININE mg/dL 0.74* 0.74* 0.64*   < > 0.63*   < >  --    INR   --   --   --   --  1.38*  --  1.44*    < > = values in this interval not displayed.         Blood Culture   Date Value Ref Range Status   09/29/2021 No growth at 24 hours  Preliminary   09/29/2021 Abnormal Stain (C)  Preliminary   09/24/2021 Fusobacterium species (C)  Corrected     Comment:     Beta lactamase negative    Appended report. These results have been appended to a previously final verified report.   09/24/2021 No growth at 5 days  Final     Recent films:  MRI Pelvis With & Without Contrast    Result Date: 10/2/2021  1. Sigmoid colon diverticulosis and nonspecific wall thickening. This report was finalized on 10/02/2021 15:28 by Dr. Cb Elliott MD.    Films reviewed personally by me.  My interpretation: no new imaging 10/4/2021    Pulmonary Assessment:  1. Lung nodule, 1.6 cm LLL  2. Right lung consolidation possible abscess/ empyema  3. Liver abnormalities probable abscess  4. Segmental thickening of the sigmoid colon  5. Paroxysmal atrial fib -xarelto on hold   6. Tobacco abuse, quitting with this hospitalization   7. Hypokalemia   8. Hyponatremia      Recommend/plan:   · Remains on Room air  · Review of labs shows Na has trended down to 129 which could be a source of his confusion/ fatigue- notified RN to discuss with attending   · ID managing positive blood culture, antibiotics, flagyl   · Will obtain respiratory culture if he has productive changes to cough   · Awaiting to here from  for possible higher level of care transfer. [facility] and [facility] have all declined.   · CT surgery-no surgery at this time  · Continue antibiotics    · S/P liver abscess CT  needle biopsy 10/01/21- micro pending, KOH negative     Electronically signed by MARY Pablo, 10/04/21, 07:44 CDT     ATTESTATION OF CLINICAL NOTE:  I have reviewed the notes, assessments, and/or procedures performed by MARY Ferguson, I concur with her/his documentation of Sonu Bravo Jr..  Patient was seen in the follow-up visit in pulmonary rounds in medical floor today.    Patient has no new complaints and remains afebrile and his serum sodium is marginally low.  He is on room air and respiratory status is stable.  He has positive blood culture and multiple abscess noted in the liver and other places.  He is waiting for transfer to [place] or [place] depending on availability of the bed.  He is little frustrated and getting tired sitting in the room.  His wife was present at the time of my visit.  He had no other acute events.  He was afebrile.    Physical examination patient is an elderly  male lying in the bed looks tired and lethargic.HEENT: Atraumatic normocephalic.  Neck: Supple no mass nuclear medicine mention.  Heart: Sounds normal rate and rhythm regular no murmur.  Lungs: Bibasilar crackles.  Abdomen: Soft nondistended nontender.  Extremities: No edema normal pulses normal color.  Neurologic: Grossly intact.  Skin: No breakdown.     His respiratory status is stable on the room air.  Continue current treatment plan and routine respiratory care.  Continue IV antibiotics per ID recommendation and he is also on Flagyl.  Continue bronchodilator treatment.  Plan for outpatient pulmonary clinic visit and PFT and work-up for COPD. Incentive spirometry encouraged.  Patient waiting for transfer to her care center for multiple abscess and further management and still he is waiting for bed.  I talked to Dr. Iniguez today. The cavitary lung lesion will most likely be followed by serial CT scan and no interventions planned at this moment.  CT-guided needle biopsy of the  liver abscess is currently in the lab and further work-up in progress..  DVT and stress ulcer prophylaxis.  Repeat labs and imaging studies from time to time. Continue nutritional support and physical activity as tolerated.  She is waiting for transfer [facility] the [facility].  He is low serum sodium which will be addressed by the attending. Status: Full.  Overall prognosis: Guarded.  Pulmonary team will continue following and further recommendations.     I have seen and examined patient personally, performing a face-to-face diagnostic evaluation with plan of care reviewed and developed with APRN and nursing staff. I have addended and/or modified the above history of present illness, physical examination, and assessment and plan to reflect my findings and impressions. Essential elements of the care plan were discussed with APRN above.  Agree with findings and assessment/plan as documented above.    Jeannine Gloria MD  Pulmonologist/Intensivist  10/4/2021 11:19 CDT

## 2021-10-05 LAB
ALBUMIN SERPL-MCNC: 2 G/DL (ref 3.5–5.2)
ALBUMIN/GLOB SERPL: 0.6 G/DL
ALP SERPL-CCNC: 81 U/L (ref 39–117)
ALT SERPL W P-5'-P-CCNC: 15 U/L (ref 1–41)
ANION GAP SERPL CALCULATED.3IONS-SCNC: 11 MMOL/L (ref 5–15)
APTT PPP: 58.1 SECONDS (ref 24.1–35)
AST SERPL-CCNC: 25 U/L (ref 1–40)
BACTERIA FLD CULT: NORMAL
BASOPHILS # BLD AUTO: 0.06 10*3/MM3 (ref 0–0.2)
BASOPHILS NFR BLD AUTO: 0.6 % (ref 0–1.5)
BILIRUB SERPL-MCNC: 0.5 MG/DL (ref 0–1.2)
BUN SERPL-MCNC: 8 MG/DL (ref 8–23)
BUN/CREAT SERPL: 11.4 (ref 7–25)
CA-I BLD-MCNC: 4.22 MG/DL (ref 4.6–5.4)
CALCIUM SPEC-SCNC: 7.6 MG/DL (ref 8.6–10.5)
CHLORIDE SERPL-SCNC: 96 MMOL/L (ref 98–107)
CHOLEST SERPL-MCNC: 76 MG/DL (ref 0–200)
CO2 SERPL-SCNC: 25 MMOL/L (ref 22–29)
CREAT SERPL-MCNC: 0.7 MG/DL (ref 0.76–1.27)
CRP SERPL-MCNC: 8.86 MG/DL (ref 0–0.5)
CRP SERPL-MCNC: 9.13 MG/DL (ref 0–0.5)
DEPRECATED RDW RBC AUTO: 48.3 FL (ref 37–54)
EOSINOPHIL # BLD AUTO: 0.14 10*3/MM3 (ref 0–0.4)
EOSINOPHIL NFR BLD AUTO: 1.3 % (ref 0.3–6.2)
ERYTHROCYTE [DISTWIDTH] IN BLOOD BY AUTOMATED COUNT: 15.4 % (ref 12.3–15.4)
GFR SERPL CREATININE-BSD FRML MDRD: 109 ML/MIN/1.73
GLOBULIN UR ELPH-MCNC: 3.4 GM/DL
GLUCOSE BLDC GLUCOMTR-MCNC: 124 MG/DL (ref 70–130)
GLUCOSE BLDC GLUCOMTR-MCNC: 97 MG/DL (ref 70–130)
GLUCOSE SERPL-MCNC: 88 MG/DL (ref 65–99)
GRAM STN SPEC: NORMAL
GRAM STN SPEC: NORMAL
HCT VFR BLD AUTO: 30.6 % (ref 37.5–51)
HGB BLD-MCNC: 10 G/DL (ref 13–17.7)
IMM GRANULOCYTES # BLD AUTO: 0.18 10*3/MM3 (ref 0–0.05)
IMM GRANULOCYTES NFR BLD AUTO: 1.7 % (ref 0–0.5)
INR PPP: 1.34 (ref 0.91–1.09)
L PNEUMO1 AG UR QL IA: NEGATIVE
LYMPHOCYTES # BLD AUTO: 1.31 10*3/MM3 (ref 0.7–3.1)
LYMPHOCYTES NFR BLD AUTO: 12.2 % (ref 19.6–45.3)
Lab: ABNORMAL
MAGNESIUM SERPL-MCNC: 1.9 MG/DL (ref 1.6–2.4)
MCH RBC QN AUTO: 28.5 PG (ref 26.6–33)
MCHC RBC AUTO-ENTMCNC: 32.7 G/DL (ref 31.5–35.7)
MCV RBC AUTO: 87.2 FL (ref 79–97)
MONOCYTES # BLD AUTO: 1.33 10*3/MM3 (ref 0.1–0.9)
MONOCYTES NFR BLD AUTO: 12.4 % (ref 5–12)
MRSA DNA SPEC QL NAA+PROBE: NORMAL
NEUTROPHILS NFR BLD AUTO: 7.72 10*3/MM3 (ref 1.7–7)
NEUTROPHILS NFR BLD AUTO: 71.8 % (ref 42.7–76)
NRBC BLD AUTO-RTO: 0 /100 WBC (ref 0–0.2)
PHOSPHATE SERPL-MCNC: 3.4 MG/DL (ref 2.5–4.5)
PLATELET # BLD AUTO: 432 10*3/MM3 (ref 140–450)
PMV BLD AUTO: 9.6 FL (ref 6–12)
POTASSIUM SERPL-SCNC: 3.4 MMOL/L (ref 3.5–5.2)
PREALB SERPL-MCNC: 5.3 MG/DL (ref 20–40)
PROCALCITONIN SERPL-MCNC: 0.35 NG/ML (ref 0–0.25)
PROT SERPL-MCNC: 5.4 G/DL (ref 6–8.5)
PROTHROMBIN TIME: 16.1 SECONDS (ref 11.9–14.6)
RBC # BLD AUTO: 3.51 10*6/MM3 (ref 4.14–5.8)
S PNEUM AG SPEC QL LA: NEGATIVE
SODIUM SERPL-SCNC: 132 MMOL/L (ref 136–145)
TRIGL SERPL-MCNC: 112 MG/DL (ref 0–150)
WBC # BLD AUTO: 10.74 10*3/MM3 (ref 3.4–10.8)

## 2021-10-05 PROCEDURE — 94799 UNLISTED PULMONARY SVC/PX: CPT

## 2021-10-05 PROCEDURE — 84145 PROCALCITONIN (PCT): CPT | Performed by: INTERNAL MEDICINE

## 2021-10-05 PROCEDURE — 80053 COMPREHEN METABOLIC PANEL: CPT | Performed by: FAMILY MEDICINE

## 2021-10-05 PROCEDURE — 99232 SBSQ HOSP IP/OBS MODERATE 35: CPT | Performed by: INTERNAL MEDICINE

## 2021-10-05 PROCEDURE — 85610 PROTHROMBIN TIME: CPT | Performed by: NURSE PRACTITIONER

## 2021-10-05 PROCEDURE — 25010000002 CEFEPIME PER 500 MG: Performed by: SPECIALIST

## 2021-10-05 PROCEDURE — 85730 THROMBOPLASTIN TIME PARTIAL: CPT | Performed by: NURSE PRACTITIONER

## 2021-10-05 PROCEDURE — 84478 ASSAY OF TRIGLYCERIDES: CPT | Performed by: SPECIALIST

## 2021-10-05 PROCEDURE — 87899 AGENT NOS ASSAY W/OPTIC: CPT | Performed by: INTERNAL MEDICINE

## 2021-10-05 PROCEDURE — 87641 MR-STAPH DNA AMP PROBE: CPT | Performed by: INTERNAL MEDICINE

## 2021-10-05 PROCEDURE — 86140 C-REACTIVE PROTEIN: CPT | Performed by: INTERNAL MEDICINE

## 2021-10-05 PROCEDURE — 83735 ASSAY OF MAGNESIUM: CPT | Performed by: SPECIALIST

## 2021-10-05 PROCEDURE — 82330 ASSAY OF CALCIUM: CPT

## 2021-10-05 PROCEDURE — 82465 ASSAY BLD/SERUM CHOLESTEROL: CPT | Performed by: SPECIALIST

## 2021-10-05 PROCEDURE — 87899 AGENT NOS ASSAY W/OPTIC: CPT | Performed by: FAMILY MEDICINE

## 2021-10-05 PROCEDURE — 94664 DEMO&/EVAL PT USE INHALER: CPT

## 2021-10-05 PROCEDURE — 25010000002 ENOXAPARIN PER 10 MG: Performed by: FAMILY MEDICINE

## 2021-10-05 PROCEDURE — 84100 ASSAY OF PHOSPHORUS: CPT | Performed by: SPECIALIST

## 2021-10-05 PROCEDURE — 85025 COMPLETE CBC W/AUTO DIFF WBC: CPT | Performed by: FAMILY MEDICINE

## 2021-10-05 PROCEDURE — 86140 C-REACTIVE PROTEIN: CPT | Performed by: SPECIALIST

## 2021-10-05 PROCEDURE — 99233 SBSQ HOSP IP/OBS HIGH 50: CPT | Performed by: INTERNAL MEDICINE

## 2021-10-05 PROCEDURE — 82962 GLUCOSE BLOOD TEST: CPT

## 2021-10-05 PROCEDURE — 84134 ASSAY OF PREALBUMIN: CPT | Performed by: SPECIALIST

## 2021-10-05 RX ORDER — POTASSIUM CHLORIDE 750 MG/1
40 CAPSULE, EXTENDED RELEASE ORAL ONCE
Status: COMPLETED | OUTPATIENT
Start: 2021-10-05 | End: 2021-10-05

## 2021-10-05 RX ORDER — LANOLIN ALCOHOL/MO/W.PET/CERES
6 CREAM (GRAM) TOPICAL NIGHTLY
Status: DISCONTINUED | OUTPATIENT
Start: 2021-10-05 | End: 2021-10-07 | Stop reason: HOSPADM

## 2021-10-05 RX ADMIN — I.V. FAT EMULSION 50 G: 20 EMULSION INTRAVENOUS at 17:49

## 2021-10-05 RX ADMIN — SODIUM CHLORIDE, POTASSIUM CHLORIDE, SODIUM LACTATE AND CALCIUM CHLORIDE 75 ML/HR: 600; 310; 30; 20 INJECTION, SOLUTION INTRAVENOUS at 11:55

## 2021-10-05 RX ADMIN — CEFEPIME HYDROCHLORIDE 2 G: 2 INJECTION, POWDER, FOR SOLUTION INTRAVENOUS at 08:55

## 2021-10-05 RX ADMIN — METRONIDAZOLE 500 MG: 500 INJECTION, SOLUTION INTRAVENOUS at 11:49

## 2021-10-05 RX ADMIN — FAMOTIDINE 20 MG: 10 INJECTION INTRAVENOUS at 20:34

## 2021-10-05 RX ADMIN — ALBUTEROL SULFATE 1.25 MG: 2.5 SOLUTION RESPIRATORY (INHALATION) at 10:39

## 2021-10-05 RX ADMIN — ENOXAPARIN SODIUM 90 MG: 100 INJECTION SUBCUTANEOUS at 06:25

## 2021-10-05 RX ADMIN — BISOPROLOL FUMARATE 10 MG: 10 TABLET ORAL at 08:56

## 2021-10-05 RX ADMIN — CEFEPIME HYDROCHLORIDE 2 G: 2 INJECTION, POWDER, FOR SOLUTION INTRAVENOUS at 16:32

## 2021-10-05 RX ADMIN — ALBUTEROL SULFATE 1.25 MG: 2.5 SOLUTION RESPIRATORY (INHALATION) at 21:00

## 2021-10-05 RX ADMIN — HYDROCHLOROTHIAZIDE 12.5 MG: 25 TABLET ORAL at 08:56

## 2021-10-05 RX ADMIN — Medication 6 MG: at 21:03

## 2021-10-05 RX ADMIN — METRONIDAZOLE 500 MG: 500 INJECTION, SOLUTION INTRAVENOUS at 05:14

## 2021-10-05 RX ADMIN — BUDESONIDE AND FORMOTEROL FUMARATE DIHYDRATE 2 PUFF: 160; 4.5 AEROSOL RESPIRATORY (INHALATION) at 21:00

## 2021-10-05 RX ADMIN — POTASSIUM CHLORIDE 40 MEQ: 10 CAPSULE, COATED, EXTENDED RELEASE ORAL at 11:49

## 2021-10-05 RX ADMIN — FLUTICASONE PROPIONATE 2 SPRAY: 50 SPRAY, METERED NASAL at 08:59

## 2021-10-05 RX ADMIN — BUDESONIDE AND FORMOTEROL FUMARATE DIHYDRATE 2 PUFF: 160; 4.5 AEROSOL RESPIRATORY (INHALATION) at 06:36

## 2021-10-05 RX ADMIN — CEFEPIME HYDROCHLORIDE 2 G: 2 INJECTION, POWDER, FOR SOLUTION INTRAVENOUS at 01:12

## 2021-10-05 RX ADMIN — ROSUVASTATIN CALCIUM 10 MG: 10 TABLET, FILM COATED ORAL at 20:34

## 2021-10-05 RX ADMIN — ALBUTEROL SULFATE 1.25 MG: 2.5 SOLUTION RESPIRATORY (INHALATION) at 14:55

## 2021-10-05 RX ADMIN — CETIRIZINE HYDROCHLORIDE 10 MG: 10 TABLET ORAL at 08:56

## 2021-10-05 RX ADMIN — HYDROCODONE BITARTRATE AND HOMATROPINE METHYLBROMIDE 10 ML: 5; 1.5 SOLUTION ORAL at 20:34

## 2021-10-05 RX ADMIN — IPRATROPIUM BROMIDE 0.5 MG: 0.5 SOLUTION RESPIRATORY (INHALATION) at 10:39

## 2021-10-05 RX ADMIN — LEVOTHYROXINE SODIUM 100 MCG: 100 TABLET ORAL at 08:56

## 2021-10-05 RX ADMIN — TRACE ELEMENTS INJECTION 4: 7.4; .75; 98; 151 INJECTION, SOLUTION INTRAVENOUS at 17:49

## 2021-10-05 RX ADMIN — FAMOTIDINE 20 MG: 10 INJECTION INTRAVENOUS at 08:55

## 2021-10-05 RX ADMIN — IPRATROPIUM BROMIDE 0.5 MG: 0.5 SOLUTION RESPIRATORY (INHALATION) at 06:36

## 2021-10-05 RX ADMIN — IPRATROPIUM BROMIDE 0.5 MG: 0.5 SOLUTION RESPIRATORY (INHALATION) at 14:55

## 2021-10-05 RX ADMIN — SODIUM CHLORIDE, PRESERVATIVE FREE 10 ML: 5 INJECTION INTRAVENOUS at 08:59

## 2021-10-05 RX ADMIN — IPRATROPIUM BROMIDE 0.5 MG: 0.5 SOLUTION RESPIRATORY (INHALATION) at 21:00

## 2021-10-05 RX ADMIN — ALBUTEROL SULFATE 1.25 MG: 2.5 SOLUTION RESPIRATORY (INHALATION) at 06:36

## 2021-10-05 RX ADMIN — METRONIDAZOLE 500 MG: 500 INJECTION, SOLUTION INTRAVENOUS at 20:33

## 2021-10-05 RX ADMIN — SODIUM CHLORIDE, PRESERVATIVE FREE 10 ML: 5 INJECTION INTRAVENOUS at 20:34

## 2021-10-05 NOTE — PROGRESS NOTES
"[facility] Progress Note    Progress Note    Patient:  Sonu Bravo Jr.  YOB: 1945  Date of Service: 10/5/2021  MRN: [id number]   Acct: [id number]   Primary Care Physician: Vaughn Kramer DO  Advance Directive:   Code Status and Medical Interventions:   Ordered at: 10/04/21 1109     Level Of Support Discussed With:    Patient     Code Status:    CPR     Medical Interventions (Level of Support Prior to Arrest):    Full     Admit Date: 9/29/2021       Hospital Day: 6      Subjective:     Chief Compliant: \"Doing good, you?\"    Subjective:   Had a restful night according to his spouse.  No bouts of confusion or disorientation last evening.  Admits to lingering fatigue but has been ambulatory in his room.  No recurrent chills.  No fevers.  No shortness of breath at rest.  No worsening cough.  No abdominal pain.  Has been tolerating a regular diet.    Interval history: Sonu Bravo Jr. 76 y.o. male medical patient of Dr. Kramer, whose history includes arthritis, paroxysmal atrial fibrillation, GERD, hyperlipidemia, longstanding tobacco use/nicotine dependence, and COPD.     --08/31/2021-CT angiogram chest.  No PE.  Suspicious 1.5 cm cavitary left lower lobe pulmonary nodule in the superior segment.  Tiny 3 mm subpleural right lower lobe nodule.  Small lymph nodes in the lisa hepatis region in the upper abdomen.     --09/10/2021-liver ultrasound-2 suspicious masses in the liver, 1 in the left hepatic lobe 5.3 cm, 1 in the dome of the right hepatic lobe 6.4 cm.  Hepatic metastasis not excluded.  Unremarkable gallbladder, no biliary ductal dilatation identified.     --09/10/2021-CT abdomen/pelvis with and without contrast.  2 cm segmental thickening of the sigmoid colon concerning for malignancy.  2 large heterogenous liver lesions (right posterior liver 5.5 cm, predominantly centrally hypodense lesion; left liver lobe with similar-appearing 4.8 cm liver lesion) most concerning for " metastatic disease     --09/23/2021-was seen at Dr. Kramer office complaining of chills and shaking in the evenings lasting 60 to 90 minutes at a time associated with weakness, shortness of breath and a dry cough.  Previous Covid testing hospital was negative.     --09/23/2021-hemoglobin 12.3, MCV 88.3, platelets 280,000, WBC 29,000 with 88 point 6 segs.  BUN 31, creatinine 1.16, albumin 2.7, ALT 42, alk phos 197, GFR 61.  ESR 26 (elevated).  Manual blood smear showed leukocytosis with 45% segs, 31% bands, 8 lymphocytes, 1 monocyte, 11 metamyelocytes, 4 myelocytes, 1 nucleated red blood cell.  Comment: Significant leukocytosis with granulocytic left shift with vacuolated granulocytes concerning for bacterial infection     --09/24/2029-blood culture showed no growth at 5 days.     --09/28/2029-colonoscopy.  Impression: 3 diminutive polyps in the rectum removed with hot snare.  3 diminutive polyps at the splenic flexure, removed with hot snare.  Diverticulosis in the sigmoid colon and in the descending colon.  Distal rectum and anal verge are normal on retroflexion view.     --09/30/2021-hemoglobin 9.5; MCV 88.4, platelets 376,000, WBC 23.66 with 86 point 3 segs     --09/30/2021-CT chest with contrast--worsening patient's liver abscess cyst with extension beyond the liver capsule.  In particular extension of the right liver abscess above the diaphragm with developing right empyema and right lower lobe pulmonary abscess.  Also extension of the left liver abscess into the subhepatic space with formation of a subserosal gastric wall abscess.  Lung emphysema with 1.6 cm.  Segment left lower lobe nodule concerning for primary lung malignancy.    --09/30/2021-MRI abdomen with and without contrast-enlarging liver abscesses in segment 3 and 7 which appear to have extended beyond the liver capsule.  In particular there is a developing empyema along the subdiaphragmatic pleura posteriorly as well as extending up along the  "posterior costal pleura and there is also developing right lower lobe pulmonary abscess.  There is also an elongated subserosal abscess along the lesser curvature and antrum of the stomach.  Differential includes subhepatic space abscess although this very closely follows the path and contour of the stomach and appears to be within the wall of the stomach.    --10/01/4209-XN-uqwllx abscess drain liver-successful CT-guided biopsy liver abscess aspiration yielding approximately 20 cc of frankly purulent material.    Review of Systems  Review of Systems:   Constitutional: Lingering fatigue.  \"Still tired but have been able to walk some.\"  No fever / No recurrent chills nor sweats.  HEENT:  No sore throat / No hoarseness / No vision changes  CV:  No chest pain / No palpitations / No orthopnea  Respiratory: Chronic intermittent cough with scant clear phlegm/ No shortness of breath at rest/(+) exertional dyspnea/ No hemoptysis/smoker since age 22 (up to 2 packs/day), currently<1/2 pack/day (prior to hospitalization).  GI: Has been tolerating p.o. diet/no nausea / No vomiting /No abdominal pain / No diarrhea / No constipation.  Colonoscopy, 09/28/2021.  :  No dysuria / No hesitancy / No urgency / No hematuria  Neuro:   Generalized muscle weakness, somewhat improved/ No dysphagia / No headache / No paresthesias  Musculoskeletal:  No edema / No cyanosis / No new arthralgias.    Vitals: /74 (BP Location: Right arm, Patient Position: Sitting)   Pulse 68   Temp 97.9 °F (36.6 °C) (Oral)   Resp 16   Ht 177.8 cm (70\")   Wt 85.1 kg (187 lb 11.2 oz)   SpO2 97%   BMI 26.93 kg/m²     Physical Exam  Physical Exam:  General appearance: Pleasant, cooperative, heavyset, modestly kept elderly male who appears comfortable while seated up in a chair at his bedside having breakfast .  His spouse Bertha is at the bedside   Skin:  Skin color is a bit pale. No rashes or lesions  HEENT:  Head: Normal, normocephalic, " atraumatic.  Neck:  no adenopathy  Lungs:  clear to auscultation bilaterally with overall diminished breath sounds  Heart:  regular rate and rhythm  Abdomen:  soft, obese, non-tender; bowel sounds hypoactive; no obvious masses,  no overt organomegaly  Extremities:  extremities normal, atraumatic, no cyanosis or edema  Neurologic:  Mental status: Alert, oriented, thought content appropriate    24HR INTAKE/OUTPUT:      Intake/Output Summary (Last 24 hours) at 10/5/2021 0845  Last data filed at 10/5/2021 0514  Gross per 24 hour   Intake 540 ml   Output --   Net 540 ml        Black Catheter: None    Diet: Diet Regular      Medications:   Scheduled Meds:albuterol, 1.25 mg, Nebulization, 4x Daily - RT   And  ipratropium, 0.5 mg, Nebulization, 4x Daily - RT  bisoprolol, 10 mg, Oral, Daily  budesonide-formoterol, 2 puff, Inhalation, BID - RT  cefepime, 2 g, Intravenous, Q8H  cetirizine, 10 mg, Oral, Daily  dextromethorphan polistirex ER, 60 mg, Oral, Q12H  enoxaparin, 1 mg/kg, Subcutaneous, Q12H  famotidine, 20 mg, Intravenous, BID  fluticasone, 2 spray, Each Nare, Daily  hydroCHLOROthiazide, 12.5 mg, Oral, Daily  levothyroxine, 100 mcg, Oral, Daily  melatonin, 3 mg, Oral, Nightly  metroNIDAZOLE, 500 mg, Intravenous, Q8H  rosuvastatin, 10 mg, Oral, Nightly  sodium chloride, 10 mL, Intravenous, Q12H        Continuous Infusions:lactated ringers, 75 mL/hr, Last Rate: 75 mL/hr (10/05/21 0117)        Labs:     Results from last 7 days   Lab Units 10/05/21  0436 10/04/21  0435 10/03/21  0409   WBC 10*3/mm3 10.74 13.16* 11.42*   HEMOGLOBIN g/dL 10.0* 10.1* 9.4*   HEMATOCRIT % 30.6* 30.8* 28.6*   PLATELETS 10*3/mm3 432 454* 459*     09/30/2021 0526 09/30/2021 1231 Ferritin [[id number]]    (Abnormal)   Blood    Final result Component Value Units   Ferritin 822.70High  ng/mL           09/30/2021 0526 09/30/2021 2331 Folate [[id number]]    Blood    Final result Component Value Units   Folate 11.50 ng/mL           09/30/2021 0526  09/30/2021 2331 Vitamin B12 [[id number]]    (Abnormal)   Blood    Final result Component Value Units   Vitamin B-12 >2,000High               10/01/2021 0423 10/01/2021 0534 Iron Profile [[id number]]   (Abnormal)   Blood    Final result Component Value Units   Iron 17Low  mcg/dL   Iron Saturation 12Low  %   Transferrin 94Low  mg/dL   TIBC 140Low  mcg/dL        09/30/2021 1036 09/30/2021 2237 CEA [[id number]]    Blood    Final result Component Value Units   CEA 3.32 ng/mL            Results from last 7 days   Lab Units 10/05/21  0436 10/04/21  0435 10/03/21  0409   SODIUM mmol/L 132* 129* 133*   POTASSIUM mmol/L 3.4* 3.6 3.2*   CHLORIDE mmol/L 96* 94* 98   CO2 mmol/L 25.0 27.0 27.0   BUN mg/dL 8 9 10   CREATININE mg/dL 0.70* 0.74* 0.74*   CALCIUM mg/dL 7.6* 7.6* 7.5*   BILIRUBIN mg/dL 0.5 0.5 0.4   ALK PHOS U/L 81 92 104   ALT (SGPT) U/L 15 17 19   AST (SGOT) U/L 25 24 29   GLUCOSE mg/dL 88 96 140*       ABG:  No results found for: PHART, PO2ART, TNK6QAL    Culture Data:   Blood Culture   Date Value Ref Range Status   09/29/2021 No growth at 24 hours  Preliminary   09/29/2021 Abnormal Stain (C)  Preliminary   09/24/2021 Fusobacterium species (C)  Corrected     Comment:     Beta lactamase negative    Appended report. These results have been appended to a previously final verified report.   09/24/2021 No growth at 5 days  Final       Lab Results   Component Value Date    PATHINTERP  09/23/2021     Mild normochromic normocytic anemia    Leukocytosis with the following manual differential:  Neutrophils 45%  Bands 31%  Lymphocytes 8%  Monocytes 1%  Metamyelocytes 11%  Myelocytes 4%  Nucleated red blood cells 1  Platelets within normal range    Comment: There is a significant leukocytosis with a granulocytic left shift with vacuolated granulocytes present concerning for bacterial infection.       Radiology:     Imaging Results (Last 7 Days)     Procedure Component Value Units Date/Time    CT Chest With Contrast Diagnostic  [[id number]] Collected: 10/04/21 1424     Updated: 10/04/21 1503    Narrative:      EXAM: CT CHEST W CONTRAST DIAGNOSTIC- - 10/4/2021 1:13 PM CDT     HISTORY: Evaluate right lower lobe opacity; Z74.09-Other reduced  mobility; R26.9-Unspecified abnormalities of gait and mobility       COMPARISON: 9/30/2021.      DOSE LENGTH PRODUCT: 412 mGy cm. Automatic exposure control was utilized  to make radiation dose as low as reasonably achievable.     TECHNIQUE: Enhanced CT images of the chest obtained with multiplanar  reformats.     FINDINGS:      AIRWAYS/PULMONARY PARENCHYMA: The central airways are midline and  patent.      Right greater than left bibasilar consolidative opacities. Right lower  lung with complex low density, suspicious for parenchymal abscess or  necrosis.     Left lower lobe 1.8 cm nodule, previously 1.6 cm on 9/30/2021.  Difference in measurement may be partially due to differences in  technique.     Mild emphysematous changes.     Moderate right and small left pleural fluid.     VASCULATURE: Thoracic aorta is normal in course and caliber. Moderate  calcified aortic atherosclerosis.  No pulmonary artery filling defect on  this non CTA exam. Normal pulmonary artery caliber.     CARDIAC:  Normal heart size.  No pericardial effusion.  Scattered  coronary artery calcifications.      MEDIASTINUM: There is no mediastinal or hilar lymphadenopathy by CT size  criteria. Esophagus has normal course, caliber and wall thickness.     EXTRATHORACIC: The visualized portions of the thyroid gland are  unremarkable. No thoracic inlet or axillary adenopathy. The  extrathoracic soft tissues are within normal limits.      INCLUDED UPPER ABDOMEN: Right posterior liver with a 4.7 cm  rim-enhancing fluid collection, suspicious for abscess, previously 4.9  cm on 9/30/2021. Left liver rim-enhancing fluid collection measures 3.6  cm, previously 4.1 cm on 9/30/2021. The smaller adjacent lesion measures  1.5 cm, previously 2.0 cm  on 9/30/2021. Patent portal vein and hepatic  veins.     BONES: No acute or suspicious bony finding.           Impression:      1. Redemonstration right lower lung complex consolidative and  low-density opacity suspicious for parenchymal abscess or necrosis.  2. Left lower lobe 1.8 cm nodule, previously 1.6 cm. Difference in  measurement may be partially due to differences in technique. Recommend  follow-up.  3. Right greater than left pleural effusions.  4. No rim-enhancing liver lesions, slightly decreased in size compared  to 9/30/2021.  This report was finalized on 10/04/2021 15:00 by Dr Theresa Contreras MD.    MRI Pelvis With & Without Contrast [[id number]] Collected: 10/02/21 1521     Updated: 10/02/21 1532    Narrative:      EXAMINATION:  MRI PELVIS W WO CONTRAST-  10/2/2021 12:20 PM CDT     HISTORY: Follow-up sigmoid lesion; Z74.09-Other reduced mobility;  R26.9-Unspecified abnormalities of gait and mobility      COMPARISON: Abdomen pelvis CT dated 9/10/2021     TECHNIQUE: Multiplanar MR imaging of the pelvis was performed.  Gadolinium enhanced imaging obtained.     FINDINGS:     There is image quality degradation with motion.     As noted on the CT examination there is diffuse segmental wall  thickening of the sigmoid colon with scattered diverticuli.  Circumferential wall thickening associated with diverticular disease can  certainly have this appearance. Underlying neoplasm is not excluded.  Endoscopic characterization suggested.     There is free fluid identified in the pelvis.     No pelvic lymphadenopathy identified.     No acute osseous abnormalities observed.     Urinary bladder is moderately distended.     Prostate gland is mildly prominent.       Impression:      1. Sigmoid colon diverticulosis and nonspecific wall thickening.  This report was finalized on 10/02/2021 15:28 by Dr. Cb Elliott MD.    CT Guided Abscess Drain Liver [[id number]] Collected: 10/01/21 1606     Updated: 10/01/21 1612     Narrative:      CT GUIDED ABSCESS DRAIN LIVER- 10/1/2021 2:27 PM CDT     INDICATION: abscess; Z74.09-Other reduced mobility; R26.9-Unspecified  abnormalities of gait and mobility     CONSENT: An informed written consent was obtained from the patient after  discussing the risks, benefits, potential complications and  alternatives. All questions answered to the patient's satisfaction.  Automated exposure control (AEC) protocols are utilized on the scanner  to ensure dose lowered technique.      COMPLICATIONS: None.      MEDICATIONS: Local analgesia only     CONTRAST: None.      ESTIMATED BLOOD LOSS: Less than 5 ml.      DOSE LENGTH PRODUCT: 1016 mGy cm. Automated exposure control was also  utilized to decrease patient radiation dose.     PROCEDURE: An informed consent was obtained as above. A formal timeout  was performed prior to the procedure. Prior to sterile preparation and  local anesthetic, noninfused axial CT scans were obtained  redemonstrating the 2 dominant liver abscesses. The optimal site for  needle aspiration of the left lobe abscess was marked at the skin. The  skin was cleansed with Betadine swabs and sterilely draped. 1% buffered  lidocaine was used to anesthetize the skin and soft tissues down to the  liver capsule. A tiny dermatotomy was made. A 5 Algerian 10 cm CropUpeh  catheter was advanced into the left lobe liver abscess using CT for  guidance. Approximately 20 cc frankly purulent material was aspirated  from this abscess and set aside for microbiology evaluation.     No immediate complications.        Impression:      Successful CT guided biopsy liver abscess aspiration, yielding  approximately 20 cc frankly purulent material. This sample was set aside  for microbiology evaluation.        This report was finalized on 10/01/2021 16:09 by Dr Arie Leon, .    MRI Abdomen With & Without Contrast [[id number]] Collected: 09/30/21 1703     Updated: 09/30/21 1805    Narrative:      MRI OF ABDOMEN WITH AND  WITHOUT CONTRAST     HISTORY: Follow-up sigmoid lesion and liver masses/abscesses?;  Z74.09-Other reduced mobility      COMPARISON: CT scan dated 8/31/2021      TECHNIQUE: Multiplanar, multisequential MR imaging of the abdomen was  performed before and after the intravenous administration of contrast.     FINDINGS:     5.2 cm segment 7 rim-enhancing liver abscess showing prominent diffusion  restriction. There are 2 adjacent liver abscesses in liver segment 3,  the larger measuring 4.3 cm and the smaller measuring 1.2 cm, both  displaying prominent diffusion restriction. There appears to be  extension of these liver abscesses beyond the liver capsule, as there is  subcapsular fluid along the anterior, right lateral and dome of the  liver and there is also a developing right lower lobe pulmonary abscess  and empyema. There is also a what appears to be a subserosal abscess  along the lesser curvature and antrum of the stomach, again displaying a  prominent diffusion restriction. Differential would include subhepatic  space abscess although this does appear to follow the contour of the  stomach closely and appears to be within the wall.     No gross intraperitoneal free fluid. The gallbladder is nondistended.  The biliary tree is nondilated. No pancreatic lesions are identified.  The spleen, adrenal glands and kidneys are unremarkable. No suspicious  adenopathy in the upper abdomen.       Impression:         1. Enlarging liver abscesses in segments 3 and 7 which appear to have  extended beyond the liver capsule. In particular, there is a developing  empyema along the diaphragmatic pleura, posteriorly, as well as  extending up along the posterior costal pleura and there is also a  developing right lower lobe pulmonary abscess. There is also an  elongated subserosal abscess along the lesser curvature and antrum of  the stomach. Differential would include a subhepatic space abscess  although this very closely follows the  path and contour of the stomach  and this does appear to be within the wall of the stomach.     Findings and recommendations were discussed with Dr Gu on 9/30/2021  at 5:01 PM CDT.              This report was finalized on 09/30/2021 18:02 by Dr Arie Leon, .    CT Chest With Contrast Diagnostic [[id number]] Collected: 09/30/21 1628     Updated: 09/30/21 1704    Narrative:      CT CHEST W CONTRAST DIAGNOSTIC- 9/30/2021 3:37 PM CDT     HISTORY: Follow-up lung nodule; Z74.09-Other reduced mobility      COMPARISON: CT scan dated 8/31/2021      DOSE LENGTH PRODUCT: 192 mGy cm. Automated exposure control was also  utilized to decrease patient radiation dose.     TECHNIQUE: Serial helical tomographic images of the chest were acquired  following the intravenous infusion of contrast. Multiplanar reformatted  images were provided for review.     FINDINGS:      Visualized neck base: The imaged portion of the base of the neck appears  unremarkable.      Lungs: Advanced lung emphysema. 1.6 cm superior segment left lower lobe  pulmonary nodule, concerning for primary lung malignancy. There is a new  small empyema identified in the right lung base overlying the more  posterior aspect of the diaphragmatic pleura as well as extending up  along the posterior costal pleura. Additionally, there is a new cavitary  right lower lobe pneumonia, which appears to be developing into a  pulmonary abscess.     Heart: The heart is normal in size. There is no pericardial effusion.     Vasculature: Thoracic aorta is normal in caliber. No dissection  identified. No flow-limiting stenosis identified at the great vessel  origins. The pulmonary arteries are normal in appearance.     Mediastinum and lymph nodes: There is a mild reactive right hilar and  subcarinal adenopathy.     Skeletal and soft tissues: Chest wall soft tissues are unremarkable. No  acute bony abnormality.       Upper abdomen: 4.9 cm segment 7 liver abscess with subcapsular  extension  as well as extension above the diaphragm in the right pleural space.  Enlarging left lobe abscesses as well measuring 4.1 and 2.0 cm, in  segment 3, which also appear to be extended beyond the liver capsule.  There is a new subserosal abscess along the gastric wall, mostly along  the antrum, measuring over 10 cm in long axis..           Impression:      1. Worsening of the patient's liver abscesses with extension beyond the  liver capsule. In particular, there is extension of the right liver  abscess above the diaphragm with developing right empyema and right  lower lobe pulmonary abscess. There is also extension of the left liver  abscess into the subhepatic space with formation of a subserosal gastric  wall abscess.  2. Lung emphysema with 1.6 cm superior segment left lower lobe nodule  which is concerning for a primary lung malignancy.     Findings and recommendations were discussed with Dr Gu on 9/30/2021  at 5:01 PM CDT.     This report was finalized on 09/30/2021 17:01 by Dr Arie Leon, .    XR Chest PA & Lateral [[id number]] Collected: 09/29/21 1951     Updated: 09/29/21 1956    Narrative:      HISTORY: Cough     CXR: 2 views the chest obtained     COMPARISON: 9/24/2021     FINDINGS: There are hazy right lower lobe opacities which may be patchy  atelectasis or pneumonia. Left basilar atelectasis. There is a similar  1.5 cm superior segment left lower lobe cavitary nodule. Cardiac  mediastinal contours normal. No pleural effusion pneumothorax.  Degenerative change of the thoracic spine.       Impression:      1. New patchy right lower lobe opacities may be atelectasis versus  pneumonia. Linear left basilar atelectasis. Stable partially cavitary  superior segment left lower lobe nodule.  This report was finalized on 09/29/2021 19:53 by Dr. Dyan Rashid MD.            Objective:       Problem list:     Liver abscess    Acquired hypothyroidism    Gastro-esophageal reflux disease with  esophagitis    Essential hypertension    Paroxysmal atrial fibrillation (HCC)    Current use of long term anticoagulation    Cavitary lesion of lung    Empyema lung (HCC)    Hyponatremia    Hypokalemia        Assessment/Plan:       ASSESSMENT:   1.   Liver masses/abscesses.  --09/10/2021-liver ultrasound-2 suspicious masses in the liver, 1 in the left hepatic lobe 5.3 cm, 1 in the dome of the right hepatic lobe 6.4 cm.  Hepatic metastasis not excluded.  Unremarkable gallbladder, no biliary ductal dilatation identified.  --09/10/2021-CT abdomen/pelvis with and without contrast.  2 cm segmental thickening of the sigmoid colon concerning for malignancy.  2 large heterogenous liver lesions (right posterior liver 5.5 cm, predominantly centrally hypodense lesion; left liver lobe with similar-appearing 4.8 cm liver lesion) most concerning for metastatic disease  --09/30/2021-MRI abdomen.  Enlarging liver abscesses which appear to have extended beyond the liver capsule.  In particular there is a developing empyema along the subdiaphragmatic pleura, posteriorly as well as extending up the posterior costal pleura and there is also developing right lower lobe pulmonary abscess.  There is an elongated subserosal abscess along the lesser curvature and antrum of the stomach.  Differential includes subhepatic space abscess although this very closely follows the path and contour of the stomach and does appear to be within the wall of the stomach.  --10/01/5218-ZL-dpridj abscess drain liver-successful CT-guided biopsy liver abscess aspiration yielding approximately 20 cc of frankly purulent material.  Gram stain: Many (4+) WBCs per low powered field.  No organisms seen.  Cultures no growth to date.  --10/02/2021-MRI pelvis with and without contrast.  Sigmoid colon diverticulosis and nonspecific wall thickening.  --10/04/2021-CT chest with contrast-redemonstration right lower lung complex consolidative and low-density opacities  suspicious for parenchymal abscess or necrosis.  Left lower lobe 1.8 cm nodule, previously 1.6 cm.  Difference in measurement may be partially due to differences in technique.  Recommend follow-up.  Right greater than left pleural effusions.  Rim-enhancing liver lesions, slightly decreased in size compared to 9/30/2021       2.   Segmental thickening of the sigmoid colon, low abdominal pain, chills and neutrophilic leukocytosis.  Acute diverticulitis?  ----09/28/2029-colonoscopy.  Impression: 3 diminutive polyps in the rectum removed with hot snare.  3 diminutive polyps at the splenic flexure, removed with hot snare.  Diverticulosis in the sigmoid colon and in the descending colon.  Distal rectum and anal verge are normal on retroflexion view.  3.   Left lower lobe pulmonary nodule.    Will need clarification down the road.  --08/31/2021-CT angiogram chest.  No PE.  Suspicious 1.5 cm cavitary left lower lobe pulmonary nodule in the superior segment.  Tiny 3 mm subpleural right lower lobe nodule.  Small lymph nodes in the lisa hepatis region in the upper abdomen.  --09/30/2021-CT chest.  Worsening liver abscesses with extension beyond the liver capsule.  In particular there is extension of the right liver abscess above the diaphragm with developing right empyema and right lower lobe pulmonary abscess.  Also extension of the left liver abscess into the sub hepatic space with formation of a subserosal gastric wall abscess.  Lung emphysema with 1.6 cm superior segment left lower lobe nodule concerning for primary lung malignancy.  4.   Neutrophilic leukocytosis reactive to ongoing bacterial infection.  Normal counts today.  5.    Positive blood cultures.  Dr. Graza (ID) now following:  --09/29/2021- staph coagulase-negative from aerobic bottle (possible contaminant)   --09/24/2021-Fusobacterium species from anaerobic bottle.    6.   Normocytic anemia.  Components from iron deficiency, and iron under utilization/anemia  of chronic disease.  --Hemoglobin 10.0; MCV 87.2, 10/05/2021 (prior range: Hemoglobin 9.0 -15.4; MCV 85.3-94.2)  7.     Mild thrombocytosis.  Reactive.  Normal counts today.  8.    Longstanding tobacco use/nicotine dependence (beginning age 22-up to 2 packs/day, currently < 1/2 pack/day)  9.   Paroxysmal atrial fibrillation  10.   Hyperlipidemia     Recommendations:  1.   Apprised of current labs with resolution of neutrophilic leukocytosis, stable anemia, normal platelets, hyponatremia, hypokalemia, hypocalcemia, depressed total protein/albumin, normal GFR, normal LFTs, elevated procalcitonin, elevated CRP.  2.     Again note percutaneous liver abscess drainage, 10/01/2021 (above).  Gram stain with many (4+) WBCs per low powered field.  No organisms seen.  KOH with no yeast or hyphal elements seen.  Cultures NGTD.  Anaerobic cultures-no anaerobes isolated at 3 days  3.    Apprised of most recent CT chest, 10/04/2021 (above), MRI pelvis, 10/02/2021, and previous abdominal MRI and CT chest findings (above).  4.   In line to transfer to higher level of care.  5.   Continue other general medical management and support (including IV antibiotics).  PICC line has been placed to restart TPN.  6.   Transfuse RBCs if hemoglobin < 8 if symptomatic  7.   Care discussed with the patient, and his spouse Bertha at the bedside         I spent at least 25 total minutes, face-to-face, caring for Sonu today.  Greater than 50% of this time involved counseling and/or coordination of care as documented within this note regarding the patient's illness(es), pros and cons of various treatment options, instructions and/or risk reduction.

## 2021-10-05 NOTE — PROGRESS NOTES
Yasmin Solorio MD FACS  Progress Note     LOS: 6 days   Patient Care Team:  Vaughn Kramer DO as PCP - General (Internal Medicine)  Thomas Dc DO as Consulting Physician (Gastroenterology)  Tien Canchola MD as Consulting Physician (Pulmonary Disease)      Subjective     Interval History:      no nausea, but early satiety     Objective     Vital Signs  Temp:  [97.9 °F (36.6 °C)-98.1 °F (36.7 °C)] 97.9 °F (36.6 °C)  Heart Rate:  [51-68] 68  Resp:  [16-18] 16  BP: (148-163)/(74-87) 148/74    Physical Exam:  General appearance - alert, well appearing, and in no distress  Abdomen - soft, some protuberance, essentially nontender      Results Review:    Lab Results (last 24 hours)     Procedure Component Value Units Date/Time    Anaerobic Culture - Aspirate, Liver [[id number]] Collected: 10/01/21 1528    Specimen: Aspirate from Liver Updated: 10/05/21 0606     Anaerobic Culture No anaerobes isolated at 3 days    Comprehensive Metabolic Panel [[id number]]  (Abnormal) Collected: 10/05/21 0436    Specimen: Blood Updated: 10/05/21 0602     Glucose 88 mg/dL      BUN 8 mg/dL      Creatinine 0.70 mg/dL      Sodium 132 mmol/L      Potassium 3.4 mmol/L      Chloride 96 mmol/L      CO2 25.0 mmol/L      Calcium 7.6 mg/dL      Total Protein 5.4 g/dL      Albumin 2.00 g/dL      ALT (SGPT) 15 U/L      AST (SGOT) 25 U/L      Alkaline Phosphatase 81 U/L      Total Bilirubin 0.5 mg/dL      eGFR Non African Amer 109 mL/min/1.73      Globulin 3.4 gm/dL      A/G Ratio 0.6 g/dL      BUN/Creatinine Ratio 11.4     Anion Gap 11.0 mmol/L     Narrative:      GFR Normal >60  Chronic Kidney Disease <60  Kidney Failure <15      C-reactive Protein [[id number]]  (Abnormal) Collected: 10/05/21 0436    Specimen: Blood Updated: 10/05/21 0602     C-Reactive Protein 9.13 mg/dL     Procalcitonin [[id number]]  (Abnormal) Collected: 10/05/21 0436    Specimen: Blood Updated: 10/05/21 0602     Procalcitonin 0.35 ng/mL     Narrative:      As a  "Marker for Sepsis (Non-Neonates):     1. <0.5 ng/mL represents a low risk of severe sepsis and/or septic shock.  2. >2 ng/mL represents a high risk of severe sepsis and/or septic shock.    As a Marker for Lower Respiratory Tract Infections that require antibiotic therapy:  PCT on Admission     Antibiotic Therapy             6-12 Hrs later  >0.5                          Strongly Recommended            >0.25 - <0.5             Recommended  0.1 - 0.25                  Discouraged                       Remeasure/reassess PCT  <0.1                         Strongly Discouraged         Remeasure/reassess PCT      As 28 day mortality risk marker: \"Change in Procalcitonin Result\" (>80% or <=80%) if Day 0 (or Day 1) and Day 4 values are available. Refer to http://www.[employer] Ladspct-calculator.com/    Change in PCT <=80 %   A decrease of PCT levels below or equal to 80% defines a positive change in PCT test result representing a higher risk for 28-day all-cause mortality of patients diagnosed with severe sepsis or septic shock.    Change in PCT >80 %   A decrease of PCT levels of more than 80% defines a negative change in PCT result representing a lower risk for 28-day all-cause mortality of patients diagnosed with severe sepsis or septic shock.                CBC & Differential [[id number]]  (Abnormal) Collected: 10/05/21 0436    Specimen: Blood Updated: 10/05/21 0538    Narrative:      The following orders were created for panel order CBC & Differential.  Procedure                               Abnormality         Status                     ---------                               -----------         ------                     CBC Auto Differential[[id number]]        Abnormal            Final result                 Please view results for these tests on the individual orders.    CBC Auto Differential [[id number]]  (Abnormal) Collected: 10/05/21 0436    Specimen: Blood Updated: 10/05/21 0538     WBC 10.74 10*3/mm3      RBC 3.51 " 10*6/mm3      Hemoglobin 10.0 g/dL      Hematocrit 30.6 %      MCV 87.2 fL      MCH 28.5 pg      MCHC 32.7 g/dL      RDW 15.4 %      RDW-SD 48.3 fl      MPV 9.6 fL      Platelets 432 10*3/mm3      Neutrophil % 71.8 %      Lymphocyte % 12.2 %      Monocyte % 12.4 %      Eosinophil % 1.3 %      Basophil % 0.6 %      Immature Grans % 1.7 %      Neutrophils, Absolute 7.72 10*3/mm3      Lymphocytes, Absolute 1.31 10*3/mm3      Monocytes, Absolute 1.33 10*3/mm3      Eosinophils, Absolute 0.14 10*3/mm3      Basophils, Absolute 0.06 10*3/mm3      Immature Grans, Absolute 0.18 10*3/mm3      nRBC 0.0 /100 WBC     Blood Culture With MICHAEL - Blood, Arm, Left [[id number]] Collected: 09/29/21 1924    Specimen: Blood from Arm, Left Updated: 10/04/21 2100     Blood Culture No growth at 5 days        Imaging Results (Last 24 Hours)     Procedure Component Value Units Date/Time    CT Chest With Contrast Diagnostic [[id number]] Collected: 10/04/21 1424     Updated: 10/04/21 1503    Narrative:      EXAM: CT CHEST W CONTRAST DIAGNOSTIC- - 10/4/2021 1:13 PM CDT     HISTORY: Evaluate right lower lobe opacity; Z74.09-Other reduced  mobility; R26.9-Unspecified abnormalities of gait and mobility       COMPARISON: 9/30/2021.      DOSE LENGTH PRODUCT: 412 mGy cm. Automatic exposure control was utilized  to make radiation dose as low as reasonably achievable.     TECHNIQUE: Enhanced CT images of the chest obtained with multiplanar  reformats.     FINDINGS:      AIRWAYS/PULMONARY PARENCHYMA: The central airways are midline and  patent.      Right greater than left bibasilar consolidative opacities. Right lower  lung with complex low density, suspicious for parenchymal abscess or  necrosis.     Left lower lobe 1.8 cm nodule, previously 1.6 cm on 9/30/2021.  Difference in measurement may be partially due to differences in  technique.     Mild emphysematous changes.     Moderate right and small left pleural fluid.     VASCULATURE: Thoracic aorta is  normal in course and caliber. Moderate  calcified aortic atherosclerosis.  No pulmonary artery filling defect on  this non CTA exam. Normal pulmonary artery caliber.     CARDIAC:  Normal heart size.  No pericardial effusion.  Scattered  coronary artery calcifications.      MEDIASTINUM: There is no mediastinal or hilar lymphadenopathy by CT size  criteria. Esophagus has normal course, caliber and wall thickness.     EXTRATHORACIC: The visualized portions of the thyroid gland are  unremarkable. No thoracic inlet or axillary adenopathy. The  extrathoracic soft tissues are within normal limits.      INCLUDED UPPER ABDOMEN: Right posterior liver with a 4.7 cm  rim-enhancing fluid collection, suspicious for abscess, previously 4.9  cm on 9/30/2021. Left liver rim-enhancing fluid collection measures 3.6  cm, previously 4.1 cm on 9/30/2021. The smaller adjacent lesion measures  1.5 cm, previously 2.0 cm on 9/30/2021. Patent portal vein and hepatic  veins.     BONES: No acute or suspicious bony finding.           Impression:      1. Redemonstration right lower lung complex consolidative and  low-density opacity suspicious for parenchymal abscess or necrosis.  2. Left lower lobe 1.8 cm nodule, previously 1.6 cm. Difference in  measurement may be partially due to differences in technique. Recommend  follow-up.  3. Right greater than left pleural effusions.  4. No rim-enhancing liver lesions, slightly decreased in size compared  to 9/30/2021.  This report was finalized on 10/04/2021 15:00 by Dr Theresa Contreras MD.            Assessment/Plan       Sigmoid diverticulitis with liver abscesses    Continue iv abx and diet as tolerated.  Will start TPN as eating minimally.      Yasmin Solorio MD  10/05/21  09:05 CDT

## 2021-10-05 NOTE — PLAN OF CARE
Goal Outcome Evaluation:  Plan of Care Reviewed With: patient        Progress: no change  Outcome Summary: A & O x 4, denies pain, up ad shobha, self turning, IVF and IV ABX continue, c/o cough, prn  cough meds given with relief, NS on tele, safety maintained, PICC in place, plan to start TPN r/t poor appetite, waiting for transfer to

## 2021-10-05 NOTE — PROGRESS NOTES
"Infectious Diseases Progress Note    Patient:  Sonu Bravo Jr.  YOB: 1945  MRN: [id number]   Admit date: 9/29/2021   Admitting Physician: Jan Mcintyre MD  Primary Care Physician: Vaughn Kramer DO    Chief Complaint/Interval History: He indicates he is feeling okay.  No pain or discomfort.  No cardiopulmonary symptoms.    Intake/Output Summary (Last 24 hours) at 10/5/2021 1244  Last data filed at 10/5/2021 0900  Gross per 24 hour   Intake 780 ml   Output --   Net 780 ml     Allergies:   Allergies   Allergen Reactions   • Benadryl [Diphenhydramine] Mental Status Change   • Penicillins Hives   • Sulfa Antibiotics Hives, Itching and Rash   • Sulfamethoxazole-Trimethoprim Hives, Itching and Rash     Current Scheduled Medications:   albuterol, 1.25 mg, Nebulization, 4x Daily - RT   And  ipratropium, 0.5 mg, Nebulization, 4x Daily - RT  bisoprolol, 10 mg, Oral, Daily  budesonide-formoterol, 2 puff, Inhalation, BID - RT  cefepime, 2 g, Intravenous, Q8H  cetirizine, 10 mg, Oral, Daily  dextromethorphan polistirex ER, 60 mg, Oral, Q12H  enoxaparin, 1 mg/kg, Subcutaneous, Q12H  famotidine, 20 mg, Intravenous, BID  Fat Emulsion Plant Based, 250 mL, Intravenous, Q24H (TPN)  fluticasone, 2 spray, Each Nare, Daily  hydroCHLOROthiazide, 12.5 mg, Oral, Daily  levothyroxine, 100 mcg, Oral, Daily  melatonin, 3 mg, Oral, Nightly  metroNIDAZOLE, 500 mg, Intravenous, Q8H  rosuvastatin, 10 mg, Oral, Nightly  sodium chloride, 10 mL, Intravenous, Q12H      Current PRN Medications:  •  acetaminophen  •  HYDROcodone-homatropine  •  metoprolol tartrate  •  sodium chloride    Review of Systems see HPI    Vital Signs:  /57 (BP Location: Right arm, Patient Position: Lying)   Pulse 56   Temp 98.5 °F (36.9 °C) (Oral)   Resp 16   Ht 176 cm (69.29\")   Wt 78 kg (172 lb)   SpO2 96%   BMI 25.19 kg/m²     Physical Exam  Vital signs - reviewed.  Line/IV site - No erythema, warmth, induration, or " tenderness.  Abdomen with normal bowel sounds.  Abdomen is soft and nontender.    Lab Results:  CBC:   Results from last 7 days   Lab Units 10/05/21  0436 10/04/21  0435 10/03/21  0409 10/02/21  0424 10/01/21  0423 09/30/21  0526 09/29/21 1924   WBC 10*3/mm3 10.74 13.16* 11.42* 14.51* 15.85* 23.66* 27.34*   HEMOGLOBIN g/dL 10.0* 10.1* 9.4* 9.6* 9.0* 9.5* 10.4*   HEMATOCRIT % 30.6* 30.8* 28.6* 29.0* 26.8* 28.5* 31.5*   PLATELETS 10*3/mm3 432 454* 459* 418 380 376 371     BMP:  Results from last 7 days   Lab Units 10/05/21  0436 10/04/21  0435 10/04/21  0435 10/03/21  0409 10/03/21  0409 10/02/21  0424 10/02/21  0424 10/01/21  0423 10/01/21  0423 09/30/21  1843 09/30/21  1843 09/30/21  0526 09/30/21  0526 09/29/21  1924 09/29/21  1924   SODIUM mmol/L 132*  --  129*  --  133*  --  132*  --  130*  --  131*  --  131*   < > 133*   POTASSIUM mmol/L 3.4*  --  3.6  --  3.2*  --  3.6  --  3.2*  --  3.2*  --  2.4*   < > 3.0*   CHLORIDE mmol/L 96*  --  94*  --  98  --  96*  --  95*  --  94*  --  93*   < > 91*   CO2 mmol/L 25.0  --  27.0  --  27.0  --  27.0  --  26.0  --  29.0  --  29.0   < > 29.0   BUN mg/dL 8  --  9  --  10  --  10  --  13  --  15  --  17   < > 18   CREATININE mg/dL 0.70*  --  0.74*  --  0.74*  --  0.64*  --  0.63*  --  0.72*  --  0.77   < > 0.95   GLUCOSE mg/dL 88   < > 96   < > 140*   < > 92   < > 92   < > 109*   < > 112*   < > 84   CALCIUM mg/dL 7.6*  --  7.6*  --  7.5*  --  7.6*  --  7.6*  --  7.8*  --  7.5*   < > 8.1*   ALT (SGPT) U/L 15  --  17  --  19  --  19  --  19  --   --   --  23  --  32    < > = values in this interval not displayed.     Culture Results:   Blood Culture   Date Value Ref Range Status   09/29/2021 No growth at 5 days  Final   09/29/2021 Staphylococcus, coagulase negative (C)  Final     Comment:     Probable contaminant requires clinical correlation, susceptibility not performed unless requested by physician.       Radiology:   CT chest from yesterday personally  reviewed:  IMPRESSION:  1. Redemonstration right lower lung complex consolidative and  low-density opacity suspicious for parenchymal abscess or necrosis.  2. Left lower lobe 1.8 cm nodule, previously 1.6 cm. Difference in  measurement may be partially due to differences in technique. Recommend  follow-up.  3. Right greater than left pleural effusions.  4. No rim-enhancing liver lesions, slightly decreased in size compared  to 9/30/2021.  This report was finalized on 10/04/2021 15:00 by Dr Theresa Contreras MD.      Impression:   Liver abscess  Right lung pleural pulmonary process  Positive blood culture for Fusobacterium    Recommendations:   He is without fever.  White blood cell count is normalized.  Responding to current antibiotic treatment cefepime and Flagyl  Surgery/thoracic surgery evaluating  Continue to follow    Julien Sharma MD

## 2021-10-05 NOTE — PROGRESS NOTES
"    [facility] Medicine Services  INPATIENT PROGRESS NOTE    Patient Name: Sonu Bravo Jr.  Date of Admission: 9/29/2021  Today's Date: 10/05/21  Length of Stay: 6  Primary Care Physician: Vaughn Kramer DO    Subjective   Chief Complaint: \"bored\"  HPI   Patient was seen and examined at bedside.     Transfer center from  called me today and discussed with me that there is a good chance it may be a long waiting period and suggested calling other centers if we have not already.      Nursing and house supervisor have assisted in calling >10 hospitals today and none have beds currently, [facility] in [place] indicated they would have updated numbers at 630 PM and may have a bed.  Patient needs a facility with IR and hepatobiliary backup in case he needs an open liver abscess drainage.        Review of Systems   Constitutional: Positive for fatigue. Negative for activity change, chills and fever.   Respiratory: Negative for cough and shortness of breath.    Cardiovascular: Negative for chest pain and palpitations.   Gastrointestinal: Negative for abdominal distention, abdominal pain, diarrhea, nausea and vomiting.   Psychiatric/Behavioral: Positive for dysphoric mood.        All pertinent negatives and positives are as above. All other systems have been reviewed and are negative unless otherwise stated.     Objective    Temp:  [97.9 °F (36.6 °C)-98.5 °F (36.9 °C)] 98 °F (36.7 °C)  Heart Rate:  [51-68] 61  Resp:  [16-18] 16  BP: (138-163)/(57-87) 146/70  Physical Exam  Vitals and nursing note reviewed.   Constitutional:       Appearance: Normal appearance.   HENT:      Head: Normocephalic and atraumatic.      Nose: No congestion or rhinorrhea.      Mouth/Throat:      Mouth: Mucous membranes are dry.      Pharynx: Oropharynx is clear.   Eyes:      General: No scleral icterus.     Conjunctiva/sclera: Conjunctivae normal.   Cardiovascular:      Rate and Rhythm: Normal rate and " regular rhythm.   Pulmonary:      Effort: Pulmonary effort is normal. No respiratory distress.      Breath sounds: Normal breath sounds.      Comments: Diminished at bases   Abdominal:      General: Abdomen is flat. Bowel sounds are normal. There is no distension.      Palpations: Abdomen is soft. There is no mass.   Skin:     General: Skin is warm and dry.      Coloration: Skin is not jaundiced or pale.   Neurological:      General: No focal deficit present.      Mental Status: He is alert and oriented to person, place, and time.   Psychiatric:         Mood and Affect: Mood normal.         Behavior: Behavior normal.       Results Review:  I have reviewed the labs, radiology results, and diagnostic studies.    Laboratory Data:   Results from last 7 days   Lab Units 10/05/21  0436 10/04/21  0435 10/03/21  0409   WBC 10*3/mm3 10.74 13.16* 11.42*   HEMOGLOBIN g/dL 10.0* 10.1* 9.4*   HEMATOCRIT % 30.6* 30.8* 28.6*   PLATELETS 10*3/mm3 432 454* 459*        Results from last 7 days   Lab Units 10/05/21  0436 10/04/21  0435 10/03/21  0409   SODIUM mmol/L 132* 129* 133*   POTASSIUM mmol/L 3.4* 3.6 3.2*   CHLORIDE mmol/L 96* 94* 98   CO2 mmol/L 25.0 27.0 27.0   BUN mg/dL 8 9 10   CREATININE mg/dL 0.70* 0.74* 0.74*   CALCIUM mg/dL 7.6* 7.6* 7.5*   BILIRUBIN mg/dL 0.5 0.5 0.4   ALK PHOS U/L 81 92 104   ALT (SGPT) U/L 15 17 19   AST (SGOT) U/L 25 24 29   GLUCOSE mg/dL 88 96 140*       Culture Data:   Blood Culture   Date Value Ref Range Status   09/29/2021 No growth at 4 days  Preliminary   09/29/2021 Staphylococcus, coagulase negative (C)  Final     Comment:     Probable contaminant requires clinical correlation, susceptibility not performed unless requested by physician.         Radiology Data:   Imaging Results (Last 24 Hours)     ** No results found for the last 24 hours. **          I have reviewed the patient's current medications.     Assessment/Plan     Active Hospital Problems    Diagnosis    • **Liver abscess    •  Hyponatremia    • Hypokalemia    • Empyema lung (HCC)    • Cavitary lesion of lung    • Current use of long term anticoagulation    • Paroxysmal atrial fibrillation (HCC)    • Essential hypertension    • Acquired hypothyroidism    • Gastro-esophageal reflux disease with esophagitis        Plan:    Patient was admitted on 9/29 for cavitary lesion in the lung as well as concern for liver abscess.  Patient reportedly had fever/chills, night sweats.    General surgery, oncology, pulmonary, CT surgery infectious disease consulted, notes reviewed, appreciate assistance.    Patient underwent CT guided abscess drainage on 10/1 with IR that showed frankly purulent drainage.  Cultures are NGTD.    Empyema on the right being followed and monitored by CT surgery, repeat CT scan on 10/5 showed complex consolidative area concerning for abscess or necrosis.  Decreased in size of liver lesions.  IR to place chest tube tomorrow AM in right empyema. NPO at midnight.       Antibiotics per infectious disease.  Currently on cefepime and flagyl.  Antibiotics pending.  Previous blood cultures likely contaminant.      Mildy hyponatremia, little clinical significance at this point.  Will monitor.     Patient had an episode of atrial fibrillation, he has paroxsymal atrial fibrillation and per wife does not have a long-history of it.  Anticoagulation when appropriate.  Rate and rhythm currently controlled.     Pulmonary treating with bronchodilators and inhaled corticosteroids.    Ambulate TID.  Incentive spirometer.    Patient also has a concerning pulmonary nodule 1.6-1.8 cm, will need to follow-up this as an outpatient.    D/C IVF as general surgery has placed orders for TPN.    Encourage PO intake.     Increase melatonin to 6 mg.  Focus on day/night cycle.    AM labs.      Attempting transfer to tertiary care center so he would have hepatobiliary surgery available if needed for the liver abscess with drainage around to the  stomach.    Discharge Planning: I expect the patient to be discharged to tertiary care when find accepting facility.      Electronically signed by Jan Mcintyre MD, 10/05/21, 18:26 CDT.

## 2021-10-05 NOTE — PROGRESS NOTES
PULMONARY AND CRITICAL CARE PROGRESS NOTE - [facility]    Patient: Sonu Bravo Jr.  1945   MR# [id number]   Acct# [id number]  10/05/21   09:46 CDT  Referring Provider: Jan Mcintyre MD    Chief Complaint: Liver abscess, lung nodule, infiltrates   Interval history: Patient is seen awake and alert sitting up in the chair eating breakfast.  Oxygen saturations remained stable on room air.  No confusion overnight.  Sodium is up to 132 today.  Cytology and cultures still pending from liver aspirate.  He remains afebrile.  No other issues reported.  Per chart review cavitary lung lesion is being followed by serial chest CTs and [facility] is [facility].  Meds:  albuterol, 1.25 mg, Nebulization, 4x Daily - RT   And  ipratropium, 0.5 mg, Nebulization, 4x Daily - RT  bisoprolol, 10 mg, Oral, Daily  budesonide-formoterol, 2 puff, Inhalation, BID - RT  cefepime, 2 g, Intravenous, Q8H  cetirizine, 10 mg, Oral, Daily  dextromethorphan polistirex ER, 60 mg, Oral, Q12H  enoxaparin, 1 mg/kg, Subcutaneous, Q12H  famotidine, 20 mg, Intravenous, BID  fluticasone, 2 spray, Each Nare, Daily  hydroCHLOROthiazide, 12.5 mg, Oral, Daily  levothyroxine, 100 mcg, Oral, Daily  melatonin, 3 mg, Oral, Nightly  metroNIDAZOLE, 500 mg, Intravenous, Q8H  potassium chloride, 40 mEq, Oral, Once  rosuvastatin, 10 mg, Oral, Nightly  sodium chloride, 10 mL, Intravenous, Q12H      lactated ringers, 75 mL/hr, Last Rate: 75 mL/hr (10/05/21 0117)      Review of Systems:   Review of Systems   Constitutional: Negative for fever.   Respiratory: Positive for cough and shortness of breath.    Cardiovascular: Negative for chest pain.   Gastrointestinal: Negative for nausea and vomiting.   Neurological: Negative for weakness.   Psychiatric/Behavioral: Negative for agitation.        Physical Exam:  SpO2 Percentage    10/05/21 0508 10/05/21 0636 10/05/21 0744   SpO2: 93% 93% 97%      Temp:  [97.9 °F (36.6 °C)-98.1 °F (36.7 °C)] 97.9 °F (36.6 °C)  Heart Rate:  [51-68] 68  Resp:  [16-18] 16  BP: (148-163)/(74-87) 148/74    Intake/Output Summary (Last 24 hours) at 10/5/2021 0946  Last data filed at 10/5/2021 0900  Gross per 24 hour   Intake 780 ml   Output --   Net 780 ml     Physical Exam  Constitutional:       General: He is not in acute distress.  HENT:      Head: Normocephalic.      Nose: Nose normal.      Mouth/Throat:      Mouth: Mucous membranes are moist.   Eyes:      General: No scleral icterus.  Cardiovascular:      Rate and Rhythm: Normal rate.   Pulmonary:      Effort: No respiratory distress.   Abdominal:      General: There is no distension.   Musculoskeletal:      Right lower leg: No edema.      Left lower leg: No edema.   Neurological:      Mental Status: He is alert and oriented to person, place, and time.   Psychiatric:         Mood and Affect: Mood normal.         Behavior: Behavior is cooperative.            Laboratory Data:  Results from last 7 days   Lab Units 10/05/21  0436 10/04/21  0435 10/03/21  0409   WBC 10*3/mm3 10.74 13.16* 11.42*   HEMOGLOBIN g/dL 10.0* 10.1* 9.4*   PLATELETS 10*3/mm3 432 454* 459*     Results from last 7 days   Lab Units 10/05/21  0436 10/04/21  0435 10/03/21  0409 10/02/21  0424 10/01/21  0423 09/30/21  1843 09/30/21  1036   SODIUM mmol/L 132* 129* 133*   < > 130*   < >  --    POTASSIUM mmol/L 3.4* 3.6 3.2*   < > 3.2*   < >  --    BUN mg/dL 8 9 10   < > 13   < >  --    CREATININE mg/dL 0.70* 0.74* 0.74*   < > 0.63*   < >  --    INR   --   --   --   --  1.38*  --  1.44*    < > = values in this interval not displayed.         Blood Culture   Date Value Ref Range Status   09/29/2021 No growth at 24 hours  Preliminary   09/29/2021 Abnormal Stain (C)  Preliminary   09/24/2021 Fusobacterium species (C)  Corrected     Comment:     Beta lactamase negative    Appended report. These results have been appended to a previously final verified report.    09/24/2021 No growth at 5 days  Final     Recent films:  CT Chest With Contrast Diagnostic    Result Date: 10/4/2021  1. Redemonstration right lower lung complex consolidative and low-density opacity suspicious for parenchymal abscess or necrosis. 2. Left lower lobe 1.8 cm nodule, previously 1.6 cm. Difference in measurement may be partially due to differences in technique. Recommend follow-up. 3. Right greater than left pleural effusions. 4. No rim-enhancing liver lesions, slightly decreased in size compared to 9/30/2021. This report was finalized on 10/04/2021 15:00 by Dr Theresa Contreras MD.    Films reviewed personally by me.  My interpretation: no new imaging 10-5-21    Pulmonary Assessment:  1. Lung nodule, 1.6 cm LLL  2. Right lung consolidation possible abscess/ empyema  3. Liver abnormalities probable abscess  4. Segmental thickening of the sigmoid colon  5. Paroxysmal atrial fib -xarelto on hold   6. Tobacco abuse, quitting with this hospitalization   7. Hypokalemia   8. Hyponatremia      Recommend/plan:   · Continue supplemental oxygen and titrate for saturation 90%.  Currently on room air  · Continue albuterol ipratropium nebs and Symbicort  · Continue incentive spirometer and Aerobika flutter  · Antibiotic: Cefepime D #6, metronidazole D #6, ID following appreciate their assistance  · Respiratory culture pending  · Awaiting transfer to    · CT surgery-no surgery at this time  · S/P liver abscess CT needle biopsy 10/01/21- micro pending   · DVT prophylaxis, Lovenox  · Mobilize as tolerated  · Further recommendations per Dr. Gloria    Electronically signed by MARY Calvert, 10/05/21, 09:46 CDT     ATTESTATION OF CLINICAL NOTE:  I have reviewed the notes, assessments, and/or procedures performed by MARY Calvert,  I concur with her/his documentation of Sonu Bravo Jr..  Patient was seen in the follow-up visit in pulmonary rounds in medical floor today.    He is doing well and remains  on the room air and the cultures are still pending.  He has multiple abscesses and is on multiple antibiotics ID is following.  He is afebrile.  He has proximal atrial fibrillation and is not on any anticoagulation.  The patient is otherwise stable and is waiting for transfer to another hospital when bed is available.  No other events reported and he is afebrile.  Oncology and ID are following.    Physical examination patient is an elderly  male lying in the bed looks tired and lethargic.HEENT: Atraumatic normocephalic.  Neck: Supple no mass nuclear medicine mention.  Heart: Sounds normal rate and rhythm regular no murmur.  Lungs: Bibasilar crackles.  Abdomen: Soft nondistended nontender.  Extremities: No edema normal pulses normal color.  Neurologic: Grossly intact.  Skin: No breakdown.    Continue current treatment plan and routine respiratory care.  Continue IV antibiotics per ID recommendation and he is also on Flagyl.  Continue bronchodilator treatment.  He is waiting for transfer to another higher care facility.  We will  Plan for outpatient pulmonary clinic visit and PFT and work-up for COPD. Incentive spirometry encouraged..  I talked to Dr. Iniguez today.  We will follow the cavitary lung lesion conservatively.  No CT scan will be done and further intervention may be needed later.  CT-guided needle biopsy of the liver abscess is currently in the lab and further work-up in progress..  DVT and stress ulcer prophylaxis.  Repeat labs and imaging studies from time to time. Continue nutritional support and physical activity as tolerated.   He is low serum sodium.  Today the serum sodium is 132 which will be addressed by the attending. Status: Full.  Overall prognosis: Guarded.  Pulmonary team will continue following and further recommendations.    I have seen and examined patient personally, performing a face-to-face diagnostic evaluation with plan of care reviewed and developed with APRN and nursing  staff. I have addended and/or modified the above history of present illness, physical examination, and assessment and plan to reflect my findings and impressions. Essential elements of the care plan were discussed with APRN above.  Agree with findings and assessment/plan as documented above.    Jeannine Gloria MD  Pulmonologist/Intensivist  10/5/2021 10:34 CDT

## 2021-10-05 NOTE — NURSING NOTE
MRSA respiratory panel collected and sent to lab. Educated patient on how to collect respiratory sputum culture. Container at bedside. Awaiting patient to void to send down S. Pneumo urine specimen.

## 2021-10-05 NOTE — PLAN OF CARE
Goal Outcome Evaluation:  Plan of Care Reviewed With: patient, spouse           Outcome Summary: A&Ox4. VSS. TPN started this shift at 35 ml/hr. Regular diet maintained. Hold lovenox. NPO at midnight for ultrasound ordered tomorrow. Up ad shobha. NSR on tele. Room air. K+ administered. Wife at bedside. ABX administered as ordered. Call light within reach. Safety maintained.

## 2021-10-05 NOTE — PLAN OF CARE
Goal Outcome Evaluation:  Plan of Care Reviewed With: other (see comments) (RN)      Progress: no change  Outcome Summary: Ntn assessment completed. Pt with poor oral inake. Oral intake avg 37.5% of two meals. Regular diet. Boost Plus BID. Initiate TPN of D15/AA 5% at 35 ml/hr x 24 hrs.,D15/AA5% at 77 ml/hr starting on 10/6 at 1800. Lipids daily. Pt currently has LR at 75 ml/hr may need to be adjusted when TPN initiated. Cont to follow for plan of care.

## 2021-10-06 ENCOUNTER — APPOINTMENT (OUTPATIENT)
Dept: ULTRASOUND IMAGING | Facility: HOSPITAL | Age: 76
End: 2021-10-06

## 2021-10-06 LAB
ALBUMIN SERPL-MCNC: 2.2 G/DL (ref 3.5–5.2)
ALBUMIN/GLOB SERPL: 0.8 G/DL
ALP SERPL-CCNC: 94 U/L (ref 39–117)
ALT SERPL W P-5'-P-CCNC: 15 U/L (ref 1–41)
ANION GAP SERPL CALCULATED.3IONS-SCNC: 8 MMOL/L (ref 5–15)
APPEARANCE FLD: ABNORMAL
APTT PPP: 45.6 SECONDS (ref 24.1–35)
AST SERPL-CCNC: 25 U/L (ref 1–40)
BASOPHILS # BLD AUTO: 0.06 10*3/MM3 (ref 0–0.2)
BASOPHILS NFR BLD AUTO: 0.6 % (ref 0–1.5)
BILIRUB SERPL-MCNC: 0.3 MG/DL (ref 0–1.2)
BUN SERPL-MCNC: 9 MG/DL (ref 8–23)
BUN/CREAT SERPL: 11.1 (ref 7–25)
CA-I BLD-MCNC: 4.24 MG/DL (ref 4.6–5.4)
CALCIUM SPEC-SCNC: 7.7 MG/DL (ref 8.6–10.5)
CHLORIDE SERPL-SCNC: 97 MMOL/L (ref 98–107)
CO2 SERPL-SCNC: 29 MMOL/L (ref 22–29)
COLOR FLD: YELLOW
CREAT SERPL-MCNC: 0.81 MG/DL (ref 0.76–1.27)
DEPRECATED RDW RBC AUTO: 50.4 FL (ref 37–54)
EOSINOPHIL # BLD AUTO: 0.21 10*3/MM3 (ref 0–0.4)
EOSINOPHIL NFR BLD AUTO: 2.2 % (ref 0.3–6.2)
ERYTHROCYTE [DISTWIDTH] IN BLOOD BY AUTOMATED COUNT: 15.7 % (ref 12.3–15.4)
GFR SERPL CREATININE-BSD FRML MDRD: 92 ML/MIN/1.73
GLOBULIN UR ELPH-MCNC: 2.9 GM/DL
GLUCOSE BLDC GLUCOMTR-MCNC: 127 MG/DL (ref 70–130)
GLUCOSE BLDC GLUCOMTR-MCNC: 127 MG/DL (ref 70–130)
GLUCOSE BLDC GLUCOMTR-MCNC: 135 MG/DL (ref 70–130)
GLUCOSE SERPL-MCNC: 118 MG/DL (ref 65–99)
HCT VFR BLD AUTO: 31.6 % (ref 37.5–51)
HGB BLD-MCNC: 10.4 G/DL (ref 13–17.7)
IMM GRANULOCYTES # BLD AUTO: 0.2 10*3/MM3 (ref 0–0.05)
IMM GRANULOCYTES NFR BLD AUTO: 2.1 % (ref 0–0.5)
INR PPP: 1.24 (ref 0.91–1.09)
INTERPRETATION: NORMAL
LYMPHOCYTES # BLD AUTO: 1.09 10*3/MM3 (ref 0.7–3.1)
LYMPHOCYTES NFR BLD AUTO: 11.3 % (ref 19.6–45.3)
LYMPHOCYTES NFR FLD MANUAL: 21 %
Lab: ABNORMAL
MAGNESIUM SERPL-MCNC: 1.8 MG/DL (ref 1.6–2.4)
MCH RBC QN AUTO: 29.3 PG (ref 26.6–33)
MCHC RBC AUTO-ENTMCNC: 32.9 G/DL (ref 31.5–35.7)
MCV RBC AUTO: 89 FL (ref 79–97)
MONOCYTES # BLD AUTO: 1.13 10*3/MM3 (ref 0.1–0.9)
MONOCYTES NFR BLD AUTO: 11.7 % (ref 5–12)
MONOCYTES NFR FLD: 12 %
NEUTROPHILS NFR BLD AUTO: 6.97 10*3/MM3 (ref 1.7–7)
NEUTROPHILS NFR BLD AUTO: 72.1 % (ref 42.7–76)
NEUTROPHILS NFR FLD MANUAL: 65 %
NRBC BLD AUTO-RTO: 0 /100 WBC (ref 0–0.2)
PHOSPHATE SERPL-MCNC: 3.3 MG/DL (ref 2.5–4.5)
PLATELET # BLD AUTO: 450 10*3/MM3 (ref 140–450)
PMV BLD AUTO: 9.5 FL (ref 6–12)
POTASSIUM SERPL-SCNC: 3.5 MMOL/L (ref 3.5–5.2)
PROT SERPL-MCNC: 5.1 G/DL (ref 6–8.5)
PROTHROMBIN TIME: 15.1 SECONDS (ref 11.9–14.6)
RBC # BLD AUTO: 3.55 10*6/MM3 (ref 4.14–5.8)
RBC # FLD AUTO: 1000 /MM3
SODIUM SERPL-SCNC: 134 MMOL/L (ref 136–145)
UNCLASSIFIED CELLS, FLUID: 2 %
WBC # BLD AUTO: 9.66 10*3/MM3 (ref 3.4–10.8)
WBC # FLD AUTO: 1161 /MM3

## 2021-10-06 PROCEDURE — 25010000002 ENOXAPARIN PER 10 MG: Performed by: NURSE PRACTITIONER

## 2021-10-06 PROCEDURE — 80053 COMPREHEN METABOLIC PANEL: CPT | Performed by: FAMILY MEDICINE

## 2021-10-06 PROCEDURE — 94799 UNLISTED PULMONARY SVC/PX: CPT

## 2021-10-06 PROCEDURE — 89051 BODY FLUID CELL COUNT: CPT | Performed by: NURSE PRACTITIONER

## 2021-10-06 PROCEDURE — 85610 PROTHROMBIN TIME: CPT | Performed by: NURSE PRACTITIONER

## 2021-10-06 PROCEDURE — 84100 ASSAY OF PHOSPHORUS: CPT | Performed by: SPECIALIST

## 2021-10-06 PROCEDURE — 85730 THROMBOPLASTIN TIME PARTIAL: CPT | Performed by: NURSE PRACTITIONER

## 2021-10-06 PROCEDURE — 82330 ASSAY OF CALCIUM: CPT

## 2021-10-06 PROCEDURE — 0W9930Z DRAINAGE OF RIGHT PLEURAL CAVITY WITH DRAINAGE DEVICE, PERCUTANEOUS APPROACH: ICD-10-PCS | Performed by: SURGERY

## 2021-10-06 PROCEDURE — 99232 SBSQ HOSP IP/OBS MODERATE 35: CPT | Performed by: INTERNAL MEDICINE

## 2021-10-06 PROCEDURE — 82962 GLUCOSE BLOOD TEST: CPT

## 2021-10-06 PROCEDURE — 87015 SPECIMEN INFECT AGNT CONCNTJ: CPT | Performed by: NURSE PRACTITIONER

## 2021-10-06 PROCEDURE — 87205 SMEAR GRAM STAIN: CPT | Performed by: NURSE PRACTITIONER

## 2021-10-06 PROCEDURE — 85025 COMPLETE CBC W/AUTO DIFF WBC: CPT | Performed by: FAMILY MEDICINE

## 2021-10-06 PROCEDURE — 25010000002 CEFEPIME PER 500 MG: Performed by: SPECIALIST

## 2021-10-06 PROCEDURE — 87070 CULTURE OTHR SPECIMN AEROBIC: CPT | Performed by: NURSE PRACTITIONER

## 2021-10-06 PROCEDURE — 85060 BLOOD SMEAR INTERPRETATION: CPT | Performed by: NURSE PRACTITIONER

## 2021-10-06 PROCEDURE — 94664 DEMO&/EVAL PT USE INHALER: CPT

## 2021-10-06 PROCEDURE — 25010000002 HYDROMORPHONE PER 4 MG: Performed by: INTERNAL MEDICINE

## 2021-10-06 PROCEDURE — 88112 CYTOPATH CELL ENHANCE TECH: CPT | Performed by: NURSE PRACTITIONER

## 2021-10-06 PROCEDURE — 87075 CULTR BACTERIA EXCEPT BLOOD: CPT | Performed by: NURSE PRACTITIONER

## 2021-10-06 PROCEDURE — 88305 TISSUE EXAM BY PATHOLOGIST: CPT | Performed by: NURSE PRACTITIONER

## 2021-10-06 PROCEDURE — 87040 BLOOD CULTURE FOR BACTERIA: CPT | Performed by: INTERNAL MEDICINE

## 2021-10-06 PROCEDURE — 83735 ASSAY OF MAGNESIUM: CPT | Performed by: SPECIALIST

## 2021-10-06 PROCEDURE — 76604 US EXAM CHEST: CPT

## 2021-10-06 RX ORDER — HYDROMORPHONE HYDROCHLORIDE 1 MG/ML
0.5 INJECTION, SOLUTION INTRAMUSCULAR; INTRAVENOUS; SUBCUTANEOUS
Start: 2021-10-06 | End: 2021-10-13

## 2021-10-06 RX ORDER — BUDESONIDE AND FORMOTEROL FUMARATE DIHYDRATE 160; 4.5 UG/1; UG/1
2 AEROSOL RESPIRATORY (INHALATION)
Refills: 12
Start: 2021-10-06 | End: 2022-04-28

## 2021-10-06 RX ORDER — FAMOTIDINE 10 MG/ML
20 INJECTION, SOLUTION INTRAVENOUS 2 TIMES DAILY
Start: 2021-10-06 | End: 2021-10-27

## 2021-10-06 RX ORDER — HYDROMORPHONE HYDROCHLORIDE 1 MG/ML
0.5 INJECTION, SOLUTION INTRAMUSCULAR; INTRAVENOUS; SUBCUTANEOUS
Status: DISCONTINUED | OUTPATIENT
Start: 2021-10-06 | End: 2021-10-07 | Stop reason: HOSPADM

## 2021-10-06 RX ORDER — CETIRIZINE HYDROCHLORIDE 10 MG/1
10 TABLET ORAL DAILY
Start: 2021-10-07 | End: 2021-10-27

## 2021-10-06 RX ORDER — LANOLIN ALCOHOL/MO/W.PET/CERES
6 CREAM (GRAM) TOPICAL NIGHTLY
Start: 2021-10-06 | End: 2021-12-03

## 2021-10-06 RX ADMIN — BISOPROLOL FUMARATE 10 MG: 10 TABLET ORAL at 08:38

## 2021-10-06 RX ADMIN — ENOXAPARIN SODIUM 80 MG: 100 INJECTION SUBCUTANEOUS at 21:06

## 2021-10-06 RX ADMIN — ALBUTEROL SULFATE 1.25 MG: 2.5 SOLUTION RESPIRATORY (INHALATION) at 06:51

## 2021-10-06 RX ADMIN — IPRATROPIUM BROMIDE 0.5 MG: 0.5 SOLUTION RESPIRATORY (INHALATION) at 14:39

## 2021-10-06 RX ADMIN — METRONIDAZOLE 500 MG: 500 INJECTION, SOLUTION INTRAVENOUS at 04:23

## 2021-10-06 RX ADMIN — ALBUTEROL SULFATE 1.25 MG: 2.5 SOLUTION RESPIRATORY (INHALATION) at 10:37

## 2021-10-06 RX ADMIN — Medication 6 MG: at 21:06

## 2021-10-06 RX ADMIN — ALBUTEROL SULFATE 1.25 MG: 2.5 SOLUTION RESPIRATORY (INHALATION) at 14:39

## 2021-10-06 RX ADMIN — LEVOTHYROXINE SODIUM 100 MCG: 100 TABLET ORAL at 08:38

## 2021-10-06 RX ADMIN — IPRATROPIUM BROMIDE 0.5 MG: 0.5 SOLUTION RESPIRATORY (INHALATION) at 10:37

## 2021-10-06 RX ADMIN — HYDROMORPHONE HYDROCHLORIDE 0.5 MG: 1 INJECTION, SOLUTION INTRAMUSCULAR; INTRAVENOUS; SUBCUTANEOUS at 14:53

## 2021-10-06 RX ADMIN — METRONIDAZOLE 500 MG: 500 INJECTION, SOLUTION INTRAVENOUS at 11:46

## 2021-10-06 RX ADMIN — CEFEPIME HYDROCHLORIDE 2 G: 2 INJECTION, POWDER, FOR SOLUTION INTRAVENOUS at 07:46

## 2021-10-06 RX ADMIN — HYDROCHLOROTHIAZIDE 12.5 MG: 25 TABLET ORAL at 08:38

## 2021-10-06 RX ADMIN — I.V. FAT EMULSION 50 G: 20 EMULSION INTRAVENOUS at 17:44

## 2021-10-06 RX ADMIN — ROSUVASTATIN CALCIUM 10 MG: 10 TABLET, FILM COATED ORAL at 21:06

## 2021-10-06 RX ADMIN — FLUTICASONE PROPIONATE 2 SPRAY: 50 SPRAY, METERED NASAL at 08:38

## 2021-10-06 RX ADMIN — IPRATROPIUM BROMIDE 0.5 MG: 0.5 SOLUTION RESPIRATORY (INHALATION) at 06:51

## 2021-10-06 RX ADMIN — FAMOTIDINE 20 MG: 10 INJECTION INTRAVENOUS at 21:06

## 2021-10-06 RX ADMIN — BUDESONIDE AND FORMOTEROL FUMARATE DIHYDRATE 2 PUFF: 160; 4.5 AEROSOL RESPIRATORY (INHALATION) at 06:51

## 2021-10-06 RX ADMIN — SODIUM CHLORIDE, PRESERVATIVE FREE 10 ML: 5 INJECTION INTRAVENOUS at 21:06

## 2021-10-06 RX ADMIN — ASCORBIC ACID, VITAMIN A PALMITATE, CHOLECALCIFEROL, THIAMINE HYDROCHLORIDE, RIBOFLAVIN-5 PHOSPHATE SODIUM, PYRIDOXINE HYDROCHLORIDE, NIACINAMIDE, DEXPANTHENOL, ALPHA-TOCOPHEROL ACETATE, VITAMIN K1, FOLIC ACID, BIOTIN, CYANOCOBALAMIN: 200; 3300; 200; 6; 3.6; 6; 40; 15; 10; 150; 600; 60; 5 INJECTION, SOLUTION INTRAVENOUS at 17:44

## 2021-10-06 RX ADMIN — SODIUM CHLORIDE, PRESERVATIVE FREE 10 ML: 5 INJECTION INTRAVENOUS at 08:38

## 2021-10-06 RX ADMIN — METRONIDAZOLE 500 MG: 500 INJECTION, SOLUTION INTRAVENOUS at 18:30

## 2021-10-06 RX ADMIN — CETIRIZINE HYDROCHLORIDE 10 MG: 10 TABLET ORAL at 08:38

## 2021-10-06 RX ADMIN — FAMOTIDINE 20 MG: 10 INJECTION INTRAVENOUS at 08:39

## 2021-10-06 RX ADMIN — CEFEPIME HYDROCHLORIDE 2 G: 2 INJECTION, POWDER, FOR SOLUTION INTRAVENOUS at 00:10

## 2021-10-06 RX ADMIN — HYDROMORPHONE HYDROCHLORIDE 0.5 MG: 1 INJECTION, SOLUTION INTRAMUSCULAR; INTRAVENOUS; SUBCUTANEOUS at 11:24

## 2021-10-06 RX ADMIN — ACETAMINOPHEN 650 MG: 325 TABLET, FILM COATED ORAL at 10:43

## 2021-10-06 RX ADMIN — CEFEPIME HYDROCHLORIDE 2 G: 2 INJECTION, POWDER, FOR SOLUTION INTRAVENOUS at 17:01

## 2021-10-06 NOTE — PROGRESS NOTES
"[facility] Progress Note    Progress Note    Patient:  Sonu Bravo Jr.  YOB: 1945  Date of Service: 10/6/2021  MRN: [id number]   Acct: [id number]   Primary Care Physician: Vaughn Kramer DO  Advance Directive:   Code Status and Medical Interventions:   Ordered at: 10/04/21 1109     Level Of Support Discussed With:    Patient     Code Status:    CPR     Medical Interventions (Level of Support Prior to Arrest):    Full     Admit Date: 9/29/2021       Hospital Day: 7      Subjective:     Chief Compliant: \"Tired\" otherwise no new specific complaints    Subjective:   Had a restful night, corroborated by his spouse.  Admits to lingering fatigue but has been ambulatory in his room in the hallways.  No recurrent chills.  No fevers.  No shortness of breath at rest.  No worsening cough.  No abdominal pain.  Has been tolerating a regular diet but has not been eating much.    Interval history: Sonu Bravo Jr. 76 y.o. male medical patient of Dr. Kramer, whose history includes arthritis, paroxysmal atrial fibrillation, GERD, hyperlipidemia, longstanding tobacco use/nicotine dependence, and COPD.     --08/31/2021-CT angiogram chest.  No PE.  Suspicious 1.5 cm cavitary left lower lobe pulmonary nodule in the superior segment.  Tiny 3 mm subpleural right lower lobe nodule.  Small lymph nodes in the lisa hepatis region in the upper abdomen.     --09/10/2021-liver ultrasound-2 suspicious masses in the liver, 1 in the left hepatic lobe 5.3 cm, 1 in the dome of the right hepatic lobe 6.4 cm.  Hepatic metastasis not excluded.  Unremarkable gallbladder, no biliary ductal dilatation identified.     --09/10/2021-CT abdomen/pelvis with and without contrast.  2 cm segmental thickening of the sigmoid colon concerning for malignancy.  2 large heterogenous liver lesions (right posterior liver 5.5 cm, predominantly centrally hypodense lesion; left liver lobe with similar-appearing 4.8 cm liver lesion) " most concerning for metastatic disease     --09/23/2021-was seen at Dr. Kramer office complaining of chills and shaking in the evenings lasting 60 to 90 minutes at a time associated with weakness, shortness of breath and a dry cough.  Previous Covid testing hospital was negative.     --09/23/2021-hemoglobin 12.3, MCV 88.3, platelets 280,000, WBC 29,000 with 88 point 6 segs.  BUN 31, creatinine 1.16, albumin 2.7, ALT 42, alk phos 197, GFR 61.  ESR 26 (elevated).  Manual blood smear showed leukocytosis with 45% segs, 31% bands, 8 lymphocytes, 1 monocyte, 11 metamyelocytes, 4 myelocytes, 1 nucleated red blood cell.  Comment: Significant leukocytosis with granulocytic left shift with vacuolated granulocytes concerning for bacterial infection     --09/24/2029-blood culture showed no growth at 5 days.     --09/28/2029-colonoscopy.  Impression: 3 diminutive polyps in the rectum removed with hot snare.  3 diminutive polyps at the splenic flexure, removed with hot snare.  Diverticulosis in the sigmoid colon and in the descending colon.  Distal rectum and anal verge are normal on retroflexion view.     --09/30/2021-hemoglobin 9.5; MCV 88.4, platelets 376,000, WBC 23.66 with 86 point 3 segs     --09/30/2021-CT chest with contrast--worsening patient's liver abscess cyst with extension beyond the liver capsule.  In particular extension of the right liver abscess above the diaphragm with developing right empyema and right lower lobe pulmonary abscess.  Also extension of the left liver abscess into the subhepatic space with formation of a subserosal gastric wall abscess.  Lung emphysema with 1.6 cm.  Segment left lower lobe nodule concerning for primary lung malignancy.    --09/30/2021-MRI abdomen with and without contrast-enlarging liver abscesses in segment 3 and 7 which appear to have extended beyond the liver capsule.  In particular there is a developing empyema along the subdiaphragmatic pleura posteriorly as well as  "extending up along the posterior costal pleura and there is also developing right lower lobe pulmonary abscess.  There is also an elongated subserosal abscess along the lesser curvature and antrum of the stomach.  Differential includes subhepatic space abscess although this very closely follows the path and contour of the stomach and appears to be within the wall of the stomach.    --10/01/3740-UT-ojohko abscess drain liver-successful CT-guided biopsy liver abscess aspiration yielding approximately 20 cc of frankly purulent material.    Review of Systems  Review of Systems:   Constitutional: Lingering fatigue.  No fever / No recurrent chills nor sweats.  HEENT:  No sore throat / No hoarseness / No vision changes  CV:  No chest pain / No palpitations / No orthopnea  Respiratory: Chronic intermittent cough with scant clear phlegm/ No shortness of breath at rest/(+) exertional dyspnea/ No hemoptysis/smoker since age 22 (up to 2 packs/day), currently<1/2 pack/day (prior to hospitalization).  GI: Has been tolerating p.o. diet, though has been consuming only one third of his meals/Mo nausea / No vomiting /No abdominal pain / No diarrhea / No constipation.  Colonoscopy, 09/28/2021.  :  No dysuria / No hesitancy / No urgency / No hematuria  Neuro:   Generalized muscle weakness/ No dysphagia / No headache / No paresthesias  Musculoskeletal:  No edema / No cyanosis / No new arthralgias.    Vitals: /65 (BP Location: Right arm, Patient Position: Lying)   Pulse 59   Temp 97.9 °F (36.6 °C) (Oral)   Resp 18   Ht 176 cm (69.29\")   Wt 78 kg (172 lb)   SpO2 94%   BMI 25.19 kg/m²     Physical Exam  Physical Exam:  General appearance: Pleasant, cooperative, heavyset, modestly kept elderly male who appears comfortable while lying semirecumbent in bed.  His spouse Bertha is at the bedside   Skin:  Skin color is a bit pale. No rashes or lesions  HEENT:  Head: Normal, normocephalic, atraumatic.  Neck:  no adenopathy  Lungs:  " clear to auscultation bilaterally with overall diminished breath sounds  Heart:  regular rate and rhythm  Abdomen:  soft, obese, non-tender; bowel sounds hypoactive; no obvious masses,  no overt organomegaly  Extremities:  extremities normal, atraumatic, no cyanosis or edema  Neurologic:  Mental status: Alert, oriented, thought content appropriate    24HR INTAKE/OUTPUT:      Intake/Output Summary (Last 24 hours) at 10/6/2021 0939  Last data filed at 10/6/2021 0423  Gross per 24 hour   Intake 780 ml   Output 400 ml   Net 380 ml        Black Catheter: None    Diet: Adult Standard Central TPN  Adult Standard Central TPN  Diet Regular      Medications:   Scheduled Meds:albuterol, 1.25 mg, Nebulization, 4x Daily - RT   And  ipratropium, 0.5 mg, Nebulization, 4x Daily - RT  bisoprolol, 10 mg, Oral, Daily  budesonide-formoterol, 2 puff, Inhalation, BID - RT  cefepime, 2 g, Intravenous, Q8H  cetirizine, 10 mg, Oral, Daily  dextromethorphan polistirex ER, 60 mg, Oral, Q12H  famotidine, 20 mg, Intravenous, BID  Fat Emulsion Plant Based, 250 mL, Intravenous, Q24H (TPN)  fluticasone, 2 spray, Each Nare, Daily  hydroCHLOROthiazide, 12.5 mg, Oral, Daily  levothyroxine, 100 mcg, Oral, Daily  melatonin, 6 mg, Oral, Nightly  metroNIDAZOLE, 500 mg, Intravenous, Q8H  rosuvastatin, 10 mg, Oral, Nightly  sodium chloride, 10 mL, Intravenous, Q12H        Continuous Infusions:Adult Standard Central TPN, , Last Rate: 35 mL/hr at 10/05/21 1749  Adult Standard Central TPN,   [START ON 10/7/2021] Adult Standard Central TPN,         Labs:     Results from last 7 days   Lab Units 10/06/21  0458 10/05/21  0436 10/04/21  0435   WBC 10*3/mm3 9.66 10.74 13.16*   HEMOGLOBIN g/dL 10.4* 10.0* 10.1*   HEMATOCRIT % 31.6* 30.6* 30.8*   PLATELETS 10*3/mm3 450 432 454*     09/30/2021 0526 09/30/2021 1231 Ferritin [[id number]]    (Abnormal)   Blood    Final result Component Value Units   Ferritin 822.70High  ng/mL           09/30/2021 0526 09/30/2021 2157  Folate [[id number]]    Blood    Final result Component Value Units   Folate 11.50 ng/mL           09/30/2021 0526 09/30/2021 2331 Vitamin B12 [[id number]]    (Abnormal)   Blood    Final result Component Value Units   Vitamin B-12 >2,000High               10/01/2021 0423 10/01/2021 0534 Iron Profile [[id number]]   (Abnormal)   Blood    Final result Component Value Units   Iron 17Low  mcg/dL   Iron Saturation 12Low  %   Transferrin 94Low  mg/dL   TIBC 140Low  mcg/dL        09/30/2021 1036 09/30/2021 2237 CEA [[id number]]    Blood    Final result Component Value Units   CEA 3.32 ng/mL            Results from last 7 days   Lab Units 10/06/21  0458 10/05/21  0436 10/04/21  0435   SODIUM mmol/L 134* 132* 129*   POTASSIUM mmol/L 3.5 3.4* 3.6   CHLORIDE mmol/L 97* 96* 94*   CO2 mmol/L 29.0 25.0 27.0   BUN mg/dL 9 8 9   CREATININE mg/dL 0.81 0.70* 0.74*   CALCIUM mg/dL 7.7* 7.6* 7.6*   BILIRUBIN mg/dL 0.3 0.5 0.5   ALK PHOS U/L 94 81 92   ALT (SGPT) U/L 15 15 17   AST (SGOT) U/L 25 25 24   GLUCOSE mg/dL 118* 88 96       ABG:  No results found for: PHART, PO2ART, RHZ6BVA    Culture Data:   Blood Culture   Date Value Ref Range Status   09/29/2021 No growth at 24 hours  Preliminary   09/29/2021 Abnormal Stain (C)  Preliminary   09/24/2021 Fusobacterium species (C)  Corrected     Comment:     Beta lactamase negative    Appended report. These results have been appended to a previously final verified report.   09/24/2021 No growth at 5 days  Final       Lab Results   Component Value Date    PATHINTERP  09/23/2021     Mild normochromic normocytic anemia    Leukocytosis with the following manual differential:  Neutrophils 45%  Bands 31%  Lymphocytes 8%  Monocytes 1%  Metamyelocytes 11%  Myelocytes 4%  Nucleated red blood cells 1  Platelets within normal range    Comment: There is a significant leukocytosis with a granulocytic left shift with vacuolated granulocytes present concerning for bacterial infection.       Radiology:     Imaging  Results (Last 7 Days)     Procedure Component Value Units Date/Time    US Chest [[id number]] Resulted: 10/06/21 0850     Updated: 10/06/21 0850    CT Chest With Contrast Diagnostic [[id number]] Collected: 10/04/21 1424     Updated: 10/04/21 1503    Narrative:      EXAM: CT CHEST W CONTRAST DIAGNOSTIC- - 10/4/2021 1:13 PM CDT     HISTORY: Evaluate right lower lobe opacity; Z74.09-Other reduced  mobility; R26.9-Unspecified abnormalities of gait and mobility       COMPARISON: 9/30/2021.      DOSE LENGTH PRODUCT: 412 mGy cm. Automatic exposure control was utilized  to make radiation dose as low as reasonably achievable.     TECHNIQUE: Enhanced CT images of the chest obtained with multiplanar  reformats.     FINDINGS:      AIRWAYS/PULMONARY PARENCHYMA: The central airways are midline and  patent.      Right greater than left bibasilar consolidative opacities. Right lower  lung with complex low density, suspicious for parenchymal abscess or  necrosis.     Left lower lobe 1.8 cm nodule, previously 1.6 cm on 9/30/2021.  Difference in measurement may be partially due to differences in  technique.     Mild emphysematous changes.     Moderate right and small left pleural fluid.     VASCULATURE: Thoracic aorta is normal in course and caliber. Moderate  calcified aortic atherosclerosis.  No pulmonary artery filling defect on  this non CTA exam. Normal pulmonary artery caliber.     CARDIAC:  Normal heart size.  No pericardial effusion.  Scattered  coronary artery calcifications.      MEDIASTINUM: There is no mediastinal or hilar lymphadenopathy by CT size  criteria. Esophagus has normal course, caliber and wall thickness.     EXTRATHORACIC: The visualized portions of the thyroid gland are  unremarkable. No thoracic inlet or axillary adenopathy. The  extrathoracic soft tissues are within normal limits.      INCLUDED UPPER ABDOMEN: Right posterior liver with a 4.7 cm  rim-enhancing fluid collection, suspicious for abscess, previously  4.9  cm on 9/30/2021. Left liver rim-enhancing fluid collection measures 3.6  cm, previously 4.1 cm on 9/30/2021. The smaller adjacent lesion measures  1.5 cm, previously 2.0 cm on 9/30/2021. Patent portal vein and hepatic  veins.     BONES: No acute or suspicious bony finding.           Impression:      1. Redemonstration right lower lung complex consolidative and  low-density opacity suspicious for parenchymal abscess or necrosis.  2. Left lower lobe 1.8 cm nodule, previously 1.6 cm. Difference in  measurement may be partially due to differences in technique. Recommend  follow-up.  3. Right greater than left pleural effusions.  4. No rim-enhancing liver lesions, slightly decreased in size compared  to 9/30/2021.  This report was finalized on 10/04/2021 15:00 by Dr Theresa Contreras MD.    MRI Pelvis With & Without Contrast [[id number]] Collected: 10/02/21 1521     Updated: 10/02/21 1532    Narrative:      EXAMINATION:  MRI PELVIS W WO CONTRAST-  10/2/2021 12:20 PM CDT     HISTORY: Follow-up sigmoid lesion; Z74.09-Other reduced mobility;  R26.9-Unspecified abnormalities of gait and mobility      COMPARISON: Abdomen pelvis CT dated 9/10/2021     TECHNIQUE: Multiplanar MR imaging of the pelvis was performed.  Gadolinium enhanced imaging obtained.     FINDINGS:     There is image quality degradation with motion.     As noted on the CT examination there is diffuse segmental wall  thickening of the sigmoid colon with scattered diverticuli.  Circumferential wall thickening associated with diverticular disease can  certainly have this appearance. Underlying neoplasm is not excluded.  Endoscopic characterization suggested.     There is free fluid identified in the pelvis.     No pelvic lymphadenopathy identified.     No acute osseous abnormalities observed.     Urinary bladder is moderately distended.     Prostate gland is mildly prominent.       Impression:      1. Sigmoid colon diverticulosis and nonspecific wall  thickening.  This report was finalized on 10/02/2021 15:28 by Dr. Cb Elliott MD.    CT Guided Abscess Drain Liver [[id number]] Collected: 10/01/21 1606     Updated: 10/01/21 1612    Narrative:      CT GUIDED ABSCESS DRAIN LIVER- 10/1/2021 2:27 PM CDT     INDICATION: abscess; Z74.09-Other reduced mobility; R26.9-Unspecified  abnormalities of gait and mobility     CONSENT: An informed written consent was obtained from the patient after  discussing the risks, benefits, potential complications and  alternatives. All questions answered to the patient's satisfaction.  Automated exposure control (AEC) protocols are utilized on the scanner  to ensure dose lowered technique.      COMPLICATIONS: None.      MEDICATIONS: Local analgesia only     CONTRAST: None.      ESTIMATED BLOOD LOSS: Less than 5 ml.      DOSE LENGTH PRODUCT: 1016 mGy cm. Automated exposure control was also  utilized to decrease patient radiation dose.     PROCEDURE: An informed consent was obtained as above. A formal timeout  was performed prior to the procedure. Prior to sterile preparation and  local anesthetic, noninfused axial CT scans were obtained  redemonstrating the 2 dominant liver abscesses. The optimal site for  needle aspiration of the left lobe abscess was marked at the skin. The  skin was cleansed with Betadine swabs and sterilely draped. 1% buffered  lidocaine was used to anesthetize the skin and soft tissues down to the  liver capsule. A tiny dermatotomy was made. A 5 [place] 10 cm Yueh  catheter was advanced into the left lobe liver abscess using CT for  guidance. Approximately 20 cc frankly purulent material was aspirated  from this abscess and set aside for microbiology evaluation.     No immediate complications.        Impression:      Successful CT guided biopsy liver abscess aspiration, yielding  approximately 20 cc frankly purulent material. This sample was set aside  for microbiology evaluation.        This report was finalized on  10/01/2021 16:09 by Dr Arie Leon, .    MRI Abdomen With & Without Contrast [[id number]] Collected: 09/30/21 1703     Updated: 09/30/21 1805    Narrative:      MRI OF ABDOMEN WITH AND WITHOUT CONTRAST     HISTORY: Follow-up sigmoid lesion and liver masses/abscesses?;  Z74.09-Other reduced mobility      COMPARISON: CT scan dated 8/31/2021      TECHNIQUE: Multiplanar, multisequential MR imaging of the abdomen was  performed before and after the intravenous administration of contrast.     FINDINGS:     5.2 cm segment 7 rim-enhancing liver abscess showing prominent diffusion  restriction. There are 2 adjacent liver abscesses in liver segment 3,  the larger measuring 4.3 cm and the smaller measuring 1.2 cm, both  displaying prominent diffusion restriction. There appears to be  extension of these liver abscesses beyond the liver capsule, as there is  subcapsular fluid along the anterior, right lateral and dome of the  liver and there is also a developing right lower lobe pulmonary abscess  and empyema. There is also a what appears to be a subserosal abscess  along the lesser curvature and antrum of the stomach, again displaying a  prominent diffusion restriction. Differential would include subhepatic  space abscess although this does appear to follow the contour of the  stomach closely and appears to be within the wall.     No gross intraperitoneal free fluid. The gallbladder is nondistended.  The biliary tree is nondilated. No pancreatic lesions are identified.  The spleen, adrenal glands and kidneys are unremarkable. No suspicious  adenopathy in the upper abdomen.       Impression:         1. Enlarging liver abscesses in segments 3 and 7 which appear to have  extended beyond the liver capsule. In particular, there is a developing  empyema along the diaphragmatic pleura, posteriorly, as well as  extending up along the posterior costal pleura and there is also a  developing right lower lobe pulmonary abscess. There is  also an  elongated subserosal abscess along the lesser curvature and antrum of  the stomach. Differential would include a subhepatic space abscess  although this very closely follows the path and contour of the stomach  and this does appear to be within the wall of the stomach.     Findings and recommendations were discussed with Dr Gu on 9/30/2021  at 5:01 PM CDT.              This report was finalized on 09/30/2021 18:02 by Dr Arie Leon, .    CT Chest With Contrast Diagnostic [[id number]] Collected: 09/30/21 1628     Updated: 09/30/21 1704    Narrative:      CT CHEST W CONTRAST DIAGNOSTIC- 9/30/2021 3:37 PM CDT     HISTORY: Follow-up lung nodule; Z74.09-Other reduced mobility      COMPARISON: CT scan dated 8/31/2021      DOSE LENGTH PRODUCT: 192 mGy cm. Automated exposure control was also  utilized to decrease patient radiation dose.     TECHNIQUE: Serial helical tomographic images of the chest were acquired  following the intravenous infusion of contrast. Multiplanar reformatted  images were provided for review.     FINDINGS:      Visualized neck base: The imaged portion of the base of the neck appears  unremarkable.      Lungs: Advanced lung emphysema. 1.6 cm superior segment left lower lobe  pulmonary nodule, concerning for primary lung malignancy. There is a new  small empyema identified in the right lung base overlying the more  posterior aspect of the diaphragmatic pleura as well as extending up  along the posterior costal pleura. Additionally, there is a new cavitary  right lower lobe pneumonia, which appears to be developing into a  pulmonary abscess.     Heart: The heart is normal in size. There is no pericardial effusion.     Vasculature: Thoracic aorta is normal in caliber. No dissection  identified. No flow-limiting stenosis identified at the great vessel  origins. The pulmonary arteries are normal in appearance.     Mediastinum and lymph nodes: There is a mild reactive right hilar  and  subcarinal adenopathy.     Skeletal and soft tissues: Chest wall soft tissues are unremarkable. No  acute bony abnormality.       Upper abdomen: 4.9 cm segment 7 liver abscess with subcapsular extension  as well as extension above the diaphragm in the right pleural space.  Enlarging left lobe abscesses as well measuring 4.1 and 2.0 cm, in  segment 3, which also appear to be extended beyond the liver capsule.  There is a new subserosal abscess along the gastric wall, mostly along  the antrum, measuring over 10 cm in long axis..           Impression:      1. Worsening of the patient's liver abscesses with extension beyond the  liver capsule. In particular, there is extension of the right liver  abscess above the diaphragm with developing right empyema and right  lower lobe pulmonary abscess. There is also extension of the left liver  abscess into the subhepatic space with formation of a subserosal gastric  wall abscess.  2. Lung emphysema with 1.6 cm superior segment left lower lobe nodule  which is concerning for a primary lung malignancy.     Findings and recommendations were discussed with Dr Gu on 9/30/2021  at 5:01 PM CDT.     This report was finalized on 09/30/2021 17:01 by Dr Arie Leon, .    XR Chest PA & Lateral [[id number]] Collected: 09/29/21 1951     Updated: 09/29/21 1956    Narrative:      HISTORY: Cough     CXR: 2 views the chest obtained     COMPARISON: 9/24/2021     FINDINGS: There are hazy right lower lobe opacities which may be patchy  atelectasis or pneumonia. Left basilar atelectasis. There is a similar  1.5 cm superior segment left lower lobe cavitary nodule. Cardiac  mediastinal contours normal. No pleural effusion pneumothorax.  Degenerative change of the thoracic spine.       Impression:      1. New patchy right lower lobe opacities may be atelectasis versus  pneumonia. Linear left basilar atelectasis. Stable partially cavitary  superior segment left lower lobe nodule.  This report  was finalized on 09/29/2021 19:53 by Dr. Dyan Rashid MD.            Objective:       Problem list:     Liver abscess    Acquired hypothyroidism    Gastro-esophageal reflux disease with esophagitis    Essential hypertension    Paroxysmal atrial fibrillation (HCC)    Current use of long term anticoagulation    Cavitary lesion of lung    Empyema lung (HCC)    Hyponatremia    Hypokalemia        Assessment/Plan:       ASSESSMENT:   1.   Liver masses/abscesses.  --09/10/2021-liver ultrasound-2 suspicious masses in the liver, 1 in the left hepatic lobe 5.3 cm, 1 in the dome of the right hepatic lobe 6.4 cm.  Hepatic metastasis not excluded.  Unremarkable gallbladder, no biliary ductal dilatation identified.  --09/10/2021-CT abdomen/pelvis with and without contrast.  2 cm segmental thickening of the sigmoid colon concerning for malignancy.  2 large heterogenous liver lesions (right posterior liver 5.5 cm, predominantly centrally hypodense lesion; left liver lobe with similar-appearing 4.8 cm liver lesion) most concerning for metastatic disease  --09/30/2021-MRI abdomen.  Enlarging liver abscesses which appear to have extended beyond the liver capsule.  In particular there is a developing empyema along the subdiaphragmatic pleura, posteriorly as well as extending up the posterior costal pleura and there is also developing right lower lobe pulmonary abscess.  There is an elongated subserosal abscess along the lesser curvature and antrum of the stomach.  Differential includes subhepatic space abscess although this very closely follows the path and contour of the stomach and does appear to be within the wall of the stomach.  --10/01/0609-GF-tjsbye abscess drain liver-successful CT-guided biopsy liver abscess aspiration yielding approximately 20 cc of frankly purulent material.  Gram stain: Many (4+) WBCs per low powered field.  No organisms seen.  Cultures no growth to date.  --10/02/2021-MRI pelvis with and without  contrast.  Sigmoid colon diverticulosis and nonspecific wall thickening.  --10/04/2021-CT chest with contrast-redemonstration right lower lung complex consolidative and low-density opacities suspicious for parenchymal abscess or necrosis.  Left lower lobe 1.8 cm nodule, previously 1.6 cm.  Difference in measurement may be partially due to differences in technique.  Recommend follow-up.  Right greater than left pleural effusions.  Rim-enhancing liver lesions, slightly decreased in size compared to 9/30/2021       2.   Segmental thickening of the sigmoid colon, low abdominal pain, chills and neutrophilic leukocytosis.  Acute diverticulitis?  ----09/28/2029-colonoscopy.  Impression: 3 diminutive polyps in the rectum removed with hot snare.  3 diminutive polyps at the splenic flexure, removed with hot snare.  Diverticulosis in the sigmoid colon and in the descending colon.  Distal rectum and anal verge are normal on retroflexion view.  3.   Left lower lobe pulmonary nodule.    Will need clarification down the road.  --08/31/2021-CT angiogram chest.  No PE.  Suspicious 1.5 cm cavitary left lower lobe pulmonary nodule in the superior segment.  Tiny 3 mm subpleural right lower lobe nodule.  Small lymph nodes in the lisa hepatis region in the upper abdomen.  --09/30/2021-CT chest.  Worsening liver abscesses with extension beyond the liver capsule.  In particular there is extension of the right liver abscess above the diaphragm with developing right empyema and right lower lobe pulmonary abscess.  Also extension of the left liver abscess into the sub hepatic space with formation of a subserosal gastric wall abscess.  Lung emphysema with 1.6 cm superior segment left lower lobe nodule concerning for primary lung malignancy.  4.   Neutrophilic leukocytosis reactive to ongoing bacterial infection.  Normal counts today.  5.    Positive blood cultures.  Dr. 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    6.   Normocytic anemia.  Components from iron deficiency, and iron under utilization/anemia of chronic disease.  --Hemoglobin 10.4; MCV 89, 10/06/2021 (prior range: Hemoglobin 9.0 -15.4; MCV 85.3-94.2)  7.     Mild thrombocytosis.  Reactive.  Normal counts today.  8.    Longstanding tobacco use/nicotine dependence (beginning age 22-up to 2 packs/day, currently < 1/2 pack/day)  9.   Paroxysmal atrial fibrillation  10.   Hyperlipidemia     Recommendations:  1.   Apprised of current labs with normal WBC (resolution of neutrophilic leukocytosis), stable anemia, normal platelets, hyponatremia, hypocalcemia, depressed total protein/albumin, normal GFR, normal LFTs, previously elevated procalcitonin, previously elevated CRP.  2.     Again note percutaneous liver abscess drainage, 10/01/2021 (above).  Gram stain with many (4+) WBCs per low powered field.  No organisms seen.  KOH with no yeast or hyphal elements seen.  Cultures NGTD (reported at 3 days).  Anaerobic cultures-no anaerobes isolated at 3 days  3.    Previously discussed most recent CT chest, 10/04/2021 (above), MRI pelvis, 10/02/2021, and previous abdominal MRI and CT chest findings (above).  4.   In line to transfer to higher level of care -needs hepatobiliary surgery backup available for liver abscess.  5.   Continue other general medical management and support (including IV antibiotics).  PICC line has been placed to restart TPN.  6.   Plan for IR to place chest tube today in an attempt to drain the right empyema.    7.   Transfuse RBCs if hemoglobin < 8 if symptomatic  8.   Care discussed with the patient, and his spouse Bertha at the bedside         I spent at least 25 total minutes, face-to-face, caring for Sonu today.  Greater than 50% of this time involved counseling and/or coordination of care as documented within this note regarding the  patient's illness(es), pros and cons of various treatment options, instructions and/or risk reduction.

## 2021-10-06 NOTE — PROGRESS NOTES
"Infectious Diseases Progress Note    Patient:  Sonu Bravo Jr.  YOB: 1945  MRN: [id number]   Admit date: 9/29/2021   Admitting Physician: Jan Mcintyre MD  Primary Care Physician: Vaughn Kramer DO    Chief Complaint/Interval History: He is without fever.  Is hemodynamically stable.  He is up to chair.  Had ultrasound-guided catheter placement on the right chest.  Fluid output has a serous appearance.  Cytology pending.  Cell counts were not very high on his pleural fluid.    Intake/Output Summary (Last 24 hours) at 10/6/2021 1451  Last data filed at 10/6/2021 1046  Gross per 24 hour   Intake 540 ml   Output 500 ml   Net 40 ml     Allergies:   Allergies   Allergen Reactions   • Benadryl [Diphenhydramine] Mental Status Change   • Penicillins Hives   • Sulfa Antibiotics Hives, Itching and Rash   • Sulfamethoxazole-Trimethoprim Hives, Itching and Rash     Current Scheduled Medications:   albuterol, 1.25 mg, Nebulization, 4x Daily - RT   And  ipratropium, 0.5 mg, Nebulization, 4x Daily - RT  bisoprolol, 10 mg, Oral, Daily  budesonide-formoterol, 2 puff, Inhalation, BID - RT  cefepime, 2 g, Intravenous, Q8H  cetirizine, 10 mg, Oral, Daily  dextromethorphan polistirex ER, 60 mg, Oral, Q12H  famotidine, 20 mg, Intravenous, BID  Fat Emulsion Plant Based, 250 mL, Intravenous, Q24H (TPN)  fluticasone, 2 spray, Each Nare, Daily  hydroCHLOROthiazide, 12.5 mg, Oral, Daily  levothyroxine, 100 mcg, Oral, Daily  melatonin, 6 mg, Oral, Nightly  metroNIDAZOLE, 500 mg, Intravenous, Q8H  rosuvastatin, 10 mg, Oral, Nightly  sodium chloride, 10 mL, Intravenous, Q12H      Current PRN Medications:  •  acetaminophen  •  HYDROcodone-homatropine  •  HYDROmorphone  •  metoprolol tartrate  •  sodium chloride    Review of Systems    Vital Signs:  /64 (BP Location: Right arm, Patient Position: Lying)   Pulse 94   Temp 97.6 °F (36.4 °C) (Oral)   Resp 18   Ht 176 cm (69.29\")   Wt 78 kg (172 lb)   SpO2 96% "   BMI 25.19 kg/m²     Physical Exam  Vital signs - reviewed.  Line/IV site - No erythema, warmth, induration, or tenderness.  Abdomen is soft and nontender  Catheter in place right chest cavity with primarily serous appearing drainage    Lab Results:  CBC:   Results from last 7 days   Lab Units 10/06/21  0458 10/05/21  0436 10/04/21  0435 10/03/21  0409 10/02/21  0424 10/01/21  0423 09/30/21  0526   WBC 10*3/mm3 9.66 10.74 13.16* 11.42* 14.51* 15.85* 23.66*   HEMOGLOBIN g/dL 10.4* 10.0* 10.1* 9.4* 9.6* 9.0* 9.5*   HEMATOCRIT % 31.6* 30.6* 30.8* 28.6* 29.0* 26.8* 28.5*   PLATELETS 10*3/mm3 450 432 454* 459* 418 380 376     BMP:  Results from last 7 days   Lab Units 10/06/21  0458 10/05/21  0436 10/05/21  0436 10/04/21  0435 10/04/21  0435 10/03/21  0409 10/03/21  0409 10/02/21  0424 10/02/21  0424 10/01/21  0423 10/01/21  0423 09/30/21  1843 09/30/21  1843 09/30/21  0526 09/30/21  0526   SODIUM mmol/L 134*  --  132*  --  129*  --  133*  --  132*  --  130*  --  131*   < > 131*   POTASSIUM mmol/L 3.5  --  3.4*  --  3.6  --  3.2*  --  3.6  --  3.2*  --  3.2*   < > 2.4*   CHLORIDE mmol/L 97*  --  96*  --  94*  --  98  --  96*  --  95*  --  94*   < > 93*   CO2 mmol/L 29.0  --  25.0  --  27.0  --  27.0  --  27.0  --  26.0  --  29.0   < > 29.0   BUN mg/dL 9  --  8  --  9  --  10  --  10  --  13  --  15   < > 17   CREATININE mg/dL 0.81  --  0.70*  --  0.74*  --  0.74*  --  0.64*  --  0.63*  --  0.72*   < > 0.77   GLUCOSE mg/dL 118*   < > 88   < > 96   < > 140*   < > 92   < > 92   < > 109*   < > 112*   CALCIUM mg/dL 7.7*  --  7.6*  --  7.6*  --  7.5*  --  7.6*  --  7.6*  --  7.8*   < > 7.5*   ALT (SGPT) U/L 15  --  15  --  17  --  19  --  19  --  19  --   --   --  23    < > = values in this interval not displayed.     Cytology on pleural fluid - pending    Pleural fluid cell counts:  Color, Fluid  Yellow    Appearance, Fluid   Clear Slightly HazyAbnormal     WBC, Fluid   /mm3 1,161    RBC, Fluid   /mm3 1,000      Culture  Results:   Blood Culture   Date Value Ref Range Status   09/29/2021 No growth at 5 days  Final   09/29/2021 Staphylococcus, coagulase negative (C)  Final     Comment:     Probable contaminant requires clinical correlation, susceptibility not performed unless requested by physician.       Radiology:     Ultrasound chest tube placement on the right:  IMPRESSION:  1. Successful ultrasound-guided diagnostic and therapeutic right chest  tube placement with drainage of 600 mL of clear yellow pleural fluid.  This report was finalized on 10/06/2021 11:15 by Dr Theresa Contreras MD.    Additional Studies Reviewed: None    Impression:   Appears to have liver abscesses  Right lung pleural pulmonary process  Positive blood culture for Fusobacterium  Left lung pulmonary nodule    Recommendations:   Continue drainage of right chest  Continue antibiotic treatment with cefepime and Flagyl  Await cytology on right chest drainage  Possible transfer to [facility]  Continue supportive care  He may have 2 diagnoses-liver abscess with bacteremia, and potentially could have cancer as well    Julien Sharma MD

## 2021-10-06 NOTE — PROGRESS NOTES
"    [facility] Medicine Services  INPATIENT PROGRESS NOTE    Patient Name: Sonu Bravo Jr.  Date of Admission: 9/29/2021  Today's Date: 10/06/21  Length of Stay: 7  Primary Care Physician: Vaughn Kramer DO    Subjective   Chief Complaint: \"bored\"  HPI     Patient was seen and examined at bedside.     Patient overall feels well.  Continues to have intermittent lethargy.  Right sided drain placed, appears like normal pleural fluid but is a little bit cloudy.     08/31/2021 - CT angiogram chest: No PE.  Suspicious 1.5 cm cavitary left lower lobe pulmonary nodule in the superior segment.  Tiny 3 mm subpleural right lower lobe nodule.  Small lymph nodes in the lisa hepatis region in the upper abdomen.     09/10/2021 - Liver Ultrasound:  2 suspicious masses in the liver, 1 in the left hepatic lobe 5.3 cm, 1 in the dome of the right hepatic lobe 6.4 cm.  Hepatic metastasis not excluded.  Unremarkable gallbladder, no biliary ductal dilatation identified.     09/10/2021 - CT abdomen/pelvis with and without contrast:   2 cm segmental thickening of the sigmoid colon concerning for malignancy.  2 large heterogenous liver lesions (right posterior liver 5.5 cm, predominantly centrally hypodense lesion; left liver lobe with similar-appearing 4.8 cm liver lesion) most concerning for metastatic disease     09/23/2021 - Dr. Kramer office with chills and shaking in the evenings lasting 60 to 90 minutes at a time associated with weakness, shortness of breath and a dry cough.  Previous Covid testing hospital was negative.     09/23/2021 - hemoglobin 12.3, MCV 88.3, platelets 280,000, WBC 29,000 with 88 point 6 segs.  BUN 31, creatinine 1.16, albumin 2.7, ALT 42, alk phos 197, GFR 61.  ESR 26 (elevated).  Manual blood smear showed leukocytosis with 45% segs, 31% bands, 8 lymphocytes, 1 monocyte, 11 metamyelocytes, 4 myelocytes, 1 nucleated red blood cell.  Comment: Significant leukocytosis " "with granulocytic left shift with vacuolated granulocytes concerning for bacterial infection      09/24/2021 - Presented to the ER on 9/24.  Elevated procalcitonin.  Given a dose of ceftriaxone and discharged home.    09/28/2021 - Colonoscopy - Impression: 3 diminutive polyps in the rectum removed with hot snare.  3 diminutive polyps at the splenic flexure, removed with hot snare.  Diverticulosis in the sigmoid colon and in the descending colon.  Distal rectum and anal verge are normal on retroflexion view.    09/29/2021 - Office visit with Dr. Kramer.  Dr. Kramer called radiologist Dr. Leon who reviewed the images and felt as though the \"masses\" were more likely to be abscesses and Dr. Kramer felt as though this clinically was more likely given presentation and symptoms.  Dr. Kramer spoke with Dr. English (general surgery) who indicated an MRI would be beneficial and possible percutaneous drainage in addition to IV antibiotics.  Patient presented as direct admission to the hospital service, accepting Dr. Hancock.    09/30/2021 - MRI abdomen with and without contrast: Enlarging liver abscesses in segments 3 and 7 which appear to have extended beyond the liver capsule.  Developing empyema along the diaphragmatic pleura, posteriorly.  Extension up along the posterior costal pleura and developing right lower lobe pulmonary abscess.  Elongated subserosal abscess along lesser curvature and antrum of stomach.     09/30/2021 - CT chest with contrast: Worsening of liver abscesses with extension beyond the liver capsule, especially the right liver abscess above the diaphragm with developing right empyema and right lower lobe pulmonary abscess.  Also extension of the left liver abscess into the subhepatic space with formation of a subserosal gastric wall abscess.  1.6 cm segment left lower lobe nodule, suspicious for malignancy.    10/1/2021 - CT guided percutaneous abscess drainage of liver: Cultures NGTD as of " 10/6.    10/2/2021 - MRI pelvis with and without contrast: Sigmoid colon diverticulosis and nonspecific wall thickening     10/4/2021 - CT chest with contrast: Redemonstration right lower lung complex consolidative and low density opacity suspicious for parenchymal abscess or necrosis.  Redemonstration of left lower lobe lung nodule.  Slight decrease in size of liver lesions compared to 9/30.    10/6/2021 - US guided chest tube placed by IR.  Fluid studies pending.         Review of Systems   Constitutional: Positive for fatigue. Negative for activity change, chills and fever.   Respiratory: Negative for cough and shortness of breath.    Cardiovascular: Negative for chest pain and palpitations.   Gastrointestinal: Negative for abdominal distention, abdominal pain, diarrhea, nausea and vomiting.   Psychiatric/Behavioral: Positive for dysphoric mood.        All pertinent negatives and positives are as above. All other systems have been reviewed and are negative unless otherwise stated.     Objective    Temp:  [97.9 °F (36.6 °C)-98.5 °F (36.9 °C)] 97.9 °F (36.6 °C)  Heart Rate:  [56-63] 59  Resp:  [16-18] 18  BP: (138-165)/(57-71) 153/65  Physical Exam  Vitals and nursing note reviewed.   Constitutional:       Appearance: Normal appearance.   HENT:      Head: Normocephalic and atraumatic.      Nose: No congestion or rhinorrhea.      Mouth/Throat:      Mouth: Mucous membranes are dry.      Pharynx: Oropharynx is clear.   Eyes:      General: No scleral icterus.     Conjunctiva/sclera: Conjunctivae normal.   Cardiovascular:      Rate and Rhythm: Normal rate and regular rhythm.   Pulmonary:      Effort: Pulmonary effort is normal. No respiratory distress.      Breath sounds: Normal breath sounds.      Comments: Diminished at bases   Abdominal:      General: Abdomen is flat. Bowel sounds are normal. There is no distension.      Palpations: Abdomen is soft. There is no mass.   Skin:     General: Skin is warm and dry.       Coloration: Skin is not jaundiced or pale.   Neurological:      General: No focal deficit present.      Mental Status: He is alert and oriented to person, place, and time.   Psychiatric:         Mood and Affect: Mood normal.         Behavior: Behavior normal.       Results Review:  I have reviewed the labs, radiology results, and diagnostic studies.    Laboratory Data:   Results from last 7 days   Lab Units 10/06/21  0458 10/05/21  0436 10/04/21  0435   WBC 10*3/mm3 9.66 10.74 13.16*   HEMOGLOBIN g/dL 10.4* 10.0* 10.1*   HEMATOCRIT % 31.6* 30.6* 30.8*   PLATELETS 10*3/mm3 450 432 454*        Results from last 7 days   Lab Units 10/06/21  0458 10/05/21  0436 10/04/21  0435   SODIUM mmol/L 134* 132* 129*   POTASSIUM mmol/L 3.5 3.4* 3.6   CHLORIDE mmol/L 97* 96* 94*   CO2 mmol/L 29.0 25.0 27.0   BUN mg/dL 9 8 9   CREATININE mg/dL 0.81 0.70* 0.74*   CALCIUM mg/dL 7.7* 7.6* 7.6*   BILIRUBIN mg/dL 0.3 0.5 0.5   ALK PHOS U/L 94 81 92   ALT (SGPT) U/L 15 15 17   AST (SGOT) U/L 25 25 24   GLUCOSE mg/dL 118* 88 96       Culture Data:   Blood Culture   Date Value Ref Range Status   09/29/2021 No growth at 4 days  Preliminary   09/29/2021 Staphylococcus, coagulase negative (C)  Final     Comment:     Probable contaminant requires clinical correlation, susceptibility not performed unless requested by physician.         Radiology Data:   Imaging Results (Last 24 Hours)     ** No results found for the last 24 hours. **          I have reviewed the patient's current medications.     Assessment/Plan     Active Hospital Problems    Diagnosis    • **Liver abscess    • Hyponatremia    • Hypokalemia    • Empyema lung (HCC)    • Cavitary lesion of lung    • Current use of long term anticoagulation    • Paroxysmal atrial fibrillation (HCC)    • Essential hypertension    • Acquired hypothyroidism    • Gastro-esophageal reflux disease with esophagitis        Plan:    Patient was admitted on 9/29 for cavitary lesion in the lung as well as  Spouse concern for liver abscess.  Patient reportedly had fever/chills, night sweats.    General surgery, oncology, pulmonary, CT surgery infectious disease consulted, notes reviewed, appreciate assistance.    Patient underwent CT guided abscess drainage on 10/1 with IR that showed frankly purulent drainage.  Cultures are NGTD.    Empyema on the right being followed and monitored by CT surgery, repeat CT scan on 10/5 showed complex consolidative area concerning for abscess or necrosis.  Decreased in size of liver lesions.  IR placed pigtail catheter, fluid studies pending.     Antibiotics per infectious disease.  Currently on cefepime and flagyl.  Antibiotics pending.  Previous blood cultures likely contaminant.      Mildy hyponatremia, little clinical significance at this point.  Will monitor.     Patient had an episode of atrial fibrillation, he has paroxsymal atrial fibrillation and per wife does not have a long-history of it.  Anticoagulation when appropriate.  Rate and rhythm currently controlled.     Pulmonary treating with bronchodilators and inhaled corticosteroids.    Ambulate TID.  Incentive spirometer.    Patient also has a concerning pulmonary nodule 1.6-1.8 cm, will need to follow-up this as an outpatient.    D/C IVF as general surgery has placed orders for TPN.    Encourage PO intake.     Continue melatonin to 6 mg.  Focus on day/night cycle.    AM labs.      Patient accepted to [facility] ([facility] in [place].  Appreciative of Dr. Shah's assistance in this case.  Agree with his assessment that patient really needs source control and likely will be able to accomplish via percutaneous approach.      Discharge Planning: I expect the patient to be discharged to tertiary care pending bed availability.    Electronically signed by Jan Mcintyre MD, 10/06/21, 08:30 CDT.

## 2021-10-06 NOTE — PROGRESS NOTES
PULMONARY AND CRITICAL CARE PROGRESS NOTE - [facility]    Patient: Sonu Bravo Jr.  1945   MR# [id number]   Acct# [id number]  10/06/21   11:46 CDT  Referring Provider: Jan Mcintyre MD    Chief Complaint: Liver abscess, lung nodule, infiltrates   Interval history: Patient is seen awake and alert resting in bed.  He tells me he did not sleep well overnight.  Oxygen saturation remained stable on room air.  Also bedside reports plans for IR insertion of drain today for liver abscess.  TPN infusing.  Afebrile.  No other issues reported.  Meds:  albuterol, 1.25 mg, Nebulization, 4x Daily - RT   And  ipratropium, 0.5 mg, Nebulization, 4x Daily - RT  bisoprolol, 10 mg, Oral, Daily  budesonide-formoterol, 2 puff, Inhalation, BID - RT  cefepime, 2 g, Intravenous, Q8H  cetirizine, 10 mg, Oral, Daily  dextromethorphan polistirex ER, 60 mg, Oral, Q12H  famotidine, 20 mg, Intravenous, BID  Fat Emulsion Plant Based, 250 mL, Intravenous, Q24H (TPN)  fluticasone, 2 spray, Each Nare, Daily  hydroCHLOROthiazide, 12.5 mg, Oral, Daily  levothyroxine, 100 mcg, Oral, Daily  melatonin, 6 mg, Oral, Nightly  metroNIDAZOLE, 500 mg, Intravenous, Q8H  rosuvastatin, 10 mg, Oral, Nightly  sodium chloride, 10 mL, Intravenous, Q12H      Adult Standard Central TPN, , Last Rate: 35 mL/hr at 10/05/21 1749  Adult Standard Central TPN,   [START ON 10/7/2021] Adult Standard Central TPN,       Review of Systems:   Review of Systems   Constitutional: Negative for fever.   Respiratory: Negative for cough and shortness of breath.    Cardiovascular: Negative for chest pain.   Gastrointestinal: Negative for nausea and vomiting.   Neurological: Negative for weakness.   Psychiatric/Behavioral: Negative for agitation.        Physical Exam:  SpO2 Percentage    10/06/21 1037 10/06/21 1045 10/06/21 1116   SpO2: 98% 97% 100%     Temp:  [97.6 °F (36.4 °C)-98.1 °F (36.7 °C)] 97.6 °F (36.4 °C)  Heart Rate:  [56-64] 56  Resp:  [16-18]  18  BP: (145-165)/(64-80) 150/64    Intake/Output Summary (Last 24 hours) at 10/6/2021 1146  Last data filed at 10/6/2021 1046  Gross per 24 hour   Intake 780 ml   Output 500 ml   Net 280 ml     Physical Exam  Constitutional:       General: He is not in acute distress.  HENT:      Head: Normocephalic.      Nose: Nose normal.      Mouth/Throat:      Mouth: Mucous membranes are moist.   Eyes:      General: No scleral icterus.  Cardiovascular:      Rate and Rhythm: Normal rate.   Pulmonary:      Effort: No respiratory distress.   Abdominal:      General: There is no distension.   Musculoskeletal:      Right lower leg: No edema.      Left lower leg: No edema.   Neurological:      Mental Status: He is alert and oriented to person, place, and time.   Psychiatric:         Mood and Affect: Mood normal.         Behavior: Behavior is cooperative.            Laboratory Data:  Results from last 7 days   Lab Units 10/06/21  0458 10/05/21  0436 10/04/21  0435   WBC 10*3/mm3 9.66 10.74 13.16*   HEMOGLOBIN g/dL 10.4* 10.0* 10.1*   PLATELETS 10*3/mm3 450 432 454*     Results from last 7 days   Lab Units 10/06/21  0458 10/05/21  1219 10/05/21  0436 10/04/21  0435 10/02/21  0424 10/01/21  0423   SODIUM mmol/L 134*  --  132* 129*   < > 130*   POTASSIUM mmol/L 3.5  --  3.4* 3.6   < > 3.2*   BUN mg/dL 9  --  8 9   < > 13   CREATININE mg/dL 0.81  --  0.70* 0.74*   < > 0.63*   INR  1.24* 1.34*  --   --   --  1.38*    < > = values in this interval not displayed.         Blood Culture   Date Value Ref Range Status   09/29/2021 No growth at 24 hours  Preliminary   09/29/2021 Abnormal Stain (C)  Preliminary   09/24/2021 Fusobacterium species (C)  Corrected     Comment:     Beta lactamase negative    Appended report. These results have been appended to a previously final verified report.   09/24/2021 No growth at 5 days  Final     Recent films:  CT Chest With Contrast Diagnostic    Result Date: 10/4/2021  1. Redemonstration right lower lung  complex consolidative and low-density opacity suspicious for parenchymal abscess or necrosis. 2. Left lower lobe 1.8 cm nodule, previously 1.6 cm. Difference in measurement may be partially due to differences in technique. Recommend follow-up. 3. Right greater than left pleural effusions. 4. No rim-enhancing liver lesions, slightly decreased in size compared to 9/30/2021. This report was finalized on 10/04/2021 15:00 by Dr Theresa Contreras MD.    US Tube Placement    Result Date: 10/6/2021  1. Successful ultrasound-guided diagnostic and therapeutic right chest tube placement with drainage of 600 mL of clear yellow pleural fluid.  This report was finalized on 10/06/2021 11:15 by Dr Theresa Contreras MD.    Films reviewed personally by me.  My interpretation: no new imaging 10-6-21    Pulmonary Assessment:  1. Lung nodule, 1.6 cm LLL  2. Right lung consolidation possible abscess/ empyema  3. Liver abnormalities probable abscess  4. Segmental thickening of the sigmoid colon  5. Paroxysmal atrial fib -xarelto on hold   6. Tobacco abuse, quitting with this hospitalization   7. Hypokalemia   8. Hyponatremia      Recommend/plan:   · Continue supplemental oxygen and titrate for saturation 90%.  Currently on room air  · Continue albuterol ipratropium nebs and Symbicort  · Continue incentive spirometer and Aerobika flutter  · Antibiotic: Cefepime D #7, metronidazole D #7, ID following appreciate their assistance  · Awaiting transfer to    · CT surgery-no surgery at this time  · S/P liver abscess CT needle biopsy 10/01/21- micro pending  · Plans for drain placement today per other specialties  · DVT prophylaxis, Lovenox  · Mobilize as tolerated   · Will need follow-up for left lower lobe lung nodule outpatient  · Follow-up with Dr. Gloria in 4 to 6 weeks  · Pulmonary will sign off, call if needed  · Further recommendations per Dr. Gloria    Electronically signed by MARY Calvert, 10/06/21, 11:46 CDT     ATTESTATION OF  CLINICAL NOTE:  I have reviewed the notes, assessments, and/or procedures performed by MARY Calvert, I concur with her/his documentation of Sonu Bravo Jr..  Patient was seen in the follow-up visit in pulmonary medical floor today.    He is doing well and is afebrile.  He still on IV antibiotics for liver abscess and abscess around stomach and base of the lungs.  He is feeling better but his abscess to be drained and is waiting for transfer to a higher care center for hepatobiliary surgery evaluation and possible interventional radiology drainage.  He had no other acute events overnight but his wife was present in the room and I also talked to Dr. Mcintyre today.  Apparently patient is trying to get a bed in the [facility] and it may happen today or tomorrow as Dr. Mcintyre told me.    Physical examination patient is an elderly  male lying in the bed looks tired and lethargic.HEENT: Atraumatic normocephalic.  Neck: Supple no mass nuclear medicine mention.  Heart: Sounds normal rate and rhythm regular no murmur.  Lungs: Bibasilar crackles.  Abdomen: Soft nondistended nontender.  Extremities: No edema normal pulses normal color.  Neurologic: Grossly intact.  Skin: No breakdown.     Continue current treatment plan and routine respiratory care.  Continue IV antibiotics per ID recommendation and he is also on Flagyl.  Continue bronchodilator treatment.  He is waiting for transfer to another higher care facility.  Most likely [facility] is the accepting facility.  Apparently [facility] in [facility] did not have any bed available yet.  He will need to schedule outpatient pulmonary clinic visit and PFT and work-up for COPD in a month after discharge for the follow-up of the left sided peripheral lung nodule.. Incentive spirometry encouraged.  Follow-up CT scan will be done and further intervention may be needed later.  CT-guided needle biopsy of the liver abscess is  currently in the lab and further work-up in progress..  DVT and stress ulcer prophylaxis.  Repeat labs and imaging studies from time to time. Continue nutritional support and physical activity as tolerated.   He is low serum sodium.  Today the serum sodium is 134 which will be addressed by the attending. Status: Full.  Overall prognosis: Guarded.  As he is stable and did not have any acute lung problems and he is on room air we will sign off and he is most likely going to be transferred back to facility but will plan for outpatient follow-up in a month time after discharge.  We appreciate the consult and like to thank the hospitalist team for the referral.    I have seen and examined patient personally, performing a face-to-face diagnostic evaluation with plan of care reviewed and developed with APRN and nursing staff. I have addended and/or modified the above history of present illness, physical examination, and assessment and plan to reflect my findings and impressions. Essential elements of the care plan were discussed with APRN above.  Agree with findings and assessment/plan as documented above.    Jeannine Gloria MD  Pulmonologist/Intensivist  10/6/2021 11:59 CDT

## 2021-10-06 NOTE — PROGRESS NOTES
"Patient name: Sonu Bravo Jr.  Patient : 1945  VISIT # [id number]  MR #[id number]    Procedure:  Procedure Date:  POD:* No surgery found *    Subjective    Chief complaint: abdominal pain, cough    Patient resting in bed with wife at bedside.  He has just returned from radiology and right pleural drain has been placed.  Per report 600 ml of clear yellow fluid removed.  He is currently somewhat lethargic.  Mild pain at drain site but otherwise no complaints.         Objective     Visit Vitals  /64 (BP Location: Right arm, Patient Position: Lying)   Pulse 56   Temp 97.6 °F (36.4 °C) (Oral)   Resp 18   Ht 176 cm (69.29\")   Wt 78 kg (172 lb)   SpO2 100%   BMI 25.19 kg/m²       Intake/Output Summary (Last 24 hours) at 10/6/2021 1409  Last data filed at 10/6/2021 1046  Gross per 24 hour   Intake 540 ml   Output 500 ml   Net 40 ml       Lab:     CBC:  Results from last 7 days   Lab Units 10/06/21  0458 10/05/21  0436 10/04/21  0435   WBC 10*3/mm3 9.66 10.74 13.16*   HEMATOCRIT % 31.6* 30.6* 30.8*   PLATELETS 10*3/mm3 450 432 454*          BMP:  Results from last 7 days   Lab Units 10/06/21  0458 10/05/21  0436 10/04/21  0435   SODIUM mmol/L 134* 132* 129*   POTASSIUM mmol/L 3.5 3.4* 3.6   CHLORIDE mmol/L 97* 96* 94*   CO2 mmol/L 29.0 25.0 27.0   GLUCOSE mg/dL 118* 88 96   BUN mg/dL 9 8 9   CREATININE mg/dL 0.81 0.70* 0.74*          COAG:  Results from last 7 days   Lab Units 10/06/21  0458   INR  1.24*   APTT seconds 45.6*       IMAGES:       Imaging Results (Last 24 Hours)     Procedure Component Value Units Date/Time    US Tube Placement [[id number]] Collected: 10/06/21 110     Updated: 10/06/21 1118    Narrative:      EXAM: US TUBE PLACEMENT- - 10/6/2021 10:58 AM CDT     HISTORY: right chest tube placement ; Z74.09-Other reduced mobility;  R26.9-Unspecified abnormalities of gait and mobility       COMPARISON: 10/4/2021     PROCEDURE:  Risks, benefits and alternatives to the procedure were discussed " with  the patient and his wife. Specifically, risks of bleeding, infection,  allergy to medication, pneumothorax were discussed with the patient.  Written and verbal informed consent was obtained from the patient. All  of their questions were answered.     Time Out:   Immediately prior to the procedure, a time out was performed. The  patient's name, date of birth, and procedure to be performed were  verified.     Using ultrasound guidance, a site in the right lower posterior  chest  was selected and prepped in a sterile fashion. Lidocaine was used for  local anesthesia. A 5 Portuguese Yueh catheter was inserted into the right  pleural space. The needle was removed and a wire fed through the  catheter. The Yueh catheter was removed and an 8 Portuguese pigtail catheter  was placed over the wire. The pigtail was locked in position.      600 mL of clear yellow fluid was withdrawn. Small bore pigtail chest  tube was anchored to the skin with increasing device.      The patient tolerated the procedure well. No evidence of complication.  The patient returned to the floor in stable condition.     Drainage bag containing fluid was returned with the patient to the  floor. This was discussed with the patient's nurse Braxton Blake, who  reports fluid has been sent for analysis.          Impression:      1. Successful ultrasound-guided diagnostic and therapeutic right chest  tube placement with drainage of 600 mL of clear yellow pleural fluid.     This report was finalized on 10/06/2021 11:15 by Dr Theresa Contreras MD.            Physical Exam:  General: Lethargic, No apparent distress.   Cardiovascular: Regular rate and rhythm without murmur, rubs, or gallops.    Pulmonary: Diminished bilaterally without wheezing, rubs, or rales.  Chest: Right pleural drain in place with slightly cloudy yellow pleural fluid drainage  Abdomen: Soft, non distended, and tender.  Extremities: Warm, moves all extremities. No edema.   Neurologic:  Grossly  intact with no focal deficits.            Impression:       Liver mass    Cavitary lesion of lung  Sigmoid diverticulitis   Paroxysmal atrial fibrillation  Anticoagulated  Bacteremia       Plan:  Right pleural drain placed per radiology this morning.  We will continue to follow  Await transfer to tertiary care center  DW patient and his wife      Jesika Cohen, APRN  10/06/21  14:09 CDT

## 2021-10-06 NOTE — DISCHARGE SUMMARY
[facility] Medicine Services  DISCHARGE SUMMARY       Date of Admission: 9/29/2021  Date of Discharge:  10/6/2021  Primary Care Physician: Vaughn Kramer DO    Presenting Problem/History of Present Illness:  Liver lesion    Final Discharge Diagnoses:  Active Hospital Problems    Diagnosis    • **Liver abscess    • Hyponatremia    • Hypokalemia    • Empyema lung (HCC)    • Cavitary lesion of lung    • Current use of long term anticoagulation    • Paroxysmal atrial fibrillation (HCC)    • Essential hypertension    • Acquired hypothyroidism    • Gastro-esophageal reflux disease with esophagitis        Consults:   Infectious Disease  Pulmonology  General Surgery   CT Surgery  Interventional Radiology      Procedures Performed:   Aspiration of liver abscess  Percutaneous chest tube placement under US guidance by radiology    Pertinent Test Results:   Results for orders placed during the hospital encounter of 08/31/21    Adult Transthoracic Echo Complete w/ Color, Spectral and Contrast if necessary per protocol    Interpretation Summary  · Left ventricular wall thickness is consistent with mild concentric hypertrophy.  · Left ventricular ejection fraction appears to be 66 - 70%. Left ventricular systolic function is normal.  · Left ventricular diastolic dysfunction is noted.  · Normal right ventricular cavity size and systolic function noted.  · There is no significant (greater than mild) valvular dysfunction.      Imaging Results (Last 7 Days)     Procedure Component Value Units Date/Time    US Tube Placement [[id number]] Collected: 10/06/21 1109     Updated: 10/06/21 1118    Narrative:      EXAM: US TUBE PLACEMENT- - 10/6/2021 10:58 AM CDT     HISTORY: right chest tube placement ; Z74.09-Other reduced mobility;  R26.9-Unspecified abnormalities of gait and mobility       COMPARISON: 10/4/2021     PROCEDURE:  Risks, benefits and alternatives to the procedure were discussed  with  the patient and his wife. Specifically, risks of bleeding, infection,  allergy to medication, pneumothorax were discussed with the patient.  Written and verbal informed consent was obtained from the patient. All  of their questions were answered.     Time Out:   Immediately prior to the procedure, a time out was performed. The  patient's name, date of birth, and procedure to be performed were  verified.     Using ultrasound guidance, a site in the right lower posterior  chest  was selected and prepped in a sterile fashion. Lidocaine was used for  local anesthesia. A 5 Icelandic Yueh catheter was inserted into the right  pleural space. The needle was removed and a wire fed through the  catheter. The Yueh catheter was removed and an 8 Icelandic pigtail catheter  was placed over the wire. The pigtail was locked in position.      600 mL of clear yellow fluid was withdrawn. Small bore pigtail chest  tube was anchored to the skin with increasing device.      The patient tolerated the procedure well. No evidence of complication.  The patient returned to the floor in stable condition.     Drainage bag containing fluid was returned with the patient to the  floor. This was discussed with the patient's nurse Braxton Blake, who  reports fluid has been sent for analysis.          Impression:      1. Successful ultrasound-guided diagnostic and therapeutic right chest  tube placement with drainage of 600 mL of clear yellow pleural fluid.     This report was finalized on 10/06/2021 11:15 by Dr Theresa Contreras MD.    CT Chest With Contrast Diagnostic [[id number]] Collected: 10/04/21 1424     Updated: 10/04/21 1503    Narrative:      EXAM: CT CHEST W CONTRAST DIAGNOSTIC- - 10/4/2021 1:13 PM CDT     HISTORY: Evaluate right lower lobe opacity; Z74.09-Other reduced  mobility; R26.9-Unspecified abnormalities of gait and mobility       COMPARISON: 9/30/2021.      DOSE LENGTH PRODUCT: 412 mGy cm. Automatic exposure control was utilized  to  make radiation dose as low as reasonably achievable.     TECHNIQUE: Enhanced CT images of the chest obtained with multiplanar  reformats.     FINDINGS:      AIRWAYS/PULMONARY PARENCHYMA: The central airways are midline and  patent.      Right greater than left bibasilar consolidative opacities. Right lower  lung with complex low density, suspicious for parenchymal abscess or  necrosis.     Left lower lobe 1.8 cm nodule, previously 1.6 cm on 9/30/2021.  Difference in measurement may be partially due to differences in  technique.     Mild emphysematous changes.     Moderate right and small left pleural fluid.     VASCULATURE: Thoracic aorta is normal in course and caliber. Moderate  calcified aortic atherosclerosis.  No pulmonary artery filling defect on  this non CTA exam. Normal pulmonary artery caliber.     CARDIAC:  Normal heart size.  No pericardial effusion.  Scattered  coronary artery calcifications.      MEDIASTINUM: There is no mediastinal or hilar lymphadenopathy by CT size  criteria. Esophagus has normal course, caliber and wall thickness.     EXTRATHORACIC: The visualized portions of the thyroid gland are  unremarkable. No thoracic inlet or axillary adenopathy. The  extrathoracic soft tissues are within normal limits.      INCLUDED UPPER ABDOMEN: Right posterior liver with a 4.7 cm  rim-enhancing fluid collection, suspicious for abscess, previously 4.9  cm on 9/30/2021. Left liver rim-enhancing fluid collection measures 3.6  cm, previously 4.1 cm on 9/30/2021. The smaller adjacent lesion measures  1.5 cm, previously 2.0 cm on 9/30/2021. Patent portal vein and hepatic  veins.     BONES: No acute or suspicious bony finding.           Impression:      1. Redemonstration right lower lung complex consolidative and  low-density opacity suspicious for parenchymal abscess or necrosis.  2. Left lower lobe 1.8 cm nodule, previously 1.6 cm. Difference in  measurement may be partially due to differences in technique.  Recommend  follow-up.  3. Right greater than left pleural effusions.  4. No rim-enhancing liver lesions, slightly decreased in size compared  to 9/30/2021.  This report was finalized on 10/04/2021 15:00 by Dr Theresa Contreras MD.    MRI Pelvis With & Without Contrast [[id number]] Collected: 10/02/21 1521     Updated: 10/02/21 1532    Narrative:      EXAMINATION:  MRI PELVIS W WO CONTRAST-  10/2/2021 12:20 PM CDT     HISTORY: Follow-up sigmoid lesion; Z74.09-Other reduced mobility;  R26.9-Unspecified abnormalities of gait and mobility      COMPARISON: Abdomen pelvis CT dated 9/10/2021     TECHNIQUE: Multiplanar MR imaging of the pelvis was performed.  Gadolinium enhanced imaging obtained.     FINDINGS:     There is image quality degradation with motion.     As noted on the CT examination there is diffuse segmental wall  thickening of the sigmoid colon with scattered diverticuli.  Circumferential wall thickening associated with diverticular disease can  certainly have this appearance. Underlying neoplasm is not excluded.  Endoscopic characterization suggested.     There is free fluid identified in the pelvis.     No pelvic lymphadenopathy identified.     No acute osseous abnormalities observed.     Urinary bladder is moderately distended.     Prostate gland is mildly prominent.       Impression:      1. Sigmoid colon diverticulosis and nonspecific wall thickening.  This report was finalized on 10/02/2021 15:28 by Dr. Cb Elliott MD.    CT Guided Abscess Drain Liver [[id number]] Collected: 10/01/21 1606     Updated: 10/01/21 1612    Narrative:      CT GUIDED ABSCESS DRAIN LIVER- 10/1/2021 2:27 PM CDT     INDICATION: abscess; Z74.09-Other reduced mobility; R26.9-Unspecified  abnormalities of gait and mobility     CONSENT: An informed written consent was obtained from the patient after  discussing the risks, benefits, potential complications and  alternatives. All questions answered to the patient's  satisfaction.  Automated exposure control (AEC) protocols are utilized on the scanner  to ensure dose lowered technique.      COMPLICATIONS: None.      MEDICATIONS: Local analgesia only     CONTRAST: None.      ESTIMATED BLOOD LOSS: Less than 5 ml.      DOSE LENGTH PRODUCT: 1016 mGy cm. Automated exposure control was also  utilized to decrease patient radiation dose.     PROCEDURE: An informed consent was obtained as above. A formal timeout  was performed prior to the procedure. Prior to sterile preparation and  local anesthetic, noninfused axial CT scans were obtained  redemonstrating the 2 dominant liver abscesses. The optimal site for  needle aspiration of the left lobe abscess was marked at the skin. The  skin was cleansed with Betadine swabs and sterilely draped. 1% buffered  lidocaine was used to anesthetize the skin and soft tissues down to the  liver capsule. A tiny dermatotomy was made. A 5 [place] 10 cm [employer]  catheter was advanced into the left lobe liver abscess using CT for  guidance. Approximately 20 cc frankly purulent material was aspirated  from this abscess and set aside for microbiology evaluation.     No immediate complications.        Impression:      Successful CT guided biopsy liver abscess aspiration, yielding  approximately 20 cc frankly purulent material. This sample was set aside  for microbiology evaluation.        This report was finalized on 10/01/2021 16:09 by Dr Arie Leon, .    MRI Abdomen With & Without Contrast [[id number]] Collected: 09/30/21 1703     Updated: 09/30/21 1805    Narrative:      MRI OF ABDOMEN WITH AND WITHOUT CONTRAST     HISTORY: Follow-up sigmoid lesion and liver masses/abscesses?;  Z74.09-Other reduced mobility      COMPARISON: CT scan dated 8/31/2021      TECHNIQUE: Multiplanar, multisequential MR imaging of the abdomen was  performed before and after the intravenous administration of contrast.     FINDINGS:     5.2 cm segment 7 rim-enhancing liver abscess  showing prominent diffusion  restriction. There are 2 adjacent liver abscesses in liver segment 3,  the larger measuring 4.3 cm and the smaller measuring 1.2 cm, both  displaying prominent diffusion restriction. There appears to be  extension of these liver abscesses beyond the liver capsule, as there is  subcapsular fluid along the anterior, right lateral and dome of the  liver and there is also a developing right lower lobe pulmonary abscess  and empyema. There is also a what appears to be a subserosal abscess  along the lesser curvature and antrum of the stomach, again displaying a  prominent diffusion restriction. Differential would include subhepatic  space abscess although this does appear to follow the contour of the  stomach closely and appears to be within the wall.     No gross intraperitoneal free fluid. The gallbladder is nondistended.  The biliary tree is nondilated. No pancreatic lesions are identified.  The spleen, adrenal glands and kidneys are unremarkable. No suspicious  adenopathy in the upper abdomen.       Impression:         1. Enlarging liver abscesses in segments 3 and 7 which appear to have  extended beyond the liver capsule. In particular, there is a developing  empyema along the diaphragmatic pleura, posteriorly, as well as  extending up along the posterior costal pleura and there is also a  developing right lower lobe pulmonary abscess. There is also an  elongated subserosal abscess along the lesser curvature and antrum of  the stomach. Differential would include a subhepatic space abscess  although this very closely follows the path and contour of the stomach  and this does appear to be within the wall of the stomach.     Findings and recommendations were discussed with Dr Gu on 9/30/2021  at 5:01 PM CDT.              This report was finalized on 09/30/2021 18:02 by Dr Arie Leon, .    CT Chest With Contrast Diagnostic [[id number]] Collected: 09/30/21 1628     Updated: 09/30/21  1704    Narrative:      CT CHEST W CONTRAST DIAGNOSTIC- 9/30/2021 3:37 PM CDT     HISTORY: Follow-up lung nodule; Z74.09-Other reduced mobility      COMPARISON: CT scan dated 8/31/2021      DOSE LENGTH PRODUCT: 192 mGy cm. Automated exposure control was also  utilized to decrease patient radiation dose.     TECHNIQUE: Serial helical tomographic images of the chest were acquired  following the intravenous infusion of contrast. Multiplanar reformatted  images were provided for review.     FINDINGS:      Visualized neck base: The imaged portion of the base of the neck appears  unremarkable.      Lungs: Advanced lung emphysema. 1.6 cm superior segment left lower lobe  pulmonary nodule, concerning for primary lung malignancy. There is a new  small empyema identified in the right lung base overlying the more  posterior aspect of the diaphragmatic pleura as well as extending up  along the posterior costal pleura. Additionally, there is a new cavitary  right lower lobe pneumonia, which appears to be developing into a  pulmonary abscess.     Heart: The heart is normal in size. There is no pericardial effusion.     Vasculature: Thoracic aorta is normal in caliber. No dissection  identified. No flow-limiting stenosis identified at the great vessel  origins. The pulmonary arteries are normal in appearance.     Mediastinum and lymph nodes: There is a mild reactive right hilar and  subcarinal adenopathy.     Skeletal and soft tissues: Chest wall soft tissues are unremarkable. No  acute bony abnormality.       Upper abdomen: 4.9 cm segment 7 liver abscess with subcapsular extension  as well as extension above the diaphragm in the right pleural space.  Enlarging left lobe abscesses as well measuring 4.1 and 2.0 cm, in  segment 3, which also appear to be extended beyond the liver capsule.  There is a new subserosal abscess along the gastric wall, mostly along  the antrum, measuring over 10 cm in long axis..           Impression:       1. Worsening of the patient's liver abscesses with extension beyond the  liver capsule. In particular, there is extension of the right liver  abscess above the diaphragm with developing right empyema and right  lower lobe pulmonary abscess. There is also extension of the left liver  abscess into the subhepatic space with formation of a subserosal gastric  wall abscess.  2. Lung emphysema with 1.6 cm superior segment left lower lobe nodule  which is concerning for a primary lung malignancy.     Findings and recommendations were discussed with Dr Gu on 9/30/2021  at 5:01 PM CDT.     This report was finalized on 09/30/2021 17:01 by Dr Arie Leon, .          Lab Results (last 7 days)     Procedure Component Value Units Date/Time    Body Fluid Culture - Body Fluid, Pleural Cavity [[id number]] Collected: 10/06/21 1046    Specimen: Body Fluid from Pleural Cavity Updated: 10/06/21 1337     Gram Stain Many (4+) WBCs seen      No organisms seen    Body Fluid Cell Count With Differential - Pleural Fluid, Pleural Cavity [[id number]]  (Abnormal) Collected: 10/06/21 1047    Specimen: Pleural Fluid from Pleural Cavity Updated: 10/06/21 1309    Narrative:      The following orders were created for panel order Body Fluid Cell Count With Differential - Pleural Fluid, Pleural Cavity.  Procedure                               Abnormality         Status                     ---------                               -----------         ------                     Body fluid cell count - ...[[id number]]  Abnormal            Final result               Body fluid differential ...[[id number]]                      Final result               Slide Review, Fluid - Pl...[[id number]]                      Final result                 Please view results for these tests on the individual orders.    Slide Review, Fluid - Pleural Fluid, Pleural Cavity [[id number]] Collected: 10/06/21 1047    Specimen: Pleural Fluid from Pleural Cavity Updated:  10/06/21 1309     Interpretation Acute inflammatory background with reactive mesothelial cell and red blood cells    Non-gynecologic Cytology [[id number]] Collected: 10/06/21 1048    Specimen: Body Fluid from Pleural Cavity Updated: 10/06/21 1256    POC Glucose Once [[id number]]  (Normal) Collected: 10/06/21 1156    Specimen: Blood Updated: 10/06/21 1207     Glucose 127 mg/dL      Comment: : [id number] Bobby Betts AllieMeter ID: [id number]       Body fluid differential - Pleural Fluid, Pleural Cavity [[id number]] Collected: 10/06/21 1047    Specimen: Pleural Fluid from Pleural Cavity Updated: 10/06/21 1206     Neutrophils, Fluid 65 %      Lymphocytes, Fluid 21 %      Monocytes, Fluid 12 %      Unclassified Cells, Fluid 2 %     Body fluid cell count - Pleural Fluid, Pleural Cavity [[id number]]  (Abnormal) Collected: 10/06/21 1047    Specimen: Pleural Fluid from Pleural Cavity Updated: 10/06/21 1206     Color, Fluid Yellow     Appearance, Fluid Slightly Hazy     WBC, Fluid 1,161 /mm3      RBC, Fluid 1,000 /mm3     Anaerobic Culture - Body Fluid, Pleural Cavity [[id number]] Collected: 10/06/21 1047    Specimen: Body Fluid from Pleural Cavity Updated: 10/06/21 1103    Blood Culture With MICHAEL - Blood, Arm, Right [[id number]] Collected: 10/06/21 1050    Specimen: Blood from Arm, Right Updated: 10/06/21 1101    Blood Culture With MICHAEL - Blood, Arm, Left [[id number]] Collected: 10/06/21 1049    Specimen: Blood from Arm, Left Updated: 10/06/21 1101    POC Glucose Once [[id number]]  (Normal) Collected: 10/06/21 0743    Specimen: Blood Updated: 10/06/21 0754     Glucose 127 mg/dL      Comment: : [id number] Bobby Betts AllieMeter ID: [id number]       Comprehensive Metabolic Panel [[id number]]  (Abnormal) Collected: 10/06/21 0458    Specimen: Blood Updated: 10/06/21 0635     Glucose 118 mg/dL      BUN 9 mg/dL      Creatinine 0.81 mg/dL      Sodium 134 mmol/L      Potassium 3.5 mmol/L      Chloride 97 mmol/L      CO2  29.0 mmol/L      Calcium 7.7 mg/dL      Total Protein 5.1 g/dL      Albumin 2.20 g/dL      ALT (SGPT) 15 U/L      AST (SGOT) 25 U/L      Alkaline Phosphatase 94 U/L      Total Bilirubin 0.3 mg/dL      eGFR Non African Amer 92 mL/min/1.73      Globulin 2.9 gm/dL      A/G Ratio 0.8 g/dL      BUN/Creatinine Ratio 11.1     Anion Gap 8.0 mmol/L     Narrative:      GFR Normal >60  Chronic Kidney Disease <60  Kidney Failure <15      Phosphorus [[id number]]  (Normal) Collected: 10/06/21 0458    Specimen: Blood Updated: 10/06/21 0635     Phosphorus 3.3 mg/dL     Magnesium [[id number]]  (Normal) Collected: 10/06/21 0458    Specimen: Blood Updated: 10/06/21 0635     Magnesium 1.8 mg/dL     aPTT [[id number]]  (Abnormal) Collected: 10/06/21 0458    Specimen: Blood Updated: 10/06/21 0620     PTT 45.6 seconds     Protime-INR [[id number]]  (Abnormal) Collected: 10/06/21 0458    Specimen: Blood Updated: 10/06/21 0620     Protime 15.1 Seconds      INR 1.24    CBC & Differential [[id number]]  (Abnormal) Collected: 10/06/21 0458    Specimen: Blood Updated: 10/06/21 0613    Narrative:      The following orders were created for panel order CBC & Differential.  Procedure                               Abnormality         Status                     ---------                               -----------         ------                     CBC Auto Differential[[id number]]        Abnormal            Final result                 Please view results for these tests on the individual orders.    CBC Auto Differential [[id number]]  (Abnormal) Collected: 10/06/21 0458    Specimen: Blood Updated: 10/06/21 0613     WBC 9.66 10*3/mm3      RBC 3.55 10*6/mm3      Hemoglobin 10.4 g/dL      Hematocrit 31.6 %      MCV 89.0 fL      MCH 29.3 pg      MCHC 32.9 g/dL      RDW 15.7 %      RDW-SD 50.4 fl      MPV 9.5 fL      Platelets 450 10*3/mm3      Neutrophil % 72.1 %      Lymphocyte % 11.3 %      Monocyte % 11.7 %      Eosinophil % 2.2 %      Basophil % 0.6 %       Immature Grans % 2.1 %      Neutrophils, Absolute 6.97 10*3/mm3      Lymphocytes, Absolute 1.09 10*3/mm3      Monocytes, Absolute 1.13 10*3/mm3      Eosinophils, Absolute 0.21 10*3/mm3      Basophils, Absolute 0.06 10*3/mm3      Immature Grans, Absolute 0.20 10*3/mm3      nRBC 0.0 /100 WBC     Calcium, Ionized [[id number]]  (Abnormal) Collected: 10/06/21 0526    Specimen: Blood Updated: 10/06/21 0528     Ionized Calcium 4.24 mg/dL      Comment: 84 Value below reference range        Collected by [id number]     Comment: Meter: [id number]     :  [id number]       Legionella Antigen, Urine - Urine, Urine, Clean Catch [[id number]]  (Normal) Collected: 10/05/21 2054    Specimen: Urine, Clean Catch Updated: 10/05/21 2129     LEGIONELLA ANTIGEN, URINE Negative    S. Pneumo Ag Urine or CSF - Urine, Urine, Clean Catch [[id number]]  (Normal) Collected: 10/05/21 2051    Specimen: Urine, Clean Catch Updated: 10/05/21 2128     Strep Pneumo Ag Negative    MRSA Screen, PCR (Inpatient) - Swab, Nares [[id number]]  (Normal) Collected: 10/05/21 1846    Specimen: Swab from Nares Updated: 10/05/21 2003     MRSA PCR No MRSA Detected    Prealbumin [[id number]]  (Abnormal) Collected: 10/05/21 1119    Specimen: Blood Updated: 10/05/21 1852     Prealbumin 5.3 mg/dL     POC Glucose Once [[id number]]  (Normal) Collected: 10/05/21 1816    Specimen: Blood Updated: 10/05/21 1827     Glucose 124 mg/dL      Comment: : [id number] Lou CalleyMeter ID: [id number]       aPTT [[id number]]  (Abnormal) Collected: 10/05/21 1219    Specimen: Blood Updated: 10/05/21 1236     PTT 58.1 seconds     Protime-INR [[id number]]  (Abnormal) Collected: 10/05/21 1219    Specimen: Blood Updated: 10/05/21 1236     Protime 16.1 Seconds      INR 1.34    Cholesterol, Total [[id number]]  (Normal) Collected: 10/05/21 1120    Specimen: Blood Updated: 10/05/21 1202     Total Cholesterol 76 mg/dL     Narrative:      Cholesterol Reference Ranges    (U.[place] Department fo Health  and Human Services ATP III Classifications)    Desirable        <200 mg/dl  Borderline High  200-239 mg/dl  High Risk        >240 mg/dl    C-reactive Protein [[id number]]  (Abnormal) Collected: 10/05/21 1120    Specimen: Blood Updated: 10/05/21 1202     C-Reactive Protein 8.86 mg/dL     Triglycerides [[id number]]  (Normal) Collected: 10/05/21 1120    Specimen: Blood Updated: 10/05/21 1202     Triglycerides 112 mg/dL     Phosphorus [[id number]]  (Normal) Collected: 10/05/21 1120    Specimen: Blood Updated: 10/05/21 1202     Phosphorus 3.4 mg/dL     Magnesium [[id number]]  (Normal) Collected: 10/05/21 1120    Specimen: Blood Updated: 10/05/21 1202     Magnesium 1.9 mg/dL     POC Glucose Once [[id number]]  (Normal) Collected: 10/05/21 1131    Specimen: Blood Updated: 10/05/21 1143     Glucose 97 mg/dL      Comment: : [id number] Betts AllieMeter ID: [id number]       Calcium, Ionized [[id number]]  (Abnormal) Collected: 10/05/21 1119    Specimen: Blood Updated: 10/05/21 1139     Ionized Calcium 4.22 mg/dL      Comment: 84 Value below reference range        Collected by [id number]     Comment: Meter: [id number]     :  [id number]       Body Fluid Culture - Body Fluid, Liver [[id number]] Collected: 10/01/21 1528    Specimen: Body Fluid from Liver Updated: 10/05/21 0916     Body Fluid Culture No growth at 3 days     Gram Stain Many (4+) WBCs per low power field      No organisms seen    Anaerobic Culture - Aspirate, Liver [[id number]] Collected: 10/01/21 1528    Specimen: Aspirate from Liver Updated: 10/05/21 0606     Anaerobic Culture No anaerobes isolated at 3 days    Comprehensive Metabolic Panel [[id number]]  (Abnormal) Collected: 10/05/21 0436    Specimen: Blood Updated: 10/05/21 0602     Glucose 88 mg/dL      BUN 8 mg/dL      Creatinine 0.70 mg/dL      Sodium 132 mmol/L      Potassium 3.4 mmol/L      Chloride 96 mmol/L      CO2 25.0 mmol/L      Calcium 7.6 mg/dL      Total Protein 5.4 g/dL      Albumin  "2.00 g/dL      ALT (SGPT) 15 U/L      AST (SGOT) 25 U/L      Alkaline Phosphatase 81 U/L      Total Bilirubin 0.5 mg/dL      eGFR Non African Amer 109 mL/min/1.73      Globulin 3.4 gm/dL      A/G Ratio 0.6 g/dL      BUN/Creatinine Ratio 11.4     Anion Gap 11.0 mmol/L     Narrative:      GFR Normal >60  Chronic Kidney Disease <60  Kidney Failure <15      C-reactive Protein [[id number]]  (Abnormal) Collected: 10/05/21 0436    Specimen: Blood Updated: 10/05/21 0602     C-Reactive Protein 9.13 mg/dL     Procalcitonin [[id number]]  (Abnormal) Collected: 10/05/21 0436    Specimen: Blood Updated: 10/05/21 0602     Procalcitonin 0.35 ng/mL     Narrative:      As a Marker for Sepsis (Non-Neonates):     1. <0.5 ng/mL represents a low risk of severe sepsis and/or septic shock.  2. >2 ng/mL represents a high risk of severe sepsis and/or septic shock.    As a Marker for Lower Respiratory Tract Infections that require antibiotic therapy:  PCT on Admission     Antibiotic Therapy             6-12 Hrs later  >0.5                          Strongly Recommended            >0.25 - <0.5             Recommended  0.1 - 0.25                  Discouraged                       Remeasure/reassess PCT  <0.1                         Strongly Discouraged         Remeasure/reassess PCT      As 28 day mortality risk marker: \"Change in Procalcitonin Result\" (>80% or <=80%) if Day 0 (or Day 1) and Day 4 values are available. Refer to http://www.[employer]-pct-calculator.com/    Change in PCT <=80 %   A decrease of PCT levels below or equal to 80% defines a positive change in PCT test result representing a higher risk for 28-day all-cause mortality of patients diagnosed with severe sepsis or septic shock.    Change in PCT >80 %   A decrease of PCT levels of more than 80% defines a negative change in PCT result representing a lower risk for 28-day all-cause mortality of patients diagnosed with severe sepsis or septic shock.                CBC & Differential " [[id number]]  (Abnormal) Collected: 10/05/21 0436    Specimen: Blood Updated: 10/05/21 0538    Narrative:      The following orders were created for panel order CBC & Differential.  Procedure                               Abnormality         Status                     ---------                               -----------         ------                     CBC Auto Differential[[id number]]        Abnormal            Final result                 Please view results for these tests on the individual orders.    CBC Auto Differential [[id number]]  (Abnormal) Collected: 10/05/21 0436    Specimen: Blood Updated: 10/05/21 0538     WBC 10.74 10*3/mm3      RBC 3.51 10*6/mm3      Hemoglobin 10.0 g/dL      Hematocrit 30.6 %      MCV 87.2 fL      MCH 28.5 pg      MCHC 32.7 g/dL      RDW 15.4 %      RDW-SD 48.3 fl      MPV 9.6 fL      Platelets 432 10*3/mm3      Neutrophil % 71.8 %      Lymphocyte % 12.2 %      Monocyte % 12.4 %      Eosinophil % 1.3 %      Basophil % 0.6 %      Immature Grans % 1.7 %      Neutrophils, Absolute 7.72 10*3/mm3      Lymphocytes, Absolute 1.31 10*3/mm3      Monocytes, Absolute 1.33 10*3/mm3      Eosinophils, Absolute 0.14 10*3/mm3      Basophils, Absolute 0.06 10*3/mm3      Immature Grans, Absolute 0.18 10*3/mm3      nRBC 0.0 /100 WBC     Blood Culture With MICHAEL - Blood, Arm, Left [[id number]] Collected: 09/29/21 1924    Specimen: Blood from Arm, Left Updated: 10/04/21 2100     Blood Culture No growth at 5 days    Comprehensive Metabolic Panel [[id number]]  (Abnormal) Collected: 10/04/21 0435    Specimen: Blood Updated: 10/04/21 0538     Glucose 96 mg/dL      BUN 9 mg/dL      Creatinine 0.74 mg/dL      Sodium 129 mmol/L      Potassium 3.6 mmol/L      Chloride 94 mmol/L      CO2 27.0 mmol/L      Calcium 7.6 mg/dL      Total Protein 5.4 g/dL      Albumin 2.10 g/dL      ALT (SGPT) 17 U/L      AST (SGOT) 24 U/L      Alkaline Phosphatase 92 U/L      Total Bilirubin 0.5 mg/dL      eGFR Non African Amer 103  mL/min/1.73      Globulin 3.3 gm/dL      A/G Ratio 0.6 g/dL      BUN/Creatinine Ratio 12.2     Anion Gap 8.0 mmol/L     Narrative:      GFR Normal >60  Chronic Kidney Disease <60  Kidney Failure <15      CBC & Differential [[id number]]  (Abnormal) Collected: 10/04/21 0435    Specimen: Blood Updated: 10/04/21 0521    Narrative:      The following orders were created for panel order CBC & Differential.  Procedure                               Abnormality         Status                     ---------                               -----------         ------                     CBC Auto Differential[[id number]]        Abnormal            Final result                 Please view results for these tests on the individual orders.    CBC Auto Differential [[id number]]  (Abnormal) Collected: 10/04/21 0435    Specimen: Blood Updated: 10/04/21 0521     WBC 13.16 10*3/mm3      RBC 3.57 10*6/mm3      Hemoglobin 10.1 g/dL      Hematocrit 30.8 %      MCV 86.3 fL      MCH 28.3 pg      MCHC 32.8 g/dL      RDW 15.0 %      RDW-SD 46.5 fl      MPV 9.6 fL      Platelets 454 10*3/mm3      Neutrophil % 76.0 %      Lymphocyte % 9.6 %      Monocyte % 12.2 %      Eosinophil % 0.7 %      Basophil % 0.4 %      Immature Grans % 1.1 %      Neutrophils, Absolute 10.01 10*3/mm3      Lymphocytes, Absolute 1.26 10*3/mm3      Monocytes, Absolute 1.60 10*3/mm3      Eosinophils, Absolute 0.09 10*3/mm3      Basophils, Absolute 0.05 10*3/mm3      Immature Grans, Absolute 0.15 10*3/mm3      nRBC 0.0 /100 WBC     Comprehensive Metabolic Panel [[id number]]  (Abnormal) Collected: 10/03/21 0409    Specimen: Blood Updated: 10/03/21 0508     Glucose 140 mg/dL      BUN 10 mg/dL      Creatinine 0.74 mg/dL      Sodium 133 mmol/L      Potassium 3.2 mmol/L      Chloride 98 mmol/L      CO2 27.0 mmol/L      Calcium 7.5 mg/dL      Total Protein 5.0 g/dL      Albumin 1.90 g/dL      ALT (SGPT) 19 U/L      AST (SGOT) 29 U/L      Alkaline Phosphatase 104 U/L      Total Bilirubin  0.4 mg/dL      eGFR Non African Amer 103 mL/min/1.73      Globulin 3.1 gm/dL      A/G Ratio 0.6 g/dL      BUN/Creatinine Ratio 13.5     Anion Gap 8.0 mmol/L     Narrative:      GFR Normal >60  Chronic Kidney Disease <60  Kidney Failure <15      CBC & Differential [[id number]]  (Abnormal) Collected: 10/03/21 0409    Specimen: Blood Updated: 10/03/21 0448    Narrative:      The following orders were created for panel order CBC & Differential.  Procedure                               Abnormality         Status                     ---------                               -----------         ------                     CBC Auto Differential[[id number]]        Abnormal            Final result                 Please view results for these tests on the individual orders.    CBC Auto Differential [[id number]]  (Abnormal) Collected: 10/03/21 0409    Specimen: Blood Updated: 10/03/21 0448     WBC 11.42 10*3/mm3      RBC 3.33 10*6/mm3      Hemoglobin 9.4 g/dL      Hematocrit 28.6 %      MCV 85.9 fL      MCH 28.2 pg      MCHC 32.9 g/dL      RDW 14.9 %      RDW-SD 46.5 fl      MPV 9.6 fL      Platelets 459 10*3/mm3      Neutrophil % 75.1 %      Lymphocyte % 10.9 %      Monocyte % 11.7 %      Eosinophil % 0.7 %      Basophil % 0.5 %      Immature Grans % 1.1 %      Neutrophils, Absolute 8.57 10*3/mm3      Lymphocytes, Absolute 1.24 10*3/mm3      Monocytes, Absolute 1.34 10*3/mm3      Eosinophils, Absolute 0.08 10*3/mm3      Basophils, Absolute 0.06 10*3/mm3      Immature Grans, Absolute 0.13 10*3/mm3      nRBC 0.0 /100 WBC     KOH Prep - Body Fluid, Liver [[id number]] Collected: 10/01/21 1538    Specimen: Body Fluid from Liver Updated: 10/02/21 1037     KOH Prep No yeast or hyphal elements seen    Fungus Culture - Body Fluid, Liver [[id number]] Collected: 10/01/21 1538    Specimen: Body Fluid from Liver Updated: 10/02/21 0956    Blood Culture With MICHAEL - Blood, Arm, Right [[id number]]  (Abnormal) Collected: 09/29/21 1924    Specimen: Blood  from Arm, Right Updated: 10/02/21 0715     Blood Culture Staphylococcus, coagulase negative     Comment: Probable contaminant requires clinical correlation, susceptibility not performed unless requested by physician.          Isolated from Aerobic Bottle     Gram Stain Aerobic Bottle Gram positive cocci in clusters    Comprehensive Metabolic Panel [[id number]]  (Abnormal) Collected: 10/02/21 0424    Specimen: Blood Updated: 10/02/21 0536     Glucose 92 mg/dL      BUN 10 mg/dL      Creatinine 0.64 mg/dL      Sodium 132 mmol/L      Potassium 3.6 mmol/L      Chloride 96 mmol/L      CO2 27.0 mmol/L      Calcium 7.6 mg/dL      Total Protein 5.1 g/dL      Albumin 1.80 g/dL      ALT (SGPT) 19 U/L      AST (SGOT) 29 U/L      Alkaline Phosphatase 96 U/L      Total Bilirubin 0.6 mg/dL      eGFR Non African Amer 121 mL/min/1.73      Globulin 3.3 gm/dL      A/G Ratio 0.5 g/dL      BUN/Creatinine Ratio 15.6     Anion Gap 9.0 mmol/L     Narrative:      GFR Normal >60  Chronic Kidney Disease <60  Kidney Failure <15      CBC & Differential [[id number]]  (Abnormal) Collected: 10/02/21 0424    Specimen: Blood Updated: 10/02/21 0509    Narrative:      The following orders were created for panel order CBC & Differential.  Procedure                               Abnormality         Status                     ---------                               -----------         ------                     CBC Auto Differential[[id number]]        Abnormal            Final result                 Please view results for these tests on the individual orders.    CBC Auto Differential [[id number]]  (Abnormal) Collected: 10/02/21 0424    Specimen: Blood Updated: 10/02/21 0509     WBC 14.51 10*3/mm3      RBC 3.30 10*6/mm3      Hemoglobin 9.6 g/dL      Hematocrit 29.0 %      MCV 87.9 fL      MCH 29.1 pg      MCHC 33.1 g/dL      RDW 14.6 %      RDW-SD 46.9 fl      MPV 10.1 fL      Platelets 418 10*3/mm3      Neutrophil % 82.1 %      Lymphocyte % 7.5 %       Monocyte % 9.0 %      Eosinophil % 0.3 %      Basophil % 0.2 %      Immature Grans % 0.9 %      Neutrophils, Absolute 11.91 10*3/mm3      Lymphocytes, Absolute 1.09 10*3/mm3      Monocytes, Absolute 1.30 10*3/mm3      Eosinophils, Absolute 0.05 10*3/mm3      Basophils, Absolute 0.03 10*3/mm3      Immature Grans, Absolute 0.13 10*3/mm3      nRBC 0.0 /100 WBC     Vancomycin, Trough Please call results to Pharmacy prior to administering next dose [[id number]]  (Abnormal) Collected: 10/01/21 0734    Specimen: Blood Updated: 10/01/21 0809     Vancomycin Trough 4.80 mcg/mL     Comprehensive Metabolic Panel [[id number]]  (Abnormal) Collected: 10/01/21 0423    Specimen: Blood Updated: 10/01/21 0537     Glucose 92 mg/dL      BUN 13 mg/dL      Creatinine 0.63 mg/dL      Sodium 130 mmol/L      Potassium 3.2 mmol/L      Chloride 95 mmol/L      CO2 26.0 mmol/L      Calcium 7.6 mg/dL      Total Protein 5.0 g/dL      Albumin 1.90 g/dL      ALT (SGPT) 19 U/L      AST (SGOT) 24 U/L      Alkaline Phosphatase 95 U/L      Total Bilirubin 0.6 mg/dL      eGFR Non African Amer 123 mL/min/1.73      Globulin 3.1 gm/dL      A/G Ratio 0.6 g/dL      BUN/Creatinine Ratio 20.6     Anion Gap 9.0 mmol/L     Narrative:      GFR Normal >60  Chronic Kidney Disease <60  Kidney Failure <15      Iron Profile [[id number]]  (Abnormal) Collected: 10/01/21 0423    Specimen: Blood Updated: 10/01/21 0534     Iron 17 mcg/dL      Iron Saturation 12 %      Transferrin 94 mg/dL      TIBC 140 mcg/dL     Protime-INR [[id number]]  (Abnormal) Collected: 10/01/21 0423    Specimen: Blood Updated: 10/01/21 0507     Protime 16.4 Seconds      INR 1.38    CBC & Differential [[id number]]  (Abnormal) Collected: 10/01/21 0423    Specimen: Blood Updated: 10/01/21 0501    Narrative:      The following orders were created for panel order CBC & Differential.  Procedure                               Abnormality         Status                     ---------                                -----------         ------                     CBC Auto Differential[[id number]]        Abnormal            Final result                 Please view results for these tests on the individual orders.    CBC Auto Differential [[id number]]  (Abnormal) Collected: 10/01/21 0423    Specimen: Blood Updated: 10/01/21 0501     WBC 15.85 10*3/mm3      RBC 3.03 10*6/mm3      Hemoglobin 9.0 g/dL      Hematocrit 26.8 %      MCV 88.4 fL      MCH 29.7 pg      MCHC 33.6 g/dL      RDW 14.7 %      RDW-SD 47.0 fl      MPV 9.8 fL      Platelets 380 10*3/mm3      Neutrophil % 83.0 %      Lymphocyte % 8.3 %      Monocyte % 7.6 %      Eosinophil % 0.3 %      Basophil % 0.1 %      Immature Grans % 0.7 %      Neutrophils, Absolute 13.15 10*3/mm3      Lymphocytes, Absolute 1.32 10*3/mm3      Monocytes, Absolute 1.21 10*3/mm3      Eosinophils, Absolute 0.04 10*3/mm3      Basophils, Absolute 0.02 10*3/mm3      Immature Grans, Absolute 0.11 10*3/mm3      nRBC 0.0 /100 WBC     C-reactive Protein [[id number]]  (Abnormal) Collected: 09/30/21 1843    Specimen: Blood Updated: 10/01/21 0231     C-Reactive Protein 22.08 mg/dL     Folate [[id number]]  (Normal) Collected: 09/30/21 0526    Specimen: Blood Updated: 09/30/21 2331     Folate 11.50 ng/mL     Narrative:      Results may be falsely increased if patient taking Biotin.      Vitamin B12 [[id number]]  (Abnormal) Collected: 09/30/21 0526    Specimen: Blood Updated: 09/30/21 2331     Vitamin B-12 >2,000 pg/mL     Narrative:      Results may be falsely increased if patient taking Biotin.      CEA [[id number]] Collected: 09/30/21 1036    Specimen: Blood Updated: 09/30/21 2237     CEA 3.32 ng/mL     Narrative:      CEA Reference Range:    Non Smokers:   Less than 3 ng/mL  Smokers:       Less than 5 ng/mL  Results may be falsely decreased if patient taking Biotin.      Blood Culture ID, PCR - Blood, Arm, Right [[id number]]  (Abnormal) Collected: 09/29/21 1924    Specimen: Blood from Arm, Right Updated:  "09/30/21 2016     BCID, PCR Staph spp, not aureus or lugdunesis. Identification by BCID2 PCR.     BOTTLE TYPE Aerobic Bottle    Basic Metabolic Panel [[id number]]  (Abnormal) Collected: 09/30/21 1843    Specimen: Blood Updated: 09/30/21 1923     Glucose 109 mg/dL      BUN 15 mg/dL      Creatinine 0.72 mg/dL      Sodium 131 mmol/L      Potassium 3.2 mmol/L      Chloride 94 mmol/L      CO2 29.0 mmol/L      Calcium 7.8 mg/dL      eGFR Non African Amer 106 mL/min/1.73      BUN/Creatinine Ratio 20.8     Anion Gap 8.0 mmol/L     Narrative:      GFR Normal >60  Chronic Kidney Disease <60  Kidney Failure <15      Procalcitonin [[id number]]  (Abnormal) Collected: 09/30/21 1843    Specimen: Blood Updated: 09/30/21 1922     Procalcitonin 0.54 ng/mL     Narrative:      As a Marker for Sepsis (Non-Neonates):     1. <0.5 ng/mL represents a low risk of severe sepsis and/or septic shock.  2. >2 ng/mL represents a high risk of severe sepsis and/or septic shock.    As a Marker for Lower Respiratory Tract Infections that require antibiotic therapy:  PCT on Admission     Antibiotic Therapy             6-12 Hrs later  >0.5                          Strongly Recommended            >0.25 - <0.5             Recommended  0.1 - 0.25                  Discouraged                       Remeasure/reassess PCT  <0.1                         Strongly Discouraged         Remeasure/reassess PCT      As 28 day mortality risk marker: \"Change in Procalcitonin Result\" (>80% or <=80%) if Day 0 (or Day 1) and Day 4 values are available. Refer to http://www.[facility]-pct-calculator.com/    Change in PCT <=80 %   A decrease of PCT levels below or equal to 80% defines a positive change in PCT test result representing a higher risk for 28-day all-cause mortality of patients diagnosed with severe sepsis or septic shock.    Change in PCT >80 %   A decrease of PCT levels of more than 80% defines a negative change in PCT result representing a lower risk for 28-day " all-cause mortality of patients diagnosed with severe sepsis or septic shock.                Lactic Acid, Plasma [[id number]]  (Normal) Collected: 09/30/21 1843    Specimen: Blood Updated: 09/30/21 1913     Lactate 1.3 mmol/L     Ferritin [[id number]]  (Abnormal) Collected: 09/30/21 0526    Specimen: Blood Updated: 09/30/21 1231     Ferritin 822.70 ng/mL     Narrative:      Results may be falsely decreased if patient taking Biotin.      Protime-INR [[id number]]  (Abnormal) Collected: 09/30/21 1036    Specimen: Blood Updated: 09/30/21 1058     Protime 17.0 Seconds      INR 1.44    Magnesium [[id number]]  (Normal) Collected: 09/30/21 0526    Specimen: Blood Updated: 09/30/21 0733     Magnesium 1.9 mg/dL     Comprehensive Metabolic Panel [[id number]]  (Abnormal) Collected: 09/30/21 0526    Specimen: Blood Updated: 09/30/21 0709     Glucose 112 mg/dL      BUN 17 mg/dL      Creatinine 0.77 mg/dL      Sodium 131 mmol/L      Potassium 2.4 mmol/L      Chloride 93 mmol/L      CO2 29.0 mmol/L      Calcium 7.5 mg/dL      Total Protein 5.3 g/dL      Albumin 2.00 g/dL      ALT (SGPT) 23 U/L      AST (SGOT) 24 U/L      Alkaline Phosphatase 105 U/L      Total Bilirubin 0.7 mg/dL      eGFR Non African Amer 98 mL/min/1.73      Globulin 3.3 gm/dL      A/G Ratio 0.6 g/dL      BUN/Creatinine Ratio 22.1     Anion Gap 9.0 mmol/L     Narrative:      GFR Normal >60  Chronic Kidney Disease <60  Kidney Failure <15      Lipid Panel [[id number]]  (Abnormal) Collected: 09/30/21 0526    Specimen: Blood Updated: 09/30/21 0702     Total Cholesterol 97 mg/dL      Triglycerides 111 mg/dL      HDL Cholesterol 19 mg/dL      LDL Cholesterol  57 mg/dL      VLDL Cholesterol 21 mg/dL      LDL/HDL Ratio 2.94    Narrative:      Cholesterol Reference Ranges  (U.S. Department of Health and Human Services ATP III Classifications)    Desirable          <200 mg/dL  Borderline High    200-239 mg/dL  High Risk          >240 mg/dL      Triglyceride Reference  "Ranges  (U.S. Department of Health and Human Services ATP III Classifications)    Normal           <150 mg/dL  Borderline High  150-199 mg/dL  High             200-499 mg/dL  Very High        >500 mg/dL    HDL Reference Ranges  (U.S. Department of Health and Human Services ATP III Classifcations)    Low     <40 mg/dl (major risk factor for CHD)  High    >60 mg/dl ('negative' risk factor for CHD)        LDL Reference Ranges  (U.S. Department of Health and Human Services ATP III Classifcations)    Optimal          <100 mg/dL  Near Optimal     100-129 mg/dL  Borderline High  130-159 mg/dL  High             160-189 mg/dL  Very High        >189 mg/dL    Hemoglobin A1c [[id number]]  (Abnormal) Collected: 09/30/21 0526    Specimen: Blood Updated: 09/30/21 0702     Hemoglobin A1C 6.00 %     Narrative:      Hemoglobin A1C Ranges:    Increased Risk for Diabetes  5.7% to 6.4%  Diabetes                     >= 6.5%  Diabetic Goal                < 7.0%    CBC Auto Differential [[id number]]  (Abnormal) Collected: 09/30/21 0526    Specimen: Blood Updated: 09/30/21 0639     WBC 23.66 10*3/mm3      RBC 3.21 10*6/mm3      Hemoglobin 9.5 g/dL      Hematocrit 28.5 %      MCV 88.8 fL      MCH 29.6 pg      MCHC 33.3 g/dL      RDW 14.6 %      RDW-SD 47.2 fl      MPV 10.7 fL      Platelets 376 10*3/mm3      Neutrophil % 86.3 %      Lymphocyte % 5.6 %      Monocyte % 6.6 %      Eosinophil % 0.0 %      Basophil % 0.1 %      Immature Grans % 1.4 %      Neutrophils, Absolute 20.41 10*3/mm3      Lymphocytes, Absolute 1.33 10*3/mm3      Monocytes, Absolute 1.56 10*3/mm3      Eosinophils, Absolute 0.01 10*3/mm3      Basophils, Absolute 0.02 10*3/mm3      Immature Grans, Absolute 0.33 10*3/mm3      nRBC 0.0 /100 WBC           HPI 9/29/2021:  Dr. Gu:  \"Patient is a 76-year-old  male has not been feeling well for last 3 weeks.  Previously with diagnosed cavitary lesion, Dr. Canchola put patient on Levaquin antibiotic for 5 days and schedule " "for repeat CT scan to reevaluate.  Patient also received 1 g of Rocephin on Friday in ER for coughing/congestion/chills.  Patient stated when he is on antibiotics he feels better.  Patient has been feeling poorly since his blood pressure medication has been changed. CT scan shows questionable liver mass with cavitary lung nodule and a finding on the sigmoid colon unexplained. Patient has colonoscopy 9/28/2021-no evidence of cancer.     Masses of the liver possibly represent abscess of the liver per Dr. Leon-radiologist from Dr. Maria's note. History of fever night sweats chills fatigue for last couple weeks.  Note from Dr. Maria \" I spoke with Dr. English regarding possible approaches to manage source control of his abscesses, but he felt that possible aspiration and more likely long-term antibiotic therapy would be warranted and would be best served and patient\"     Patient was a direct admit by recommendation of Dr. Kramer and Dr. Curry to start IV antibiotics daily.\"    Hospital Course:     08/31/2021 - CT angiogram chest: No PE.  Suspicious 1.5 cm cavitary left lower lobe pulmonary nodule in the superior segment.  Tiny 3 mm subpleural right lower lobe nodule.  Small lymph nodes in the lisa hepatis region in the upper abdomen.     09/10/2021 - Liver Ultrasound:  2 suspicious masses in the liver, 1 in the left hepatic lobe 5.3 cm, 1 in the dome of the right hepatic lobe 6.4 cm.  Hepatic metastasis not excluded.  Unremarkable gallbladder, no biliary ductal dilatation identified.     09/10/2021 - CT abdomen/pelvis with and without contrast:   2 cm segmental thickening of the sigmoid colon concerning for malignancy.  2 large heterogenous liver lesions (right posterior liver 5.5 cm, predominantly centrally hypodense lesion; left liver lobe with similar-appearing 4.8 cm liver lesion) most concerning for metastatic disease     09/23/2021 - Dr. Kramer office with chills and shaking in the evenings lasting 60 to 90 " "minutes at a time associated with weakness, shortness of breath and a dry cough.  Previous Covid testing hospital was negative.     09/23/2021 - hemoglobin 12.3, MCV 88.3, platelets 280,000, WBC 29,000 with 88 point 6 segs.  BUN 31, creatinine 1.16, albumin 2.7, ALT 42, alk phos 197, GFR 61.  ESR 26 (elevated).  Manual blood smear showed leukocytosis with 45% segs, 31% bands, 8 lymphocytes, 1 monocyte, 11 metamyelocytes, 4 myelocytes, 1 nucleated red blood cell.  Comment: Significant leukocytosis with granulocytic left shift with vacuolated granulocytes concerning for bacterial infection      09/24/2021 - Presented to the ER on 9/24.  Elevated procalcitonin.  Given a dose of ceftriaxone and discharged home.    09/28/2021 - Colonoscopy - Impression: 3 diminutive polyps in the rectum removed with hot snare.  3 diminutive polyps at the splenic flexure, removed with hot snare.  Diverticulosis in the sigmoid colon and in the descending colon.  Distal rectum and anal verge are normal on retroflexion view.    09/29/2021 - Office visit with Dr. Kramer.  Dr. Kramer called radiologist Dr. Leon who reviewed the images and felt as though the \"masses\" were more likely to be abscesses and Dr. Kramer felt as though this clinically was more likely given presentation and symptoms.  Dr. Kramer spoke with Dr. English (general surgery) who indicated an MRI would be beneficial and possible percutaneous drainage in addition to IV antibiotics.  Patient presented as direct admission to the hospital service, accepting Dr. Hancock.    09/30/2021 - MRI abdomen with and without contrast: Enlarging liver abscesses in segments 3 and 7 which appear to have extended beyond the liver capsule.  Developing empyema along the diaphragmatic pleura, posteriorly.  Extension up along the posterior costal pleura and developing right lower lobe pulmonary abscess.  Elongated subserosal abscess along lesser curvature and antrum of stomach.     09/30/2021 - " CT chest with contrast: Worsening of liver abscesses with extension beyond the liver capsule, especially the right liver abscess above the diaphragm with developing right empyema and right lower lobe pulmonary abscess.  Also extension of the left liver abscess into the subhepatic space with formation of a subserosal gastric wall abscess.  1.6 cm segment left lower lobe nodule, suspicious for malignancy.    10/1/2021 - CT guided percutaneous abscess drainage of liver: Cultures NGTD as of 10/6.    10/2/2021 - MRI pelvis with and without contrast: Sigmoid colon diverticulosis and nonspecific wall thickening     10/4/2021 - CT chest with contrast: Redemonstration right lower lung complex consolidative and low density opacity suspicious for parenchymal abscess or necrosis.  Redemonstration of left lower lobe lung nodule.  Slight decrease in size of liver lesions compared to 9/30.    10/6/2021 - US guided chest tube placed by IR.  Fluid studies pending.      Patient was admitted on 9/29 for cavitary lesion in the lung as well as concern for liver abscess.  Patient reportedly had fever/chills, night sweats.     General surgery, oncology, pulmonary, CT surgery infectious disease consulted, notes reviewed, appreciate assistance.     Patient underwent CT guided abscess drainage on 10/1 with IR that showed frankly purulent drainage.  Cultures are NGTD.     Empyema on the right being followed and monitored by CT surgery, repeat CT scan on 10/5 showed complex consolidative area concerning for abscess or necrosis.  Decreased in size of liver lesions.  IR placed pigtail catheter, fluid studies pending.      Antibiotics per infectious disease.  Currently on cefepime and flagyl.  Antibiotics pending.  Previous blood cultures likely contaminant.       Mildy hyponatremia, little clinical significance at this point.  Will monitor.      Patient had an episode of atrial fibrillation, he has paroxsymal atrial fibrillation and per wife  "does not have a long-history of it.  Anticoagulation when appropriate.  Rate and rhythm currently controlled.      Pulmonary treating with bronchodilators and inhaled corticosteroids.     Ambulate TID.  Incentive spirometer.     Patient also has a concerning pulmonary nodule 1.6-1.8 cm, will need to follow-up this as an outpatient.     D/C IVF as general surgery has placed orders for TPN.     Encourage PO intake.      Continue melatonin to 6 mg.  Focus on day/night cycle.     AM labs.       Patient accepted to [facility] ([facility]) in [place].  Appreciative of Dr. Shah's assistance in this case.  Agree with his assessment that patient really needs source control and likely will be able to accomplish via percutaneous approach.           Physical Exam on Discharge:  /58 (BP Location: Right arm, Patient Position: Sitting)   Pulse 100   Temp 97.6 °F (36.4 °C) (Oral)   Resp 18   Ht 176 cm (69.29\")   Wt 78 kg (172 lb)   SpO2 98%   BMI 25.19 kg/m²   Physical Exam  Vitals and nursing note reviewed.   Constitutional:       Appearance: Normal appearance.   HENT:      Head: Normocephalic and atraumatic.      Nose: No congestion or rhinorrhea.      Mouth/Throat:      Mouth: Mucous membranes are dry.      Pharynx: Oropharynx is clear.   Eyes:      General: No scleral icterus.     Conjunctiva/sclera: Conjunctivae normal.   Cardiovascular:      Rate and Rhythm: Normal rate and regular rhythm.   Pulmonary:      Effort: Pulmonary effort is normal. No respiratory distress.      Breath sounds: Normal breath sounds.      Comments: Diminished at bases   Abdominal:      General: Abdomen is flat. Bowel sounds are normal. There is no distension.      Palpations: Abdomen is soft. There is no mass.   Skin:     General: Skin is warm and dry.      Coloration: Skin is not jaundiced or pale.   Neurological:      General: No focal deficit present.      Mental Status: He is alert and oriented to person, place, and time. "   Psychiatric:         Mood and Affect: Mood normal.         Behavior: Behavior normal.     Condition on Discharge: Stable    [facility]        Discharge Medications:     Discharge Medications      New Medications      Instructions Start Date   albuterol (5 MG/ML) 0.5% nebulizer solution  Commonly known as: PROVENTIL   1.25 mg, Nebulization, 4 Times Daily - RT      budesonide-formoterol 160-4.5 MCG/ACT inhaler  Commonly known as: SYMBICORT   2 puffs, Inhalation, 2 Times Daily - RT      cefepime 2 gm IVPB in 100 ml NS (VTB)   2 g, Intravenous, Every 8 Hours      cetirizine 10 MG tablet  Commonly known as: zyrTEC   10 mg, Oral, Daily   Start Date: October 7, 2021     famotidine 10 MG/ML solution injection  Commonly known as: PEPCID   20 mg, Intravenous, 2 Times Daily      HYDROcodone-homatropine 5-1.5 MG/5ML syrup  Commonly known as: HYCODAN   10 mL, Oral, Every 4 Hours PRN      HYDROmorphone 1 MG/ML injection  Commonly known as: DILAUDID   0.5 mg, Intravenous, Every 3 Hours PRN      ipratropium 0.02 % nebulizer solution  Commonly known as: ATROVENT   0.5 mg, Nebulization, 4 Times Daily - RT      melatonin 3 MG tablet   6 mg, Oral, Nightly      metroNIDAZOLE 5-0.79 MG/ML-% IVPB  Commonly known as: FLAGYL   500 mg, Intravenous, Every 8 Hours         Continue These Medications      Instructions Start Date   bisoprolol 10 MG tablet  Commonly known as: ZEBeta   10 mg, Oral, Daily      DELSYM PO   2 teaspoon(s), Oral, 2 times daily, OTC      fluticasone 50 MCG/ACT nasal spray  Commonly known as: FLONASE   1 spray, Nasal, Daily, Each Nostral      hydroCHLOROthiazide 12.5 MG tablet  Commonly known as: HYDRODIURIL   12.5 mg, Oral, Daily      levothyroxine 100 MCG tablet  Commonly known as: SYNTHROID, LEVOTHROID   100 mcg, Oral, Daily      omeprazole 20 MG capsule  Commonly known as: priLOSEC   20 mg, Oral, Daily      rosuvastatin 20 MG tablet  Commonly known as: CRESTOR    20 mg, Oral, Nightly         Stop These Medications    apixaban 5 MG tablet tablet  Commonly known as: ELIQUIS     Cholecalciferol 100 MCG (4000 UT) capsule     Co Q-10 100 MG capsule     fexofenadine 180 MG tablet  Commonly known as: ALLEGRA     multivitamin tablet tablet  Commonly known as: THERAGRAN     VITAMIN B 12 PO          Discharge Diet:   Diet Instructions     Advance Diet As Tolerated      Diet: Regular; Thin      Discharge Diet: Regular    Fluid Consistency: Thin        Activity at Discharge:   Activity Instructions     Activity as Tolerated            Follow-up Appointments:   Future Appointments   Date Time Provider Department Center   2/14/2022  9:00 AM Vaughn Kramer DO [facility]   3/25/2022 10:00 AM John Diallo APRN [facility]       Test Results Pending at Discharge: Fluid studies, Cultures    Electronically signed by Jan Mcintyre MD, 10/06/21, 16:28 CDT.    Time: 45 minutes.

## 2021-10-06 NOTE — PLAN OF CARE
Goal Outcome Evaluation:  Plan of Care Reviewed With: patient        Progress: no change  Outcome Summary: A & O x 4, denies pain, up ad shobha, self turning, PICC in place, IV ABX continue, TPN @ 35 ml/hr and lipids infusing, c/o cough, prn  cough meds given with relief, sinus davey on tele, safety maintained, voiding per BR, urine specimen sent to lab per orders

## 2021-10-06 NOTE — PROGRESS NOTES
Out of room.  Acceptance at [facility] u noted. Hopefully, he will transfer soon for definitive drainage of the abscesses.

## 2021-10-07 VITALS
HEART RATE: 60 BPM | SYSTOLIC BLOOD PRESSURE: 158 MMHG | HEIGHT: 69 IN | TEMPERATURE: 98 F | BODY MASS INDEX: 25.48 KG/M2 | RESPIRATION RATE: 16 BRPM | DIASTOLIC BLOOD PRESSURE: 70 MMHG | OXYGEN SATURATION: 93 % | WEIGHT: 172 LBS

## 2021-10-07 LAB
BACTERIA SPEC ANAEROBE CULT: NORMAL
CYTO UR: NORMAL
LAB AP CASE REPORT: NORMAL
PATH REPORT.FINAL DX SPEC: NORMAL
PATH REPORT.GROSS SPEC: NORMAL

## 2021-10-07 NOTE — NURSING NOTE
Received call from [facility] that a bed was available. Dr. Marsh notified for D/C orders. Report given to RAFY Bright.

## 2021-10-07 NOTE — PAYOR COMM NOTE
"TRANSFER TO [facility] ON 10-7-21  [id number]   [id number]  Apurva Renee JrJanet (76 y.o. Male)     Date of Birth Social Security Number Address Home Phone MRN    1945  [id number]   [facility] [address] [phone] [id number]    Hinduism Marital Status          Congregational        Admission Date Admission Type Admitting Provider Attending Provider Department, Room/Bed    9/29/21 Urgent Jan Mcintyre MD  [facility] 3A, 353/1    Discharge Date Discharge Disposition Discharge Destination        10/7/2021 Hospice/Medical Facility (DC - External)              Attending Provider: (none)   Allergies: Benadryl [Diphenhydramine], Penicillins, Sulfa Antibiotics, Sulfamethoxazole-trimethoprim    Isolation: None   Infection: None   Code Status: Prior    Ht: 176 cm (69.29\")   Wt: 78 kg (172 lb)    Admission Cmt: None   Principal Problem: Liver abscess [K75.0]                 Active Insurance as of 9/29/2021     Primary Coverage     Payor Plan Insurance Group Employer/Plan Group    HUMANA MEDICARE REPLACEMENT HUMANA MEDICARE REPLACEMENT [id number]     Payor Plan Address Payor Plan Phone Number Payor Plan Fax Number Effective Dates    [address] [address] [phone]  6/1/2019 - None Entered    [facility] [id number]       Subscriber Name Subscriber Birth Date Member ID       APURVA RENEE Janet 1945 [id number]                 Emergency Contacts      (Rel.) Home Phone Work Phone Mobile Phone    ISA RENEE (Spouse) [phone] -- [phone]               Discharge Summary      Jan Mcintyre MD at 10/06/21 1628                [facility] ER Medicine Services  DISCHARGE SUMMARY       Date of Admission: 9/29/2021  Date of Discharge:  10/6/2021  Primary Care Physician: Vaughn Kramer DO    Presenting Problem/History of Present Illness:  Liver lesion    Final Discharge Diagnoses:  Active Hospital Problems    Diagnosis    • **Liver abscess  "   • Hyponatremia    • Hypokalemia    • Empyema lung (HCC)    • Cavitary lesion of lung    • Current use of long term anticoagulation    • Paroxysmal atrial fibrillation (HCC)    • Essential hypertension    • Acquired hypothyroidism    • Gastro-esophageal reflux disease with esophagitis        Consults:   Infectious Disease  Pulmonology  General Surgery   CT Surgery  Interventional Radiology      Procedures Performed:   Aspiration of liver abscess  Percutaneous chest tube placement under US guidance by radiology    Pertinent Test Results:   Results for orders placed during the hospital encounter of 08/31/21    Adult Transthoracic Echo Complete w/ Color, Spectral and Contrast if necessary per protocol    Interpretation Summary  · Left ventricular wall thickness is consistent with mild concentric hypertrophy.  · Left ventricular ejection fraction appears to be 66 - 70%. Left ventricular systolic function is normal.  · Left ventricular diastolic dysfunction is noted.  · Normal right ventricular cavity size and systolic function noted.  · There is no significant (greater than mild) valvular dysfunction.      Imaging Results (Last 7 Days)     Procedure Component Value Units Date/Time    US Tube Placement [[id number]] Collected: 10/06/21 1109     Updated: 10/06/21 1118    Narrative:      EXAM: US TUBE PLACEMENT- - 10/6/2021 10:58 AM CDT     HISTORY: right chest tube placement ; Z74.09-Other reduced mobility;  R26.9-Unspecified abnormalities of gait and mobility       COMPARISON: 10/4/2021     PROCEDURE:  Risks, benefits and alternatives to the procedure were discussed with  the patient and his wife. Specifically, risks of bleeding, infection,  allergy to medication, pneumothorax were discussed with the patient.  Written and verbal informed consent was obtained from the patient. All  of their questions were answered.     Time Out:   Immediately prior to the procedure, a time out was performed. The  patient's name, date of  birth, and procedure to be performed were  verified.     Using ultrasound guidance, a site in the right lower posterior  chest  was selected and prepped in a sterile fashion. Lidocaine was used for  local anesthesia. A 5 Georgian Yueh catheter was inserted into the right  pleural space. The needle was removed and a wire fed through the  catheter. The Yueh catheter was removed and an 8 Georgian pigtail catheter  was placed over the wire. The pigtail was locked in position.      600 mL of clear yellow fluid was withdrawn. Small bore pigtail chest  tube was anchored to the skin with increasing device.      The patient tolerated the procedure well. No evidence of complication.  The patient returned to the floor in stable condition.     Drainage bag containing fluid was returned with the patient to the  floor. This was discussed with the patient's nurse Braxton Blake, who  reports fluid has been sent for analysis.          Impression:      1. Successful ultrasound-guided diagnostic and therapeutic right chest  tube placement with drainage of 600 mL of clear yellow pleural fluid.     This report was finalized on 10/06/2021 11:15 by Dr Theresa Contreras MD.    CT Chest With Contrast Diagnostic [[id number]] Collected: 10/04/21 1424     Updated: 10/04/21 1503    Narrative:      EXAM: CT CHEST W CONTRAST DIAGNOSTIC- - 10/4/2021 1:13 PM CDT     HISTORY: Evaluate right lower lobe opacity; Z74.09-Other reduced  mobility; R26.9-Unspecified abnormalities of gait and mobility       COMPARISON: 9/30/2021.      DOSE LENGTH PRODUCT: 412 mGy cm. Automatic exposure control was utilized  to make radiation dose as low as reasonably achievable.     TECHNIQUE: Enhanced CT images of the chest obtained with multiplanar  reformats.     FINDINGS:      AIRWAYS/PULMONARY PARENCHYMA: The central airways are midline and  patent.      Right greater than left bibasilar consolidative opacities. Right lower  lung with complex low density, suspicious for  parenchymal abscess or  necrosis.     Left lower lobe 1.8 cm nodule, previously 1.6 cm on 9/30/2021.  Difference in measurement may be partially due to differences in  technique.     Mild emphysematous changes.     Moderate right and small left pleural fluid.     VASCULATURE: Thoracic aorta is normal in course and caliber. Moderate  calcified aortic atherosclerosis.  No pulmonary artery filling defect on  this non CTA exam. Normal pulmonary artery caliber.     CARDIAC:  Normal heart size.  No pericardial effusion.  Scattered  coronary artery calcifications.      MEDIASTINUM: There is no mediastinal or hilar lymphadenopathy by CT size  criteria. Esophagus has normal course, caliber and wall thickness.     EXTRATHORACIC: The visualized portions of the thyroid gland are  unremarkable. No thoracic inlet or axillary adenopathy. The  extrathoracic soft tissues are within normal limits.      INCLUDED UPPER ABDOMEN: Right posterior liver with a 4.7 cm  rim-enhancing fluid collection, suspicious for abscess, previously 4.9  cm on 9/30/2021. Left liver rim-enhancing fluid collection measures 3.6  cm, previously 4.1 cm on 9/30/2021. The smaller adjacent lesion measures  1.5 cm, previously 2.0 cm on 9/30/2021. Patent portal vein and hepatic  veins.     BONES: No acute or suspicious bony finding.           Impression:      1. Redemonstration right lower lung complex consolidative and  low-density opacity suspicious for parenchymal abscess or necrosis.  2. Left lower lobe 1.8 cm nodule, previously 1.6 cm. Difference in  measurement may be partially due to differences in technique. Recommend  follow-up.  3. Right greater than left pleural effusions.  4. No rim-enhancing liver lesions, slightly decreased in size compared  to 9/30/2021.  This report was finalized on 10/04/2021 15:00 by Dr Theresa Contreras MD.    MRI Pelvis With & Without Contrast [[id number]] Collected: 10/02/21 1521     Updated: 10/02/21 1532    Narrative:       EXAMINATION:  MRI PELVIS W WO CONTRAST-  10/2/2021 12:20 PM CDT     HISTORY: Follow-up sigmoid lesion; Z74.09-Other reduced mobility;  R26.9-Unspecified abnormalities of gait and mobility      COMPARISON: Abdomen pelvis CT dated 9/10/2021     TECHNIQUE: Multiplanar MR imaging of the pelvis was performed.  Gadolinium enhanced imaging obtained.     FINDINGS:     There is image quality degradation with motion.     As noted on the CT examination there is diffuse segmental wall  thickening of the sigmoid colon with scattered diverticuli.  Circumferential wall thickening associated with diverticular disease can  certainly have this appearance. Underlying neoplasm is not excluded.  Endoscopic characterization suggested.     There is free fluid identified in the pelvis.     No pelvic lymphadenopathy identified.     No acute osseous abnormalities observed.     Urinary bladder is moderately distended.     Prostate gland is mildly prominent.       Impression:      1. Sigmoid colon diverticulosis and nonspecific wall thickening.  This report was finalized on 10/02/2021 15:28 by Dr. Cb Elliott MD.    CT Guided Abscess Drain Liver [[id number]] Collected: 10/01/21 1606     Updated: 10/01/21 1612    Narrative:      CT GUIDED ABSCESS DRAIN LIVER- 10/1/2021 2:27 PM CDT     INDICATION: abscess; Z74.09-Other reduced mobility; R26.9-Unspecified  abnormalities of gait and mobility     CONSENT: An informed written consent was obtained from the patient after  discussing the risks, benefits, potential complications and  alternatives. All questions answered to the patient's satisfaction.  Automated exposure control (AEC) protocols are utilized on the scanner  to ensure dose lowered technique.      COMPLICATIONS: None.      MEDICATIONS: Local analgesia only     CONTRAST: None.      ESTIMATED BLOOD LOSS: Less than 5 ml.      DOSE LENGTH PRODUCT: 1016 mGy cm. Automated exposure control was also  utilized to decrease patient radiation dose.      PROCEDURE: An informed consent was obtained as above. A formal timeout  was performed prior to the procedure. Prior to sterile preparation and  local anesthetic, noninfused axial CT scans were obtained  redemonstrating the 2 dominant liver abscesses. The optimal site for  needle aspiration of the left lobe abscess was marked at the skin. The  skin was cleansed with Betadine swabs and sterilely draped. 1% buffered  lidocaine was used to anesthetize the skin and soft tissues down to the  liver capsule. A tiny dermatotomy was made. A 5 Hungarian 10 cm ViaBill  catheter was advanced into the left lobe liver abscess using CT for  guidance. Approximately 20 cc frankly purulent material was aspirated  from this abscess and set aside for microbiology evaluation.     No immediate complications.        Impression:      Successful CT guided biopsy liver abscess aspiration, yielding  approximately 20 cc frankly purulent material. This sample was set aside  for microbiology evaluation.        This report was finalized on 10/01/2021 16:09 by Dr Arie Leon, .    MRI Abdomen With & Without Contrast [[id number]] Collected: 09/30/21 1703     Updated: 09/30/21 1805    Narrative:      MRI OF ABDOMEN WITH AND WITHOUT CONTRAST     HISTORY: Follow-up sigmoid lesion and liver masses/abscesses?;  Z74.09-Other reduced mobility      COMPARISON: CT scan dated 8/31/2021      TECHNIQUE: Multiplanar, multisequential MR imaging of the abdomen was  performed before and after the intravenous administration of contrast.     FINDINGS:     5.2 cm segment 7 rim-enhancing liver abscess showing prominent diffusion  restriction. There are 2 adjacent liver abscesses in liver segment 3,  the larger measuring 4.3 cm and the smaller measuring 1.2 cm, both  displaying prominent diffusion restriction. There appears to be  extension of these liver abscesses beyond the liver capsule, as there is  subcapsular fluid along the anterior, right lateral and dome of  the  liver and there is also a developing right lower lobe pulmonary abscess  and empyema. There is also a what appears to be a subserosal abscess  along the lesser curvature and antrum of the stomach, again displaying a  prominent diffusion restriction. Differential would include subhepatic  space abscess although this does appear to follow the contour of the  stomach closely and appears to be within the wall.     No gross intraperitoneal free fluid. The gallbladder is nondistended.  The biliary tree is nondilated. No pancreatic lesions are identified.  The spleen, adrenal glands and kidneys are unremarkable. No suspicious  adenopathy in the upper abdomen.       Impression:         1. Enlarging liver abscesses in segments 3 and 7 which appear to have  extended beyond the liver capsule. In particular, there is a developing  empyema along the diaphragmatic pleura, posteriorly, as well as  extending up along the posterior costal pleura and there is also a  developing right lower lobe pulmonary abscess. There is also an  elongated subserosal abscess along the lesser curvature and antrum of  the stomach. Differential would include a subhepatic space abscess  although this very closely follows the path and contour of the stomach  and this does appear to be within the wall of the stomach.     Findings and recommendations were discussed with Dr Gu on 9/30/2021  at 5:01 PM CDT.              This report was finalized on 09/30/2021 18:02 by Dr Arie Leon, .    CT Chest With Contrast Diagnostic [[id number]] Collected: 09/30/21 1628     Updated: 09/30/21 1704    Narrative:      CT CHEST W CONTRAST DIAGNOSTIC- 9/30/2021 3:37 PM CDT     HISTORY: Follow-up lung nodule; Z74.09-Other reduced mobility      COMPARISON: CT scan dated 8/31/2021      DOSE LENGTH PRODUCT: 192 mGy cm. Automated exposure control was also  utilized to decrease patient radiation dose.     TECHNIQUE: Serial helical tomographic images of the chest were  acquired  following the intravenous infusion of contrast. Multiplanar reformatted  images were provided for review.     FINDINGS:      Visualized neck base: The imaged portion of the base of the neck appears  unremarkable.      Lungs: Advanced lung emphysema. 1.6 cm superior segment left lower lobe  pulmonary nodule, concerning for primary lung malignancy. There is a new  small empyema identified in the right lung base overlying the more  posterior aspect of the diaphragmatic pleura as well as extending up  along the posterior costal pleura. Additionally, there is a new cavitary  right lower lobe pneumonia, which appears to be developing into a  pulmonary abscess.     Heart: The heart is normal in size. There is no pericardial effusion.     Vasculature: Thoracic aorta is normal in caliber. No dissection  identified. No flow-limiting stenosis identified at the great vessel  origins. The pulmonary arteries are normal in appearance.     Mediastinum and lymph nodes: There is a mild reactive right hilar and  subcarinal adenopathy.     Skeletal and soft tissues: Chest wall soft tissues are unremarkable. No  acute bony abnormality.       Upper abdomen: 4.9 cm segment 7 liver abscess with subcapsular extension  as well as extension above the diaphragm in the right pleural space.  Enlarging left lobe abscesses as well measuring 4.1 and 2.0 cm, in  segment 3, which also appear to be extended beyond the liver capsule.  There is a new subserosal abscess along the gastric wall, mostly along  the antrum, measuring over 10 cm in long axis..           Impression:      1. Worsening of the patient's liver abscesses with extension beyond the  liver capsule. In particular, there is extension of the right liver  abscess above the diaphragm with developing right empyema and right  lower lobe pulmonary abscess. There is also extension of the left liver  abscess into the subhepatic space with formation of a subserosal gastric  wall  abscess.  2. Lung emphysema with 1.6 cm superior segment left lower lobe nodule  which is concerning for a primary lung malignancy.     Findings and recommendations were discussed with Dr Gu on 9/30/2021  at 5:01 PM CDT.     This report was finalized on 09/30/2021 17:01 by Dr Arie Leon, .          Lab Results (last 7 days)     Procedure Component Value Units Date/Time    Body Fluid Culture - Body Fluid, Pleural Cavity [[id number]] Collected: 10/06/21 1046    Specimen: Body Fluid from Pleural Cavity Updated: 10/06/21 1337     Gram Stain Many (4+) WBCs seen      No organisms seen    Body Fluid Cell Count With Differential - Pleural Fluid, Pleural Cavity [[id number]]  (Abnormal) Collected: 10/06/21 1047    Specimen: Pleural Fluid from Pleural Cavity Updated: 10/06/21 1309    Narrative:      The following orders were created for panel order Body Fluid Cell Count With Differential - Pleural Fluid, Pleural Cavity.  Procedure                               Abnormality         Status                     ---------                               -----------         ------                     Body fluid cell count - ...[[id number]]  Abnormal            Final result               Body fluid differential ...[[id number]]                      Final result               Slide Review, Fluid - Pl...[[id number]]                      Final result                 Please view results for these tests on the individual orders.    Slide Review, Fluid - Pleural Fluid, Pleural Cavity [[id number]] Collected: 10/06/21 1047    Specimen: Pleural Fluid from Pleural Cavity Updated: 10/06/21 1309     Interpretation Acute inflammatory background with reactive mesothelial cell and red blood cells    Non-gynecologic Cytology [[id number]] Collected: 10/06/21 1048    Specimen: Body Fluid from Pleural Cavity Updated: 10/06/21 1256    POC Glucose Once [[id number]]  (Normal) Collected: 10/06/21 1156    Specimen: Blood Updated: 10/06/21 1207     Glucose 127  mg/dL      Comment: : [id number] Bobby SocialSmack AllieMeter ID: [id number]       Body fluid differential - Pleural Fluid, Pleural Cavity [[id number]] Collected: 10/06/21 1047    Specimen: Pleural Fluid from Pleural Cavity Updated: 10/06/21 1206     Neutrophils, Fluid 65 %      Lymphocytes, Fluid 21 %      Monocytes, Fluid 12 %      Unclassified Cells, Fluid 2 %     Body fluid cell count - Pleural Fluid, Pleural Cavity [[id number]]  (Abnormal) Collected: 10/06/21 1047    Specimen: Pleural Fluid from Pleural Cavity Updated: 10/06/21 1206     Color, Fluid Yellow     Appearance, Fluid Slightly Hazy     WBC, Fluid 1,161 /mm3      RBC, Fluid 1,000 /mm3     Anaerobic Culture - Body Fluid, Pleural Cavity [[id number]] Collected: 10/06/21 1047    Specimen: Body Fluid from Pleural Cavity Updated: 10/06/21 1103    Blood Culture With MICHAEL - Blood, Arm, Right [[id number]] Collected: 10/06/21 1050    Specimen: Blood from Arm, Right Updated: 10/06/21 1101    Blood Culture With MICHAEL - Blood, Arm, Left [[id number]] Collected: 10/06/21 1049    Specimen: Blood from Arm, Left Updated: 10/06/21 1101    POC Glucose Once [[id number]]  (Normal) Collected: 10/06/21 0743    Specimen: Blood Updated: 10/06/21 0754     Glucose 127 mg/dL      Comment: : [id number] Bobby Wildruth AllieMeter ID: [id number]       Comprehensive Metabolic Panel [[id number]]  (Abnormal) Collected: 10/06/21 0458    Specimen: Blood Updated: 10/06/21 0635     Glucose 118 mg/dL      BUN 9 mg/dL      Creatinine 0.81 mg/dL      Sodium 134 mmol/L      Potassium 3.5 mmol/L      Chloride 97 mmol/L      CO2 29.0 mmol/L      Calcium 7.7 mg/dL      Total Protein 5.1 g/dL      Albumin 2.20 g/dL      ALT (SGPT) 15 U/L      AST (SGOT) 25 U/L      Alkaline Phosphatase 94 U/L      Total Bilirubin 0.3 mg/dL      eGFR Non African Amer 92 mL/min/1.73      Globulin 2.9 gm/dL      A/G Ratio 0.8 g/dL      BUN/Creatinine Ratio 11.1     Anion Gap 8.0 mmol/L     Narrative:      GFR  Normal >60  Chronic Kidney Disease <60  Kidney Failure <15      Phosphorus [[id number]]  (Normal) Collected: 10/06/21 0458    Specimen: Blood Updated: 10/06/21 0635     Phosphorus 3.3 mg/dL     Magnesium [[id number]]  (Normal) Collected: 10/06/21 0458    Specimen: Blood Updated: 10/06/21 0635     Magnesium 1.8 mg/dL     aPTT [[id number]]  (Abnormal) Collected: 10/06/21 0458    Specimen: Blood Updated: 10/06/21 0620     PTT 45.6 seconds     Protime-INR [[id number]]  (Abnormal) Collected: 10/06/21 0458    Specimen: Blood Updated: 10/06/21 0620     Protime 15.1 Seconds      INR 1.24    CBC & Differential [[id number]]  (Abnormal) Collected: 10/06/21 0458    Specimen: Blood Updated: 10/06/21 0613    Narrative:      The following orders were created for panel order CBC & Differential.  Procedure                               Abnormality         Status                     ---------                               -----------         ------                     CBC Auto Differential[[id number]]        Abnormal            Final result                 Please view results for these tests on the individual orders.    CBC Auto Differential [[id number]]  (Abnormal) Collected: 10/06/21 0458    Specimen: Blood Updated: 10/06/21 0613     WBC 9.66 10*3/mm3      RBC 3.55 10*6/mm3      Hemoglobin 10.4 g/dL      Hematocrit 31.6 %      MCV 89.0 fL      MCH 29.3 pg      MCHC 32.9 g/dL      RDW 15.7 %      RDW-SD 50.4 fl      MPV 9.5 fL      Platelets 450 10*3/mm3      Neutrophil % 72.1 %      Lymphocyte % 11.3 %      Monocyte % 11.7 %      Eosinophil % 2.2 %      Basophil % 0.6 %      Immature Grans % 2.1 %      Neutrophils, Absolute 6.97 10*3/mm3      Lymphocytes, Absolute 1.09 10*3/mm3      Monocytes, Absolute 1.13 10*3/mm3      Eosinophils, Absolute 0.21 10*3/mm3      Basophils, Absolute 0.06 10*3/mm3      Immature Grans, Absolute 0.20 10*3/mm3      nRBC 0.0 /100 WBC     Calcium, Ionized [[id number]]  (Abnormal) Collected: 10/06/21 0526     Specimen: Blood Updated: 10/06/21 0528     Ionized Calcium 4.24 mg/dL      Comment: 84 Value below reference range        Collected by [id number]     Comment: Meter: [id number]     :  [id number]       Legionella Antigen, Urine - Urine, Urine, Clean Catch [[id number]]  (Normal) Collected: 10/05/21 2054    Specimen: Urine, Clean Catch Updated: 10/05/21 2129     LEGIONELLA ANTIGEN, URINE Negative    S. Pneumo Ag Urine or CSF - Urine, Urine, Clean Catch [[id number]]  (Normal) Collected: 10/05/21 2051    Specimen: Urine, Clean Catch Updated: 10/05/21 2128     Strep Pneumo Ag Negative    MRSA Screen, PCR (Inpatient) - Swab, Nares [[id number]]  (Normal) Collected: 10/05/21 1846    Specimen: Swab from Nares Updated: 10/05/21 2003     MRSA PCR No MRSA Detected    Prealbumin [[id number]]  (Abnormal) Collected: 10/05/21 1119    Specimen: Blood Updated: 10/05/21 1852     Prealbumin 5.3 mg/dL     POC Glucose Once [[id number]]  (Normal) Collected: 10/05/21 1816    Specimen: Blood Updated: 10/05/21 1827     Glucose 124 mg/dL      Comment: : [id number] Lou CalleyMeter ID: [id number]       aPTT [[id number]]  (Abnormal) Collected: 10/05/21 1219    Specimen: Blood Updated: 10/05/21 1236     PTT 58.1 seconds     Protime-INR [[id number]]  (Abnormal) Collected: 10/05/21 1219    Specimen: Blood Updated: 10/05/21 1236     Protime 16.1 Seconds      INR 1.34    Cholesterol, Total [[id number]]  (Normal) Collected: 10/05/21 1120    Specimen: Blood Updated: 10/05/21 1202     Total Cholesterol 76 mg/dL     Narrative:      Cholesterol Reference Ranges    (U.S. Department fo Health and Human Services ATP III Classifications)    Desirable        <200 mg/dl  Borderline High  200-239 mg/dl  High Risk        >240 mg/dl    C-reactive Protein [[id number]]  (Abnormal) Collected: 10/05/21 1120    Specimen: Blood Updated: 10/05/21 1202     C-Reactive Protein 8.86 mg/dL     Triglycerides [[id number]]  (Normal) Collected: 10/05/21 1120    Specimen:  Blood Updated: 10/05/21 1202     Triglycerides 112 mg/dL     Phosphorus [[id number]]  (Normal) Collected: 10/05/21 1120    Specimen: Blood Updated: 10/05/21 1202     Phosphorus 3.4 mg/dL     Magnesium [[id number]]  (Normal) Collected: 10/05/21 1120    Specimen: Blood Updated: 10/05/21 1202     Magnesium 1.9 mg/dL     POC Glucose Once [[id number]]  (Normal) Collected: 10/05/21 1131    Specimen: Blood Updated: 10/05/21 1143     Glucose 97 mg/dL      Comment: : [id number] Bobby Betts AllieMeter ID: [id number]       Calcium, Ionized [[id number]]  (Abnormal) Collected: 10/05/21 1119    Specimen: Blood Updated: 10/05/21 1139     Ionized Calcium 4.22 mg/dL      Comment: 84 Value below reference range        Collected by [id number]     Comment: Meter: [id number]     :  [id number]       Body Fluid Culture - Body Fluid, Liver [[id number]] Collected: 10/01/21 1528    Specimen: Body Fluid from Liver Updated: 10/05/21 0916     Body Fluid Culture No growth at 3 days     Gram Stain Many (4+) WBCs per low power field      No organisms seen    Anaerobic Culture - Aspirate, Liver [[id number]] Collected: 10/01/21 1528    Specimen: Aspirate from Liver Updated: 10/05/21 0606     Anaerobic Culture No anaerobes isolated at 3 days    Comprehensive Metabolic Panel [[id number]]  (Abnormal) Collected: 10/05/21 0436    Specimen: Blood Updated: 10/05/21 0602     Glucose 88 mg/dL      BUN 8 mg/dL      Creatinine 0.70 mg/dL      Sodium 132 mmol/L      Potassium 3.4 mmol/L      Chloride 96 mmol/L      CO2 25.0 mmol/L      Calcium 7.6 mg/dL      Total Protein 5.4 g/dL      Albumin 2.00 g/dL      ALT (SGPT) 15 U/L      AST (SGOT) 25 U/L      Alkaline Phosphatase 81 U/L      Total Bilirubin 0.5 mg/dL      eGFR Non African Amer 109 mL/min/1.73      Globulin 3.4 gm/dL      A/G Ratio 0.6 g/dL      BUN/Creatinine Ratio 11.4     Anion Gap 11.0 mmol/L     Narrative:      GFR Normal >60  Chronic Kidney Disease <60  Kidney Failure <15       "C-reactive Protein [[id number]]  (Abnormal) Collected: 10/05/21 0436    Specimen: Blood Updated: 10/05/21 0602     C-Reactive Protein 9.13 mg/dL     Procalcitonin [[id number]]  (Abnormal) Collected: 10/05/21 0436    Specimen: Blood Updated: 10/05/21 0602     Procalcitonin 0.35 ng/mL     Narrative:      As a Marker for Sepsis (Non-Neonates):     1. <0.5 ng/mL represents a low risk of severe sepsis and/or septic shock.  2. >2 ng/mL represents a high risk of severe sepsis and/or septic shock.    As a Marker for Lower Respiratory Tract Infections that require antibiotic therapy:  PCT on Admission     Antibiotic Therapy             6-12 Hrs later  >0.5                          Strongly Recommended            >0.25 - <0.5             Recommended  0.1 - 0.25                  Discouraged                       Remeasure/reassess PCT  <0.1                         Strongly Discouraged         Remeasure/reassess PCT      As 28 day mortality risk marker: \"Change in Procalcitonin Result\" (>80% or <=80%) if Day 0 (or Day 1) and Day 4 values are available. Refer to http://www.[employer]pct-calculator.com/    Change in PCT <=80 %   A decrease of PCT levels below or equal to 80% defines a positive change in PCT test result representing a higher risk for 28-day all-cause mortality of patients diagnosed with severe sepsis or septic shock.    Change in PCT >80 %   A decrease of PCT levels of more than 80% defines a negative change in PCT result representing a lower risk for 28-day all-cause mortality of patients diagnosed with severe sepsis or septic shock.                CBC & Differential [[id number]]  (Abnormal) Collected: 10/05/21 0436    Specimen: Blood Updated: 10/05/21 0538    Narrative:      The following orders were created for panel order CBC & Differential.  Procedure                               Abnormality         Status                     ---------                               -----------         ------                   "   CBC Auto Differential[[id number]]        Abnormal            Final result                 Please view results for these tests on the individual orders.    CBC Auto Differential [[id number]]  (Abnormal) Collected: 10/05/21 0436    Specimen: Blood Updated: 10/05/21 0538     WBC 10.74 10*3/mm3      RBC 3.51 10*6/mm3      Hemoglobin 10.0 g/dL      Hematocrit 30.6 %      MCV 87.2 fL      MCH 28.5 pg      MCHC 32.7 g/dL      RDW 15.4 %      RDW-SD 48.3 fl      MPV 9.6 fL      Platelets 432 10*3/mm3      Neutrophil % 71.8 %      Lymphocyte % 12.2 %      Monocyte % 12.4 %      Eosinophil % 1.3 %      Basophil % 0.6 %      Immature Grans % 1.7 %      Neutrophils, Absolute 7.72 10*3/mm3      Lymphocytes, Absolute 1.31 10*3/mm3      Monocytes, Absolute 1.33 10*3/mm3      Eosinophils, Absolute 0.14 10*3/mm3      Basophils, Absolute 0.06 10*3/mm3      Immature Grans, Absolute 0.18 10*3/mm3      nRBC 0.0 /100 WBC     Blood Culture With MICHAEL - Blood, Arm, Left [[id number]] Collected: 09/29/21 1924    Specimen: Blood from Arm, Left Updated: 10/04/21 2100     Blood Culture No growth at 5 days    Comprehensive Metabolic Panel [[id number]]  (Abnormal) Collected: 10/04/21 0435    Specimen: Blood Updated: 10/04/21 0538     Glucose 96 mg/dL      BUN 9 mg/dL      Creatinine 0.74 mg/dL      Sodium 129 mmol/L      Potassium 3.6 mmol/L      Chloride 94 mmol/L      CO2 27.0 mmol/L      Calcium 7.6 mg/dL      Total Protein 5.4 g/dL      Albumin 2.10 g/dL      ALT (SGPT) 17 U/L      AST (SGOT) 24 U/L      Alkaline Phosphatase 92 U/L      Total Bilirubin 0.5 mg/dL      eGFR Non African Amer 103 mL/min/1.73      Globulin 3.3 gm/dL      A/G Ratio 0.6 g/dL      BUN/Creatinine Ratio 12.2     Anion Gap 8.0 mmol/L     Narrative:      GFR Normal >60  Chronic Kidney Disease <60  Kidney Failure <15      CBC & Differential [[id number]]  (Abnormal) Collected: 10/04/21 0435    Specimen: Blood Updated: 10/04/21 0521    Narrative:      The following orders were  created for panel order CBC & Differential.  Procedure                               Abnormality         Status                     ---------                               -----------         ------                     CBC Auto Differential[[id number]]        Abnormal            Final result                 Please view results for these tests on the individual orders.    CBC Auto Differential [[id number]]  (Abnormal) Collected: 10/04/21 0435    Specimen: Blood Updated: 10/04/21 0521     WBC 13.16 10*3/mm3      RBC 3.57 10*6/mm3      Hemoglobin 10.1 g/dL      Hematocrit 30.8 %      MCV 86.3 fL      MCH 28.3 pg      MCHC 32.8 g/dL      RDW 15.0 %      RDW-SD 46.5 fl      MPV 9.6 fL      Platelets 454 10*3/mm3      Neutrophil % 76.0 %      Lymphocyte % 9.6 %      Monocyte % 12.2 %      Eosinophil % 0.7 %      Basophil % 0.4 %      Immature Grans % 1.1 %      Neutrophils, Absolute 10.01 10*3/mm3      Lymphocytes, Absolute 1.26 10*3/mm3      Monocytes, Absolute 1.60 10*3/mm3      Eosinophils, Absolute 0.09 10*3/mm3      Basophils, Absolute 0.05 10*3/mm3      Immature Grans, Absolute 0.15 10*3/mm3      nRBC 0.0 /100 WBC     Comprehensive Metabolic Panel [[id number]]  (Abnormal) Collected: 10/03/21 0409    Specimen: Blood Updated: 10/03/21 0508     Glucose 140 mg/dL      BUN 10 mg/dL      Creatinine 0.74 mg/dL      Sodium 133 mmol/L      Potassium 3.2 mmol/L      Chloride 98 mmol/L      CO2 27.0 mmol/L      Calcium 7.5 mg/dL      Total Protein 5.0 g/dL      Albumin 1.90 g/dL      ALT (SGPT) 19 U/L      AST (SGOT) 29 U/L      Alkaline Phosphatase 104 U/L      Total Bilirubin 0.4 mg/dL      eGFR Non African Amer 103 mL/min/1.73      Globulin 3.1 gm/dL      A/G Ratio 0.6 g/dL      BUN/Creatinine Ratio 13.5     Anion Gap 8.0 mmol/L     Narrative:      GFR Normal >60  Chronic Kidney Disease <60  Kidney Failure <15      CBC & Differential [[id number]]  (Abnormal) Collected: 10/03/21 0409    Specimen: Blood Updated: 10/03/21 0448     Narrative:      The following orders were created for panel order CBC & Differential.  Procedure                               Abnormality         Status                     ---------                               -----------         ------                     CBC Auto Differential[[id number]]        Abnormal            Final result                 Please view results for these tests on the individual orders.    CBC Auto Differential [[id number]]  (Abnormal) Collected: 10/03/21 0409    Specimen: Blood Updated: 10/03/21 0448     WBC 11.42 10*3/mm3      RBC 3.33 10*6/mm3      Hemoglobin 9.4 g/dL      Hematocrit 28.6 %      MCV 85.9 fL      MCH 28.2 pg      MCHC 32.9 g/dL      RDW 14.9 %      RDW-SD 46.5 fl      MPV 9.6 fL      Platelets 459 10*3/mm3      Neutrophil % 75.1 %      Lymphocyte % 10.9 %      Monocyte % 11.7 %      Eosinophil % 0.7 %      Basophil % 0.5 %      Immature Grans % 1.1 %      Neutrophils, Absolute 8.57 10*3/mm3      Lymphocytes, Absolute 1.24 10*3/mm3      Monocytes, Absolute 1.34 10*3/mm3      Eosinophils, Absolute 0.08 10*3/mm3      Basophils, Absolute 0.06 10*3/mm3      Immature Grans, Absolute 0.13 10*3/mm3      nRBC 0.0 /100 WBC     KOH Prep - Body Fluid, Liver [[id number]] Collected: 10/01/21 1538    Specimen: Body Fluid from Liver Updated: 10/02/21 1037     KOH Prep No yeast or hyphal elements seen    Fungus Culture - Body Fluid, Liver [[id number]] Collected: 10/01/21 1538    Specimen: Body Fluid from Liver Updated: 10/02/21 0956    Blood Culture With MICHAEL - Blood, Arm, Right [[id number]]  (Abnormal) Collected: 09/29/21 1924    Specimen: Blood from Arm, Right Updated: 10/02/21 0715     Blood Culture Staphylococcus, coagulase negative     Comment: Probable contaminant requires clinical correlation, susceptibility not performed unless requested by physician.          Isolated from Aerobic Bottle     Gram Stain Aerobic Bottle Gram positive cocci in clusters    Comprehensive Metabolic Panel  [[id number]]  (Abnormal) Collected: 10/02/21 0424    Specimen: Blood Updated: 10/02/21 0536     Glucose 92 mg/dL      BUN 10 mg/dL      Creatinine 0.64 mg/dL      Sodium 132 mmol/L      Potassium 3.6 mmol/L      Chloride 96 mmol/L      CO2 27.0 mmol/L      Calcium 7.6 mg/dL      Total Protein 5.1 g/dL      Albumin 1.80 g/dL      ALT (SGPT) 19 U/L      AST (SGOT) 29 U/L      Alkaline Phosphatase 96 U/L      Total Bilirubin 0.6 mg/dL      eGFR Non African Amer 121 mL/min/1.73      Globulin 3.3 gm/dL      A/G Ratio 0.5 g/dL      BUN/Creatinine Ratio 15.6     Anion Gap 9.0 mmol/L     Narrative:      GFR Normal >60  Chronic Kidney Disease <60  Kidney Failure <15      CBC & Differential [[id number]]  (Abnormal) Collected: 10/02/21 0424    Specimen: Blood Updated: 10/02/21 0509    Narrative:      The following orders were created for panel order CBC & Differential.  Procedure                               Abnormality         Status                     ---------                               -----------         ------                     CBC Auto Differential[[id number]]        Abnormal            Final result                 Please view results for these tests on the individual orders.    CBC Auto Differential [[id number]]  (Abnormal) Collected: 10/02/21 0424    Specimen: Blood Updated: 10/02/21 0509     WBC 14.51 10*3/mm3      RBC 3.30 10*6/mm3      Hemoglobin 9.6 g/dL      Hematocrit 29.0 %      MCV 87.9 fL      MCH 29.1 pg      MCHC 33.1 g/dL      RDW 14.6 %      RDW-SD 46.9 fl      MPV 10.1 fL      Platelets 418 10*3/mm3      Neutrophil % 82.1 %      Lymphocyte % 7.5 %      Monocyte % 9.0 %      Eosinophil % 0.3 %      Basophil % 0.2 %      Immature Grans % 0.9 %      Neutrophils, Absolute 11.91 10*3/mm3      Lymphocytes, Absolute 1.09 10*3/mm3      Monocytes, Absolute 1.30 10*3/mm3      Eosinophils, Absolute 0.05 10*3/mm3      Basophils, Absolute 0.03 10*3/mm3      Immature Grans, Absolute 0.13 10*3/mm3      nRBC 0.0 /100  WBC     Vancomycin, Trough Please call results to Pharmacy prior to administering next dose [[id number]]  (Abnormal) Collected: 10/01/21 0734    Specimen: Blood Updated: 10/01/21 0809     Vancomycin Trough 4.80 mcg/mL     Comprehensive Metabolic Panel [[id number]]  (Abnormal) Collected: 10/01/21 0423    Specimen: Blood Updated: 10/01/21 0537     Glucose 92 mg/dL      BUN 13 mg/dL      Creatinine 0.63 mg/dL      Sodium 130 mmol/L      Potassium 3.2 mmol/L      Chloride 95 mmol/L      CO2 26.0 mmol/L      Calcium 7.6 mg/dL      Total Protein 5.0 g/dL      Albumin 1.90 g/dL      ALT (SGPT) 19 U/L      AST (SGOT) 24 U/L      Alkaline Phosphatase 95 U/L      Total Bilirubin 0.6 mg/dL      eGFR Non African Amer 123 mL/min/1.73      Globulin 3.1 gm/dL      A/G Ratio 0.6 g/dL      BUN/Creatinine Ratio 20.6     Anion Gap 9.0 mmol/L     Narrative:      GFR Normal >60  Chronic Kidney Disease <60  Kidney Failure <15      Iron Profile [[id number]]  (Abnormal) Collected: 10/01/21 0423    Specimen: Blood Updated: 10/01/21 0534     Iron 17 mcg/dL      Iron Saturation 12 %      Transferrin 94 mg/dL      TIBC 140 mcg/dL     Protime-INR [[id number]]  (Abnormal) Collected: 10/01/21 0423    Specimen: Blood Updated: 10/01/21 0507     Protime 16.4 Seconds      INR 1.38    CBC & Differential [[id number]]  (Abnormal) Collected: 10/01/21 0423    Specimen: Blood Updated: 10/01/21 0501    Narrative:      The following orders were created for panel order CBC & Differential.  Procedure                               Abnormality         Status                     ---------                               -----------         ------                     CBC Auto Differential[[id number]]        Abnormal            Final result                 Please view results for these tests on the individual orders.    CBC Auto Differential [[id number]]  (Abnormal) Collected: 10/01/21 0423    Specimen: Blood Updated: 10/01/21 0501     WBC 15.85 10*3/mm3      RBC 3.03  10*6/mm3      Hemoglobin 9.0 g/dL      Hematocrit 26.8 %      MCV 88.4 fL      MCH 29.7 pg      MCHC 33.6 g/dL      RDW 14.7 %      RDW-SD 47.0 fl      MPV 9.8 fL      Platelets 380 10*3/mm3      Neutrophil % 83.0 %      Lymphocyte % 8.3 %      Monocyte % 7.6 %      Eosinophil % 0.3 %      Basophil % 0.1 %      Immature Grans % 0.7 %      Neutrophils, Absolute 13.15 10*3/mm3      Lymphocytes, Absolute 1.32 10*3/mm3      Monocytes, Absolute 1.21 10*3/mm3      Eosinophils, Absolute 0.04 10*3/mm3      Basophils, Absolute 0.02 10*3/mm3      Immature Grans, Absolute 0.11 10*3/mm3      nRBC 0.0 /100 WBC     C-reactive Protein [[id number]]  (Abnormal) Collected: 09/30/21 1843    Specimen: Blood Updated: 10/01/21 0231     C-Reactive Protein 22.08 mg/dL     Folate [[id number]]  (Normal) Collected: 09/30/21 0526    Specimen: Blood Updated: 09/30/21 2331     Folate 11.50 ng/mL     Narrative:      Results may be falsely increased if patient taking Biotin.      Vitamin B12 [[id number]]  (Abnormal) Collected: 09/30/21 0526    Specimen: Blood Updated: 09/30/21 2331     Vitamin B-12 >2,000 pg/mL     Narrative:      Results may be falsely increased if patient taking Biotin.      CEA [[id number]] Collected: 09/30/21 1036    Specimen: Blood Updated: 09/30/21 2237     CEA 3.32 ng/mL     Narrative:      CEA Reference Range:    Non Smokers:   Less than 3 ng/mL  Smokers:       Less than 5 ng/mL  Results may be falsely decreased if patient taking Biotin.      Blood Culture ID, PCR - Blood, Arm, Right [[id number]]  (Abnormal) Collected: 09/29/21 1924    Specimen: Blood from Arm, Right Updated: 09/30/21 2016     BCID, PCR Staph spp, not aureus or lugdunesis. Identification by BCID2 PCR.     BOTTLE TYPE Aerobic Bottle    Basic Metabolic Panel [[id number]]  (Abnormal) Collected: 09/30/21 1843    Specimen: Blood Updated: 09/30/21 1923     Glucose 109 mg/dL      BUN 15 mg/dL      Creatinine 0.72 mg/dL      Sodium 131 mmol/L      Potassium 3.2 mmol/L   "    Chloride 94 mmol/L      CO2 29.0 mmol/L      Calcium 7.8 mg/dL      eGFR Non African Amer 106 mL/min/1.73      BUN/Creatinine Ratio 20.8     Anion Gap 8.0 mmol/L     Narrative:      GFR Normal >60  Chronic Kidney Disease <60  Kidney Failure <15      Procalcitonin [[id number]]  (Abnormal) Collected: 09/30/21 1843    Specimen: Blood Updated: 09/30/21 1922     Procalcitonin 0.54 ng/mL     Narrative:      As a Marker for Sepsis (Non-Neonates):     1. <0.5 ng/mL represents a low risk of severe sepsis and/or septic shock.  2. >2 ng/mL represents a high risk of severe sepsis and/or septic shock.    As a Marker for Lower Respiratory Tract Infections that require antibiotic therapy:  PCT on Admission     Antibiotic Therapy             6-12 Hrs later  >0.5                          Strongly Recommended            >0.25 - <0.5             Recommended  0.1 - 0.25                  Discouraged                       Remeasure/reassess PCT  <0.1                         Strongly Discouraged         Remeasure/reassess PCT      As 28 day mortality risk marker: \"Change in Procalcitonin Result\" (>80% or <=80%) if Day 0 (or Day 1) and Day 4 values are available. Refer to http://www.[employer]-pct-calculator.com/    Change in PCT <=80 %   A decrease of PCT levels below or equal to 80% defines a positive change in PCT test result representing a higher risk for 28-day all-cause mortality of patients diagnosed with severe sepsis or septic shock.    Change in PCT >80 %   A decrease of PCT levels of more than 80% defines a negative change in PCT result representing a lower risk for 28-day all-cause mortality of patients diagnosed with severe sepsis or septic shock.                Lactic Acid, Plasma [[id number]]  (Normal) Collected: 09/30/21 1843    Specimen: Blood Updated: 09/30/21 1913     Lactate 1.3 mmol/L     Ferritin [[id number]]  (Abnormal) Collected: 09/30/21 0526    Specimen: Blood Updated: 09/30/21 1231     Ferritin 822.70 ng/mL     " Narrative:      Results may be falsely decreased if patient taking Biotin.      Protime-INR [[id number]]  (Abnormal) Collected: 09/30/21 1036    Specimen: Blood Updated: 09/30/21 1058     Protime 17.0 Seconds      INR 1.44    Magnesium [[id number]]  (Normal) Collected: 09/30/21 0526    Specimen: Blood Updated: 09/30/21 0733     Magnesium 1.9 mg/dL     Comprehensive Metabolic Panel [[id number]]  (Abnormal) Collected: 09/30/21 0526    Specimen: Blood Updated: 09/30/21 0709     Glucose 112 mg/dL      BUN 17 mg/dL      Creatinine 0.77 mg/dL      Sodium 131 mmol/L      Potassium 2.4 mmol/L      Chloride 93 mmol/L      CO2 29.0 mmol/L      Calcium 7.5 mg/dL      Total Protein 5.3 g/dL      Albumin 2.00 g/dL      ALT (SGPT) 23 U/L      AST (SGOT) 24 U/L      Alkaline Phosphatase 105 U/L      Total Bilirubin 0.7 mg/dL      eGFR Non African Amer 98 mL/min/1.73      Globulin 3.3 gm/dL      A/G Ratio 0.6 g/dL      BUN/Creatinine Ratio 22.1     Anion Gap 9.0 mmol/L     Narrative:      GFR Normal >60  Chronic Kidney Disease <60  Kidney Failure <15      Lipid Panel [[id number]]  (Abnormal) Collected: 09/30/21 0526    Specimen: Blood Updated: 09/30/21 0702     Total Cholesterol 97 mg/dL      Triglycerides 111 mg/dL      HDL Cholesterol 19 mg/dL      LDL Cholesterol  57 mg/dL      VLDL Cholesterol 21 mg/dL      LDL/HDL Ratio 2.94    Narrative:      Cholesterol Reference Ranges  (U.S. Department of Health and Human Services ATP III Classifications)    Desirable          <200 mg/dL  Borderline High    200-239 mg/dL  High Risk          >240 mg/dL      Triglyceride Reference Ranges  (U.S. Department of Health and Human Services ATP III Classifications)    Normal           <150 mg/dL  Borderline High  150-199 mg/dL  High             200-499 mg/dL  Very High        >500 mg/dL    HDL Reference Ranges  (U.S. Department of Health and Human Services ATP III Classifcations)    Low     <40 mg/dl (major risk factor for CHD)  High    >60 mg/dl  "('negative' risk factor for CHD)        LDL Reference Ranges  (U.S. Department of Health and Human Services ATP III Classifcations)    Optimal          <100 mg/dL  Near Optimal     100-129 mg/dL  Borderline High  130-159 mg/dL  High             160-189 mg/dL  Very High        >189 mg/dL    Hemoglobin A1c [[id number]]  (Abnormal) Collected: 09/30/21 0526    Specimen: Blood Updated: 09/30/21 0702     Hemoglobin A1C 6.00 %     Narrative:      Hemoglobin A1C Ranges:    Increased Risk for Diabetes  5.7% to 6.4%  Diabetes                     >= 6.5%  Diabetic Goal                < 7.0%    CBC Auto Differential [[id number]]  (Abnormal) Collected: 09/30/21 0526    Specimen: Blood Updated: 09/30/21 0639     WBC 23.66 10*3/mm3      RBC 3.21 10*6/mm3      Hemoglobin 9.5 g/dL      Hematocrit 28.5 %      MCV 88.8 fL      MCH 29.6 pg      MCHC 33.3 g/dL      RDW 14.6 %      RDW-SD 47.2 fl      MPV 10.7 fL      Platelets 376 10*3/mm3      Neutrophil % 86.3 %      Lymphocyte % 5.6 %      Monocyte % 6.6 %      Eosinophil % 0.0 %      Basophil % 0.1 %      Immature Grans % 1.4 %      Neutrophils, Absolute 20.41 10*3/mm3      Lymphocytes, Absolute 1.33 10*3/mm3      Monocytes, Absolute 1.56 10*3/mm3      Eosinophils, Absolute 0.01 10*3/mm3      Basophils, Absolute 0.02 10*3/mm3      Immature Grans, Absolute 0.33 10*3/mm3      nRBC 0.0 /100 WBC           HPI 9/29/2021:  Dr. Gu:  \"Patient is a 76-year-old  male has not been feeling well for last 3 weeks.  Previously with diagnosed cavitary lesion, Dr. Canchola put patient on Levaquin antibiotic for 5 days and schedule for repeat CT scan to reevaluate.  Patient also received 1 g of Rocephin on Friday in ER for coughing/congestion/chills.  Patient stated when he is on antibiotics he feels better.  Patient has been feeling poorly since his blood pressure medication has been changed. CT scan shows questionable liver mass with cavitary lung nodule and a finding on the sigmoid colon " "unexplained. Patient has colonoscopy 9/28/2021-no evidence of cancer.     Masses of the liver possibly represent abscess of the liver per Dr. Leon-radiologist from Dr. Maria's note. History of fever night sweats chills fatigue for last couple weeks.  Note from Dr. Maria \" I spoke with Dr. English regarding possible approaches to manage source control of his abscesses, but he felt that possible aspiration and more likely long-term antibiotic therapy would be warranted and would be best served and patient\"     Patient was a direct admit by recommendation of Dr. Kramer and Dr. Curry to start IV antibiotics daily.\"    Hospital Course:     08/31/2021 - CT angiogram chest: No PE.  Suspicious 1.5 cm cavitary left lower lobe pulmonary nodule in the superior segment.  Tiny 3 mm subpleural right lower lobe nodule.  Small lymph nodes in the lisa hepatis region in the upper abdomen.     09/10/2021 - Liver Ultrasound:  2 suspicious masses in the liver, 1 in the left hepatic lobe 5.3 cm, 1 in the dome of the right hepatic lobe 6.4 cm.  Hepatic metastasis not excluded.  Unremarkable gallbladder, no biliary ductal dilatation identified.     09/10/2021 - CT abdomen/pelvis with and without contrast:   2 cm segmental thickening of the sigmoid colon concerning for malignancy.  2 large heterogenous liver lesions (right posterior liver 5.5 cm, predominantly centrally hypodense lesion; left liver lobe with similar-appearing 4.8 cm liver lesion) most concerning for metastatic disease     09/23/2021 - Dr. Kramer office with chills and shaking in the evenings lasting 60 to 90 minutes at a time associated with weakness, shortness of breath and a dry cough.  Previous Covid testing hospital was negative.     09/23/2021 - hemoglobin 12.3, MCV 88.3, platelets 280,000, WBC 29,000 with 88 point 6 segs.  BUN 31, creatinine 1.16, albumin 2.7, ALT 42, alk phos 197, GFR 61.  ESR 26 (elevated).  Manual blood smear showed leukocytosis with 45% " "segs, 31% bands, 8 lymphocytes, 1 monocyte, 11 metamyelocytes, 4 myelocytes, 1 nucleated red blood cell.  Comment: Significant leukocytosis with granulocytic left shift with vacuolated granulocytes concerning for bacterial infection      09/24/2021 - Presented to the ER on 9/24.  Elevated procalcitonin.  Given a dose of ceftriaxone and discharged home.    09/28/2021 - Colonoscopy - Impression: 3 diminutive polyps in the rectum removed with hot snare.  3 diminutive polyps at the splenic flexure, removed with hot snare.  Diverticulosis in the sigmoid colon and in the descending colon.  Distal rectum and anal verge are normal on retroflexion view.    09/29/2021 - Office visit with Dr. Kramer.  Dr. Kramer called radiologist Dr. Leon who reviewed the images and felt as though the \"masses\" were more likely to be abscesses and Dr. Kramer felt as though this clinically was more likely given presentation and symptoms.  Dr. Kramer spoke with Dr. English (general surgery) who indicated an MRI would be beneficial and possible percutaneous drainage in addition to IV antibiotics.  Patient presented as direct admission to the hospital service, accepting Dr. Hancock.    09/30/2021 - MRI abdomen with and without contrast: Enlarging liver abscesses in segments 3 and 7 which appear to have extended beyond the liver capsule.  Developing empyema along the diaphragmatic pleura, posteriorly.  Extension up along the posterior costal pleura and developing right lower lobe pulmonary abscess.  Elongated subserosal abscess along lesser curvature and antrum of stomach.     09/30/2021 - CT chest with contrast: Worsening of liver abscesses with extension beyond the liver capsule, especially the right liver abscess above the diaphragm with developing right empyema and right lower lobe pulmonary abscess.  Also extension of the left liver abscess into the subhepatic space with formation of a subserosal gastric wall abscess.  1.6 cm segment left " lower lobe nodule, suspicious for malignancy.    10/1/2021 - CT guided percutaneous abscess drainage of liver: Cultures NGTD as of 10/6.    10/2/2021 - MRI pelvis with and without contrast: Sigmoid colon diverticulosis and nonspecific wall thickening     10/4/2021 - CT chest with contrast: Redemonstration right lower lung complex consolidative and low density opacity suspicious for parenchymal abscess or necrosis.  Redemonstration of left lower lobe lung nodule.  Slight decrease in size of liver lesions compared to 9/30.    10/6/2021 - US guided chest tube placed by IR.  Fluid studies pending.      Patient was admitted on 9/29 for cavitary lesion in the lung as well as concern for liver abscess.  Patient reportedly had fever/chills, night sweats.     General surgery, oncology, pulmonary, CT surgery infectious disease consulted, notes reviewed, appreciate assistance.     Patient underwent CT guided abscess drainage on 10/1 with IR that showed frankly purulent drainage.  Cultures are NGTD.     Empyema on the right being followed and monitored by CT surgery, repeat CT scan on 10/5 showed complex consolidative area concerning for abscess or necrosis.  Decreased in size of liver lesions.  IR placed pigtail catheter, fluid studies pending.      Antibiotics per infectious disease.  Currently on cefepime and flagyl.  Antibiotics pending.  Previous blood cultures likely contaminant.       Mildy hyponatremia, little clinical significance at this point.  Will monitor.      Patient had an episode of atrial fibrillation, he has paroxsymal atrial fibrillation and per wife does not have a long-history of it.  Anticoagulation when appropriate.  Rate and rhythm currently controlled.      Pulmonary treating with bronchodilators and inhaled corticosteroids.     Ambulate TID.  Incentive spirometer.     Patient also has a concerning pulmonary nodule 1.6-1.8 cm, will need to follow-up this as an outpatient.     D/C IVF as general surgery  "has placed orders for TPN.     Encourage PO intake.      Continue melatonin to 6 mg.  Focus on day/night cycle.     AM labs.       Patient accepted to [facility] in [place].  Appreciative of Dr. Shah's assistance in this case.  Agree with his assessment that patient really needs source control and likely will be able to accomplish via percutaneous approach.           Physical Exam on Discharge:  /58 (BP Location: Right arm, Patient Position: Sitting)   Pulse 100   Temp 97.6 °F (36.4 °C) (Oral)   Resp 18   Ht 176 cm (69.29\")   Wt 78 kg (172 lb)   SpO2 98%   BMI 25.19 kg/m²   Physical Exam  Vitals and nursing note reviewed.   Constitutional:       Appearance: Normal appearance.   HENT:      Head: Normocephalic and atraumatic.      Nose: No congestion or rhinorrhea.      Mouth/Throat:      Mouth: Mucous membranes are dry.      Pharynx: Oropharynx is clear.   Eyes:      General: No scleral icterus.     Conjunctiva/sclera: Conjunctivae normal.   Cardiovascular:      Rate and Rhythm: Normal rate and regular rhythm.   Pulmonary:      Effort: Pulmonary effort is normal. No respiratory distress.      Breath sounds: Normal breath sounds.      Comments: Diminished at bases   Abdominal:      General: Abdomen is flat. Bowel sounds are normal. There is no distension.      Palpations: Abdomen is soft. There is no mass.   Skin:     General: Skin is warm and dry.      Coloration: Skin is not jaundiced or pale.   Neurological:      General: No focal deficit present.      Mental Status: He is alert and oriented to person, place, and time.   Psychiatric:         Mood and Affect: Mood normal.         Behavior: Behavior normal.     Condition on Discharge: Stable    [facility]        Discharge Medications:     Discharge Medications      New Medications      Instructions Start Date   albuterol (5 MG/ML) 0.5% nebulizer solution  Commonly known as: " PROVENTIL   1.25 mg, Nebulization, 4 Times Daily - RT      budesonide-formoterol 160-4.5 MCG/ACT inhaler  Commonly known as: SYMBICORT   2 puffs, Inhalation, 2 Times Daily - RT      cefepime 2 gm IVPB in 100 ml NS (VTB)   2 g, Intravenous, Every 8 Hours      cetirizine 10 MG tablet  Commonly known as: zyrTEC   10 mg, Oral, Daily   Start Date: October 7, 2021     famotidine 10 MG/ML solution injection  Commonly known as: PEPCID   20 mg, Intravenous, 2 Times Daily      HYDROcodone-homatropine 5-1.5 MG/5ML syrup  Commonly known as: HYCODAN   10 mL, Oral, Every 4 Hours PRN      HYDROmorphone 1 MG/ML injection  Commonly known as: DILAUDID   0.5 mg, Intravenous, Every 3 Hours PRN      ipratropium 0.02 % nebulizer solution  Commonly known as: ATROVENT   0.5 mg, Nebulization, 4 Times Daily - RT      melatonin 3 MG tablet   6 mg, Oral, Nightly      metroNIDAZOLE 5-0.79 MG/ML-% IVPB  Commonly known as: FLAGYL   500 mg, Intravenous, Every 8 Hours         Continue These Medications      Instructions Start Date   bisoprolol 10 MG tablet  Commonly known as: ZEBeta   10 mg, Oral, Daily      DELSYM PO   2 teaspoon(s), Oral, 2 times daily, OTC      fluticasone 50 MCG/ACT nasal spray  Commonly known as: FLONASE   1 spray, Nasal, Daily, Each Nostral      hydroCHLOROthiazide 12.5 MG tablet  Commonly known as: HYDRODIURIL   12.5 mg, Oral, Daily      levothyroxine 100 MCG tablet  Commonly known as: SYNTHROID, LEVOTHROID   100 mcg, Oral, Daily      omeprazole 20 MG capsule  Commonly known as: priLOSEC   20 mg, Oral, Daily      rosuvastatin 20 MG tablet  Commonly known as: CRESTOR   20 mg, Oral, Nightly         Stop These Medications    apixaban 5 MG tablet tablet  Commonly known as: ELIQUIS     Cholecalciferol 100 MCG (4000 UT) capsule     Co Q-10 100 MG capsule     fexofenadine 180 MG tablet  Commonly known as: ALLEGRA     multivitamin tablet tablet  Commonly known as: THERAGRAN     VITAMIN B 12 PO          Discharge Diet:   Diet  Instructions     Advance Diet As Tolerated      Diet: Regular; Thin      Discharge Diet: Regular    Fluid Consistency: Thin        Activity at Discharge:   Activity Instructions     Activity as Tolerated            Follow-up Appointments:   Future Appointments   Date Time Provider Department Center   2/14/2022  9:00 AM Vaughn Kramer DO [facility]   3/25/2022 10:00 AM John Diallo APRN [facility]       Test Results Pending at Discharge: Fluid studies, Cultures    Electronically signed by Jan Mcintyre MD, 10/06/21, 16:28 CDT.    Time: 45 minutes.         Electronically signed by Jan Mcintyre MD at 10/06/21 8815

## 2021-10-11 LAB
BACTERIA FLD CULT: NORMAL
BACTERIA SPEC AEROBE CULT: NORMAL
BACTERIA SPEC AEROBE CULT: NORMAL
BACTERIA SPEC ANAEROBE CULT: NORMAL
GRAM STN SPEC: NORMAL
GRAM STN SPEC: NORMAL

## 2021-10-12 ENCOUNTER — HOME HEALTH ADMISSION (OUTPATIENT)
Dept: HOME HEALTH SERVICES | Facility: HOME HEALTHCARE | Age: 76
End: 2021-10-12

## 2021-10-12 ENCOUNTER — TRANSCRIBE ORDERS (OUTPATIENT)
Dept: HOME HEALTH SERVICES | Facility: HOME HEALTHCARE | Age: 76
End: 2021-10-12

## 2021-10-12 DIAGNOSIS — K75.0 LIVER ABSCESS: Primary | ICD-10-CM

## 2021-10-13 ENCOUNTER — TELEPHONE (OUTPATIENT)
Dept: INTERNAL MEDICINE | Facility: CLINIC | Age: 76
End: 2021-10-13

## 2021-10-13 ENCOUNTER — HOME CARE VISIT (OUTPATIENT)
Dept: HOME HEALTH SERVICES | Facility: CLINIC | Age: 76
End: 2021-10-13

## 2021-10-13 VITALS
OXYGEN SATURATION: 96 % | SYSTOLIC BLOOD PRESSURE: 118 MMHG | HEART RATE: 52 BPM | DIASTOLIC BLOOD PRESSURE: 56 MMHG | TEMPERATURE: 98.4 F | RESPIRATION RATE: 18 BRPM

## 2021-10-13 PROCEDURE — G0299 HHS/HOSPICE OF RN EA 15 MIN: HCPCS

## 2021-10-13 NOTE — TELEPHONE ENCOUNTER
Noted. These are new since he returned home or were going on in the hospital as well? Let's keep him home and monitor symptoms at home as he returns to his normal diet and moves toward his normal activity. If persistent into the beginning of next week, we may need to address.

## 2021-10-13 NOTE — TELEPHONE ENCOUNTER
AMAURY WITH [facility].  SHE JUST ADMITTED PATIENT TO HOME CARE ON 10/13/21.   PATIENT IS FEELING BLOATED AROUND HIS STOMACH.  HE HAS BILATERAL SWELLING IN BOTH FEET.   PATIENT JUST GOT HOME FROM [facility].   [phone]

## 2021-10-13 NOTE — TELEPHONE ENCOUNTER
DUANE  ( NOT AMAURY)  WITH [facility] HAS BEEN CALLED, GIVEN MESSAGE AND STATED SHE WOULD LET HIS WIFE KNOW.

## 2021-10-14 ENCOUNTER — HOME CARE VISIT (OUTPATIENT)
Dept: HOME HEALTH SERVICES | Facility: CLINIC | Age: 76
End: 2021-10-14

## 2021-10-14 VITALS
DIASTOLIC BLOOD PRESSURE: 68 MMHG | SYSTOLIC BLOOD PRESSURE: 130 MMHG | HEART RATE: 66 BPM | TEMPERATURE: 99.1 F | RESPIRATION RATE: 18 BRPM | OXYGEN SATURATION: 93 %

## 2021-10-14 PROCEDURE — G0300 HHS/HOSPICE OF LPN EA 15 MIN: HCPCS

## 2021-10-15 ENCOUNTER — TRANSCRIBE ORDERS (OUTPATIENT)
Dept: ADMINISTRATIVE | Facility: HOSPITAL | Age: 76
End: 2021-10-15

## 2021-10-15 DIAGNOSIS — K75.0 LIVER ABSCESS: Primary | ICD-10-CM

## 2021-10-15 DIAGNOSIS — K57.20 DIVERTICULITIS OF COLON WITH PERFORATION: ICD-10-CM

## 2021-10-18 ENCOUNTER — HOME CARE VISIT (OUTPATIENT)
Dept: HOME HEALTH SERVICES | Facility: CLINIC | Age: 76
End: 2021-10-18

## 2021-10-18 ENCOUNTER — LAB REQUISITION (OUTPATIENT)
Dept: LAB | Facility: HOSPITAL | Age: 76
End: 2021-10-18

## 2021-10-18 VITALS
HEART RATE: 61 BPM | BODY MASS INDEX: 23.37 KG/M2 | TEMPERATURE: 98.5 F | DIASTOLIC BLOOD PRESSURE: 66 MMHG | RESPIRATION RATE: 16 BRPM | OXYGEN SATURATION: 96 % | SYSTOLIC BLOOD PRESSURE: 120 MMHG | WEIGHT: 159.6 LBS

## 2021-10-18 DIAGNOSIS — Z00.00 ENCOUNTER FOR GENERAL ADULT MEDICAL EXAMINATION WITHOUT ABNORMAL FINDINGS: ICD-10-CM

## 2021-10-18 LAB
ALBUMIN SERPL-MCNC: 2.4 G/DL (ref 3.5–5.2)
ALP SERPL-CCNC: 67 U/L (ref 39–117)
ALT SERPL W P-5'-P-CCNC: 11 U/L (ref 1–41)
ANION GAP SERPL CALCULATED.3IONS-SCNC: 8 MMOL/L (ref 5–15)
AST SERPL-CCNC: 23 U/L (ref 1–40)
BASOPHILS # BLD AUTO: 0.07 10*3/MM3 (ref 0–0.2)
BASOPHILS NFR BLD AUTO: 0.6 % (ref 0–1.5)
BILIRUB CONJ SERPL-MCNC: 0.2 MG/DL (ref 0–0.3)
BILIRUB INDIRECT SERPL-MCNC: 0.1 MG/DL
BILIRUB SERPL-MCNC: 0.3 MG/DL (ref 0–1.2)
BUN SERPL-MCNC: 10 MG/DL (ref 8–23)
BUN/CREAT SERPL: 15.9 (ref 7–25)
CALCIUM SPEC-SCNC: 8.1 MG/DL (ref 8.6–10.5)
CHLORIDE SERPL-SCNC: 97 MMOL/L (ref 98–107)
CO2 SERPL-SCNC: 28 MMOL/L (ref 22–29)
CREAT SERPL-MCNC: 0.63 MG/DL (ref 0.76–1.27)
DEPRECATED RDW RBC AUTO: 54 FL (ref 37–54)
EOSINOPHIL # BLD AUTO: 0.17 10*3/MM3 (ref 0–0.4)
EOSINOPHIL NFR BLD AUTO: 1.4 % (ref 0.3–6.2)
ERYTHROCYTE [DISTWIDTH] IN BLOOD BY AUTOMATED COUNT: 17.2 % (ref 12.3–15.4)
GFR SERPL CREATININE-BSD FRML MDRD: 123 ML/MIN/1.73
GLUCOSE SERPL-MCNC: 84 MG/DL (ref 65–99)
HCT VFR BLD AUTO: 34.5 % (ref 37.5–51)
HGB BLD-MCNC: 11.4 G/DL (ref 13–17.7)
IMM GRANULOCYTES # BLD AUTO: 0.1 10*3/MM3 (ref 0–0.05)
IMM GRANULOCYTES NFR BLD AUTO: 0.8 % (ref 0–0.5)
LYMPHOCYTES # BLD AUTO: 1.7 10*3/MM3 (ref 0.7–3.1)
LYMPHOCYTES NFR BLD AUTO: 14 % (ref 19.6–45.3)
MCH RBC QN AUTO: 28.8 PG (ref 26.6–33)
MCHC RBC AUTO-ENTMCNC: 33 G/DL (ref 31.5–35.7)
MCV RBC AUTO: 87.1 FL (ref 79–97)
MONOCYTES # BLD AUTO: 1.19 10*3/MM3 (ref 0.1–0.9)
MONOCYTES NFR BLD AUTO: 9.8 % (ref 5–12)
NEUTROPHILS NFR BLD AUTO: 73.4 % (ref 42.7–76)
NEUTROPHILS NFR BLD AUTO: 8.9 10*3/MM3 (ref 1.7–7)
NRBC BLD AUTO-RTO: 0 /100 WBC (ref 0–0.2)
PLATELET # BLD AUTO: 356 10*3/MM3 (ref 140–450)
PMV BLD AUTO: 10.1 FL (ref 6–12)
POTASSIUM SERPL-SCNC: 3.9 MMOL/L (ref 3.5–5.2)
PROT SERPL-MCNC: 6.3 G/DL (ref 6–8.5)
RBC # BLD AUTO: 3.96 10*6/MM3 (ref 4.14–5.8)
SODIUM SERPL-SCNC: 133 MMOL/L (ref 136–145)
WBC # BLD AUTO: 12.13 10*3/MM3 (ref 3.4–10.8)

## 2021-10-18 PROCEDURE — 85025 COMPLETE CBC W/AUTO DIFF WBC: CPT | Performed by: SURGERY

## 2021-10-18 PROCEDURE — 80076 HEPATIC FUNCTION PANEL: CPT | Performed by: SURGERY

## 2021-10-18 PROCEDURE — 80048 BASIC METABOLIC PNL TOTAL CA: CPT | Performed by: SURGERY

## 2021-10-18 PROCEDURE — G0180 MD CERTIFICATION HHA PATIENT: HCPCS | Performed by: INTERNAL MEDICINE

## 2021-10-18 PROCEDURE — G0299 HHS/HOSPICE OF RN EA 15 MIN: HCPCS

## 2021-10-18 NOTE — HOME HEALTH
No recent falls, no insurance changes, and no recent medication changes. There was no need to contact the MD. Pt/cg had no further questions or concerns regarding the pt's medications or plan of care. Old dressing removed, area cleansed with CHG, biopatch applied, skin prep applied, statlock applied, replaced bio-occlusive dressing, and cap was changed. A&O X 4. No c/o pain. VSS. CBC, BMP, and hepatic function test were collected via PICC and dropped off at [facility] with Results to Dr. Jacoby Donis. Pt stated he was having some trouble sleeping. I gave the pt some ideas of non-pharmaceutical ways to help him get to sleep.

## 2021-10-25 ENCOUNTER — LAB REQUISITION (OUTPATIENT)
Dept: LAB | Facility: HOSPITAL | Age: 76
End: 2021-10-25

## 2021-10-25 ENCOUNTER — HOME CARE VISIT (OUTPATIENT)
Dept: HOME HEALTH SERVICES | Facility: CLINIC | Age: 76
End: 2021-10-25

## 2021-10-25 VITALS
HEART RATE: 69 BPM | SYSTOLIC BLOOD PRESSURE: 158 MMHG | OXYGEN SATURATION: 97 % | TEMPERATURE: 98.2 F | WEIGHT: 160.2 LBS | BODY MASS INDEX: 23.46 KG/M2 | DIASTOLIC BLOOD PRESSURE: 70 MMHG | RESPIRATION RATE: 16 BRPM

## 2021-10-25 DIAGNOSIS — Z00.00 ENCOUNTER FOR GENERAL ADULT MEDICAL EXAMINATION WITHOUT ABNORMAL FINDINGS: ICD-10-CM

## 2021-10-25 LAB
ALBUMIN SERPL-MCNC: 2.5 G/DL (ref 3.5–5.2)
ALP SERPL-CCNC: 73 U/L (ref 39–117)
ALT SERPL W P-5'-P-CCNC: 14 U/L (ref 1–41)
ANION GAP SERPL CALCULATED.3IONS-SCNC: 9 MMOL/L (ref 5–15)
AST SERPL-CCNC: 31 U/L (ref 1–40)
BASOPHILS # BLD AUTO: 0.1 10*3/MM3 (ref 0–0.2)
BASOPHILS NFR BLD AUTO: 1 % (ref 0–1.5)
BILIRUB CONJ SERPL-MCNC: <0.2 MG/DL (ref 0–0.3)
BILIRUB INDIRECT SERPL-MCNC: ABNORMAL MG/DL
BILIRUB SERPL-MCNC: 0.3 MG/DL (ref 0–1.2)
BUN SERPL-MCNC: 12 MG/DL (ref 8–23)
BUN/CREAT SERPL: 19.7 (ref 7–25)
CALCIUM SPEC-SCNC: 8.2 MG/DL (ref 8.6–10.5)
CHLORIDE SERPL-SCNC: 96 MMOL/L (ref 98–107)
CO2 SERPL-SCNC: 27 MMOL/L (ref 22–29)
CREAT SERPL-MCNC: 0.61 MG/DL (ref 0.76–1.27)
DEPRECATED RDW RBC AUTO: 57.1 FL (ref 37–54)
EOSINOPHIL # BLD AUTO: 0.32 10*3/MM3 (ref 0–0.4)
EOSINOPHIL NFR BLD AUTO: 3.1 % (ref 0.3–6.2)
ERYTHROCYTE [DISTWIDTH] IN BLOOD BY AUTOMATED COUNT: 17.8 % (ref 12.3–15.4)
GFR SERPL CREATININE-BSD FRML MDRD: 128 ML/MIN/1.73
GLUCOSE SERPL-MCNC: 93 MG/DL (ref 65–99)
HCT VFR BLD AUTO: 33.4 % (ref 37.5–51)
HGB BLD-MCNC: 11 G/DL (ref 13–17.7)
IMM GRANULOCYTES # BLD AUTO: 0.09 10*3/MM3 (ref 0–0.05)
IMM GRANULOCYTES NFR BLD AUTO: 0.9 % (ref 0–0.5)
LYMPHOCYTES # BLD AUTO: 1.93 10*3/MM3 (ref 0.7–3.1)
LYMPHOCYTES NFR BLD AUTO: 18.4 % (ref 19.6–45.3)
MCH RBC QN AUTO: 28.9 PG (ref 26.6–33)
MCHC RBC AUTO-ENTMCNC: 32.9 G/DL (ref 31.5–35.7)
MCV RBC AUTO: 87.9 FL (ref 79–97)
MONOCYTES # BLD AUTO: 1.17 10*3/MM3 (ref 0.1–0.9)
MONOCYTES NFR BLD AUTO: 11.2 % (ref 5–12)
NEUTROPHILS NFR BLD AUTO: 6.86 10*3/MM3 (ref 1.7–7)
NEUTROPHILS NFR BLD AUTO: 65.4 % (ref 42.7–76)
NRBC BLD AUTO-RTO: 0 /100 WBC (ref 0–0.2)
PLATELET # BLD AUTO: 337 10*3/MM3 (ref 140–450)
PMV BLD AUTO: 9.9 FL (ref 6–12)
POTASSIUM SERPL-SCNC: 3.9 MMOL/L (ref 3.5–5.2)
PROT SERPL-MCNC: 6.5 G/DL (ref 6–8.5)
RBC # BLD AUTO: 3.8 10*6/MM3 (ref 4.14–5.8)
SODIUM SERPL-SCNC: 132 MMOL/L (ref 136–145)
WBC # BLD AUTO: 10.47 10*3/MM3 (ref 3.4–10.8)

## 2021-10-25 PROCEDURE — G0299 HHS/HOSPICE OF RN EA 15 MIN: HCPCS

## 2021-10-25 PROCEDURE — 80048 BASIC METABOLIC PNL TOTAL CA: CPT | Performed by: SURGERY

## 2021-10-25 PROCEDURE — 85025 COMPLETE CBC W/AUTO DIFF WBC: CPT | Performed by: SURGERY

## 2021-10-25 PROCEDURE — 80076 HEPATIC FUNCTION PANEL: CPT | Performed by: SURGERY

## 2021-10-25 NOTE — HOME HEALTH
No recent falls, no insurance changes, and no recent medication changes. There was no need to contact the MD. Pt/cg had no further questions or concerns regarding the pt's medications or plan of care. Old dressing removed, area cleansed with CHG, biopatch applied, skin prep applied, statlock applied, replaced bio-occlusive dressing, and cap was changed. CBC, BMP, and hepatic function test obtained via PICC and taken to [facility] lab with results to Dr. Donis. A&O X 4. No c/o pain. VSS. Pt's BP was elevated, but the pt hadn't taken his AM medications yet,.

## 2021-10-27 ENCOUNTER — OFFICE VISIT (OUTPATIENT)
Dept: INTERNAL MEDICINE | Facility: CLINIC | Age: 76
End: 2021-10-27

## 2021-10-27 VITALS
BODY MASS INDEX: 24.29 KG/M2 | SYSTOLIC BLOOD PRESSURE: 132 MMHG | WEIGHT: 164 LBS | RESPIRATION RATE: 16 BRPM | DIASTOLIC BLOOD PRESSURE: 68 MMHG | TEMPERATURE: 97.8 F | HEART RATE: 56 BPM | HEIGHT: 69 IN

## 2021-10-27 DIAGNOSIS — L29.9 ITCHING: ICD-10-CM

## 2021-10-27 DIAGNOSIS — I10 ESSENTIAL HYPERTENSION: ICD-10-CM

## 2021-10-27 DIAGNOSIS — R21 RASH AND NONSPECIFIC SKIN ERUPTION: Primary | ICD-10-CM

## 2021-10-27 PROCEDURE — 99213 OFFICE O/P EST LOW 20 MIN: CPT | Performed by: NURSE PRACTITIONER

## 2021-10-27 RX ORDER — UBIDECARENONE 100 MG
1 CAPSULE ORAL DAILY
COMMUNITY

## 2021-10-27 RX ORDER — BISOPROLOL FUMARATE 5 MG/1
5 TABLET, FILM COATED ORAL DAILY
Qty: 90 TABLET | Refills: 3 | Status: SHIPPED | OUTPATIENT
Start: 2021-10-27 | End: 2022-09-06 | Stop reason: SDUPTHER

## 2021-10-27 RX ORDER — ACETAMINOPHEN 325 MG/1
650 TABLET ORAL AS NEEDED
COMMUNITY
Start: 2021-10-11

## 2021-10-27 RX ORDER — VITAMIN B COMPLEX
2500 TABLET ORAL DAILY
COMMUNITY

## 2021-10-27 RX ORDER — BISOPROLOL FUMARATE 5 MG/1
5 TABLET, FILM COATED ORAL DAILY
COMMUNITY
End: 2021-10-27 | Stop reason: SDUPTHER

## 2021-10-27 RX ORDER — METRONIDAZOLE 500 MG/1
500 TABLET ORAL 3 TIMES DAILY
COMMUNITY
Start: 2021-10-11 | End: 2021-11-11

## 2021-10-27 RX ORDER — MULTIPLE VITAMINS W/ MINERALS TAB 9MG-400MCG
1 TAB ORAL DAILY
COMMUNITY

## 2021-10-27 RX ORDER — FEXOFENADINE HCL 180 MG/1
1 TABLET ORAL DAILY
COMMUNITY

## 2021-10-27 NOTE — PROGRESS NOTES
Chief Complaint   Patient presents with   • Rash     abdomen, back, itchy       History:  Sonu Bravo Jr. is a 76 y.o. male who presents today for follow-up for evaluation of the above:    HPI   Patient presents today with c/o a rash.  He reports that suddenly last night he developed a warmth, itching and redness to his bilateral lower back and lower abdomen.   He does report using a new [employer] lotion and wonders if this caused his rash.  He was worried about possibly being shingles.  He is having Home health visit weekly for antibiotic infusion and  left PICC line care.   No issues with the PICC at this time.           ROS:  Review of Systems   Skin: Positive for rash.   Neurological: Positive for weakness.   All other systems reviewed and are negative.      Mr. Bravo  reports that he has been smoking cigarettes. He started smoking about 54 years ago. He has a 13.00 pack-year smoking history. He has never used smokeless tobacco. He reports current alcohol use. He reports that he does not use drugs.      Current Outpatient Medications:   •  acetaminophen (TYLENOL) 325 MG tablet, Take 650 mg by mouth Every 4 (Four) Hours As Needed., Disp: , Rfl:   •  bisoprolol (ZEBeta) 5 MG tablet, Take 1 tablet by mouth Daily., Disp: 90 tablet, Rfl: 3  •  budesonide-formoterol (SYMBICORT) 160-4.5 MCG/ACT inhaler, Inhale 2 puffs 2 (Two) Times a Day., Disp: , Rfl: 12  •  cefTRIAXone (ROCEPHIN) 1 g injection, Infuse 2 mg into a venous catheter Daily., Disp: , Rfl:   •  Cholecalciferol 100 MCG (4000 UT) capsule, 4,000 Units Daily., Disp: , Rfl:   •  coenzyme Q10 100 MG capsule, Take 100 mg by mouth Daily., Disp: , Rfl:   •  Cyanocobalamin 2500 MCG sublingual tablet, 2,500 mcg Daily., Disp: , Rfl:   •  ferrous sulfate 325 (65 FE) MG tablet, Take 325 mg by mouth 2 (two) times a day., Disp: , Rfl:   •  fexofenadine (ALLEGRA) 180 MG tablet, Take 180 mg by mouth Daily., Disp: , Rfl:   •  fluticasone (FLONASE) 50 MCG/ACT nasal spray, 1  "spray into the nostril(s) as directed by provider Daily. Each Nostral, Disp: 16 g, Rfl: 5  •  Heparin Lock Flush (HEPARIN, PORCINE, LOCK FLUSH IV), Infuse 5 mL into a venous catheter Daily., Disp: , Rfl:   •  hydroCHLOROthiazide (HYDRODIURIL) 12.5 MG tablet, Take 1 tablet by mouth Daily., Disp: 30 tablet, Rfl: 3  •  levothyroxine (SYNTHROID, LEVOTHROID) 100 MCG tablet, Take 1 tablet by mouth Daily., Disp: 90 tablet, Rfl: 3  •  melatonin 3 MG tablet, Take 2 tablets by mouth Every Night. (Patient taking differently: Take 6 mg by mouth At Night As Needed.), Disp: , Rfl:   •  metroNIDAZOLE (FLAGYL) 500 MG tablet, Take 500 mg by mouth 3 (Three) Times a Day., Disp: , Rfl:   •  omeprazole (priLOSEC) 20 MG capsule, Take 1 capsule by mouth Daily., Disp: 90 capsule, Rfl: 3  •  Pseudoephedrine-DM-GG (ROBITUSSIN COLD & COUGH PO), Take  by mouth Every Night., Disp: , Rfl:   •  rosuvastatin (CRESTOR) 20 MG tablet, Take 20 mg by mouth Every Night., Disp: , Rfl:   •  Soft Lens Products (Saline) 0.9 % solution, Infuse 10 mL into a venous catheter Daily., Disp: , Rfl:   •  multivitamin with minerals tablet tablet, Take 1 capsule by mouth Daily., Disp: , Rfl:       OBJECTIVE:  /68 (BP Location: Right arm, Patient Position: Sitting, Cuff Size: Adult)   Pulse 56   Temp 97.8 °F (36.6 °C) (Temporal)   Resp 16   Ht 176 cm (69.29\")   Wt 74.4 kg (164 lb)   BMI 24.02 kg/m²    Physical Exam  Vitals reviewed.   Constitutional:       Appearance: He is well-developed.   HENT:      Head: Normocephalic and atraumatic.   Eyes:      Conjunctiva/sclera: Conjunctivae normal.      Pupils: Pupils are equal, round, and reactive to light.   Cardiovascular:      Rate and Rhythm: Normal rate and regular rhythm.      Heart sounds: Normal heart sounds.   Pulmonary:      Effort: Pulmonary effort is normal.      Breath sounds: Normal breath sounds.   Abdominal:      General: Bowel sounds are normal.      Palpations: Abdomen is soft. "   Musculoskeletal:      Cervical back: Normal range of motion and neck supple.   Skin:     General: Skin is warm and dry.      Comments: Rash not present at time of exam.      Neurological:      Mental Status: He is alert and oriented to person, place, and time.      Deep Tendon Reflexes: Reflexes are normal and symmetric.         Assessment/Plan    Diagnoses and all orders for this visit:    1. Rash and nonspecific skin eruption (Primary)  2. Itching  improved  Discussed using Cetaphil lotion  And topical benadryl or hydrocortisone for itching. Low suspicion for shingles.    3. Essential hypertension  -     bisoprolol (ZEBeta) 5 MG tablet; Take 1 tablet by mouth Daily.  Dispense: 90 tablet; Refill: 3  Well controlled, BP goal for age is <140/90 per JNC 8 guidelines and continue current medications         An After Visit Summary was printed and given to the patient at discharge.  Return in about 1 month (around 11/27/2021). Sooner if problems arise.          Lynnette HERNANDEZ. 10/27/2021   Electronically Signed

## 2021-10-28 ENCOUNTER — HOSPITAL ENCOUNTER (OUTPATIENT)
Dept: CT IMAGING | Facility: HOSPITAL | Age: 76
Discharge: HOME | End: 2021-10-28
Admitting: SURGERY

## 2021-10-28 LAB — CREAT BLDA-MCNC: 0.8 MG/DL (ref 0.6–1.3)

## 2021-10-28 PROCEDURE — 71260 CT THORAX DX C+: CPT

## 2021-10-28 PROCEDURE — 25010000002 IOPAMIDOL 61 % SOLUTION: Performed by: SURGERY

## 2021-10-28 PROCEDURE — 74177 CT ABD & PELVIS W/CONTRAST: CPT

## 2021-10-28 PROCEDURE — 82565 ASSAY OF CREATININE: CPT

## 2021-10-28 RX ADMIN — IOPAMIDOL 100 ML: 612 INJECTION, SOLUTION INTRAVENOUS at 09:33

## 2021-11-01 ENCOUNTER — LAB REQUISITION (OUTPATIENT)
Dept: LAB | Facility: HOSPITAL | Age: 76
End: 2021-11-01

## 2021-11-01 ENCOUNTER — HOME CARE VISIT (OUTPATIENT)
Dept: HOME HEALTH SERVICES | Facility: CLINIC | Age: 76
End: 2021-11-01

## 2021-11-01 VITALS
SYSTOLIC BLOOD PRESSURE: 130 MMHG | WEIGHT: 159.4 LBS | RESPIRATION RATE: 16 BRPM | DIASTOLIC BLOOD PRESSURE: 60 MMHG | OXYGEN SATURATION: 97 % | BODY MASS INDEX: 23.34 KG/M2 | HEART RATE: 69 BPM | TEMPERATURE: 98.7 F

## 2021-11-01 DIAGNOSIS — Z00.00 ENCOUNTER FOR GENERAL ADULT MEDICAL EXAMINATION WITHOUT ABNORMAL FINDINGS: ICD-10-CM

## 2021-11-01 LAB
ALBUMIN SERPL-MCNC: 2.9 G/DL (ref 3.5–5.2)
ALP SERPL-CCNC: 72 U/L (ref 39–117)
ALT SERPL W P-5'-P-CCNC: 19 U/L (ref 1–41)
ANION GAP SERPL CALCULATED.3IONS-SCNC: 12 MMOL/L (ref 5–15)
AST SERPL-CCNC: 36 U/L (ref 1–40)
BASOPHILS # BLD AUTO: 0.1 10*3/MM3 (ref 0–0.2)
BASOPHILS NFR BLD AUTO: 1.1 % (ref 0–1.5)
BILIRUB CONJ SERPL-MCNC: <0.2 MG/DL (ref 0–0.3)
BILIRUB INDIRECT SERPL-MCNC: ABNORMAL MG/DL
BILIRUB SERPL-MCNC: 0.3 MG/DL (ref 0–1.2)
BUN SERPL-MCNC: 12 MG/DL (ref 8–23)
BUN/CREAT SERPL: 16.9 (ref 7–25)
CALCIUM SPEC-SCNC: 8.7 MG/DL (ref 8.6–10.5)
CHLORIDE SERPL-SCNC: 97 MMOL/L (ref 98–107)
CO2 SERPL-SCNC: 25 MMOL/L (ref 22–29)
CREAT SERPL-MCNC: 0.71 MG/DL (ref 0.76–1.27)
DEPRECATED RDW RBC AUTO: 58.7 FL (ref 37–54)
EOSINOPHIL # BLD AUTO: 0.23 10*3/MM3 (ref 0–0.4)
EOSINOPHIL NFR BLD AUTO: 2.4 % (ref 0.3–6.2)
ERYTHROCYTE [DISTWIDTH] IN BLOOD BY AUTOMATED COUNT: 17.9 % (ref 12.3–15.4)
GFR SERPL CREATININE-BSD FRML MDRD: 108 ML/MIN/1.73
GLUCOSE SERPL-MCNC: 201 MG/DL (ref 65–99)
HCT VFR BLD AUTO: 37.1 % (ref 37.5–51)
HGB BLD-MCNC: 11.8 G/DL (ref 13–17.7)
IMM GRANULOCYTES # BLD AUTO: 0.07 10*3/MM3 (ref 0–0.05)
IMM GRANULOCYTES NFR BLD AUTO: 0.7 % (ref 0–0.5)
LYMPHOCYTES # BLD AUTO: 1.75 10*3/MM3 (ref 0.7–3.1)
LYMPHOCYTES NFR BLD AUTO: 18.6 % (ref 19.6–45.3)
MCH RBC QN AUTO: 28.5 PG (ref 26.6–33)
MCHC RBC AUTO-ENTMCNC: 31.8 G/DL (ref 31.5–35.7)
MCV RBC AUTO: 89.6 FL (ref 79–97)
MONOCYTES # BLD AUTO: 0.87 10*3/MM3 (ref 0.1–0.9)
MONOCYTES NFR BLD AUTO: 9.2 % (ref 5–12)
NEUTROPHILS NFR BLD AUTO: 6.41 10*3/MM3 (ref 1.7–7)
NEUTROPHILS NFR BLD AUTO: 68 % (ref 42.7–76)
NRBC BLD AUTO-RTO: 0 /100 WBC (ref 0–0.2)
PLATELET # BLD AUTO: 280 10*3/MM3 (ref 140–450)
PMV BLD AUTO: 9.7 FL (ref 6–12)
POTASSIUM SERPL-SCNC: 3.8 MMOL/L (ref 3.5–5.2)
PROT SERPL-MCNC: 6.8 G/DL (ref 6–8.5)
RBC # BLD AUTO: 4.14 10*6/MM3 (ref 4.14–5.8)
SODIUM SERPL-SCNC: 134 MMOL/L (ref 136–145)
WBC # BLD AUTO: 9.43 10*3/MM3 (ref 3.4–10.8)

## 2021-11-01 PROCEDURE — G0299 HHS/HOSPICE OF RN EA 15 MIN: HCPCS

## 2021-11-01 PROCEDURE — 85025 COMPLETE CBC W/AUTO DIFF WBC: CPT | Performed by: SURGERY

## 2021-11-01 PROCEDURE — 80076 HEPATIC FUNCTION PANEL: CPT | Performed by: SURGERY

## 2021-11-01 PROCEDURE — 80048 BASIC METABOLIC PNL TOTAL CA: CPT | Performed by: SURGERY

## 2021-11-01 NOTE — HOME HEALTH
No recent falls and no insurance changes. There was no need to contact the MD. Pt/cg had no further questions or concerns regarding the pt's medications or plan of care. A&O X 4. No c/o pain. VSS. Pt was taken off of his IV rocephin and placed on oral ciprofloxacin due to a rash that appeared. The pt is also splitting his allegra dose into 4 times daily to help with the rash and the pt hasn't had a rash since. PICC line dsg changed via sterile technique. CBC, BMP, and hepatic panel drawn from PICC. Specimens taken to [facility] lab with results to Dr. Jacoby Donis. The pt was educated on pulmonary hygiene.

## 2021-11-02 ENCOUNTER — APPOINTMENT (OUTPATIENT)
Dept: CT IMAGING | Facility: HOSPITAL | Age: 76
End: 2021-11-02

## 2021-11-02 ENCOUNTER — HOME CARE VISIT (OUTPATIENT)
Dept: HOME HEALTH SERVICES | Facility: HOME HEALTHCARE | Age: 76
End: 2021-11-02

## 2021-11-03 ENCOUNTER — HOME CARE VISIT (OUTPATIENT)
Dept: HOME HEALTH SERVICES | Facility: CLINIC | Age: 76
End: 2021-11-03

## 2021-11-03 VITALS
OXYGEN SATURATION: 98 % | RESPIRATION RATE: 16 BRPM | TEMPERATURE: 98 F | HEART RATE: 65 BPM | SYSTOLIC BLOOD PRESSURE: 132 MMHG | DIASTOLIC BLOOD PRESSURE: 76 MMHG

## 2021-11-03 PROCEDURE — G0299 HHS/HOSPICE OF RN EA 15 MIN: HCPCS

## 2021-11-03 NOTE — HOME HEALTH
Patient agreeable to homecare discharge. Discharge instructions reviewed and signed by the pt. Medication reconciliation completed and no issues found. No recent falls and no changes to insurance. Pt had no further questions or concerns regarding the pt's medications or discharge. A&O X 4. No c/o pain. VSS. Pt is aware of all of his medication uses and side effects. I removed the pt's PICC line. I removed 45 cm of catheter. Pressure dressing placed and the pt was educated to keep it on for 24 hours. SN GOALS MET.

## 2021-11-13 LAB — FUNGUS WND CULT: NORMAL

## 2021-11-16 ENCOUNTER — TELEPHONE (OUTPATIENT)
Dept: INTERNAL MEDICINE | Facility: CLINIC | Age: 76
End: 2021-11-16

## 2021-11-16 NOTE — TELEPHONE ENCOUNTER
Caller: Sonu Bravo Jr.    Relationship: Self    Best call back number: [phone]    What is the best time to reach you: ANY TIME    Who are you requesting to speak with (clinical staff, provider,  specific staff member):LUZ    Do you know the name of the person who called: SELF    What was the call regarding: PATIENT'S DAUGHTER AND SON IN LAW ARE COMING FOR THANKSGIVING FROM [place] HOWEVER SON IN LAW IS A DIALYSIS PATIENT AND IS NEEDING A DIALYSIS TREATMENT THE DAY AFTER THANKSGIVING AND IS FINDING IT HARD TO FIND A PLACE TO GO TO. DOES DR ESCOBAR KNOW OF ANY DIALYSIS PLACES THAT SON IN LAW CAN GO TO? PLEASE ADVISE    Do you require a callback: YES

## 2021-11-16 NOTE — TELEPHONE ENCOUNTER
There are dialysis centers in this area. [employer] is the only company with options directly in this area. [facility] is the other major chain with centers as close as an hour from here in [place].  Generally, he would talk with his nephrologist at his dialysis center and they can help find a center in the network with access to his records and orders. Then, they can sort out availability.

## 2021-12-03 ENCOUNTER — OFFICE VISIT (OUTPATIENT)
Dept: INTERNAL MEDICINE | Facility: CLINIC | Age: 76
End: 2021-12-03

## 2021-12-03 VITALS
HEIGHT: 69 IN | HEART RATE: 64 BPM | WEIGHT: 168.5 LBS | DIASTOLIC BLOOD PRESSURE: 82 MMHG | BODY MASS INDEX: 24.96 KG/M2 | SYSTOLIC BLOOD PRESSURE: 120 MMHG | RESPIRATION RATE: 16 BRPM | TEMPERATURE: 98 F | OXYGEN SATURATION: 98 %

## 2021-12-03 DIAGNOSIS — J98.4 CAVITARY LESION OF LUNG: ICD-10-CM

## 2021-12-03 DIAGNOSIS — K75.0 LIVER ABSCESS: ICD-10-CM

## 2021-12-03 DIAGNOSIS — I48.0 PAROXYSMAL ATRIAL FIBRILLATION (HCC): ICD-10-CM

## 2021-12-03 DIAGNOSIS — K57.20 COLONIC DIVERTICULAR ABSCESS: Primary | ICD-10-CM

## 2021-12-03 DIAGNOSIS — Z87.891 FORMER SMOKER: ICD-10-CM

## 2021-12-03 DIAGNOSIS — I10 ESSENTIAL HYPERTENSION: ICD-10-CM

## 2021-12-03 DIAGNOSIS — R60.0 LOWER EXTREMITY EDEMA: ICD-10-CM

## 2021-12-03 DIAGNOSIS — K57.92 DIVERTICULITIS: ICD-10-CM

## 2021-12-03 PROBLEM — E87.6 HYPOKALEMIA: Status: RESOLVED | Noted: 2021-10-04 | Resolved: 2021-12-03

## 2021-12-03 PROBLEM — E87.1 HYPONATREMIA: Status: RESOLVED | Noted: 2021-10-04 | Resolved: 2021-12-03

## 2021-12-03 PROBLEM — R79.89 ELEVATED TROPONIN: Status: RESOLVED | Noted: 2021-08-31 | Resolved: 2021-12-03

## 2021-12-03 PROBLEM — R07.9 CHEST PAIN: Status: RESOLVED | Noted: 2021-08-31 | Resolved: 2021-12-03

## 2021-12-03 PROBLEM — R93.5 ABNORMAL CT OF THE ABDOMEN: Status: RESOLVED | Noted: 2021-09-20 | Resolved: 2021-12-03

## 2021-12-03 PROBLEM — J86.9 EMPYEMA LUNG: Status: RESOLVED | Noted: 2021-10-01 | Resolved: 2021-12-03

## 2021-12-03 PROBLEM — R77.8 ELEVATED TROPONIN: Status: RESOLVED | Noted: 2021-08-31 | Resolved: 2021-12-03

## 2021-12-03 PROCEDURE — 99214 OFFICE O/P EST MOD 30 MIN: CPT | Performed by: INTERNAL MEDICINE

## 2021-12-03 RX ORDER — METRONIDAZOLE 500 MG/1
1 TABLET ORAL 3 TIMES DAILY
COMMUNITY
Start: 2021-11-11 | End: 2022-03-04

## 2021-12-03 RX ORDER — HYDROCHLOROTHIAZIDE 12.5 MG/1
12.5 TABLET ORAL DAILY
Qty: 90 TABLET | Refills: 3 | Status: SHIPPED | OUTPATIENT
Start: 2021-12-03 | End: 2022-03-04

## 2021-12-03 NOTE — PROGRESS NOTES
CC: f/u liver abscesses    History:  Sonu Bravo Jr. is a 76 y.o. male   He notes he has been doing much better and has continued on Cipro and Flagyl for his liver abscesses that were thought to be related to the diverticulitis he had. He is now planning on sigmoid colectomy in [place] to definitively treat this section. Otherwise, his BP and other conditions have done well. Records reviewed in Care Everywhere including D/C summary, CT of the abd/pelvis.        ROS:  Review of Systems   Constitutional: Negative for chills, fatigue and fever.   Respiratory: Negative for cough and shortness of breath.    Cardiovascular: Negative for chest pain and palpitations.   Gastrointestinal: Negative for abdominal pain and constipation.        reports that he quit smoking about 7 weeks ago. His smoking use included cigarettes. He started smoking about 54 years ago. He has a 13.00 pack-year smoking history. He has never used smokeless tobacco. He reports current alcohol use. He reports that he does not use drugs.      Current Outpatient Medications:   •  acetaminophen (TYLENOL) 325 MG tablet, Take 650 mg by mouth Every 4 (Four) Hours As Needed., Disp: , Rfl:   •  bisoprolol (ZEBeta) 5 MG tablet, Take 1 tablet by mouth Daily., Disp: 90 tablet, Rfl: 3  •  budesonide-formoterol (SYMBICORT) 160-4.5 MCG/ACT inhaler, Inhale 2 puffs 2 (Two) Times a Day., Disp: , Rfl: 12  •  Cholecalciferol 100 MCG (4000 UT) capsule, 4,000 Units Daily., Disp: , Rfl:   •  ciprofloxacin (Cipro) 500 MG tablet, Take 500 mg by mouth 2 (Two) Times a Day., Disp: , Rfl:   •  coenzyme Q10 100 MG capsule, Take 100 mg by mouth Daily., Disp: , Rfl:   •  Cyanocobalamin 2500 MCG sublingual tablet, 2,500 mcg Daily., Disp: , Rfl:   •  fexofenadine (ALLEGRA) 180 MG tablet, Take 180 mg by mouth Daily., Disp: , Rfl:   •  fluticasone (FLONASE) 50 MCG/ACT nasal spray, 1 spray into the nostril(s) as directed by provider Daily. Each Nostral, Disp: 16 g, Rfl: 5  •   "hydroCHLOROthiazide (HYDRODIURIL) 12.5 MG tablet, Take 1 tablet by mouth Daily., Disp: 90 tablet, Rfl: 3  •  levothyroxine (SYNTHROID, LEVOTHROID) 100 MCG tablet, Take 1 tablet by mouth Daily., Disp: 90 tablet, Rfl: 3  •  metroNIDAZOLE (FLAGYL) 500 MG tablet, Take 1 tablet by mouth 3 (Three) Times a Day., Disp: , Rfl:   •  multivitamin with minerals tablet tablet, Take 1 capsule by mouth Daily., Disp: , Rfl:   •  omeprazole (priLOSEC) 20 MG capsule, Take 1 capsule by mouth Daily., Disp: 90 capsule, Rfl: 3  •  Pseudoephedrine-DM-GG (ROBITUSSIN COLD & COUGH PO), Take  by mouth Every Night., Disp: , Rfl:   •  rosuvastatin (CRESTOR) 20 MG tablet, Take 20 mg by mouth Every Night., Disp: , Rfl:   •  Soft Lens Products (Saline) 0.9 % solution, Infuse 10 mL into a venous catheter Daily., Disp: , Rfl:     OBJECTIVE:  /82 (BP Location: Left arm, Patient Position: Sitting, Cuff Size: Adult)   Pulse 64   Temp 98 °F (36.7 °C)   Resp 16   Ht 176 cm (69.29\")   Wt 76.4 kg (168 lb 8 oz)   SpO2 98%   BMI 24.68 kg/m²    Physical Exam  Constitutional:       General: He is not in acute distress.  Cardiovascular:      Rate and Rhythm: Normal rate and regular rhythm.      Heart sounds: Normal heart sounds. No murmur heard.      Pulmonary:      Effort: Pulmonary effort is normal. No respiratory distress.      Breath sounds: Normal breath sounds. No wheezing.   Neurological:      Mental Status: He is alert and oriented to person, place, and time.      Gait: Gait normal.   Psychiatric:         Mood and Affect: Mood normal.         Behavior: Behavior normal.         Assessment/Plan     Diagnoses and all orders for this visit:    1. Colonic diverticular abscess (Primary)  2. Diverticulitis  3. Liver abscess  4. Cavitary lesion of lung  Improving on ongoing antibiotics with plans for definitive sigmoid colectomy on 1/11/22.     5. Lower extremity edema  6. Essential hypertension  -     hydroCHLOROthiazide (HYDRODIURIL) 12.5 MG " tablet; Take 1 tablet by mouth Daily.  Dispense: 90 tablet; Refill: 3  Well controlled, BP goal for age is <140/90 per JNC 8 guidelines and continue current medications    7. Former smoker  Congratulated on cessation and he is encouraged to continue.     8. Paroxysmal atrial fibrillation (HCC)  Single a fib episode happened in August leading up to the diagnosis of his abscesses. He is in NSR today. His DOAC therapy was stopped at [facility] and we will continue off this. If he remains in NSR, we may consider a Holter or Zio patch to ensure no further events and therefore consider it a provoked arrhythmia without need for further DOAC therapy.     An After Visit Summary was printed and given to the patient at discharge.  Return in about 3 months (around 3/3/2022) for Recheck with me; spencer February.          Vaughn Kramer D.O. 12/5/2021   Electronically signed.

## 2021-12-29 ENCOUNTER — TRANSCRIBE ORDERS (OUTPATIENT)
Dept: LAB | Facility: HOSPITAL | Age: 76
End: 2021-12-29

## 2021-12-29 DIAGNOSIS — K57.92 DIVERTICULITIS: ICD-10-CM

## 2021-12-29 DIAGNOSIS — K57.20 COLONIC DIVERTICULAR ABSCESS: ICD-10-CM

## 2021-12-29 DIAGNOSIS — Z01.812 ENCOUNTER FOR PRE-OPERATIVE LABORATORY TESTING: Primary | ICD-10-CM

## 2022-01-08 ENCOUNTER — APPOINTMENT (OUTPATIENT)
Dept: LAB | Facility: HOSPITAL | Age: 77
End: 2022-01-08

## 2022-01-11 NOTE — PLAN OF CARE
Goal Outcome Evaluation:  Plan of Care Reviewed With: patient, spouse        Progress: no change  Outcome Summary: A&OX4. C/o right sided back pain where pleural drain was placed today. PICC in place, IV abx, TPN and lipids infusing. SB-NSR on tele. Up ad shobha. Voiding. SCDS in place. Lovenox resumed. Wife at bedside.   98

## 2022-01-17 DIAGNOSIS — R60.0 LOWER EXTREMITY EDEMA: ICD-10-CM

## 2022-01-17 DIAGNOSIS — I10 ESSENTIAL HYPERTENSION: ICD-10-CM

## 2022-01-17 RX ORDER — HYDROCHLOROTHIAZIDE 12.5 MG/1
12.5 TABLET ORAL DAILY
Qty: 90 TABLET | Refills: 3 | OUTPATIENT
Start: 2022-01-17

## 2022-02-08 ENCOUNTER — TELEPHONE (OUTPATIENT)
Dept: INTERNAL MEDICINE | Facility: CLINIC | Age: 77
End: 2022-02-08

## 2022-02-08 NOTE — TELEPHONE ENCOUNTER
ATTEMPTED TO CALL JACK WITH [facility].  DETAILED MESSAGE HAS BEEN LEFT REGARDING MONITORING LUNG NODULE.

## 2022-02-08 NOTE — TELEPHONE ENCOUNTER
JACK WITH [facility] HAS CALLED, SHE STATED THAT SHE WAS FOLLOWING UP ON PATIENTS LUNG NODULE.   SHE STATED THAT HE WAS SEEN IN THE ER THERE  IN 2021.  SHE WANTED TO BE SURE THAT PATIENT WAS BEING FOLLOWED FOR THIS...  SHE NOTICED THAT HE MISSED HIS PULMONOLOGY APPOINTMENTS AND WAS CONCERNED THAT HE NEEDED FOLLOWED REGARDING THE LUNG NODULES.    [phone].

## 2022-03-04 ENCOUNTER — OFFICE VISIT (OUTPATIENT)
Dept: INTERNAL MEDICINE | Facility: CLINIC | Age: 77
End: 2022-03-04

## 2022-03-04 VITALS
DIASTOLIC BLOOD PRESSURE: 64 MMHG | BODY MASS INDEX: 23.74 KG/M2 | HEART RATE: 66 BPM | SYSTOLIC BLOOD PRESSURE: 138 MMHG | OXYGEN SATURATION: 96 % | WEIGHT: 169.6 LBS | HEIGHT: 71 IN

## 2022-03-04 DIAGNOSIS — I10 ESSENTIAL HYPERTENSION: Primary | ICD-10-CM

## 2022-03-04 DIAGNOSIS — E78.2 MIXED HYPERLIPIDEMIA: ICD-10-CM

## 2022-03-04 DIAGNOSIS — E03.9 ACQUIRED HYPOTHYROIDISM: ICD-10-CM

## 2022-03-04 DIAGNOSIS — K21.00 GASTROESOPHAGEAL REFLUX DISEASE WITH ESOPHAGITIS, UNSPECIFIED WHETHER HEMORRHAGE: ICD-10-CM

## 2022-03-04 DIAGNOSIS — R20.0 NUMBNESS IN FEET: ICD-10-CM

## 2022-03-04 DIAGNOSIS — I48.0 PAROXYSMAL ATRIAL FIBRILLATION: ICD-10-CM

## 2022-03-04 DIAGNOSIS — E55.9 VITAMIN D DEFICIENCY: ICD-10-CM

## 2022-03-04 DIAGNOSIS — Z91.09 ENVIRONMENTAL ALLERGIES: ICD-10-CM

## 2022-03-04 PROCEDURE — 99214 OFFICE O/P EST MOD 30 MIN: CPT | Performed by: NURSE PRACTITIONER

## 2022-03-04 RX ORDER — MONTELUKAST SODIUM 10 MG/1
10 TABLET ORAL NIGHTLY
Qty: 30 TABLET | Refills: 5 | Status: SHIPPED | OUTPATIENT
Start: 2022-03-04 | End: 2022-06-07

## 2022-03-04 NOTE — PROGRESS NOTES
Chief Complaint   Patient presents with   • Numbness     neuropathy in feet         History:  Sonu Bravo Jr. is a 76 y.o. male who presents today for evaluation of the above problems.          1) Would like to discontinue HCTZ and try just being on Zebeta for blood pressure like he did prior to August.  HCTZ makes him break out in the sun.  Was stable on Zebeta 10 mg daily for years.  Has lost weight during his recent prolonged illness but gaining back.  Has BP cuff at home to monitor. States he does not have any swelling of extremities.    2)Bilateral foot nueropathy since 2017. Says he does not have foot burning or pain.  Can't feel either foot from the MTP level to tips of toes.  This affects his walking, as he feels like the ends of his feet are numb. Again, not painful either at rest or with exercise.  States he has had vascular studies for this (normal) and has tried compression stockings (no help.) States he has known low back nerve problems and gets injections regularly.  Does have some radicular pains down the legs at times, but the foot numbness is different.  Takes Vit D and B12 on his own as supplements. Takes thyroid medication. Has had a pre-diabetic level of A1C (6.0%) but never officially diagnosed with diabetes. 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    3) Takes Allegra and Flonase daily, regularly.  Sinuses still just pour drainage.  This has been for years.  Not infected.  Just started Sudafed for this to see if it will help. Has not ever had allergy testing.      ROS:  Review of Systems  As above    Allergies   Allergen Reactions   • Sulfa Antibiotics Hives, Itching and Rash   • Sulfamethoxazole-Trimethoprim Hives, Itching and Rash   • Penicillins Hives   • Diphenhydramine Mental Status Change     Past Medical History:   Diagnosis Date   • Arthritis    • Atrial fibrillation (HCC) 08/31/2021   • GERD (gastroesophageal reflux disease)    • Hyperlipidemia    •  Hypertension      Past Surgical History:   Procedure Laterality Date   • COLONOSCOPY N/A 9/28/2021    Procedure: COLONOSCOPY WITH ANESTHESIA;  Surgeon: Thomas Dc DO;  Location: [facility] ENDOSCOPY;  Service: Gastroenterology;  Laterality: N/A;  pre op; abnormal ct scan  post op: diverticulosis ; polyps   PCP: Vaughn Kramer DO   • HERNIA REPAIR       Family History   Problem Relation Age of Onset   • Lung cancer Mother    • Brain cancer Mother    • Heart disease Father    • Hyperlipidemia Father    • Hypertension Father    • Prostate cancer Maternal Aunt    • Colon cancer Maternal Uncle    • Colon polyps Neg Hx    • Esophageal cancer Neg Hx    • Liver cancer Neg Hx      Sonu  reports that he quit smoking about 4 months ago. His smoking use included cigarettes. He started smoking about 55 years ago. He has a 13.00 pack-year smoking history. He has never used smokeless tobacco. He reports current alcohol use. He reports that he does not use drugs.    I have reviewed and updated the above documentation (if necessary) including but not limited to chief complaint, ROS, PFSH, allergies and medications        Current Outpatient Medications:   •  acetaminophen (TYLENOL) 325 MG tablet, Take 650 mg by mouth Every 4 (Four) Hours As Needed., Disp: , Rfl:   •  bisoprolol (ZEBeta) 5 MG tablet, Take 1 tablet by mouth Daily., Disp: 90 tablet, Rfl: 3  •  Cholecalciferol 100 MCG (4000 UT) capsule, 4,000 Units Daily., Disp: , Rfl:   •  coenzyme Q10 100 MG capsule, Take 100 mg by mouth Daily., Disp: , Rfl:   •  Cyanocobalamin 2500 MCG sublingual tablet, 2,500 mcg Daily., Disp: , Rfl:   •  fexofenadine (ALLEGRA) 180 MG tablet, Take 180 mg by mouth Daily., Disp: , Rfl:   •  levothyroxine (SYNTHROID, LEVOTHROID) 100 MCG tablet, Take 1 tablet by mouth Daily., Disp: 90 tablet, Rfl: 3  •  multivitamin with minerals tablet tablet, Take 1 capsule by mouth Daily., Disp: , Rfl:   •  omeprazole (priLOSEC) 20 MG capsule, Take 1  "capsule by mouth Daily., Disp: 90 capsule, Rfl: 3  •  rosuvastatin (CRESTOR) 20 MG tablet, Take 20 mg by mouth Every Night., Disp: , Rfl:   •  Zinc 50 MG capsule, Take  by mouth., Disp: , Rfl:   •  budesonide-formoterol (SYMBICORT) 160-4.5 MCG/ACT inhaler, Inhale 2 puffs 2 (Two) Times a Day., Disp: , Rfl: 12  •  fluticasone (FLONASE) 50 MCG/ACT nasal spray, 1 spray into the nostril(s) as directed by provider Daily. Each Nostral, Disp: 16 g, Rfl: 5  •  montelukast (Singulair) 10 MG tablet, Take 1 tablet by mouth Every Night., Disp: 30 tablet, Rfl: 5    OBJECTIVE:  Visit Vitals  /64 (BP Location: Right arm, Patient Position: Sitting, Cuff Size: Adult)   Pulse 66   Ht 180.3 cm (71\")   Wt 76.9 kg (169 lb 9.6 oz)   SpO2 96%   BMI 23.65 kg/m²      Physical Exam  Vitals and nursing note reviewed.   Constitutional:       General: He is not in acute distress.     Appearance: Normal appearance. He is normal weight. He is not ill-appearing, toxic-appearing or diaphoretic.   HENT:      Head: Normocephalic and atraumatic.      Left Ear: External ear normal.   Cardiovascular:      Rate and Rhythm: Normal rate.   Pulmonary:      Effort: Pulmonary effort is normal. No respiratory distress.   Abdominal:      Palpations: Abdomen is soft.   Musculoskeletal:      Right lower leg: No edema.      Left lower leg: No edema.        Feet:    Feet:      Comments: He does have sensation to light touch both feet, along toes.  He has venous stasis with purple hue initially, and cold toes bilaterally.  This improves with elevation. He has excellent DP pulses.  Capilary refill >2 seconds.  Skin:     General: Skin is warm and dry.   Neurological:      Mental Status: He is alert and oriented to person, place, and time.      Gait: Gait (normal gait, normal balance until he tries to walk heel-to-toe, and he wobbles with this) normal.   Psychiatric:         Mood and Affect: Mood normal.         Behavior: Behavior normal.         Thought Content: " Thought content normal.         Judgment: Judgment normal.     I reviewed multiple labs he has had over the last 6 month. Hgb a1C was 6.0%, TSH within acceptable range, B12 >2000.      MDM    Assessment/Plan    Diagnoses and all orders for this visit:    1. Essential hypertension (Primary)    2. Mixed hyperlipidemia    3. Vitamin D deficiency    4. Acquired hypothyroidism    5. Paroxysmal atrial fibrillation (HCC)    6. Gastroesophageal reflux disease with esophagitis, unspecified whether hemorrhage    7. Numbness in feet  -     Ambulatory Referral to Podiatry    8. Environmental allergies    Other orders  -     montelukast (Singulair) 10 MG tablet; Take 1 tablet by mouth Every Night.  Dispense: 30 tablet; Refill: 5    Stop Sudafed due to potential to raise blood pressure. Ok to d/c HCTZ and try Zebeta 5 mg daily only for blood pressure control.  Will check readings at home and let us know if a couple of weeks how it's running.  IF >140/90 consistently, would consider increasing Zebeta again.  Need to watch pulse.    Labs do not need to be repeated today.    Will refer to podiatry to assess the numbness of both feet.    Add Singulair nightly to see if this will help with his allergies. Can refer to allergist if not helping.        Patient's Body mass index is 23.65 kg/m². indicating that he is within normal range (BMI 18.5-24.9). No BMI management plan needed..      Education materials and an After Visit Summary were printed and given to the patient at discharge.  Return in about 6 months (around 9/4/2022) for follow up with Dr. Kramer, Annual physical.         Jenna Wilson, MARY   12:00 CST  3/4/2022

## 2022-04-11 NOTE — PROGRESS NOTES
[facility] - PODIATRY    Today's Date: 04/13/2022     Patient Name: Sonu Bravo Jr.  MRN: [id number]  [facility]: [id number]  PCP: Vaughn Kramer DO  Referring Provider: Jenna Wilson APRN    SUBJECTIVE     Chief Complaint   Patient presents with   • Follow-up     Vaughn Kramer, 03/04/2022 NEW PATIENT - NUMBNESS IN FEET. PN 3.4.22. NO SX - ARRIVE EARLY - pt states having pain in both feet, numbness, tingling sensation, and a lot of cramping on top of the foot - pt denies pain - pt presents new patient with numbness, tingling, and sometimes burning in both feet      HPI: Sonu Bravo Jr., a 76 y.o.male, comes to clinic as a(n) new patient complaining of numbness and tingling in feet. Patient has h/o arthritis, AFib, GERD, HLD, HTN. Patient is non-diabetic. Relates that over the past couple of years he has noticed a slow worsening of numbness and tingling in his feet and lower legs. He does have known low back issues that he gets injections from Dr. Saleem for. He does not have a neurosurgeon. Notes that he does have some issues with balance that he has tried PT for. Also notes some cramping in his lower legs as well as discoloration of his toes. Denies pain. Relates previous treatment(s) including PT. Denies any constitutional symptoms. No other pedal complaints at this time.    Past Medical History:   Diagnosis Date   • Arthritis    • Atrial fibrillation (HCC) 08/31/2021   • GERD (gastroesophageal reflux disease)    • Hyperlipidemia    • Hypertension      Past Surgical History:   Procedure Laterality Date   • COLONOSCOPY N/A 9/28/2021    Procedure: COLONOSCOPY WITH ANESTHESIA;  Surgeon: Thomas Dc DO;  Location: [facility] ENDOSCOPY;  Service: Gastroenterology;  Laterality: N/A;  pre op; abnormal ct scan  post op: diverticulosis ; polyps   PCP: Vaughn Kramer DO   • HERNIA REPAIR       Family History   Problem Relation Age of Onset   • Lung cancer Mother    • Brain cancer  Mother    • Heart disease Father    • Hyperlipidemia Father    • Hypertension Father    • Prostate cancer Maternal Aunt    • Colon cancer Maternal Uncle    • Colon polyps Neg Hx    • Esophageal cancer Neg Hx    • Liver cancer Neg Hx      Social History     Socioeconomic History   • Marital status:    Tobacco Use   • Smoking status: Former Smoker     Packs/day: 0.25     Years: 52.00     Pack years: 13.00     Types: Cigarettes     Start date:      Quit date: 10/11/2021     Years since quittin.5   • Smokeless tobacco: Never Used   • Tobacco comment: down to 2 cigs a day    Vaping Use   • Vaping Use: Never used   Substance and Sexual Activity   • Alcohol use: Yes     Comment: rare   • Drug use: No   • Sexual activity: Defer     Allergies   Allergen Reactions   • Sulfa Antibiotics Hives, Itching and Rash   • Sulfamethoxazole-Trimethoprim Hives, Itching and Rash   • Penicillins Hives   • Diphenhydramine Mental Status Change     Current Outpatient Medications   Medication Sig Dispense Refill   • acetaminophen (TYLENOL) 325 MG tablet Take 650 mg by mouth Every 4 (Four) Hours As Needed.     • bisoprolol (ZEBeta) 5 MG tablet Take 1 tablet by mouth Daily. 90 tablet 3   • budesonide-formoterol (SYMBICORT) 160-4.5 MCG/ACT inhaler Inhale 2 puffs 2 (Two) Times a Day.  12   • Cholecalciferol 100 MCG (4000 UT) capsule 4,000 Units Daily.     • coenzyme Q10 100 MG capsule Take 100 mg by mouth Daily.     • Cyanocobalamin 2500 MCG sublingual tablet 2,500 mcg Daily.     • fexofenadine (ALLEGRA) 180 MG tablet Take 180 mg by mouth Daily.     • fluticasone (FLONASE) 50 MCG/ACT nasal spray 1 spray into the nostril(s) as directed by provider Daily. Each Nostral 16 g 5   • levothyroxine (SYNTHROID, LEVOTHROID) 100 MCG tablet Take 1 tablet by mouth Daily. 90 tablet 3   • montelukast (Singulair) 10 MG tablet Take 1 tablet by mouth Every Night. 30 tablet 5   • multivitamin with minerals tablet tablet Take 1 capsule by mouth Daily.      • omeprazole (priLOSEC) 20 MG capsule Take 1 capsule by mouth Daily. 90 capsule 3   • rosuvastatin (CRESTOR) 20 MG tablet Take 20 mg by mouth Every Night.     • Zinc 50 MG capsule Take  by mouth.       No current facility-administered medications for this visit.     Review of Systems   Constitutional: Negative for chills and fever.   HENT: Negative for congestion.    Respiratory: Negative for shortness of breath.    Cardiovascular: Negative for chest pain and leg swelling.   Gastrointestinal: Negative for constipation, diarrhea, nausea and vomiting.   Musculoskeletal: Positive for arthralgias and gait problem.        Foot pain   Skin: Negative for wound.   Neurological: Positive for numbness.       OBJECTIVE     Vitals:    04/13/22 0852   BP: 140/90   Pulse: 55   SpO2: 96%       PHYSICAL EXAM  GEN:   Accompanied by none.     Foot/Ankle Exam:       General:   Appearance: appears stated age and healthy    Orientation: AAOx3    Affect: appropriate    Gait: unimpaired    Assistance: independent    Shoe Gear:  Casual shoes    VASCULAR      Right Foot Vascularity   Dorsalis pedis:  2+  Posterior tibial:  2+  Skin Temperature: warm    Edema Grading:  Trace  CFT:  3  Pedal Hair Growth:  Present  Varicosities: mild varicosities    Varicosities comment:  Venous congestion to toes     Left Foot Vascularity   Dorsalis pedis:  2+  Posterior tibial:  2+  Skin Temperature: warm    Edema Grading:  Trace  CFT:  3  Pedal Hair Growth:  Present  Varicosities: mild varicosities    Varicosities comment:  Venous congestion to toes      NEUROLOGIC     Right Foot Neurologic   Light touch sensation:  Diminished  Vibratory sensation:  Normal  Hot/Cold sensation: normal    Protective Sensation using Harvey-Branden Monofilament:  10     Left Foot Neurologic   Light touch sensation:  Diminished  Vibratory sensation:  Normal  Hot/cold sensation: normal    Protective Sensation using Harvey-Branden Monofilament:  10     MUSCULOSKELETAL       Right Foot Musculoskeletal   Ecchymosis:  None  Tenderness: none    Arch:  Normal  Hammertoe:  Second toe, third toe, fourth toe and fifth toe     Left Foot Musculoskeletal   Ecchymosis:  None  Tenderness: none    Arch:  Normal  Hammertoe:  Second toe, third toe, fourth toe and fifth toe     MUSCLE STRENGTH     Right Foot Muscle Strength   Foot dorsiflexion:  4+  Foot plantar flexion:  5  Foot inversion:  5  Foot eversion:  5     Left Foot Muscle Strength   Foot dorsiflexion:  4+  Foot plantar flexion:  5  Foot inversion:  5  Foot eversion:  5     RANGE OF MOTION      Right Foot Range of Motion   Foot and ankle ROM within normal limits       Left Foot Range of Motion   Foot and ankle ROM within normal limits       DERMATOLOGIC     Right Foot Dermatologic   Skin: skin intact    Skin comment:  Discoloration to toes     Left Foot Dermatologic   Skin: skin intact    Skin comment:  Discoloration to toes      RADIOLOGY/NUCLEAR:  No results found.    LABORATORY/CULTURE RESULTS:      PATHOLOGY RESULTS:       ASSESSMENT/PLAN     Diagnoses and all orders for this visit:    1. Venous congestion (Primary)  -     Ambulatory Referral to Vascular Surgery    2. Spinal stenosis of lumbar region with neurogenic claudication  -     Ambulatory Referral to Neurosurgery    3. Lumbar radiculopathy  -     Ambulatory Referral to Neurosurgery      Comprehensive lower extremity examination and evaluation was performed.  Discussed findings and treatment plan including risks, benefits, and treatment options with patient in detail. Patient agreed with treatment plan.  Ultimately I feel the patient has venous congestion of his feet/toes as well as lumbar radiculopathy.    From a podiatry stand point I do not have much to offer but will refer to Neurosurgery and Vascular surgery for further evaluation.    An After Visit Summary was printed and given to the patient at discharge, including (if requested) any available informative/educational  handouts regarding diagnosis, treatment, or medications. All questions were answered to patient/family satisfaction. Should symptoms fail to improve or worsen they agree to call or return to clinic or to go to the Emergency Department. Discussed the importance of following up with any needed screening tests/labs/specialist appointments and any requested follow-up recommended by me today. Importance of maintaining follow-up discussed and patient accepts that missed appointments can delay diagnosis and potentially lead to worsening of conditions.  Return if symptoms worsen or fail to improve., or sooner if acute issues arise.    Lab Frequency Next Occurrence   Diet: Once 09/28/2021       This document has been electronically signed by Cali Broderick DPM on April 13, 2022 09:25 CDT

## 2022-04-13 ENCOUNTER — PATIENT ROUNDING (BHMG ONLY) (OUTPATIENT)
Dept: PODIATRY | Facility: CLINIC | Age: 77
End: 2022-04-13

## 2022-04-13 ENCOUNTER — OFFICE VISIT (OUTPATIENT)
Dept: PODIATRY | Facility: CLINIC | Age: 77
End: 2022-04-13

## 2022-04-13 VITALS
HEIGHT: 71 IN | SYSTOLIC BLOOD PRESSURE: 140 MMHG | OXYGEN SATURATION: 96 % | BODY MASS INDEX: 23.94 KG/M2 | DIASTOLIC BLOOD PRESSURE: 90 MMHG | WEIGHT: 171 LBS | HEART RATE: 55 BPM

## 2022-04-13 DIAGNOSIS — M54.16 LUMBAR RADICULOPATHY: ICD-10-CM

## 2022-04-13 DIAGNOSIS — I87.8 VENOUS CONGESTION: Primary | ICD-10-CM

## 2022-04-13 DIAGNOSIS — M48.062 SPINAL STENOSIS OF LUMBAR REGION WITH NEUROGENIC CLAUDICATION: ICD-10-CM

## 2022-04-13 PROCEDURE — 99203 OFFICE O/P NEW LOW 30 MIN: CPT | Performed by: PODIATRIST

## 2022-04-13 NOTE — PROGRESS NOTES
April 13, 2022    Hello, may I speak with Sonu Bravo Jr.?    My name is Cherri       I am  with [facility]  [address], [address]  [place] [address] [phone].    Before we get started may I verify your date of birth? 1945    I am calling to officially welcome you to our practice and ask about your recent visit. Is this a good time to talk? Yes    Tell me about your visit with us. What things went well?  He is a good Dr. But can't do anything for my condition. He will be referring me to see Vascular       We're always looking for ways to make our patients' experiences even better. Do you have recommendations on ways we may improve?  No ma'am     Overall were you satisfied with your first visit to our practice? Yes I was.       I appreciate you taking the time to speak with me today. Is there anything else I can do for you? No ma'am      Thank you, and have a great day.

## 2022-04-27 ENCOUNTER — TELEPHONE (OUTPATIENT)
Dept: VASCULAR SURGERY | Facility: CLINIC | Age: 77
End: 2022-04-27

## 2022-04-27 NOTE — TELEPHONE ENCOUNTER
Spoke with Mrs Bravo reminding her of Mr Bravo's appointment for Thursday, April 28th, 2022 at 1045 am with Dr Estes. Mrs Bravo confirmed Mr Bravo would be here.

## 2022-04-28 ENCOUNTER — PATIENT ROUNDING (BHMG ONLY) (OUTPATIENT)
Dept: VASCULAR SURGERY | Facility: CLINIC | Age: 77
End: 2022-04-28

## 2022-04-28 ENCOUNTER — OFFICE VISIT (OUTPATIENT)
Dept: VASCULAR SURGERY | Facility: CLINIC | Age: 77
End: 2022-04-28

## 2022-04-28 ENCOUNTER — TELEPHONE (OUTPATIENT)
Dept: VASCULAR SURGERY | Facility: CLINIC | Age: 77
End: 2022-04-28

## 2022-04-28 ENCOUNTER — TELEPHONE (OUTPATIENT)
Dept: INTERNAL MEDICINE | Facility: CLINIC | Age: 77
End: 2022-04-28

## 2022-04-28 ENCOUNTER — HOSPITAL ENCOUNTER (OUTPATIENT)
Dept: ULTRASOUND IMAGING | Facility: HOSPITAL | Age: 77
Discharge: HOME | End: 2022-04-28
Admitting: SURGERY

## 2022-04-28 VITALS
OXYGEN SATURATION: 98 % | SYSTOLIC BLOOD PRESSURE: 128 MMHG | BODY MASS INDEX: 23.94 KG/M2 | HEIGHT: 71 IN | HEART RATE: 65 BPM | WEIGHT: 171 LBS | DIASTOLIC BLOOD PRESSURE: 82 MMHG

## 2022-04-28 DIAGNOSIS — I48.0 PAROXYSMAL ATRIAL FIBRILLATION: Primary | ICD-10-CM

## 2022-04-28 DIAGNOSIS — I73.9 PAD (PERIPHERAL ARTERY DISEASE): ICD-10-CM

## 2022-04-28 DIAGNOSIS — M79.89 LEG SWELLING: ICD-10-CM

## 2022-04-28 DIAGNOSIS — I73.9 PAD (PERIPHERAL ARTERY DISEASE): Primary | ICD-10-CM

## 2022-04-28 DIAGNOSIS — I10 ESSENTIAL HYPERTENSION: ICD-10-CM

## 2022-04-28 DIAGNOSIS — M79.89 LEG SWELLING: Primary | ICD-10-CM

## 2022-04-28 DIAGNOSIS — I79.8 OTHER DISORDERS OF ARTERIES, ARTERIOLES AND CAPILLARIES IN DISEASES CLASSIFIED ELSEWHERE: ICD-10-CM

## 2022-04-28 DIAGNOSIS — I87.323 CHRONIC VENOUS HYPERTENSION WITH INFLAMMATION INVOLVING BOTH SIDES: ICD-10-CM

## 2022-04-28 DIAGNOSIS — I82.4Y2 ACUTE DEEP VEIN THROMBOSIS (DVT) OF PROXIMAL VEIN OF LEFT LOWER EXTREMITY: ICD-10-CM

## 2022-04-28 DIAGNOSIS — E78.2 MIXED HYPERLIPIDEMIA: ICD-10-CM

## 2022-04-28 DIAGNOSIS — Z13.6 ENCOUNTER FOR SCREENING FOR STENOSIS OF CAROTID ARTERY: ICD-10-CM

## 2022-04-28 PROCEDURE — 93971 EXTREMITY STUDY: CPT

## 2022-04-28 PROCEDURE — 93971 EXTREMITY STUDY: CPT | Performed by: SURGERY

## 2022-04-28 PROCEDURE — 99214 OFFICE O/P EST MOD 30 MIN: CPT | Performed by: SURGERY

## 2022-04-28 RX ORDER — ASPIRIN 81 MG/1
81 TABLET ORAL DAILY
Start: 2022-04-28 | End: 2022-04-29

## 2022-04-28 NOTE — TELEPHONE ENCOUNTER
Left message letting Mr Bravo know that Dr Estes wanted to see him back in 6 months instead of 2 weeks and he would have testing prior to the appointment

## 2022-04-28 NOTE — TELEPHONE ENCOUNTER
Caller: ISA RENEE    Relationship: Emergency Contact    Best call back number: [phone]    What is the best time to reach you: ANYTIME    Who are you requesting to speak with (clinical staff, provider,  specific staff member): CLINICAL    What was the call regarding: PATIENTS WIFE STATES HE SAW DR. PADILLA TODAY 04/28/22. SHE STATES THEY DID AN ULTRASOUND ON HIS CALF ON HIS LEFT LEG THEY FOUND A BLOOD CLOT. DR. PADILLA PRESCRIBED HIM BLOOD THINNERS  (XARELTO) BUT WAS TOLD TO CONTACT OUR OFFICE BEFORE TAKING THESE.    Do you require a callback: YES

## 2022-04-28 NOTE — PROGRESS NOTES
April 28, 2022    Hello, may I speak with Sonu Bravo Jr.?    My name is Sarita Quezada      I am  with [facility]ASCULAR SURG [facility] VASCULAR SURGERY  [address]  Located within [place] [address] [phone].    Before we get started may I verify your date of birth? 1945    I am calling to officially welcome you to our practice and ask about your recent visit. Is this a good time to talk? yes    Tell me about your visit with us. What things went well?  All questions were answered.     We're always looking for ways to make our patients' experiences even better. Do you have recommendations on ways we may improve?  no    Overall were you satisfied with your first visit to our practice? Yes, it went very good.     I appreciate you taking the time to speak with me today. Is there anything else I can do for you? no      Thank you, and have a great day.

## 2022-04-28 NOTE — PROGRESS NOTES
04/28/2022      Cali Broderick DPM  [address] [facility]  [address]  [place] [address]    Sonu Bravo Jr.  1945    Chief Complaint   Patient presents with   • Establish Care     Referred over by Dr Broderick for Venous congestion. Patient denies any stroke like symptoms.    • Former Smoker     Patient is a Current Everyday Smoker    • Other     Patient states he has a lot of pain in the back then about 4 years ago his ankles and toes started swelling. Patient states he has started to get a lot of numb feeling in his feet and legs and having trouble with his balance. Patient states he is also having some redness and weakness in bilateral legs and feet. Patient states he fell about a week ago and has affected the Left Leg worse than the Right.    • Med Management     Verified medications from list patient brought in        Dear Cali Broderick DPM    HPI  I had the pleasure of seeing your patient Sonu Bravo  in the office today.  Thank you kindly for this consultation.  As you recall, Sonu Bravo Jr. is a 76 y.o.  male who you are currently following for foot care.  He has degenerative disc disease and numbness to his toes. He receives epidurals 4 times per year.  His balance is off causing falls. He had a fall one week ago with swelling to his left leg.  He also complains of some swelling.  He is maintained on Crestor. He was on Eliquis at one point for atrial fibrillation but reports he stopped this prior to surgery and has not resumed.       Past Medical History:   Diagnosis Date   • Arthritis    • Atrial fibrillation (HCC) 08/31/2021   • GERD (gastroesophageal reflux disease)    • Hyperlipidemia    • Hypertension        Past Surgical History:   Procedure Laterality Date   • COLON SURGERY  01/2022    Dr Fairbanks at [facility]   • COLONOSCOPY N/A 09/28/2021    Procedure: COLONOSCOPY WITH ANESTHESIA;  Surgeon: Thomas Dc DO;  Location: [facility] ENDOSCOPY;  Service: Gastroenterology;   Laterality: N/A;  pre op; abnormal ct scan  post op: diverticulosis ; polyps   PCP: Vaughn Kramer,    • HERNIA REPAIR         Family History   Problem Relation Age of Onset   • Lung cancer Mother    • Brain cancer Mother    • Heart disease Father    • Hyperlipidemia Father    • Hypertension Father    • Prostate cancer Maternal Aunt    • Colon cancer Maternal Uncle    • Colon polyps Neg Hx    • Esophageal cancer Neg Hx    • Liver cancer Neg Hx        Social History     Socioeconomic History   • Marital status:    Tobacco Use   • Smoking status: Current Every Day Smoker     Packs/day: 0.25     Years: 52.00     Pack years: 13.00     Types: Cigarettes     Start date:      Last attempt to quit: 10/11/2021     Years since quittin.5   • Smokeless tobacco: Never Used   • Tobacco comment: down to 2 cigs a day    Vaping Use   • Vaping Use: Never used   Substance and Sexual Activity   • Alcohol use: Yes     Comment: rare   • Drug use: No   • Sexual activity: Defer       Allergies   Allergen Reactions   • Sulfa Antibiotics Hives, Itching and Rash   • Sulfamethoxazole-Trimethoprim Hives, Itching and Rash   • Penicillins Hives   • Diphenhydramine Mental Status Change         Current Outpatient Medications:   •  acetaminophen (TYLENOL) 325 MG tablet, Take 650 mg by mouth Every 4 (Four) Hours As Needed., Disp: , Rfl:   •  bisoprolol (ZEBeta) 5 MG tablet, Take 1 tablet by mouth Daily., Disp: 90 tablet, Rfl: 3  •  Cholecalciferol 100 MCG (4000 UT) capsule, 4,000 Units Daily., Disp: , Rfl:   •  coenzyme Q10 100 MG capsule, Take 100 mg by mouth Daily., Disp: , Rfl:   •  Cyanocobalamin 2500 MCG sublingual tablet, 2,500 mcg Daily., Disp: , Rfl:   •  fexofenadine (ALLEGRA) 180 MG tablet, Take 180 mg by mouth Daily., Disp: , Rfl:   •  fluticasone (FLONASE) 50 MCG/ACT nasal spray, 1 spray into the nostril(s) as directed by provider Daily. Each Nostral, Disp: 16 g, Rfl: 5  •  levothyroxine (SYNTHROID, LEVOTHROID) 100  "MCG tablet, Take 1 tablet by mouth Daily., Disp: 90 tablet, Rfl: 3  •  montelukast (Singulair) 10 MG tablet, Take 1 tablet by mouth Every Night., Disp: 30 tablet, Rfl: 5  •  multivitamin with minerals tablet tablet, Take 1 capsule by mouth Daily., Disp: , Rfl:   •  omeprazole (priLOSEC) 20 MG capsule, Take 1 capsule by mouth Daily., Disp: 90 capsule, Rfl: 3  •  rosuvastatin (CRESTOR) 20 MG tablet, Take 20 mg by mouth Every Night., Disp: , Rfl:   •  Zinc 50 MG capsule, Take  by mouth., Disp: , Rfl:     Review of Systems   Constitutional: Negative.    HENT: Negative.    Eyes: Negative.    Respiratory: Negative.    Cardiovascular: Positive for leg swelling.        Leg cramping   Gastrointestinal: Negative.    Endocrine: Negative.    Genitourinary: Negative.    Musculoskeletal: Negative.    Skin: Positive for color change.   Allergic/Immunologic: Negative.    Neurological: Positive for numbness.   Hematological: Negative.    Psychiatric/Behavioral: Negative.    All other systems reviewed and are negative.    /82 (BP Location: Left arm, Patient Position: Sitting, Cuff Size: Adult)   Pulse 65   Ht 180.3 cm (71\")   Wt 77.6 kg (171 lb)   SpO2 98%   BMI 23.85 kg/m²     Physical Exam  Vitals and nursing note reviewed.   Constitutional:       Appearance: Normal appearance. He is well-developed.   HENT:      Head: Normocephalic and atraumatic.   Eyes:      General: No scleral icterus.     Pupils: Pupils are equal, round, and reactive to light.   Neck:      Thyroid: No thyromegaly.      Vascular: No carotid bruit or JVD.   Cardiovascular:      Rate and Rhythm: Normal rate. Rhythm irregular.      Pulses:           Carotid pulses are 2+ on the right side and 2+ on the left side.       Femoral pulses are 2+ on the right side and 2+ on the left side.       Popliteal pulses are 2+ on the right side and 2+ on the left side.        Dorsalis pedis pulses are 2+ on the right side and 2+ on the left side.        Posterior " tibial pulses are 2+ on the right side and 2+ on the left side.      Heart sounds: Normal heart sounds.      Comments: Varicose veins with venous congestion to feet  Pulmonary:      Effort: Pulmonary effort is normal.      Breath sounds: Normal breath sounds.   Abdominal:      General: Bowel sounds are normal. There is no distension or abdominal bruit.      Palpations: Abdomen is soft. There is no mass.      Tenderness: There is no abdominal tenderness.   Musculoskeletal:         General: Swelling present. Normal range of motion.      Cervical back: Neck supple.   Lymphadenopathy:      Cervical: No cervical adenopathy.   Skin:     General: Skin is warm and dry.   Neurological:      Mental Status: He is alert and oriented to person, place, and time.      Cranial Nerves: No cranial nerve deficit.      Sensory: No sensory deficit.   Psychiatric:         Mood and Affect: Mood normal.         Behavior: Behavior normal.         Thought Content: Thought content normal.         Judgment: Judgment normal.         Patient Active Problem List   Diagnosis   • Abdominal aortic ectasia (HCC)   • Vitamin D deficiency   • Spinal stenosis of lumbar region with neurogenic claudication   • Mixed hyperlipidemia   • Acquired hypothyroidism   • Gastro-esophageal reflux disease with esophagitis   • Essential hypertension   • Former smoker   • Paroxysmal atrial fibrillation (HCC)   • Current use of long term anticoagulation   • Cavitary lesion of lung   • Abdominal infection (HCC)   • Colonic diverticular abscess        Diagnosis Plan   1. Leg swelling  US Venous Doppler Lower Extremity Left (duplex)   2. Chronic venous hypertension with inflammation involving both sides     3. Mixed hyperlipidemia     4. Essential hypertension         Plan: After thoroughly evaluating Sonu Bravo Jr., I believe the best course of action is to send his down for venous duplex to assess for DVT of the left leg.  I would like him to continue wearing  compression stockings.  Then I will need him to have a venous valvular insufficiency study to assess for significant venous reflux. I will see him back in 2 weeks to discuss further plans and possible endovenous closure.  After we address his venous disease, I will check ABIS and a carotid duplex.  He does have vascular disease noted on previous CT of the abdomen and pelvis.  We recommended he discuss resuming his Eliquis with Dr. Kramer or cardiology.  I discussed that venous closure will not help his neuropathy symptoms.  I would recommend he begin a aspirin 91 mg EC if his Eliquis is not going to be resumed. The patient is to continue taking their medications as previously discussed.  I did discuss vascular risk factors as they pertain to the progression of vascular disease including controlling his hypertension and hyperlipidemia.  These risk factors are currently stable. This was all discussed in full with complete understanding.  Thank you for allowing me to participate in the care of your patient.  Please do not hesitate to call with any questions or concerns.  We will keep you aware of any further encounters with Sonu Bravo Jr..        Sincerely yours,         Wilfred Estes, Vaughn Frazier,

## 2022-04-28 NOTE — TELEPHONE ENCOUNTER
He had previously been on Eliquis for his atrial fibrillation. It seems that may have gotten stopped at some point? We definitely recommend that he stay on either Eliquis or Xarelto long-term to decrease his risk of strokes. Eliquis is probably the better option of the two. If he needs refills, we can make sure he has that.

## 2022-04-29 NOTE — TELEPHONE ENCOUNTER
PATIENT HAS RETURNED CALL, HE STATED THAT HE HAS ELIQUIS LEFT OVER FROM THE ORIGINAL PRESCRIPTION.  PATIENT IS ASKING IF HE SHOULD CONTINUE TAKING THE XARELTO SAMPLES THAT HE WAS GIVEN OR SHOULD HE JUST START THE ELIQUIS?  PLEASE ADVISE.    HE ALSO STATED THAT DR PADILLA WAS CHECKING HIS PULSE AND STATED THAT IT WAS IRREGULAR.   PATIENT STATED THAT HE HAS NOT SYMPTOMS JUST WANTING DR ESCOBAR TO BE AWARE OF IT.

## 2022-04-29 NOTE — TELEPHONE ENCOUNTER
The irregular pulse could be related to recurrence of atrial fibrillation. We will need blood thinner for 3-6 months related to his clot and then we will need to make further decisions on whether we need blood thinner in the long-run.  He may finish the Xarelto samples he was given as directed and then may transition back to the Eliquis, which I have sent to the pharmacy when he needs a refill. Eliquis will be 5mg twice a day.     In starting these new blood thinners, he may STOP ASPIRIN.

## 2022-05-03 ENCOUNTER — LAB (OUTPATIENT)
Dept: LAB | Facility: HOSPITAL | Age: 77
End: 2022-05-03

## 2022-05-03 ENCOUNTER — OFFICE VISIT (OUTPATIENT)
Dept: INTERNAL MEDICINE | Facility: CLINIC | Age: 77
End: 2022-05-03

## 2022-05-03 VITALS
HEART RATE: 50 BPM | HEIGHT: 71 IN | TEMPERATURE: 97.8 F | DIASTOLIC BLOOD PRESSURE: 84 MMHG | RESPIRATION RATE: 16 BRPM | OXYGEN SATURATION: 93 % | SYSTOLIC BLOOD PRESSURE: 160 MMHG | BODY MASS INDEX: 24.71 KG/M2 | WEIGHT: 176.5 LBS

## 2022-05-03 DIAGNOSIS — E03.9 ACQUIRED HYPOTHYROIDISM: ICD-10-CM

## 2022-05-03 DIAGNOSIS — I10 ESSENTIAL HYPERTENSION: ICD-10-CM

## 2022-05-03 DIAGNOSIS — I82.432 ACUTE DEEP VEIN THROMBOSIS (DVT) OF POPLITEAL VEIN OF LEFT LOWER EXTREMITY: ICD-10-CM

## 2022-05-03 DIAGNOSIS — I48.0 PAROXYSMAL ATRIAL FIBRILLATION: Primary | ICD-10-CM

## 2022-05-03 LAB
ANION GAP SERPL CALCULATED.3IONS-SCNC: 13 MMOL/L (ref 5–15)
BUN SERPL-MCNC: 19 MG/DL (ref 8–23)
BUN/CREAT SERPL: 18.1 (ref 7–25)
CALCIUM SPEC-SCNC: 9.3 MG/DL (ref 8.6–10.5)
CHLORIDE SERPL-SCNC: 106 MMOL/L (ref 98–107)
CO2 SERPL-SCNC: 27 MMOL/L (ref 22–29)
CREAT SERPL-MCNC: 1.05 MG/DL (ref 0.76–1.27)
DEPRECATED RDW RBC AUTO: 50.1 FL (ref 37–54)
EGFRCR SERPLBLD CKD-EPI 2021: 73.1 ML/MIN/1.73
ERYTHROCYTE [DISTWIDTH] IN BLOOD BY AUTOMATED COUNT: 14.2 % (ref 12.3–15.4)
GLUCOSE SERPL-MCNC: 61 MG/DL (ref 65–99)
HCT VFR BLD AUTO: 46.5 % (ref 37.5–51)
HGB BLD-MCNC: 15.1 G/DL (ref 13–17.7)
MCH RBC QN AUTO: 30.8 PG (ref 26.6–33)
MCHC RBC AUTO-ENTMCNC: 32.5 G/DL (ref 31.5–35.7)
MCV RBC AUTO: 94.9 FL (ref 79–97)
PLATELET # BLD AUTO: 228 10*3/MM3 (ref 140–450)
PMV BLD AUTO: 10.1 FL (ref 6–12)
POTASSIUM SERPL-SCNC: 3.9 MMOL/L (ref 3.5–5.2)
RBC # BLD AUTO: 4.9 10*6/MM3 (ref 4.14–5.8)
SODIUM SERPL-SCNC: 146 MMOL/L (ref 136–145)
TSH SERPL DL<=0.05 MIU/L-ACNC: 4.65 UIU/ML (ref 0.27–4.2)
WBC NRBC COR # BLD: 6.99 10*3/MM3 (ref 3.4–10.8)

## 2022-05-03 PROCEDURE — 93000 ELECTROCARDIOGRAM COMPLETE: CPT | Performed by: INTERNAL MEDICINE

## 2022-05-03 PROCEDURE — 36415 COLL VENOUS BLD VENIPUNCTURE: CPT

## 2022-05-03 PROCEDURE — 99214 OFFICE O/P EST MOD 30 MIN: CPT | Performed by: INTERNAL MEDICINE

## 2022-05-03 PROCEDURE — 80048 BASIC METABOLIC PNL TOTAL CA: CPT

## 2022-05-03 PROCEDURE — 85027 COMPLETE CBC AUTOMATED: CPT

## 2022-05-03 PROCEDURE — 84443 ASSAY THYROID STIM HORMONE: CPT

## 2022-05-03 RX ORDER — DOXYCYCLINE 100 MG/1
1 CAPSULE ORAL DAILY
COMMUNITY
Start: 2022-04-27 | End: 2022-05-05

## 2022-05-03 NOTE — PROGRESS NOTES
CC: f/u abnormal heart rhythm    History:  Sonu Bravo Jr. is a 76 y.o. male   He notes he has been doing reasonably well, but was recently diagnosed with DVT by Dr. Estes after being found to have swelling in his leg.  He is now on Xarelto samples, but was also told that he had abnormal heart rhythm in their office.  He has no symptoms of palpitations, fatigue, shortness of breath, or other symptoms he previously had with A. fib.       ROS:  Review of Systems   Respiratory: Negative for shortness of breath.    Cardiovascular: Positive for leg swelling. Negative for chest pain and palpitations.        reports that he has been smoking cigarettes. He started smoking about 55 years ago. He has a 26.00 pack-year smoking history. He has never used smokeless tobacco. He reports current alcohol use. He reports that he does not use drugs.      Current Outpatient Medications:   •  acetaminophen (TYLENOL) 325 MG tablet, Take 650 mg by mouth Every 4 (Four) Hours As Needed., Disp: , Rfl:   •  bisoprolol (ZEBeta) 5 MG tablet, Take 1 tablet by mouth Daily., Disp: 90 tablet, Rfl: 3  •  Cholecalciferol 100 MCG (4000 UT) capsule, 4,000 Units Daily., Disp: , Rfl:   •  coenzyme Q10 100 MG capsule, Take 100 mg by mouth Daily., Disp: , Rfl:   •  Cyanocobalamin 2500 MCG sublingual tablet, 2,500 mcg Daily., Disp: , Rfl:   •  doxycycline (MONODOX) 100 MG capsule, Take 1 capsule by mouth Daily., Disp: , Rfl:   •  fexofenadine (ALLEGRA) 180 MG tablet, Take 180 mg by mouth Daily., Disp: , Rfl:   •  fluticasone (FLONASE) 50 MCG/ACT nasal spray, 1 spray into the nostril(s) as directed by provider Daily. Each Nostral, Disp: 16 g, Rfl: 5  •  levothyroxine (SYNTHROID, LEVOTHROID) 100 MCG tablet, Take 1 tablet by mouth Daily., Disp: 90 tablet, Rfl: 3  •  montelukast (Singulair) 10 MG tablet, Take 1 tablet by mouth Every Night., Disp: 30 tablet, Rfl: 5  •  multivitamin with minerals tablet tablet, Take 1 capsule by mouth Daily., Disp: , Rfl:  "  •  omeprazole (priLOSEC) 20 MG capsule, Take 1 capsule by mouth Daily., Disp: 90 capsule, Rfl: 3  •  rivaroxaban (XARELTO) 15 MG tablet, Take 15 mg by mouth 2 (Two) Times a Day. After 22 days change over to 20 mg daily, Disp: , Rfl:   •  rosuvastatin (CRESTOR) 20 MG tablet, Take 20 mg by mouth Every Night., Disp: , Rfl:   •  Zinc 50 MG capsule, Take  by mouth., Disp: , Rfl:   •  apixaban (ELIQUIS) 5 MG tablet tablet, Take 1 tablet by mouth 2 (Two) Times a Day., Disp: 180 tablet, Rfl: 1    OBJECTIVE:  /84 (BP Location: Right arm, Patient Position: Sitting, Cuff Size: Adult)   Pulse 50   Temp 97.8 °F (36.6 °C)   Resp 16   Ht 180.3 cm (71\")   Wt 80.1 kg (176 lb 8 oz)   SpO2 93%   BMI 24.62 kg/m²    Physical Exam  Constitutional:       General: He is not in acute distress.  Cardiovascular:      Rate and Rhythm: Normal rate and regular rhythm.  Extrasystoles are present.     Heart sounds: Normal heart sounds. No murmur heard.  Pulmonary:      Effort: Pulmonary effort is normal.      Breath sounds: Normal breath sounds. No wheezing.   Neurological:      Mental Status: He is alert and oriented to person, place, and time.      Gait: Gait normal.   Psychiatric:         Mood and Affect: Mood normal.         Behavior: Behavior normal.         Assessment/Plan     Diagnoses and all orders for this visit:    1. Paroxysmal atrial fibrillation (HCC) (Primary)  -     ECG 12 Lead  -     Holter Monitor - 72 Hour Up To 15 Days; Future  EKG performed today and compared to September 2021.  At that time it showed sinus rhythm with PACs.  Today shows sinus bradycardia with no ectopy.  There is a slight leftward axis and no acute ischemic changes.  Intervals are normal. We will order Zio patch to monitor rhythm for possible recurrence of A. fib that would indicate long-term anticoagulation. He is in NSR, but he did have a couple of instances of ectopy during auscultation.     2. Essential hypertension  -     Basic Metabolic " Panel; Future  -     CBC (No Diff); Future  Well controlled, BP goal for age is <140/90 per JNC 8 guidelines and continue current medications    3. Acquired hypothyroidism  -     TSH; Future  Check TSH to monitor therapy and rule out contribution to abnormal rhythms.     4. Acute deep vein thrombosis (DVT) of popliteal vein of left lower extremity  He is on Xarelto samples from Dr. Estes. Transition to Eliquis when finished with those and plan for long-term anticoagulation given unprovoked DVT and paroxysmal a fib.     An After Visit Summary was printed and given to the patient at discharge.  Return for Next scheduled follow up.          Vaughn Kramer D.O. 5/3/2022   Electronically signed.

## 2022-05-04 RX ORDER — LEVOTHYROXINE SODIUM 112 UG/1
112 TABLET ORAL DAILY
Qty: 90 TABLET | Refills: 1 | Status: SHIPPED | OUTPATIENT
Start: 2022-05-04 | End: 2022-09-06 | Stop reason: SDUPTHER

## 2022-05-05 ENCOUNTER — HOSPITAL ENCOUNTER (OUTPATIENT)
Dept: CARDIOLOGY | Facility: HOSPITAL | Age: 77
Discharge: HOME | End: 2022-05-05

## 2022-05-05 ENCOUNTER — HOSPITAL ENCOUNTER (OUTPATIENT)
Dept: GENERAL RADIOLOGY | Facility: HOSPITAL | Age: 77
Discharge: HOME | End: 2022-05-05

## 2022-05-05 ENCOUNTER — OFFICE VISIT (OUTPATIENT)
Dept: NEUROSURGERY | Facility: CLINIC | Age: 77
End: 2022-05-05

## 2022-05-05 VITALS — WEIGHT: 176.8 LBS | BODY MASS INDEX: 24.75 KG/M2 | HEIGHT: 71 IN

## 2022-05-05 DIAGNOSIS — M54.50 LUMBAR BACK PAIN: Primary | ICD-10-CM

## 2022-05-05 DIAGNOSIS — R29.2 HYPERREFLEXIA: ICD-10-CM

## 2022-05-05 DIAGNOSIS — I48.0 PAROXYSMAL ATRIAL FIBRILLATION: ICD-10-CM

## 2022-05-05 DIAGNOSIS — M54.50 LUMBAR BACK PAIN: ICD-10-CM

## 2022-05-05 DIAGNOSIS — R20.2 NUMBNESS AND TINGLING OF BOTH LOWER EXTREMITIES: ICD-10-CM

## 2022-05-05 DIAGNOSIS — Z72.0 TOBACCO ABUSE: ICD-10-CM

## 2022-05-05 DIAGNOSIS — R20.0 NUMBNESS AND TINGLING OF BOTH LOWER EXTREMITIES: ICD-10-CM

## 2022-05-05 PROCEDURE — 72114 X-RAY EXAM L-S SPINE BENDING: CPT

## 2022-05-05 PROCEDURE — 99214 OFFICE O/P EST MOD 30 MIN: CPT | Performed by: NURSE PRACTITIONER

## 2022-05-05 PROCEDURE — 72052 X-RAY EXAM NECK SPINE 6/>VWS: CPT

## 2022-05-05 PROCEDURE — 93246 EXT ECG>7D<15D RECORDING: CPT

## 2022-05-05 NOTE — PATIENT INSTRUCTIONS
Tobacco Use Disorder  Tobacco use disorder (TUD) occurs when a person craves, seeks, and uses tobacco, regardless of the consequences. This disorder can cause problems with mental and physical health. It can affect your ability to have healthy relationships, and it can keep you from meeting your responsibilities at work, home, or school.  Tobacco may be:  Smoked as a cigarette or cigar.  Inhaled using e-cigarettes.  Smoked in a pipe or hookah.  Chewed as smokeless tobacco.  Inhaled into the nostrils as snuff.  Tobacco products contain a dangerous chemical called nicotine, which is very addictive. Nicotine triggers hormones that make the body feel stimulated and works on areas of the brain that make you feel good. These effects can make it hard for people to quit nicotine.  Tobacco contains many other unsafe chemicals that can damage almost every organ in the body. Smoking tobacco also puts others in danger due to fire risk and possible health problems caused by breathing in secondhand smoke.  What are the signs or symptoms?  Symptoms of TUD may include:  Being unable to slow down or stop your tobacco use.  Spending an abnormal amount of time getting or using tobacco.  Craving tobacco. Cravings may last for up to 6 months after quitting.  Tobacco use that:  Interferes with your work, school, or home life.  Interferes with your personal and social relationships.  Makes you give up activities that you once enjoyed or found important.  Using tobacco even though you know that it is:  Dangerous or bad for your health or someone else's health.  Causing problems in your life.  Needing more and more of the substance to get the same effect (developing tolerance).  Experiencing unpleasant symptoms if you do not use the substance (withdrawal). Withdrawal symptoms may include:  Depressed, anxious, or irritable mood.  Difficulty concentrating.  Increased appetite.  Restlessness or trouble sleeping.  Using the substance to avoid  withdrawal.  How is this diagnosed?  This condition may be diagnosed based on:  Your current and past tobacco use. Your health care provider may ask questions about how your tobacco use affects your life.  A physical exam.  You may be diagnosed with TUD if you have at least two symptoms within a 12-month period.  How is this treated?  This condition is treated by stopping tobacco use. Many people are unable to quit on their own and need help. Treatment may include:  Nicotine replacement therapy (NRT). NRT provides nicotine without the other harmful chemicals in tobacco. NRT gradually lowers the dosage of nicotine in the body and reduces withdrawal symptoms. NRT is available as:  Over-the-counter gums, lozenges, and skin patches.  Prescription mouth inhalers and nasal sprays.  Medicine that acts on the brain to reduce cravings and withdrawal symptoms.  A type of talk therapy that examines your triggers for tobacco use, how to avoid them, and how to cope with cravings (behavioral therapy).  Hypnosis. This may help with withdrawal symptoms.  Joining a support group for others coping with TUD.  The best treatment for TUD is usually a combination of medicine, talk therapy, and support groups. Recovery can be a long process. Many people start using tobacco again after stopping (relapse). If you relapse, it does not mean that treatment will not work.  Follow these instructions at home:    Lifestyle  Do not use any products that contain nicotine or tobacco, such as cigarettes and e-cigarettes.  Avoid things that trigger tobacco use as much as you can. Triggers include people and situations that usually cause you to use tobacco.  Avoid drinks that contain caffeine, including coffee. These may worsen some withdrawal symptoms.  Find ways to manage stress. Wanting to smoke may cause stress, and stress can make you want to smoke. Relaxation techniques such as deep breathing, meditation, and yoga may help.  Attend support groups  as needed. These groups are an important part of long-term recovery for many people.  General instructions  Take over-the-counter and prescription medicines only as told by your health care provider.  Check with your health care provider before taking any new prescription or over-the-counter medicines.  Decide on a friend, family member, or smoking quit-line (such as 1-800-QUIT-NOW in the [place] that you can call or text when you feel the urge to smoke or when you need help coping with cravings.  Keep all follow-up visits as told by your health care provider and therapist. This is important.  Contact a health care provider if:  You are not able to take your medicines as prescribed.  Your symptoms get worse, even with treatment.  Summary  Tobacco use disorder (TUD) occurs when a person craves, seeks, and uses tobacco regardless of the consequences.  This condition may be diagnosed based on your current and past tobacco use and a physical exam.  Many people are unable to quit on their own and need help. Recovery can be a long process.  The most effective treatment for TUD is usually a combination of medicine, talk therapy, and support groups.  This information is not intended to replace advice given to you by your health care provider. Make sure you discuss any questions you have with your health care provider.  Document Revised: 12/05/2018 Document Reviewed: 12/05/2018  [employer] Patient Education © 2021 [employer]    Advance Care Planning and Advance Directives     You make decisions on a daily basis - decisions about where you want to live, your career, your home, your life. Perhaps one of the most important decisions you face is your choice for future medical care. Take time to talk with your family and your healthcare team and start planning today.  Advance Care Planning is a process that can help you:  Understand possible future healthcare decisions in light of your own experiences  Reflect on those decision in  light of your goals and values  Discuss your decisions with those closest to you and the healthcare professionals that care for you  Make a plan by creating a document that reflects your wishes    Surrogate Decision Maker  In the event of a medical emergency, which has left you unable to communicate or to make your own decisions, you would need someone to make decisions for you.  It is important to discuss your preferences for medical treatment with this person while you are in good health.     Qualities of a surrogate decision maker:  Willing to take on this role and responsibility  Knows what you want for future medical care  Willing to follow your wishes even if they don't agree with them  Able to make difficult medical decisions under stressful circumstances    Advance Directives  These are legal documents you can create that will guide your healthcare team and decision maker(s) when needed. These documents can be stored in the electronic medical record.    Living Will - a legal document to guide your care if you have a terminal condition or a serious illness and are unable to communicate. States vary by statute in document names/types, but most forms may include one or more of the following:        -  Directions regarding life-prolonging treatments        -  Directions regarding artificially provided nutrition/hydration        -  Choosing a healthcare decision maker        -  Direction regarding organ/tissue donation    Durable Power of  for Healthcare - this document names an -in-fact to make medical decisions for you, but it may also allow this person to make personal and financial decisions for you. Please seek the advice of an  if you need this type of document.    **Advance Directives are not required and no one may discriminate against you if you do not sign one.    Medical Orders  Many states allow specific forms/orders signed by your physician to record your wishes for medical  treatment in your current state of health. This form, signed in personal communication with your physician, addresses resuscitation and other medical interventions that you may or may not want.      For more information or to schedule a time with a [facility] Advance Care Planning Facilitator contact: Psychiatric.[place] [facility] or call [phone] and someone will contact you directly.

## 2022-05-05 NOTE — PROGRESS NOTES
Primary Care Provider: Vaughn Kramer DO  Requesting Provider: Cali Broderick DPM    Chief Complaint:   Chief Complaint   Patient presents with   • Back Pain     Pt is here with complaints of low back pain.    Complaints of bilateral foot pain.        History of Present Illness  Consultation today at the request of Cali Broderick DPM    HPI:  Sonu Bravo Jr. is a 76 y.o. male who presents today with his wife with a complaint of lumbar back pain and bilateral lower extremity numbness and tingling, 80% feet, 20% back.  No previous spine surgeries.    Gradual onset of lumbar back pain over the past 20 years that has progressively worsened over the past 6 years.  He currently complains of minimal lumbar back pain at present, primarily constant numbness and tingling to all digits of the bilateral feet that has been ongoing since 2017.  He additionally reports intermittent nondermatomal radicular pain to the bilateral thighs.  Regarding his lumbar back discomfort, Mr. Bravo states his discomfort has been well managed by Dr. Saleem with lumbar facet injections.  Regarding his bilateral lower extremity dysesthesias, no significant aggravating or alleviating factors as his lower extremity dysesthesias are constant.  He does report poor gait and balance and one fall that occurred approximately 3-4 weeks ago.  He denies need for assist while ambulating.  He additionally denies fevers, chills, night sweats, unexplained weight loss, saddle anesthesia, or bowel bladder dysfunction.  He currently rates the severity of his symptoms 5/10.  No additional concerns at this time.    To note: Mr. Bravo was diagnosed 1 week ago with nonocclusive deep venous thrombosis in the entire superficial femoral, popliteal, posterior tibial, and peroneal veins to the left lower extremity.  He is currently under the care of Dr. Estes and on Xarelto with a tentative plan to transition to [facility] in the near future.  Scheduled for  carotid ultrasound and bilateral JAZMIN in the near future.    Oswestry Disability Index = 22%   Score   Pain Intensity Moderate pain-2   Personal Care Look after myself without pain-0   Lifting Medium weights off a table-3   Walking Pain prevents > 1 mile-1   Sitting Sit as long as I like-0   Standing Pain limits standing to < 1hr-2   Sleeping Occasionally disturbed-1   Sex Life (if applicable) My sex life is normal-1   Social Life Pain limits more energetic activities-2   Traveling Travel without pain-0   (Muna et al, 1980)    SCORE INTERPRETATION OF THE OSWESTRY LBP DISABILITY QUESTIONNAIRE     20-40% Moderate disability.  This group experiences more pain and problems with sitting, lifting, and standing.  Travel and social life are more difficult and they may well be off work. Personal care, sexual activity, and sleeping are not grossly affected, and the back condition can usually be managed by conservative means.      ROS  Review of Systems   Constitutional: Negative.    HENT: Positive for sinus pressure.    Eyes: Negative.    Respiratory: Positive for cough.    Cardiovascular: Positive for leg swelling.   Gastrointestinal: Negative.    Endocrine: Negative.    Genitourinary: Positive for urgency.   Musculoskeletal: Positive for back pain.   Skin: Negative.    Allergic/Immunologic: Positive for environmental allergies.   Neurological: Negative.    Hematological: Negative.    Psychiatric/Behavioral: Negative.    All other systems reviewed and are negative.    Past Medical History:   Diagnosis Date   • Arthritis    • Atrial fibrillation (HCC) 08/31/2021   • GERD (gastroesophageal reflux disease)    • Hyperlipidemia    • Hypertension      Past Surgical History:   Procedure Laterality Date   • COLON SURGERY  01/2022    Dr Fairbanks at [facility]   • COLONOSCOPY N/A 09/28/2021    Procedure: COLONOSCOPY WITH ANESTHESIA;  Surgeon: Thomas Dc DO;  Location: [facility] ENDOSCOPY;  Service:  Gastroenterology;  Laterality: N/A;  pre op; abnormal ct scan  post op: diverticulosis ; polyps   PCP: Vaughn Kramer,    • HERNIA REPAIR       Family History: family history includes Brain cancer in his mother; Colon cancer in his maternal uncle; Heart disease in his father; Hyperlipidemia in his father; Hypertension in his father; Lung cancer in his mother; Prostate cancer in his maternal aunt.    Social History:  reports that he has been smoking cigarettes. He started smoking about 55 years ago. He has a 26.00 pack-year smoking history. He has never used smokeless tobacco. He reports current alcohol use. He reports current drug use.    Medications:    Current Outpatient Medications:   •  acetaminophen (TYLENOL) 325 MG tablet, Take 650 mg by mouth Every 4 (Four) Hours As Needed., Disp: , Rfl:   •  apixaban (ELIQUIS) 5 MG tablet tablet, Take 1 tablet by mouth 2 (Two) Times a Day., Disp: 180 tablet, Rfl: 1  •  bisoprolol (ZEBeta) 5 MG tablet, Take 1 tablet by mouth Daily., Disp: 90 tablet, Rfl: 3  •  Cholecalciferol 100 MCG (4000 UT) capsule, 4,000 Units Daily., Disp: , Rfl:   •  coenzyme Q10 100 MG capsule, Take 100 mg by mouth Daily., Disp: , Rfl:   •  Cyanocobalamin 2500 MCG sublingual tablet, 2,500 mcg Daily., Disp: , Rfl:   •  fexofenadine (ALLEGRA) 180 MG tablet, Take 180 mg by mouth Daily., Disp: , Rfl:   •  fluticasone (FLONASE) 50 MCG/ACT nasal spray, 1 spray into the nostril(s) as directed by provider Daily. Each Nostral, Disp: 16 g, Rfl: 5  •  levothyroxine (SYNTHROID, LEVOTHROID) 112 MCG tablet, Take 1 tablet by mouth Daily., Disp: 90 tablet, Rfl: 1  •  montelukast (Singulair) 10 MG tablet, Take 1 tablet by mouth Every Night., Disp: 30 tablet, Rfl: 5  •  multivitamin with minerals tablet tablet, Take 1 capsule by mouth Daily., Disp: , Rfl:   •  omeprazole (priLOSEC) 20 MG capsule, Take 1 capsule by mouth Daily., Disp: 90 capsule, Rfl: 3  •  rosuvastatin (CRESTOR) 20 MG tablet, Take 20 mg by mouth  "Every Night., Disp: , Rfl:   •  Zinc 50 MG capsule, Take  by mouth., Disp: , Rfl:     Allergies:  Sulfa antibiotics, Sulfamethoxazole-trimethoprim, Penicillins, and Diphenhydramine    Objective   Ht 180.3 cm (71\")   Wt 80.2 kg (176 lb 12.8 oz)   BMI 24.66 kg/m²   Physical Exam  Vitals and nursing note reviewed.   Constitutional:       General: He is not in acute distress.     Appearance: Normal appearance. He is well-developed, well-groomed and normal weight. He is not ill-appearing, toxic-appearing or diaphoretic.   HENT:      Head: Normocephalic and atraumatic.      Right Ear: Hearing normal.      Left Ear: Hearing normal.   Eyes:      Extraocular Movements: EOM normal.      Conjunctiva/sclera: Conjunctivae normal.      Pupils: Pupils are equal, round, and reactive to light.   Neck:      Trachea: Trachea normal.   Cardiovascular:      Rate and Rhythm: Normal rate and regular rhythm.      Comments:   Bilateral LE cyanosis  Pulmonary:      Effort: Pulmonary effort is normal. No tachypnea, bradypnea, accessory muscle usage or respiratory distress.   Abdominal:      Palpations: Abdomen is soft.   Musculoskeletal:      Cervical back: Full passive range of motion without pain and neck supple.      Left lower le+ Edema present.   Skin:     General: Skin is warm and dry.   Neurological:      Mental Status: He is alert and oriented to person, place, and time.      GCS: GCS eye subscore is 4. GCS verbal subscore is 5. GCS motor subscore is 6.      Gait: Gait is intact.      Deep Tendon Reflexes:      Reflex Scores:       Tricep reflexes are 3+ on the right side and 2+ on the left side.       Bicep reflexes are 3+ on the right side and 2+ on the left side.       Brachioradialis reflexes are 3+ on the right side and 2+ on the left side.       Patellar reflexes are 4+ on the right side and 2+ on the left side.       Achilles reflexes are 2+ on the right side and 0 on the left side.  Psychiatric:         Speech: Speech " normal.         Behavior: Behavior normal. Behavior is cooperative.       Neurologic Exam     Mental Status   Oriented to person, place, and time.   Attention: normal. Concentration: normal.   Speech: speech is normal   Level of consciousness: alert    Cranial Nerves     CN II   Visual fields full to confrontation.     CN III, IV, VI   Pupils are equal, round, and reactive to light.  Extraocular motions are normal.     CN V   Facial sensation intact.     CN VII   Facial expression full, symmetric.     CN VIII   CN VIII normal.     CN IX, X   CN IX normal.     CN XI   CN XI normal.     Motor Exam   Right arm tone: normal  Left arm tone: normal  Right leg tone: normal  Left leg tone: normal    Strength   Right deltoid: 5/5  Left deltoid: 5/5  Right biceps: 5/5  Left biceps: 5/5  Right triceps: 5/5  Left triceps: 5/5  Right wrist extension: 5/5  Left wrist extension: 5/5  Right iliopsoas: 5/5  Left iliopsoas: 5/5  Right quadriceps: 5/5  Left quadriceps: 5/5  Right anterior tibial: 5/5  Left anterior tibial: 5/5  Right gastroc: 5/5  Left gastroc: 5/5  Right EHL 5/5  Left EHL 5/5       Sensory Exam   Right arm light touch: normal  Left arm light touch: normal  Right leg light touch: decreased from toes  Left leg light touch: decreased from toes    Gait, Coordination, and Reflexes     Gait  Gait: normal    Tremor   Resting tremor: absent  Intention tremor: absent  Action tremor: absent    Reflexes   Right brachioradialis: 3+  Left brachioradialis: 2+  Right biceps: 3+  Left biceps: 2+  Right triceps: 3+  Left triceps: 2+  Right patellar: 4+  Left patellar: 2+  Right achilles: 2+  Left achilles: 0  Right plantar: upgoing  Left plantar: normal  Right Joel: absent  Left Joel: absent  Right ankle clonus: absent  Left ankle clonus: absent  Right pendular knee jerk: absent  Left pendular knee jerk: absent    Imaging: (independent review and interpretation)  No recent imaging of the lumbar spine.    .US Venous Doppler  Lower Extremity Left (duplex)    Result Date: 4/28/2022  Impression: There is evidence of nonocclusive deep venous thrombosis of the left lower extremity.  Comments: Left lower extremity venous duplex exam was performed using color Doppler flow, Doppler wave form analysis, and grayscale imaging, with and without compression. There is evidence of nonocclusive deep venous thrombosis in the entire superficial femoral, popliteal, posterior tibial, and peroneal veins. There is no thrombus identified in the saphenofemoral junction or the greater saphenous vein.  This report was finalized on 04/28/2022 16:04 by Dr. Wilfred Estes MD.    ASSESSMENT and PLAN  Sonu Bravo Jr. is a 76 y.o. male with a significant medical history of DVT on Xarelto, A. fib hypertension, hyperlipidemia hypothyroidism, and tobacco abuse.  He presents with a new problem of lumbar back pain and lower extremity dysesthesias, 80% bilateral feet, 20% back. MARY: 22.  Physical exam findings of bilateral lower extremity cyanosis with 1+ left lower extremity pitting edema, no focal weakness, right upper and lower extremity hyperreflexia with right Babinski, and a relatively well-maintained gait.  No recent imaging of the lumbar spine.    RECOMMENDATIONS ...  Lumbar back pain  Bilateral lower extremity numbness and tingling  Differential diagnosis include, but are not limited to spondylolisthesis with concern for mechanical instability, degenerative facet arthropathy, lumbar stenosis with neurogenic claudication or Peripheral neuropathy.     For further evaluation we will proceed today by obtaining x-rays of the lumbar spine complete with flexion and extension to assess for areas of instability.  We also send them for an EMG and nerve conduction study of the bilateral lower extremities to confirm or refute radiculopathy from the lumbar spine and/or a peripheral neuropathy as a causative factor for their lower extremity dysesthesias.  We will also send  Mr. Bravo for a noncontrasted MRI of the lumbar spine to rule out need for surgical intervention.  Once all testing is complete we will we will have Mr. Bravo return for reassessment with Dr. Cifuentes.  In the interim, as a means of first-line conservative management for lumbar back pain we will send Sonu for dedicated course of physician directed physical therapy, Rx provided.  I advised the patient to call to return sooner for new or worsening complaints of weakness, paresthesias, gait disturbances, or any additional concerns.  Treatment options discussed in detail with Sonu and he voiced understanding.  Mr. Bravo agrees with this plan of care.    Hyperreflexia/myelopathy  DDX:  Congenital: Chiari malformation, tethered cord, syringomyelia, hereditary spastic paraplegia  Acquired: Cervical or thoracic spinal stenosis, traumatic, herniated disc, kyphosis, extra medullary hematopoiesis, epidural lipomatosis, OPLL, Paget's disease  Neoplastic: Spinal cord tumors intra or extra medullary, carcinomatosis, paraneoplastic syndromes  Vascular: Epidural hematoma, spinal cord infarction, vascular malformation such as AVM  Iatrogenic: Radiation myelopathy  Infectious: Post viral or autoimmune.  Often seen in HIV, syphilis, cytomegalovirus, herpes simplex 2 and CJD  Demyelinating: Acute transverse myelitis, multiple sclerosis, Devic's syndrome  Metabolic: Subacute combined systemic disease due to vitamin B-12 deficiency  Motor neuron disease: Amyotrophic lateral sclerosis, primary lateral sclerosis    Upon physical exam Mr. Bravo was found to be hyperreflexive with a right Babinski.  For further evaluation we will send for x-rays of the cervical spine complete with flexion and extension, as well as a noncontrasted MRI of the cervical spine to rule out need for surgical intervention.  Again, once all testing has been completed we will have him return for reassessment with Dr. Cifuentes.    Tobacco abuse  The patient understands  the many dangers of continuing to use tobacco.  If quitting becomes an increased priority Sonu knows to contact us for help with quitting.       Diagnoses and all orders for this visit:    1. Lumbar back pain (Primary)  -     XR Spine Lumbar Complete With Flex & Ext; Future  -     Ambulatory Referral to Physical Therapy Evaluate and treat (2 to 3 times weekly for 6 weeks)  -     MRI Lumbar Spine Without Contrast; Future    2. Numbness and tingling of both lower extremities  -     EMG & Nerve Conduction Test; Future    3. Hyperreflexia  -     XR Spine Cervical Complete With Flex Ext; Future  -     MRI Cervical Spine Without Contrast; Future    4. Tobacco abuse      Return for FOLLOW UP WITH DR. MATTHEW NEXT AVAILABLE WITH MRI, EMG STUDY.    Thank you for this Consultation and the opportunity to participate in Sonu's care.    Sincerely,  Brennan Salinas, MARY    Level of Risk: Moderate due to: undiagnosed new problem  MDM: Moderate  (Mod = 43532, High = 43168)

## 2022-05-11 RX ORDER — FLUTICASONE PROPIONATE 50 MCG
1 SPRAY, SUSPENSION (ML) NASAL DAILY
Qty: 32 G | Refills: 3 | Status: SHIPPED | OUTPATIENT
Start: 2022-05-11

## 2022-05-16 ENCOUNTER — HOSPITAL ENCOUNTER (OUTPATIENT)
Dept: MRI IMAGING | Facility: HOSPITAL | Age: 77
Discharge: HOME | End: 2022-05-16

## 2022-05-16 DIAGNOSIS — R29.2 HYPERREFLEXIA: ICD-10-CM

## 2022-05-16 DIAGNOSIS — M54.50 LUMBAR BACK PAIN: ICD-10-CM

## 2022-05-16 PROCEDURE — 72148 MRI LUMBAR SPINE W/O DYE: CPT

## 2022-05-16 PROCEDURE — 72141 MRI NECK SPINE W/O DYE: CPT

## 2022-05-17 ENCOUNTER — DOCUMENTATION (OUTPATIENT)
Dept: NEUROSURGERY | Facility: CLINIC | Age: 77
End: 2022-05-17

## 2022-05-23 LAB
MAXIMAL PREDICTED HEART RATE: 143 BPM
STRESS TARGET HR: 122 BPM

## 2022-05-23 PROCEDURE — 93248 EXT ECG>7D<15D REV&INTERPJ: CPT | Performed by: INTERNAL MEDICINE

## 2022-06-06 ENCOUNTER — TELEPHONE (OUTPATIENT)
Dept: INTERNAL MEDICINE | Facility: CLINIC | Age: 77
End: 2022-06-06

## 2022-06-06 NOTE — TELEPHONE ENCOUNTER
"Patient stated he is not having pain, redness, or warmth.  But he does have that feeling of \"tightness\" in his foot.  He was just concerned because the swelling is in the same leg he had the clot in (left) and the foot as well as the ankle is swollen. He plans to call back in the morning to schedule an appointment.  "

## 2022-06-06 NOTE — TELEPHONE ENCOUNTER
We are probably best to get an appointment to look into things.     Is he having pain, redness or warmth of the area?

## 2022-06-06 NOTE — TELEPHONE ENCOUNTER
Caller: Sonu Bravo Jr.    Relationship: Self    Best call back number: [phone]    What is the best time to reach you: AS SOON AS POSSIBLE PLEASE     Who are you requesting to speak with (clinical staff, provider,  specific staff member): PROVIDER OR NURSE         What was the call regarding: PATIENT REQUESTING A CALL BACK TO DISCUSS THE SWELLING IN ANKLE GETTING WORSE MOVING TO TOP OF FOOT  HARD TO WEAR A SHOE IN THE LAST 2 OR 3 DAYS      PATIENTS WANTS TO MAKE SURE THIS IS NORMAL SINCE HE IS ON BLOOD THINNER FOR A BLOOD CLOT     Do you require a callback:  YES

## 2022-06-07 ENCOUNTER — HOSPITAL ENCOUNTER (OUTPATIENT)
Dept: GENERAL RADIOLOGY | Facility: HOSPITAL | Age: 77
Discharge: HOME | End: 2022-06-07
Admitting: NURSE PRACTITIONER

## 2022-06-07 ENCOUNTER — OFFICE VISIT (OUTPATIENT)
Dept: INTERNAL MEDICINE | Facility: CLINIC | Age: 77
End: 2022-06-07

## 2022-06-07 VITALS
BODY MASS INDEX: 24.5 KG/M2 | WEIGHT: 175 LBS | DIASTOLIC BLOOD PRESSURE: 78 MMHG | SYSTOLIC BLOOD PRESSURE: 138 MMHG | HEIGHT: 71 IN | OXYGEN SATURATION: 98 % | HEART RATE: 70 BPM

## 2022-06-07 DIAGNOSIS — I82.4Y2 ACUTE DEEP VEIN THROMBOSIS (DVT) OF PROXIMAL VEIN OF LEFT LOWER EXTREMITY: ICD-10-CM

## 2022-06-07 DIAGNOSIS — M79.672 LEFT FOOT PAIN: Primary | ICD-10-CM

## 2022-06-07 DIAGNOSIS — R60.0 LOWER EXTREMITY EDEMA: ICD-10-CM

## 2022-06-07 DIAGNOSIS — M79.672 LEFT FOOT PAIN: ICD-10-CM

## 2022-06-07 PROCEDURE — 73630 X-RAY EXAM OF FOOT: CPT

## 2022-06-07 PROCEDURE — 99213 OFFICE O/P EST LOW 20 MIN: CPT | Performed by: NURSE PRACTITIONER

## 2022-06-07 NOTE — PROGRESS NOTES
Chief Complaint   Patient presents with   • Leg Swelling     left         History:  Sonu Bravo Jr. is a 76 y.o. male who presents today for evaluation of the above problems.          LLE DVT diagnosed 4/28/22.  On Eliquis 5 mg BID.  Has seen improvement in the left calf swelling since starting.  Over the weekend he was up on his feet, walking for hours.  He has noticed some discomfort across the dorsum of the foot and also increased swelling.  He has not been wearing compression stockings. Denies chest pain or shortness of breath. NO known trauma to foot.    The swelling goes down if he gets off his feet for a while and props them up.  It is almost completely resolved in the mornings when he first gets out of bed.      ROS:  Review of Systems  As above    Allergies   Allergen Reactions   • Sulfa Antibiotics Hives, Itching and Rash   • Sulfamethoxazole-Trimethoprim Hives, Itching and Rash   • Penicillins Hives   • Diphenhydramine Mental Status Change     Past Medical History:   Diagnosis Date   • Arthritis    • Atrial fibrillation (HCC) 08/31/2021   • GERD (gastroesophageal reflux disease)    • Hyperlipidemia    • Hypertension      Past Surgical History:   Procedure Laterality Date   • COLON SURGERY  01/2022    Dr Fairbanks at [facility] in [facility]   • COLONOSCOPY N/A 09/28/2021    Procedure: COLONOSCOPY WITH ANESTHESIA;  Surgeon: Thomas Dc DO;  Location: [facility] ENDOSCOPY;  Service: Gastroenterology;  Laterality: N/A;  pre op; abnormal ct scan  post op: diverticulosis ; polyps   PCP: Vaughn Kramer DO   • HERNIA REPAIR       Family History   Problem Relation Age of Onset   • Lung cancer Mother    • Brain cancer Mother    • Heart disease Father    • Hyperlipidemia Father    • Hypertension Father    • Prostate cancer Maternal Aunt    • Colon cancer Maternal Uncle    • Colon polyps Neg Hx    • Esophageal cancer Neg Hx    • Liver cancer Neg Hx      Sonu  reports that he has been smoking cigarettes.  "He started smoking about 55 years ago. He has a 26.00 pack-year smoking history. He has never used smokeless tobacco. He reports current alcohol use. He reports current drug use.    I have reviewed and updated the above documentation (if necessary) including but not limited to chief complaint, ROS, PFSH, allergies and medications        Current Outpatient Medications:   •  acetaminophen (TYLENOL) 325 MG tablet, Take 650 mg by mouth Every 4 (Four) Hours As Needed., Disp: , Rfl:   •  apixaban (ELIQUIS) 5 MG tablet tablet, Take 1 tablet by mouth 2 (Two) Times a Day., Disp: 180 tablet, Rfl: 1  •  bisoprolol (ZEBeta) 5 MG tablet, Take 1 tablet by mouth Daily., Disp: 90 tablet, Rfl: 3  •  Cholecalciferol 100 MCG (4000 UT) capsule, 4,000 Units Daily., Disp: , Rfl:   •  coenzyme Q10 100 MG capsule, Take 100 mg by mouth Daily., Disp: , Rfl:   •  Cyanocobalamin 2500 MCG sublingual tablet, 2,500 mcg Daily., Disp: , Rfl:   •  fexofenadine (ALLEGRA) 180 MG tablet, Take 180 mg by mouth Daily., Disp: , Rfl:   •  fluticasone (FLONASE) 50 MCG/ACT nasal spray, 1 spray into the nostril(s) as directed by provider Daily., Disp: 32 g, Rfl: 3  •  levothyroxine (SYNTHROID, LEVOTHROID) 112 MCG tablet, Take 1 tablet by mouth Daily., Disp: 90 tablet, Rfl: 1  •  multivitamin with minerals tablet tablet, Take 1 capsule by mouth Daily., Disp: , Rfl:   •  omeprazole (priLOSEC) 20 MG capsule, Take 1 capsule by mouth Daily., Disp: 90 capsule, Rfl: 3  •  rosuvastatin (CRESTOR) 20 MG tablet, Take 20 mg by mouth Every Night., Disp: , Rfl:   •  Zinc 50 MG capsule, Take  by mouth., Disp: , Rfl:     OBJECTIVE:  Visit Vitals  /78 (BP Location: Right arm, Patient Position: Sitting, Cuff Size: Adult)   Pulse 70   Ht 180.3 cm (71\")   Wt 79.4 kg (175 lb)   SpO2 98%   BMI 24.41 kg/m²     Encounter Date      4/28/22         Venous Doppler Lower Extremity Left (duplex) [[id number]] (Order [id number])  Order  Status: Final result       Study Notes   "     Bisi Frazier on 4/28/2022 12:18 PM   Non occlusive thrombus from prox FV to distal PTV and Roxie V. Does not extend into CFV.    Appointment Information      PACS Images     Radiology Images    Study Result    Narrative & Impression   History: Pain and swelling     IMPRESSION:  Impression: There is evidence of nonocclusive deep venous thrombosis of  the left lower extremity.     Comments: Left lower extremity venous duplex exam was performed using  color Doppler flow, Doppler wave form analysis, and grayscale imaging,  with and without compression. There is evidence of nonocclusive deep  venous thrombosis in the entire superficial femoral, popliteal,  posterior tibial, and peroneal veins. There is no thrombus identified in  the saphenofemoral junction or the greater saphenous vein.      This report was finalized on 04/28/2022 16:04 by Dr. Wilfred Estes MD.          Physical Exam  Vitals and nursing note reviewed.   Constitutional:       General: He is not in acute distress.     Appearance: Normal appearance. He is not ill-appearing, toxic-appearing or diaphoretic.   HENT:      Head: Normocephalic and atraumatic.   Cardiovascular:      Rate and Rhythm: Normal rate.   Pulmonary:      Effort: Pulmonary effort is normal. No respiratory distress.   Musculoskeletal:      Right lower leg: No edema.      Left lower leg: Edema present.        Legs:       Comments: 2+ pitting edema left ankle and dorsum of foot.  Excellent, strong DP pulse on left.  Tender across the bridge of the left foot diffusely.  Venous stasis changes in skin color.   Neurological:      Mental Status: He is alert.         MDM  I reviewed the last office notes from Dr. Kramer and from Dr. Estes.  Assessment/Plan    Diagnoses and all orders for this visit:    1. Left foot pain (Primary)  -     XR Foot 3+ View Left; Future    2. Lower extremity edema    3. Acute deep vein thrombosis (DVT) of proximal vein of left lower extremity (HCC)    Will  xray left foot to rule out fracture. I suspect the increased swelling is due to his chronic venous congestion compounded by the DVT and aggravated by being on his feet for several hours over the weekend.  I recommended that he wear his compression stockings as much as possible.  Asked him to let us know if doing this does not improve his swelling or if anything is getting worse.    BMI is within normal parameters. No other follow-up for BMI required.      Education materials and an After Visit Summary were printed and given to the patient at discharge.  No follow-ups on file.         Jenna Wilson, APRN   11:49 CDT  6/7/2022

## 2022-06-27 ENCOUNTER — HOSPITAL ENCOUNTER (OUTPATIENT)
Dept: NEUROLOGY | Facility: HOSPITAL | Age: 77
Discharge: HOME | End: 2022-06-27
Admitting: NURSE PRACTITIONER

## 2022-06-27 DIAGNOSIS — R20.2 NUMBNESS AND TINGLING OF BOTH LOWER EXTREMITIES: ICD-10-CM

## 2022-06-27 DIAGNOSIS — R20.0 NUMBNESS AND TINGLING OF BOTH LOWER EXTREMITIES: ICD-10-CM

## 2022-06-27 PROCEDURE — 95886 MUSC TEST DONE W/N TEST COMP: CPT

## 2022-06-27 PROCEDURE — 95912 NRV CNDJ TEST 11-12 STUDIES: CPT

## 2022-06-29 ENCOUNTER — OFFICE VISIT (OUTPATIENT)
Dept: INTERNAL MEDICINE | Facility: CLINIC | Age: 77
End: 2022-06-29

## 2022-06-29 VITALS
HEART RATE: 52 BPM | BODY MASS INDEX: 23.1 KG/M2 | OXYGEN SATURATION: 94 % | WEIGHT: 165 LBS | DIASTOLIC BLOOD PRESSURE: 82 MMHG | SYSTOLIC BLOOD PRESSURE: 136 MMHG | HEIGHT: 71 IN

## 2022-06-29 DIAGNOSIS — I82.432 ACUTE DEEP VEIN THROMBOSIS (DVT) OF POPLITEAL VEIN OF LEFT LOWER EXTREMITY: ICD-10-CM

## 2022-06-29 DIAGNOSIS — M54.31 SCIATICA OF RIGHT SIDE: Primary | ICD-10-CM

## 2022-06-29 PROCEDURE — 96372 THER/PROPH/DIAG INJ SC/IM: CPT | Performed by: NURSE PRACTITIONER

## 2022-06-29 PROCEDURE — 99214 OFFICE O/P EST MOD 30 MIN: CPT | Performed by: NURSE PRACTITIONER

## 2022-06-29 RX ORDER — METHYLPREDNISOLONE ACETATE 80 MG/ML
80 INJECTION, SUSPENSION INTRA-ARTICULAR; INTRALESIONAL; INTRAMUSCULAR; SOFT TISSUE ONCE
Status: COMPLETED | OUTPATIENT
Start: 2022-06-29 | End: 2022-06-29

## 2022-06-29 RX ORDER — CYCLOBENZAPRINE HCL 10 MG
10 TABLET ORAL 3 TIMES DAILY PRN
Qty: 30 TABLET | Refills: 0 | Status: SHIPPED | OUTPATIENT
Start: 2022-06-29 | End: 2022-08-10

## 2022-06-29 RX ADMIN — METHYLPREDNISOLONE ACETATE 80 MG: 80 INJECTION, SUSPENSION INTRA-ARTICULAR; INTRALESIONAL; INTRAMUSCULAR; SOFT TISSUE at 09:09

## 2022-06-29 NOTE — PROGRESS NOTES
"Chief Complaint   Patient presents with   • Back Pain         History:  Sonu Bravo Jr. is a 76 y.o. male who presents today for evaluation of the above problems.          Chronic sciatic pain right buttock and going down to back of right thigh.  Cannot get in to Dr. To's office for \"cocktail\" because someone off on leave.  Is going on a fishing trip in a few weeks and would like to get a steroid shot to help with pain before his trip. He does not feel there is any new type of pain that needs evaluation at this time. Had L-spine MRI in May and just had EMG as well. Does not tolerate oral steroids (make him crazy) but has taken steroid injections for years without problems.    Left leg swelling is much better since last visit.  Is wearing compression stockings some at home and helps.      ROS:  Review of Systems  As above    Allergies   Allergen Reactions   • Sulfa Antibiotics Hives, Itching and Rash   • Sulfamethoxazole-Trimethoprim Hives, Itching and Rash   • Penicillins Hives   • Diphenhydramine Mental Status Change     Past Medical History:   Diagnosis Date   • Arthritis    • Atrial fibrillation (HCC) 08/31/2021   • GERD (gastroesophageal reflux disease)    • Hyperlipidemia    • Hypertension      Past Surgical History:   Procedure Laterality Date   • COLON SURGERY  01/2022    Dr Fairbanks at [facility] in [facility]   • COLONOSCOPY N/A 09/28/2021    Procedure: COLONOSCOPY WITH ANESTHESIA;  Surgeon: Thomas Dc DO;  Location: [facility] ENDOSCOPY;  Service: Gastroenterology;  Laterality: N/A;  pre op; abnormal ct scan  post op: diverticulosis ; polyps   PCP: Vaughn Kramer DO   • HERNIA REPAIR       Family History   Problem Relation Age of Onset   • Lung cancer Mother    • Brain cancer Mother    • Heart disease Father    • Hyperlipidemia Father    • Hypertension Father    • Prostate cancer Maternal Aunt    • Colon cancer Maternal Uncle    • Colon polyps Neg Hx    • Esophageal cancer Neg Hx    • Liver " cancer Neg Hx      Sonu  reports that he has been smoking cigarettes. He started smoking about 55 years ago. He has a 26.00 pack-year smoking history. He has never used smokeless tobacco. He reports current alcohol use. He reports current drug use.    I have reviewed and updated the above documentation (if necessary) including but not limited to chief complaint, ROS, PFSH, allergies and medications        Current Outpatient Medications:   •  acetaminophen (TYLENOL) 325 MG tablet, Take 650 mg by mouth Every 4 (Four) Hours As Needed., Disp: , Rfl:   •  apixaban (ELIQUIS) 5 MG tablet tablet, Take 1 tablet by mouth 2 (Two) Times a Day., Disp: 180 tablet, Rfl: 1  •  bisoprolol (ZEBeta) 5 MG tablet, Take 1 tablet by mouth Daily., Disp: 90 tablet, Rfl: 3  •  Cholecalciferol 100 MCG (4000 UT) capsule, 4,000 Units Daily., Disp: , Rfl:   •  coenzyme Q10 100 MG capsule, Take 100 mg by mouth Daily., Disp: , Rfl:   •  Cyanocobalamin 2500 MCG sublingual tablet, 2,500 mcg Daily., Disp: , Rfl:   •  fexofenadine (ALLEGRA) 180 MG tablet, Take 180 mg by mouth Daily., Disp: , Rfl:   •  fluticasone (FLONASE) 50 MCG/ACT nasal spray, 1 spray into the nostril(s) as directed by provider Daily., Disp: 32 g, Rfl: 3  •  levothyroxine (SYNTHROID, LEVOTHROID) 112 MCG tablet, Take 1 tablet by mouth Daily., Disp: 90 tablet, Rfl: 1  •  multivitamin with minerals tablet tablet, Take 1 capsule by mouth Daily., Disp: , Rfl:   •  omeprazole (priLOSEC) 20 MG capsule, Take 1 capsule by mouth Daily., Disp: 90 capsule, Rfl: 3  •  rosuvastatin (CRESTOR) 20 MG tablet, Take 20 mg by mouth Every Night., Disp: , Rfl:   •  Zinc 50 MG capsule, Take  by mouth., Disp: , Rfl:   •  cyclobenzaprine (FLEXERIL) 10 MG tablet, Take 1 tablet by mouth 3 (Three) Times a Day As Needed for Muscle Spasms (sciatic pain)., Disp: 30 tablet, Rfl: 0  No current facility-administered medications for this visit.    OBJECTIVE:  Visit Vitals  /82 (BP Location: Right arm, Patient  "Position: Sitting, Cuff Size: Adult)   Pulse 52   Ht 180.3 cm (71\")   Wt 74.8 kg (165 lb)   SpO2 94%   BMI 23.01 kg/m²      Physical Exam  Vitals and nursing note reviewed.   Constitutional:       General: He is not in acute distress.     Appearance: Normal appearance. He is not ill-appearing, toxic-appearing or diaphoretic.   HENT:      Head: Normocephalic and atraumatic.   Musculoskeletal:         General: Tenderness present.      Right lower leg: No edema.      Left lower leg: No edema (edema from previous visit has resolved, calf is soft).        Legs:       Comments: Area of tenderness right buttock.  No low back tenderness or spasm.   Neurological:      Mental Status: He is alert and oriented to person, place, and time.   Psychiatric:         Mood and Affect: Mood normal.         Behavior: Behavior normal.         Thought Content: Thought content normal.         Judgment: Judgment normal.     Study Result    Narrative & Impression   EXAMINATION: MRI LUMBAR SPINE WO CONTRAST- 5/16/2022 1:16 PM CDT     HISTORY: lumbar back pain; M54.50-Low back pain, unspecified.     REPORT: Multiplanar multisequence MR imaging of the lumbar spine was  performed without contrast.     COMPARISON: MRI lumbar spine without contrast 9/12/2019..     Sagittal images demonstrate grade 1 spondylolisthesis at each level  except for L3-4. The conus tip is visualized at the L1 level and appears  unremarkable. There is disc space collapse at L2-3 and moderate disc  space narrowing is present at L1-2, L3-4 and L5-S1, with associated  hypertrophic endplate spurring. Sagittal STIR images demonstrate type I  Modic endplate changes at L3-4 which have progressed. There are mild  type I Modic endplate changes at L1-2 which appear stable. No fractures  identified. The axial images reviewed level by level.     L5-S1: Grade 1 spondylolisthesis with moderate disc space narrowing and  hypertrophic endplate spurring bilateral facet overgrowth is " present.  There is mild spinal stenosis at this level, as well as severe left and  moderate right neuroforaminal narrowing. No focal disc herniation is  identified.     L4-5: Grade 1 spondylolisthesis with posterior uncovering of the disc  and broad-based disc bulging. No focal disc herniation is identified.  There is extensive facet overgrowth, with severe spinal stenosis which  appears slightly progressed. There is moderate bilateral neuroforaminal  narrowing at this level. No focal disc herniation is identified.     L3-4: Extensive type I Modic endplate changes on the left which have  progressed. There is moderate disc space narrowing and hypertrophic  endplate spurring. There is bilateral facet overgrowth with ligamentum  flavum thickening and severe spinal stenosis is present as before. No  focal HNP is identified. There is severe bilateral neuroforaminal  narrowing, left greater than right.     L2-3: Grade 1 spondylolisthesis with disc space collapse and  hypertrophic endplate spurring, there is moderate to severe spinal  stenosis which appears increased. There is bilateral facet overgrowth  and endplate spurring. There is severe bilateral neural foraminal  narrowing, greater on the right.     L1-2: Broad-based bulging the discs present and there is grade 1  spondylolisthesis. There is posterior uncovering of the disc and no  focal disc herniation. Mild spinal stenosis is present. There is  moderate bilateral neural foraminal narrowing, this appears greater on  the right.     IMPRESSION:  Extensive chronic and progressive degenerative changes  throughout the lumbar spine as detailed above.  This report was finalized on 05/16/2022 13:22 by Dr. Aakash Carolina MD.           MDM    Assessment/Plan    Diagnoses and all orders for this visit:    1. Sciatica of right side (Primary)  -     methylPREDNISolone acetate (DEPO-medrol) injection 80 mg  -     cyclobenzaprine (FLEXERIL) 10 MG tablet; Take 1 tablet by mouth 3  (Three) Times a Day As Needed for Muscle Spasms (sciatic pain).  Dispense: 30 tablet; Refill: 0    2. Acute deep vein thrombosis (DVT) of popliteal vein of left lower extremity (HCC)      Steroid injection, muscle relaxers.  Heat to area as much as possible.    Doing well from DVT standpoint.  Continue compression stockings and Eliquis BID.    BMI is within normal parameters. No other follow-up for BMI required.      Education materials and an After Visit Summary were printed and given to the patient at discharge.  Return if symptoms worsen or fail to improve, for Next scheduled follow up.         MARY Brooks   09:09 CDT  6/29/2022

## 2022-07-05 ENCOUNTER — TELEPHONE (OUTPATIENT)
Dept: NEUROSURGERY | Facility: CLINIC | Age: 77
End: 2022-07-05

## 2022-07-05 NOTE — TELEPHONE ENCOUNTER
PATIENT IS CALLING DUE TO INCREASED PAIN, STATES HE HAS A SPOT FROM HIS BUTTOCKS DOWN TO HIS RIGHT LEG HAMSTRING PRIMARILY BUT IT ALSO AFFECTS THE LEFT THAT HURTS AND HIS LEG WANTS TO GIVE WAY.     GETS BETTER AS THE DAY GOES ON.     PT HAS NOT BEEN ABLE TO DO PHYSICAL THERAPY DUE TO HAVING A BLOOD CLOT IN HIS LEG AND PHYSICAL THERAPY WILL NOT WORK WITH HIM WITH HAVING THAT.     PT WOULD LIKE TO SEE IF HE CAN BE SEEN SOONER THAN 8/10 WITH EITHER TOY OR DR MATTHEW.     PT STATES THAT HE HAS HAD ALL HIS REQUIRED TESTING COMPLETED

## 2022-07-06 NOTE — TELEPHONE ENCOUNTER
Spoke with patient and offered an appt this month, he declined stating [place]. He declined pain management referral as well. States he will likely decline surgery if offered but will keep scheduled follow up 8/10.

## 2022-07-25 NOTE — TELEPHONE ENCOUNTER
PATIENT STATES EXTREME PAIN HAS GOTTEN WORSE, PRIMARILY SCIATIC SYMPTOMS. HE IS USING A WALKER, BUT HAS FALLEN 5-6 TIMES (3 TIMES ON SATURDAY ALONE). PLEASE REVIEW FOR SOONER APPT AND/OR TEMPORARY TREATMENT OPTIONS AND CALL PATIENT.

## 2022-07-25 NOTE — TELEPHONE ENCOUNTER
Spoke with patient, will add to cancellation list as I do not have anything sooner at the moment. He also declined pain management appt again. States he has had many epidurals in the past and it didn't work and he does not want pain medication.

## 2022-08-10 ENCOUNTER — LAB (OUTPATIENT)
Dept: LAB | Facility: HOSPITAL | Age: 77
End: 2022-08-10

## 2022-08-10 ENCOUNTER — OFFICE VISIT (OUTPATIENT)
Dept: NEUROSURGERY | Facility: CLINIC | Age: 77
End: 2022-08-10

## 2022-08-10 VITALS — WEIGHT: 165 LBS | HEIGHT: 71 IN | BODY MASS INDEX: 23.1 KG/M2

## 2022-08-10 DIAGNOSIS — M47.12 CERVICAL SPONDYLOSIS WITH MYELOPATHY: Primary | ICD-10-CM

## 2022-08-10 DIAGNOSIS — M48.062 SPINAL STENOSIS OF LUMBAR REGION WITH NEUROGENIC CLAUDICATION: ICD-10-CM

## 2022-08-10 DIAGNOSIS — Z87.891 FORMER SMOKER: ICD-10-CM

## 2022-08-10 DIAGNOSIS — M83.2 ADULT OSTEOMALACIA DUE TO MALABSORPTION: ICD-10-CM

## 2022-08-10 DIAGNOSIS — M47.12 CERVICAL SPONDYLOSIS WITH MYELOPATHY: ICD-10-CM

## 2022-08-10 DIAGNOSIS — M80.08XA AGE-RELATED OSTEOPOROSIS WITH CURRENT PATHOLOGICAL FRACTURE, VERTEBRA(E), INITIAL ENCOUNTER FOR FRACTURE: ICD-10-CM

## 2022-08-10 LAB
ALBUMIN SERPL-MCNC: 4.6 G/DL (ref 3.5–5.2)
ALBUMIN/GLOB SERPL: 1.7 G/DL
ALP SERPL-CCNC: 76 U/L (ref 39–117)
ALT SERPL W P-5'-P-CCNC: 32 U/L (ref 1–41)
ANION GAP SERPL CALCULATED.3IONS-SCNC: 11 MMOL/L (ref 5–15)
AST SERPL-CCNC: 30 U/L (ref 1–40)
BASOPHILS # BLD AUTO: 0.06 10*3/MM3 (ref 0–0.2)
BASOPHILS NFR BLD AUTO: 0.8 % (ref 0–1.5)
BILIRUB SERPL-MCNC: 0.3 MG/DL (ref 0–1.2)
BUN SERPL-MCNC: 19 MG/DL (ref 8–23)
BUN/CREAT SERPL: 22.6 (ref 7–25)
CALCIUM SPEC-SCNC: 9.5 MG/DL (ref 8.6–10.5)
CHLORIDE SERPL-SCNC: 102 MMOL/L (ref 98–107)
CO2 SERPL-SCNC: 28 MMOL/L (ref 22–29)
CREAT SERPL-MCNC: 0.84 MG/DL (ref 0.76–1.27)
DEPRECATED RDW RBC AUTO: 51.2 FL (ref 37–54)
EGFRCR SERPLBLD CKD-EPI 2021: 89.3 ML/MIN/1.73
EOSINOPHIL # BLD AUTO: 0.18 10*3/MM3 (ref 0–0.4)
EOSINOPHIL NFR BLD AUTO: 2.3 % (ref 0.3–6.2)
ERYTHROCYTE [DISTWIDTH] IN BLOOD BY AUTOMATED COUNT: 14.6 % (ref 12.3–15.4)
GLOBULIN UR ELPH-MCNC: 2.7 GM/DL
GLUCOSE SERPL-MCNC: 89 MG/DL (ref 65–99)
HCT VFR BLD AUTO: 48.3 % (ref 37.5–51)
HGB BLD-MCNC: 16.2 G/DL (ref 13–17.7)
IMM GRANULOCYTES # BLD AUTO: 0.02 10*3/MM3 (ref 0–0.05)
IMM GRANULOCYTES NFR BLD AUTO: 0.3 % (ref 0–0.5)
LYMPHOCYTES # BLD AUTO: 2.11 10*3/MM3 (ref 0.7–3.1)
LYMPHOCYTES NFR BLD AUTO: 26.9 % (ref 19.6–45.3)
MCH RBC QN AUTO: 31.8 PG (ref 26.6–33)
MCHC RBC AUTO-ENTMCNC: 33.5 G/DL (ref 31.5–35.7)
MCV RBC AUTO: 94.9 FL (ref 79–97)
MONOCYTES # BLD AUTO: 0.67 10*3/MM3 (ref 0.1–0.9)
MONOCYTES NFR BLD AUTO: 8.5 % (ref 5–12)
NEUTROPHILS NFR BLD AUTO: 4.8 10*3/MM3 (ref 1.7–7)
NEUTROPHILS NFR BLD AUTO: 61.2 % (ref 42.7–76)
NRBC BLD AUTO-RTO: 0 /100 WBC (ref 0–0.2)
PLATELET # BLD AUTO: 180 10*3/MM3 (ref 140–450)
PMV BLD AUTO: 9.9 FL (ref 6–12)
POTASSIUM SERPL-SCNC: 3.6 MMOL/L (ref 3.5–5.2)
PROT SERPL-MCNC: 7.3 G/DL (ref 6–8.5)
PTH-INTACT SERPL-MCNC: 42.8 PG/ML (ref 15–65)
RBC # BLD AUTO: 5.09 10*6/MM3 (ref 4.14–5.8)
SODIUM SERPL-SCNC: 141 MMOL/L (ref 136–145)
WBC NRBC COR # BLD: 7.84 10*3/MM3 (ref 3.4–10.8)

## 2022-08-10 PROCEDURE — 80053 COMPREHEN METABOLIC PANEL: CPT

## 2022-08-10 PROCEDURE — 99215 OFFICE O/P EST HI 40 MIN: CPT | Performed by: NEUROLOGICAL SURGERY

## 2022-08-10 PROCEDURE — 85025 COMPLETE CBC W/AUTO DIFF WBC: CPT

## 2022-08-10 PROCEDURE — 36415 COLL VENOUS BLD VENIPUNCTURE: CPT

## 2022-08-10 PROCEDURE — 82306 VITAMIN D 25 HYDROXY: CPT

## 2022-08-10 PROCEDURE — 83970 ASSAY OF PARATHORMONE: CPT

## 2022-08-10 PROCEDURE — 84134 ASSAY OF PREALBUMIN: CPT

## 2022-08-10 RX ORDER — PREGABALIN 75 MG/1
75 CAPSULE ORAL EVERY 12 HOURS
COMMUNITY
Start: 2022-07-15 | End: 2022-08-10

## 2022-08-10 NOTE — PROGRESS NOTES
Chief complaint:   Chief Complaint   Patient presents with   • Back Pain     Patient here for follow up with MRI and EMG/NCS.       Subjective     HPI:   Interval History: Sonu returns today for surgical discussion with cervical MRI and lumbar MRI.    Patient initially had back pain and was seen in May.  This was after an unfortunate year where he had perforated diverticulitis resulting in multiple abscesses and extended hospital stay at [facility].  Regardless over the past 6 months he has had progressive lumbar back pain and bilateral lower extremity weakness to the point he is now using a walker.  His back pain is a 7 out of 10.  50% back pain 50% lateral thigh and anterior thigh pain.  He often feels like his legs will give out this often results in a slow controlled fall to the ground.  He also has numbness and tingling in his lower extremities.  He has been using a walker.  He is fallen 12 times since June 6.  He also has difficulty with urination finds himself having to sit down urinating and now he has to go more frequently.    Additionally he does not complain of any neck pain but he does have some numbness and tingling in his hands and decreased function  strength on the right hand.  He feels that the strength in his upper extremities is very good.    He has completed a course of diclofenac and is also been referred to pain management is received approximately 15 epidural injections by Dr. Saleem.  He is also used a TENS unit without significant improvement.  He has previously tried Flexeril and Lyrica without significant improvement and currently uses Tylenol for pain.    Oswestry Disability Index Lumbar = 68%  SCORE INTERPRETATION OF THE OSWESTRY LBP DISABILITY QUESTIONNAIRE: 60-80% Crippled Back pain impinges on all aspects of these patients' lives both at home and at work. Positive intervention is required.    Score   Pain Intensity Fairly severe pain-3   Personal Care I can look after myself  but it is slow and painful-2   Lifting Only very light weights-4   Walking Walk with crutches or stick-4   Sitting Sit as long as I like-0   Standing Pain limits standing to < 10 min-4   Sleeping Can only sleep < 4 hrs-3   Sex Life (if applicable) Pain prevents any sex-6   Social Life Pain restricts me to my home-4   Traveling Pain restricts to <30 min-4   (Muna et al, 1980)    Review of Systems   HENT: Negative.    Eyes: Negative.    Respiratory: Negative.    Cardiovascular: Negative.    Gastrointestinal: Negative.    Endocrine: Negative.    Genitourinary: Negative.    Musculoskeletal: Positive for back pain, gait problem and neck stiffness.   Skin: Negative.    Allergic/Immunologic: Negative.    Neurological: Positive for weakness and numbness.   Hematological: Negative.    Psychiatric/Behavioral: Negative.        PFSH:  Past Medical History:   Diagnosis Date   • Arthritis    • Atrial fibrillation (HCC) 08/31/2021   • GERD (gastroesophageal reflux disease)    • Hyperlipidemia    • Hypertension        Past Surgical History:   Procedure Laterality Date   • COLON SURGERY  01/2022    Dr Fairbanks at [facility] in [facility]   • COLONOSCOPY N/A 09/28/2021    Procedure: COLONOSCOPY WITH ANESTHESIA;  Surgeon: Thomas Dc DO;  Location: [facility] ENDOSCOPY;  Service: Gastroenterology;  Laterality: N/A;  pre op; abnormal ct scan  post op: diverticulosis ; polyps   PCP: Vaughn Kramer DO   • HERNIA REPAIR         Objective      Current Outpatient Medications   Medication Sig Dispense Refill   • acetaminophen (TYLENOL) 325 MG tablet Take 650 mg by mouth Every 4 (Four) Hours As Needed.     • apixaban (ELIQUIS) 5 MG tablet tablet Take 1 tablet by mouth 2 (Two) Times a Day. 180 tablet 1   • bisoprolol (ZEBeta) 5 MG tablet Take 1 tablet by mouth Daily. 90 tablet 3   • Cholecalciferol 100 MCG (4000 UT) capsule 4,000 Units Daily.     • coenzyme Q10 100 MG capsule Take 100 mg by mouth Daily.     • Cyanocobalamin 2500  "MCG sublingual tablet 2,500 mcg Daily.     • fexofenadine (ALLEGRA) 180 MG tablet Take 180 mg by mouth Daily.     • fluticasone (FLONASE) 50 MCG/ACT nasal spray 1 spray into the nostril(s) as directed by provider Daily. 32 g 3   • levothyroxine (SYNTHROID, LEVOTHROID) 112 MCG tablet Take 1 tablet by mouth Daily. 90 tablet 1   • multivitamin with minerals tablet tablet Take 1 capsule by mouth Daily.     • omeprazole (priLOSEC) 20 MG capsule Take 1 capsule by mouth Daily. 90 capsule 3   • rosuvastatin (CRESTOR) 20 MG tablet Take 20 mg by mouth Every Night.     • Zinc 50 MG capsule Take  by mouth.       No current facility-administered medications for this visit.       Vital Signs  Ht 180.3 cm (71\")   Wt 74.8 kg (165 lb)   BMI 23.01 kg/m²   Physical Exam  Eyes:      Extraocular Movements: EOM normal.      Pupils: Pupils are equal, round, and reactive to light.   Neurological:      Mental Status: He is oriented to person, place, and time.      Deep Tendon Reflexes:      Reflex Scores:       Tricep reflexes are 3+ on the right side and 2+ on the left side.       Bicep reflexes are 3+ on the right side and 2+ on the left side.       Brachioradialis reflexes are 3+ on the right side and 2+ on the left side.       Patellar reflexes are 4+ on the right side and 3+ on the left side.       Achilles reflexes are 0 on the right side and 0 on the left side.  Psychiatric:         Speech: Speech normal.       Neurologic Exam     Mental Status   Oriented to person, place, and time.   Attention: normal. Concentration: normal.   Speech: speech is normal   Level of consciousness: alert    Cranial Nerves     CN II   Visual fields full to confrontation.     CN III, IV, VI   Pupils are equal, round, and reactive to light.  Extraocular motions are normal.     CN V   Facial sensation intact.     CN VII   Facial expression full, symmetric.     CN VIII   CN VIII normal.     CN IX, X   CN IX normal.     CN XI   CN XI normal.     Motor Exam "   Right arm tone: normal  Left arm tone: normal  Right leg tone: normal  Left leg tone: normal    Strength   Right deltoid: 5/5  Left deltoid: 5/5  Right biceps: 5/5  Left biceps: 5/5  Right triceps: 5/5  Left triceps: 5/5  Right wrist extension: 5/5  Left wrist extension: 5/5  Right interossei: 4/5  Left interossei: 5/5  Right iliopsoas: 4/5  Left iliopsoas: 3/5  Right quadriceps: 4/5  Left quadriceps: 4/5  Right anterior tibial: 5/5  Left anterior tibial: 5/5  Right gastroc: 5/5  Left gastroc: 5/5  Right EHL 5/5  Left EHL 5/5       Sensory Exam   Right arm light touch: normal  Left arm light touch: normal  Right leg light touch: decreased from knee  Left leg light touch: decreased from knee  Sensory deficit distribution on right: L2  Sensory deficit distribution on left: L2    Gait, Coordination, and Reflexes     Tremor   Resting tremor: absent  Intention tremor: absent  Action tremor: absent    Reflexes   Right brachioradialis: 3+  Left brachioradialis: 2+  Right biceps: 3+  Left biceps: 2+  Right triceps: 3+  Left triceps: 2+  Right patellar: 4+  Left patellar: 3+  Right achilles: 0  Left achilles: 0  Right plantar: upgoing  Left plantar: upgoing  Right Joel: absent  Left Joel: present  Right ankle clonus: absent  Left ankle clonus: absent  Right pendular knee jerk: absent  Left pendular knee jerk: absentKyphotic with a walker.    Crossed abductors sign       Male  strength (pounds)  AGE Right Hand RH Norms Left Hand LH Norms   20-24  121+20.6  104+21.8   25-29  120+23.0  110+16.2   30-34  121+22.4  110+21.7   35-39  119+24  113+21.7   40-44  117+20.7  112+18.7   45-49  110+23.0  101+22.8   50-54  113+18.1  102+17   55-59  101+26.7  83+23.4   60-64  90+20.4  77+20.3   65-69  91+20.6  76.8+19.8   70-74  75+21.5  65+18.1   75+ 59 66+21.0 75 55+17.0   (DANIELLE Cleary et al; Hand Dynometer: Effects of trials and sessions.  Perpetual and Motor Skills 61:195-8, 1985)  Key  * = Dominant hand  > =  Intervention      (12 bullet pts)    Results Review:   No radiology results for the last 30 days.                        Sonu Bravo Jr. is a 76 y.o. male with a significant medical history of DVT on Xarelto, A. fib hypertension, hyperlipidemia hypothyroidism, and tobacco abuse.  He presents with a new problem of lumbar back pain and lower extremity dysesthesias, 80% bilateral feet, 20% back. MARY: 22.  Physical exam findings of bilateral lower extremity cyanosis with 1+ left lower extremity pitting edema, no focal weakness, right upper and lower extremity hyperreflexia with right Babinski, and a relatively well-maintained gait.  No recent imaging of the lumbar spine.     RECOMMENDATIONS ...  Cervical Spondylitic Myelopathy  C4/5 and 5/6 stenosis  DDX: Cervical stenosis, facet arthropathy, brachial plexopathy, peripheral neuropathy    Natural history of cervical spondylitic myelopathy  1. In younger patients with mild CSM (age younger than 75 years and mJOA scale score > 12), both operative and nonoperative management options be offered, as objectively measurable deterioration in function is rarely seen acutely (quality of evidence: Class I; strength of recommendation, B).   2. Also the clinical gains after nonoperative treatment are often maintained over 3 years in 70% of cases (quality of evidence, Class III; strength of recommendation, D).   3. The course of CSM is mixed, with many patients experiencing a slow, stepwise decline. We addressed the fact that long periods of quiescence are not uncommon and that a subgroup of patients may have interim improvement (quality of evidence, Class III; strength of recommendation, D).   4. However, long periods of severe stenosis over many years are associated with demyelination of white matter and may result in necrosis of both gray and white matter leading to potentially irreversible deficit (quality of evidence Class III; strength of recommendation, D).     Expected  Functional Outcome After Surgery  1. Recovery varies  a. The mean mJOA scores improve from 10.5 to 14.1  (Pelon et al., 1993)  b. 59% of patients recovering significant neuro function.  (Pelon et al., 1993) (Mary et al., 2002)  2. Duration of symptoms affects recovery rate  a. Symptom duration correlates with neurological recovery rate. (Pelon et al., 1993)  b. 73% of patients symptoms for <2 yrs improved postop outcomes, whereas 53% with symptoms >2 yrs had improvement. (Chagas et al., 2005)  c. In elderly patients, duration of symptoms (<1 yr) was predictive of an excellent neurological recovery. (Shaq et al., 2003)  d. Among elderly patients both symptom duration and severity of canal stenosis affected the neurological outcome. (Mary et al., 2002)  3. Age affects recovery rate  a. 70% of patients <60 yrs old had an improved outcome score, whereas 56% of patients >60 yrs had an improved outcome score. (Aldoas et al., 2005)  b. Some studies show limited effect of age ... (Ana Luisaaki et al., 2003)  (Pelon et al., 1993)  4. Symptom severity affects recovery  a. Among younger patients only symptom severity affected recovery. (Mary et al., 2002)  5. The prognostic value of clinical factors such as age, duration of symptoms, and preoperative neurological function results were discussed with Sonu (quality of evidence, Class III; strength of recommendation, D)    Radiographs  1. Preoperative T2 hyperintensity at multiple levels in the cervical cord predicts poor surgical outcome (quality of evidence, Class III; strength of recommendation, D).   2. Preoperative T1 hypointensity combined with T2 hyperintensity at the any single level predicts a poor surgical outcome (quality of evidence, Class III; strength of recommendation, D).   3. Spinal cord atrophy (transverse area < 45 mm2) may predict worse outcome (quality of evidence, Class III; strength of recommendation, D).     Surgical decision Making  1. In patients  with cervical stenosis without myelopathy who have abnormal EMG findings, decompression should be considered because the presence of EMG abnormalities or clinical radiculopathy is associated with development of symptomatic CSM (quality of evidence, Class I; strength of recommendation, B).  2. SEVERE CSM (mJOA scale score < 13) should be treated with surgical decompression with benefits being maintained a minimum of 5 years and as long as 15 years postoperatively (quality of evidence, Class III; strength of recommendation, D).   3. It is recommended that operative therapy be offered to patients with severe and/or long lasting symptoms, because the likelihood of improvement with nonoperative measures is low (quality of evidence Class III; strength of recommendation, D).     Surgical technique selection  Byany failed a trial of conservative therapy which included physician directed physical therapy and occupational therapy, analgesics, and rest.  We discussed the risk benefits and possible complications of continued conservative management and observation versus a variety of validated techniques for surgical treatment of CSM including ACDF, ACCF, laminoplasty, laminectomy, and laminectomy with fusion (quality of evidence, Class III; strength of recommendation, D). There is insufficient evidence to recommend one technique over another as all approaches have produced comparable improvements among CSM patients (quality of evidence, Class III; strength of recommendation, D).     Sonu has elected to proceed with C4/5 and 5/6 anterior cervical discectomy and fusion    I discussed the risks of the procedure, including but not limited to infection (less than 1%), bleeding, damage to local structures, perforation of the pharynx, esophageal and/or trachea.  Vocal cord paresis from damage to the recurrent laryngeal nerve (11% temporary, 4% permanent), vertebral artery injury due to thrombosis or laceration 0.3% incidence.   Carotid injury through thrombosis or occlusion or laceration.  Lj syndrome due to sympathetic plexus injury.  Thoracic duct injury.  Failure of fusion to to 20%, graft extrusion 2%, failure of hardware due to fracture, wound infection less than 1%, adjacent level degeneration (which is controversial)Injury to the nerves or thecal sac resulting in CSF leak, weakness, numbness, paralysis, or loss of bowel, and bladder function.  instrumentation failure, dysphagia, dysphonia, visual loss, stroke, coma, MI, or death.      Lumbar back pain  Bilateral lower extremity numbness and tingling  Lumbar Spondylolisthesis, L2/3, 3/4 and 4/5  Lumbar Stenosis secondary to above    Diagnosis  5. To establish the diagnosis of lumbar facet-mediated pain, the double-injection technique with an improvement threshold of 80% or greater is suggested (single Level I study; B).  6. There is no evidence to support the use of diagnostic facet blocks as a predictor of lumbar fusion outcome in patients with chronic low-back pain from degenerative lumbar disease (conflicting Level IV evidence).    Treatment Recommendations  6. Continued conservative care  7. Surgical decompression and fusion - recommended     Fusion Technique  4. Posterior Lateral Fusion (PLF)  5. PLF with Pedicle Screws  6. Transforaminal Interbody Fusion (TLIF)  7. Direct Lateral Interbody Fusion (DLIF)  8. Anterior Lumbar Interbody Fusion (ALIF)    Guidelines for  Fusion  4. Interbody fusion is recommended as an option to enhance the fusion rate.  (multiple Level II reports; B).   5. Posterolateral lumbar fusion (PLF) plus interbody fusion is not recommended since the evidence indicates no substantial benefit but an increased rate of complications if a PLF is added to an interbody fusion (Level II and III reports; B).  6. Anterior lumbar interbody fusion has better clinical outcomes and fewer perioperative morbidities than instrumented PLF (Level III evidence from 2  reports; C).  7. Pedicle screw fixation is recommended when posterolateral lumbar fusion (PLF) is used to manage low-back pain in patients at high risk for pseudarthrosis (Level II reports; Grade B).  8. Routine use of pedicle screw fixation as an adjunct to PLF for patients with degenerative disc disease is an option. There is consistent evidence that the use of pedicle screws enhances the fusion rate.  9. The use of a brace following instrumented posterolateral lumbar fusion (PLF) for lumbar spondylosis is not supported due to equivalent outcomes with and without bracing (single Level II study; C).    Although there is insufficient evidence to recommend a standard fusion technique, the patient's anatomy, desires, and concerns as well as surgeon experience should all be factored into the decision-making process when determining the optimal strategy for an individual patient to maximize fusion potential while minimizing risk of complications.    I discussed with the risks benefits and possible complications of conservative therapy versus surgical intervention. risks of the procedure, including but not limited to infection, bleeding, damage to local structures, injury to the nerves or thecal sac resulting in CSF leak, weakness, numbness, paralysis, or loss of bowel, and bladder function, instrumentation failure, dysphagia, dysphonia, visual loss, stroke, coma, MI, or death.      Sonu has completed an adequate trial of conservative therapy and still has significant pain and deficits that correlate with his MRI findings.  Therefore Sonu Bravo Jr. has elected to proceed with L2-5 TLIF, however this will not be pursued until after his anterior cervical discectomy and fusion.       Tobacco abuse  The patient understands the many dangers of continuing to use tobacco.  If quitting becomes an increased priority Sonu knows to contact us for help with quitting.         1. Cervical spondylosis with myelopathy    2.  Spinal stenosis of lumbar region with neurogenic claudication    3. Former smoker    4. 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 Age-related osteoporosis with current pathological fracture, vertebra(e), initial encounter for fracture ([facility])         Recommendations:  Diagnoses and all orders for this visit:    1. Cervical spondylosis with myelopathy (Primary)  -     Vitamin D 25 Hydroxy; Future  -     PTH, Intact; Future  -     Prealbumin; Future  -     Calcium; Future  -     DEXA Bone Density Axial; Future  -     Ambulatory Referral to Physical Therapy Evaluate and treat  -     Case Request; Standing  -     COVID PRE-OP / PRE-PROCEDURE SCREENING ORDER (NO ISOLATION) - Swab, Nasopharynx; Future  -     CBC & Differential; Future  -     Comprehensive Metabolic Panel; Future  -     Vitamin D 25 Hydroxy; Future  -     Type & Screen; Future  -     ECG 12 Lead; Future  -     Case Request    2. Spinal stenosis of lumbar region with neurogenic claudication  -     Vitamin D 25 Hydroxy; Future  -     PTH, Intact; Future  -     Prealbumin; Future  -     Calcium; Future  -     DEXA Bone Density Axial; Future  -     Ambulatory Referral to Physical Therapy Evaluate and treat    3. Former smoker    4. 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 Age-related osteoporosis with current pathological fracture, vertebra(e), initial encounter for fracture (HCC)   -     DEXA Bone Density Axial; Future    Other orders  -     Follow Anesthesia Guidelines / Protocol; Future  -     Obtain Informed Consent; Future  -     Provide NPO Instructions to Patient; Future  -     Chlorhexidine Skin Prep; Future        Return for POSTOPERATIVELY.    I spent 51 minutes caring for Sonu on this date of service. This time includes time spent by me in the following activities: preparing for the visit, reviewing tests, obtaining and/or reviewing a separately obtained history, performing a medically appropriate  examination and/or evaluation, counseling and educating the patient/family/caregiver, ordering medications, tests, or procedures, referring and communicating with other health care professionals, documenting information in the medical record, independently interpreting results and communicating that information with the patient/family/caregiver, and/or care coordination.         Thank you, for allowing me to continue to participate in the care of this patient.    Sincerely,  Durga Cifuentes MD

## 2022-08-11 LAB
25(OH)D3 SERPL-MCNC: 134 NG/ML (ref 30–100)
PREALB SERPL-MCNC: 27.6 MG/DL (ref 20–40)

## 2022-08-18 ENCOUNTER — TELEPHONE (OUTPATIENT)
Dept: INTERNAL MEDICINE | Facility: CLINIC | Age: 77
End: 2022-08-18

## 2022-08-18 ENCOUNTER — HOSPITAL ENCOUNTER (OUTPATIENT)
Dept: BONE DENSITY | Facility: HOSPITAL | Age: 77
Discharge: HOME | End: 2022-08-18
Admitting: NEUROLOGICAL SURGERY

## 2022-08-18 DIAGNOSIS — M47.12 CERVICAL SPONDYLOSIS WITH MYELOPATHY: ICD-10-CM

## 2022-08-18 DIAGNOSIS — M48.062 SPINAL STENOSIS OF LUMBAR REGION WITH NEUROGENIC CLAUDICATION: ICD-10-CM

## 2022-08-18 DIAGNOSIS — M80.08XA AGE-RELATED OSTEOPOROSIS WITH CURRENT PATHOLOGICAL FRACTURE, VERTEBRA(E), INITIAL ENCOUNTER FOR FRACTURE: ICD-10-CM

## 2022-08-18 PROCEDURE — 77080 DXA BONE DENSITY AXIAL: CPT

## 2022-08-18 NOTE — TELEPHONE ENCOUNTER
Caller: Sonu Bravo Jr.    Relationship: Self    Best call back number: [phone]    What is the best time to reach you: ANY    Who are you requesting to speak with (clinical staff, provider,  specific staff member):  CLINICAL STAFF      What was the call regarding: THE PATIENT STATES THAT HE NEEDS DR. ESCOBAR TO CONTACT [facility] TO RELEASE THE PATIENT FROM TAKING   apixaban (ELIQUIS) 5 MG tablet tablet TWO TO THREE DAYS PRIOR TO HIS NEXT EPIDURAL. THE PATIENT STATES THAT HE COULD NOT GET HIS LAST EPIDURAL BECAUSE HE WAS NOT RELEASED FROM TAKING THE ELIQUIS.    Do you require a callback: YES

## 2022-08-19 NOTE — TELEPHONE ENCOUNTER
Susie with [facility] informed we did not receive the paperwork.  She said she will refax it to the office.

## 2022-08-22 ENCOUNTER — TELEPHONE (OUTPATIENT)
Dept: NEUROSURGERY | Facility: CLINIC | Age: 77
End: 2022-08-22

## 2022-08-22 NOTE — TELEPHONE ENCOUNTER
Patient calling in. States he knows Dr Cifuentes wanted to do his neck surgery before his back. But he is having more pain and issues with his back and would like to do his back first. Is that possible?

## 2022-08-22 NOTE — TELEPHONE ENCOUNTER
"Attempted to return call.  The short answer is \"no\".    - I understand that his back is hurting worse.   - With the tightness in his neck he has a risk of paralysis with laying face down for the back surgery  - This risk is eliminated if we operate on the neck first  - We will go his back surgery as soon as he has recovered from his neck surgery."

## 2022-08-22 NOTE — TELEPHONE ENCOUNTER
Patient given explanation per dr Cifuentes. He understands and is agreeable to proceed with his neck surgery first

## 2022-08-23 ENCOUNTER — TREATMENT (OUTPATIENT)
Dept: PHYSICAL THERAPY | Facility: CLINIC | Age: 77
End: 2022-08-23

## 2022-08-23 DIAGNOSIS — M54.42 CHRONIC BILATERAL LOW BACK PAIN WITH BILATERAL SCIATICA: ICD-10-CM

## 2022-08-23 DIAGNOSIS — M48.062 SPINAL STENOSIS OF LUMBAR REGION WITH NEUROGENIC CLAUDICATION: ICD-10-CM

## 2022-08-23 DIAGNOSIS — M47.12 CERVICAL SPONDYLOSIS WITH MYELOPATHY: Primary | ICD-10-CM

## 2022-08-23 DIAGNOSIS — M54.41 CHRONIC BILATERAL LOW BACK PAIN WITH BILATERAL SCIATICA: ICD-10-CM

## 2022-08-23 DIAGNOSIS — G89.29 CHRONIC BILATERAL LOW BACK PAIN WITH BILATERAL SCIATICA: ICD-10-CM

## 2022-08-23 PROCEDURE — 97163 PT EVAL HIGH COMPLEX 45 MIN: CPT | Performed by: PHYSICAL THERAPIST

## 2022-08-23 NOTE — PROGRESS NOTES
Physical Therapy Initial Evaluation and Plan of Care    Patient: Sonu Bravo Jr.               : 1945  Visit Date: 2022  Referring practitioner: Durga Cifuentes, *  Date of Initial Visit: 2022  Patient seen for 1 sessions    Visit Diagnoses:    ICD-10-CM ICD-9-CM   1. Cervical spondylosis with myelopathy  M47.12 721.1   2. Spinal stenosis of lumbar region with neurogenic claudication  M48.062 724.03   3. Chronic bilateral low back pain with bilateral sciatica  M54.42 724.2    M54.41 724.3    G89.29 338.29     Past Medical History:   Diagnosis Date   • Arthritis    • Atrial fibrillation (HCC) 2021   • GERD (gastroesophageal reflux disease)    • Hyperlipidemia    • Hypertension      Past Surgical History:   Procedure Laterality Date   • COLON SURGERY  2022    Dr Fairbanks at [facility] in [facility]   • COLONOSCOPY N/A 2021    Procedure: COLONOSCOPY WITH ANESTHESIA;  Surgeon: Thomas Dc DO;  Location: [facility] ENDOSCOPY;  Service: Gastroenterology;  Laterality: N/A;  pre op; abnormal ct scan  post op: diverticulosis ; polyps   PCP: Vaughn Kramer DO   • HERNIA REPAIR           SUBJECTIVE     Subjective Evaluation    History of Present Illness  Date of onset: 2022  Mechanism of injury: He has a h/o progressive back pain to the point of needing a walker to walk with for safety. He reports falling 12 times in the last few weeks. He reports difficult emptying his bladder while sitting on the toilet. His c/c of pain right now is his back and his edward radicular pain but he has been scheduled for a neck fusion in October due to his severe cervical stenosis with myelopathy. He wasn't exactly sure why his neck needed surgery before his back did.     Pain  Current pain ratin  Location: back/edward radicular lateral LE  Quality: radiating  Relieving factors: rest (sitting)  Aggravating factors: ambulation and  standing    Social Support  Lives with: spouse    Diagnostic Tests  MRI studies: abnormal (spinal stenosis mid cervical with cord compression; severe DDD of lumbar with severe foraminal stenosis)    Treatments  Current treatment: injection treatment and medication  Patient Goals  Patient goals for therapy: decreased pain, increased motion, improved balance, increased strength and independence with ADLs/IADLs  Patient goal: prepare for surgery       Outcome Measure:   ROSA MARIA: 23/50       OBJECTIVE     Objective          Static Posture     Head  Forward.    Shoulders  Rounded.    Thoracic Spine  Hyperkyphosis.    Lumbar Spine   Flattened.     Neurological Testing     Sensation     Lumbar   Left   Diminished: light touch    Right   Diminished: light touch    Reflexes   Left   Patellar (L4): brisk (3+)    Right   Patellar (L4): brisk (3+)    Active Range of Motion   Cervical/Thoracic Spine   Cervical    Extension: 25 degrees   Left rotation: 30 degrees   Right rotation: 30 degrees     Lumbar   Flexion: 40 degrees   Extension: Active lumbar extension: unable to rise to neutral.     Passive Range of Motion   Left Hip   Flexion: WFL  Extension: 0 degrees   External rotation (90/90): WFL  Internal rotation (90/90): WFL    Right Hip   Flexion: WFL  Extension: 0 degrees   External rotation (90/90): WFL  Internal rotation (90/90): WFL    Strength/Myotome Testing     Left Hip   Planes of Motion   Flexion: 4+  Abduction: 4    Right Hip   Planes of Motion   Flexion: 4+  Abduction: 4    Left Knee   Flexion: 4  Extension: 4+    Right Knee   Flexion: 4  Extension: 4+    Left Ankle/Foot   Dorsiflexion: 4  Plantar flexion: 4+    Right Ankle/Foot   Dorsiflexion: 4  Plantar flexion: 4+    Ambulation     Comments   Walks in a forward stooped posture with a standard walker.     Functional Assessment     Single Leg Stance   Left: 2 seconds  Right: 2 seconds        Therapy Education/Self Care [id number]   Details: Explained in detail the anatomy of  his MRI findings and why the surgeon would want to help his neck first.   Advised to get wheels for his walker or get a rolling walker.   Seated thoracic extension stretch with towel roll across back.   Hooklying piriformis stretch.    Date last updated: 8/23/22   Given Home Exercise Program  symptom relief   Progress: New   Education provided to:  Patient   Level of understanding Verbalized   Timed Minutes        Total Timed Treatment:     0   mins  Total Time of Visit:            45   mins    ASSESSMENT/PLAN     GOALS:  Goals                                                  Progress Note due by 9/23/22                                                              Recert due by 11/20/23   STG by: 4 weeks Comments Date Status   Improve edward thoracolumbar mobility       Improve muscle relaxation of edward hip mms       Improve  bilateral passive hip ext to 10 deg       Improve active cervical rotation to 60 deg       LTG by: 8 weeks       Reports no radicular symptoms in bilateral LE for a week.       Able to walk 20 min without exacerbation of pain       SLS either leg x 5 secs       Reports pain no greater than 2-3/10 when it does occur.       Improve Modified Oswestry to 10      Independent with HEP for flexibility and core/hip stability.           Assessment & Plan     Assessment  Impairments: abnormal muscle firing, abnormal muscle tone, abnormal or restricted ROM, activity intolerance, impaired physical strength, lacks appropriate home exercise program and pain with function  Functional Limitations: lifting, walking, uncomfortable because of pain, sitting, standing and unable to perform repetitive tasks  Assessment details: He has 2 severe problems. His primary pain is his back and lumbar radicular symptoms but he is having surgery for his neck. I explained to him that his back pain is worse but if he injures his neck, it could be catastrophic with paralysis. He says he understands. His thoracic spine has taken on a  hyperkyphotic posture to try to avoid lumbar pain but this is adding more stress to his lumbar and neck. I think he will do well with surgery. Our primary function will be to trying to help his pain and also prepare him for his surgery for a better outcome.   Prognosis: good    Plan  Therapy options: will be seen for skilled therapy services  Planned modality interventions: dry needling, low level laser therapy, TENS and traction  Planned therapy interventions: abdominal trunk stabilization, flexibility, functional ROM exercises, home exercise program, joint mobilization, manual therapy, motor coordination training, neuromuscular re-education, postural training, soft tissue mobilization, spinal/joint mobilization, strengthening, stretching and therapeutic activities  Frequency: 3x week  Duration in weeks: 12  Treatment plan discussed with: patient  Plan details: Focus early on pain relief with soft tissue work and modalities as needed. Work on  mobility and flexibility through the spine and hips. Progress with postural/core/hip stability to improve postural alignment and mechanics.Progress the HEP for the same.        SIGNATURE: Spike Ramirez PT, [place] License #: [id number]  Electronically Signed on 8/23/2022    Initial Certification  Certification Period: 8/23/2022 through 11/20/2022  I certify that the therapy services are furnished while this patient is under my care.  The services outlined above are required by this patient, and will be reviewed every 90 days.     PHYSICIAN: Durga Cifuentes MD (NPI: [id number])    Signature____________________________________________DATE: _________     Please sign and return via fax to [phone].   Thank you so much for letting us work with Sonu. I appreciate your letting us work with your patients. If you have any questions or concerns, please don't hesitate to contact me.          [address] [address]  [phone]

## 2022-08-29 ENCOUNTER — TELEPHONE (OUTPATIENT)
Dept: INTERNAL MEDICINE | Facility: CLINIC | Age: 77
End: 2022-08-29

## 2022-08-29 DIAGNOSIS — M47.12 CERVICAL SPONDYLOSIS WITH MYELOPATHY: ICD-10-CM

## 2022-08-29 DIAGNOSIS — M48.062 SPINAL STENOSIS OF LUMBAR REGION WITH NEUROGENIC CLAUDICATION: Primary | ICD-10-CM

## 2022-08-29 NOTE — TELEPHONE ENCOUNTER
PATIENT HAS BEEN CALLED, HE STATED THAT HE HAS AN APPOINTMENT WITH [facility] ON 08/31/2022 TO FITTED FOR A WALKER.  HE IS NEEDING AN ORDER FOR A WALKER WITH 2 WHEELS IN THE FRONT AND SKIDS IN THE BACK.      ALAN LOPEZ PHYSICAL THERAPIST SUGGESTED TO PATIENT THAT HE GET A WALKER TO HELP WITH STABILITY.

## 2022-08-29 NOTE — TELEPHONE ENCOUNTER
Fast, Sonu Sweet Jr. called and stated that she would like a callback from Nurse or PCP    Notes: PATIENT NEEDS SCRIPT FOR WALKER - PRIOR TO HIS SURGERY ON October 11.    [facility]    Please advise with callback @ [phone]    Thank you,  Emiliano Vaughn, PCT

## 2022-08-31 RX ORDER — ROSUVASTATIN CALCIUM 20 MG/1
20 TABLET, COATED ORAL DAILY
Qty: 90 TABLET | Refills: 3 | Status: SHIPPED | OUTPATIENT
Start: 2022-08-31 | End: 2022-09-06 | Stop reason: SDUPTHER

## 2022-09-02 ENCOUNTER — TREATMENT (OUTPATIENT)
Dept: PHYSICAL THERAPY | Facility: CLINIC | Age: 77
End: 2022-09-02

## 2022-09-02 DIAGNOSIS — M54.41 CHRONIC BILATERAL LOW BACK PAIN WITH BILATERAL SCIATICA: ICD-10-CM

## 2022-09-02 DIAGNOSIS — G89.29 CHRONIC BILATERAL LOW BACK PAIN WITH BILATERAL SCIATICA: ICD-10-CM

## 2022-09-02 DIAGNOSIS — M47.12 CERVICAL SPONDYLOSIS WITH MYELOPATHY: Primary | ICD-10-CM

## 2022-09-02 DIAGNOSIS — M54.42 CHRONIC BILATERAL LOW BACK PAIN WITH BILATERAL SCIATICA: ICD-10-CM

## 2022-09-02 DIAGNOSIS — M48.062 SPINAL STENOSIS OF LUMBAR REGION WITH NEUROGENIC CLAUDICATION: ICD-10-CM

## 2022-09-02 PROCEDURE — 97140 MANUAL THERAPY 1/> REGIONS: CPT | Performed by: PHYSICAL THERAPIST

## 2022-09-02 NOTE — PROGRESS NOTES
Physical Therapy Treatment Note    Patient: Sonu Bravo Jr.                                                 Visit Date: 2022  :     1945    Referring practitioner:    Durga Cifuentes, *  Date of Initial Visit:          Type: THERAPY  Noted: 2022    Patient seen for 2 sessions    Visit Diagnoses:    ICD-10-CM ICD-9-CM   1. Cervical spondylosis with myelopathy  M47.12 721.1   2. Spinal stenosis of lumbar region with neurogenic claudication  M48.062 724.03   3. Chronic bilateral low back pain with bilateral sciatica  M54.42 724.2    M54.41 724.3    G89.29 338.29     SUBJECTIVE     Subjective He got an epidural which helped his pain quite a bit but yesterday, he was walking through [place] and his legs buckled. He got a rolling walker which is helping.     PAIN: 4/10         OBJECTIVE     Objective        Manual Therapy     [id number]  Comments   Prone CT ext/rot mobs    Prone thoracic ext mobs    Prone LS manual distraction    Prone TALI PA mobs Gr 2-3   Prone hip ext stretch Dbl towel roll, sustained OP   Passive hip stretches all planes            Timed Minutes 45     Therapy Education/Self Care 16436   Details: Raised rolling walker and advised to stand more upright inside the walker to keep stress of back and neck.    Date last updated: 22   Given Home Exercise Program  symptom relief   Progress: reinforced   Education provided to:  Patient   Level of understanding Verbalized   Timed Minutes        Total Timed Treatment:     0   mins  Total Time of Visit:            45   mins    ASSESSMENT/PLAN     GOALS:  Goals                                                  Progress Note due by 22                                                              Recert due by 23   STG by: 4 weeks Comments Date Status   Improve edward thoracolumbar mobility       Improve muscle relaxation of edward hip mms       Improve  bilateral passive hip  ext to 10 deg       Improve active cervical rotation to 60 deg       LTG by: 8 weeks       Reports no radicular symptoms in bilateral LE for a week.  had epidural which helped this but legs still weak 9/2 ongoing   Able to walk 20 min without exacerbation of pain       SLS either leg x 5 secs       Reports pain no greater than 2-3/10 when it does occur.       Improve Modified Oswestry to 10      Independent with HEP for flexibility and core/hip stability.         Assessment/Plan     ASSESSMENT: today was his first visit after his eval so no goals met at this point but his back did feel better after today's treatment. I advised him to go ahead and schedule out as I will book up quickly. His neck surgery is scheduled in October.     PLAN: cont with emphasis on flexibility through his hips and thoracic spine.     SIGNATURE: Spike Ramirez, PT, [place] License #: [id number]  Electronically Signed on 9/2/2022        [address] [place] [address]. [id number]  [phone]

## 2022-09-06 ENCOUNTER — OFFICE VISIT (OUTPATIENT)
Dept: INTERNAL MEDICINE | Facility: CLINIC | Age: 77
End: 2022-09-06

## 2022-09-06 VITALS
OXYGEN SATURATION: 98 % | HEIGHT: 71 IN | DIASTOLIC BLOOD PRESSURE: 74 MMHG | HEART RATE: 70 BPM | TEMPERATURE: 98 F | WEIGHT: 167.6 LBS | RESPIRATION RATE: 16 BRPM | SYSTOLIC BLOOD PRESSURE: 104 MMHG | BODY MASS INDEX: 23.46 KG/M2

## 2022-09-06 DIAGNOSIS — E78.2 MIXED HYPERLIPIDEMIA: ICD-10-CM

## 2022-09-06 DIAGNOSIS — E03.9 ACQUIRED HYPOTHYROIDISM: ICD-10-CM

## 2022-09-06 DIAGNOSIS — I10 ESSENTIAL HYPERTENSION: ICD-10-CM

## 2022-09-06 DIAGNOSIS — Z86.718 HISTORY OF DVT (DEEP VEIN THROMBOSIS): Primary | ICD-10-CM

## 2022-09-06 DIAGNOSIS — I48.0 PAROXYSMAL ATRIAL FIBRILLATION: ICD-10-CM

## 2022-09-06 DIAGNOSIS — I82.4Y2 ACUTE DEEP VEIN THROMBOSIS (DVT) OF PROXIMAL VEIN OF LEFT LOWER EXTREMITY: ICD-10-CM

## 2022-09-06 DIAGNOSIS — K21.00 GASTRO-ESOPHAGEAL REFLUX DISEASE WITH ESOPHAGITIS: ICD-10-CM

## 2022-09-06 PROBLEM — K65.9 ABDOMINAL INFECTION: Status: RESOLVED | Noted: 2021-10-01 | Resolved: 2022-09-06

## 2022-09-06 PROCEDURE — 1170F FXNL STATUS ASSESSED: CPT | Performed by: INTERNAL MEDICINE

## 2022-09-06 PROCEDURE — 96160 PT-FOCUSED HLTH RISK ASSMT: CPT | Performed by: INTERNAL MEDICINE

## 2022-09-06 PROCEDURE — G0439 PPPS, SUBSEQ VISIT: HCPCS | Performed by: INTERNAL MEDICINE

## 2022-09-06 PROCEDURE — 1159F MED LIST DOCD IN RCRD: CPT | Performed by: INTERNAL MEDICINE

## 2022-09-06 PROCEDURE — 1126F AMNT PAIN NOTED NONE PRSNT: CPT | Performed by: INTERNAL MEDICINE

## 2022-09-06 RX ORDER — ROSUVASTATIN CALCIUM 20 MG/1
20 TABLET, COATED ORAL DAILY
Qty: 90 TABLET | Refills: 3 | Status: SHIPPED | OUTPATIENT
Start: 2022-09-06

## 2022-09-06 RX ORDER — BISOPROLOL FUMARATE 5 MG/1
5 TABLET, FILM COATED ORAL DAILY
Qty: 90 TABLET | Refills: 3 | Status: SHIPPED | OUTPATIENT
Start: 2022-09-06 | End: 2023-01-11

## 2022-09-06 RX ORDER — OXYCODONE HYDROCHLORIDE AND ACETAMINOPHEN 5; 325 MG/1; MG/1
1 TABLET ORAL NIGHTLY
Status: ON HOLD | COMMUNITY
Start: 2022-08-17 | End: 2022-10-13 | Stop reason: SDUPTHER

## 2022-09-06 RX ORDER — OMEPRAZOLE 20 MG/1
20 CAPSULE, DELAYED RELEASE ORAL DAILY
Qty: 90 CAPSULE | Refills: 3 | Status: SHIPPED | OUTPATIENT
Start: 2022-09-06

## 2022-09-06 RX ORDER — LEVOTHYROXINE SODIUM 112 UG/1
112 TABLET ORAL DAILY
Qty: 90 TABLET | Refills: 1 | Status: SHIPPED | OUTPATIENT
Start: 2022-09-06 | End: 2023-02-24 | Stop reason: SDUPTHER

## 2022-09-06 NOTE — PROGRESS NOTES
"CC:     History:  Sonu Bravo Jr. is a 77 y.o. male   ***    {SnapShot  Notes  Encounters  Result Review  Labs  Imaging  Meds  Media :23}   ROS:  Review of Systems     reports that he has been smoking cigarettes. He started smoking about 55 years ago. He has a 26.00 pack-year smoking history. He has never used smokeless tobacco. He reports current alcohol use. He reports current drug use.      Current Outpatient Medications:   •  acetaminophen (TYLENOL) 325 MG tablet, Take 650 mg by mouth Every 4 (Four) Hours As Needed., Disp: , Rfl:   •  apixaban (ELIQUIS) 5 MG tablet tablet, Take 1 tablet by mouth 2 (Two) Times a Day., Disp: 180 tablet, Rfl: 1  •  bisoprolol (ZEBeta) 5 MG tablet, Take 1 tablet by mouth Daily., Disp: 90 tablet, Rfl: 3  •  Cholecalciferol 100 MCG (4000 UT) capsule, 4,000 Units Daily., Disp: , Rfl:   •  coenzyme Q10 100 MG capsule, Take 100 mg by mouth Daily., Disp: , Rfl:   •  Cyanocobalamin 2500 MCG sublingual tablet, 2,500 mcg Daily., Disp: , Rfl:   •  fexofenadine (ALLEGRA) 180 MG tablet, Take 180 mg by mouth Daily., Disp: , Rfl:   •  fluticasone (FLONASE) 50 MCG/ACT nasal spray, 1 spray into the nostril(s) as directed by provider Daily., Disp: 32 g, Rfl: 3  •  levothyroxine (SYNTHROID, LEVOTHROID) 112 MCG tablet, Take 1 tablet by mouth Daily., Disp: 90 tablet, Rfl: 1  •  multivitamin with minerals tablet tablet, Take 1 capsule by mouth Daily., Disp: , Rfl:   •  omeprazole (priLOSEC) 20 MG capsule, Take 1 capsule by mouth Daily., Disp: 90 capsule, Rfl: 3  •  oxyCODONE-acetaminophen (PERCOCET) 5-325 MG per tablet, Take 1 tablet by mouth Every Night., Disp: , Rfl:   •  rosuvastatin (CRESTOR) 20 MG tablet, Take 1 tablet by mouth Daily., Disp: 90 tablet, Rfl: 3  •  Zinc 50 MG capsule, Take  by mouth., Disp: , Rfl:     OBJECTIVE:  /74 (BP Location: Left arm, Patient Position: Sitting, Cuff Size: Adult)   Pulse 70   Temp 98 °F (36.7 °C)   Resp 16   Ht 180.3 cm (71\")   Wt 76 kg " (167 lb 9.6 oz)   SpO2 98%   BMI 23.38 kg/m²    Physical Exam    Assessment/Plan  {Problem List  Visit Diagnosis  Medications  SmartSets  Prep for Surgery  BestPractice :23}   Diagnoses and all orders for this visit:    1. History of DVT (deep vein thrombosis) (Primary)  -     apixaban (ELIQUIS) 5 MG tablet tablet; Take 1 tablet by mouth 2 (Two) Times a Day.  Dispense: 180 tablet; Refill: 1    2. Paroxysmal atrial fibrillation (HCC)  -     apixaban (ELIQUIS) 5 MG tablet tablet; Take 1 tablet by mouth 2 (Two) Times a Day.  Dispense: 180 tablet; Refill: 1    3. Acute deep vein thrombosis (DVT) of proximal vein of left lower extremity (HCC)    4. Essential hypertension  -     bisoprolol (ZEBeta) 5 MG tablet; Take 1 tablet by mouth Daily.  Dispense: 90 tablet; Refill: 3  -     Comprehensive Metabolic Panel; Future  -     CBC (No Diff); Future    5. Acquired hypothyroidism  -     levothyroxine (SYNTHROID, LEVOTHROID) 112 MCG tablet; Take 1 tablet by mouth Daily.  Dispense: 90 tablet; Refill: 1  -     TSH; Future    6. Gastro-esophageal reflux disease with esophagitis  -     omeprazole (priLOSEC) 20 MG capsule; Take 1 capsule by mouth Daily.  Dispense: 90 capsule; Refill: 3    7. Mixed hyperlipidemia  -     rosuvastatin (CRESTOR) 20 MG tablet; Take 1 tablet by mouth Daily.  Dispense: 90 tablet; Refill: 3  -     Lipid Panel; Future        An After Visit Summary was printed and given to the patient at discharge.  No follow-ups on file.  {Review (Popup)  Instructions  Quality  Charge Capture  LOS  Follow-up  Communications :23}        Vaughn Kramer D.O. 9/6/2022   Electronically signed.

## 2022-09-06 NOTE — PROGRESS NOTES
The ABCs of the Annual Wellness Visit  Subsequent Medicare Wellness Visit    Chief Complaint   Patient presents with   • Medicare Wellness-subsequent      Subjective    History of Present Illness:  Sonu Bravo Jr. is a 77 y.o. male who presents for a Subsequent Medicare Wellness Visit.    The following portions of the patient's history were reviewed and   updated as appropriate: allergies, current medications, past family history, past medical history, past social history, past surgical history and problem list.    Compared to one year ago, the patient feels his physical   health is better.    Compared to one year ago, the patient feels his mental   health is worse.    Recent Hospitalizations:  This patient has had a [facility] admission record on file within the last 365 days.    Current Medical Providers:  Patient Care Team:  Vaughn Kramer DO as PCP - General (Internal Medicine)  Thomas Dc DO as Consulting Physician (Gastroenterology)  Tien Canchola MD as Consulting Physician (Pulmonary Disease)  Cb Fairbanks MD as Surgeon (General Surgery)  Durga Cifuentes MD as Surgeon (Neurosurgery)    Outpatient Medications Prior to Visit   Medication Sig Dispense Refill   • acetaminophen (TYLENOL) 325 MG tablet Take 650 mg by mouth Every 4 (Four) Hours As Needed.     • Cholecalciferol 100 MCG (4000 UT) capsule 4,000 Units Daily.     • coenzyme Q10 100 MG capsule Take 100 mg by mouth Daily.     • Cyanocobalamin 2500 MCG sublingual tablet 2,500 mcg Daily.     • fexofenadine (ALLEGRA) 180 MG tablet Take 180 mg by mouth Daily.     • fluticasone (FLONASE) 50 MCG/ACT nasal spray 1 spray into the nostril(s) as directed by provider Daily. 32 g 3   • multivitamin with minerals tablet tablet Take 1 capsule by mouth Daily.     • oxyCODONE-acetaminophen (PERCOCET) 5-325 MG per tablet Take 1 tablet by mouth Every Night.     • Zinc 50 MG capsule Take  by mouth.     • apixaban (ELIQUIS) 5 MG  tablet tablet Take 1 tablet by mouth 2 (Two) Times a Day. 180 tablet 1   • bisoprolol (ZEBeta) 5 MG tablet Take 1 tablet by mouth Daily. 90 tablet 3   • levothyroxine (SYNTHROID, LEVOTHROID) 112 MCG tablet Take 1 tablet by mouth Daily. 90 tablet 1   • omeprazole (priLOSEC) 20 MG capsule Take 1 capsule by mouth Daily. 90 capsule 3   • rosuvastatin (CRESTOR) 20 MG tablet Take 1 tablet by mouth Daily. 90 tablet 3     No facility-administered medications prior to visit.       Opioid medication/s are on active medication list.  and I have evaluated his active treatment plan and pain score trends (see table).  There were no vitals filed for this visit.  I have reviewed the chart for potential of high risk medication and harmful drug interactions in the elderly.            Aspirin is not on active medication list.  Aspirin use is not indicated based on review of current medical condition/s. Risk of harm outweighs potential benefits.  .    Patient Active Problem List   Diagnosis   • Abdominal aortic ectasia (HCC)   • Vitamin D deficiency   • Spinal stenosis of lumbar region with neurogenic claudication   • Mixed hyperlipidemia   • Acquired hypothyroidism   • Gastro-esophageal reflux disease with esophagitis   • Essential hypertension   • Former smoker   • Paroxysmal atrial fibrillation (HCC)   • Current use of long term anticoagulation   • Cavitary lesion of lung   • Colonic diverticular abscess   • History of DVT (deep vein thrombosis)   • Cervical spondylosis with myelopathy     Advance Care Planning  Advance Directive is on file.  ACP discussion was held with the patient during this visit. He will bring a copy.     Review of Systems   Constitutional: Negative for fatigue.   Respiratory: Negative for shortness of breath.    Cardiovascular: Negative for chest pain.        Objective    Vitals:    09/06/22 0851   BP: 104/74   BP Location: Left arm   Patient Position: Sitting   Cuff Size: Adult   Pulse: 70   Resp: 16   Temp:  "98 °F (36.7 °C)   SpO2: 98%   Weight: 76 kg (167 lb 9.6 oz)   Height: 180.3 cm (71\")     Estimated body mass index is 23.38 kg/m² as calculated from the following:    Height as of this encounter: 180.3 cm (71\").    Weight as of this encounter: 76 kg (167 lb 9.6 oz).    BMI is within normal parameters. No other follow-up for BMI required.      Does the patient have evidence of cognitive impairment? No    Physical Exam  Constitutional:       General: He is not in acute distress.  Cardiovascular:      Rate and Rhythm: Normal rate and regular rhythm.      Heart sounds: Normal heart sounds. No murmur heard.  Pulmonary:      Effort: Pulmonary effort is normal. No respiratory distress.      Breath sounds: Normal breath sounds. No wheezing.   Neurological:      Mental Status: He is alert and oriented to person, place, and time.      Gait: Gait normal.   Psychiatric:         Mood and Affect: Mood normal.         Behavior: Behavior normal.                 HEALTH RISK ASSESSMENT    Smoking Status:  Social History     Tobacco Use   Smoking Status Current Every Day Smoker   • Packs/day: 0.50   • Years: 52.00   • Pack years: 26.00   • Types: Cigarettes   • Start date:    • Last attempt to quit: 10/11/2021   • Years since quittin.9   Smokeless Tobacco Never Used   Tobacco Comment    up to 1/2 a pack a day 2022     Alcohol Consumption:  Social History     Substance and Sexual Activity   Alcohol Use Yes    Comment: rare     Fall Risk Screen:    JACOBADI Fall Risk Assessment was completed, and patient is at HIGH risk for falls. Assessment completed on:2022    Depression Screening:  PHQ-2/PHQ-9 Depression Screening 2022   Retired PHQ-9 Total Score -   Retired Total Score -   Little Interest or Pleasure in Doing Things 0-->not at all   Feeling Down, Depressed or Hopeless 0-->not at all   PHQ-9: Brief Depression Severity Measure Score 0       Health Habits and Functional and Cognitive Screening:  Functional & Cognitive " Status 9/6/2022   Do you have difficulty preparing food and eating? Yes   Do you have difficulty bathing yourself, getting dressed or grooming yourself? Yes   Do you have difficulty using the toilet? Yes   Do you have difficulty moving around from place to place? Yes   Do you have trouble with steps or getting out of a bed or a chair? Yes   Current Diet Unhealthy Diet   Dental Exam Up to date   Eye Exam Up to date   Exercise (times per week) 0 times per week   Current Exercises Include No Regular Exercise        Exercise Comment -   Current Exercise Activities Include -   Do you need help using the phone?  No   Are you deaf or do you have serious difficulty hearing?  No   Do you need help with transportation? No   Do you need help shopping? No   Do you need help preparing meals?  No   Do you need help with housework?  No   Do you need help with laundry? No   Do you need help taking your medications? No   Do you need help managing money? No   Do you ever drive or ride in a car without wearing a seat belt? No   Have you felt unusual stress, anger or loneliness in the last month? No   Who do you live with? Spouse   If you need help, do you have trouble finding someone available to you? No   Have you been bothered in the last four weeks by sexual problems? No   Do you have difficulty concentrating, remembering or making decisions? No       Age-appropriate Screening Schedule:  Refer to the list below for future screening recommendations based on patient's age, sex and/or medical conditions. Orders for these recommended tests are listed in the plan section. The patient has been provided with a written plan.    Health Maintenance   Topic Date Due   • INFLUENZA VACCINE  10/01/2022   • LIPID PANEL  10/07/2022   • DXA SCAN  08/18/2024   • TDAP/TD VACCINES (2 - Td or Tdap) 11/01/2026   • ZOSTER VACCINE  Completed              Assessment & Plan   CMS Preventative Services Quick Reference  Risk Factors Identified During  Encounter  Cardiovascular Disease  Depression/Dysphoria  Fall Risk-High or Moderate  Immunizations Discussed/Encouraged (specific Immunizations; Influenza and COVID19  Inactivity/Sedentary  The above risks/problems have been discussed with the patient.  Follow up actions/plans if indicated are seen below in the Assessment/Plan Section.  Pertinent information has been shared with the patient in the After Visit Summary.    Diagnoses and all orders for this visit:    1. History of DVT (deep vein thrombosis) (Primary)  2. Paroxysmal atrial fibrillation (HCC)  -     apixaban (ELIQUIS) 5 MG tablet tablet; Take 1 tablet by mouth 2 (Two) Times a Day.  Dispense: 180 tablet; Refill: 1  Continue DOAC therapy. He will be able to hold ramy-operatively, but should continue this into his post-op recovery given history of unprovoked DVT and ongoing A fib, though burden was only 1% on last Holter.     4. Essential hypertension  -     bisoprolol (ZEBeta) 5 MG tablet; Take 1 tablet by mouth Daily.  Dispense: 90 tablet; Refill: 3  -     Comprehensive Metabolic Panel; Future  -     CBC (No Diff); Future  Well controlled, BP goal for age is <140/90 per JNC 8 guidelines and continue current medications    5. Acquired hypothyroidism  -     levothyroxine (SYNTHROID, LEVOTHROID) 112 MCG tablet; Take 1 tablet by mouth Daily.  Dispense: 90 tablet; Refill: 1  -     TSH; Future  Check TSH and adjust dose as indicated.     6. Gastro-esophageal reflux disease with esophagitis  -     omeprazole (priLOSEC) 20 MG capsule; Take 1 capsule by mouth Daily.  Dispense: 90 capsule; Refill: 3  Stable without exacerbation on PPI.    7. Mixed hyperlipidemia  -     rosuvastatin (CRESTOR) 20 MG tablet; Take 1 tablet by mouth Daily.  Dispense: 90 tablet; Refill: 3  -     Lipid Panel; Future  Stable on moderate intensity statin therapy per ACC/AHA guidelines.      Follow Up:   Return for Next scheduled follow up.     An After Visit Summary and PPPS were made  available to the patient.

## 2022-09-06 NOTE — PATIENT INSTRUCTIONS
Medicare Wellness  Personal Prevention Plan of Service     Date of Office Visit:    Encounter Provider:  Vaughn Kramer DO  Place of Service:  [facility] INTERNAL MED[facility]INE  Patient Name: Sonu Bravo Jr.  :  1945    As part of the Medicare Wellness portion of your visit today, we are providing you with this personalized preventive plan of services (PPPS). This plan is based upon recommendations of the [place] Preventive Services Task Force (USPSTF) and the Advisory Committee on Immunization Practices (ACIP).    This lists the preventive care services that should be considered, and provides dates of when you are due. Items listed as completed are up-to-date and do not require any further intervention.    Health Maintenance   Topic Date Due    COVID-19 Vaccine (4 - Booster for Moderna series) 03/15/2022    INFLUENZA VACCINE  10/01/2022    LIPID PANEL  10/07/2022    LUNG CANCER SCREENING  10/28/2022    ANNUAL WELLNESS VISIT  2023    DXA SCAN  2024    COLORECTAL CANCER SCREENING  2024    TDAP/TD VACCINES (2 - Td or Tdap) 2026    HEPATITIS C SCREENING  Completed    Pneumococcal Vaccine 65+  Completed    ZOSTER VACCINE  Completed       Orders Placed This Encounter   Procedures    TSH     Standing Status:   Future     Standing Expiration Date:   2023     Order Specific Question:   Release to patient     Answer:   Routine Release    Comprehensive Metabolic Panel     Standing Status:   Future     Standing Expiration Date:   2023     Order Specific Question:   Release to patient     Answer:   Routine Release    Lipid Panel     Standing Status:   Future     Standing Expiration Date:   2023    CBC (No Diff)     Standing Status:   Future     Standing Expiration Date:   2023     Order Specific Question:   Release to patient     Answer:   Routine Release       Return for Next scheduled follow up.

## 2022-09-07 ENCOUNTER — TREATMENT (OUTPATIENT)
Dept: PHYSICAL THERAPY | Facility: CLINIC | Age: 77
End: 2022-09-07

## 2022-09-07 DIAGNOSIS — M48.062 SPINAL STENOSIS OF LUMBAR REGION WITH NEUROGENIC CLAUDICATION: ICD-10-CM

## 2022-09-07 DIAGNOSIS — M54.41 CHRONIC BILATERAL LOW BACK PAIN WITH BILATERAL SCIATICA: ICD-10-CM

## 2022-09-07 DIAGNOSIS — M47.12 CERVICAL SPONDYLOSIS WITH MYELOPATHY: Primary | ICD-10-CM

## 2022-09-07 DIAGNOSIS — G89.29 CHRONIC BILATERAL LOW BACK PAIN WITH BILATERAL SCIATICA: ICD-10-CM

## 2022-09-07 DIAGNOSIS — M54.42 CHRONIC BILATERAL LOW BACK PAIN WITH BILATERAL SCIATICA: ICD-10-CM

## 2022-09-07 PROCEDURE — 97140 MANUAL THERAPY 1/> REGIONS: CPT | Performed by: PHYSICAL THERAPIST

## 2022-09-07 NOTE — PROGRESS NOTES
Physical Therapy Treatment Note    Patient: Sonu Bravo Jr.                                                 Visit Date: 2022  :     1945    Referring practitioner:    Durga Cifuentes, *  Date of Initial Visit:          Type: THERAPY  Noted: 2022    Patient seen for 3 sessions    Visit Diagnoses:    ICD-10-CM ICD-9-CM   1. Cervical spondylosis with myelopathy  M47.12 721.1   2. Spinal stenosis of lumbar region with neurogenic claudication  M48.062 724.03   3. Chronic bilateral low back pain with bilateral sciatica  M54.42 724.2    M54.41 724.3    G89.29 338.29     SUBJECTIVE     Subjective He says his pain is still doing well. He is hesitant to walk through [facility] still.     PAIN: 4/10         OBJECTIVE     Objective        Manual Therapy     [id number]  Comments   Prone CT ext/rot mobs    Prone thoracic ext mobs    Prone LS manual distraction    Prone TALI PA mobs Gr 2-3   Prone hip ext stretch Dbl towel roll, sustained OP   Passive hip stretches all planes            Timed Minutes 45     Therapy Education/Self Care 13695   Details: Raised rolling walker and advised to stand more upright inside the walker to keep stress of back and neck.    Date last updated: 22   Given Home Exercise Program  symptom relief   Progress: reinforced   Education provided to:  Patient   Level of understanding Verbalized   Timed Minutes        Total Timed Treatment:     45   mins  Total Time of Visit:            45   mins    ASSESSMENT/PLAN     GOALS:  Goals                                                  Progress Note due by 22                                                              Recert due by 23   STG by: 4 weeks Comments Date Status   Improve edward thoracolumbar mobility       Improve muscle relaxation of edward hip mms       Improve  bilateral passive hip ext to 10 deg       Improve active cervical rotation to 60 deg       LTG by: 8  weeks       Reports no radicular symptoms in bilateral LE for a week.  had epidural which helped this but legs still weak 9/7 ongoing   Able to walk 20 min without exacerbation of pain       SLS either leg x 5 secs       Reports pain no greater than 2-3/10 when it does occur.       Improve Modified Oswestry to 10      Independent with HEP for flexibility and core/hip stability.         Assessment/Plan     ASSESSMENT: His epidural is working well and his pain is still doing well. Our focus right now is on improving flexibility and mobility.     PLAN: cont with emphasis on flexibility through his hips and thoracic spine.     SIGNATURE: Spike Ramirez, PT, [place] License #: [id number]  Electronically Signed on 9/7/2022        [address] [place] [address]  [phone]

## 2022-09-21 ENCOUNTER — TREATMENT (OUTPATIENT)
Dept: PHYSICAL THERAPY | Facility: CLINIC | Age: 77
End: 2022-09-21

## 2022-09-21 DIAGNOSIS — M54.42 CHRONIC BILATERAL LOW BACK PAIN WITH BILATERAL SCIATICA: ICD-10-CM

## 2022-09-21 DIAGNOSIS — M48.062 SPINAL STENOSIS OF LUMBAR REGION WITH NEUROGENIC CLAUDICATION: ICD-10-CM

## 2022-09-21 DIAGNOSIS — M54.41 CHRONIC BILATERAL LOW BACK PAIN WITH BILATERAL SCIATICA: ICD-10-CM

## 2022-09-21 DIAGNOSIS — G89.29 CHRONIC BILATERAL LOW BACK PAIN WITH BILATERAL SCIATICA: ICD-10-CM

## 2022-09-21 DIAGNOSIS — M47.12 CERVICAL SPONDYLOSIS WITH MYELOPATHY: Primary | ICD-10-CM

## 2022-09-21 PROCEDURE — 97140 MANUAL THERAPY 1/> REGIONS: CPT | Performed by: PHYSICAL THERAPIST

## 2022-09-21 NOTE — PROGRESS NOTES
Physical Therapy Treatment Note    Patient: Sonu Bravo Jr.                                                 Visit Date: 2022  :     1945    Referring practitioner:    Durga Cifuentes, *  Date of Initial Visit:          Type: THERAPY  Noted: 2022    Patient seen for 4 sessions    Visit Diagnoses:    ICD-10-CM ICD-9-CM   1. Cervical spondylosis with myelopathy  M47.12 721.1   2. Spinal stenosis of lumbar region with neurogenic claudication  M48.062 724.03   3. Chronic bilateral low back pain with bilateral sciatica  M54.42 724.2    M54.41 724.3    G89.29 338.29     SUBJECTIVE     Subjective He feels about the same. He feels the epidural is fading. He would rarely uses the walker outside of the house but now he is careful. He does find relief following his sessions.     PAIN: 4-5/10 R posterior LE to mid thigh      OBJECTIVE     Objective     Manual Therapy     [id number]  Comments   IASTM w/ blue ridged roller to thoracic    Prone CT ext/rot mobs    Prone thoracic ext mobs    Prone LS manual distraction    B hip inferior/lateral mobilizations w/ belt     B passive SKTC and hamstring stretches L hip more restricted            Timed Minutes 40     Therapy Education/Self Care 68559   Details: Raised rolling walker and advised to stand more upright inside the walker to keep stress of back and neck.    Date last updated: 22   Given Home Exercise Program  symptom relief   Progress: reinforced   Education provided to:  Patient   Level of understanding Verbalized   Timed Minutes        Total Timed Treatment:     40   mins  Total Time of Visit:            45   mins    ASSESSMENT/PLAN     GOALS:  Goals                                                  Progress Note due by 22                                                              Recert due by 23   STG by: 4 weeks Comments Date Status   Improve edward thoracolumbar mobility        Improve muscle relaxation of edward hip mms       Improve  bilateral passive hip ext to 10 deg       Improve active cervical rotation to 60 deg       LTG by: 8 weeks       Reports no radicular symptoms in bilateral LE for a week.  had epidural which helped this but legs still weak 9/7 ongoing   Able to walk 20 min without exacerbation of pain       SLS either leg x 5 secs       Reports pain no greater than 2-3/10 when it does occur.       Improve Modified Oswestry to 10      Independent with HEP for flexibility and core/hip stability.         Assessment/Plan     ASSESSMENT: we continued to focus on spinal and hip mobility. He did have a difficult time remaining passive for stretching and continues to demonstrate thoracic hypomobility.     PLAN: Continue to focus on hip and spinal mobility. Consider issuing self-stretches for HEP next visit. Consider assessing R ankle mobility in regards to radicular symptoms.     SIGNATURE: Karlee Ruiz PTA, [place] License #: [id number]  Electronically Signed on 9/21/2022        [address] [place] [address]. [address]  [phone]

## 2022-09-23 ENCOUNTER — TREATMENT (OUTPATIENT)
Dept: PHYSICAL THERAPY | Facility: CLINIC | Age: 77
End: 2022-09-23

## 2022-09-23 DIAGNOSIS — M47.12 CERVICAL SPONDYLOSIS WITH MYELOPATHY: Primary | ICD-10-CM

## 2022-09-23 DIAGNOSIS — M54.42 CHRONIC BILATERAL LOW BACK PAIN WITH BILATERAL SCIATICA: ICD-10-CM

## 2022-09-23 DIAGNOSIS — M54.41 CHRONIC BILATERAL LOW BACK PAIN WITH BILATERAL SCIATICA: ICD-10-CM

## 2022-09-23 DIAGNOSIS — M48.062 SPINAL STENOSIS OF LUMBAR REGION WITH NEUROGENIC CLAUDICATION: ICD-10-CM

## 2022-09-23 DIAGNOSIS — G89.29 CHRONIC BILATERAL LOW BACK PAIN WITH BILATERAL SCIATICA: ICD-10-CM

## 2022-09-23 PROCEDURE — 97140 MANUAL THERAPY 1/> REGIONS: CPT | Performed by: PHYSICAL THERAPIST

## 2022-09-23 NOTE — PROGRESS NOTES
Physical Therapy Treatment Note and 30 Day Progress Note    Patient: Sonu Bravo Jr.                                                 Visit Date: 2022  :     1945    Referring practitioner:    Durga Cifuentes, *  Date of Initial Visit:          Type: THERAPY  Noted: 2022    Patient seen for 5 sessions    Visit Diagnoses:    ICD-10-CM ICD-9-CM   1. Cervical spondylosis with myelopathy  M47.12 721.1   2. Spinal stenosis of lumbar region with neurogenic claudication  M48.062 724.03   3. Chronic bilateral low back pain with bilateral sciatica  M54.42 724.2    M54.41 724.3    G89.29 338.29     SUBJECTIVE     Subjective He is having his neck surgery in a couple of weeks. He doesn't feel this is going to help his lumbar but understands it is the priority right now given the stenosis.     PAIN: 5-610 R posterior LE to mid thigh      OBJECTIVE     Objective     Manual Therapy     49552  Comments   IASTM w/ blue ridged roller to thoracic    Prone CT ext/rot mobs    Prone thoracic ext mobs    Prone LS manual distraction    edward LE LAD    B passive SKTC, piri/OI and hamstring stretches            Timed Minutes 45     Therapy Education/Self Care 05817   Education offered today    Medbride Code    Ongoing HEP   Seated thoracic extension stretch with towel roll across back.   Hooklying piriformis stretch.    Timed Minutes      Total Timed Treatment:     45   mins  Total Time of Visit:            45   mins    ASSESSMENT/PLAN     GOALS:  Goals                                                  Progress Note due by 10/22/22                                                              Recert due by 23   STG by: 4 weeks Comments Date Status   Improve edward thoracolumbar mobility  still stiff  ongoing   Improve muscle relaxation of edward hip mms Not as guarded at initial eval  MET   Improve  bilateral passive hip ext to 10 deg  10 deg edward  9/23 MET   Improve active cervical rotation to 60 deg  RR: 35 deg; LR: 25 9/23 ongoing   LTG by: 8 weeks       Reports no radicular symptoms in bilateral LE for a week. Mostly post thigh, R>L 9/23 ongoing   Able to walk 20 min without exacerbation of pain  uses a rolling walker; without the rwx, he can walk about 1/2 a block 9/23 ongoing   SLS either leg x 5 secs       Reports pain no greater than 2-3/10 when it does occur.  5-6/10 9/23 ongoing   Improve Modified Oswestry to 10 20/50 9/23 ongoing   Independent with HEP for flexibility and core/hip stability.  focusing on gentle hip flexibility and core stability now 9/23 ongoing     Assessment & Plan     Assessment  Impairments: abnormal muscle firing, abnormal muscle tone, abnormal or restricted ROM, activity intolerance, impaired physical strength, lacks appropriate home exercise program and pain with function  Functional Limitations: lifting, walking, uncomfortable because of pain, sitting, standing and unable to perform repetitive tasksPrognosis: good    Plan  Therapy options: will be seen for skilled therapy services  Planned modality interventions: dry needling, low level laser therapy, TENS and traction  Planned therapy interventions: abdominal trunk stabilization, flexibility, functional ROM exercises, home exercise program, joint mobilization, manual therapy, motor coordination training, neuromuscular re-education, postural training, soft tissue mobilization, spinal/joint mobilization, strengthening, stretching and therapeutic activities  Frequency: 3x week  Duration in weeks: 12  Treatment plan discussed with: patient         ASSESSMENT: we continued to focus on spinal and hip mobility. He is having neck surgery in a couple of weeks so PT will be on hold after that.     PLAN: Continue to focus on hip and spinal mobility.     SIGNATURE: Spike Ramirez, PT, [place] License #: [id number]  Electronically Signed on 9/23/2022        [address] [place]  [address]  [phone]

## 2022-09-26 ENCOUNTER — TREATMENT (OUTPATIENT)
Dept: PHYSICAL THERAPY | Facility: CLINIC | Age: 77
End: 2022-09-26

## 2022-09-26 ENCOUNTER — PRE-ADMISSION TESTING (OUTPATIENT)
Dept: PREADMISSION TESTING | Facility: HOSPITAL | Age: 77
End: 2022-09-26

## 2022-09-26 VITALS
BODY MASS INDEX: 24.81 KG/M2 | RESPIRATION RATE: 18 BRPM | HEIGHT: 70 IN | DIASTOLIC BLOOD PRESSURE: 81 MMHG | OXYGEN SATURATION: 98 % | SYSTOLIC BLOOD PRESSURE: 175 MMHG | WEIGHT: 173.28 LBS | HEART RATE: 61 BPM

## 2022-09-26 DIAGNOSIS — M48.062 SPINAL STENOSIS OF LUMBAR REGION WITH NEUROGENIC CLAUDICATION: ICD-10-CM

## 2022-09-26 DIAGNOSIS — M54.42 CHRONIC BILATERAL LOW BACK PAIN WITH BILATERAL SCIATICA: ICD-10-CM

## 2022-09-26 DIAGNOSIS — G89.29 CHRONIC BILATERAL LOW BACK PAIN WITH BILATERAL SCIATICA: ICD-10-CM

## 2022-09-26 DIAGNOSIS — M47.12 CERVICAL SPONDYLOSIS WITH MYELOPATHY: ICD-10-CM

## 2022-09-26 DIAGNOSIS — M54.41 CHRONIC BILATERAL LOW BACK PAIN WITH BILATERAL SCIATICA: ICD-10-CM

## 2022-09-26 DIAGNOSIS — M47.12 CERVICAL SPONDYLOSIS WITH MYELOPATHY: Primary | ICD-10-CM

## 2022-09-26 LAB
ANION GAP SERPL CALCULATED.3IONS-SCNC: 11 MMOL/L (ref 5–15)
BUN SERPL-MCNC: 13 MG/DL (ref 8–23)
BUN/CREAT SERPL: 14.9 (ref 7–25)
CALCIUM SPEC-SCNC: 9.1 MG/DL (ref 8.6–10.5)
CHLORIDE SERPL-SCNC: 104 MMOL/L (ref 98–107)
CO2 SERPL-SCNC: 28 MMOL/L (ref 22–29)
CREAT SERPL-MCNC: 0.87 MG/DL (ref 0.76–1.27)
DEPRECATED RDW RBC AUTO: 53.1 FL (ref 37–54)
EGFRCR SERPLBLD CKD-EPI 2021: 88.3 ML/MIN/1.73
ERYTHROCYTE [DISTWIDTH] IN BLOOD BY AUTOMATED COUNT: 14.8 % (ref 12.3–15.4)
GLUCOSE SERPL-MCNC: 71 MG/DL (ref 65–99)
HCT VFR BLD AUTO: 46.3 % (ref 37.5–51)
HGB BLD-MCNC: 14.8 G/DL (ref 13–17.7)
MCH RBC QN AUTO: 30.9 PG (ref 26.6–33)
MCHC RBC AUTO-ENTMCNC: 32 G/DL (ref 31.5–35.7)
MCV RBC AUTO: 96.7 FL (ref 79–97)
PLATELET # BLD AUTO: 157 10*3/MM3 (ref 140–450)
PMV BLD AUTO: 10.2 FL (ref 6–12)
POTASSIUM SERPL-SCNC: 3.7 MMOL/L (ref 3.5–5.2)
RBC # BLD AUTO: 4.79 10*6/MM3 (ref 4.14–5.8)
SODIUM SERPL-SCNC: 143 MMOL/L (ref 136–145)
WBC NRBC COR # BLD: 7.2 10*3/MM3 (ref 3.4–10.8)

## 2022-09-26 PROCEDURE — 36415 COLL VENOUS BLD VENIPUNCTURE: CPT

## 2022-09-26 PROCEDURE — 93005 ELECTROCARDIOGRAM TRACING: CPT

## 2022-09-26 PROCEDURE — 80048 BASIC METABOLIC PNL TOTAL CA: CPT

## 2022-09-26 PROCEDURE — 93010 ELECTROCARDIOGRAM REPORT: CPT | Performed by: INTERNAL MEDICINE

## 2022-09-26 PROCEDURE — 97140 MANUAL THERAPY 1/> REGIONS: CPT | Performed by: PHYSICAL THERAPIST

## 2022-09-26 PROCEDURE — 85027 COMPLETE CBC AUTOMATED: CPT

## 2022-09-26 NOTE — PROGRESS NOTES
Physical Therapy Treatment Note    Patient: Sonu Bravo Jr.                                                 Visit Date: 2022  :     1945    Referring practitioner:    Durga Cifuentes, *  Date of Initial Visit:          Type: THERAPY  Noted: 2022    Patient seen for 6 sessions    Visit Diagnoses:    ICD-10-CM ICD-9-CM   1. Cervical spondylosis with myelopathy  M47.12 721.1   2. Spinal stenosis of lumbar region with neurogenic claudication  M48.062 724.03   3. Chronic bilateral low back pain with bilateral sciatica  M54.42 724.2    M54.41 724.3    G89.29 338.29     SUBJECTIVE     Subjective Today is not a bad day. Saturday and  weren't too great.     PAIN: 3-4/10 R posterior LE to mid thigh      OBJECTIVE     Objective     Manual Therapy     [id number]  Comments   IASTM w/ blue ridged roller to thoracic    Prone CT ext/rot mobs    Prone thoracic ext mobs    Prone lumbar side glides    Prone LS manual distraction    B passive SKTC, IR/ER, and hamstring stretches            Timed Minutes 40     Therapy Education/Self Care [id number]   Education offered today    Medbride Code    Ongoing HEP   Seated thoracic extension stretch with towel roll across back.   Hooklying piriformis stretch.    Timed Minutes      Total Timed Treatment:     40   mins  Total Time of Visit:            45   mins    ASSESSMENT/PLAN     GOALS:  Goals                                                  Progress Note due by 10/22/22                                                              Recert due by 23   STG by: 4 weeks Comments Date Status   Improve edward thoracolumbar mobility  still stiff  ongoing   Improve muscle relaxation of edward hip mms Not as guarded at initial eval  MET   Improve  bilateral passive hip ext to 10 deg  10 deg edward  MET   Improve active cervical rotation to 60 deg  RR: 35 deg; LR: 25  ongoing   LTG by: 8 weeks        Reports no radicular symptoms in bilateral LE for a week. Mostly post thigh, R>L 9/23 ongoing   Able to walk 20 min without exacerbation of pain  uses a rolling walker; without the rwx, he can walk about 1/2 a block 9/23 ongoing   SLS either leg x 5 secs       Reports pain no greater than 2-3/10 when it does occur.  5-6/10 9/23 ongoing   Improve Modified Oswestry to 10 20/50 9/23 ongoing   Independent with HEP for flexibility and core/hip stability.  focusing on gentle hip flexibility and core stability now 9/23 ongoing     Assessment/Plan     ASSESSMENT: He has had a good day so far. We continued to focus on improving spinal mobility to aid in his surgical recovery in a few weeks.     PLAN: Continue to focus on hip and spinal mobility.     SIGNATURE: Karlee Ruiz PTA, [place] License #: [id number]  Electronically Signed on 9/26/2022        [address]  [place] [address]  [phone]

## 2022-09-26 NOTE — DISCHARGE INSTRUCTIONS
Before you come to the hospital        Arrival time: AS DIRECTED BY OFFICE     YOU MAY TAKE THE FOLLOWING MEDICATION(S) THE MORNING OF SURGERY WITH A SIP OF WATER: BISOPROLOL           ALL OTHER HOME MEDICATION CHECK WITH YOUR PHYSICIAN (especially if you are taking diabetes medicines or blood thinners)    Do not take any Erectile Dysfunction medications (EX: CIALIS, VIAGRA) 24 hours prior to surgery.      If you were given and instructed to use a germ- killing soap, use as directed the night before surgery and again the morning of surgery or as directed by your surgeon.    (See attached information for How to Use Chlorhexidine for Bathing if applicable.)            Eating and drinking restrictions prior to scheduled arrival time    2 Hours before arrival time STOP   Drinking Clear liquids (water, apple juice-no pulp)     6 Hours before arrival time STOP   Milk or drinks that contain milk, full liquids    6 Hours before arrival time STOP   Light meals or foods, such as toast or cereal    8 Hours before arrival time STOP   Heavy foods, such as meat, fried foods, or fatty foods    (It is extremely important that you follow these guidelines to prevent delay or cancelation of your procedure)     Clear Liquids  Water and flavored water                                                                      Clear Fruit juices, such as cranberry juice and apple juice.  Black coffee (NO cream of any kind, including powdered).  Plain tea  Clear bouillon or broth.  Flavored gelatin.  Soda.  Gatorade or Powerade.  Full liquid examples  Juices that have pulp.  Frozen ice pops that contain fruit pieces.  Coffee with creamer  Milk.  Yogurt.                MANAGING PAIN AFTER SURGERY    We know you are probably wondering what your pain will be like after surgery.  Following surgery it is unrealistic to expect you will not have pain.   Pain is how our bodies let us know that something is wrong or cautions us to be careful.  That said,  our goal is to make your pain tolerable.    Methods we may use to treat your pain include (oral or IV medications, PCAs, epidurals, nerve blocks, etc.)   While some procedures require IV pain medications for a short time after surgery, transitioning to pain medications by mouth allows for better management of pain.   Your nurse will encourage you to take oral pain medications whenever possible.  IV medications work almost immediately, but only last a short while.  Taking medications by mouth allows for a more constant level of medication in your blood stream for a longer period of time.      Once your pain is out of control it is harder to get back under control.  It is important you are aware when your next dose of pain medication is due.  If you are admitted, your nurse may write the time of your next dose on the white board in your room to help you remember.      We are interested in your pain and encourage you to inform us about aggravating factors during your visit.   Many times a simple repositioning every few hours can make a big difference.    If your physician says it is okay, do not let your pain prevent you from getting out of bed. Be sure to call your nurse for assistance prior to getting up so you do not fall.      Before surgery, please decide your tolerable pain goal.  These faces help describe the pain ratings we use on a 0-10 scale.   Be prepared to tell us your goal and whether or not you take pain or anxiety medications at home.          Preparing for Surgery  Preparing for surgery is an important part of your care. It can make things go more smoothly and help you avoid complications. The steps leading up to surgery may vary among hospitals. Follow all instructions given to you by your health care providers. Ask questions if you do not understand something. Talk about any concerns that you have.  Here are some questions to consider asking before your surgery:  If my surgery is not an emergency (is  elective), when would be the best time to have the surgery?  What arrangements do I need to make for work, home, or school?  What will my recovery be like? How long will it be before I can return to normal activities?  Will I need to prepare my home? Will I need to arrange care for me or my children?  Should I expect to have pain after surgery? What are my pain management options? Are there nonmedical options that I can try for pain?  Tell a health care provider about:  Any allergies you have.  All medicines you are taking, including vitamins, herbs, eye drops, creams, and over-the-counter medicines.  Any problems you or family members have had with anesthetic medicines.  Any blood disorders you have.  Any surgeries you have had.  Any medical conditions you have.  Whether you are pregnant or may be pregnant.  What are the risks?  The risks and complications of surgery depend on the specific procedure that you have. Discuss all the risks with your health care providers before your surgery. Ask about common surgical complications, which may include:  Infection.  Bleeding or a need for blood replacement (transfusion).  Allergic reactions to medicines.  Damage to surrounding nerves, tissues, or structures.  A blood clot.  Scarring.  Failure of the surgery to correct the problem.  Follow these instructions before the procedure:  Several days or weeks before your procedure  You may have a physical exam by your primary health care provider to make sure it is safe for you to have surgery.  You may have testing. This may include a chest X-ray, blood and urine tests, electrocardiogram (ECG), or other testing.  Ask your health care provider about:  Changing or stopping your regular medicines. This is especially important if you are taking diabetes medicines or blood thinners.  Taking medicines such as aspirin and ibuprofen. These medicines can thin your blood. Do not take these medicines unless your health care provider tells  you to take them.  Taking over-the-counter medicines, vitamins, herbs, and supplements.  Do not use any products that contain nicotine or tobacco, such as cigarettes and e-cigarettes. If you need help quitting, ask your health care provider.  Avoid alcohol.  Ask your health care provider if there are exercises you can do to prepare for surgery.  Eat a healthy diet.   Plan to have someone take you home from the hospital or clinic.  Plan to have a responsible adult care for you for at least 24 hours after you leave the hospital or clinic. This is important.  The day before your procedure  You may be given antibiotic medicine to take by mouth to help prevent infection. Take it as told by your health care provider.  You may be asked to shower with a germ-killing soap.  Follow instructions from your health care provider about eating and drinking restrictions. This includes gum, mints and hard candy.  Pack comfortable clothes according to your procedure.   The day of your procedure  You may need to take another shower with a germ-killing soap before you leave home in the morning.  With a small sip of water, take only the medicines that you are told to take.  Remove all jewelry including rings.   Leave anything you consider valuable at home except hearing aids if needed.  Do not wear any makeup, nail polish, powder, deodorant, lotion, hair accessories, or anything on your skin or body except your clothes.  If you will be staying in the hospital, bring a case to hold your glasses, contacts, or dentures. You may also want to bring your robe and non-skid footwear.  If you wear oxygen at home, bring it with you the day of surgery.  If instructed by your health care provider, bring your sleep apnea device with you on the day of your surgery (if this applies to you).  You may want to leave your suitcase and sleep apnea device in the car until after surgery.   Arrive at the hospital as scheduled.  Bring a friend or family member  with you who can help to answer questions and be present while you meet with your health care provider.  At the hospital  When you arrive at the hospital:  Go to registration located at the main entrance of the hospital. You will be registered and given a beeper and a sticker sheet. Take the stickers to the Outpatient nurses desk and place in the black tray. This is to notify staff that you have arrived. Then return to the lobby to wait.   When your beeper lights up and vibrates proceed through the double doors, under the stairs, and a member of the Outpatient Surgery staff will escort you to your preoperative room.  You may have to wear compression sleeves. These help to prevent blood clots and reduce swelling in your legs.  An IV may be inserted into one of your veins.              In the operating room, you may be given one or more of the following:        A medicine to help you relax (sedative).        A medicine to numb the area (local anesthetic).        A medicine to make you fall asleep (general anesthetic).        A medicine that is injected into an area of your body to numb everything below the                      injection site (regional anesthetic).  You may be given an antibiotic through your IV to help prevent infection.  Your surgical site will be marked or identified.    Contact a health care provider if you:  Develop a fever of more than 100.4°F (38°C) or other feelings of illness during the 48 hours before your surgery.  Have symptoms that get worse.  Have questions or concerns about your surgery.  Summary  Preparing for surgery can make the procedure go more smoothly and lower your risk of complications.  Before surgery, make a list of questions and concerns to discuss with your surgeon. Ask about the risks and possible complications.  In the days or weeks before your surgery, follow all instructions from your health care provider. You may need to stop smoking, avoid alcohol, follow eating  restrictions, and change or stop your regular medicines.  Contact your surgeon if you develop a fever or other signs of illness during the few days before your surgery.  This information is not intended to replace advice given to you by your health care provider. Make sure you discuss any questions you have with your health care provider.  Document Revised: 12/21/2018 Document Reviewed: 10/23/2018  Elsevier Patient Education © 2021 [employer]

## 2022-09-28 ENCOUNTER — OFFICE VISIT (OUTPATIENT)
Dept: INTERNAL MEDICINE | Facility: CLINIC | Age: 77
End: 2022-09-28

## 2022-09-28 ENCOUNTER — TREATMENT (OUTPATIENT)
Dept: PHYSICAL THERAPY | Facility: CLINIC | Age: 77
End: 2022-09-28

## 2022-09-28 VITALS
BODY MASS INDEX: 24.34 KG/M2 | RESPIRATION RATE: 16 BRPM | HEART RATE: 70 BPM | OXYGEN SATURATION: 98 % | SYSTOLIC BLOOD PRESSURE: 138 MMHG | HEIGHT: 70 IN | TEMPERATURE: 98 F | WEIGHT: 170 LBS | DIASTOLIC BLOOD PRESSURE: 78 MMHG

## 2022-09-28 DIAGNOSIS — M47.12 CERVICAL SPONDYLOSIS WITH MYELOPATHY: ICD-10-CM

## 2022-09-28 DIAGNOSIS — Z23 NEED FOR VACCINATION: ICD-10-CM

## 2022-09-28 DIAGNOSIS — Z01.818 PREOP EXAM FOR INTERNAL MEDICINE: Primary | ICD-10-CM

## 2022-09-28 DIAGNOSIS — M54.42 CHRONIC BILATERAL LOW BACK PAIN WITH BILATERAL SCIATICA: ICD-10-CM

## 2022-09-28 DIAGNOSIS — M48.062 SPINAL STENOSIS OF LUMBAR REGION WITH NEUROGENIC CLAUDICATION: ICD-10-CM

## 2022-09-28 DIAGNOSIS — M47.12 CERVICAL SPONDYLOSIS WITH MYELOPATHY: Primary | ICD-10-CM

## 2022-09-28 DIAGNOSIS — I48.0 PAROXYSMAL ATRIAL FIBRILLATION: ICD-10-CM

## 2022-09-28 DIAGNOSIS — G89.29 CHRONIC BILATERAL LOW BACK PAIN WITH BILATERAL SCIATICA: ICD-10-CM

## 2022-09-28 DIAGNOSIS — M54.41 CHRONIC BILATERAL LOW BACK PAIN WITH BILATERAL SCIATICA: ICD-10-CM

## 2022-09-28 PROCEDURE — 91312 COVID-19 (PFIZER) BIVALENT BOOSTER 12+YRS: CPT | Performed by: INTERNAL MEDICINE

## 2022-09-28 PROCEDURE — 99214 OFFICE O/P EST MOD 30 MIN: CPT | Performed by: INTERNAL MEDICINE

## 2022-09-28 PROCEDURE — 0124A COVID-19 (PFIZER) BIVALENT BOOSTER 12+YRS: CPT | Performed by: INTERNAL MEDICINE

## 2022-09-28 PROCEDURE — 97140 MANUAL THERAPY 1/> REGIONS: CPT | Performed by: PHYSICAL THERAPIST

## 2022-09-28 RX ORDER — IBUPROFEN 200 MG
200 TABLET ORAL EVERY 6 HOURS PRN
COMMUNITY
End: 2022-10-13 | Stop reason: HOSPADM

## 2022-09-28 NOTE — PROGRESS NOTES
Physical Therapy Treatment Note    Patient: Sonu Bravo Jr.                                                 Visit Date: 2022  :     1945    Referring practitioner:    Durga Cifuentes, *  Date of Initial Visit:          Type: THERAPY  Noted: 2022    Patient seen for 7 sessions    Visit Diagnoses:    ICD-10-CM ICD-9-CM   1. Cervical spondylosis with myelopathy  M47.12 721.1   2. Spinal stenosis of lumbar region with neurogenic claudication  M48.062 724.03   3. Chronic bilateral low back pain with bilateral sciatica  M54.42 724.2    M54.41 724.3    G89.29 338.29     SUBJECTIVE     Subjective He says the last session, his legs really hurt. He felt weak and like it was nerve pain.     PAIN: 5-6/10 R posterior LE to mid thigh      OBJECTIVE     Objective      Manual Therapy     09599  Comments   Prone CT ext/rot mobs    Prone thoracic ext mobs    Prone LS manual distraction    edward LE LAD    B passive SKTC, piri/OI  Avoided hamstring stretch due to neural tension           Timed Minutes 45     Therapy Education/Self Care 10439   Education offered today    Lesly Code    Ongoing HEP   Seated thoracic extension stretch with towel roll across back.   Hooklying piriformis stretch.    Timed Minutes      Total Timed Treatment:     45   mins  Total Time of Visit:            45   mins    ASSESSMENT/PLAN     GOALS:  Goals                                                  Progress Note due by 10/22/22                                                              Recert due by 23   STG by: 4 weeks Comments Date Status   Improve edward thoracolumbar mobility  still stiff  ongoing   Improve muscle relaxation of edward hip mms Not as guarded at initial eval  MET   Improve  bilateral passive hip ext to 10 deg  10 deg edward  MET   Improve active cervical rotation to 60 deg  RR: 35 deg; LR: 25  ongoing   LTG by: 8 weeks       Reports  no radicular symptoms in bilateral LE for a week. Mostly post thigh, R>L 9/23 ongoing   Able to walk 20 min without exacerbation of pain  uses a rolling walker; without the rwx, he can walk about 1/2 a block 9/23 ongoing   SLS either leg x 5 secs       Reports pain no greater than 2-3/10 when it does occur.  5-6/10 9/28 ongoing   Improve Modified Oswestry to 10 20/50 9/23 ongoing   Independent with HEP for flexibility and core/hip stability.  focusing on gentle hip flexibility and core stability now 9/23 ongoing     Assessment/Plan     ASSESSMENT: His legs may have flared some due to the ham stretch causing neural tension. I avoided these today to hopefully keep it from flaring his legs.     PLAN: Continue to focus on hip and spinal mobility to prepare for surgery.    SIGNATURE: Spike Ramirez, PT, [place] License #: [id number]  Electronically Signed on 9/28/2022        [address] [place] [address] [place] [address]  [phone]

## 2022-09-28 NOTE — PROGRESS NOTES
CC: preop for cervical discectomy    History:  Sonu Bravo Jr. is a 77 y.o. male   He notes he has been doing alright and is planning discectomy with Dr. Cifuentes. He has no chest pain, shortness of breath, new swelling or symptoms of arrhythmia. He is able to climb a flight of stairs without cardiopulmonary compromise.        ROS:  Review of Systems   Respiratory: Negative for shortness of breath.    Cardiovascular: Negative for chest pain, palpitations and leg swelling.        reports that he has been smoking cigarettes. He started smoking about 55 years ago. He has a 26.00 pack-year smoking history. He has never used smokeless tobacco. He reports current alcohol use. He reports that he does not use drugs.      Current Outpatient Medications:   •  acetaminophen (TYLENOL) 325 MG tablet, Take 650 mg by mouth Every 4 (Four) Hours As Needed., Disp: , Rfl:   •  apixaban (ELIQUIS) 5 MG tablet tablet, Take 1 tablet by mouth 2 (Two) Times a Day., Disp: 180 tablet, Rfl: 1  •  bisoprolol (ZEBeta) 5 MG tablet, Take 1 tablet by mouth Daily., Disp: 90 tablet, Rfl: 3  •  Cholecalciferol 100 MCG (4000 UT) capsule, 4,000 Units Daily., Disp: , Rfl:   •  coenzyme Q10 100 MG capsule, Take 100 mg by mouth Daily., Disp: , Rfl:   •  Cyanocobalamin 2500 MCG sublingual tablet, 2,500 mcg Daily., Disp: , Rfl:   •  fexofenadine (ALLEGRA) 180 MG tablet, Take 180 mg by mouth Daily., Disp: , Rfl:   •  fluticasone (FLONASE) 50 MCG/ACT nasal spray, 1 spray into the nostril(s) as directed by provider Daily., Disp: 32 g, Rfl: 3  •  ibuprofen (ADVIL,MOTRIN) 200 MG tablet, Take 200 mg by mouth Every 6 (Six) Hours As Needed for Mild Pain., Disp: , Rfl:   •  levothyroxine (SYNTHROID, LEVOTHROID) 112 MCG tablet, Take 1 tablet by mouth Daily., Disp: 90 tablet, Rfl: 1  •  multivitamin with minerals tablet tablet, Take 1 capsule by mouth Daily., Disp: , Rfl:   •  omeprazole (priLOSEC) 20 MG capsule, Take 1 capsule by mouth Daily., Disp: 90 capsule,  "Rfl: 3  •  oxyCODONE-acetaminophen (PERCOCET) 5-325 MG per tablet, Take 1 tablet by mouth Every Night., Disp: , Rfl:   •  rosuvastatin (CRESTOR) 20 MG tablet, Take 1 tablet by mouth Daily., Disp: 90 tablet, Rfl: 3  •  Zinc 50 MG capsule, Take  by mouth Daily., Disp: , Rfl:     OBJECTIVE:  /78 (BP Location: Left arm, Patient Position: Sitting, Cuff Size: Adult)   Pulse 70   Temp 98 °F (36.7 °C)   Resp 16   Ht 177.2 cm (69.76\")   Wt 77.1 kg (170 lb)   SpO2 98%   BMI 24.56 kg/m²    Physical Exam  Constitutional:       General: He is not in acute distress.  Cardiovascular:      Rate and Rhythm: Normal rate and regular rhythm.      Heart sounds: Normal heart sounds. No murmur heard.  Pulmonary:      Effort: Pulmonary effort is normal.      Breath sounds: Normal breath sounds. No wheezing.   Neurological:      Mental Status: He is alert and oriented to person, place, and time.      Gait: Gait normal.   Psychiatric:         Mood and Affect: Mood normal.         Behavior: Behavior normal.     CBC (No Diff) (09/26/2022 13:12)   Basic Metabolic Panel (09/26/2022 13:12)   ECG 12 Lead (09/26/2022 13:04)       Assessment/Plan     Diagnoses and all orders for this visit:    1. Preop exam for internal medicine (Primary)  2. Cervical spondylosis with myelopathy  RCRI risk is 0. He is able to perform greater than 4 METS without difficulty. EKG and labs reviewed without renal compromise or any new findings. He has no symptoms of ACS, arrhythmia, decompensated heart failure nor valvular dysfunction. Recommend to proceed with planned procedure and is optimized without recommendation for additional testing. Recommend to continue beta blocker and statin as well as levothyroxine ramy-operatively. May hold Eliquis 2 days prior to surgery and restart at Dr. Cifuentes's discretion on hemostasis.     3. Paroxysmal atrial fibrillation (HCC)  Stable on rate control and anticoagulation without new symptoms.     4. Need for " vaccination  -     COVID-19 Bivalent Booster (Pfizer) 12+yrs        An After Visit Summary was printed and given to the patient at discharge.  Return for Next scheduled follow up.          Vaughn Kramer D.O. 9/29/2022   Electronically signed.

## 2022-09-30 LAB
QT INTERVAL: 424 MS
QTC INTERVAL: 419 MS

## 2022-10-03 ENCOUNTER — TREATMENT (OUTPATIENT)
Dept: PHYSICAL THERAPY | Facility: CLINIC | Age: 77
End: 2022-10-03

## 2022-10-03 DIAGNOSIS — M54.41 CHRONIC BILATERAL LOW BACK PAIN WITH BILATERAL SCIATICA: ICD-10-CM

## 2022-10-03 DIAGNOSIS — G89.29 CHRONIC BILATERAL LOW BACK PAIN WITH BILATERAL SCIATICA: ICD-10-CM

## 2022-10-03 DIAGNOSIS — M47.12 CERVICAL SPONDYLOSIS WITH MYELOPATHY: Primary | ICD-10-CM

## 2022-10-03 DIAGNOSIS — M48.062 SPINAL STENOSIS OF LUMBAR REGION WITH NEUROGENIC CLAUDICATION: ICD-10-CM

## 2022-10-03 DIAGNOSIS — M54.42 CHRONIC BILATERAL LOW BACK PAIN WITH BILATERAL SCIATICA: ICD-10-CM

## 2022-10-03 PROCEDURE — 97140 MANUAL THERAPY 1/> REGIONS: CPT | Performed by: PHYSICAL THERAPIST

## 2022-10-03 NOTE — PROGRESS NOTES
Physical Therapy Treatment Note    Patient: Sonu Bravo Jr.                                                 Visit Date: 10/3/2022  :     1945    Referring practitioner:    Durga Cifuentes, *  Date of Initial Visit:          Type: THERAPY  Noted: 2022    Patient seen for 8 sessions    Visit Diagnoses:    ICD-10-CM ICD-9-CM   1. Cervical spondylosis with myelopathy  M47.12 721.1   2. Spinal stenosis of lumbar region with neurogenic claudication  M48.062 724.03   3. Chronic bilateral low back pain with bilateral sciatica  M54.42 724.2    M54.41 724.3    G89.29 338.29     SUBJECTIVE     Subjective He says the last session, his legs really hurt. He felt weak and like it was nerve pain.     PAIN: 5-6/10 R posterior LE to mid thigh      OBJECTIVE     Objective      Manual Therapy     17534  Comments   Prone CT ext/rot mobs    Prone thoracic ext mobs    Prone LS manual distraction    edward LE LAD    B passive SKTC, piri/OI  Avoided hamstring stretch due to neural tension           Timed Minutes 45     Therapy Education/Self Care 64264   Education offered today    Lesly Code    Ongoing HEP   Seated thoracic extension stretch with towel roll across back.   Hooklying piriformis stretch.    Timed Minutes      Total Timed Treatment:     45   mins  Total Time of Visit:             45   mins    ASSESSMENT/PLAN     GOALS:  Goals                                                  Progress Note due by 10/22/22                                                              Recert due by 23   STG by: 4 weeks Comments Date Status   Improve edward thoracolumbar mobility  still stiff  ongoing   Improve muscle relaxation of edward hip mms Not as guarded at initial eval  MET   Improve  bilateral passive hip ext to 10 deg  10 deg edward  MET   Improve active cervical rotation to 60 deg  RR: 35 deg; LR: 25  ongoing   LTG by: 8 weeks       Reports  no radicular symptoms in bilateral LE for a week. Mostly post thigh, R>L 9/23 ongoing   Able to walk 20 min without exacerbation of pain  uses a rolling walker; without the rwx, he can walk about 1/2 a block 9/23 ongoing   SLS either leg x 5 secs       Reports pain no greater than 2-3/10 when it does occur.  5-6/10 10/3 ongoing   Improve Modified Oswestry to 10 20/50 9/23 ongoing   Independent with HEP for flexibility and core/hip stability.  focusing on gentle hip flexibility and core stability now 9/23 ongoing     Assessment/Plan     ASSESSMENT: His back pain is still present. He is preparing for his surgery.     PLAN: Continue to focus on hip and spinal mobility to prepare for surgery.    SIGNATURE: Spike Ramirez, PT, [place] License #: [id number]  Electronically Signed on 10/3/2022        [facility] [place] [address]  [phone]

## 2022-10-05 ENCOUNTER — TREATMENT (OUTPATIENT)
Dept: PHYSICAL THERAPY | Facility: CLINIC | Age: 77
End: 2022-10-05

## 2022-10-05 DIAGNOSIS — M47.12 CERVICAL SPONDYLOSIS WITH MYELOPATHY: Primary | ICD-10-CM

## 2022-10-05 DIAGNOSIS — M54.41 CHRONIC BILATERAL LOW BACK PAIN WITH BILATERAL SCIATICA: ICD-10-CM

## 2022-10-05 DIAGNOSIS — G89.29 CHRONIC BILATERAL LOW BACK PAIN WITH BILATERAL SCIATICA: ICD-10-CM

## 2022-10-05 DIAGNOSIS — M54.42 CHRONIC BILATERAL LOW BACK PAIN WITH BILATERAL SCIATICA: ICD-10-CM

## 2022-10-05 DIAGNOSIS — M48.062 SPINAL STENOSIS OF LUMBAR REGION WITH NEUROGENIC CLAUDICATION: ICD-10-CM

## 2022-10-05 PROCEDURE — 97140 MANUAL THERAPY 1/> REGIONS: CPT | Performed by: PHYSICAL THERAPIST

## 2022-10-05 NOTE — PROGRESS NOTES
Physical Therapy Treatment Note and Discharge Note    Patient: Sonu Bravo Jr.                                                 Visit Date: 10/5/2022  :     1945    Referring practitioner:    Durga Cifuentes, *  Date of Initial Visit:          Type: THERAPY  Noted: 2022    Patient seen for 9 sessions    Visit Diagnoses:    ICD-10-CM ICD-9-CM   1. Cervical spondylosis with myelopathy  M47.12 721.1   2. Spinal stenosis of lumbar region with neurogenic claudication  M48.062 724.03   3. Chronic bilateral low back pain with bilateral sciatica  M54.42 724.2    M54.41 724.3    G89.29 338.29     SUBJECTIVE     Subjective He says his back and hip are bothering him more today.He has neck surgery next Tuesday.    PAIN: 7/10 R right lumbar/thigh     OBJECTIVE     Objective      Manual Therapy     36310  Comments   Prone CT ext/rot mobs    Prone thoracic ext mobs    Prone LS manual distraction    edward LE LAD    B passive SKTC, piri/OI  Avoided hamstring stretch due to neural tension           Timed Minutes 40     Therapy Education/Self Care 78447   Education offered today    Medleydae Code    Ongoing HEP   Seated thoracic extension stretch with towel roll across back.   Hooklying piriformis stretch.    Timed Minutes      Total Timed Treatment:     40   mins  Total Time of Visit:             40   mins    ASSESSMENT/PLAN     GOALS:  Goals                                                  Progress Note due by 10/22/22                                                              Recert due by 23   STG by: 4 weeks Comments Date Status   Improve edward thoracolumbar mobility  still stiff  ongoing   Improve muscle relaxation of edward hip mms Not as guarded at initial eval  MET   Improve  bilateral passive hip ext to 10 deg  10 deg edward  MET   Improve active cervical rotation to 60 deg  RR: 35 deg; LR: 25  ongoing   LTG by: 8 weeks        Reports no radicular symptoms in bilateral LE for a week. Mostly post thigh, R>L 9/23 ongoing   Able to walk 20 min without exacerbation of pain  uses a rolling walker; without the rwx, he can walk about 1/2 a block 9/23 ongoing   SLS either leg x 5 secs       Reports pain no greater than 2-3/10 when it does occur.  7/10 10/5 ongoing   Improve Modified Oswestry to 10 20/50 9/23 ongoing   Independent with HEP for flexibility and core/hip stability.  focusing on gentle hip flexibility and core stability now 9/23 ongoing     Assessment/Plan     ASSESSMENT: His back pain is still present. He is preparing for his surgery next week.     PLAN: plan to DC. We will start a new evaluation if the MD orders.   DISCHARGE SUMMARY   Discharge date 10/5/2022   Dates of this episode 8/23/22 through 10/5/22   Number of visits on this episode 9   Reason for discharge having neck surgery next week   Outcomes achieved Refer to the goals table for specifics on goals   Discharge plan Continue with current home exercise program as instructed   Summary of care Focus was on improving mobility to prepare his for his neck and eventual back surgery. We worked on joint mobs and flexibility for both. He is eager to have his lumbar surgery as he feels this is his main issue.   Discharge instruction See Education Table       SIGNATURE: Spike Ramirez, PT, [place] License #: [id number]  Electronically Signed on 10/5/2022        [place] [address]  [phone]

## 2022-10-11 ENCOUNTER — ANESTHESIA EVENT (OUTPATIENT)
Dept: PERIOP | Facility: HOSPITAL | Age: 77
End: 2022-10-11

## 2022-10-11 ENCOUNTER — ANESTHESIA (OUTPATIENT)
Dept: PERIOP | Facility: HOSPITAL | Age: 77
End: 2022-10-11

## 2022-10-11 ENCOUNTER — APPOINTMENT (OUTPATIENT)
Dept: GENERAL RADIOLOGY | Facility: HOSPITAL | Age: 77
End: 2022-10-11

## 2022-10-11 ENCOUNTER — HOSPITAL ENCOUNTER (OUTPATIENT)
Facility: HOSPITAL | Age: 77
Discharge: HOME | End: 2022-10-13
Attending: NEUROLOGICAL SURGERY | Admitting: NEUROLOGICAL SURGERY

## 2022-10-11 DIAGNOSIS — I48.0 PAROXYSMAL ATRIAL FIBRILLATION: ICD-10-CM

## 2022-10-11 DIAGNOSIS — M47.12 CERVICAL SPONDYLOSIS WITH MYELOPATHY: ICD-10-CM

## 2022-10-11 DIAGNOSIS — Z74.09 IMPAIRED MOBILITY: ICD-10-CM

## 2022-10-11 DIAGNOSIS — Z86.718 HISTORY OF DVT (DEEP VEIN THROMBOSIS): ICD-10-CM

## 2022-10-11 DIAGNOSIS — Z98.1 STATUS POST CERVICAL SPINAL FUSION: Primary | ICD-10-CM

## 2022-10-11 LAB
ABO GROUP BLD: NORMAL
BLD GP AB SCN SERPL QL: NEGATIVE
RH BLD: POSITIVE
T&S EXPIRATION DATE: NORMAL

## 2022-10-11 PROCEDURE — 25010000002 VANCOMYCIN 1 G RECONSTITUTED SOLUTION 1 EACH VIAL: Performed by: NEUROLOGICAL SURGERY

## 2022-10-11 PROCEDURE — 25010000002 FENTANYL CITRATE (PF) 250 MCG/5ML SOLUTION

## 2022-10-11 PROCEDURE — 22551 ARTHRD ANT NTRBDY CERVICAL: CPT | Performed by: NEUROLOGICAL SURGERY

## 2022-10-11 PROCEDURE — C1713 ANCHOR/SCREW BN/BN,TIS/BN: HCPCS | Performed by: NEUROLOGICAL SURGERY

## 2022-10-11 PROCEDURE — 99024 POSTOP FOLLOW-UP VISIT: CPT | Performed by: NURSE PRACTITIONER

## 2022-10-11 PROCEDURE — 94761 N-INVAS EAR/PLS OXIMETRY MLT: CPT

## 2022-10-11 PROCEDURE — 22552 ARTHRD ANT NTRBD CERVICAL EA: CPT | Performed by: NEUROLOGICAL SURGERY

## 2022-10-11 PROCEDURE — 22845 INSERT SPINE FIXATION DEVICE: CPT | Performed by: NEUROLOGICAL SURGERY

## 2022-10-11 PROCEDURE — 94799 UNLISTED PULMONARY SVC/PX: CPT

## 2022-10-11 PROCEDURE — 72040 X-RAY EXAM NECK SPINE 2-3 VW: CPT

## 2022-10-11 PROCEDURE — 25010000002 PHENYLEPHRINE 10 MG/ML SOLUTION 5 ML VIAL

## 2022-10-11 PROCEDURE — 25010000002 EPINEPHRINE 1 MG/ML SOLUTION 1 ML AMPULE: Performed by: NEUROLOGICAL SURGERY

## 2022-10-11 PROCEDURE — 25010000002 HEPARIN (PORCINE) PER 1000 UNITS: Performed by: NURSE PRACTITIONER

## 2022-10-11 PROCEDURE — 86850 RBC ANTIBODY SCREEN: CPT | Performed by: NEUROLOGICAL SURGERY

## 2022-10-11 PROCEDURE — 86901 BLOOD TYPING SEROLOGIC RH(D): CPT | Performed by: NEUROLOGICAL SURGERY

## 2022-10-11 PROCEDURE — 22853 INSJ BIOMECHANICAL DEVICE: CPT | Performed by: NEUROLOGICAL SURGERY

## 2022-10-11 PROCEDURE — 25010000002 ONDANSETRON PER 1 MG

## 2022-10-11 PROCEDURE — 25010000002 DEXAMETHASONE PER 1 MG: Performed by: ANESTHESIOLOGY

## 2022-10-11 PROCEDURE — 25010000002 PROPOFOL 10 MG/ML EMULSION

## 2022-10-11 PROCEDURE — 76000 FLUOROSCOPY <1 HR PHYS/QHP: CPT

## 2022-10-11 PROCEDURE — S0260 H&P FOR SURGERY: HCPCS | Performed by: NEUROLOGICAL SURGERY

## 2022-10-11 PROCEDURE — 86900 BLOOD TYPING SEROLOGIC ABO: CPT | Performed by: NEUROLOGICAL SURGERY

## 2022-10-11 DEVICE — SCREW 3120515 4.0 X 15 SELF DRILL VAR
Type: IMPLANTABLE DEVICE | Site: SPINE CERVICAL | Status: FUNCTIONAL
Brand: ATLANTIS® ANTERIOR CERVICAL PLATE SYSTEM

## 2022-10-11 DEVICE — KT HEMOST ABS SURGIFOAM PORCN 1GRAM: Type: IMPLANTABLE DEVICE | Site: SPINE CERVICAL | Status: FUNCTIONAL

## 2022-10-11 DEVICE — CAGE 5030664 ANATOMIC PTC 16X14X6MM
Type: IMPLANTABLE DEVICE | Site: SPINE CERVICAL | Status: FUNCTIONAL
Brand: ANATOMIC PEEK PTC CERVICAL FUSION SYSTEM

## 2022-10-11 DEVICE — HEMOST ABS SURGIFOAM SZ100 8X12 10MM: Type: IMPLANTABLE DEVICE | Site: SPINE CERVICAL | Status: FUNCTIONAL

## 2022-10-11 DEVICE — DBM T43103 2.5CC GRAFTON PUTTY
Type: IMPLANTABLE DEVICE | Site: SPINE CERVICAL | Status: FUNCTIONAL
Brand: GRAFTON®AND GRAFTON PLUS®DEMINERALIZED BONE MATRIX (DBM)

## 2022-10-11 RX ORDER — ONDANSETRON 2 MG/ML
4 INJECTION INTRAMUSCULAR; INTRAVENOUS EVERY 6 HOURS PRN
Status: DISCONTINUED | OUTPATIENT
Start: 2022-10-11 | End: 2022-10-13 | Stop reason: HOSPADM

## 2022-10-11 RX ORDER — DEXAMETHASONE SODIUM PHOSPHATE 4 MG/ML
4 INJECTION, SOLUTION INTRA-ARTICULAR; INTRALESIONAL; INTRAMUSCULAR; INTRAVENOUS; SOFT TISSUE ONCE AS NEEDED
Status: COMPLETED | OUTPATIENT
Start: 2022-10-11 | End: 2022-10-11

## 2022-10-11 RX ORDER — OXYCODONE AND ACETAMINOPHEN 7.5; 325 MG/1; MG/1
1 TABLET ORAL EVERY 4 HOURS PRN
Status: DISCONTINUED | OUTPATIENT
Start: 2022-10-11 | End: 2022-10-13 | Stop reason: HOSPADM

## 2022-10-11 RX ORDER — FENTANYL CITRATE 50 UG/ML
INJECTION, SOLUTION INTRAMUSCULAR; INTRAVENOUS AS NEEDED
Status: DISCONTINUED | OUTPATIENT
Start: 2022-10-11 | End: 2022-10-11 | Stop reason: SURG

## 2022-10-11 RX ORDER — ONDANSETRON 4 MG/1
4 TABLET, FILM COATED ORAL EVERY 6 HOURS PRN
Status: DISCONTINUED | OUTPATIENT
Start: 2022-10-11 | End: 2022-10-13 | Stop reason: HOSPADM

## 2022-10-11 RX ORDER — AMOXICILLIN 250 MG
2 CAPSULE ORAL 2 TIMES DAILY
Status: DISCONTINUED | OUTPATIENT
Start: 2022-10-11 | End: 2022-10-13 | Stop reason: HOSPADM

## 2022-10-11 RX ORDER — SODIUM CHLORIDE, SODIUM LACTATE, POTASSIUM CHLORIDE, CALCIUM CHLORIDE 600; 310; 30; 20 MG/100ML; MG/100ML; MG/100ML; MG/100ML
1000 INJECTION, SOLUTION INTRAVENOUS CONTINUOUS
Status: DISPENSED | OUTPATIENT
Start: 2022-10-11 | End: 2022-10-13

## 2022-10-11 RX ORDER — ACETAMINOPHEN 160 MG/5ML
650 SOLUTION ORAL EVERY 4 HOURS PRN
Status: DISCONTINUED | OUTPATIENT
Start: 2022-10-11 | End: 2022-10-13 | Stop reason: HOSPADM

## 2022-10-11 RX ORDER — ACETAMINOPHEN 500 MG
1000 TABLET ORAL ONCE
Status: COMPLETED | OUTPATIENT
Start: 2022-10-11 | End: 2022-10-11

## 2022-10-11 RX ORDER — LIDOCAINE HYDROCHLORIDE 20 MG/ML
INJECTION, SOLUTION EPIDURAL; INFILTRATION; INTRACAUDAL; PERINEURAL AS NEEDED
Status: DISCONTINUED | OUTPATIENT
Start: 2022-10-11 | End: 2022-10-11 | Stop reason: SURG

## 2022-10-11 RX ORDER — SODIUM CHLORIDE 0.9 % (FLUSH) 0.9 %
3 SYRINGE (ML) INJECTION AS NEEDED
Status: DISCONTINUED | OUTPATIENT
Start: 2022-10-11 | End: 2022-10-11 | Stop reason: HOSPADM

## 2022-10-11 RX ORDER — LABETALOL HYDROCHLORIDE 5 MG/ML
5 INJECTION, SOLUTION INTRAVENOUS
Status: DISCONTINUED | OUTPATIENT
Start: 2022-10-11 | End: 2022-10-11 | Stop reason: HOSPADM

## 2022-10-11 RX ORDER — ROSUVASTATIN CALCIUM 20 MG/1
20 TABLET, COATED ORAL NIGHTLY
Status: DISCONTINUED | OUTPATIENT
Start: 2022-10-12 | End: 2022-10-13 | Stop reason: HOSPADM

## 2022-10-11 RX ORDER — FLUMAZENIL 0.1 MG/ML
0.2 INJECTION INTRAVENOUS AS NEEDED
Status: DISCONTINUED | OUTPATIENT
Start: 2022-10-11 | End: 2022-10-11 | Stop reason: HOSPADM

## 2022-10-11 RX ORDER — CYCLOBENZAPRINE HCL 10 MG
10 TABLET ORAL 3 TIMES DAILY
Status: DISCONTINUED | OUTPATIENT
Start: 2022-10-11 | End: 2022-10-13 | Stop reason: HOSPADM

## 2022-10-11 RX ORDER — PROPOFOL 10 MG/ML
VIAL (ML) INTRAVENOUS AS NEEDED
Status: DISCONTINUED | OUTPATIENT
Start: 2022-10-11 | End: 2022-10-11 | Stop reason: SURG

## 2022-10-11 RX ORDER — NALOXONE HCL 0.4 MG/ML
0.04 VIAL (ML) INJECTION AS NEEDED
Status: DISCONTINUED | OUTPATIENT
Start: 2022-10-11 | End: 2022-10-11 | Stop reason: HOSPADM

## 2022-10-11 RX ORDER — ACETAMINOPHEN 650 MG/1
650 SUPPOSITORY RECTAL EVERY 4 HOURS PRN
Status: DISCONTINUED | OUTPATIENT
Start: 2022-10-11 | End: 2022-10-13 | Stop reason: HOSPADM

## 2022-10-11 RX ORDER — HEPARIN SODIUM 5000 [USP'U]/ML
5000 INJECTION, SOLUTION INTRAVENOUS; SUBCUTANEOUS EVERY 12 HOURS SCHEDULED
Status: DISCONTINUED | OUTPATIENT
Start: 2022-10-11 | End: 2022-10-13 | Stop reason: HOSPADM

## 2022-10-11 RX ORDER — POLYETHYLENE GLYCOL 3350 17 G/17G
17 POWDER, FOR SOLUTION ORAL DAILY
Status: DISCONTINUED | OUTPATIENT
Start: 2022-10-11 | End: 2022-10-13 | Stop reason: HOSPADM

## 2022-10-11 RX ORDER — MAGNESIUM HYDROXIDE 1200 MG/15ML
LIQUID ORAL AS NEEDED
Status: DISCONTINUED | OUTPATIENT
Start: 2022-10-11 | End: 2022-10-11 | Stop reason: HOSPADM

## 2022-10-11 RX ORDER — HYDROMORPHONE HYDROCHLORIDE 1 MG/ML
0.5 INJECTION, SOLUTION INTRAMUSCULAR; INTRAVENOUS; SUBCUTANEOUS
Status: DISCONTINUED | OUTPATIENT
Start: 2022-10-11 | End: 2022-10-11 | Stop reason: HOSPADM

## 2022-10-11 RX ORDER — OXYCODONE AND ACETAMINOPHEN 10; 325 MG/1; MG/1
1 TABLET ORAL EVERY 4 HOURS PRN
Status: DISCONTINUED | OUTPATIENT
Start: 2022-10-11 | End: 2022-10-13 | Stop reason: HOSPADM

## 2022-10-11 RX ORDER — SODIUM CHLORIDE 0.9 % (FLUSH) 0.9 %
10 SYRINGE (ML) INJECTION AS NEEDED
Status: DISCONTINUED | OUTPATIENT
Start: 2022-10-11 | End: 2022-10-11 | Stop reason: HOSPADM

## 2022-10-11 RX ORDER — ROPIVACAINE HYDROCHLORIDE 5 MG/ML
INJECTION, SOLUTION EPIDURAL; INFILTRATION; PERINEURAL
Status: DISCONTINUED | OUTPATIENT
Start: 2022-10-11 | End: 2022-10-11

## 2022-10-11 RX ORDER — SODIUM CHLORIDE 0.9 % (FLUSH) 0.9 %
10 SYRINGE (ML) INJECTION AS NEEDED
Status: DISCONTINUED | OUTPATIENT
Start: 2022-10-11 | End: 2022-10-13 | Stop reason: HOSPADM

## 2022-10-11 RX ORDER — SODIUM CHLORIDE, SODIUM LACTATE, POTASSIUM CHLORIDE, CALCIUM CHLORIDE 600; 310; 30; 20 MG/100ML; MG/100ML; MG/100ML; MG/100ML
100 INJECTION, SOLUTION INTRAVENOUS CONTINUOUS
Status: DISCONTINUED | OUTPATIENT
Start: 2022-10-11 | End: 2022-10-11

## 2022-10-11 RX ORDER — LIDOCAINE HYDROCHLORIDE 10 MG/ML
0.5 INJECTION, SOLUTION EPIDURAL; INFILTRATION; INTRACAUDAL; PERINEURAL ONCE AS NEEDED
Status: DISCONTINUED | OUTPATIENT
Start: 2022-10-11 | End: 2022-10-11 | Stop reason: HOSPADM

## 2022-10-11 RX ORDER — PANTOPRAZOLE SODIUM 40 MG/1
40 TABLET, DELAYED RELEASE ORAL
Status: DISCONTINUED | OUTPATIENT
Start: 2022-10-12 | End: 2022-10-13 | Stop reason: HOSPADM

## 2022-10-11 RX ORDER — FENTANYL CITRATE 50 UG/ML
25 INJECTION, SOLUTION INTRAMUSCULAR; INTRAVENOUS
Status: DISCONTINUED | OUTPATIENT
Start: 2022-10-11 | End: 2022-10-11 | Stop reason: HOSPADM

## 2022-10-11 RX ORDER — SUCCINYLCHOLINE/SOD CL,ISO/PF 200MG/10ML
SYRINGE (ML) INTRAVENOUS AS NEEDED
Status: DISCONTINUED | OUTPATIENT
Start: 2022-10-11 | End: 2022-10-11 | Stop reason: SURG

## 2022-10-11 RX ORDER — ONDANSETRON 2 MG/ML
INJECTION INTRAMUSCULAR; INTRAVENOUS AS NEEDED
Status: DISCONTINUED | OUTPATIENT
Start: 2022-10-11 | End: 2022-10-11 | Stop reason: SURG

## 2022-10-11 RX ORDER — SODIUM CHLORIDE 0.9 % (FLUSH) 0.9 %
10 SYRINGE (ML) INJECTION EVERY 12 HOURS SCHEDULED
Status: DISCONTINUED | OUTPATIENT
Start: 2022-10-11 | End: 2022-10-13 | Stop reason: HOSPADM

## 2022-10-11 RX ORDER — SODIUM CHLORIDE 0.9 % (FLUSH) 0.9 %
3 SYRINGE (ML) INJECTION EVERY 12 HOURS SCHEDULED
Status: DISCONTINUED | OUTPATIENT
Start: 2022-10-11 | End: 2022-10-11 | Stop reason: HOSPADM

## 2022-10-11 RX ORDER — DEXMEDETOMIDINE HYDROCHLORIDE 4 UG/ML
INJECTION, SOLUTION INTRAVENOUS CONTINUOUS PRN
Status: DISCONTINUED | OUTPATIENT
Start: 2022-10-11 | End: 2022-10-11 | Stop reason: SURG

## 2022-10-11 RX ORDER — SODIUM CHLORIDE 0.9 % (FLUSH) 0.9 %
3-10 SYRINGE (ML) INJECTION AS NEEDED
Status: DISCONTINUED | OUTPATIENT
Start: 2022-10-11 | End: 2022-10-11 | Stop reason: HOSPADM

## 2022-10-11 RX ORDER — OXYCODONE AND ACETAMINOPHEN 10; 325 MG/1; MG/1
1 TABLET ORAL ONCE AS NEEDED
Status: COMPLETED | OUTPATIENT
Start: 2022-10-11 | End: 2022-10-11

## 2022-10-11 RX ORDER — EPHEDRINE SULFATE 50 MG/ML
INJECTION INTRAVENOUS AS NEEDED
Status: DISCONTINUED | OUTPATIENT
Start: 2022-10-11 | End: 2022-10-11 | Stop reason: SURG

## 2022-10-11 RX ORDER — ACETAMINOPHEN 325 MG/1
650 TABLET ORAL EVERY 4 HOURS PRN
Status: DISCONTINUED | OUTPATIENT
Start: 2022-10-11 | End: 2022-10-13 | Stop reason: HOSPADM

## 2022-10-11 RX ORDER — LEVOTHYROXINE SODIUM 112 UG/1
112 TABLET ORAL
Status: DISCONTINUED | OUTPATIENT
Start: 2022-10-12 | End: 2022-10-13 | Stop reason: HOSPADM

## 2022-10-11 RX ORDER — KETAMINE HCL IN NACL, ISO-OSM 100MG/10ML
SYRINGE (ML) INJECTION AS NEEDED
Status: DISCONTINUED | OUTPATIENT
Start: 2022-10-11 | End: 2022-10-11 | Stop reason: SURG

## 2022-10-11 RX ORDER — ONDANSETRON 2 MG/ML
4 INJECTION INTRAMUSCULAR; INTRAVENOUS
Status: DISCONTINUED | OUTPATIENT
Start: 2022-10-11 | End: 2022-10-11 | Stop reason: HOSPADM

## 2022-10-11 RX ORDER — LIDOCAINE HYDROCHLORIDE 40 MG/ML
SOLUTION TOPICAL AS NEEDED
Status: DISCONTINUED | OUTPATIENT
Start: 2022-10-11 | End: 2022-10-11 | Stop reason: SURG

## 2022-10-11 RX ORDER — DROPERIDOL 2.5 MG/ML
0.62 INJECTION, SOLUTION INTRAMUSCULAR; INTRAVENOUS ONCE AS NEEDED
Status: DISCONTINUED | OUTPATIENT
Start: 2022-10-11 | End: 2022-10-11 | Stop reason: HOSPADM

## 2022-10-11 RX ADMIN — FENTANYL CITRATE 50 MCG: 50 INJECTION INTRAMUSCULAR; INTRAVENOUS at 13:20

## 2022-10-11 RX ADMIN — PROPOFOL 150 MCG/KG/MIN: 10 INJECTION, EMULSION INTRAVENOUS at 11:53

## 2022-10-11 RX ADMIN — FENTANYL CITRATE 50 MCG: 50 INJECTION INTRAMUSCULAR; INTRAVENOUS at 12:45

## 2022-10-11 RX ADMIN — DOCUSATE SODIUM 50 MG AND SENNOSIDES 8.6 MG 2 TABLET: 8.6; 5 TABLET, FILM COATED ORAL at 20:46

## 2022-10-11 RX ADMIN — SODIUM CHLORIDE, POTASSIUM CHLORIDE, SODIUM LACTATE AND CALCIUM CHLORIDE 1000 ML: 600; 310; 30; 20 INJECTION, SOLUTION INTRAVENOUS at 10:45

## 2022-10-11 RX ADMIN — EPHEDRINE SULFATE 10 MG: 50 INJECTION INTRAVENOUS at 12:11

## 2022-10-11 RX ADMIN — CYCLOBENZAPRINE 10 MG: 10 TABLET, FILM COATED ORAL at 20:45

## 2022-10-11 RX ADMIN — Medication 20 MG: at 12:25

## 2022-10-11 RX ADMIN — LIDOCAINE HYDROCHLORIDE 100 MG: 20 INJECTION, SOLUTION EPIDURAL; INFILTRATION; INTRACAUDAL; PERINEURAL at 11:53

## 2022-10-11 RX ADMIN — FENTANYL CITRATE 50 MCG: 50 INJECTION INTRAMUSCULAR; INTRAVENOUS at 12:15

## 2022-10-11 RX ADMIN — Medication 30 MG: at 12:23

## 2022-10-11 RX ADMIN — LIDOCAINE HYDROCHLORIDE 1 EACH: 40 SOLUTION TOPICAL at 11:54

## 2022-10-11 RX ADMIN — CYCLOBENZAPRINE 10 MG: 10 TABLET, FILM COATED ORAL at 16:10

## 2022-10-11 RX ADMIN — POLYETHYLENE GLYCOL 3350 17 G: 17 POWDER, FOR SOLUTION ORAL at 16:10

## 2022-10-11 RX ADMIN — ACETAMINOPHEN 1000 MG: 500 TABLET ORAL at 11:22

## 2022-10-11 RX ADMIN — Medication 160 MG: at 11:53

## 2022-10-11 RX ADMIN — Medication 10 ML: at 20:46

## 2022-10-11 RX ADMIN — VANCOMYCIN HYDROCHLORIDE 1250 MG: 10 INJECTION, POWDER, LYOPHILIZED, FOR SOLUTION INTRAVENOUS at 23:37

## 2022-10-11 RX ADMIN — OXYCODONE AND ACETAMINOPHEN 1 TABLET: 325; 10 TABLET ORAL at 15:07

## 2022-10-11 RX ADMIN — HEPARIN SODIUM 5000 UNITS: 5000 INJECTION, SOLUTION INTRAVENOUS; SUBCUTANEOUS at 20:45

## 2022-10-11 RX ADMIN — ONDANSETRON 4 MG: 2 INJECTION INTRAMUSCULAR; INTRAVENOUS at 13:58

## 2022-10-11 RX ADMIN — PHENYLEPHRINE HYDROCHLORIDE 0.1 MCG/KG/MIN: 10 INJECTION INTRAVENOUS at 11:59

## 2022-10-11 RX ADMIN — VANCOMYCIN HYDROCHLORIDE 1000 MG: 1 INJECTION, POWDER, LYOPHILIZED, FOR SOLUTION INTRAVENOUS at 11:30

## 2022-10-11 RX ADMIN — DEXMEDETOMIDINE HYDROCHLORIDE 0.5 MCG/KG/HR: 4 INJECTION, SOLUTION INTRAVENOUS at 11:53

## 2022-10-11 RX ADMIN — PROPOFOL 150 MG: 10 INJECTION, EMULSION INTRAVENOUS at 11:53

## 2022-10-11 RX ADMIN — DEXAMETHASONE SODIUM PHOSPHATE 4 MG: 4 INJECTION INTRA-ARTICULAR; INTRALESIONAL; INTRAMUSCULAR; INTRAVENOUS; SOFT TISSUE at 11:23

## 2022-10-11 RX ADMIN — PROPOFOL 150 MCG/KG/MIN: 10 INJECTION, EMULSION INTRAVENOUS at 12:52

## 2022-10-11 RX ADMIN — FENTANYL CITRATE 100 MCG: 50 INJECTION INTRAMUSCULAR; INTRAVENOUS at 11:51

## 2022-10-11 NOTE — H&P
Chief complaint:        Chief Complaint   Patient presents with   • Back Pain       Patient here for follow up with MRI and EMG/NCS.            Subjective         HPI:   Interval History: Sonu returns today for surgical discussion with cervical MRI and lumbar MRI.     Patient initially had back pain and was seen in May.  This was after an unfortunate year where he had perforated diverticulitis resulting in multiple abscesses and extended hospital stay at [facility].  Regardless over the past 6 months he has had progressive lumbar back pain and bilateral lower extremity weakness to the point he is now using a walker.  His back pain is a 7 out of 10.  50% back pain 50% lateral thigh and anterior thigh pain.  He often feels like his legs will give out this often results in a slow controlled fall to the ground.  He also has numbness and tingling in his lower extremities.  He has been using a walker.  He is fallen 12 times since June 6.  He also has difficulty with urination finds himself having to sit down urinating and now he has to go more frequently.     Additionally he does not complain of any neck pain but he does have some numbness and tingling in his hands and decreased function  strength on the right hand.  He feels that the strength in his upper extremities is very good.     He has completed a course of diclofenac and is also been referred to pain management is received approximately 15 epidural injections by Dr. Saleem.  He is also used a TENS unit without significant improvement.  He has previously tried Flexeril and Lyrica without significant improvement and currently uses Tylenol for pain.     Oswestry Disability Index Lumbar = 68%  SCORE INTERPRETATION OF THE OSWESTRY LBP DISABILITY QUESTIONNAIRE: 60-80% Crippled Back pain impinges on all aspects of these patients' lives both at home and at work. Positive intervention is required.     Score   Pain Intensity Fairly severe pain-3   Personal Care I can  look after myself but it is slow and painful-2   Lifting Only very light weights-4   Walking Walk with crutches or stick-4   Sitting Sit as long as I like-0   Standing Pain limits standing to < 10 min-4   Sleeping Can only sleep < 4 hrs-3   Sex Life (if applicable) Pain prevents any sex-6   Social Life Pain restricts me to my home-4   Traveling Pain restricts to <30 min-4   (Muna et al, 1980)     Review of Systems   HENT: Negative.    Eyes: Negative.    Respiratory: Negative.    Cardiovascular: Negative.    Gastrointestinal: Negative.    Endocrine: Negative.    Genitourinary: Negative.    Musculoskeletal: Positive for back pain, gait problem and neck stiffness.   Skin: Negative.    Allergic/Immunologic: Negative.    Neurological: Positive for weakness and numbness.   Hematological: Negative.    Psychiatric/Behavioral: Negative.          PFSH:  Medical History        Past Medical History:   Diagnosis Date   • Arthritis     • Atrial fibrillation (HCC) 08/31/2021   • GERD (gastroesophageal reflux disease)     • Hyperlipidemia     • Hypertension              Surgical History         Past Surgical History:   Procedure Laterality Date   • COLON SURGERY   01/2022     Dr Fairbanks at [facility] in [facility]   • COLONOSCOPY N/A 09/28/2021     Procedure: COLONOSCOPY WITH ANESTHESIA;  Surgeon: Thomas Dc DO;  Location: [facility] ENDOSCOPY;  Service: Gastroenterology;  Laterality: N/A;  pre op; abnormal ct scan  post op: diverticulosis ; polyps   PCP: Vaughn Kramer DO   • HERNIA REPAIR                      Objective          Current Medications          Current Outpatient Medications   Medication Sig Dispense Refill   • acetaminophen (TYLENOL) 325 MG tablet Take 650 mg by mouth Every 4 (Four) Hours As Needed.       • apixaban (ELIQUIS) 5 MG tablet tablet Take 1 tablet by mouth 2 (Two) Times a Day. 180 tablet 1   • bisoprolol (ZEBeta) 5 MG tablet Take 1 tablet by mouth Daily. 90 tablet 3   • Cholecalciferol 100  "MCG (4000 UT) capsule 4,000 Units Daily.       • coenzyme Q10 100 MG capsule Take 100 mg by mouth Daily.       • Cyanocobalamin 2500 MCG sublingual tablet 2,500 mcg Daily.       • fexofenadine (ALLEGRA) 180 MG tablet Take 180 mg by mouth Daily.       • fluticasone (FLONASE) 50 MCG/ACT nasal spray 1 spray into the nostril(s) as directed by provider Daily. 32 g 3   • levothyroxine (SYNTHROID, LEVOTHROID) 112 MCG tablet Take 1 tablet by mouth Daily. 90 tablet 1   • multivitamin with minerals tablet tablet Take 1 capsule by mouth Daily.       • omeprazole (priLOSEC) 20 MG capsule Take 1 capsule by mouth Daily. 90 capsule 3   • rosuvastatin (CRESTOR) 20 MG tablet Take 20 mg by mouth Every Night.       • Zinc 50 MG capsule Take  by mouth.          No current facility-administered medications for this visit.            Vital Signs  Ht 180.3 cm (71\")   Wt 74.8 kg (165 lb)   BMI 23.01 kg/m²   Physical Exam  Eyes:      Extraocular Movements: EOM normal.      Pupils: Pupils are equal, round, and reactive to light.   Neurological:      Mental Status: He is oriented to person, place, and time.      Deep Tendon Reflexes:      Reflex Scores:       Tricep reflexes are 3+ on the right side and 2+ on the left side.       Bicep reflexes are 3+ on the right side and 2+ on the left side.       Brachioradialis reflexes are 3+ on the right side and 2+ on the left side.       Patellar reflexes are 4+ on the right side and 3+ on the left side.       Achilles reflexes are 0 on the right side and 0 on the left side.  Psychiatric:         Speech: Speech normal.         Neurologic Exam      Mental Status   Oriented to person, place, and time.   Attention: normal. Concentration: normal.   Speech: speech is normal   Level of consciousness: alert     Cranial Nerves      CN II   Visual fields full to confrontation.      CN III, IV, VI   Pupils are equal, round, and reactive to light.  Extraocular motions are normal.      CN V   Facial sensation " intact.      CN VII   Facial expression full, symmetric.      CN VIII   CN VIII normal.      CN IX, X   CN IX normal.      CN XI   CN XI normal.      Motor Exam   Right arm tone: normal  Left arm tone: normal  Right leg tone: normal  Left leg tone: normal     Strength   Right deltoid: 5/5  Left deltoid: 5/5  Right biceps: 5/5  Left biceps: 5/5  Right triceps: 5/5  Left triceps: 5/5  Right wrist extension: 5/5  Left wrist extension: 5/5  Right interossei: 4/5  Left interossei: 5/5  Right iliopsoas: 4/5  Left iliopsoas: 3/5  Right quadriceps: 4/5  Left quadriceps: 4/5  Right anterior tibial: 5/5  Left anterior tibial: 5/5  Right gastroc: 5/5  Left gastroc: 5/5  Right EHL 5/5  Left EHL 5/5        Sensory Exam   Right arm light touch: normal  Left arm light touch: normal  Right leg light touch: decreased from knee  Left leg light touch: decreased from knee  Sensory deficit distribution on right: L2  Sensory deficit distribution on left: L2     Gait, Coordination, and Reflexes      Tremor   Resting tremor: absent  Intention tremor: absent  Action tremor: absent     Reflexes   Right brachioradialis: 3+  Left brachioradialis: 2+  Right biceps: 3+  Left biceps: 2+  Right triceps: 3+  Left triceps: 2+  Right patellar: 4+  Left patellar: 3+  Right achilles: 0  Left achilles: 0  Right plantar: upgoing  Left plantar: upgoing  Right Joel: absent  Left Joel: present  Right ankle clonus: absent  Left ankle clonus: absent  Right pendular knee jerk: absent  Left pendular knee jerk: absentKyphotic with a walker.    Crossed abductors sign         Male  strength (pounds)  AGE Right Hand RH Norms Left Hand LH Norms   20-24   121+20.6   104+21.8   25-29   120+23.0   110+16.2   30-34   121+22.4   110+21.7   35-39   119+24   113+21.7   40-44   117+20.7   112+18.7   45-49   110+23.0   101+22.8   50-54   113+18.1   102+17   55-59   101+26.7   83+23.4   60-64   90+20.4   77+20.3   65-69   91+20.6   76.8+19.8   70-74   75+21.5    65+18.1   75+ 59 66+21.0 75 55+17.0   (DANIELLE Cleary et al; Hand Dynometer: Effects of trials and sessions.  Perpetual and Motor Skills 61:195-8, 1985)  Key  * = Dominant hand  > = Intervention        (12 bullet pts)     Results Review:   No radiology results for the last 30 days.                       Sonu Bravo Jr. is a 76 y.o. male with a significant medical history of DVT on Xarelto, A. fib hypertension, hyperlipidemia hypothyroidism, and tobacco abuse.  He presents with a new problem of lumbar back pain and lower extremity dysesthesias, 80% bilateral feet, 20% back. MARY: 22.  Physical exam findings of bilateral lower extremity cyanosis with 1+ left lower extremity pitting edema, no focal weakness, right upper and lower extremity hyperreflexia with right Babinski, and a relatively well-maintained gait.  No recent imaging of the lumbar spine.     RECOMMENDATIONS ...  Cervical Spondylitic Myelopathy  C4/5 and 5/6 stenosis  DDX: Cervical stenosis, facet arthropathy, brachial plexopathy, peripheral neuropathy     Natural history of cervical spondylitic myelopathy  1. In younger patients with mild CSM (age younger than 75 years and mJOA scale score > 12), both operative and nonoperative management options be offered, as objectively measurable deterioration in function is rarely seen acutely (quality of evidence: Class I; strength of recommendation, B).   2. Also the clinical gains after nonoperative treatment are often maintained over 3 years in 70% of cases (quality of evidence, Class III; strength of recommendation, D).   3. The course of CSM is mixed, with many patients experiencing a slow, stepwise decline. We addressed the fact that long periods of quiescence are not uncommon and that a subgroup of patients may have interim improvement (quality of evidence, Class III; strength of recommendation, D).   4. However, long periods of severe stenosis over many years are associated with demyelination of white matter and  may result in necrosis of both gray and white matter leading to potentially irreversible deficit (quality of evidence Class III; strength of recommendation, D).      Expected Functional Outcome After Surgery  1. Recovery varies  a. The mean mJOA scores improve from 10.5 to 14.1  (Pelon et al., 1993)  b. 59% of patients recovering significant neuro function.  (Pelon et al., 1993) (Mary et al., 2002)  2. Duration of symptoms affects recovery rate  a. Symptom duration correlates with neurological recovery rate. (Pelon et al., 1993)  b. 73% of patients symptoms for <2 yrs improved postop outcomes, whereas 53% with symptoms >2 yrs had improvement. (Chagas et al., 2005)  c. In elderly patients, duration of symptoms (<1 yr) was predictive of an excellent neurological recovery. (Shaq et al., 2003)  d. Among elderly patients both symptom duration and severity of canal stenosis affected the neurological outcome. (Mary et al., 2002)  3. Age affects recovery rate  a. 70% of patients <60 yrs old had an improved outcome score, whereas 56% of patients >60 yrs had an improved outcome score. (Chagas et al., 2005)  b. Some studies show limited effect of age ... (Yamazaki et al., 2003)  (Pelon et al., 1993)  4. Symptom severity affects recovery  a. Among younger patients only symptom severity affected recovery. (Mary et al., 2002)  5. The prognostic value of clinical factors such as age, duration of symptoms, and preoperative neurological function results were discussed with Sonu (quality of evidence, Class III; strength of recommendation, D)     Radiographs  1. Preoperative T2 hyperintensity at multiple levels in the cervical cord predicts poor surgical outcome (quality of evidence, Class III; strength of recommendation, D).   2. Preoperative T1 hypointensity combined with T2 hyperintensity at the any single level predicts a poor surgical outcome (quality of evidence, Class III; strength of recommendation, D).   3. Spinal  cord atrophy (transverse area < 45 mm2) may predict worse outcome (quality of evidence, Class III; strength of recommendation, D).      Surgical decision Making  1. In patients with cervical stenosis without myelopathy who have abnormal EMG findings, decompression should be considered because the presence of EMG abnormalities or clinical radiculopathy is associated with development of symptomatic CSM (quality of evidence, Class I; strength of recommendation, B).  2. SEVERE CSM (mJOA scale score < 13) should be treated with surgical decompression with benefits being maintained a minimum of 5 years and as long as 15 years postoperatively (quality of evidence, Class III; strength of recommendation, D).   3. It is recommended that operative therapy be offered to patients with severe and/or long lasting symptoms, because the likelihood of improvement with nonoperative measures is low (quality of evidence Class III; strength of recommendation, D).      Surgical technique selection  Christine failed a trial of conservative therapy which included physician directed physical therapy and occupational therapy, analgesics, and rest.  We discussed the risk benefits and possible complications of continued conservative management and observation versus a variety of validated techniques for surgical treatment of CSM including ACDF, ACCF, laminoplasty, laminectomy, and laminectomy with fusion (quality of evidence, Class III; strength of recommendation, D). There is insufficient evidence to recommend one technique over another as all approaches have produced comparable improvements among CSM patients (quality of evidence, Class III; strength of recommendation, D).      Sonu has elected to proceed with C4/5 and 5/6 anterior cervical discectomy and fusion     I discussed the risks of the procedure, including but not limited to infection (less than 1%), bleeding, damage to local structures, perforation of the pharynx, esophageal and/or  trachea.  Vocal cord paresis from damage to the recurrent laryngeal nerve (11% temporary, 4% permanent), vertebral artery injury due to thrombosis or laceration 0.3% incidence.  Carotid injury through thrombosis or occlusion or laceration.  Lj syndrome due to sympathetic plexus injury.  Thoracic duct injury.  Failure of fusion to to 20%, graft extrusion 2%, failure of hardware due to fracture, wound infection less than 1%, adjacent level degeneration (which is controversial)Injury to the nerves or thecal sac resulting in CSF leak, weakness, numbness, paralysis, or loss of bowel, and bladder function.  instrumentation failure, dysphagia, dysphonia, visual loss, stroke, coma, MI, or death.        Lumbar back pain  Bilateral lower extremity numbness and tingling  Lumbar Spondylolisthesis, L2/3, 3/4 and 4/5  Lumbar Stenosis secondary to above     Diagnosis  5. To establish the diagnosis of lumbar facet-mediated pain, the double-injection technique with an improvement threshold of 80% or greater is suggested (single Level I study; B).  6. There is no evidence to support the use of diagnostic facet blocks as a predictor of lumbar fusion outcome in patients with chronic low-back pain from degenerative lumbar disease (conflicting Level IV evidence).     Treatment Recommendations  6. Continued conservative care  7. Surgical decompression and fusion - recommended      Fusion Technique  4. Posterior Lateral Fusion (PLF)  5. PLF with Pedicle Screws  6. Transforaminal Interbody Fusion (TLIF)  7. Direct Lateral Interbody Fusion (DLIF)  8. Anterior Lumbar Interbody Fusion (ALIF)     Guidelines for  Fusion  4. Interbody fusion is recommended as an option to enhance the fusion rate.  (multiple Level II reports; B).   5. Posterolateral lumbar fusion (PLF) plus interbody fusion is not recommended since the evidence indicates no substantial benefit but an increased rate of complications if a PLF is added to an interbody fusion  (Level II and III reports; B).  6. Anterior lumbar interbody fusion has better clinical outcomes and fewer perioperative morbidities than instrumented PLF (Level III evidence from 2 reports; C).  7. Pedicle screw fixation is recommended when posterolateral lumbar fusion (PLF) is used to manage low-back pain in patients at high risk for pseudarthrosis (Level II reports; Grade B).  8. Routine use of pedicle screw fixation as an adjunct to PLF for patients with degenerative disc disease is an option. There is consistent evidence that the use of pedicle screws enhances the fusion rate.  9. The use of a brace following instrumented posterolateral lumbar fusion (PLF) for lumbar spondylosis is not supported due to equivalent outcomes with and without bracing (single Level II study; C).     Although there is insufficient evidence to recommend a standard fusion technique, the patient's anatomy, desires, and concerns as well as surgeon experience should all be factored into the decision-making process when determining the optimal strategy for an individual patient to maximize fusion potential while minimizing risk of complications.     I discussed with the risks benefits and possible complications of conservative therapy versus surgical intervention. risks of the procedure, including but not limited to infection, bleeding, damage to local structures, injury to the nerves or thecal sac resulting in CSF leak, weakness, numbness, paralysis, or loss of bowel, and bladder function, instrumentation failure, dysphagia, dysphonia, visual loss, stroke, coma, MI, or death.       Sonu has completed an adequate trial of conservative therapy and still has significant pain and deficits that correlate with his MRI findings.  Therefore Sonu Micki Bravo Jr. has elected to proceed with L2-5 TLIF, however this will not be pursued until after his anterior cervical discectomy and fusion.        Tobacco abuse  The patient understands the many  dangers of continuing to use tobacco.  If quitting becomes an increased priority Sonu knows to contact us for help with quitting.       Durga Cifuentes MD

## 2022-10-11 NOTE — PROGRESS NOTES
NEUROSURGERY DAILY PROGRESS NOTE    ASSESSMENT:   Sonu Bravo Jr. is a 77 y.o. with a significant medical history of DVT on Xarelto, A. fib hypertension, hyperlipidemia hypothyroidism, and tobacco abuse.  He presents with a new problem of lumbar back pain and lower extremity dysesthesias, 80% bilateral feet, 20% back. MARY: 22.  Physical exam findings of bilateral lower extremity cyanosis with 1+ left lower extremity pitting edema, no focal weakness, right upper and lower extremity hyperreflexia with right Babinski, and a relatively well-maintained gait.  No recent imaging of the lumbar spine.    Past Medical History:   Diagnosis Date   • Arthritis    • Atrial fibrillation (HCC) 08/31/2021   • Diverticulitis    • GERD (gastroesophageal reflux disease)    • Hyperlipidemia    • Hypertension      Active Hospital Problems    Diagnosis    • **Cervical spondylosis with myelopathy      PLAN:   Neuro: Stable.  Bright and awake.  Oriented x3.  Follows commands without prompting.  Moves all extremities.   Day of Surgery (10/11/2022) ACDF C4-5 and C5-6   SAUL    Postop x-rays pending   Cervical collar in place.  Trachea midline.   Neurochecks every 4 hours     CV: No acute issues.   Pulm: Maintaining O2 sat.  Continuous pulse oximetry.  Incentive spirometry.   : Remove Black ASAP, bladder scans and I/O cath per policy.   FEN: Regular diet.  Advance as tolerated.   GI: No acute issues.    ID: 23-hour postoperative prophylactic antibiotics.    Heme:  DVT prophylaxis; SCD's  Pain: Tolerable at present with oral meds.     Dispo: PT/OT.       CHIEF COMPLAINT:   Upper extremity weakness    Subjective  Symptom stable.  Doing well    Temp:  [97.1 °F (36.2 °C)] 97.1 °F (36.2 °C)  Heart Rate:  [60] 60  Resp:  [18] 18  BP: (176)/(81) 176/81    Objective:  General Appearance:  Well-appearing and in no acute distress.    Vital signs: (most recent): Blood pressure 169/76, pulse 61, temperature 97.4 °F (36.3 °C), temperature source Oral,  "resp. rate 16, height 177.8 cm (70\"), weight 82.4 kg (181 lb 9.6 oz), SpO2 96 %.        Neurologic Exam     Mental Status   Oriented to person, place, and time.   Attention: normal. Concentration: normal.   Speech: speech is normal   Level of consciousness: alert    Bright and awake.  Oriented x3.  Follows commands without prompting showing thumbs up and 2 fingers bilaterally.     Cranial Nerves     CN II   Visual fields full to confrontation.     CN III, IV, VI   Pupils are equal, round, and reactive to light.  Extraocular motions are normal.     CN V   Facial sensation intact.     CN VII   Facial expression full, symmetric.     CN VIII   CN VIII normal.     CN IX, X   CN IX normal.     CN XI   CN XI normal.     Motor Exam   Right arm tone: normal  Left arm tone: normal  Right leg tone: normal  Left leg tone: normal    Strength   Right deltoid: 5/5  Left deltoid: 5/5  Right biceps: 5/5  Left biceps: 5/5  Right triceps: 5/5  Left triceps: 5/5  Right wrist extension: 5/5  Left wrist extension: 5/5  Right iliopsoas: 5/5  Left iliopsoas: 5/5  Right quadriceps: 5/5  Left quadriceps: 5/5  Right anterior tibial: 5/5  Left anterior tibial: 5/5  Right gastroc: 5/5  Left gastroc: 5/5  Right EHL 5/5  Left EHL 5/5       Sensory Exam   Right arm light touch: normal  Left arm light touch: normal  Right leg light touch: normal  Left leg light touch: normal    Gait, Coordination, and Reflexes     Tremor   Resting tremor: absent  Intention tremor: absent  Action tremor: absent    Drains:   Urethral Catheter Non-latex;Silicone 16 Fr. (Active)       [REMOVED] Chest Tube Right (Removed)       Output by Drain (mL) 10/10/22 0701 - 10/10/22 1900 10/10/22 1901 - 10/11/22 0700 10/11/22 0701 - 10/11/22 1335 Range Total   Requested LDAs do not have output data documented.       Imaging Results (Last 24 Hours)     ** No results found for the last 24 hours. **        Lab Results (last 24 hours)     ** No results found for the last 24 hours. ** "        [id number]  Brennan Salinas, APRN

## 2022-10-11 NOTE — ANESTHESIA POSTPROCEDURE EVALUATION
"Patient: Sonu Bravo Jr.    Procedure Summary     Date: 10/11/22 Room / Location:  [facility] OR  /  [facility] OR    Anesthesia Start: 1148 Anesthesia Stop: 1427    Procedure: CERVICAL DISCECTOMY ANTERIOR WITH FUSION, Cervical 4/5 and 5/6 (Spine Cervical) Diagnosis:       Cervical spondylosis with myelopathy      (Cervical spondylosis with myelopathy [M47.12])    Surgeons: Durga Cifuentes MD Provider: Jose Hernandez CRNA    Anesthesia Type: general ASA Status: 3          Anesthesia Type: general    Vitals  Vitals Value Taken Time   /76 10/11/22 1530   Temp 97.9 °F (36.6 °C) 10/11/22 1530   Pulse 64 10/11/22 1530   Resp 16 10/11/22 1530   SpO2 95 % 10/11/22 1530           Post Anesthesia Care and Evaluation    Patient location during evaluation: PACU  Patient participation: complete - patient participated  Level of consciousness: awake and alert  Pain management: adequate    Airway patency: patent  Anesthetic complications: No anesthetic complications    Cardiovascular status: acceptable  Respiratory status: acceptable  Hydration status: acceptable    Comments: Blood pressure (!) 182/76, pulse 64, temperature 97.9 °F (36.6 °C), temperature source Axillary, resp. rate 16, height 177.8 cm (70\"), weight 82.4 kg (181 lb 9.6 oz), SpO2 95 %.    Pt discharged from PACU based on stone score >8      "

## 2022-10-11 NOTE — ANESTHESIA PROCEDURE NOTES
Airway  Urgency: elective    Airway not difficult    General Information and Staff    Patient location during procedure: OR    Indications and Patient Condition  Indications for airway management: airway protection    Preoxygenated: yes  MILS maintained throughout  Mask difficulty assessment: 1 - vent by mask    Final Airway Details  Final airway type: endotracheal airway      Successful airway: ETT  Cuffed: yes   Successful intubation technique: video laryngoscopy  Facilitating devices/methods: intubating stylet  Blade: Canela  Blade size: 4  ETT size (mm): 7.5  Cormack-Lehane Classification: grade I - full view of glottis  Placement verified by: chest auscultation and capnometry   Cuff volume (mL): 10  Measured from: teeth  ETT/EBT  to teeth (cm): 22  Number of attempts at approach: 1  Assessment: lips, teeth, and gum same as pre-op and atraumatic intubation    Additional Comments  Intubated with head and neck in neutral position

## 2022-10-11 NOTE — ANESTHESIA PREPROCEDURE EVALUATION
Anesthesia Evaluation     Patient summary reviewed   no history of anesthetic complications:  NPO Solid Status: > 8 hours  NPO Liquid Status: > 8 hours           Airway   Mallampati: III  TM distance: >3 FB  Neck ROM: limited  No difficulty expected  Dental - normal exam     Pulmonary    (+) a smoker Current Smoked day of surgery,   (-) asthma, sleep apnea  Cardiovascular   Exercise tolerance: good (4-7 METS)    (+) hypertension, dysrhythmias Atrial Fib, DVT, hyperlipidemia,   (-) past MI, CAD, cardiac stents      Neuro/Psych  (-) seizures, TIA, CVA  GI/Hepatic/Renal/Endo    (+)  GERD,  thyroid problem   (-) liver disease, no renal disease, diabetes    Musculoskeletal     (+) neck pain,   Abdominal    Substance History      OB/GYN          Other   arthritis,                      Anesthesia Plan    ASA 3     general     intravenous induction     Anesthetic plan, risks, benefits, and alternatives have been provided, discussed and informed consent has been obtained with: patient.        CODE STATUS:

## 2022-10-11 NOTE — PLAN OF CARE
Goal Outcome Evaluation:  Plan of Care Reviewed With: patient, spouse        Progress: no change  Outcome Evaluation: t admitted from PACU, anterior neck closed with mastisol, CDI, SAUL drain in place with small amount of serous drainage, PPP, moderate , weak pedal pushes and dorsiflexion, pt and wife report that is pt baseline, aspen collar in  place, complaints of pain improved with PRN medications. FC draining clear yeallow urine to BSDsafety maintained, wife has been at bedside.

## 2022-10-11 NOTE — OP NOTE
NEUROSURGERY OPERATIVE NOTE    Sonu Bravo Jr.  OR Date: 10/11/2022    Pre-op Diagnosis:   Cervical spondylosis with myelopathy [M47.12]    Post-op Diagnosis:     Post-Op Diagnosis Codes:     * Cervical spondylosis with myelopathy [M47.12]          Surgeon(s):  Durga Cifuentes MD    Anesthesia: General    Staff:   Circulator: Valentin Mistry RN; Vicky Urena RN; Jorge Hansen RN  Scrub Person: Sury Kennedy; Braulio Espinoza; Roma Cabrera RN  Vendor Representative: Dyan Quiroz MD    Procedure(s):  CERVICAL DISCECTOMY ANTERIOR WITH FUSION, Cervical 4/5 and 5/6    OPERATIVE PROCEDURES  Arthrodesis  1. Anterior interbody fusion, with discectomy and decompression  2. Each additional level  Interbody Cage  3.  insertion of interbody biomechanical device  4.  Each additional interspace  Anterior Instrumentation/Plating  5.  2-3 vertebral body plate  Application of bone graft  6.  allograft, morselized, or placement of osteo-promoted material  7.  Neuromonitoring    OPERATIVE INDICATIONS:   Sonu Bravo Jr. is a 76 y.o. male with a significant medical history of DVT on Xarelto, A. fib hypertension, hyperlipidemia hypothyroidism, and tobacco abuse.  He presents with a new problem of lumbar back pain and lower extremity dysesthesias, 80% bilateral feet, 20% back. MARY: 22.  Physical exam findings of bilateral lower extremity cyanosis with 1+ left lower extremity pitting edema, no focal weakness, right upper and lower extremity hyperreflexia with right Babinski, and a relatively well-maintained gait.  No recent imaging of the lumbar spine.    We reviewed the surgery itself as well as risks including infection, injury to blood vessels or nerves, leakage of spinal fluid, weakness or paralysis, failure of the pain to improve, possible worsening of the pain, failure of the neurologic symptoms to improve, possible worsening of the neurologic symptoms, and possible need for further surgery  including re-revision and/or removal.  Furthermore I explained that the fusion may not become solid or that the hardware could break. We discussed various techniques available for obtaining fusion including anterior and posterior approaches and I recommended allograft and plate fixation. Furthermore, I explained that removing motion at the fusion sites will transfer stress to other disc levels possibly accelerating their degeneration and causing additional symptoms and/or necessitating additional surgery in the future.    OPERATIVE TECHNIQUE:   On the day of surgery, the patient was brought to the preoperative holding area where IV access was obtained. Prophylactic intravenous antibiotics were administered. The patient was then brought to the major operative suite. While on the [facility], the patient underwent an uneventful induction of general anesthetic with placement of endotracheal tube. TEDs, SCD hoses, and a Black catheter were applied.      He was then positioned supine on the operating table, and all bony prominences were padded. The arms were carefully padded and tucked at the patient's sides. Baseline neuro monitoring was obtained.  A jaw bra with 5lbs of cervical traction was applied and neuro monitoring was compared to baseline and found to be stable.    The skin was prepped with alcohol and lateral fluoroscopy was used to identify the approximate incision site over C5.  The entire neck was then sterilely prepped and draped in the usual fashion with DuraPrep and the skin was infused with quarter percent Marcaine with epinephrine.    A transverse skin incision was made and carried down to the platysma muscle. This was then split in line with its fibers. Blunt dissection was carried down medial to the carotid sheath and lateral to the trachea and esophagus until the anterior cervical was visualized. A bayoneted spinal needle was placed into a disc and fluoroscopy confirmed C4/5 by counting down from  C2.      The longus colli muscles were then elevated bilaterally with the Bovie cautery. Self-retaining retractors were placed deep to the longus colli muscle in an effort to avoid injury to the sympathetic chain.  Camby pins were then placed mid vertebral body at C4 and C5 and distracted. Anterior discectomy was performed at C4/5. This included complete removal of the anterior annulus, nucleus, and posterior annulus.  A high-speed drill was then used to drill off posterior osteophytes.  The posterior longitudinal ligament was removed with angled curette and 1 mm Kerrison. Foraminotomies were then accomplished bilaterally with 2 mm Kerrisons. Once all of this was accomplished, the blunt-tip probe was used to check for any residual compression.     The superior and inferior endplates were prepared with a 3 mm high-speed side cutting bur. Bleeding cancellous bone was exposed.  Disc space trials were then inserted into the distracted disc space and lateral fluoroscopy used to confirm appropriate size.  Precut and milled PEEK cage packed with allograft was gently tapped into place with lateral fluoroscopy confirming no impingement on the central canal and good juxtaposition against the bleeding decorticated surfaces without distraction of the facets.  Camby post distraction was then removed.      The superior Camby post placed into the C4 was removed and was placed into the midpoint of the C6 vertebral body under fluoroscopy.  An anterior discectomy, decompression, and cage placement were performed at C5/6 as described above.    Once all graft placements had been performed, an appropriate size Medtronic anterior cervical locking plate was chosen and bent into gentle lordosis. Two screws were then placed into each of the vertebral bodies at C4, C5 and C6 using fluoroscopy. There was excellent purchase. A final x-ray was done confirming good position of the hardware and grafts. The locking mechanism was then  applied.    Following a final copious irrigation, there was good hemostasis and no dural leaks. The carotid pulse was strong.     A 10-Divehi round drain was placed deep to the level of the platysma muscle and left at the level of the hardware. The wounds were then closed in layers using 3-0 Vicryl suture for the platysma muscle and subcutaneous tissue, and 4-0 Monocryl suture in a subcuticular skin closure. Dermabond was applied to the wound. The drain was hooked to bulb suction.     A hard cervical collar was applied.     The patient was then carefully returned to their hospital bed where the patient was reversed and extubated and taken to the recovery room having tolerated the procedure well.  All monitoring was noted to be stable throughout the case.        Estimated Blood Loss: minimal    Complications: None.  Neuromonitoring in the feet improved with decompression    Implants:   Implant Name Type Inv. Item Serial No.  Lot No. LRB No. Used Action   HEMOST ABS SURGIFOAM  8X12 10MM - [id number] Implant HEMOST ABS SURGIFOAM  8X12 10MM  [employer]  DIV OF J AND J [id number] N/A 1 Implanted   KT HEMOST ABS SURGIFOAM PORCN 1GRAM - [id number] Implant KT HEMOST ABS SURGIFOAM PORCN 1GRAM  [employer]  DIV OF J AND J [id number] N/A 1 Implanted   PUTTY DBM ABHI 2.5CC - [id number] - [id number] Implant PUTTY DBM ABHI 2.5CC [id number] [employer]  N/A 1 Implanted   CAGE CERV ANATOMIC PTC [id number] - [id number] Implant CAGE CERV ANATOMIC PTC [id number]  [employer] 45MW N/A 1 Implanted   CAGE CERV ANATOMIC PTC [id number] - [id number] Implant CAGE CERV ANATOMIC PTC [id number]  [employer] 45MW N/A 1 Implanted   PLT ACP ATLANTIS VSN ELT 2LVL 37.5MM NS - [id number] Implant PLT ACP ATLANTIS VSN ELT 2LVL 37.5MM NS  [employer]  N/A 1 Implanted   SCRW ATLANTIS VSN ELITE VA SD 4X15 GRN - [id number] Implant SCRW ATLANTIS VSN ELITE VA SD 4X15 GRN  [employer]  N/A 2 Implanted   SCRW ATLANTIS VSN ELITE VA SD 4X17 [facility] - [id number]  Implant SCRW ATLANTIS VSN ELITE VA SD 4X17 GRN  [employer]  N/A 4 Implanted       Specimens:                None      Drains:   Closed/Suction Drain 1 Anterior Neck Bulb 10 Fr. (Active)   Site Description Unable to view 10/11/22 1445   Dressing Status Clean;Intact;Dry 10/11/22 1445   Drainage Appearance Bloody 10/11/22 1445   Status To bulb suction 10/11/22 1445       Urethral Catheter Non-latex;Silicone 16 Fr. (Active)   Daily Indications Selected surgeries ( tract, abdomen) 10/11/22 1445   Site Assessment Clean;Skin intact 10/11/22 1421   Collection Container Standard drainage bag 10/11/22 1445   Securement Method Securing device 10/11/22 1445       [REMOVED] Chest Tube Right (Removed)

## 2022-10-11 NOTE — ANESTHESIA PROCEDURE NOTES
Peripheral Block    Pre-sedation assessment completed: 10/11/2022 11:27 AM    Patient reassessed immediately prior to procedure    Patient location during procedure: holding area  Start time: 10/11/2022 11:29 AM  Stop time: 10/11/2022 11:30 AM  Reason for block: procedure for pain, at surgeon's request, post-op pain management and Request by Dr. Fulton  Performed by  Anesthesiologist: Melissa Clay MD  Preanesthetic Checklist  Completed: patient identified, IV checked, site marked, risks and benefits discussed, surgical consent, monitors and equipment checked, pre-op evaluation and timeout performed  Prep:  Pt Position: supine  Sterile barriers:gloves, cap, mask and washed/disinfected hands  Prep: ChloraPrep  Patient monitoring: blood pressure monitoring, continuous pulse oximetry and EKG  Procedure    Sedation: yes    Guidance:ultrasound guided and Brachial plexus identified and local anesthetic seen surrounding nerves  Images:still images obtained (picture printed and placed in patients chart)    Laterality:right  Block Type:interscalene  Injection Technique:single-shot  Needle Type:echogenic  Needle Gauge:20 G  Resistance on Injection: none    Medications Used: ropivacaine (NAROPIN) injection 0.5 % - Injection   20 mL - 10/11/2022 11:29:00 AM      Post Assessment  Injection Assessment: negative aspiration for heme, no paresthesia on injection and incremental injection  Patient Tolerance:comfortable throughout block  Complications:no

## 2022-10-12 LAB
ALBUMIN SERPL-MCNC: 4 G/DL (ref 3.5–5.2)
ALBUMIN/GLOB SERPL: 1.5 G/DL
ALP SERPL-CCNC: 66 U/L (ref 39–117)
ALT SERPL W P-5'-P-CCNC: 21 U/L (ref 1–41)
ANION GAP SERPL CALCULATED.3IONS-SCNC: 9 MMOL/L (ref 5–15)
AST SERPL-CCNC: 21 U/L (ref 1–40)
BASOPHILS # BLD AUTO: 0.01 10*3/MM3 (ref 0–0.2)
BASOPHILS NFR BLD AUTO: 0.1 % (ref 0–1.5)
BILIRUB SERPL-MCNC: 0.4 MG/DL (ref 0–1.2)
BUN SERPL-MCNC: 15 MG/DL (ref 8–23)
BUN/CREAT SERPL: 17.2 (ref 7–25)
CALCIUM SPEC-SCNC: 9.5 MG/DL (ref 8.6–10.5)
CHLORIDE SERPL-SCNC: 104 MMOL/L (ref 98–107)
CO2 SERPL-SCNC: 27 MMOL/L (ref 22–29)
CREAT SERPL-MCNC: 0.87 MG/DL (ref 0.76–1.27)
DEPRECATED RDW RBC AUTO: 49.4 FL (ref 37–54)
EGFRCR SERPLBLD CKD-EPI 2021: 88.3 ML/MIN/1.73
EOSINOPHIL # BLD AUTO: 0 10*3/MM3 (ref 0–0.4)
EOSINOPHIL NFR BLD AUTO: 0 % (ref 0.3–6.2)
ERYTHROCYTE [DISTWIDTH] IN BLOOD BY AUTOMATED COUNT: 14.3 % (ref 12.3–15.4)
GLOBULIN UR ELPH-MCNC: 2.7 GM/DL
GLUCOSE SERPL-MCNC: 112 MG/DL (ref 65–99)
HCT VFR BLD AUTO: 44.2 % (ref 37.5–51)
HGB BLD-MCNC: 15 G/DL (ref 13–17.7)
IMM GRANULOCYTES # BLD AUTO: 0.03 10*3/MM3 (ref 0–0.05)
IMM GRANULOCYTES NFR BLD AUTO: 0.3 % (ref 0–0.5)
LYMPHOCYTES # BLD AUTO: 1.53 10*3/MM3 (ref 0.7–3.1)
LYMPHOCYTES NFR BLD AUTO: 15.3 % (ref 19.6–45.3)
MCH RBC QN AUTO: 31.8 PG (ref 26.6–33)
MCHC RBC AUTO-ENTMCNC: 33.9 G/DL (ref 31.5–35.7)
MCV RBC AUTO: 93.8 FL (ref 79–97)
MONOCYTES # BLD AUTO: 0.9 10*3/MM3 (ref 0.1–0.9)
MONOCYTES NFR BLD AUTO: 9 % (ref 5–12)
NEUTROPHILS NFR BLD AUTO: 7.56 10*3/MM3 (ref 1.7–7)
NEUTROPHILS NFR BLD AUTO: 75.3 % (ref 42.7–76)
NRBC BLD AUTO-RTO: 0 /100 WBC (ref 0–0.2)
PLATELET # BLD AUTO: 163 10*3/MM3 (ref 140–450)
PMV BLD AUTO: 10.2 FL (ref 6–12)
POTASSIUM SERPL-SCNC: 4.2 MMOL/L (ref 3.5–5.2)
PROT SERPL-MCNC: 6.7 G/DL (ref 6–8.5)
RBC # BLD AUTO: 4.71 10*6/MM3 (ref 4.14–5.8)
SODIUM SERPL-SCNC: 140 MMOL/L (ref 136–145)
WBC NRBC COR # BLD: 10.03 10*3/MM3 (ref 3.4–10.8)

## 2022-10-12 PROCEDURE — 97166 OT EVAL MOD COMPLEX 45 MIN: CPT

## 2022-10-12 PROCEDURE — 25010000002 HEPARIN (PORCINE) PER 1000 UNITS: Performed by: NURSE PRACTITIONER

## 2022-10-12 PROCEDURE — 25010000002 VANCOMYCIN 10 G RECONSTITUTED SOLUTION: Performed by: NURSE PRACTITIONER

## 2022-10-12 PROCEDURE — 97161 PT EVAL LOW COMPLEX 20 MIN: CPT | Performed by: PHYSICAL THERAPIST

## 2022-10-12 PROCEDURE — 97116 GAIT TRAINING THERAPY: CPT

## 2022-10-12 PROCEDURE — 25010000002 HYDRALAZINE PER 20 MG: Performed by: NEUROLOGICAL SURGERY

## 2022-10-12 PROCEDURE — 99024 POSTOP FOLLOW-UP VISIT: CPT | Performed by: NURSE PRACTITIONER

## 2022-10-12 PROCEDURE — 80053 COMPREHEN METABOLIC PANEL: CPT | Performed by: NURSE PRACTITIONER

## 2022-10-12 PROCEDURE — 85025 COMPLETE CBC W/AUTO DIFF WBC: CPT | Performed by: NURSE PRACTITIONER

## 2022-10-12 RX ORDER — CLONIDINE HYDROCHLORIDE 0.1 MG/1
0.1 TABLET ORAL EVERY 8 HOURS SCHEDULED
Status: DISCONTINUED | OUTPATIENT
Start: 2022-10-12 | End: 2022-10-13 | Stop reason: HOSPADM

## 2022-10-12 RX ORDER — HYDRALAZINE HYDROCHLORIDE 20 MG/ML
10 INJECTION INTRAMUSCULAR; INTRAVENOUS ONCE
Status: COMPLETED | OUTPATIENT
Start: 2022-10-12 | End: 2022-10-12

## 2022-10-12 RX ORDER — LABETALOL HYDROCHLORIDE 5 MG/ML
10 INJECTION, SOLUTION INTRAVENOUS ONCE
Status: COMPLETED | OUTPATIENT
Start: 2022-10-12 | End: 2022-10-12

## 2022-10-12 RX ADMIN — Medication 10 ML: at 09:33

## 2022-10-12 RX ADMIN — HEPARIN SODIUM 5000 UNITS: 5000 INJECTION, SOLUTION INTRAVENOUS; SUBCUTANEOUS at 09:33

## 2022-10-12 RX ADMIN — LEVOTHYROXINE SODIUM 112 MCG: 112 TABLET ORAL at 05:23

## 2022-10-12 RX ADMIN — CLONIDINE HYDROCHLORIDE 0.1 MG: 0.1 TABLET ORAL at 21:16

## 2022-10-12 RX ADMIN — CLONIDINE HYDROCHLORIDE 0.1 MG: 0.1 TABLET ORAL at 05:57

## 2022-10-12 RX ADMIN — HYDRALAZINE HYDROCHLORIDE 10 MG: 20 INJECTION INTRAMUSCULAR; INTRAVENOUS at 05:55

## 2022-10-12 RX ADMIN — OXYCODONE AND ACETAMINOPHEN 1 TABLET: 325; 10 TABLET ORAL at 21:15

## 2022-10-12 RX ADMIN — CLONIDINE HYDROCHLORIDE 0.1 MG: 0.1 TABLET ORAL at 15:45

## 2022-10-12 RX ADMIN — LABETALOL HYDROCHLORIDE 10 MG: 5 INJECTION INTRAVENOUS at 03:38

## 2022-10-12 RX ADMIN — Medication 10 ML: at 21:21

## 2022-10-12 RX ADMIN — PANTOPRAZOLE SODIUM 40 MG: 40 TABLET, DELAYED RELEASE ORAL at 05:23

## 2022-10-12 RX ADMIN — DOCUSATE SODIUM 50 MG AND SENNOSIDES 8.6 MG 2 TABLET: 8.6; 5 TABLET, FILM COATED ORAL at 09:33

## 2022-10-12 RX ADMIN — OXYCODONE AND ACETAMINOPHEN 1 TABLET: 325; 10 TABLET ORAL at 07:36

## 2022-10-12 RX ADMIN — VANCOMYCIN HYDROCHLORIDE 1250 MG: 10 INJECTION, POWDER, LYOPHILIZED, FOR SOLUTION INTRAVENOUS at 12:38

## 2022-10-12 RX ADMIN — CYCLOBENZAPRINE 10 MG: 10 TABLET, FILM COATED ORAL at 15:45

## 2022-10-12 RX ADMIN — CYCLOBENZAPRINE 10 MG: 10 TABLET, FILM COATED ORAL at 09:33

## 2022-10-12 RX ADMIN — OXYCODONE AND ACETAMINOPHEN 1 TABLET: 325; 10 TABLET ORAL at 15:50

## 2022-10-12 RX ADMIN — ROSUVASTATIN CALCIUM 20 MG: 20 TABLET, FILM COATED ORAL at 21:16

## 2022-10-12 RX ADMIN — CYCLOBENZAPRINE 10 MG: 10 TABLET, FILM COATED ORAL at 21:16

## 2022-10-12 RX ADMIN — POLYETHYLENE GLYCOL 3350 17 G: 17 POWDER, FOR SOLUTION ORAL at 09:32

## 2022-10-12 RX ADMIN — HEPARIN SODIUM 5000 UNITS: 5000 INJECTION, SOLUTION INTRAVENOUS; SUBCUTANEOUS at 21:16

## 2022-10-12 NOTE — PROGRESS NOTES
NEUROSURGERY DAILY PROGRESS NOTE    ASSESSMENT:   Sonu Bravo Jr. is a 77 y.o. with a significant medical history of DVT on Xarelto, A. fib hypertension, hyperlipidemia hypothyroidism, and tobacco abuse.  He presents with a new problem of lumbar back pain and lower extremity dysesthesias, 80% bilateral feet, 20% back. MARY: 22.  Physical exam findings of bilateral lower extremity cyanosis with 1+ left lower extremity pitting edema, no focal weakness, right upper and lower extremity hyperreflexia with right Babinski, and a relatively well-maintained gait.  No recent imaging of the lumbar spine.    Past Medical History:   Diagnosis Date   • Arthritis    • Atrial fibrillation (HCC) 08/31/2021   • Diverticulitis    • GERD (gastroesophageal reflux disease)    • Hyperlipidemia    • Hypertension      Active Hospital Problems    Diagnosis    • **Cervical spondylosis with myelopathy    • Former smoker      PLAN:   Neuro: Stable.  Bright and awake.  Oriented x3.  Follows commands without prompting.  Moves all extremities.   1 Day Post-Op (10/11/2022) ACDF C4-5 and C5-6   SAUL = 85 mL, keep   Postop x-rays pending   Cervical collar in place.  Trachea midline.   Neurochecks every 4 hours     CV: Hypertension overnight.  We will continue to monitor.  Pulm: Maintaining O2 sat.  Continuous pulse oximetry.  Incentive spirometry.   : Voiding spontaneously.  Bladder scans and I/O cath per policy.   FEN: Tolerating regular diet.    GI: No acute issues.    ID: 23-hour postoperative prophylactic antibiotics.    Heme:  DVT prophylaxis; SCD's  Pain: Tolerable with oral meds.     Dispo: PT/OT.       CHIEF COMPLAINT:   Upper extremity weakness    Subjective  Symptom stable.  Doing well    Temp:  [97.1 °F (36.2 °C)-98 °F (36.7 °C)] 97.5 °F (36.4 °C)  Heart Rate:  [58-67] 59  Resp:  [14-18] 16  BP: (111-200)/(43-86) 121/71    Objective:  General Appearance:  Well-appearing and in no acute distress.    Vital signs: (most recent): Blood  "pressure 121/71, pulse 59, temperature 97.5 °F (36.4 °C), temperature source Oral, resp. rate 16, height 177.8 cm (70\"), weight 82.4 kg (181 lb 9.6 oz), SpO2 95 %.        Neurologic Exam     Mental Status   Oriented to person, place, and time.   Attention: normal. Concentration: normal.   Speech: speech is normal   Level of consciousness: alert    Bright and awake.  Oriented x3.  Follows commands without prompting showing thumbs up and 2 fingers bilaterally.     Cranial Nerves     CN II   Visual fields full to confrontation.     CN III, IV, VI   Pupils are equal, round, and reactive to light.  Extraocular motions are normal.     CN V   Facial sensation intact.     CN VII   Facial expression full, symmetric.     CN VIII   CN VIII normal.     CN IX, X   CN IX normal.     CN XI   CN XI normal.     Motor Exam   Right arm tone: normal  Left arm tone: normal  Right leg tone: normal  Left leg tone: normal    Strength   Right deltoid: 5/5  Left deltoid: 5/5  Right biceps: 5/5  Left biceps: 5/5  Right triceps: 5/5  Left triceps: 5/5  Right wrist extension: 5/5  Left wrist extension: 5/5  Right iliopsoas: 5/5  Left iliopsoas: 5/5  Right quadriceps: 5/5  Left quadriceps: 5/5  Right anterior tibial: 5/5  Left anterior tibial: 5/5  Right gastroc: 5/5  Left gastroc: 5/5  Right EHL 5/5  Left EHL 5/5       Sensory Exam   Right arm light touch: normal  Left arm light touch: normal  Right leg light touch: normal  Left leg light touch: normal    Gait, Coordination, and Reflexes     Tremor   Resting tremor: absent  Intention tremor: absent  Action tremor: absent    Drains:   Urethral Catheter Non-latex;Silicone 16 Fr. (Active)       [REMOVED] Chest Tube Right (Removed)       Output by Drain (mL) 10/11/22 0701 - 10/11/22 1900 10/11/22 1901 - 10/12/22 0700 10/12/22 0701 - 10/12/22 0836 Range Total   Closed/Suction Drain 1 Anterior Neck Bulb 10 Fr. 15 70  85       Imaging Results (Last 24 Hours)     Procedure Component Value Units " Date/Time    XR Spine Cervical 2 View [[id number]] Collected: 10/11/22 1519     Updated: 10/12/22 0659    Narrative:      XR SPINE CERVICAL 2 VW- 10/11/2022 12:30 PM CDT     HISTORY: discectomy; M47.12-Other spondylosis with myelopathy, cervical  region     COMPARISON: None     FLUOROSCOPY TIME: 28.7 seconds     FLUOROSCOPY DOSE: 1.6 mGy     NUMBER OF IMAGES: 2       Impression:         Intraoperative fluoroscopic images during discectomy.     Please refer to the operative note for more details.   This report was finalized on 10/11/2022 15:24 by Dr. Rajat Armijo MD.    FL C Arm During Surgery [[id number]] Collected: 10/11/22 1519     Updated: 10/12/22 0659    Narrative:      XR SPINE CERVICAL 2 VW- 10/11/2022 12:30 PM CDT     HISTORY: discectomy; M47.12-Other spondylosis with myelopathy, cervical  region     COMPARISON: None     FLUOROSCOPY TIME: 28.7 seconds     FLUOROSCOPY DOSE: 1.6 mGy     NUMBER OF IMAGES: 2       Impression:         Intraoperative fluoroscopic images during discectomy.     Please refer to the operative note for more details.   This report was finalized on 10/11/2022 15:24 by Dr. Rajat Armijo MD.        Lab Results (last 24 hours)     Procedure Component Value Units Date/Time    Comprehensive Metabolic Panel [[id number]]  (Abnormal) Collected: 10/12/22 0531    Specimen: Blood Updated: 10/12/22 0627     Glucose 112 mg/dL      BUN 15 mg/dL      Creatinine 0.87 mg/dL      Sodium 140 mmol/L      Potassium 4.2 mmol/L      Chloride 104 mmol/L      CO2 27.0 mmol/L      Calcium 9.5 mg/dL      Total Protein 6.7 g/dL      Albumin 4.00 g/dL      ALT (SGPT) 21 U/L      AST (SGOT) 21 U/L      Alkaline Phosphatase 66 U/L      Total Bilirubin 0.4 mg/dL      Globulin 2.7 gm/dL      A/G Ratio 1.5 g/dL      BUN/Creatinine Ratio 17.2     Anion Gap 9.0 mmol/L      eGFR 88.3 mL/min/1.73      Comment: National Kidney Foundation and American Society of Nephrology (ASN) Task Force recommended calculation based on the  Chronic Kidney Disease Epidemiology Collaboration (CKD-EPI) equation refit without adjustment for race.       Narrative:      GFR Normal >60  Chronic Kidney Disease <60  Kidney Failure <15      CBC Auto Differential [[id number]]  (Abnormal) Collected: 10/12/22 0531    Specimen: Blood Updated: 10/12/22 0559     WBC 10.03 10*3/mm3      RBC 4.71 10*6/mm3      Hemoglobin 15.0 g/dL      Hematocrit 44.2 %      MCV 93.8 fL      MCH 31.8 pg      MCHC 33.9 g/dL      RDW 14.3 %      RDW-SD 49.4 fl      MPV 10.2 fL      Platelets 163 10*3/mm3      Neutrophil % 75.3 %      Lymphocyte % 15.3 %      Monocyte % 9.0 %      Eosinophil % 0.0 %      Basophil % 0.1 %      Immature Grans % 0.3 %      Neutrophils, Absolute 7.56 10*3/mm3      Lymphocytes, Absolute 1.53 10*3/mm3      Monocytes, Absolute 0.90 10*3/mm3      Eosinophils, Absolute 0.00 10*3/mm3      Basophils, Absolute 0.01 10*3/mm3      Immature Grans, Absolute 0.03 10*3/mm3      nRBC 0.0 /100 WBC         [id number]  Brennan Salinas, APRN

## 2022-10-12 NOTE — THERAPY TREATMENT NOTE
Acute Care - Physical Therapy Treatment Note  [facility]     Patient Name: Sonu Bravo Jr.  : 1945  MRN: [id number]  Today's Date: 10/12/2022      Visit Dx:     ICD-10-CM ICD-9-CM   1. Cervical spondylosis with myelopathy  M47.12 721.1   2. Impaired mobility  Z74.09 799.89     Patient Active Problem List   Diagnosis   • Abdominal aortic ectasia (HCC)   • Vitamin D deficiency   • Spinal stenosis of lumbar region with neurogenic claudication   • Mixed hyperlipidemia   • Acquired hypothyroidism   • Gastro-esophageal reflux disease with esophagitis   • Essential hypertension   • Former smoker   • Paroxysmal atrial fibrillation (HCC)   • Current use of long term anticoagulation   • Cavitary lesion of lung   • Colonic diverticular abscess   • History of DVT (deep vein thrombosis)   • Cervical spondylosis with myelopathy     Past Medical History:   Diagnosis Date   • Arthritis    • Atrial fibrillation (HCC) 2021   • Diverticulitis    • GERD (gastroesophageal reflux disease)    • Hyperlipidemia    • Hypertension      Past Surgical History:   Procedure Laterality Date   • COLON SURGERY  2022    Dr Fairbanks at [facility]   • COLONOSCOPY N/A 2021    Procedure: COLONOSCOPY WITH ANESTHESIA;  Surgeon: Thomas Dc DO;  Location: [facility] ENDOSCOPY;  Service: Gastroenterology;  Laterality: N/A;  pre op; abnormal ct scan  post op: diverticulosis ; polyps   PCP: Vaughn Kramer DO   • HERNIA REPAIR       PT Assessment (last 12 hours)     PT Evaluation and Treatment     Row Name 10/12/22 1446          Physical Therapy Time and Intention    Subjective Information complains of;pain  -LY     Document Type therapy note (daily note)  -LY     Mode of Treatment physical therapy  -LY     Row Name 10/12/22 1446          General Information    Existing Precautions/Restrictions fall;spinal;brace on at all times  -LY     Row Name 10/12/22 1446          Pain    Pretreatment Pain Rating 3/10  -LY      Posttreatment Pain Rating 3/10  -LY     Pain Location - neck  -LY     Pre/Posttreatment Pain Comment c/o R foot pain as well  -LY     Pain Intervention(s) Medication (See MAR);Ambulation/increased activity  -LY     Row Name 10/12/22 1446          Sit-Stand Transfer    Sit-Stand [place] (Transfers) verbal cues;contact guard  -LY     Assistive Device (Sit-Stand Transfers) walker, front-wheeled  -LY     Row Name 10/12/22 1446          Stand-Sit Transfer    Stand-Sit [place] (Transfers) verbal cues;standby assist  -LY     Assistive Device (Stand-Sit Transfers) walker, front-wheeled  -LY     Row Name 10/12/22 1446          Gait/Stairs (Locomotion)    [place] Level (Gait) contact guard;verbal cues  -LY     Assistive Device (Gait) walker, front-wheeled  -LY     Distance in Feet (Gait) 175'  -LY     Pattern (Gait) step-through  -LY     Bilateral Gait Deviations forward flexed posture  -LY     Row Name             Wound 10/11/22 1306 anterior neck Incision    Wound - Properties Group Placement Date: 10/11/22  -DIOGO Placement Time: 1306  -DIOGO Orientation: anterior  -DIOGO Location: neck  -DIOGO Primary Wound Type: Incision  -DIOGO    Retired Wound - Properties Group Placement Date: 10/11/22  -DIOGO Placement Time: 1306  -DIOOG Orientation: anterior  -DIOGO Location: neck  -DIOGO Primary Wound Type: Incision  -DIOGO    Retired Wound - Properties Group Date first assessed: 10/11/22  -DIOGO Time first assessed: 1306  -DIOGO Location: neck  -DIOGO Primary Wound Type: Incision  -DIOGO    Row Name 10/12/22 1446          Positioning and Restraints    Pre-Treatment Position sitting in chair/recliner  -LY     Post Treatment Position chair  -LY     In Chair sitting;call light within reach;encouraged to call for assist;with family/caregiver  -LY           User Key  (r) = Recorded By, (t) = Taken By, (c) = Cosigned By    Initials Name Provider Type    Valentin Jarvis, RN Registered Nurse    Ruchi Bose, PTA Physical Therapist Assistant                   PT Recommendation and Plan             Time Calculation:    PT Charges     Row Name 10/12/22 1549 10/12/22 0859          Time Calculation    Start Time 1446  -LY 0804  -SB (r) JG (t) SB (c)     Stop Time 1500  -LY 0846  -SB (r) JG (t) SB (c)     Time Calculation (min) 14 min  -LY 42 min  -SB (r) JG (t)     PT Received On 10/12/22  -LY 10/12/22  -SB (r) JG (t) SB (c)     PT Goal Re-Cert Due Date -- 10/22/22  -SB (r) JG (t) SB (c)        Time Calculation- PT    Total Timed Code Minutes- PT 14 minute(s)  -LY --        Timed Charges    53985 - Gait Training Minutes  14  -LY --        Total Minutes    Timed Charges Total Minutes 14  -LY --      Total Minutes 14  -LY --           User Key  (r) = Recorded By, (t) = Taken By, (c) = Cosigned By    Initials Name Provider Type    LY Ruchi Carreno PTA Physical Therapist Assistant    Hanny Morocho, PT DPT Physical Therapist    BEKA Hilton, PT Student PT Student              Therapy Charges for Today     Code Description Service Date Service Provider Modifiers Qty    [id number] HC GAIT TRAINING EA 15 MIN 10/12/2022 Ruchi Carreno PTA GP 1          PT G-Codes  Outcome Measure Options: AM-PAC 6 Clicks Basic Mobility (PT)  AM-PAC 6 Clicks Score (PT): 20  AM-PAC 6 Clicks Score (OT): 24    Ruchi Carreno PTA  10/12/2022

## 2022-10-12 NOTE — PLAN OF CARE
Goal Outcome Evaluation:  Plan of Care Reviewed With: patient        Progress: no change  Outcome Evaluation: PT eval completed. Pt presents in fowlers a&ox4 with OT present. Pt reports previously independent with all functional mobility living in single level home with spouse. Pt performed log roll to R side with sup and verbal cues appropriately. MMT revealed gross 4/5 strength in BLEs. Pt reports decreased sensation on BLEs above the knee on the lateral aspect, more significant on the R. Pt also reports some numbness in B toes. Pt provided education on donning and doffing cervical brace, wear schedule, spinal precautions. Pt performed sit to stand and ambulated 125' with RW and CGA. Pt is appropriate for skilled PT to address decreased functional mobility, safety awareness, decreased strength. Anticipated d/c is home with assist.

## 2022-10-12 NOTE — PLAN OF CARE
Goal Outcome Evaluation:  Plan of Care Reviewed With: patient        Progress: no change  Outcome Evaluation: OT evaluation completed.  Pt demo no further need for skilled OT services.  Pt is A&Ox4.  Pt completes bed mobility with S.  He completes functional transfers with SBA/CGA and functional mobility with SBA/CGA and use of RWX.  Pt reports that his walking is his baseline. Pt completed LB dressing sitting EOB independently.  OT provided significant amount of education regarding wear/care of cerivcal collar and spinal precautions.  Second set of cervical collar pads provided to the patient.  Pt and spouse verbalized understanding.  OT will sign off at this time with recommendation for pt to discharge home with assist.

## 2022-10-12 NOTE — PLAN OF CARE
Goal Outcome Evaluation:  Plan of Care Reviewed With: patient           Outcome Evaluation: A&Ox4.  Incision to anterior neck CDI.  C-collar in place.  SAUL drain in place.  Rating pain 1-2.  Abx given as ordred.  SCD.  ETCO2 in place.  2 L while sleeping.   No N/T.  Resting some.  Will continue to monitor./78.  Once time dose labetalol 10 mg IV push given. Zebeta 5 mg on PTA med list currently not on MAR.

## 2022-10-12 NOTE — THERAPY EVALUATION
Patient Name: Sonu Bravo Jr.  : 1945    MRN: [id number]                              Today's Date: 10/12/2022       Admit Date: 10/11/2022    Visit Dx:     ICD-10-CM ICD-9-CM   1. Cervical spondylosis with myelopathy  M47.12 721.1   2. Impaired mobility  Z74.09 799.89     Patient Active Problem List   Diagnosis   • Abdominal aortic ectasia (HCC)   • Vitamin D deficiency   • Spinal stenosis of lumbar region with neurogenic claudication   • Mixed hyperlipidemia   • Acquired hypothyroidism   • Gastro-esophageal reflux disease with esophagitis   • Essential hypertension   • Former smoker   • Paroxysmal atrial fibrillation (HCC)   • Current use of long term anticoagulation   • Cavitary lesion of lung   • Colonic diverticular abscess   • History of DVT (deep vein thrombosis)   • Cervical spondylosis with myelopathy     Past Medical History:   Diagnosis Date   • Arthritis    • Atrial fibrillation (HCC) 2021   • Diverticulitis    • GERD (gastroesophageal reflux disease)    • Hyperlipidemia    • Hypertension      Past Surgical History:   Procedure Laterality Date   • COLON SURGERY  2022    Dr Fairbanks at [facility]   • COLONOSCOPY N/A 2021    Procedure: COLONOSCOPY WITH ANESTHESIA;  Surgeon: Thomas Dc DO;  Location: [facility] ENDOSCOPY;  Service: Gastroenterology;  Laterality: N/A;  pre op; abnormal ct scan  post op: diverticulosis ; polyps   PCP: Vaughn Kramer DO   • HERNIA REPAIR        General Information     Row Name 10/12/22 0804          Physical Therapy Time and Intention    Document Type evaluation  Pt presented with RUE and RLE hyperreflexia, LE weakness, hx of falling.  s/p ACDF C4/5 and C5/6  -SB (r) JG (t) SB (c)     Mode of Treatment physical therapy  -SB (r) JG (t) SB (c)     Row Name 10/12/22 0804          General Information    Patient Profile Reviewed yes  -SB (r) JG (t) SB (c)     Prior Level of Function independent:;all household mobility;community  mobility;gait;transfer;bed mobility  -SB (r) JG (t) SB (c)     Existing Precautions/Restrictions fall;spinal;brace on at all times  -SB (r) JG (t) SB (c)     Barriers to Rehab medically complex  -SB (r) JG (t) SB (c)     Row Name 10/12/22 [facility]          Living Environment    People in Home spouse  -SB (r) JG (t) SB (c)     Row Name 10/12/22 [facility]          Home Main Entrance    Number of Stairs, Main Entrance five  -SB (r) JG (t) SB (c)     Stair Railings, Main Entrance railing on left side (ascending)  -SB (r) JG (t) SB (c)     Row Name 10/12/22 [facility]          Stairs Within Home, Primary    Number of Stairs, Within Home, Primary none  -SB (r) JG (t) SB (c)     Row Name 10/12/22 [facility]          Cognition    Orientation Status (Cognition) oriented x 4  -SB (r) JG (t) SB (c)     Row Name 10/12/22 [facility]          Safety Issues, Functional Mobility    Impairments Affecting Function (Mobility) strength;endurance/activity tolerance;balance;sensation/sensory awareness;pain  -SB (r) JG (t) SB (c)           User Key  (r) = Recorded By, (t) = Taken By, (c) = Cosigned By    Initials Name Provider Type    SB Hanny Erazo, PT DPT Physical Therapist    BEKA Hilton, PT Student PT Student               Mobility     Row Name 10/12/22 [facility]          Bed Mobility    Bed Mobility rolling right;sidelying-sit  -SB (r) JG (t) SB (c)     Rolling Right [place] (Bed Mobility) supervision;verbal cues  -SB (r) JG (t) SB (c)     Sidelying-Sit [place] (Bed Mobility) supervision;verbal cues  -SB (r) JG (t) SB (c)     Row Name 10/12/22 [facility]          Sit-Stand Transfer    Sit-Stand [place] (Transfers) contact guard  -SB (r) JG (t) SB (c)     Assistive Device (Sit-Stand Transfers) walker, front-wheeled  -SB (r) JG (t) SB (c)     Row Name 10/12/22 [facility]          Gait/Stairs (Locomotion)    [place] Level (Gait) contact guard;verbal cues  -SB (r) JG (t) SB (c)     Assistive Device (Gait) walker, front-wheeled  -SB (r) JG (t) SB (c)      Distance in Feet (Gait) 125  -SB (r) JG (t) SB (c)     Deviations/Abnormal Patterns (Gait) antalgic  Slight R hip antalgic pattern  -SB (r) JG (t) SB (c)     Bilateral Gait Deviations forward flexed posture  -SB (r) JG (t) SB (c)           User Key  (r) = Recorded By, (t) = Taken By, (c) = Cosigned By    Initials Name Provider Type    SB Hanny Eraoz, PT DPT Physical Therapist    BEKA Hilton, PT Student PT Student               Obj/Interventions     Row Name 10/12/22 0849          Range of Motion Comprehensive    General Range of Motion bilateral lower extremity ROM WFL  -SB (r) JG (t) SB (c)     Row Name 10/12/22 0849          Strength Comprehensive (MMT)    General Manual Muscle Testing (MMT) Assessment lower extremity strength deficits identified  -SB (r) JG (t) SB (c)     Comment, General Manual Muscle Testing (MMT) Assessment MMT revealed gross 4/5 strength in BLEs  -SB (r) JG (t) SB (c)     Row Name 10/12/22 0889          Balance    Balance Assessment sitting static balance;sitting dynamic balance;sit to stand dynamic balance;standing static balance;standing dynamic balance  -SB (r) JG (t) SB (c)     Static Sitting Balance independent  -SB (r) JG (t) SB (c)     Dynamic Sitting Balance independent  -SB (r) JG (t) SB (c)     Position, Sitting Balance unsupported  -SB (r) JG (t) SB (c)     Sit to Stand Dynamic Balance standby assist;verbal cues  -SB (r) JG (t) SB (c)     Static Standing Balance standby assist;verbal cues  -SB (r) JG (t) SB (c)     Dynamic Standing Balance standby assist;verbal cues  -SB (r) JG (t) SB (c)     Row Name 10/12/22 0876          Sensory Assessment (Somatosensory)    Sensory Assessment (Somatosensory) bilateral LE;other (see comments)  Pt reports decreased sensation on BLEs above the knee on the lateral aspect, more significant on the R. Pt also reports some numbness in B toes  -SB (r) JG (t) SB (c)           User Key  (r) = Recorded By, (t) = Taken By, (c) = Cosigned By     Initials Name Provider Type    SB Hanny Erazo, PT DPT Physical Therapist    BEKA Hilton, PT Student PT Student               Goals/Plan     Row Name 10/12/22 0804          Bed Mobility Goal 1 (PT)    Activity/Assistive Device (Bed Mobility Goal 1, PT) bed mobility activities, all  -SB (r) JG (t) SB (c)     [place] Level/Cues Needed (Bed Mobility Goal 1, PT) independent  -SB (r) JG (t) SB (c)     Time Frame (Bed Mobility Goal 1, PT) long term goal (LTG)  -SB (r) JG (t) SB (c)     Progress/Outcomes (Bed Mobility Goal 1, PT) new goal  -SB (r) JG (t) SB (c)     Row Name 10/12/22 0804          Transfer Goal 1 (PT)    Activity/Assistive Device (Transfer Goal 1, PT) sit-to-stand/stand-to-sit;bed-to-chair/chair-to-bed  -SB (r) JG (t) SB (c)     [place] Level/Cues Needed (Transfer Goal 1, PT) modified independence  -SB (r) JG (t) SB (c)     Time Frame (Transfer Goal 1, PT) long term goal (LTG)  -SB (r) JG (t) SB (c)     Progress/Outcome (Transfer Goal 1, PT) new goal  -SB (r) JG (t) SB (c)     Row Name 10/12/22 0804          Gait Training Goal 1 (PT)    Activity/Assistive Device (Gait Training Goal 1, PT) gait (walking locomotion);assistive device use;decrease asymmetrical patterns;decrease fall risk;diminish gait deviation;backward stepping;improve balance and speed;increase endurance/gait distance;increase energy conservation;walker, rolling  -SB (r) JG (t) SB (c)     [place] Level (Gait Training Goal 1, PT) modified independence  -SB (r) JG (t) SB (c)     Distance (Gait Training Goal 1, PT) 200  -SB (r) JG (t) SB (c)     Time Frame (Gait Training Goal 1, PT) long term goal (LTG)  -SB (r) JG (t) SB (c)     Progress/Outcome (Gait Training Goal 1, PT) new goal  -SB (r) JG (t) SB (c)     Row Name 10/12/22 0804          Stairs Goal 1 (PT)    Activity/Assistive Device (Stairs Goal 1, PT) stairs, all skills;ascending stairs;descending stairs;using handrail, left;using handrail,  right;step-to-step;decrease fall risk;improve balance and speed;maintain weight-bearing status;assistive device use  -SB (r) JG (t) SB (c)     [place] Level/Cues Needed (Stairs Goal 1, PT) modified independence  -SB (r) JG (t) SB (c)     Number of Stairs (Stairs Goal 1, PT) 5  -SB (r) JG (t) SB (c)     Time Frame (Stairs Goal 1, PT) long term goal (LTG)  -SB (r) JG (t) SB (c)     Progress/Outcome (Stairs Goal 1, PT) new goal  -SB (r) JG (t) SB (c)     Row Name 10/12/22 0804          Patient Education Goal (PT)    Activity (Patient Education Goal, PT) Pt demonstrates ability to independently don and doff cervical brace appropriately  -SB (r) JG (t) SB (c)     [place]/Cues/Accuracy (Memory Goal 2, PT) demonstrates adequately;independent;verbalizes understanding  -SB (r) JG (t) SB (c)     Time Frame (Patient Education Goal, PT) long term goal (LTG)  -SB (r) JG (t) SB (c)     Progress/Outcome (Patient Education Goal, PT) new goal  -SB (r) JG (t) SB (c)     Row Name 10/12/22 0804          Therapy Assessment/Plan (PT)    Planned Therapy Interventions (PT) balance training;bed mobility training;gait training;patient/family education;transfer training;strengthening;stair training;orthotic fitting/training  -SB (r) JG (t) SB (c)           User Key  (r) = Recorded By, (t) = Taken By, (c) = Cosigned By    Initials Name Provider Type    Hanny Morocho, PT DPT Physical Therapist    BEKA Hilton, PT Student PT Student               Clinical Impression     Row Name 10/12/22 0804          Pain    Pretreatment Pain Rating 2/10  Numbness in B toes  -SB (r) JG (t) SB (c)     Posttreatment Pain Rating 2/10  -SB (r) JG (t) SB (c)     Pain Location - neck  -SB (r) JG (t) SB (c)     Pain Intervention(s) Repositioned;Ambulation/increased activity;Distraction  -SB (r) JG (t) JAMES (c)     Row Name 10/12/22 0804          Plan of Care Review    Plan of Care Reviewed With patient  -JAMES ([place]) JORY (t) JAMES (c)     Progress no change   -SB (r) JG (t) SB (c)     Outcome Evaluation PT eval completed. Pt presents in fowlers a&ox4 with OT present. Pt reports previously independent with all functional mobility living in single level home with spouse. Pt performed log roll to R side with sup and verbal cues appropriately. MMT revealed gross 4/5 strength in BLEs. Pt reports decreased sensation on BLEs above the knee on the lateral aspect, more significant on the R. Pt also reports some numbness in B toes. Pt provided education on donning and doffing cervical brace, wear schedule, spinal precautions. Pt performed sit to stand and ambulated 125' with RW and CGA. Pt is appropriate for skilled PT to address decreased functional mobility, safety awareness, decreased strength. Anticipated d/c is home with assist.  -SB (r) JG (t) SB (c)     Row Name 10/12/22 08          Therapy Assessment/Plan (PT)    Patient/Family Therapy Goals Statement (PT) Return to home  -SB (r) JG (t) SB (c)     Rehab Potential (PT) good, to achieve stated therapy goals  -SB (r) JG (t) SB (c)     Criteria for Skilled Interventions Met (PT) yes;meets criteria;skilled treatment is necessary  -SB (r) JG (t) SB (c)     Therapy Frequency (PT) 2 times/day  -SB (r) JG (t) SB (c)     Predicted Duration of Therapy Intervention (PT) Until d/c or goals met  -SB (r) JG (t) SB (c)     Row Name 10/12/22 0804          Vital Signs    Pre SpO2 (%) 93  -SB (r) JG (t) SB (c)     O2 Delivery Pre Treatment room air  -SB (r) JG (t) SB (c)     Post SpO2 (%) 96  -SB (r) JG (t) SB (c)     O2 Delivery Post Treatment room air  -SB (r) JG (t) SB (c)     Row Name 10/12/22 0804          Positioning and Restraints    Pre-Treatment Position in bed  -SB (r) JG (t) SB (c)     Post Treatment Position chair  -SB (r) JG (t) SB (c)     In Chair call light within reach;encouraged to call for assist;with family/caregiver;sitting  -SB (r) JG (t) SB (c)           User Key  (r) = Recorded By, (t) = Taken By, (c) = Cosigned By     Initials Name Provider Type    Hanny Morocho, PT DPT Physical Therapist    BEKA Hilton, PT Student PT Student               Outcome Measures     Row Name 10/12/22 0804 10/12/22 0747       How much help from another person do you currently need...    Turning from your back to your side while in flat bed without using bedrails? 4  -SB (r) JG (t) SB (c) 3  -CB    Moving from lying on back to sitting on the side of a flat bed without bedrails? 4  -SB (r) JG (t) SB (c) 3  -CB    Moving to and from a bed to a chair (including a wheelchair)? 3  -SB (r) JG (t) SB (c) 3  -CB    Standing up from a chair using your arms (e.g., wheelchair, bedside chair)? 3  -SB (r) JG (t) SB (c) 3  -CB    Climbing 3-5 steps with a railing? 3  -SB (r) JG (t) SB (c) 2  -CB    To walk in hospital room? 3  -SB (r) JG (t) SB (c) 2  -CB    AM-PAC 6 Clicks Score (PT) 20  -SB (r) JG (t) 16  -CB    Highest level of mobility 6 --> Walked 10 steps or more  -SB (r) JG (t) 5 --> Static standing  -CB    Row Name 10/12/22 0804 10/12/22 0744       Functional Assessment    Outcome Measure Options AM-PAC 6 Clicks Basic Mobility (PT)  -SB (r) JG (t) SB (c) AM-PAC 6 Clicks Daily Activity (OT)  -CS          User Key  (r) = Recorded By, (t) = Taken By, (c) = Cosigned By    Initials Name Provider Type    Judy Upton, OTR/L, CNT Occupational Therapist    Brenna Hawkins LPN Licensed Nurse    Hanny Morocho, PT DPT Physical Therapist    BEKA Hilton, PT Student PT Student                               PT Recommendation and Plan  Planned Therapy Interventions (PT): balance training, bed mobility training, gait training, patient/family education, transfer training, strengthening, stair training, orthotic fitting/training  Plan of Care Reviewed With: patient  Progress: no change  Outcome Evaluation: PT eval completed. Pt presents in fowlers a&ox4 with OT present. Pt reports previously independent with all functional mobility living  in single level home with spouse. Pt performed log roll to R side with sup and verbal cues appropriately. MMT revealed gross 4/5 strength in BLEs. Pt reports decreased sensation on BLEs above the knee on the lateral aspect, more significant on the R. Pt also reports some numbness in B toes. Pt provided education on donning and doffing cervical brace, wear schedule, spinal precautions. Pt performed sit to stand and ambulated 125' with RW and CGA. Pt is appropriate for skilled PT to address decreased functional mobility, safety awareness, decreased strength. Anticipated d/c is home with assist.     Time Calculation:    PT Charges     Row Name 10/12/22 0859             Time Calculation    Start Time 0804  -SB (r) JG (t) SB (c)      Stop Time 0846  -SB (r) JG (t) SB (c)      Time Calculation (min) 42 min  -SB (r) JG (t)      PT Received On 10/12/22  -SB (r) JG (t) SB (c)      PT Goal Re-Cert Due Date 10/22/22  -SB (r) JG (t) SB (c)            User Key  (r) = Recorded By, (t) = Taken By, (c) = Cosigned By    Initials Name Provider Type    Hanny Morocho, PT DPT Physical Therapist    BEKA Hilton, PT Student PT Student                  PT G-Codes  Outcome Measure Options: AM-PAC 6 Clicks Basic Mobility (PT)  AM-PAC 6 Clicks Score (PT): 20  AM-PAC 6 Clicks Score (OT): 24    BEKA Pedersen, PT Student  10/12/2022

## 2022-10-12 NOTE — CASE MANAGEMENT/SOCIAL WORK
Discharge Planning Assessment  [facility]     Patient Name: Sonu Bravo Jr.  MRN: [id number]  Today's Date: 10/12/2022    Admit Date: 10/11/2022        Discharge Needs Assessment     Row Name 10/12/22 1530       Living Environment    People in Home spouse    Name(s) of People in Home Bertha, spouse    Current Living Arrangements home    Primary Care Provided by self;spouse/significant other    Provides Primary Care For no one, unable/limited ability to care for self    Caregiving Concerns neck surgery    Family Caregiver if Needed spouse    Family Caregiver Names Bertha, spouse    Quality of Family Relationships helpful;involved;supportive    Able to Return to Prior Arrangements yes       Resource/Environmental Concerns    Resource/Environmental Concerns none       Transition Planning    Patient/Family Anticipates Transition to home with family    Patient/Family Anticipated Services at Transition none    Transportation Anticipated family or friend will provide       Discharge Needs Assessment    Readmission Within the Last 30 Days no previous admission in last 30 days    Equipment Currently Used at Home walker, rolling    Concerns to be Addressed no discharge needs identified    Anticipated Changes Related to Illness none    Equipment Needed After Discharge none    Discharge Coordination/Progress Plan for return home with spouse to assist . RW in room from home. Will follow for possible need for Home Health. Patient has PCP and rx coverage.               Discharge Plan    No documentation.               Continued Care and Services - Admitted Since 10/11/2022    Coordination has not been started for this encounter.          Demographic Summary    No documentation.                Functional Status    No documentation.                Psychosocial    No documentation.                Abuse/Neglect    No documentation.                Legal    No documentation.                Substance Abuse    No documentation.                 Patient Forms    No documentation.                   Emilia Sanchez RN

## 2022-10-12 NOTE — THERAPY DISCHARGE NOTE
Acute Care - Occupational Therapy Discharge  [facility]    Patient Name: Sonu Bravo Jr.  : 1945    MRN: [id number]                              Today's Date: 10/12/2022       Admit Date: 10/11/2022    Visit Dx:     ICD-10-CM ICD-9-CM   1. Cervical spondylosis with myelopathy  M47.12 721.1     Patient Active Problem List   Diagnosis   • Abdominal aortic ectasia (HCC)   • Vitamin D deficiency   • Spinal stenosis of lumbar region with neurogenic claudication   • Mixed hyperlipidemia   • Acquired hypothyroidism   • Gastro-esophageal reflux disease with esophagitis   • Essential hypertension   • Former smoker   • Paroxysmal atrial fibrillation (HCC)   • Current use of long term anticoagulation   • Cavitary lesion of lung   • Colonic diverticular abscess   • History of DVT (deep vein thrombosis)   • Cervical spondylosis with myelopathy     Past Medical History:   Diagnosis Date   • Arthritis    • Atrial fibrillation (HCC) 2021   • Diverticulitis    • GERD (gastroesophageal reflux disease)    • Hyperlipidemia    • Hypertension      Past Surgical History:   Procedure Laterality Date   • COLON SURGERY  2022    Dr Fairbanks at [facility]   • COLONOSCOPY N/A 2021    Procedure: COLONOSCOPY WITH ANESTHESIA;  Surgeon: Thomas Dc DO;  Location: [facility] ENDOSCOPY;  Service: Gastroenterology;  Laterality: N/A;  pre op; abnormal ct scan  post op: diverticulosis ; polyps   PCP: Vaughn Kramer DO   • HERNIA REPAIR        General Information     Row Name 10/12/22 0744          OT Time and Intention    Document Type evaluation  Pt presented with RUE and RLE hyperreflexia, LE weakness, hx of falling.  s/p ACDF C4/5 and C5/6.  -CS     Mode of Treatment occupational therapy  -CS     Row Name 10/12/22 0744          General Information    Patient Profile Reviewed yes  -CS     Prior Level of Function independent:;all household mobility;ADL's;dressing;bathing  -CS     Existing  Precautions/Restrictions fall;spinal;brace on at all times  -     Barriers to Rehab medically complex  -CS     Row Name 10/12/22 0744          Occupational Profile    Environmental Supports and Barriers (Occupational Profile) walk in shower, shower chair, RWX  -CS     Row Name 10/12/22 0744          Living Environment    People in Home spouse  -CS     Row Name 10/12/22 0744          Home Main Entrance    Number of Stairs, Main Entrance five  -CS     Stair Railings, Main Entrance railing on left side (ascending)  -CS     Row Name 10/12/22 0744          Stairs Within Home, Primary    Number of Stairs, Within Home, Primary none  -CS     Row Name 10/12/22 0744          Cognition    Orientation Status (Cognition) oriented x 4  -CS     Row Name 10/12/22 0744          Safety Issues, Functional Mobility    Impairments Affecting Function (Mobility) strength;endurance/activity tolerance  -           User Key  (r) = Recorded By, (t) = Taken By, (c) = Cosigned By    Initials Name Provider Type    CS Judy Burch S, OTR/L, CNT Occupational Therapist               Mobility/ADL's     Row Name 10/12/22 0744          Bed Mobility    Bed Mobility rolling right;sidelying-sit  -     Rolling Right [place] (Bed Mobility) supervision;verbal cues  -CS     Sidelying-Sit [place] (Bed Mobility) supervision;verbal cues  -CS     Assistive Device (Bed Mobility) bed rails  -CS     Row Name 10/12/22 0744          Transfers    Transfers sit-stand transfer;stand-sit transfer  -     Row Name 10/12/22 0744          Sit-Stand Transfer    Sit-Stand [place] (Transfers) standby assist;contact guard;verbal cues  -CS     Assistive Device (Sit-Stand Transfers) walker, front-wheeled  -CS     Row Name 10/12/22 0744          Stand-Sit Transfer    Stand-Sit [place] (Transfers) standby assist;contact guard;verbal cues  -CS     Assistive Device (Stand-Sit Transfers) walker, front-wheeled  -     Row Name 10/12/22 0744           Functional Mobility    Functional Mobility- Ind. Level supervision required;contact guard assist  -CS     Functional Mobility- Device walker, front-wheeled  -CS     Row Name 10/12/22 0744          Activities of Daily Living    BADL Assessment/Intervention lower body dressing  -CS     Row Name 10/12/22 0744          Lower Body Dressing Assessment/Training    [place] Level (Lower Body Dressing) don;socks;independent  -CS     Position (Lower Body Dressing) edge of bed sitting  -CS           User Key  (r) = Recorded By, (t) = Taken By, (c) = Cosigned By    Initials Name Provider Type    Judy Upton, OTR/L, LUCRECIA Occupational Therapist               Obj/Interventions     Row Name 10/12/22 0840          Sensory Assessment (Somatosensory)    Sensory Assessment (Somatosensory) UE sensation intact  -     Row Name 10/12/22 0840          Range of Motion Comprehensive    General Range of Motion bilateral upper extremity ROM WFL  -CS     Row Name 10/12/22 0840          Strength Comprehensive (MMT)    Comment, General Manual Muscle Testing (MMT) Assessment BUE strength: 4+/5  -CS     Row Name 10/12/22 0840          Motor Skills    Motor Skills coordination  -     Coordination bilateral;upper extremity;WNL  -     Row Name 10/12/22 0840          Balance    Balance Assessment sitting static balance;sitting dynamic balance;standing static balance;standing dynamic balance  -CS     Static Sitting Balance independent  -CS     Dynamic Sitting Balance independent  -CS     Static Standing Balance standby assist;verbal cues  -CS     Dynamic Standing Balance standby assist;contact guard;verbal cues  -CS     Position/Device Used, Standing Balance walker, front-wheeled  -CS           User Key  (r) = Recorded By, (t) = Taken By, (c) = Cosigned By    Initials Name Provider Type    Judy Upton, OTR/L, LUCRECIA Occupational Therapist               Goals/Plan    No documentation.                Clinical Impression     Row Name  10/12/22 0744          Pain Assessment    Pretreatment Pain Rating 2/10  numbness in [place]ward feet  -CS     Posttreatment Pain Rating 2/10  -CS     Pain Location - neck  -CS     Pain Intervention(s) Medication (See MAR);Repositioned;Ambulation/increased activity  -CS     Row Name 10/12/22 0744          Plan of Care Review    Plan of Care Reviewed With patient  -CS     Progress no change  -CS     Outcome Evaluation OT evaluation completed.  Pt demo no further need for skilled OT services.  Pt is A&Ox4.  Pt completes bed mobility with S.  He completes functional transfers with SBA/CGA and functional mobility with SBA/CGA and use of RWX.  Pt reports that his walking is his baseline. Pt completed LB dressing sitting EOB independently.  OT provided significant amount of education regarding wear/care of cerivcal collar and spinal precautions.  Second set of cervical collar pads provided to the patient.  Pt and spouse verbalized understanding.  OT will sign off at this time with recommendation for pt to discharge home with assist.  -CS     Row Name 10/12/22 0744          Therapy Assessment/Plan (OT)    Patient/Family Therapy Goal Statement (OT) to go home  -CS     Criteria for Skilled Therapeutic Interventions Met (OT) no;no problems identified which require skilled intervention  -CS     Therapy Frequency (OT) evaluation only  -CS     Row Name 10/12/22 0744          Therapy Plan Review/Discharge Plan (OT)    Anticipated Discharge Disposition (OT) home with assist  -CS     Row Name 10/12/22 0744          Vital Signs    Pre SpO2 (%) 93  -CS     O2 Delivery Pre Treatment room air  -CS     Post SpO2 (%) 96  -CS     O2 Delivery Post Treatment room air  -CS     Row Name 10/12/22 0744          Positioning and Restraints    Pre-Treatment Position in bed  -CS     Post Treatment Position chair  -CS     In Chair sitting;call light within reach;encouraged to call for assist;with family/caregiver;with PT;with brace  -CS           User Key   (r) = Recorded By, (t) = Taken By, (c) = Cosigned By    Initials Name Provider Type    Judy Upton OTR/L, LUCRECIA Occupational Therapist               Outcome Measures     Row Name 10/12/22 0744          How much help from another is currently needed...    Putting on and taking off regular lower body clothing? 4  -CS     Bathing (including washing, rinsing, and drying) 4  -CS     Toileting (which includes using toilet bed pan or urinal) 4  -CS     Putting on and taking off regular upper body clothing 4  -CS     Taking care of personal grooming (such as brushing teeth) 4  -CS     Eating meals 4  -CS     AM-PAC 6 Clicks Score (OT) 24  -CS     Row Name 10/11/22 4213          How much help from another person do you currently need...    Turning from your back to your side while in flat bed without using bedrails? 3  -LF     Moving from lying on back to sitting on the side of a flat bed without bedrails? 3  -LF     Moving to and from a bed to a chair (including a wheelchair)? 2  -LF     Standing up from a chair using your arms (e.g., wheelchair, bedside chair)? 2  -LF     Climbing 3-5 steps with a railing? 2  -LF     To walk in hospital room? 2  -LF     AM-PAC 6 Clicks Score (PT) 14  -LF     Highest level of mobility 4 --> Transferred to chair/commode  -LF     Row Name 10/12/22 2344          Functional Assessment    Outcome Measure Options AM-PAC 6 Clicks Daily Activity (OT)  -CS           User Key  (r) = Recorded By, (t) = Taken By, (c) = Cosigned By    Initials Name Provider Type    Judy Upton OTR/L, LUCRECIA Occupational Therapist    Massiel Burns, RN Registered Nurse              Occupational Therapy Education     Title: PT OT SLP Therapies (Done)     Topic: Occupational Therapy (Done)     Point: ADL training (Done)     Description:   Instruct learner(s) on proper safety adaptation and remediation techniques during self care or transfers.   Instruct in proper use of assistive devices.               Learning Progress Summary           Patient Acceptance, E, VU by  at 10/12/2022 0849   Significant Other Acceptance, E, VU by CS at 10/12/2022 0849                   Point: Home exercise program (Done)     Description:   Instruct learner(s) on appropriate technique for monitoring, assisting and/or progressing therapeutic exercises/activities.              Learning Progress Summary           Patient Acceptance, E, VU by  at 10/12/2022 0849   Significant Other Acceptance, E, VU by CS at 10/12/2022 0849                   Point: Precautions (Done)     Description:   Instruct learner(s) on prescribed precautions during self-care and functional transfers.              Learning Progress Summary           Patient Acceptance, E, VU by  at 10/12/2022 0849   Significant Other Acceptance, E, VU by CS at 10/12/2022 0849                   Point: Body mechanics (Done)     Description:   Instruct learner(s) on proper positioning and spine alignment during self-care, functional mobility activities and/or exercises.              Learning Progress Summary           Patient Acceptance, E, VU by  at 10/12/2022 0849   Significant Other Acceptance, E, VU by CS at 10/12/2022 0849                               User Key     Initials Effective Dates Name Provider Type Discipline     06/16/21 -  Judy Burch, OTR/L, CNT Occupational Therapist OT              OT Recommendation and Plan  Therapy Frequency (OT): evaluation only  Plan of Care Review  Plan of Care Reviewed With: patient  Progress: no change  Outcome Evaluation: OT evaluation completed.  Pt demo no further need for skilled OT services.  Pt is A&Ox4.  Pt completes bed mobility with S.  He completes functional transfers with SBA/CGA and functional mobility with SBA/CGA and use of RWX.  Pt reports that his walking is his baseline. Pt completed LB dressing sitting EOB independently.  OT provided significant amount of education regarding wear/care of cerivcal collar and  spinal precautions.  Second set of cervical collar pads provided to the patient.  Pt and spouse verbalized understanding.  OT will sign off at this time with recommendation for pt to discharge home with assist.  Plan of Care Reviewed With: patient  Outcome Evaluation: OT evaluation completed.  Pt demo no further need for skilled OT services.  Pt is A&Ox4.  Pt completes bed mobility with S.  He completes functional transfers with SBA/CGA and functional mobility with SBA/CGA and use of RWX.  Pt reports that his walking is his baseline. Pt completed LB dressing sitting EOB independently.  OT provided significant amount of education regarding wear/care of cerivcal collar and spinal precautions.  Second set of cervical collar pads provided to the patient.  Pt and spouse verbalized understanding.  OT will sign off at this time with recommendation for pt to discharge home with assist.     Time Calculation:    Time Calculation- OT     Row Name 10/12/22 0848             Time Calculation- OT    OT Start Time 0744  -CS      OT Stop Time 0828  -CS      OT Time Calculation (min) 44 min  -CS      OT Received On 10/12/22  -CS         Untimed Charges    OT Eval/Re-eval Minutes 44  -CS         Total Minutes    Untimed Charges Total Minutes 44  -CS       Total Minutes 44  -CS            User Key  (r) = Recorded By, (t) = Taken By, (c) = Cosigned By    Initials Name Provider Type    CS Judy Burch OTR/L, CNT Occupational Therapist              Therapy Charges for Today     Code Description Service Date Service Provider Modifiers Qty    [id number]  OT EVAL MOD COMPLEXITY 3 10/12/2022 Judy Burch OTR/L, CNT GO 1             OT Discharge Summary  Anticipated Discharge Disposition (OT): home with assist  Reason for Discharge: At baseline function  Outcomes Achieved: Other (1x OT eval)  Discharge Destination: Home with assist    CHRISSY Prasad/L, CNT  10/12/2022

## 2022-10-13 ENCOUNTER — APPOINTMENT (OUTPATIENT)
Dept: GENERAL RADIOLOGY | Facility: HOSPITAL | Age: 77
End: 2022-10-13

## 2022-10-13 VITALS
WEIGHT: 181.6 LBS | HEART RATE: 68 BPM | TEMPERATURE: 97.4 F | RESPIRATION RATE: 16 BRPM | SYSTOLIC BLOOD PRESSURE: 131 MMHG | HEIGHT: 70 IN | DIASTOLIC BLOOD PRESSURE: 66 MMHG | BODY MASS INDEX: 26 KG/M2 | OXYGEN SATURATION: 97 %

## 2022-10-13 LAB
ALBUMIN SERPL-MCNC: 4.1 G/DL (ref 3.5–5.2)
ALBUMIN/GLOB SERPL: 1.6 G/DL
ALP SERPL-CCNC: 65 U/L (ref 39–117)
ALT SERPL W P-5'-P-CCNC: 28 U/L (ref 1–41)
ANION GAP SERPL CALCULATED.3IONS-SCNC: 12 MMOL/L (ref 5–15)
AST SERPL-CCNC: 23 U/L (ref 1–40)
BASOPHILS # BLD AUTO: 0.06 10*3/MM3 (ref 0–0.2)
BASOPHILS NFR BLD AUTO: 0.6 % (ref 0–1.5)
BILIRUB SERPL-MCNC: 0.5 MG/DL (ref 0–1.2)
BUN SERPL-MCNC: 18 MG/DL (ref 8–23)
BUN/CREAT SERPL: 19.6 (ref 7–25)
CALCIUM SPEC-SCNC: 9.3 MG/DL (ref 8.6–10.5)
CHLORIDE SERPL-SCNC: 102 MMOL/L (ref 98–107)
CO2 SERPL-SCNC: 27 MMOL/L (ref 22–29)
CREAT SERPL-MCNC: 0.92 MG/DL (ref 0.76–1.27)
DEPRECATED RDW RBC AUTO: 51.5 FL (ref 37–54)
EGFRCR SERPLBLD CKD-EPI 2021: 85.1 ML/MIN/1.73
EOSINOPHIL # BLD AUTO: 0.11 10*3/MM3 (ref 0–0.4)
EOSINOPHIL NFR BLD AUTO: 1.1 % (ref 0.3–6.2)
ERYTHROCYTE [DISTWIDTH] IN BLOOD BY AUTOMATED COUNT: 14.7 % (ref 12.3–15.4)
GLOBULIN UR ELPH-MCNC: 2.5 GM/DL
GLUCOSE SERPL-MCNC: 105 MG/DL (ref 65–99)
HCT VFR BLD AUTO: 43.4 % (ref 37.5–51)
HGB BLD-MCNC: 14.5 G/DL (ref 13–17.7)
IMM GRANULOCYTES # BLD AUTO: 0.03 10*3/MM3 (ref 0–0.05)
IMM GRANULOCYTES NFR BLD AUTO: 0.3 % (ref 0–0.5)
LYMPHOCYTES # BLD AUTO: 2.87 10*3/MM3 (ref 0.7–3.1)
LYMPHOCYTES NFR BLD AUTO: 28.3 % (ref 19.6–45.3)
MCH RBC QN AUTO: 31.7 PG (ref 26.6–33)
MCHC RBC AUTO-ENTMCNC: 33.4 G/DL (ref 31.5–35.7)
MCV RBC AUTO: 95 FL (ref 79–97)
MONOCYTES # BLD AUTO: 1.12 10*3/MM3 (ref 0.1–0.9)
MONOCYTES NFR BLD AUTO: 11 % (ref 5–12)
NEUTROPHILS NFR BLD AUTO: 5.95 10*3/MM3 (ref 1.7–7)
NEUTROPHILS NFR BLD AUTO: 58.7 % (ref 42.7–76)
NRBC BLD AUTO-RTO: 0 /100 WBC (ref 0–0.2)
PLATELET # BLD AUTO: 154 10*3/MM3 (ref 140–450)
PMV BLD AUTO: 10.5 FL (ref 6–12)
POTASSIUM SERPL-SCNC: 3.8 MMOL/L (ref 3.5–5.2)
PROT SERPL-MCNC: 6.6 G/DL (ref 6–8.5)
RBC # BLD AUTO: 4.57 10*6/MM3 (ref 4.14–5.8)
SODIUM SERPL-SCNC: 141 MMOL/L (ref 136–145)
WBC NRBC COR # BLD: 10.14 10*3/MM3 (ref 3.4–10.8)

## 2022-10-13 PROCEDURE — 72040 X-RAY EXAM NECK SPINE 2-3 VW: CPT

## 2022-10-13 PROCEDURE — 97116 GAIT TRAINING THERAPY: CPT

## 2022-10-13 PROCEDURE — 99024 POSTOP FOLLOW-UP VISIT: CPT | Performed by: NURSE PRACTITIONER

## 2022-10-13 PROCEDURE — 85025 COMPLETE CBC W/AUTO DIFF WBC: CPT | Performed by: NURSE PRACTITIONER

## 2022-10-13 PROCEDURE — 80053 COMPREHEN METABOLIC PANEL: CPT | Performed by: NURSE PRACTITIONER

## 2022-10-13 PROCEDURE — 25010000002 HEPARIN (PORCINE) PER 1000 UNITS: Performed by: NURSE PRACTITIONER

## 2022-10-13 RX ORDER — AMOXICILLIN 250 MG
2 CAPSULE ORAL DAILY
Qty: 60 TABLET | Refills: 0 | Status: SHIPPED | OUTPATIENT
Start: 2022-10-13

## 2022-10-13 RX ORDER — OXYCODONE HYDROCHLORIDE AND ACETAMINOPHEN 5; 325 MG/1; MG/1
1 TABLET ORAL NIGHTLY
Refills: 0
Start: 2022-10-20 | End: 2023-01-10

## 2022-10-13 RX ORDER — CYCLOBENZAPRINE HCL 10 MG
10 TABLET ORAL 3 TIMES DAILY PRN
Qty: 90 TABLET | Refills: 0 | Status: SHIPPED | OUTPATIENT
Start: 2022-10-13 | End: 2022-10-24

## 2022-10-13 RX ORDER — HYDROCODONE BITARTRATE AND ACETAMINOPHEN 7.5; 325 MG/1; MG/1
1 TABLET ORAL EVERY 6 HOURS PRN
Qty: 28 TABLET | Refills: 0 | Status: SHIPPED | OUTPATIENT
Start: 2022-10-13 | End: 2022-10-24

## 2022-10-13 RX ADMIN — POLYETHYLENE GLYCOL 3350 17 G: 17 POWDER, FOR SOLUTION ORAL at 09:08

## 2022-10-13 RX ADMIN — Medication 10 ML: at 09:09

## 2022-10-13 RX ADMIN — DOCUSATE SODIUM 50 MG AND SENNOSIDES 8.6 MG 2 TABLET: 8.6; 5 TABLET, FILM COATED ORAL at 09:08

## 2022-10-13 RX ADMIN — LEVOTHYROXINE SODIUM 112 MCG: 112 TABLET ORAL at 05:06

## 2022-10-13 RX ADMIN — HEPARIN SODIUM 5000 UNITS: 5000 INJECTION, SOLUTION INTRAVENOUS; SUBCUTANEOUS at 09:08

## 2022-10-13 RX ADMIN — CLONIDINE HYDROCHLORIDE 0.1 MG: 0.1 TABLET ORAL at 05:06

## 2022-10-13 RX ADMIN — CYCLOBENZAPRINE 10 MG: 10 TABLET, FILM COATED ORAL at 09:08

## 2022-10-13 RX ADMIN — OXYCODONE AND ACETAMINOPHEN 1 TABLET: 325; 10 TABLET ORAL at 05:06

## 2022-10-13 RX ADMIN — PANTOPRAZOLE SODIUM 40 MG: 40 TABLET, DELAYED RELEASE ORAL at 05:06

## 2022-10-13 NOTE — PROGRESS NOTES
NEUROSURGERY DAILY PROGRESS NOTE    ASSESSMENT:   Sonu Bravo Jr. is a 77 y.o. with a significant medical history of DVT on Xarelto, A. fib hypertension, hyperlipidemia hypothyroidism, and tobacco abuse.  He presents with a new problem of lumbar back pain and lower extremity dysesthesias, 80% bilateral feet, 20% back. MARY: 22.  Physical exam findings of bilateral lower extremity cyanosis with 1+ left lower extremity pitting edema, no focal weakness, right upper and lower extremity hyperreflexia with right Babinski, and a relatively well-maintained gait.  No recent imaging of the lumbar spine.    Past Medical History:   Diagnosis Date   • Arthritis    • Atrial fibrillation (HCC) 08/31/2021   • Diverticulitis    • GERD (gastroesophageal reflux disease)    • Hyperlipidemia    • Hypertension      Active Hospital Problems    Diagnosis    • **Cervical spondylosis with myelopathy    • Former smoker      PLAN:   Neuro: Stable.  Bright and awake.  Oriented x3.  Follows commands without prompting.  Moves all extremities.  Voice quality appropriate.  Capable of swallowing.   2 Days Post-Op (10/11/2022) ACDF C4-5 and C5-6   SAUL = 25 mL, removed   Postop x-rays stable   Cervical collar in place.  Trachea midline.   Neurochecks every 4 hours     CV: Hypertension overnight.  We will continue to monitor.  Pulm: Maintaining O2 sat.  Continuous pulse oximetry.  Incentive spirometry.   : Voiding spontaneously.  Bladder scans and I/O cath per policy.   FEN: Tolerating regular diet.    GI: No acute issues.    ID: 23-hour postoperative prophylactic antibiotics complete.    Heme:  DVT prophylaxis; SCD's  Pain: Tolerable with oral meds.     Dispo: PT/OT.      DC home today with assist    CHIEF COMPLAINT:   Upper extremity weakness    Subjective  Symptom stable.  Doing well    Temp:  [97.4 °F (36.3 °C)-98 °F (36.7 °C)] 97.4 °F (36.3 °C)  Heart Rate:  [65-82] 68  Resp:  [16-18] 16  BP: (131-179)/(66-80) 131/66    Objective:  General  "Appearance:  Well-appearing and in no acute distress.    Vital signs: (most recent): Blood pressure 131/66, pulse 68, temperature 97.4 °F (36.3 °C), temperature source Oral, resp. rate 16, height 177.8 cm (70\"), weight 82.4 kg (181 lb 9.6 oz), SpO2 97 %.        Neurologic Exam     Mental Status   Oriented to person, place, and time.   Attention: normal. Concentration: normal.   Speech: speech is normal   Level of consciousness: alert    Bright and awake.  Oriented x3.  Follows commands without prompting showing thumbs up and 2 fingers bilaterally.     Cranial Nerves     CN II   Visual fields full to confrontation.     CN III, IV, VI   Pupils are equal, round, and reactive to light.  Extraocular motions are normal.     CN V   Facial sensation intact.     CN VII   Facial expression full, symmetric.     CN VIII   CN VIII normal.     CN IX, X   CN IX normal.     CN XI   CN XI normal.     Motor Exam   Right arm tone: normal  Left arm tone: normal  Right leg tone: normal  Left leg tone: normal    Strength   Right deltoid: 5/5  Left deltoid: 5/5  Right biceps: 5/5  Left biceps: 5/5  Right triceps: 5/5  Left triceps: 5/5  Right wrist extension: 5/5  Left wrist extension: 5/5  Right iliopsoas: 5/5  Left iliopsoas: 5/5  Right quadriceps: 5/5  Left quadriceps: 5/5  Right anterior tibial: 5/5  Left anterior tibial: 5/5  Right gastroc: 5/5  Left gastroc: 5/5  Right EHL 5/5  Left EHL 5/5       Sensory Exam   Right arm light touch: normal  Left arm light touch: normal  Right leg light touch: normal  Left leg light touch: normal    Gait, Coordination, and Reflexes     Tremor   Resting tremor: absent  Intention tremor: absent  Action tremor: absent    Incision: C, D, I    Drains:   Urethral Catheter Non-latex;Silicone 16 Fr. (Active)       [REMOVED] Chest Tube Right (Removed)       Output by Drain (mL) 10/12/22 0701 - 10/12/22 1900 10/12/22 1901 - 10/13/22 0700 10/13/22 0701 - 10/13/22 1103 Range Total   Closed/Suction Drain 1 " Anterior Neck Bulb 10 Fr. 10 15  25       Imaging Results (Last 24 Hours)     Procedure Component Value Units Date/Time    XR Spine Cervical 2 View [[id number]] Collected: 10/13/22 0846     Updated: 10/13/22 0850    Narrative:      HISTORY: Postoperative assessment following anterior cervical spine  fusion     Cervical spine: Frontal and lateral views of cervical spine are  obtained.     COMPARISON: Preoperative study 5/5/2022     FINDINGS: Anterior cervical fusion of C4-C6 with interposition graft  placement. Stable alignment with sagittal spondylolisthesis at C2/C3 and  C3/C4. Surgical drain within the anterior prevertebral soft tissues at  site of surgery. Moderate to advanced facet osteoarthropathy with  uncinate process hypertrophy.       Impression:      1. Anterior cervical fusion at C4-C6 with interbody graft placement.  Surgical drain within the prevertebral soft tissues. Stable alignment.  This report was finalized on 10/13/2022 08:47 by Dr. Dyan Rashid MD.        Lab Results (last 24 hours)     Procedure Component Value Units Date/Time    Comprehensive Metabolic Panel [[id number]]  (Abnormal) Collected: 10/13/22 0512    Specimen: Blood Updated: 10/13/22 0624     Glucose 105 mg/dL      BUN 18 mg/dL      Creatinine 0.92 mg/dL      Sodium 141 mmol/L      Potassium 3.8 mmol/L      Chloride 102 mmol/L      CO2 27.0 mmol/L      Calcium 9.3 mg/dL      Total Protein 6.6 g/dL      Albumin 4.10 g/dL      ALT (SGPT) 28 U/L      AST (SGOT) 23 U/L      Alkaline Phosphatase 65 U/L      Total Bilirubin 0.5 mg/dL      Globulin 2.5 gm/dL      A/G Ratio 1.6 g/dL      BUN/Creatinine Ratio 19.6     Anion Gap 12.0 mmol/L      eGFR 85.1 mL/min/1.73      Comment: National Kidney Foundation and American Society of Nephrology (ASN) Task Force recommended calculation based on the Chronic Kidney Disease Epidemiology Collaboration (CKD-EPI) equation refit without adjustment for race.       Narrative:      GFR Normal >60  Chronic  Kidney Disease <60  Kidney Failure <15      CBC Auto Differential [[id number]]  (Abnormal) Collected: 10/13/22 0512    Specimen: Blood Updated: 10/13/22 0601     WBC 10.14 10*3/mm3      RBC 4.57 10*6/mm3      Hemoglobin 14.5 g/dL      Hematocrit 43.4 %      MCV 95.0 fL      MCH 31.7 pg      MCHC 33.4 g/dL      RDW 14.7 %      RDW-SD 51.5 fl      MPV 10.5 fL      Platelets 154 10*3/mm3      Neutrophil % 58.7 %      Lymphocyte % 28.3 %      Monocyte % 11.0 %      Eosinophil % 1.1 %      Basophil % 0.6 %      Immature Grans % 0.3 %      Neutrophils, Absolute 5.95 10*3/mm3      Lymphocytes, Absolute 2.87 10*3/mm3      Monocytes, Absolute 1.12 10*3/mm3      Eosinophils, Absolute 0.11 10*3/mm3      Basophils, Absolute 0.06 10*3/mm3      Immature Grans, Absolute 0.03 10*3/mm3      nRBC 0.0 /100 WBC         [id number]  Brennan Salinas, APRN

## 2022-10-13 NOTE — DISCHARGE INSTRUCTIONS
[facility] Neurosurgery    Postoperative care following spine surgery  Dear Patient,  You have recently undergone spine surgery (C4-5 and C5-6 ACDF) and are now ready to go home. These written instructions are intended to help you to recover quickly.  If you have ANY QUESTIONS about your condition prior to discharge please ask Dr. Cifuentes. In particular, if you have concerns about going home discuss them now. We do not want you to go until you are completely comfortable leaving the hospital.   If you have ANY QUESTIONS about your condition after you go home call your doctor. The number is [phone] which is answered 24 hours a day. During regular working hours a  will connect you to your doctor, one of his partners, or one of our nurses. At night or on weekends the answering service will connect you with the physician on call. DO NOT HESITATE to call. We want to help you with any problems.     Deep vein thrombosis/ pulmonary embolus  Some patients who undergo surgery develop blood clots in the veins of the legs. These clots can cause pain or swelling in the legs, or may cause no obvious problem. They can break free from the legs and travel to the lungs causing shortness of breath and/or chest pain.you develop pain or swelling in your legs after surgery, call your doctor. If you develop breathing problems or chest pain after surgery, call [phone].    Neurological Deficit  Neurological deficits are problems with brain function like speech difficulty, weakness, numbness, imbalance, etc. These deficits may be present before or after spine surgery. Prior to discharge your doctor will make sure that all treatment needed to help you recover from such deficits has been instituted. He will also make sure that these deficits are stable or improving. After you go home, if you think any of your spine problems are getting worse, not better, it may be a sign of bleeding, infection, or other problems. Call your  doctor. He will order tests and prescribe treatment as needed.    Activity Restrictions  In general after surgery you will be on restricted activities for several weeks to several months depending on the nature of your operation. For six weeks you should avoid heavy lifting (over 8 lbs or roughly a gallon of milk), bending, or stooping. You may be released by Dr. Cifuentes earlier. You may return to driving when you feel comfortable enough that you can safely handle your vehicle AND you are not taking narcotic pain medications. This may be as early as 10 - 14 days, but could be longer as instructed by your neurosurgeon. After surgery you may gradually increase your activities, as you are able to tolerate. Walking is an excellent low impact exercise to begin after surgery. You should try to make regular walks of 15 to 30 minutes part of your postoperative recovery as you become able to do so. It typically requires a period of days to a few weeks to reach this level of activity and should be done in a gradual fashion. Your return to work will depend on your job requirements. In general, you may return to light duty work as soon as you feel comfortable and are not taking routine narcotic pain medications.    Medication  It is important to take your medication EXACTLY as prescribed. Some patients are reluctant to take pain medication. It is perfectly fine to take pain medication for several weeks after surgery. We want to eliminate pain whenever possible. Many pain medications can cause nausea (sick to your stomach), constipation (inability to poop), or itching. Nausea may be minimized by taking the medication with food. Constipation can be relieved by taking stool softeners and/ or laxatives that you can purchase over the counter as needed.    It is important to realize that no pain after surgery is an unrealistic expectation.  Pain medication will never reduce your pain score to zero.  The goal of pain medicine is to  reduce your pain to the point you can move, take care of yourself, and participate in therapy.  Make sure to work with your caregiver to determine what is an adequate level of pain control to promote healthy movement and then take your medication to reach this goal.      You have undergone a Two-level ACDF (3) surgery which you are expected to recover from over several weeks.     Use of opioids immediately following surgery is often necessary.  Pain after surgery results in decreased quality of life, surgical complications, and prolonged rehabilitation.  Thus in certain situations, the benefits of a limited course of opioids may outweigh the risk.      However, multiple studies have found that patients of all ages frequently take fewer opioid pills than the amount prescribed after common surgeries.  In some cases they do not take any of the prescribed medications at all.  This results in excess opioid pills that are accessible to others, raise a concern for misuse, can be stolen by family members, can result in later addiction, or can often be used in overdose.  Therefore we are going to make every effort to only prescribe as much pain medication as necessary to limit the amount of pills that are left over after you recover.      First-line treatment should include nonopioid analgesics.  Examples of these would be Tylenol or nonsteroidal anti-inflammatories such as ibuprofen or Aleve.  - Tylenol 1000 mg every six hours as needed    Second-line treatment. If the above first-line treatments are insufficient to control your pain you have also been provided a small dose of opioids.  In order to avoid addiction you should take the lowest amount possible.    Type III: Opioids prescription for 14 days or less.  Prescriptions will be provided weekly.  (week 1 every 6 hours and week 2 if indicated, every 8 hours).  Will consider an additional 7-day course upon request (every 12 hours)    EXAMPLE IF APPLICABLE:  Please taper  your pain medications to avoid long term addiction.  Week 1: Take your pain medication no more than ever 6 hours as needed for severe pain.  Week 2: Take your pain medication no more than every 8 hours as needed for severe pain.  Week 3: Take your pain medication no more than every 12 hours as needed for unresolved pain.    These recommendations are based on ...  CDC guidelines for control and prevention of postsurgical pain,   [place] opioid prescribing engagement network (OPEN),   [place] in [facility] medical directors group prescribing opioids for postoperative pain-supplemental guidance (2018)    Wound Care  Your incision is held together with dissolvable sutures that do not need to be removed.     Seventy-two hours after surgery it is OK to get the wound wet, so you can take a shower or bath.     You do not need to put any medication (like Neosporin or Vitamin E) on the wound. Scrubbing the wound should be avoided until the staples nondissolvable sutures come out.     Heating pads have the potential to cause very serious burns, especially in patients using narcotic pain medications (e.g. Oxycodone, Oxycontin, etc.) Do not use heating pads during your recovery.      No nicotine products, including second-hand smoke, gum or patches. Nicotine will delay healing and increase the likelihood of a surgical complication. For help quitting, call the Quitline: 1-451.987.2224     Potential wound problems include the following:  Infection--If the wound becomes red, tender, swollen, or warm it may be infected. Infection is often accompanied by fever. If you think your wound might be infected you should call your doctor. Often you can send us a picture of the wound so we can better evaluate it.   Drainage--Fluid should not drain from your wound. If it does, call your doctor. Colored fluid may indicate infection. Clear fluid may indicate leakage of spinal fluid.   Dehiscence--If the wound does not  heal properly it may open up along the staple line. This is called dehiscence. Call us immediately.   Sutures--Occasionally, one of the buried threads (sutures) may work through the skin. If you think this has happened call your doctor.   Swelling--Spinal fluid or blood may collect under the skin. This is usually harmless, but needs to be evaluated. Call your doctor.     How to contact your doctor  Dr. Cifuentes and his team did your surgery and, therefore, are likely to know more about your condition than any other physicians. We are immediately available to help you with any problems after surgery. Please call us for any concerns at the following numbers:  Doctor’s office:  [phone] (answered 24 hours a day)   [facility] : [phone] (alternative emergency number for on-call neurosurgeon)     Specific instructions related to your surgery  Diet: no restrictions, eat a heart healthy diet. If you are diabetic, tightly controlling your blood sugar over the next 2 weeks is crucial in controlling infection.     Activity: as tolerated, no heavy lifting or strenuous exercise for at least 2 weeks.    Brace/collar instructions: Cervical collar should be worn at all times except while bathing.  Please shower daily allowing clean soapy water to cleanse wound.  If you have had a posterior cervical fusion, place a clean dry gauze over your incision before putting cervical collar on.    Please do not use NSAIDs (Ibuprofen, Toradol, etc) for 4 weeks following spinal fusion, as this can prevent bone growth. Tylenol is acceptable as an over the counter pain management.     Follow up:   Follow up with with Brennan HERNANDEZ in 2 weeks for post op wound check and with Dr. Cifuentes in the neurosurgery clinic ([phone]) in 6 weeks.  X-rays of the cervical spine prior to arrival. We will schedule this appointment for you.            Sincerely,        Evaristo Cifuentes MD, PhD, MPH

## 2022-10-13 NOTE — PLAN OF CARE
Goal Outcome Evaluation:               Patient resting well throughout the night.  Cervical collar in place.  SAUL to bulb suction.  15cc emptied from SAUL throughout the shift.  No drainage noted to drsg to right anterior neck covering SAUL drain.  Voiding per urinal.  No new complaints voiced or changes noted in patient's condition.

## 2022-10-13 NOTE — PLAN OF CARE
Goal Outcome Evaluation:  Plan of Care Reviewed With: patient        Progress: improving  Outcome Evaluation: A&Ox4. C/o mild neck pain. Drsg with scant amount of dried drainage. C-collar in place, removed for assessment. No abnormalities noted. SAUL removed by MD. , Room air. ECTO2. Denies numbness/tingling. Peripheral pulses palpable. SCD when in bed. Call liight within reach. Safety maintained. Plans to d/c home with family.

## 2022-10-13 NOTE — DISCHARGE SUMMARY
DISCHARGE SUMMARY FROM HOSPITAL    Date of Discharge:  10/13/2022    Presenting Diagnosis/History of Present Illness  Cervical spondylosis with myelopathy [M47.12]  Former smoker [Z87.891]    Medical History   Patient Active Problem List   Diagnosis   • Abdominal aortic ectasia (HCC)   • Vitamin D deficiency   • Spinal stenosis of lumbar region with neurogenic claudication   • Mixed hyperlipidemia   • Acquired hypothyroidism   • Gastro-esophageal reflux disease with esophagitis   • Essential hypertension   • Former smoker   • Paroxysmal atrial fibrillation (HCC)   • Current use of long term anticoagulation   • Cavitary lesion of lung   • Colonic diverticular abscess   • History of DVT (deep vein thrombosis)   • Cervical spondylosis with myelopathy     Discharge Diagnosis/ Active Hospital Problems:   Active Hospital Problems    Diagnosis    • **Cervical spondylosis with myelopathy    • Former smoker      Hospital Course  Patient is a 77 y.o. male presented with a significant medical history of DVT on Xarelto, A. fib hypertension, hyperlipidemia hypothyroidism, and tobacco abuse.  He presents with a new problem of lumbar back pain and lower extremity dysesthesias, 80% bilateral feet, 20% back. MARY: 22.  Physical exam findings of bilateral lower extremity cyanosis with 1+ left lower extremity pitting edema, no focal weakness, right upper and lower extremity hyperreflexia with right Babinski, and a relatively well-maintained gait.  No recent imaging of the lumbar spine.      On 10/11/2022 the patient was brought to the main operating room where he underwent an uneventful anterior cervical discectomy and fusion of C4-5 and C5-6.  Postoperatively he  did extremely well and his neurological exam remained unchanged.  He was kept in the hospital for close neurologic monitoring, airway observation, and for pain control.  He has been able to ambulate, tolerate oral diet, oral pain medications and void.   He has been evaluated  by physical therapy and occupational therapy and deemed safe for discharge home with family.   Postoperative education was performed at bedside which included wound care instructions, maximal physical activity, use of postoperative pain medication including tapering, and indications for contacting the neurosurgical office vs the emergency department.  Arrangements for postoperative follow-up should be provided prior to hospital discharge.  He will be provided with an appropriate course of oral medication for pain control.  Additional information provided in hospital discharge instructions.    Procedures Performed  Procedure(s):  CERVICAL DISCECTOMY ANTERIOR WITH FUSION, Cervical 4/5 and 5/6       Consults:   Consults     No orders found from 9/12/2022 to 10/12/2022.        Pertinent Test Results:   FL C Arm During Surgery    Result Date: 10/11/2022   Intraoperative fluoroscopic images during discectomy.  Please refer to the operative note for more details. This report was finalized on 10/11/2022 15:24 by Dr. Rajat Armijo MD.    XR Spine Cervical 2 View    Result Date: 10/13/2022  1. Anterior cervical fusion at C4-C6 with interbody graft placement. Surgical drain within the prevertebral soft tissues. Stable alignment. This report was finalized on 10/13/2022 08:47 by Dr. Dyan Rashid MD.    XR Spine Cervical 2 View    Result Date: 10/11/2022   Intraoperative fluoroscopic images during discectomy.  Please refer to the operative note for more details. This report was finalized on 10/11/2022 15:24 by Dr. Rajat Armijo MD.    CBC:   Results from last 7 days   Lab Units 10/13/22  0512 10/12/22  0531   WBC 10*3/mm3 10.14 10.03   HEMOGLOBIN g/dL 14.5 15.0   HEMATOCRIT % 43.4 44.2   PLATELETS 10*3/mm3 154 163     BMP:  Results from last 7 days   Lab Units 10/13/22  0512 10/12/22  0531   SODIUM mmol/L 141 140   POTASSIUM mmol/L 3.8 4.2   CHLORIDE mmol/L 102 104   CO2 mmol/L 27.0 27.0   BUN mg/dL 18 15   CREATININE mg/dL  0.92 0.87   GLUCOSE mg/dL 105* 112*   CALCIUM mg/dL 9.3 9.5   ALT (SGPT) U/L 28 21     Culture Results: No results found for: BLOODCX, URINECX, WOUNDCX, MRSACX, RESPCX, STOOLCX    Condition on Discharge:  Stable    Vital Signs  Temp:  [97.4 °F (36.3 °C)-98 °F (36.7 °C)] 97.4 °F (36.3 °C)  Heart Rate:  [65-82] 68  Resp:  [16-18] 16  BP: (131-179)/(66-80) 131/66    Physical Exam:   Physical Exam  Vitals and nursing note reviewed.   Constitutional:       General: He is not in acute distress.     Appearance: Normal appearance. He is well-developed, well-groomed and overweight. He is not ill-appearing, toxic-appearing or diaphoretic.      Comments: BMI 26.06   HENT:      Head: Normocephalic and atraumatic.      Right Ear: Hearing normal.      Left Ear: Hearing normal.   Eyes:      Extraocular Movements: EOM normal.      Conjunctiva/sclera: Conjunctivae normal.      Pupils: Pupils are equal, round, and reactive to light.   Neck:      Trachea: Trachea normal.     Cardiovascular:      Rate and Rhythm: Normal rate and regular rhythm.   Pulmonary:      Effort: Pulmonary effort is normal. No tachypnea, bradypnea, accessory muscle usage or respiratory distress.   Abdominal:      Palpations: Abdomen is soft.   Musculoskeletal:      Cervical back: Neck supple. No edema or erythema.   Skin:     General: Skin is warm and dry.   Neurological:      Mental Status: He is alert and oriented to person, place, and time.      GCS: GCS eye subscore is 4. GCS verbal subscore is 5. GCS motor subscore is 6.   Psychiatric:         Speech: Speech normal.         Behavior: Behavior normal. Behavior is cooperative.          Neurologic Exam     Mental Status   Oriented to person, place, and time.   Attention: normal. Concentration: normal.   Speech: speech is normal   Level of consciousness: alert    Bright and awake.  Oriented x3.  Follows commands without prompting showing thumbs up and 2 fingers bilaterally.     Cranial Nerves     CN II   Visual  fields full to confrontation.     CN III, IV, VI   Pupils are equal, round, and reactive to light.  Extraocular motions are normal.     CN V   Facial sensation intact.     CN VII   Facial expression full, symmetric.     CN VIII   CN VIII normal.     CN IX, X   CN IX normal.     CN XI   CN XI normal.     Motor Exam   Right arm tone: normal  Left arm tone: normal  Right leg tone: normal  Left leg tone: normal    Strength   Right deltoid: 5/5  Left deltoid: 5/5  Right biceps: 5/5  Left biceps: 5/5  Right triceps: 5/5  Left triceps: 5/5  Right wrist extension: 5/5  Left wrist extension: 5/5  Right iliopsoas: 5/5  Left iliopsoas: 5/5  Right quadriceps: 5/5  Left quadriceps: 5/5  Right anterior tibial: 5/5  Left anterior tibial: 5/5  Right gastroc: 5/5  Left gastroc: 5/5  Right EHL 5/5  Left EHL 5/5       Sensory Exam   Right arm light touch: normal  Left arm light touch: normal  Right leg light touch: normal  Left leg light touch: normal    Gait, Coordination, and Reflexes     Tremor   Resting tremor: absent  Intention tremor: absent  Action tremor: absent    Discharge Disposition  Home or Self Care    Discharge Medications     Discharge Medications      New Medications      Instructions Start Date   cyclobenzaprine 10 MG tablet  Commonly known as: FLEXERIL   10 mg, Oral, 3 Times Daily PRN      HYDROcodone-acetaminophen 7.5-325 MG per tablet  Commonly known as: NORCO   1 tablet, Oral, Every 6 Hours PRN      sennosides-docusate 8.6-50 MG per tablet  Commonly known as: PERICOLACE   2 tablets, Oral, Daily         Changes to Medications      Instructions Start Date   apixaban 5 MG tablet tablet  Commonly known as: ELIQUIS  What changed: These instructions start on October 18, 2022. If you are unsure what to do until then, ask your doctor or other care provider.   5 mg, Oral, 2 Times Daily   Start Date: October 18, 2022     oxyCODONE-acetaminophen 5-325 MG per tablet  Commonly known as: PERCOCET  What changed: These  instructions start on October 20, 2022. If you are unsure what to do until then, ask your doctor or other care provider.   1 tablet, Oral, Nightly   Start Date: October 20, 2022        Continue These Medications      Instructions Start Date   acetaminophen 325 MG tablet  Commonly known as: TYLENOL   650 mg, Oral, Every 4 Hours PRN      bisoprolol 5 MG tablet  Commonly known as: ZEBeta   5 mg, Oral, Daily      Cholecalciferol 100 MCG (4000 UT) capsule   4,000 Units, Oral, Daily      coenzyme Q10 100 MG capsule   100 mg, Oral, Daily      Cyanocobalamin 2500 MCG sublingual tablet   2,500 mcg, Sublingual, Daily      fexofenadine 180 MG tablet  Commonly known as: ALLEGRA   180 mg, Oral, Daily      fluticasone 50 MCG/ACT nasal spray  Commonly known as: FLONASE   1 spray, Nasal, Daily      levothyroxine 112 MCG tablet  Commonly known as: SYNTHROID, LEVOTHROID   112 mcg, Oral, Daily      multivitamin with minerals tablet tablet   1 capsule, Oral, Daily      omeprazole 20 MG capsule  Commonly known as: priLOSEC   20 mg, Oral, Daily      rosuvastatin 20 MG tablet  Commonly known as: CRESTOR   20 mg, Oral, Daily      Zinc 50 MG capsule   1 capsule, Oral, Daily         Stop These Medications    ibuprofen 200 MG tablet  Commonly known as: ADVIL,MOTRIN          Discharge Diet:   Diet Instructions     Advance Diet As Tolerated           Activity at Discharge:   Activity Instructions     Activity as Tolerated      Bathing Restrictions      May shower 72 hours postoperatively.  No tub baths.  Showers only.  Allow clean soapy water to cleanse wound.  Do not scrub incision.    Type of Restriction: Bathing    Bathing Restrictions: No Tub Bath    Driving Restrictions      NO DRIVING WHILE WEARING CERVICAL COLLAR    Type of Restriction: Driving    Driving Restrictions: No Driving While Taking Narcotics    Gradually Increase Activity Until at Pre-Hospitalization Level      Lifting Restrictions      Type of Restriction: Lifting    Lifting  Restrictions: Lifting Restriction (Indicate Limit)    Weight Limit (Pounds): 8    Length of Lifting Restriction: 2-3 WEEKS         Follow-up Appointments  Future Appointments   Date Time Provider Department Center   11/1/2022  1:00 PM PAD US [facility] CART 1 [facility] PAD [facility] P[facility]   11/1/2022  2:00 PM PAD US [facility] CART 2 [facility] PAD US PAD   11/1/2022  2:30 PM Wilfred Estes,  [facility] PAD PAD     Additional Instructions for the Follow-ups that You Need to Schedule     Call MD With Problems / Concerns   As directed      Instructions: Call for worsening pain or a concern for a postoperative incision infection (increased incisional redness, swelling, warmth, or drainage/discharge).    Order Comments: Instructions: Call for worsening pain or a concern for a postoperative incision infection (increased incisional redness, swelling, warmth, or drainage/discharge).          Discharge Follow-up with Specified Provider: Dr. Cifuentes; 6 Weeks   As directed      To: Dr. Cifuentes    Follow Up: 6 Weeks    Follow Up Details: 6-8 WEEKS WITH XRAYS OF THE CERVICAL SPINE         Discharge Follow-up with Specified Provider: MARY Brown; 2 Weeks   As directed      To: MARY Brown    Follow Up: 2 Weeks    Follow Up Details: Postop wound check         XR spine cervical 2 vw   Nov 27, 2022      Exam reason: Eval status post C4-5 and C5-6 ACDF    Release to patient: Routine Release             Test Results Pending at Discharge  None     MARY Pal  10/13/22  11:11 [facility]    [id number]

## 2022-10-13 NOTE — THERAPY DISCHARGE NOTE
Acute Care - Physical Therapy Discharge Summary  [facility]       Patient Name: Sonu Bravo Jr.  : 1945  MRN: [id number]    Today's Date: 10/13/2022                 Admit Date: 10/11/2022      PT Recommendation and Plan    Visit Dx:    ICD-10-CM ICD-9-CM   1. Status post cervical spinal fusion  Z98.1 V45.4   2. Cervical spondylosis with myelopathy  M47.12 721.1   3. Impaired mobility  Z74.09 799.89   4. Paroxysmal atrial fibrillation (HCC)  I48.0 427.31   5. History of DVT (deep vein thrombosis)  Z86.718 V12.51        Outcome Measures     Row Name 10/13/22 0811             How much help from another person do you currently need...    Turning from your back to your side while in flat bed without using bedrails? 4  -LY      Moving from lying on back to sitting on the side of a flat bed without bedrails? 4  -LY      Moving to and from a bed to a chair (including a wheelchair)? 3  -LY      Standing up from a chair using your arms (e.g., wheelchair, bedside chair)? 3  -LY      Climbing 3-5 steps with a railing? 3  -LY      To walk in hospital room? 3  -LY      AM-PAC 6 Clicks Score (PT) 20  -LY         Functional Assessment    Outcome Measure Options AM-PAC 6 Clicks Basic Mobility (PT)  -LY            User Key  (r) = Recorded By, (t) = Taken By, (c) = Cosigned By    Initials Name Provider Type    Ruchi Bose PTA Physical Therapist Assistant                 PT Charges     Row Name 10/13/22 0931             Time Calculation    Start Time 0811  -LY      Stop Time 0828  -LY      Time Calculation (min) 17 min  -LY      PT Received On 10/13/22  -LY         Time Calculation- PT    Total Timed Code Minutes- PT 17 minute(s)  -LY         Timed Charges    99345 - Gait Training Minutes  17  -LY         Total Minutes    Timed Charges Total Minutes 17  -LY       Total Minutes 17  -LY            User Key  (r) = Recorded By, (t) = Taken By, (c) = Cosigned By    Initials Name Provider Type    Ruchi Bose PTA  Physical Therapist Assistant                 PT Rehab Goals     Row Name 10/13/22 1505             Bed Mobility Goal 1 (PT)    Activity/Assistive Device (Bed Mobility Goal 1, PT) bed mobility activities, all  -AB      [place] Level/Cues Needed (Bed Mobility Goal 1, PT) independent  -AB      Time Frame (Bed Mobility Goal 1, PT) long term goal (LTG)  -AB      Progress/Outcomes (Bed Mobility Goal 1, PT) goal not met  -AB         Transfer Goal 1 (PT)    Activity/Assistive Device (Transfer Goal 1, PT) sit-to-stand/stand-to-sit;bed-to-chair/chair-to-bed  -AB      [place] Level/Cues Needed (Transfer Goal 1, PT) modified independence  -AB      Time Frame (Transfer Goal 1, PT) long term goal (LTG)  -AB      Progress/Outcome (Transfer Goal 1, PT) new goal;goal not met;discharged from facility  -AB         Gait Training Goal 1 (PT)    Activity/Assistive Device (Gait Training Goal 1, PT) gait (walking locomotion);assistive device use;decrease asymmetrical patterns;decrease fall risk;diminish gait deviation;backward stepping;improve balance and speed;increase endurance/gait distance;increase energy conservation;walker, rolling  -AB      [place] Level (Gait Training Goal 1, PT) modified independence  -AB      Distance (Gait Training Goal 1, PT) 200  -AB      Time Frame (Gait Training Goal 1, PT) long term goal (LTG)  -AB      Progress/Outcome (Gait Training Goal 1, PT) goal not met  -AB         Stairs Goal 1 (PT)    Activity/Assistive Device (Stairs Goal 1, PT) stairs, all skills;ascending stairs;descending stairs;using handrail, left;using handrail, right;step-to-step;decrease fall risk;improve balance and speed;maintain weight-bearing status;assistive device use  -AB      [place] Level/Cues Needed (Stairs Goal 1, PT) modified independence  -AB      Number of Stairs (Stairs Goal 1, PT) 5  -AB      Time Frame (Stairs Goal 1, PT) long term goal (LTG)  -AB      Progress/Outcome (Stairs Goal 1, PT) goal not met   -AB         Patient Education Goal (PT)    Activity (Patient Education Goal, PT) Pt demonstrates ability to independently don and doff cervical brace appropriately  -AB      [place]/Cues/Accuracy (Memory Goal 2, PT) demonstrates adequately;independent;verbalizes understanding  -AB      Time Frame (Patient Education Goal, PT) long term goal (LTG)  -AB      Progress/Outcome (Patient Education Goal, PT) goal partially met  -AB            User Key  (r) = Recorded By, (t) = Taken By, (c) = Cosigned By    Initials Name Provider Type Discipline    Catrina Zelaya PTA Physical Therapist Assistant PT                    PT Discharge Summary  Anticipated Discharge Disposition (PT): home with assist  Reason for Discharge: Discharge from facility  Outcomes Achieved: Refer to plan of care for updates on goals achieved  Discharge Destination: Home with assist      Catrina Smith PTA   10/13/2022        36.1

## 2022-10-13 NOTE — PLAN OF CARE
Goal Outcome Evaluation:  Plan of Care Reviewed With: patient, spouse        Progress: improving  Outcome Evaluation: PT tx completed. Pt up in chair. Reports having a good night and is feeling better today. SBA for sit to stands with RWX. Amb 150' with RWX and SBA. Occasional cues for posture. Pt amb without AD ~20' to transport chair with CGA, no LOB. Will cont to follow. Recommend home with assist.

## 2022-10-13 NOTE — PLAN OF CARE
Goal Outcome Evaluation:  Plan of Care Reviewed With: patient        Progress: no change   Pt alert and oriented x4. VSS. Pt c/o pain, prn medications given with some relief. LEE. PPP. SCDs and heparin for VTE. , room air during day, 2L NC at night. Tolerating prescribed diet. Incision site, CDI. SAUL drain, small drainage at site, dressing reinforced. Voiding via urinal. Up x 1 w/ C-collar and walker. Last BM 10/12. Bed alarm set. Family in room. Call light within reach. Safety maintained.

## 2022-10-13 NOTE — THERAPY TREATMENT NOTE
Acute Care - Physical Therapy Treatment Note  [facility]     Patient Name: Sonu Bravo Jr.  : 1945  MRN: [id number]  Today's Date: 10/13/2022      Visit Dx:     ICD-10-CM ICD-9-CM   1. Cervical spondylosis with myelopathy  M47.12 721.1   2. Impaired mobility  Z74.09 799.89     Patient Active Problem List   Diagnosis   • Abdominal aortic ectasia (HCC)   • Vitamin D deficiency   • Spinal stenosis of lumbar region with neurogenic claudication   • Mixed hyperlipidemia   • Acquired hypothyroidism   • Gastro-esophageal reflux disease with esophagitis   • Essential hypertension   • Former smoker   • Paroxysmal atrial fibrillation (HCC)   • Current use of long term anticoagulation   • Cavitary lesion of lung   • Colonic diverticular abscess   • History of DVT (deep vein thrombosis)   • Cervical spondylosis with myelopathy     Past Medical History:   Diagnosis Date   • Arthritis    • Atrial fibrillation (HCC) 2021   • Diverticulitis    • GERD (gastroesophageal reflux disease)    • Hyperlipidemia    • Hypertension      Past Surgical History:   Procedure Laterality Date   • COLON SURGERY  2022    Dr Fairbanks at [facility]   • COLONOSCOPY N/A 2021    Procedure: COLONOSCOPY WITH ANESTHESIA;  Surgeon: Thomas Dc DO;  Location: [facility] ENDOSCOPY;  Service: Gastroenterology;  Laterality: N/A;  pre op; abnormal ct scan  post op: diverticulosis ; polyps   PCP: Vaughn Kramer DO   • HERNIA REPAIR       PT Assessment (last 12 hours)     PT Evaluation and Treatment     Row Name 10/13/22 08          Physical Therapy Time and Intention    Subjective Information complains of;pain  -LY     Document Type therapy note (daily note)  -LY     Mode of Treatment physical therapy  -LY     Row Name 10/13/22 08          General Information    Existing Precautions/Restrictions fall;spinal;brace on at all times  -LY     Row Name 10/13/22 08          Pain    Pretreatment Pain Rating 10  -LY      Posttreatment Pain Rating 2/10  -LY     Pain Location - neck  -LY     Pain Intervention(s) Medication (See MAR);Ambulation/increased activity  -LY     Row Name 10/13/22 0811          Bed Mobility    Comment, (Bed Mobility) in chair  -LY     Row Name 10/13/22 0811          Sit-Stand Transfer    Sit-Stand [place] (Transfers) standby assist  -LY     Assistive Device (Sit-Stand Transfers) walker, front-wheeled  -LY     Row Name 10/13/22 0811          Stand-Sit Transfer    Stand-Sit [place] (Transfers) standby assist  -LY     Assistive Device (Stand-Sit Transfers) walker, front-wheeled  -LY     Row Name 10/13/22 0811          Gait/Stairs (Locomotion)    [place] Level (Gait) verbal cues;standby assist  -LY     Assistive Device (Gait) walker, front-wheeled  -LY     Distance in Feet (Gait) 150'  -LY     Pattern (Gait) step-through  -LY     Bilateral Gait Deviations forward flexed posture  -LY     Comment, (Gait/Stairs) cues for posture  -LY     Row Name             Wound 10/11/22 1306 anterior neck Incision    Wound - Properties Group Placement Date: 10/11/22  -DIOGO Placement Time: 1306  -DIOGO Orientation: anterior  -DIOGO Location: neck  -DIOGO Primary Wound Type: Incision  -DIOGO    Retired Wound - Properties Group Placement Date: 10/11/22  -DIOGO Placement Time: 1306  -DIOGO Orientation: anterior  -DIOGO Location: neck  -DIOGO Primary Wound Type: Incision  -DIOGO    Retired Wound - Properties Group Date first assessed: 10/11/22  -DIOGO Time first assessed: 1306  -DIOGO Location: neck  -DIOGO Primary Wound Type: Incision  -DIOGO    Row Name 10/13/22 0811          Plan of Care Review    Plan of Care Reviewed With patient;spouse  -LY     Progress improving  -LY     Outcome Evaluation PT tx completed. Pt up in chair. Reports having a good night and is feeling better today. SBA for sit to stands with RWX. Amb 150' with RWX and SBA. Occasional cues for posture. Pt amb without AD ~20' to transport chair with CGA, no LOB. Will cont to follow. Recommend  home with assist.  -LY     Row Name 10/13/22 0811          Positioning and Restraints    Pre-Treatment Position sitting in chair/recliner  -LY     Post Treatment Position other  transport chair  -LY     Other Position with other staff  going down for xray  -LY           User Key  (r) = Recorded By, (t) = Taken By, (c) = Cosigned By    Initials Name Provider Type    Valentin Jarvis, RN Registered Nurse    Ruchi Bose PTA Physical Therapist Assistant                  PT Recommendation and Plan     Plan of Care Reviewed With: patient, spouse  Progress: improving  Outcome Evaluation: PT tx completed. Pt up in chair. Reports having a good night and is feeling better today. SBA for sit to stands with RWX. Amb 150' with RWX and SBA. Occasional cues for posture. Pt amb without AD ~20' to transport chair with CGA, no LOB. Will cont to follow. Recommend home with assist.   Outcome Measures     Row Name 10/13/22 0811             How much help from another person do you currently need...    Turning from your back to your side while in flat bed without using bedrails? 4  -LY      Moving from lying on back to sitting on the side of a flat bed without bedrails? 4  -LY      Moving to and from a bed to a chair (including a wheelchair)? 3  -LY      Standing up from a chair using your arms (e.g., wheelchair, bedside chair)? 3  -LY      Climbing 3-5 steps with a railing? 3  -LY      To walk in hospital room? 3  -LY      AM-PAC 6 Clicks Score (PT) 20  -LY         Functional Assessment    Outcome Measure Options AM-PAC 6 Clicks Basic Mobility (PT)  -LY            User Key  (r) = Recorded By, (t) = Taken By, (c) = Cosigned By    Initials Name Provider Type    Ruchi Bose PTA Physical Therapist Assistant                 Time Calculation:    PT Charges     Row Name 10/13/22 0931             Time Calculation    Start Time 0811  -LY      Stop Time 0828  -LY      Time Calculation (min) 17 min  -LY      PT Received On 10/13/22   -LY         Time Calculation- PT    Total Timed Code Minutes- PT 17 minute(s)  -LY         Timed Charges    85150 - Gait Training Minutes  17  -LY         Total Minutes    Timed Charges Total Minutes 17  -LY       Total Minutes 17  -LY            User Key  (r) = Recorded By, (t) = Taken By, (c) = Cosigned By    Initials Name Provider Type    Ruchi Bose PTA Physical Therapist Assistant              Therapy Charges for Today     Code Description Service Date Service Provider Modifiers Qty    45562383565 HC GAIT TRAINING EA 15 MIN 10/12/2022 Ruchi Carreno PTA GP 1    52579565330 HC GAIT TRAINING EA 15 MIN 10/13/2022 Ruchi Carreno PTA GP 1          PT G-Codes  Outcome Measure Options: AM-PAC 6 Clicks Basic Mobility (PT)  AM-PAC 6 Clicks Score (PT): 20  AM-PAC 6 Clicks Score (OT): 24    Ruchi Carreno PTA  10/13/2022

## 2022-10-17 ENCOUNTER — TELEPHONE (OUTPATIENT)
Dept: NEUROSURGERY | Facility: CLINIC | Age: 77
End: 2022-10-17

## 2022-10-17 NOTE — TELEPHONE ENCOUNTER
Patients spouse calling, states patient is having difficulty swallowing and thick mucous post op. I explained that is normal and to increase water intake to thin out secretions. Also explained some hoarseness may occur as well. Wife advised to call for persistent or worsening sx, she is agreeable with plan

## 2022-10-24 ENCOUNTER — OFFICE VISIT (OUTPATIENT)
Dept: INTERNAL MEDICINE | Facility: CLINIC | Age: 77
End: 2022-10-24

## 2022-10-24 VITALS
SYSTOLIC BLOOD PRESSURE: 138 MMHG | OXYGEN SATURATION: 97 % | RESPIRATION RATE: 16 BRPM | DIASTOLIC BLOOD PRESSURE: 78 MMHG | BODY MASS INDEX: 23.91 KG/M2 | WEIGHT: 167 LBS | HEIGHT: 70 IN | HEART RATE: 66 BPM | TEMPERATURE: 97.1 F

## 2022-10-24 DIAGNOSIS — M47.12 CERVICAL SPONDYLOSIS WITH MYELOPATHY: Primary | ICD-10-CM

## 2022-10-24 DIAGNOSIS — Z98.1 STATUS POST CERVICAL SPINAL FUSION: ICD-10-CM

## 2022-10-24 DIAGNOSIS — M47.12 CERVICAL SPONDYLOSIS WITH MYELOPATHY: ICD-10-CM

## 2022-10-24 PROCEDURE — 99213 OFFICE O/P EST LOW 20 MIN: CPT | Performed by: NURSE PRACTITIONER

## 2022-10-24 PROCEDURE — 1111F DSCHRG MED/CURRENT MED MERGE: CPT | Performed by: NURSE PRACTITIONER

## 2022-10-24 RX ORDER — HYDROCODONE BITARTRATE AND ACETAMINOPHEN 7.5; 325 MG/1; MG/1
1 TABLET ORAL EVERY 6 HOURS PRN
Qty: 28 TABLET | Refills: 0 | Status: SHIPPED | OUTPATIENT
Start: 2022-10-24 | End: 2022-10-31

## 2022-10-24 NOTE — PROGRESS NOTES
Transitional Care Follow Up Visit  Subjective     Sonu Micki Bravo Jr. is a 77 y.o. male who presents for a transitional care management visit.    Within 48 business hours after discharge our office contacted him via telephone to coordinate his care and needs.      I reviewed and discussed the details of that call along with the discharge summary, hospital problems, inpatient lab results, inpatient diagnostic studies, and consultation reports with Sonu.     Current outpatient and discharge medications have been reconciled for the patient.  Reviewed by: MARY Estrella      Date of TCM Phone Call 8/31/2021   [facility]   Date of Admission 8/31/2021   Date of Discharge 8/31/2021   Discharge Disposition Home or Self Care     Risk for Readmission (LACE) Score: 2 (10/13/2022  5:00 AM)      History of Present Illness   Course During Hospital Stay:  Mr. Bravo was admitted on 10/11 for cervical discectomy and fusion of C4-5 and C5-6. He had no post surgical complications during his hospital stay. He says pain is well controlled and he is gradually decreasing the amount of pain medication he is using each week. He follows up with neurosurgery next week. He continues to wear cervical collar. He does mention some tightness to hamstrings but no new symptoms of pain, numbness, or tingling.     The following portions of the patient's history were reviewed and updated as appropriate: past surgical history and problem list.    Review of Systems   Constitutional: Negative for fever.   Respiratory: Negative for shortness of breath and stridor.    Gastrointestinal: Negative for constipation.   Musculoskeletal: Positive for back pain, myalgias and neck pain. Negative for gait problem and neck stiffness.   Neurological: Negative for weakness and numbness.       Objective   Physical Exam  Constitutional:       General: He is not in acute distress.  Cardiovascular:      Rate and Rhythm: Normal rate and  regular rhythm.   Musculoskeletal:      Comments: ROM limited due to cervical collar   Skin:     General: Skin is warm and dry.             Comments: Incision without erythema, edema or drainage. Healing well         Assessment & Plan   Diagnoses and all orders for this visit:    1. Cervical spondylosis with myelopathy (Primary)  Continue use of cervical collar and follow up with neurosurgery next week.

## 2022-10-31 ENCOUNTER — OFFICE VISIT (OUTPATIENT)
Dept: NEUROSURGERY | Facility: CLINIC | Age: 77
End: 2022-10-31

## 2022-10-31 VITALS — HEIGHT: 70 IN | WEIGHT: 167 LBS | BODY MASS INDEX: 23.91 KG/M2

## 2022-10-31 DIAGNOSIS — R20.0 NUMBNESS AND TINGLING OF BOTH LOWER EXTREMITIES: ICD-10-CM

## 2022-10-31 DIAGNOSIS — M54.50 LUMBAR BACK PAIN: ICD-10-CM

## 2022-10-31 DIAGNOSIS — Z72.0 TOBACCO ABUSE: ICD-10-CM

## 2022-10-31 DIAGNOSIS — Z98.1 STATUS POST CERVICAL SPINAL FUSION: ICD-10-CM

## 2022-10-31 DIAGNOSIS — R20.2 NUMBNESS AND TINGLING OF BOTH LOWER EXTREMITIES: ICD-10-CM

## 2022-10-31 DIAGNOSIS — M47.12 CERVICAL SPONDYLOSIS WITH MYELOPATHY: Primary | ICD-10-CM

## 2022-10-31 DIAGNOSIS — M48.062 SPINAL STENOSIS OF LUMBAR REGION WITH NEUROGENIC CLAUDICATION: ICD-10-CM

## 2022-10-31 PROCEDURE — 99214 OFFICE O/P EST MOD 30 MIN: CPT | Performed by: NURSE PRACTITIONER

## 2022-10-31 PROCEDURE — 99024 POSTOP FOLLOW-UP VISIT: CPT | Performed by: NURSE PRACTITIONER

## 2022-11-01 ENCOUNTER — APPOINTMENT (OUTPATIENT)
Dept: ULTRASOUND IMAGING | Facility: HOSPITAL | Age: 77
End: 2022-11-01

## 2022-11-16 ENCOUNTER — TELEPHONE (OUTPATIENT)
Dept: VASCULAR SURGERY | Facility: CLINIC | Age: 77
End: 2022-11-16

## 2022-11-16 NOTE — TELEPHONE ENCOUNTER
----- Message from MARY Limon sent at 11/9/2022  3:24 PM CST -----  yes  ----- Message -----  From: Savannah Sales MA  Sent: 11/9/2022  11:19 AM CST  To: MARY Limon    Pt asked to be called back in January to reschedule testing and f/u appt that was cancelled due to back surgery done in October.  Is it okay to extend the expected date of the order?

## 2022-11-22 ENCOUNTER — HOSPITAL ENCOUNTER (OUTPATIENT)
Dept: GENERAL RADIOLOGY | Facility: HOSPITAL | Age: 77
Discharge: HOME | End: 2022-11-22
Admitting: NURSE PRACTITIONER

## 2022-11-22 ENCOUNTER — TELEPHONE (OUTPATIENT)
Dept: NEUROSURGERY | Facility: CLINIC | Age: 77
End: 2022-11-22

## 2022-11-22 DIAGNOSIS — M47.12 CERVICAL SPONDYLOSIS WITH MYELOPATHY: ICD-10-CM

## 2022-11-22 DIAGNOSIS — Z98.1 STATUS POST CERVICAL SPINAL FUSION: ICD-10-CM

## 2022-11-22 PROCEDURE — 72040 X-RAY EXAM NECK SPINE 2-3 VW: CPT

## 2022-11-22 NOTE — TELEPHONE ENCOUNTER
Caller: Sonu Bravo Jr.    Relationship to patient: Self    Best call back number: [phone]     Patient is needing:     PATIENT REPORTS HE HAD HIS XRAY DONE THIS MORNING, WOULD LIKE A CALL BACK REGARDING IF HE CAN STOP WEARING HIS CERVICAL COLLAR

## 2022-12-05 DIAGNOSIS — M47.12 CERVICAL SPONDYLOSIS WITH MYELOPATHY: ICD-10-CM

## 2022-12-05 DIAGNOSIS — Z98.1 STATUS POST CERVICAL SPINAL FUSION: ICD-10-CM

## 2022-12-05 RX ORDER — HYDROCODONE BITARTRATE AND ACETAMINOPHEN 7.5; 325 MG/1; MG/1
1 TABLET ORAL EVERY 6 HOURS PRN
Qty: 28 TABLET | Refills: 0 | OUTPATIENT
Start: 2022-12-05 | End: 2022-12-12

## 2022-12-06 RX ORDER — HYDROCODONE BITARTRATE AND ACETAMINOPHEN 7.5; 325 MG/1; MG/1
1 TABLET ORAL EVERY 6 HOURS PRN
Qty: 21 TABLET | Refills: 0 | Status: SHIPPED | OUTPATIENT
Start: 2022-12-06 | End: 2023-01-11

## 2022-12-06 NOTE — TELEPHONE ENCOUNTER
Patient states Dr Cifuentes is aware he takes one pain pill every night for his back and states he agreed to continue filling it prior to next surgery.

## 2022-12-07 ENCOUNTER — TELEPHONE (OUTPATIENT)
Dept: INTERNAL MEDICINE | Facility: CLINIC | Age: 77
End: 2022-12-07

## 2022-12-07 DIAGNOSIS — Z86.718 HISTORY OF DVT (DEEP VEIN THROMBOSIS): ICD-10-CM

## 2022-12-07 DIAGNOSIS — Z79.01 CURRENT USE OF LONG TERM ANTICOAGULATION: ICD-10-CM

## 2022-12-07 DIAGNOSIS — I48.0 PAROXYSMAL ATRIAL FIBRILLATION: Primary | ICD-10-CM

## 2022-12-07 NOTE — TELEPHONE ENCOUNTER
PATIENT HAS CALLED, HE IS SCHEDULED FOR AN EPIDURAL ON 12/20/22 AND IS NEEDING TO BE OF THE ELIQUIS FOR 2 DAYS PRIOR TO HAVING THE PROCEDURE.   PLEASE ADVISE.      [phone]

## 2022-12-07 NOTE — TELEPHONE ENCOUNTER
Which physician is performing this and is Dr. Cifuentes aware of this procedure? He is scheduled for surgery with Dr. Cifuentes in January, so want to be sure he is aware of this.     We then need to obtain records from the physician who plans to do the epidural to know the details what is planned.

## 2022-12-08 NOTE — TELEPHONE ENCOUNTER
PATIENTS WIFE HAS BEEN CALLED, SHE STATED THAT DR MATTHEW IS AWARE OF THE EPIDURAL.    DR BERNAL IS DOING THE EPIDURAL.  PATIENTS WIFE STATED THAT PATIENT WAS TOLD ON 12/07/2022 THAT DR ESCOBAR HAD APPROVED THIS BEFORE AND THEY CAN STILL USE THAT FOR THIS PROCEDURE.

## 2022-12-09 RX ORDER — ENOXAPARIN SODIUM 100 MG/ML
80 INJECTION SUBCUTANEOUS EVERY 12 HOURS SCHEDULED
Qty: 3.2 ML | Refills: 1 | Status: SHIPPED | OUTPATIENT
Start: 2022-12-09 | End: 2022-12-11

## 2022-12-09 NOTE — TELEPHONE ENCOUNTER
PATIENT HAS BEEN CALLED, GIVEN MESSAGE AND UNDERSTANDING VOICED.       ATTEMPTED TO CALL DR ADAMS OFFICE -[phone].  MESSAGE HAS BEEN LEFT FOR RETURN CALL.

## 2022-12-09 NOTE — TELEPHONE ENCOUNTER
Given his a fib and with his DVT this year, we would recommend doing bridging with Lovenox. Here's the instructions for his epidural on 12/20 and also for his surgery coming up in January:    Stop Eliquis 2 days prior to scheduled procedure (last dose in the evening on 12/17 for epidural).   Lovenox injection into the abdomen will be done at home 12 hours apart (available at pharmacy) for 2 days leading up on 12/18 & 12/19. Last dose will be evening on 12/19. NO injection on 12/20 before epidural.  Restart Eliquis as long as Dr. Saleem is comfortable with it on the evening following your procedure.     We will use the same dosing pattern ahead of your surgery in January and a refill of the Lovenox will be at the pharmacy for next month.     My office staff: Please contact Dr. Saleem's office to notify them that we will be bridging and stopping eliquis 2 days prior.

## 2022-12-12 NOTE — TELEPHONE ENCOUNTER
SPOKE WITH CAROL ANN AND SHE STATED THAT THAT THE OFFICE REQUIRES FOR PATIENT TO BE OFF ELIQUIS FOR 72 HOURS BEFORE A PROCEDURE.   SHE WILL FAX OVER THE FORM TO BE FILLED OUT.

## 2022-12-12 NOTE — TELEPHONE ENCOUNTER
Please confirm with [facility] again. The initial call asked for 2 days, so I want to be sure as we will now need to amend the prescription at his pharmacy and his instructions. Also, if they needed this clearance, it would normally be ideal for them to send the paperwork initially instead of depending on the patient to be the middle man between offices to best provide the excellent care that I know both Dr. Saleem and I strive for.

## 2022-12-14 RX ORDER — ENOXAPARIN SODIUM 100 MG/ML
80 INJECTION SUBCUTANEOUS EVERY 12 HOURS SCHEDULED
Qty: 4.8 ML | Refills: 1 | Status: SHIPPED | OUTPATIENT
Start: 2022-12-14 | End: 2022-12-17

## 2022-12-14 NOTE — TELEPHONE ENCOUNTER
SPOKE WITH CAROL ANN AT [facility].  SHE STATED THAT PAPERWORK WAS FAXED.    SPOKE TO DR ESCOBAR AND HE HAS RECEIVED IT AND WILL WORK ON IT.

## 2022-12-14 NOTE — TELEPHONE ENCOUNTER
After further discussions with Dr. Saleem's office, we need you to be off Eliquis for 3 days. Instructions follow:     1. Stop Eliquis 3 days prior to scheduled procedure (last dose in the evening on 12/16 for epidural).   2. Lovenox injection into the abdomen will be done at home 12 hours apart (available at pharmacy) for 2 days leading up on 12/17, 12/18 & 12/19. Last dose will be evening on 12/19. NO injection on 12/20 before epidural.  3. Restart Eliquis as long as Dr. Saleem is comfortable with it on the evening following your procedure.

## 2023-01-10 ENCOUNTER — PRE-ADMISSION TESTING (OUTPATIENT)
Dept: PREADMISSION TESTING | Facility: HOSPITAL | Age: 78
End: 2023-01-10
Payer: MEDICARE

## 2023-01-10 VITALS
HEIGHT: 70 IN | SYSTOLIC BLOOD PRESSURE: 171 MMHG | HEART RATE: 65 BPM | BODY MASS INDEX: 24.33 KG/M2 | RESPIRATION RATE: 18 BRPM | WEIGHT: 169.97 LBS | DIASTOLIC BLOOD PRESSURE: 73 MMHG | OXYGEN SATURATION: 97 %

## 2023-01-10 DIAGNOSIS — M48.062 SPINAL STENOSIS OF LUMBAR REGION WITH NEUROGENIC CLAUDICATION: ICD-10-CM

## 2023-01-10 LAB
ANION GAP SERPL CALCULATED.3IONS-SCNC: 9 MMOL/L (ref 5–15)
BUN SERPL-MCNC: 17 MG/DL (ref 8–23)
BUN/CREAT SERPL: 17.7 (ref 7–25)
CALCIUM SPEC-SCNC: 9.4 MG/DL (ref 8.6–10.5)
CHLORIDE SERPL-SCNC: 104 MMOL/L (ref 98–107)
CO2 SERPL-SCNC: 30 MMOL/L (ref 22–29)
CREAT SERPL-MCNC: 0.96 MG/DL (ref 0.76–1.27)
DEPRECATED RDW RBC AUTO: 52.6 FL (ref 37–54)
EGFRCR SERPLBLD CKD-EPI 2021: 80.9 ML/MIN/1.73
ERYTHROCYTE [DISTWIDTH] IN BLOOD BY AUTOMATED COUNT: 14.9 % (ref 12.3–15.4)
GLUCOSE SERPL-MCNC: 71 MG/DL (ref 65–99)
HCT VFR BLD AUTO: 47.2 % (ref 37.5–51)
HGB BLD-MCNC: 15.2 G/DL (ref 13–17.7)
MCH RBC QN AUTO: 30.7 PG (ref 26.6–33)
MCHC RBC AUTO-ENTMCNC: 32.2 G/DL (ref 31.5–35.7)
MCV RBC AUTO: 95.4 FL (ref 79–97)
PLATELET # BLD AUTO: 166 10*3/MM3 (ref 140–450)
PMV BLD AUTO: 9.7 FL (ref 6–12)
POTASSIUM SERPL-SCNC: 4.5 MMOL/L (ref 3.5–5.2)
RBC # BLD AUTO: 4.95 10*6/MM3 (ref 4.14–5.8)
SODIUM SERPL-SCNC: 143 MMOL/L (ref 136–145)
WBC NRBC COR # BLD: 7.34 10*3/MM3 (ref 3.4–10.8)

## 2023-01-10 PROCEDURE — 85027 COMPLETE CBC AUTOMATED: CPT

## 2023-01-10 PROCEDURE — 80048 BASIC METABOLIC PNL TOTAL CA: CPT

## 2023-01-10 PROCEDURE — 36415 COLL VENOUS BLD VENIPUNCTURE: CPT

## 2023-01-10 NOTE — DISCHARGE INSTRUCTIONS
Before you come to the hospital        Arrival time: AS DIRECTED BY OFFICE     YOU MAY TAKE THE FOLLOWING MEDICATION(S) THE MORNING OF SURGERY WITH A SIP OF WATER: BISOPROLOL, HYDROCODONE           ALL OTHER HOME MEDICATION CHECK WITH YOUR PHYSICIAN (especially if   you are taking diabetes medicines or blood thinners)    Do not take any Erectile Dysfunction medications (EX: CIALIS, VIAGRA) 24 hours prior to surgery.      If you were given and instructed to use a germ- killing soap, use as directed the night before surgery and again the morning of surgery or as directed by your surgeon. (Use one-half of the bottle with each shower.)   See attached information for How to Use Chlorhexidine for Bathing if applicable.            Eating and drinking restrictions prior to scheduled arrival time    2 Hours before arrival time STOP   Drinking Clear liquids (water, apple juice-no pulp)     6 Hours before arrival time STOP   Milk or drinks that contain milk, full liquids    6 Hours before arrival time STOP   Light meals or foods, such as toast or cereal    8 Hours before arrival time STOP   Heavy foods, such as meat, fried foods, or fatty foods    (It is extremely important that you follow these guidelines to prevent delay or cancelation of your procedure)     Clear Liquids  Water and flavored water                                                                      Clear Fruit juices, such as cranberry juice and apple juice.  Black coffee (NO cream of any kind, including powdered).  Plain tea  Clear bouillon or broth.  Flavored gelatin.  Soda.  Gatorade or Powerade.  Full liquid examples  Juices that have pulp.  Frozen ice pops that contain fruit pieces.  Coffee with creamer  Milk.  Yogurt.                MANAGING PAIN AFTER SURGERY    We know you are probably wondering what your pain will be like after surgery.  Following surgery it is unrealistic to expect you will not have pain.   Pain is how our bodies let us know that  something is wrong or cautions us to be careful.  That said, our goal is to make your pain tolerable.    Methods we may use to treat your pain include (oral or IV medications, PCAs, epidurals, nerve blocks, etc.)   While some procedures require IV pain medications for a short time after surgery, transitioning to pain medications by mouth allows for better management of pain.   Your nurse will encourage you to take oral pain medications whenever possible.  IV medications work almost immediately, but only last a short while.  Taking medications by mouth allows for a more constant level of medication in your blood stream for a longer period of time.      Once your pain is out of control it is harder to get back under control.  It is important you are aware when your next dose of pain medication is due.  If you are admitted, your nurse may write the time of your next dose on the white board in your room to help you remember.      We are interested in your pain and encourage you to inform us about aggravating factors during your visit.   Many times a simple repositioning every few hours can make a big difference.    If your physician says it is okay, do not let your pain prevent you from getting out of bed. Be sure to call your nurse for assistance prior to getting up so you do not fall.      Before surgery, please decide your tolerable pain goal.  These faces help describe the pain ratings we use on a 0-10 scale.   Be prepared to tell us your goal and whether or not you take pain or anxiety medications at home.          Preparing for Surgery  Preparing for surgery is an important part of your care. It can make things go more smoothly and help you avoid complications. The steps leading up to surgery may vary among hospitals. Follow all instructions given to you by your health care providers. Ask questions if you do not understand something. Talk about any concerns that you have.  Here are some questions to consider  asking before your surgery:  If my surgery is not an emergency (is elective), when would be the best time to have the surgery?  What arrangements do I need to make for work, home, or school?  What will my recovery be like? How long will it be before I can return to normal activities?  Will I need to prepare my home? Will I need to arrange care for me or my children?  Should I expect to have pain after surgery? What are my pain management options? Are there nonmedical options that I can try for pain?  Tell a health care provider about:  Any allergies you have.  All medicines you are taking, including vitamins, herbs, eye drops, creams, and over-the-counter medicines.  Any problems you or family members have had with anesthetic medicines.  Any blood disorders you have.  Any surgeries you have had.  Any medical conditions you have.  Whether you are pregnant or may be pregnant.  What are the risks?  The risks and complications of surgery depend on the specific procedure that you have. Discuss all the risks with your health care providers before your surgery. Ask about common surgical complications, which may include:  Infection.  Bleeding or a need for blood replacement (transfusion).  Allergic reactions to medicines.  Damage to surrounding nerves, tissues, or structures.  A blood clot.  Scarring.  Failure of the surgery to correct the problem.  Follow these instructions before the procedure:  Several days or weeks before your procedure  You may have a physical exam by your primary health care provider to make sure it is safe for you to have surgery.  You may have testing. This may include a chest X-ray, blood and urine tests, electrocardiogram (ECG), or other testing.  Ask your health care provider about:  Changing or stopping your regular medicines. This is especially important if you are taking diabetes medicines or blood thinners.  Taking medicines such as aspirin and ibuprofen. These medicines can thin your blood.  Do not take these medicines unless your health care provider tells you to take them.  Taking over-the-counter medicines, vitamins, herbs, and supplements.  Do not use any products that contain nicotine or tobacco, such as cigarettes and e-cigarettes. If you need help quitting, ask your health care provider.  Avoid alcohol.  Ask your health care provider if there are exercises you can do to prepare for surgery.  Eat a healthy diet.   Plan to have someone take you home from the hospital or clinic.  Plan to have a responsible adult care for you for at least 24 hours after you leave the hospital or clinic. This is important.  The day before your procedure  You may be given antibiotic medicine to take by mouth to help prevent infection. Take it as told by your health care provider.  You may be asked to shower with a germ-killing soap.  Follow instructions from your health care provider about eating and drinking restrictions. This includes gum, mints and hard candy.  Pack comfortable clothes according to your procedure.   The day of your procedure  You may need to take another shower with a germ-killing soap before you leave home in the morning.  With a small sip of water, take only the medicines that you are told to take.  Remove all jewelry including rings.   Leave anything you consider valuable at home except hearing aids if needed.  You do not need to bring your home medications into the hospital.   Do not wear any makeup, nail polish, powder, deodorant, lotion, hair accessories, or anything on your skin or body except your clothes.  If you will be staying in the hospital, bring a case to hold your glasses, contacts, or dentures. You may also want to bring your robe and non-skid footwear.       (Do not use denture adhesives since you will be asked to remove them during  surgery).   If you wear oxygen at home, bring it with you the day of surgery.  If instructed by your health care provider, bring your sleep apnea  device with you on the day of your surgery (if this applies to you).  You may want to leave your suitcase and sleep apnea device in the car until after surgery.   Arrive at the hospital as scheduled.  Bring a friend or family member with you who can help to answer questions and be present while you meet with your health care provider.  At the hospital  When you arrive at the hospital:  Go to registration located at the main entrance of the hospital. You will be registered and given a beeper and a sticker sheet. Take the stickers to the Outpatient nurses desk and place in the black tray. This is to notify staff that you have arrived. Then return to the lobby to wait.   When your beeper lights up and vibrates proceed through the double doors, under the stairs, and a member of the Outpatient Surgery staff will escort you to your preoperative room.  You may have to wear compression sleeves. These help to prevent blood clots and reduce swelling in your legs.  An IV may be inserted into one of your veins.              In the operating room, you may be given one or more of the following:        A medicine to help you relax (sedative).        A medicine to numb the area (local anesthetic).        A medicine to make you fall asleep (general anesthetic).        A medicine that is injected into an area of your body to numb everything below the                      injection site (regional anesthetic).  You may be given an antibiotic through your IV to help prevent infection.  Your surgical site will be marked or identified.    Contact a health care provider if you:  Develop a fever of more than 100.4°F (38°C) or other feelings of illness during the 48 hours before your surgery.  Have symptoms that get worse.  Have questions or concerns about your surgery.  Summary  Preparing for surgery can make the procedure go more smoothly and lower your risk of complications.  Before surgery, make a list of questions and concerns to  discuss with your surgeon. Ask about the risks and possible complications.  In the days or weeks before your surgery, follow all instructions from your health care provider. You may need to stop smoking, avoid alcohol, follow eating restrictions, and change or stop your regular medicines.  Contact your surgeon if you develop a fever or other signs of illness during the few days before your surgery.  This information is not intended to replace advice given to you by your health care provider. Make sure you discuss any questions you have with your health care provider.  Document Revised: 12/21/2018 Document Reviewed: 10/23/2018  Elsevier Patient Education © 2021 [employer]

## 2023-01-11 ENCOUNTER — OFFICE VISIT (OUTPATIENT)
Dept: INTERNAL MEDICINE | Facility: CLINIC | Age: 78
End: 2023-01-11
Payer: MEDICARE

## 2023-01-11 VITALS
DIASTOLIC BLOOD PRESSURE: 86 MMHG | WEIGHT: 169.5 LBS | OXYGEN SATURATION: 98 % | SYSTOLIC BLOOD PRESSURE: 136 MMHG | BODY MASS INDEX: 24.26 KG/M2 | RESPIRATION RATE: 16 BRPM | HEIGHT: 70 IN | HEART RATE: 77 BPM | TEMPERATURE: 98 F

## 2023-01-11 DIAGNOSIS — Z86.718 HISTORY OF DVT (DEEP VEIN THROMBOSIS): ICD-10-CM

## 2023-01-11 DIAGNOSIS — I10 ESSENTIAL HYPERTENSION: ICD-10-CM

## 2023-01-11 DIAGNOSIS — M48.062 SPINAL STENOSIS OF LUMBAR REGION WITH NEUROGENIC CLAUDICATION: ICD-10-CM

## 2023-01-11 DIAGNOSIS — I48.0 PAROXYSMAL ATRIAL FIBRILLATION: ICD-10-CM

## 2023-01-11 DIAGNOSIS — Z01.818 PREOP EXAM FOR INTERNAL MEDICINE: Primary | ICD-10-CM

## 2023-01-11 PROCEDURE — 99214 OFFICE O/P EST MOD 30 MIN: CPT | Performed by: INTERNAL MEDICINE

## 2023-01-11 RX ORDER — BISOPROLOL FUMARATE 10 MG/1
10 TABLET, FILM COATED ORAL DAILY
Qty: 90 TABLET | Refills: 1 | Status: SHIPPED | OUTPATIENT
Start: 2023-01-11 | End: 2023-02-24

## 2023-01-11 RX ORDER — BISOPROLOL FUMARATE 10 MG/1
10 TABLET, FILM COATED ORAL DAILY
Qty: 90 TABLET | Refills: 1 | Status: SHIPPED | OUTPATIENT
Start: 2023-01-11 | End: 2023-01-11 | Stop reason: SDUPTHER

## 2023-01-11 NOTE — LETTER
January 11, 2023     Durga Cifuentes MD  [address]  [address]  [place] [address]    Patient: Sonu Bravo Jr.   YOB: 1945   Date of Visit: 1/11/2023       Dear Durga Cifuentes MD    Sonu Bravo was in my office today. My recommendations are as follows:  1. Recommend to proceed with planned procedure and is optimized without recommendation for additional testing.   2. Recommend to continue beta blocker and statin as well as levothyroxine ramy-operatively.   3. Anticoagulation recommendations:   A. Stop Eliquis 3 days prior to surgery (last dose in the evening on 1/20).    B. Lovenox injection into the abdomen will be done at home 12 hours apart (available at pharmacy) for  starting the morning of 1/21. Last dose will be evening on 1/23. NO injection on 1/24 before surgery   C. Restart Eliquis as long as Dr. Cifuentes is comfortable with it on the evening following your  procedure.     If you have questions, please do not hesitate to call me. I look forward to following Sonu along with you.         Sincerely,        Vaughn Kramer, DO          CC: preop evaluation for Lumbar discectomy and fusion L2-5    History:  Sonu Bravo Jr. is a 77 y.o. male   He has been doing well and is looking forward to his lumbar discectomy and fusion coming up on January 24.  He has had no new angina, palpitations, swelling in his lower extremities nor dyspnea.  He is able to climb a flight of stairs without cardiopulmonary compromise.       ROS:  Review of Systems   Respiratory: Negative for cough.    Cardiovascular: Negative for chest pain, palpitations and leg swelling.   Musculoskeletal: Positive for back pain and gait problem.        reports that he has been smoking cigarettes. He started smoking about 56 years ago. He has a 26.00 pack-year smoking history. He has never used smokeless tobacco. He reports current alcohol use. He reports that he does not use drugs.      Current Outpatient  "Medications:   •  acetaminophen (TYLENOL) 325 MG tablet, Take 650 mg by mouth Every 4 (Four) Hours As Needed., Disp: , Rfl:   •  apixaban (ELIQUIS) 5 MG tablet tablet, Take 1 tablet by mouth 2 (Two) Times a Day., Disp: 180 tablet, Rfl: 1  •  bisoprolol (ZEBeta) 5 MG tablet, Take 1 tablet by mouth Daily., Disp: 90 tablet, Rfl: 3  •  Cholecalciferol 100 MCG (4000 UT) capsule, Take 4,000 Units by mouth Daily., Disp: , Rfl:   •  coenzyme Q10 100 MG capsule, Take 100 mg by mouth Daily., Disp: , Rfl:   •  CYANOCOBALAMIN SL, Take 2,500 mcg by mouth Daily., Disp: , Rfl:   •  fexofenadine (ALLEGRA) 180 MG tablet, Take 180 mg by mouth Daily., Disp: , Rfl:   •  fluticasone (FLONASE) 50 MCG/ACT nasal spray, 1 spray into the nostril(s) as directed by provider Daily., Disp: 32 g, Rfl: 3  •  levothyroxine (SYNTHROID, LEVOTHROID) 112 MCG tablet, Take 1 tablet by mouth Daily., Disp: 90 tablet, Rfl: 1  •  multivitamin with minerals tablet tablet, Take 1 capsule by mouth Daily., Disp: , Rfl:   •  omeprazole (priLOSEC) 20 MG capsule, Take 1 capsule by mouth Daily., Disp: 90 capsule, Rfl: 3  •  rosuvastatin (CRESTOR) 20 MG tablet, Take 1 tablet by mouth Daily., Disp: 90 tablet, Rfl: 3  •  sennosides-docusate (senna-docusate sodium) 8.6-50 MG per tablet, Take 2 tablets by mouth Daily., Disp: 60 tablet, Rfl: 0  •  Zinc 50 MG capsule, Take 1 capsule by mouth Daily., Disp: , Rfl:     OBJECTIVE:  /86 (BP Location: Left arm, Patient Position: Sitting, Cuff Size: Adult)   Pulse 77   Temp 98 °F (36.7 °C)   Resp 16   Ht 176.7 cm (69.57\")   Wt 76.9 kg (169 lb 8 oz)   SpO2 98%   BMI 24.62 kg/m²    Physical Exam  Constitutional:       General: He is not in acute distress.  Cardiovascular:      Rate and Rhythm: Normal rate and regular rhythm.      Heart sounds: Normal heart sounds. No murmur heard.  Pulmonary:      Effort: Pulmonary effort is normal.      Breath sounds: Normal breath sounds. No wheezing.   Neurological:      Mental " Status: He is alert and oriented to person, place, and time.      Gait: Gait normal.   Psychiatric:         Mood and Affect: Mood normal.         Behavior: Behavior normal.     Basic Metabolic Panel (01/10/2023 13:06)   CBC (No Diff) (01/10/2023 13:06)   ECG 12 Lead (09/26/2022 13:04)     Assessment/Plan     Diagnoses and all orders for this visit:    1. Preop exam for internal medicine (Primary)  2. Spinal stenosis of lumbar region with neurogenic claudication  RCRI risk is 0. He is able to perform greater than 4 METS without difficulty. EKG from September and labs yesterday reviewed without renal compromise or any new findings. He has no symptoms of ACS, arrhythmia, decompensated heart failure nor valvular dysfunction. Recommend to proceed with planned procedure and is optimized without recommendation for additional testing. Recommend to continue beta blocker and statin as well as levothyroxine ramy-operatively. Anticoagulation recommendations:  1. Stop Eliquis 3 days prior to surgery (last dose in the evening on 1/20).   2. Lovenox injection into the abdomen will be done at home 12 hours apart (available at pharmacy) for starting the morning of 1/21. Last dose will be evening on 1/23. NO injection on 1/24 before surgery  3. Restart Eliquis as long as Dr. Cifuentes is comfortable with it on the evening following your procedure.     3. History of DVT (deep vein thrombosis)  4. Paroxysmal atrial fibrillation (HCC)  Recommend bridging as above given previous unprovoked DVT, ongoing a fib and planned surgery.     5. Essential hypertension  Fair control, BP goal for age is <140/90 per JNC 8 guidelines and increase bisoprolol.      An After Visit Summary was printed and given to the patient at discharge.  Return for Next scheduled follow up.          Vaughn Kramer D.O. 1/11/2023   Electronically signed.

## 2023-01-11 NOTE — PROGRESS NOTES
CC: preop evaluation for Lumbar discectomy and fusion L2-5    History:  Sonu Bravo Jr. is a 77 y.o. male   He has been doing well and is looking forward to his lumbar discectomy and fusion coming up on January 24.  He has had no new angina, palpitations, swelling in his lower extremities nor dyspnea.  He is able to climb a flight of stairs without cardiopulmonary compromise.       ROS:  Review of Systems   Respiratory: Negative for cough.    Cardiovascular: Negative for chest pain, palpitations and leg swelling.   Musculoskeletal: Positive for back pain and gait problem.        reports that he has been smoking cigarettes. He started smoking about 56 years ago. He has a 26.00 pack-year smoking history. He has never used smokeless tobacco. He reports current alcohol use. He reports that he does not use drugs.      Current Outpatient Medications:   •  acetaminophen (TYLENOL) 325 MG tablet, Take 650 mg by mouth Every 4 (Four) Hours As Needed., Disp: , Rfl:   •  apixaban (ELIQUIS) 5 MG tablet tablet, Take 1 tablet by mouth 2 (Two) Times a Day., Disp: 180 tablet, Rfl: 1  •  bisoprolol (ZEBeta) 5 MG tablet, Take 1 tablet by mouth Daily., Disp: 90 tablet, Rfl: 3  •  Cholecalciferol 100 MCG (4000 UT) capsule, Take 4,000 Units by mouth Daily., Disp: , Rfl:   •  coenzyme Q10 100 MG capsule, Take 100 mg by mouth Daily., Disp: , Rfl:   •  CYANOCOBALAMIN SL, Take 2,500 mcg by mouth Daily., Disp: , Rfl:   •  fexofenadine (ALLEGRA) 180 MG tablet, Take 180 mg by mouth Daily., Disp: , Rfl:   •  fluticasone (FLONASE) 50 MCG/ACT nasal spray, 1 spray into the nostril(s) as directed by provider Daily., Disp: 32 g, Rfl: 3  •  levothyroxine (SYNTHROID, LEVOTHROID) 112 MCG tablet, Take 1 tablet by mouth Daily., Disp: 90 tablet, Rfl: 1  •  multivitamin with minerals tablet tablet, Take 1 capsule by mouth Daily., Disp: , Rfl:   •  omeprazole (priLOSEC) 20 MG capsule, Take 1 capsule by mouth Daily., Disp: 90 capsule, Rfl: 3  •   "rosuvastatin (CRESTOR) 20 MG tablet, Take 1 tablet by mouth Daily., Disp: 90 tablet, Rfl: 3  •  sennosides-docusate (senna-docusate sodium) 8.6-50 MG per tablet, Take 2 tablets by mouth Daily., Disp: 60 tablet, Rfl: 0  •  Zinc 50 MG capsule, Take 1 capsule by mouth Daily., Disp: , Rfl:     OBJECTIVE:  /86 (BP Location: Left arm, Patient Position: Sitting, Cuff Size: Adult)   Pulse 77   Temp 98 °F (36.7 °C)   Resp 16   Ht 176.7 cm (69.57\")   Wt 76.9 kg (169 lb 8 oz)   SpO2 98%   BMI 24.62 kg/m²    Physical Exam  Constitutional:       General: He is not in acute distress.  Cardiovascular:      Rate and Rhythm: Normal rate and regular rhythm.      Heart sounds: Normal heart sounds. No murmur heard.  Pulmonary:      Effort: Pulmonary effort is normal.      Breath sounds: Normal breath sounds. No wheezing.   Neurological:      Mental Status: He is alert and oriented to person, place, and time.      Gait: Gait normal.   Psychiatric:         Mood and Affect: Mood normal.         Behavior: Behavior normal.     Basic Metabolic Panel (01/10/2023 13:06)   CBC (No Diff) (01/10/2023 13:06)   ECG 12 Lead (09/26/2022 13:04)     Assessment/Plan     Diagnoses and all orders for this visit:    1. Preop exam for internal medicine (Primary)  2. Spinal stenosis of lumbar region with neurogenic claudication  RCRI risk is 0. He is able to perform greater than 4 METS without difficulty. EKG from September and labs yesterday reviewed without renal compromise or any new findings. He has no symptoms of ACS, arrhythmia, decompensated heart failure nor valvular dysfunction. Recommend to proceed with planned procedure and is optimized without recommendation for additional testing. Recommend to continue beta blocker and statin as well as levothyroxine ramy-operatively. Anticoagulation recommendations:  1. Stop Eliquis 3 days prior to surgery (last dose in the evening on 1/20).   2. Lovenox injection into the abdomen will be done at " home 12 hours apart (available at pharmacy) for starting the morning of 1/21. Last dose will be evening on 1/23. NO injection on 1/24 before surgery  3. Restart Eliquis as long as Dr. Cifuentes is comfortable with it on the evening following your procedure.     3. History of DVT (deep vein thrombosis)  4. Paroxysmal atrial fibrillation (HCC)  Recommend bridging as above given previous unprovoked DVT, ongoing a fib and planned surgery.     5. Essential hypertension  Fair control, BP goal for age is <140/90 per JNC 8 guidelines and increase bisoprolol.      An After Visit Summary was printed and given to the patient at discharge.  Return for Next scheduled follow up.          Vaughn Kramer D.O. 1/11/2023   Electronically signed.

## 2023-01-24 ENCOUNTER — HOSPITAL ENCOUNTER (INPATIENT)
Facility: HOSPITAL | Age: 78
LOS: 5 days | Discharge: HOME HEALTH | DRG: 455 | End: 2023-01-29
Attending: NEUROLOGICAL SURGERY | Admitting: NEUROLOGICAL SURGERY
Payer: MEDICARE

## 2023-01-24 ENCOUNTER — APPOINTMENT (OUTPATIENT)
Dept: GENERAL RADIOLOGY | Facility: HOSPITAL | Age: 78
DRG: 455 | End: 2023-01-24
Payer: MEDICARE

## 2023-01-24 ENCOUNTER — ANESTHESIA (OUTPATIENT)
Dept: PERIOP | Facility: HOSPITAL | Age: 78
DRG: 455 | End: 2023-01-24
Payer: MEDICARE

## 2023-01-24 ENCOUNTER — ANESTHESIA EVENT (OUTPATIENT)
Dept: PERIOP | Facility: HOSPITAL | Age: 78
DRG: 455 | End: 2023-01-24
Payer: MEDICARE

## 2023-01-24 ENCOUNTER — APPOINTMENT (OUTPATIENT)
Dept: CT IMAGING | Facility: HOSPITAL | Age: 78
DRG: 455 | End: 2023-01-24
Payer: MEDICARE

## 2023-01-24 DIAGNOSIS — Z98.1 STATUS POST LUMBAR SPINAL FUSION: Primary | ICD-10-CM

## 2023-01-24 DIAGNOSIS — R33.9 URINARY RETENTION: ICD-10-CM

## 2023-01-24 DIAGNOSIS — M48.062 SPINAL STENOSIS OF LUMBAR REGION WITH NEUROGENIC CLAUDICATION: ICD-10-CM

## 2023-01-24 DIAGNOSIS — Z74.09 IMPAIRED MOBILITY: ICD-10-CM

## 2023-01-24 DIAGNOSIS — Z78.9 DECREASED ACTIVITIES OF DAILY LIVING (ADL): ICD-10-CM

## 2023-01-24 PROCEDURE — 0SG1071 FUSION OF 2 OR MORE LUMBAR VERTEBRAL JOINTS WITH AUTOLOGOUS TISSUE SUBSTITUTE, POSTERIOR APPROACH, POSTERIOR COLUMN, OPEN APPROACH: ICD-10-PCS | Performed by: NEUROLOGICAL SURGERY

## 2023-01-24 PROCEDURE — 76380 CAT SCAN FOLLOW-UP STUDY: CPT

## 2023-01-24 PROCEDURE — 25010000002 PHENYLEPHRINE 10 MG/ML SOLUTION 1 ML VIAL: Performed by: NURSE ANESTHETIST, CERTIFIED REGISTERED

## 2023-01-24 PROCEDURE — 72100 X-RAY EXAM L-S SPINE 2/3 VWS: CPT

## 2023-01-24 PROCEDURE — 25010000002 FENTANYL CITRATE (PF) 100 MCG/2ML SOLUTION: Performed by: NURSE ANESTHETIST, CERTIFIED REGISTERED

## 2023-01-24 PROCEDURE — 22585 ARTHRD ANT NTRBD MIN DSC EA: CPT | Performed by: NEUROLOGICAL SURGERY

## 2023-01-24 PROCEDURE — 22614 ARTHRD PST TQ 1NTRSPC EA ADD: CPT | Performed by: NEUROLOGICAL SURGERY

## 2023-01-24 PROCEDURE — 22558 ARTHRD ANT NTRBD MIN DSC LUM: CPT | Performed by: NEUROLOGICAL SURGERY

## 2023-01-24 PROCEDURE — 25010000002 FENTANYL CITRATE (PF) 250 MCG/5ML SOLUTION: Performed by: NURSE ANESTHETIST, CERTIFIED REGISTERED

## 2023-01-24 PROCEDURE — C1713 ANCHOR/SCREW BN/BN,TIS/BN: HCPCS | Performed by: NEUROLOGICAL SURGERY

## 2023-01-24 PROCEDURE — 0SB20ZZ EXCISION OF LUMBAR VERTEBRAL DISC, OPEN APPROACH: ICD-10-PCS | Performed by: NEUROLOGICAL SURGERY

## 2023-01-24 PROCEDURE — 86850 RBC ANTIBODY SCREEN: CPT | Performed by: ANESTHESIOLOGY

## 2023-01-24 PROCEDURE — 25010000002 VANCOMYCIN 10 G RECONSTITUTED SOLUTION: Performed by: NURSE PRACTITIONER

## 2023-01-24 PROCEDURE — 63048 LAM FACETEC &FORAMOT EA ADDL: CPT | Performed by: NEUROLOGICAL SURGERY

## 2023-01-24 PROCEDURE — 86901 BLOOD TYPING SEROLOGIC RH(D): CPT | Performed by: ANESTHESIOLOGY

## 2023-01-24 PROCEDURE — 25010000002 ONDANSETRON PER 1 MG: Performed by: NURSE ANESTHETIST, CERTIFIED REGISTERED

## 2023-01-24 PROCEDURE — 99024 POSTOP FOLLOW-UP VISIT: CPT | Performed by: NURSE PRACTITIONER

## 2023-01-24 PROCEDURE — 25010000002 MORPHINE PER 10 MG: Performed by: NEUROLOGICAL SURGERY

## 2023-01-24 PROCEDURE — 8E0WXBZ COMPUTER ASSISTED PROCEDURE OF TRUNK REGION: ICD-10-PCS | Performed by: NEUROLOGICAL SURGERY

## 2023-01-24 PROCEDURE — 25010000002 VANCOMYCIN 1 G RECONSTITUTED SOLUTION: Performed by: NEUROLOGICAL SURGERY

## 2023-01-24 PROCEDURE — 76000 FLUOROSCOPY <1 HR PHYS/QHP: CPT

## 2023-01-24 PROCEDURE — 25010000002 METHYLPREDNISOLONE PER 40 MG: Performed by: NEUROLOGICAL SURGERY

## 2023-01-24 PROCEDURE — 25010000002 HEPARIN (PORCINE) PER 1000 UNITS: Performed by: NURSE PRACTITIONER

## 2023-01-24 PROCEDURE — 61783 SCAN PROC SPINAL: CPT | Performed by: NEUROLOGICAL SURGERY

## 2023-01-24 PROCEDURE — 25010000002 DEXAMETHASONE PER 1 MG: Performed by: ANESTHESIOLOGY

## 2023-01-24 PROCEDURE — 25010000002 EPINEPHRINE 1 MG/ML SOLUTION 1 ML AMPULE: Performed by: NEUROLOGICAL SURGERY

## 2023-01-24 PROCEDURE — 63047 LAM FACETEC & FORAMOT LUMBAR: CPT | Performed by: NEUROLOGICAL SURGERY

## 2023-01-24 PROCEDURE — 25010000002 LIDOCAINE PER 10 MG: Performed by: NURSE ANESTHETIST, CERTIFIED REGISTERED

## 2023-01-24 PROCEDURE — S0260 H&P FOR SURGERY: HCPCS | Performed by: NEUROLOGICAL SURGERY

## 2023-01-24 PROCEDURE — 25010000002 DEXAMETHASONE PER 1 MG: Performed by: NURSE ANESTHETIST, CERTIFIED REGISTERED

## 2023-01-24 PROCEDURE — 3E0U0GB INTRODUCTION OF RECOMBINANT BONE MORPHOGENETIC PROTEIN INTO JOINTS, OPEN APPROACH: ICD-10-PCS | Performed by: NEUROLOGICAL SURGERY

## 2023-01-24 PROCEDURE — 22612 ARTHRD PST TQ 1NTRSPC LUMBAR: CPT | Performed by: NEUROLOGICAL SURGERY

## 2023-01-24 PROCEDURE — 86900 BLOOD TYPING SEROLOGIC ABO: CPT | Performed by: ANESTHESIOLOGY

## 2023-01-24 PROCEDURE — 22842 INSERT SPINE FIXATION DEVICE: CPT | Performed by: NEUROLOGICAL SURGERY

## 2023-01-24 PROCEDURE — 0SG10A0 FUSION OF 2 OR MORE LUMBAR VERTEBRAL JOINTS WITH INTERBODY FUSION DEVICE, ANTERIOR APPROACH, ANTERIOR COLUMN, OPEN APPROACH: ICD-10-PCS | Performed by: NEUROLOGICAL SURGERY

## 2023-01-24 PROCEDURE — 22853 INSJ BIOMECHANICAL DEVICE: CPT | Performed by: NEUROLOGICAL SURGERY

## 2023-01-24 PROCEDURE — 25010000002 PROPOFOL 1000 MG/100ML EMULSION: Performed by: NURSE ANESTHETIST, CERTIFIED REGISTERED

## 2023-01-24 DEVICE — BONE GRAFT KIT 7510400 INFUSE MEDIUM
Type: IMPLANTABLE DEVICE | Site: SPINE LUMBAR | Status: FUNCTIONAL
Brand: INFUSE® BONE GRAFT

## 2023-01-24 DEVICE — SET SCREW 5540030 5.5 TI NS BRK OFF
Type: IMPLANTABLE DEVICE | Site: SPINE LUMBAR | Status: FUNCTIONAL
Brand: CD HORIZON® SPINAL SYSTEM

## 2023-01-24 DEVICE — CAGE 4986055 CDALE PTC 18MM 6 DEG 10X55
Type: IMPLANTABLE DEVICE | Site: SPINE LUMBAR | Status: FUNCTIONAL
Brand: CLYDESDALE PTC™ SPINAL SYSTEM

## 2023-01-24 DEVICE — KT HEMOST ABS SURGIFOAM PORCN 1GRAM: Type: IMPLANTABLE DEVICE | Site: SPINE LUMBAR | Status: FUNCTIONAL

## 2023-01-24 DEVICE — DBM T43103 2.5CC GRAFTON PUTTY
Type: IMPLANTABLE DEVICE | Site: SPINE LUMBAR | Status: FUNCTIONAL
Brand: GRAFTON®AND GRAFTON PLUS®DEMINERALIZED BONE MATRIX (DBM)

## 2023-01-24 DEVICE — GRFT BONE MAGNIFUSE PC 1X10CM: Type: IMPLANTABLE DEVICE | Site: SPINE LUMBAR | Status: FUNCTIONAL

## 2023-01-24 DEVICE — HEMOST ABS SURGIFOAM SZ100 8X12 10MM: Type: IMPLANTABLE DEVICE | Site: SPINE LUMBAR | Status: FUNCTIONAL

## 2023-01-24 RX ORDER — CYCLOBENZAPRINE HCL 5 MG
5 TABLET ORAL 3 TIMES DAILY
Status: DISCONTINUED | OUTPATIENT
Start: 2023-01-24 | End: 2023-01-29 | Stop reason: HOSPADM

## 2023-01-24 RX ORDER — SODIUM CHLORIDE 0.9 % (FLUSH) 0.9 %
3 SYRINGE (ML) INJECTION EVERY 12 HOURS SCHEDULED
Status: DISCONTINUED | OUTPATIENT
Start: 2023-01-24 | End: 2023-01-24 | Stop reason: HOSPADM

## 2023-01-24 RX ORDER — FLUMAZENIL 0.1 MG/ML
0.2 INJECTION INTRAVENOUS AS NEEDED
Status: DISCONTINUED | OUTPATIENT
Start: 2023-01-24 | End: 2023-01-24 | Stop reason: HOSPADM

## 2023-01-24 RX ORDER — DROPERIDOL 2.5 MG/ML
0.62 INJECTION, SOLUTION INTRAMUSCULAR; INTRAVENOUS ONCE AS NEEDED
Status: DISCONTINUED | OUTPATIENT
Start: 2023-01-24 | End: 2023-01-24 | Stop reason: HOSPADM

## 2023-01-24 RX ORDER — LIDOCAINE HYDROCHLORIDE 20 MG/ML
INJECTION, SOLUTION EPIDURAL; INFILTRATION; INTRACAUDAL; PERINEURAL AS NEEDED
Status: DISCONTINUED | OUTPATIENT
Start: 2023-01-24 | End: 2023-01-24 | Stop reason: SURG

## 2023-01-24 RX ORDER — ACETAMINOPHEN 325 MG/1
650 TABLET ORAL EVERY 4 HOURS PRN
Status: DISCONTINUED | OUTPATIENT
Start: 2023-01-24 | End: 2023-01-29 | Stop reason: HOSPADM

## 2023-01-24 RX ORDER — LABETALOL HYDROCHLORIDE 5 MG/ML
5 INJECTION, SOLUTION INTRAVENOUS
Status: DISCONTINUED | OUTPATIENT
Start: 2023-01-24 | End: 2023-01-24 | Stop reason: HOSPADM

## 2023-01-24 RX ORDER — FENTANYL CITRATE 50 UG/ML
25 INJECTION, SOLUTION INTRAMUSCULAR; INTRAVENOUS
Status: DISCONTINUED | OUTPATIENT
Start: 2023-01-24 | End: 2023-01-24 | Stop reason: HOSPADM

## 2023-01-24 RX ORDER — HYDROMORPHONE HYDROCHLORIDE 1 MG/ML
0.5 INJECTION, SOLUTION INTRAMUSCULAR; INTRAVENOUS; SUBCUTANEOUS
Status: DISCONTINUED | OUTPATIENT
Start: 2023-01-24 | End: 2023-01-24 | Stop reason: HOSPADM

## 2023-01-24 RX ORDER — KETAMINE HCL IN NACL, ISO-OSM 100MG/10ML
SYRINGE (ML) INJECTION AS NEEDED
Status: DISCONTINUED | OUTPATIENT
Start: 2023-01-24 | End: 2023-01-24 | Stop reason: SURG

## 2023-01-24 RX ORDER — LIDOCAINE HYDROCHLORIDE 10 MG/ML
0.5 INJECTION, SOLUTION EPIDURAL; INFILTRATION; INTRACAUDAL; PERINEURAL ONCE AS NEEDED
Status: DISCONTINUED | OUTPATIENT
Start: 2023-01-24 | End: 2023-01-24 | Stop reason: HOSPADM

## 2023-01-24 RX ORDER — BUPIVACAINE HCL/0.9 % NACL/PF 0.125 %
PLASTIC BAG, INJECTION (ML) EPIDURAL AS NEEDED
Status: DISCONTINUED | OUTPATIENT
Start: 2023-01-24 | End: 2023-01-24 | Stop reason: SURG

## 2023-01-24 RX ORDER — SODIUM CHLORIDE, SODIUM LACTATE, POTASSIUM CHLORIDE, CALCIUM CHLORIDE 600; 310; 30; 20 MG/100ML; MG/100ML; MG/100ML; MG/100ML
100 INJECTION, SOLUTION INTRAVENOUS CONTINUOUS
Status: DISCONTINUED | OUTPATIENT
Start: 2023-01-24 | End: 2023-01-24

## 2023-01-24 RX ORDER — ACETAMINOPHEN 160 MG/5ML
650 SOLUTION ORAL EVERY 4 HOURS PRN
Status: DISCONTINUED | OUTPATIENT
Start: 2023-01-24 | End: 2023-01-29 | Stop reason: HOSPADM

## 2023-01-24 RX ORDER — OXYCODONE AND ACETAMINOPHEN 7.5; 325 MG/1; MG/1
1 TABLET ORAL EVERY 4 HOURS PRN
Status: DISCONTINUED | OUTPATIENT
Start: 2023-01-24 | End: 2023-01-29 | Stop reason: HOSPADM

## 2023-01-24 RX ORDER — SODIUM CHLORIDE 0.9 % (FLUSH) 0.9 %
10 SYRINGE (ML) INJECTION EVERY 12 HOURS SCHEDULED
Status: DISCONTINUED | OUTPATIENT
Start: 2023-01-24 | End: 2023-01-29 | Stop reason: HOSPADM

## 2023-01-24 RX ORDER — SODIUM CHLORIDE 0.9 % (FLUSH) 0.9 %
3 SYRINGE (ML) INJECTION AS NEEDED
Status: DISCONTINUED | OUTPATIENT
Start: 2023-01-24 | End: 2023-01-24 | Stop reason: HOSPADM

## 2023-01-24 RX ORDER — ONDANSETRON 2 MG/ML
4 INJECTION INTRAMUSCULAR; INTRAVENOUS EVERY 6 HOURS PRN
Status: DISCONTINUED | OUTPATIENT
Start: 2023-01-24 | End: 2023-01-29 | Stop reason: HOSPADM

## 2023-01-24 RX ORDER — MAGNESIUM HYDROXIDE 1200 MG/15ML
LIQUID ORAL AS NEEDED
Status: DISCONTINUED | OUTPATIENT
Start: 2023-01-24 | End: 2023-01-24 | Stop reason: HOSPADM

## 2023-01-24 RX ORDER — LIDOCAINE HYDROCHLORIDE ANHYDROUS AND DEXTROSE MONOHYDRATE 5; 400 G/100ML; MG/100ML
INJECTION, SOLUTION INTRAVENOUS CONTINUOUS PRN
Status: DISCONTINUED | OUTPATIENT
Start: 2023-01-24 | End: 2023-01-24 | Stop reason: SURG

## 2023-01-24 RX ORDER — SODIUM CHLORIDE 9 MG/ML
40 INJECTION, SOLUTION INTRAVENOUS AS NEEDED
Status: DISCONTINUED | OUTPATIENT
Start: 2023-01-24 | End: 2023-01-29 | Stop reason: HOSPADM

## 2023-01-24 RX ORDER — OXYCODONE AND ACETAMINOPHEN 10; 325 MG/1; MG/1
1 TABLET ORAL EVERY 4 HOURS PRN
Status: DISCONTINUED | OUTPATIENT
Start: 2023-01-24 | End: 2023-01-29 | Stop reason: HOSPADM

## 2023-01-24 RX ORDER — VANCOMYCIN HYDROCHLORIDE 1 G/20ML
INJECTION, POWDER, LYOPHILIZED, FOR SOLUTION INTRAVENOUS AS NEEDED
Status: DISCONTINUED | OUTPATIENT
Start: 2023-01-24 | End: 2023-01-24 | Stop reason: HOSPADM

## 2023-01-24 RX ORDER — AMOXICILLIN 250 MG
2 CAPSULE ORAL 2 TIMES DAILY
Status: DISCONTINUED | OUTPATIENT
Start: 2023-01-24 | End: 2023-01-28

## 2023-01-24 RX ORDER — NALOXONE HCL 0.4 MG/ML
0.04 VIAL (ML) INJECTION AS NEEDED
Status: DISCONTINUED | OUTPATIENT
Start: 2023-01-24 | End: 2023-01-24 | Stop reason: HOSPADM

## 2023-01-24 RX ORDER — SODIUM CHLORIDE, SODIUM LACTATE, POTASSIUM CHLORIDE, CALCIUM CHLORIDE 600; 310; 30; 20 MG/100ML; MG/100ML; MG/100ML; MG/100ML
1000 INJECTION, SOLUTION INTRAVENOUS CONTINUOUS
Status: DISCONTINUED | OUTPATIENT
Start: 2023-01-24 | End: 2023-01-24

## 2023-01-24 RX ORDER — PROPOFOL 10 MG/ML
INJECTION, EMULSION INTRAVENOUS CONTINUOUS PRN
Status: DISCONTINUED | OUTPATIENT
Start: 2023-01-24 | End: 2023-01-24 | Stop reason: SURG

## 2023-01-24 RX ORDER — POLYETHYLENE GLYCOL 3350 17 G/17G
17 POWDER, FOR SOLUTION ORAL DAILY
Status: DISCONTINUED | OUTPATIENT
Start: 2023-01-24 | End: 2023-01-28

## 2023-01-24 RX ORDER — SODIUM CHLORIDE 0.9 % (FLUSH) 0.9 %
10 SYRINGE (ML) INJECTION AS NEEDED
Status: DISCONTINUED | OUTPATIENT
Start: 2023-01-24 | End: 2023-01-29 | Stop reason: HOSPADM

## 2023-01-24 RX ORDER — PANTOPRAZOLE SODIUM 40 MG/1
40 TABLET, DELAYED RELEASE ORAL
Status: DISCONTINUED | OUTPATIENT
Start: 2023-01-25 | End: 2023-01-29 | Stop reason: HOSPADM

## 2023-01-24 RX ORDER — SODIUM CHLORIDE 0.9 % (FLUSH) 0.9 %
10 SYRINGE (ML) INJECTION AS NEEDED
Status: DISCONTINUED | OUTPATIENT
Start: 2023-01-24 | End: 2023-01-24 | Stop reason: HOSPADM

## 2023-01-24 RX ORDER — FENTANYL CITRATE 50 UG/ML
INJECTION, SOLUTION INTRAMUSCULAR; INTRAVENOUS AS NEEDED
Status: DISCONTINUED | OUTPATIENT
Start: 2023-01-24 | End: 2023-01-24 | Stop reason: SURG

## 2023-01-24 RX ORDER — ONDANSETRON 2 MG/ML
INJECTION INTRAMUSCULAR; INTRAVENOUS AS NEEDED
Status: DISCONTINUED | OUTPATIENT
Start: 2023-01-24 | End: 2023-01-24 | Stop reason: SURG

## 2023-01-24 RX ORDER — ONDANSETRON 4 MG/1
4 TABLET, FILM COATED ORAL EVERY 6 HOURS PRN
Status: DISCONTINUED | OUTPATIENT
Start: 2023-01-24 | End: 2023-01-29 | Stop reason: HOSPADM

## 2023-01-24 RX ORDER — HEPARIN SODIUM 5000 [USP'U]/ML
5000 INJECTION, SOLUTION INTRAVENOUS; SUBCUTANEOUS EVERY 12 HOURS SCHEDULED
Status: DISCONTINUED | OUTPATIENT
Start: 2023-01-24 | End: 2023-01-29 | Stop reason: HOSPADM

## 2023-01-24 RX ORDER — BISOPROLOL FUMARATE 5 MG/1
10 TABLET, FILM COATED ORAL DAILY
Status: DISCONTINUED | OUTPATIENT
Start: 2023-01-25 | End: 2023-01-29 | Stop reason: HOSPADM

## 2023-01-24 RX ORDER — EPHEDRINE SULFATE 50 MG/ML
INJECTION INTRAVENOUS AS NEEDED
Status: DISCONTINUED | OUTPATIENT
Start: 2023-01-24 | End: 2023-01-24 | Stop reason: SURG

## 2023-01-24 RX ORDER — ENOXAPARIN SODIUM 100 MG/ML
80 INJECTION SUBCUTANEOUS EVERY 12 HOURS SCHEDULED
Status: ON HOLD | COMMUNITY
End: 2023-01-24

## 2023-01-24 RX ORDER — SODIUM CHLORIDE 9 MG/ML
40 INJECTION, SOLUTION INTRAVENOUS AS NEEDED
Status: DISCONTINUED | OUTPATIENT
Start: 2023-01-24 | End: 2023-01-24 | Stop reason: HOSPADM

## 2023-01-24 RX ORDER — SODIUM CHLORIDE 0.9 % (FLUSH) 0.9 %
3-10 SYRINGE (ML) INJECTION AS NEEDED
Status: DISCONTINUED | OUTPATIENT
Start: 2023-01-24 | End: 2023-01-24 | Stop reason: HOSPADM

## 2023-01-24 RX ORDER — ACETAMINOPHEN 500 MG
1000 TABLET ORAL ONCE
Status: COMPLETED | OUTPATIENT
Start: 2023-01-24 | End: 2023-01-24

## 2023-01-24 RX ORDER — ONDANSETRON 2 MG/ML
4 INJECTION INTRAMUSCULAR; INTRAVENOUS
Status: DISCONTINUED | OUTPATIENT
Start: 2023-01-24 | End: 2023-01-24 | Stop reason: HOSPADM

## 2023-01-24 RX ORDER — OXYCODONE AND ACETAMINOPHEN 10; 325 MG/1; MG/1
1 TABLET ORAL ONCE AS NEEDED
Status: DISCONTINUED | OUTPATIENT
Start: 2023-01-24 | End: 2023-01-24 | Stop reason: HOSPADM

## 2023-01-24 RX ORDER — ROSUVASTATIN CALCIUM 20 MG/1
20 TABLET, COATED ORAL NIGHTLY
Status: DISCONTINUED | OUTPATIENT
Start: 2023-01-24 | End: 2023-01-29 | Stop reason: HOSPADM

## 2023-01-24 RX ORDER — ACETAMINOPHEN 650 MG/1
650 SUPPOSITORY RECTAL EVERY 4 HOURS PRN
Status: DISCONTINUED | OUTPATIENT
Start: 2023-01-24 | End: 2023-01-29 | Stop reason: HOSPADM

## 2023-01-24 RX ORDER — DEXAMETHASONE SODIUM PHOSPHATE 4 MG/ML
INJECTION, SOLUTION INTRA-ARTICULAR; INTRALESIONAL; INTRAMUSCULAR; INTRAVENOUS; SOFT TISSUE AS NEEDED
Status: DISCONTINUED | OUTPATIENT
Start: 2023-01-24 | End: 2023-01-24 | Stop reason: SURG

## 2023-01-24 RX ORDER — LEVOTHYROXINE SODIUM 112 UG/1
112 TABLET ORAL
Status: DISCONTINUED | OUTPATIENT
Start: 2023-01-25 | End: 2023-01-29 | Stop reason: HOSPADM

## 2023-01-24 RX ORDER — DEXAMETHASONE SODIUM PHOSPHATE 4 MG/ML
4 INJECTION, SOLUTION INTRA-ARTICULAR; INTRALESIONAL; INTRAMUSCULAR; INTRAVENOUS; SOFT TISSUE ONCE AS NEEDED
Status: COMPLETED | OUTPATIENT
Start: 2023-01-24 | End: 2023-01-24

## 2023-01-24 RX ADMIN — DOCUSATE SODIUM 50 MG AND SENNOSIDES 8.6 MG 2 TABLET: 8.6; 5 TABLET, FILM COATED ORAL at 21:04

## 2023-01-24 RX ADMIN — PROPOFOL 100 MG: 10 INJECTION, EMULSION INTRAVENOUS at 10:07

## 2023-01-24 RX ADMIN — ACETAMINOPHEN 1000 MG: 500 TABLET, FILM COATED ORAL at 06:44

## 2023-01-24 RX ADMIN — FENTANYL CITRATE 100 MCG: 50 INJECTION, SOLUTION INTRAMUSCULAR; INTRAVENOUS at 07:21

## 2023-01-24 RX ADMIN — EPHEDRINE SULFATE 20 MG: 50 INJECTION INTRAVENOUS at 07:48

## 2023-01-24 RX ADMIN — VANCOMYCIN HYDROCHLORIDE 1250 MG: 10 INJECTION, POWDER, LYOPHILIZED, FOR SOLUTION INTRAVENOUS at 18:11

## 2023-01-24 RX ADMIN — CYCLOBENZAPRINE HYDROCHLORIDE 5 MG: 5 TABLET, FILM COATED ORAL at 21:04

## 2023-01-24 RX ADMIN — ROSUVASTATIN CALCIUM 20 MG: 20 TABLET, FILM COATED ORAL at 21:05

## 2023-01-24 RX ADMIN — OXYCODONE HYDROCHLORIDE AND ACETAMINOPHEN 1 TABLET: 7.5; 325 TABLET ORAL at 16:51

## 2023-01-24 RX ADMIN — PROPOFOL 150 MG: 10 INJECTION, EMULSION INTRAVENOUS at 07:24

## 2023-01-24 RX ADMIN — EPHEDRINE SULFATE 10 MG: 50 INJECTION INTRAVENOUS at 11:12

## 2023-01-24 RX ADMIN — SODIUM CHLORIDE, POTASSIUM CHLORIDE, SODIUM LACTATE AND CALCIUM CHLORIDE 1000 ML: 600; 310; 30; 20 INJECTION, SOLUTION INTRAVENOUS at 06:39

## 2023-01-24 RX ADMIN — PROPOFOL 100 MCG/KG/MIN: 10 INJECTION, EMULSION INTRAVENOUS at 07:28

## 2023-01-24 RX ADMIN — FENTANYL CITRATE 100 MCG: 50 INJECTION, SOLUTION INTRAMUSCULAR; INTRAVENOUS at 07:23

## 2023-01-24 RX ADMIN — Medication 100 MCG: at 10:48

## 2023-01-24 RX ADMIN — SODIUM CHLORIDE, POTASSIUM CHLORIDE, SODIUM LACTATE AND CALCIUM CHLORIDE 1000 ML: 600; 310; 30; 20 INJECTION, SOLUTION INTRAVENOUS at 13:54

## 2023-01-24 RX ADMIN — Medication 50 MG: at 07:59

## 2023-01-24 RX ADMIN — LIDOCAINE HYDROCHLORIDE 2 MG/MIN: 4 INJECTION, SOLUTION INTRAVENOUS at 07:28

## 2023-01-24 RX ADMIN — OXYCODONE AND ACETAMINOPHEN 1 TABLET: 325; 10 TABLET ORAL at 21:04

## 2023-01-24 RX ADMIN — Medication 200 MCG: at 11:41

## 2023-01-24 RX ADMIN — FENTANYL CITRATE 50 MCG: 50 INJECTION, SOLUTION INTRAMUSCULAR; INTRAVENOUS at 07:24

## 2023-01-24 RX ADMIN — ONDANSETRON 4 MG: 2 INJECTION INTRAMUSCULAR; INTRAVENOUS at 13:05

## 2023-01-24 RX ADMIN — DEXAMETHASONE SODIUM PHOSPHATE 8 MG: 4 INJECTION, SOLUTION INTRA-ARTICULAR; INTRALESIONAL; INTRAMUSCULAR; INTRAVENOUS; SOFT TISSUE at 13:05

## 2023-01-24 RX ADMIN — Medication 100 MCG: at 11:28

## 2023-01-24 RX ADMIN — Medication 100 MCG: at 11:15

## 2023-01-24 RX ADMIN — Medication 10 ML: at 21:05

## 2023-01-24 RX ADMIN — DEXAMETHASONE SODIUM PHOSPHATE 4 MG: 4 INJECTION INTRA-ARTICULAR; INTRALESIONAL; INTRAMUSCULAR; INTRAVENOUS; SOFT TISSUE at 06:44

## 2023-01-24 RX ADMIN — HEPARIN SODIUM 5000 UNITS: 5000 INJECTION INTRAVENOUS; SUBCUTANEOUS at 21:05

## 2023-01-24 RX ADMIN — PROPOFOL 50 MG: 10 INJECTION, EMULSION INTRAVENOUS at 07:41

## 2023-01-24 RX ADMIN — SODIUM CHLORIDE, POTASSIUM CHLORIDE, SODIUM LACTATE AND CALCIUM CHLORIDE 1000 ML: 600; 310; 30; 20 INJECTION, SOLUTION INTRAVENOUS at 06:11

## 2023-01-24 RX ADMIN — FENTANYL CITRATE 100 MCG: 50 INJECTION, SOLUTION INTRAMUSCULAR; INTRAVENOUS at 10:42

## 2023-01-24 RX ADMIN — VANCOMYCIN HYDROCHLORIDE 1250 MG: 10 INJECTION, POWDER, LYOPHILIZED, FOR SOLUTION INTRAVENOUS at 06:45

## 2023-01-24 RX ADMIN — Medication 300 MCG: at 10:56

## 2023-01-24 RX ADMIN — EPHEDRINE SULFATE 20 MG: 50 INJECTION INTRAVENOUS at 07:45

## 2023-01-24 RX ADMIN — PHENYLEPHRINE HYDROCHLORIDE 0.3 MCG/KG/MIN: 10 INJECTION INTRAVENOUS at 11:45

## 2023-01-24 RX ADMIN — Medication 200 MCG: at 10:42

## 2023-01-24 RX ADMIN — LIDOCAINE HYDROCHLORIDE 100 MG: 20 INJECTION, SOLUTION EPIDURAL; INFILTRATION; INTRACAUDAL; PERINEURAL at 07:24

## 2023-01-24 NOTE — ANESTHESIA PREPROCEDURE EVALUATION
Anesthesia Evaluation     Patient summary reviewed   no history of anesthetic complications:  NPO Solid Status: > 8 hours  NPO Liquid Status: > 8 hours           Airway   Mallampati: III  TM distance: >3 FB  Neck ROM: limited  No difficulty expected  Dental - normal exam     Pulmonary    (+) a smoker Current Smoked day of surgery,   (-) asthma, sleep apnea  Cardiovascular   Exercise tolerance: good (4-7 METS)    ECG reviewed    (+) hypertension, dysrhythmias Atrial Fib, DVT, hyperlipidemia,   (-) past MI, CAD, cardiac stents    ROS comment: eliquis--> lovenox bridge    Neuro/Psych  (-) seizures, TIA, CVA  GI/Hepatic/Renal/Endo    (+)  GERD,  thyroid problem hypothyroidism  (-) liver disease, no renal disease, diabetes    Musculoskeletal     (+) neck pain,   Abdominal    Substance History      OB/GYN          Other   arthritis,                        Anesthesia Plan    ASA 3     general     intravenous induction     Anesthetic plan, risks, benefits, and alternatives have been provided, discussed and informed consent has been obtained with: patient.        CODE STATUS:

## 2023-01-24 NOTE — ANESTHESIA POSTPROCEDURE EVALUATION
"Patient: Sonu Bravo Jr.    Procedure Summary     Date: 01/24/23 Room / Location:  [facility] [address] [place] [address] [facility]    Anesthesia Start: 0720 Anesthesia Stop: 1329    Procedures:       LUMBAR DIRECT LATERAL INTERBODY FUSION; LUMBAR 2-5, LUMBAR LAMINECTOMY TRANSFORAMINAL LUMBAR INTERBODY FUSION; LUMBAR 2-5 (Spine Lumbar)      LUMBAR DIRECT LATERAL INTERBODY FUSION; LUMBAR 2-5 (Spine Lumbar) Diagnosis:       Spinal stenosis of lumbar region with neurogenic claudication      (Spinal stenosis of lumbar region with neurogenic claudication [M48.062])    Surgeons: Durga Cifuentes MD Provider: Bisi Louie CRNA    Anesthesia Type: general ASA Status: 3          Anesthesia Type: general    Vitals  Vitals Value Taken Time   /73 01/24/23 1500   Temp 98.2 °F (36.8 °C) 01/24/23 1445   Pulse 66 01/24/23 1500   Resp 15 01/24/23 1500   SpO2 95 % 01/24/23 1500           Post Anesthesia Care and Evaluation    Patient location during evaluation: PACU  Patient participation: complete - patient participated  Level of consciousness: awake and alert  Pain management: adequate    Airway patency: patent  Anesthetic complications: No anesthetic complications    Cardiovascular status: acceptable  Respiratory status: acceptable  Hydration status: acceptable    Comments: Blood pressure 158/67, pulse 67, temperature 97.8 °F (36.6 °C), temperature source Axillary, resp. rate 16, height 176.7 cm (69.57\"), weight 78.4 kg (172 lb 13.5 oz), SpO2 95 %.    Pt discharged from PACU based on stone score >8      "

## 2023-01-24 NOTE — ANESTHESIA PROCEDURE NOTES
Airway  Urgency: elective    Date/Time: 1/24/2023 7:26 AM  Airway not difficult    General Information and Staff    Patient location during procedure: OR  CRNA/CAA: Bisi Louie CRNA    Indications and Patient Condition  Indications for airway management: airway protection    Preoxygenated: yes  Mask difficulty assessment: 1 - vent by mask    Final Airway Details  Final airway type: endotracheal airway      Successful airway: ETT  Cuffed: yes   Successful intubation technique: direct laryngoscopy  Facilitating devices/methods: intubating stylet  Endotracheal tube insertion site: oral  Blade: Ramos  Blade size: 2  ETT size (mm): 8.0  Cormack-Lehane Classification: grade IIa - partial view of glottis  Placement verified by: chest auscultation and capnometry   Cuff volume (mL): 8  Measured from: lips  ETT/EBT  to lips (cm): 24  Number of attempts at approach: 1  Assessment: lips, teeth, and gum same as pre-op and atraumatic intubation

## 2023-01-25 ENCOUNTER — APPOINTMENT (OUTPATIENT)
Dept: GENERAL RADIOLOGY | Facility: HOSPITAL | Age: 78
DRG: 455 | End: 2023-01-25
Payer: MEDICARE

## 2023-01-25 LAB
ALBUMIN SERPL-MCNC: 3.6 G/DL (ref 3.5–5.2)
ALBUMIN/GLOB SERPL: 1.6 G/DL
ALP SERPL-CCNC: 71 U/L (ref 39–117)
ALT SERPL W P-5'-P-CCNC: 30 U/L (ref 1–41)
ANION GAP SERPL CALCULATED.3IONS-SCNC: 10 MMOL/L (ref 5–15)
AST SERPL-CCNC: 34 U/L (ref 1–40)
BASOPHILS # BLD AUTO: 0.01 10*3/MM3 (ref 0–0.2)
BASOPHILS NFR BLD AUTO: 0.1 % (ref 0–1.5)
BILIRUB SERPL-MCNC: 0.2 MG/DL (ref 0–1.2)
BUN SERPL-MCNC: 17 MG/DL (ref 8–23)
BUN/CREAT SERPL: 18.5 (ref 7–25)
CALCIUM SPEC-SCNC: 8.5 MG/DL (ref 8.6–10.5)
CHLORIDE SERPL-SCNC: 105 MMOL/L (ref 98–107)
CO2 SERPL-SCNC: 25 MMOL/L (ref 22–29)
CREAT SERPL-MCNC: 0.92 MG/DL (ref 0.76–1.27)
DEPRECATED RDW RBC AUTO: 50.2 FL (ref 37–54)
EGFRCR SERPLBLD CKD-EPI 2021: 85.1 ML/MIN/1.73
EOSINOPHIL # BLD AUTO: 0 10*3/MM3 (ref 0–0.4)
EOSINOPHIL NFR BLD AUTO: 0 % (ref 0.3–6.2)
ERYTHROCYTE [DISTWIDTH] IN BLOOD BY AUTOMATED COUNT: 14.6 % (ref 12.3–15.4)
GLOBULIN UR ELPH-MCNC: 2.2 GM/DL
GLUCOSE SERPL-MCNC: 132 MG/DL (ref 65–99)
HCT VFR BLD AUTO: 36.5 % (ref 37.5–51)
HGB BLD-MCNC: 11.7 G/DL (ref 13–17.7)
IMM GRANULOCYTES # BLD AUTO: 0.09 10*3/MM3 (ref 0–0.05)
IMM GRANULOCYTES NFR BLD AUTO: 0.6 % (ref 0–0.5)
LYMPHOCYTES # BLD AUTO: 0.89 10*3/MM3 (ref 0.7–3.1)
LYMPHOCYTES NFR BLD AUTO: 6.3 % (ref 19.6–45.3)
MCH RBC QN AUTO: 30 PG (ref 26.6–33)
MCHC RBC AUTO-ENTMCNC: 32.1 G/DL (ref 31.5–35.7)
MCV RBC AUTO: 93.6 FL (ref 79–97)
MONOCYTES # BLD AUTO: 1.46 10*3/MM3 (ref 0.1–0.9)
MONOCYTES NFR BLD AUTO: 10.3 % (ref 5–12)
NEUTROPHILS NFR BLD AUTO: 11.74 10*3/MM3 (ref 1.7–7)
NEUTROPHILS NFR BLD AUTO: 82.7 % (ref 42.7–76)
NRBC BLD AUTO-RTO: 0 /100 WBC (ref 0–0.2)
PLATELET # BLD AUTO: 165 10*3/MM3 (ref 140–450)
PMV BLD AUTO: 10.1 FL (ref 6–12)
POTASSIUM SERPL-SCNC: 4.6 MMOL/L (ref 3.5–5.2)
PROT SERPL-MCNC: 5.8 G/DL (ref 6–8.5)
RBC # BLD AUTO: 3.9 10*6/MM3 (ref 4.14–5.8)
SODIUM SERPL-SCNC: 140 MMOL/L (ref 136–145)
WBC NRBC COR # BLD: 14.19 10*3/MM3 (ref 3.4–10.8)

## 2023-01-25 PROCEDURE — 99024 POSTOP FOLLOW-UP VISIT: CPT | Performed by: NURSE PRACTITIONER

## 2023-01-25 PROCEDURE — 80053 COMPREHEN METABOLIC PANEL: CPT | Performed by: NURSE PRACTITIONER

## 2023-01-25 PROCEDURE — 25010000002 VANCOMYCIN 10 G RECONSTITUTED SOLUTION: Performed by: NURSE PRACTITIONER

## 2023-01-25 PROCEDURE — 97116 GAIT TRAINING THERAPY: CPT

## 2023-01-25 PROCEDURE — 97161 PT EVAL LOW COMPLEX 20 MIN: CPT | Performed by: PHYSICAL THERAPIST

## 2023-01-25 PROCEDURE — 85025 COMPLETE CBC W/AUTO DIFF WBC: CPT | Performed by: NURSE PRACTITIONER

## 2023-01-25 PROCEDURE — 72100 X-RAY EXAM L-S SPINE 2/3 VWS: CPT

## 2023-01-25 PROCEDURE — 25010000002 HEPARIN (PORCINE) PER 1000 UNITS: Performed by: NURSE PRACTITIONER

## 2023-01-25 PROCEDURE — 97165 OT EVAL LOW COMPLEX 30 MIN: CPT

## 2023-01-25 RX ADMIN — OXYCODONE HYDROCHLORIDE AND ACETAMINOPHEN 1 TABLET: 7.5; 325 TABLET ORAL at 10:51

## 2023-01-25 RX ADMIN — BISOPROLOL FUMARATE 10 MG: 5 TABLET ORAL at 09:06

## 2023-01-25 RX ADMIN — HEPARIN SODIUM 5000 UNITS: 5000 INJECTION INTRAVENOUS; SUBCUTANEOUS at 09:06

## 2023-01-25 RX ADMIN — POLYETHYLENE GLYCOL 3350 17 G: 17 POWDER, FOR SOLUTION ORAL at 09:06

## 2023-01-25 RX ADMIN — LEVOTHYROXINE SODIUM 112 MCG: 112 TABLET ORAL at 06:16

## 2023-01-25 RX ADMIN — VANCOMYCIN HYDROCHLORIDE 1250 MG: 10 INJECTION, POWDER, LYOPHILIZED, FOR SOLUTION INTRAVENOUS at 06:16

## 2023-01-25 RX ADMIN — Medication 10 ML: at 09:09

## 2023-01-25 RX ADMIN — CYCLOBENZAPRINE HYDROCHLORIDE 5 MG: 5 TABLET, FILM COATED ORAL at 16:20

## 2023-01-25 RX ADMIN — CYCLOBENZAPRINE HYDROCHLORIDE 5 MG: 5 TABLET, FILM COATED ORAL at 09:05

## 2023-01-25 RX ADMIN — DOCUSATE SODIUM 50 MG AND SENNOSIDES 8.6 MG 2 TABLET: 8.6; 5 TABLET, FILM COATED ORAL at 21:14

## 2023-01-25 RX ADMIN — CYCLOBENZAPRINE HYDROCHLORIDE 5 MG: 5 TABLET, FILM COATED ORAL at 21:14

## 2023-01-25 RX ADMIN — PANTOPRAZOLE SODIUM 40 MG: 40 TABLET, DELAYED RELEASE ORAL at 06:16

## 2023-01-25 RX ADMIN — ROSUVASTATIN CALCIUM 20 MG: 20 TABLET, FILM COATED ORAL at 21:14

## 2023-01-25 RX ADMIN — HEPARIN SODIUM 5000 UNITS: 5000 INJECTION INTRAVENOUS; SUBCUTANEOUS at 21:15

## 2023-01-25 RX ADMIN — OXYCODONE HYDROCHLORIDE AND ACETAMINOPHEN 1 TABLET: 7.5; 325 TABLET ORAL at 06:16

## 2023-01-25 RX ADMIN — OXYCODONE HYDROCHLORIDE AND ACETAMINOPHEN 1 TABLET: 7.5; 325 TABLET ORAL at 18:06

## 2023-01-25 RX ADMIN — DOCUSATE SODIUM 50 MG AND SENNOSIDES 8.6 MG 2 TABLET: 8.6; 5 TABLET, FILM COATED ORAL at 09:06

## 2023-01-26 DIAGNOSIS — E03.9 ACQUIRED HYPOTHYROIDISM: ICD-10-CM

## 2023-01-26 DIAGNOSIS — E78.2 MIXED HYPERLIPIDEMIA: ICD-10-CM

## 2023-01-26 DIAGNOSIS — I10 ESSENTIAL HYPERTENSION: ICD-10-CM

## 2023-01-26 LAB
ALBUMIN SERPL-MCNC: 3.3 G/DL (ref 3.5–5.2)
ALBUMIN/GLOB SERPL: 1.3 G/DL
ALP SERPL-CCNC: 66 U/L (ref 39–117)
ALT SERPL W P-5'-P-CCNC: 34 U/L (ref 1–41)
ANION GAP SERPL CALCULATED.3IONS-SCNC: 8 MMOL/L (ref 5–15)
AST SERPL-CCNC: 46 U/L (ref 1–40)
BASOPHILS # BLD AUTO: 0.02 10*3/MM3 (ref 0–0.2)
BASOPHILS NFR BLD AUTO: 0.2 % (ref 0–1.5)
BILIRUB SERPL-MCNC: 0.2 MG/DL (ref 0–1.2)
BUN SERPL-MCNC: 26 MG/DL (ref 8–23)
BUN/CREAT SERPL: 23 (ref 7–25)
CALCIUM SPEC-SCNC: 8.5 MG/DL (ref 8.6–10.5)
CHLORIDE SERPL-SCNC: 104 MMOL/L (ref 98–107)
CO2 SERPL-SCNC: 26 MMOL/L (ref 22–29)
CREAT SERPL-MCNC: 1.13 MG/DL (ref 0.76–1.27)
DEPRECATED RDW RBC AUTO: 53 FL (ref 37–54)
EGFRCR SERPLBLD CKD-EPI 2021: 66.5 ML/MIN/1.73
EOSINOPHIL # BLD AUTO: 0.02 10*3/MM3 (ref 0–0.4)
EOSINOPHIL NFR BLD AUTO: 0.2 % (ref 0.3–6.2)
ERYTHROCYTE [DISTWIDTH] IN BLOOD BY AUTOMATED COUNT: 15.3 % (ref 12.3–15.4)
GLOBULIN UR ELPH-MCNC: 2.5 GM/DL
GLUCOSE SERPL-MCNC: 125 MG/DL (ref 65–99)
HCT VFR BLD AUTO: 33.7 % (ref 37.5–51)
HGB BLD-MCNC: 10.5 G/DL (ref 13–17.7)
LYMPHOCYTES # BLD AUTO: 1.99 10*3/MM3 (ref 0.7–3.1)
LYMPHOCYTES NFR BLD AUTO: 15.9 % (ref 19.6–45.3)
MCH RBC QN AUTO: 29.8 PG (ref 26.6–33)
MCHC RBC AUTO-ENTMCNC: 31.2 G/DL (ref 31.5–35.7)
MCV RBC AUTO: 95.7 FL (ref 79–97)
MONOCYTES # BLD AUTO: 1.76 10*3/MM3 (ref 0.1–0.9)
MONOCYTES NFR BLD AUTO: 14.1 % (ref 5–12)
NEUTROPHILS NFR BLD AUTO: 69.2 % (ref 42.7–76)
NEUTROPHILS NFR BLD AUTO: 8.64 10*3/MM3 (ref 1.7–7)
PLATELET # BLD AUTO: 133 10*3/MM3 (ref 140–450)
PMV BLD AUTO: 10.4 FL (ref 6–12)
POTASSIUM SERPL-SCNC: 4.3 MMOL/L (ref 3.5–5.2)
PROT SERPL-MCNC: 5.8 G/DL (ref 6–8.5)
RBC # BLD AUTO: 3.52 10*6/MM3 (ref 4.14–5.8)
SODIUM SERPL-SCNC: 138 MMOL/L (ref 136–145)
WBC NRBC COR # BLD: 12.48 10*3/MM3 (ref 3.4–10.8)

## 2023-01-26 PROCEDURE — 99024 POSTOP FOLLOW-UP VISIT: CPT | Performed by: NURSE PRACTITIONER

## 2023-01-26 PROCEDURE — 97535 SELF CARE MNGMENT TRAINING: CPT | Performed by: OCCUPATIONAL THERAPIST

## 2023-01-26 PROCEDURE — 85025 COMPLETE CBC W/AUTO DIFF WBC: CPT | Performed by: NURSE PRACTITIONER

## 2023-01-26 PROCEDURE — 97116 GAIT TRAINING THERAPY: CPT

## 2023-01-26 PROCEDURE — 25010000002 HEPARIN (PORCINE) PER 1000 UNITS: Performed by: NURSE PRACTITIONER

## 2023-01-26 PROCEDURE — 80053 COMPREHEN METABOLIC PANEL: CPT | Performed by: NURSE PRACTITIONER

## 2023-01-26 RX ORDER — BISACODYL 10 MG
10 SUPPOSITORY, RECTAL RECTAL DAILY
Status: DISCONTINUED | OUTPATIENT
Start: 2023-01-26 | End: 2023-01-28

## 2023-01-26 RX ADMIN — CYCLOBENZAPRINE HYDROCHLORIDE 5 MG: 5 TABLET, FILM COATED ORAL at 08:44

## 2023-01-26 RX ADMIN — PANTOPRAZOLE SODIUM 40 MG: 40 TABLET, DELAYED RELEASE ORAL at 06:20

## 2023-01-26 RX ADMIN — Medication 10 ML: at 08:46

## 2023-01-26 RX ADMIN — OXYCODONE AND ACETAMINOPHEN 1 TABLET: 325; 10 TABLET ORAL at 08:45

## 2023-01-26 RX ADMIN — ROSUVASTATIN CALCIUM 20 MG: 20 TABLET, FILM COATED ORAL at 20:11

## 2023-01-26 RX ADMIN — HEPARIN SODIUM 5000 UNITS: 5000 INJECTION INTRAVENOUS; SUBCUTANEOUS at 08:45

## 2023-01-26 RX ADMIN — DOCUSATE SODIUM 50 MG AND SENNOSIDES 8.6 MG 2 TABLET: 8.6; 5 TABLET, FILM COATED ORAL at 08:46

## 2023-01-26 RX ADMIN — BISOPROLOL FUMARATE 10 MG: 5 TABLET ORAL at 08:44

## 2023-01-26 RX ADMIN — LEVOTHYROXINE SODIUM 112 MCG: 112 TABLET ORAL at 06:20

## 2023-01-26 RX ADMIN — OXYCODONE AND ACETAMINOPHEN 1 TABLET: 325; 10 TABLET ORAL at 20:11

## 2023-01-26 RX ADMIN — Medication 10 ML: at 20:12

## 2023-01-26 RX ADMIN — DOCUSATE SODIUM 50 MG AND SENNOSIDES 8.6 MG 2 TABLET: 8.6; 5 TABLET, FILM COATED ORAL at 20:11

## 2023-01-26 RX ADMIN — HEPARIN SODIUM 5000 UNITS: 5000 INJECTION INTRAVENOUS; SUBCUTANEOUS at 20:11

## 2023-01-26 RX ADMIN — CYCLOBENZAPRINE HYDROCHLORIDE 5 MG: 5 TABLET, FILM COATED ORAL at 20:11

## 2023-01-26 RX ADMIN — POLYETHYLENE GLYCOL 3350 17 G: 17 POWDER, FOR SOLUTION ORAL at 08:45

## 2023-01-27 LAB
ALBUMIN SERPL-MCNC: 3.2 G/DL (ref 3.5–5.2)
ALBUMIN/GLOB SERPL: 1.5 G/DL
ALP SERPL-CCNC: 87 U/L (ref 39–117)
ALT SERPL W P-5'-P-CCNC: 36 U/L (ref 1–41)
ANION GAP SERPL CALCULATED.3IONS-SCNC: 10 MMOL/L (ref 5–15)
AST SERPL-CCNC: 42 U/L (ref 1–40)
BASOPHILS # BLD AUTO: 0.04 10*3/MM3 (ref 0–0.2)
BASOPHILS NFR BLD AUTO: 0.4 % (ref 0–1.5)
BILIRUB SERPL-MCNC: 0.6 MG/DL (ref 0–1.2)
BUN SERPL-MCNC: 25 MG/DL (ref 8–23)
BUN/CREAT SERPL: 22.1 (ref 7–25)
CALCIUM SPEC-SCNC: 8.2 MG/DL (ref 8.6–10.5)
CHLORIDE SERPL-SCNC: 102 MMOL/L (ref 98–107)
CO2 SERPL-SCNC: 27 MMOL/L (ref 22–29)
CREAT SERPL-MCNC: 1.13 MG/DL (ref 0.76–1.27)
DEPRECATED RDW RBC AUTO: 53.2 FL (ref 37–54)
EGFRCR SERPLBLD CKD-EPI 2021: 66.5 ML/MIN/1.73
EOSINOPHIL # BLD AUTO: 0.03 10*3/MM3 (ref 0–0.4)
EOSINOPHIL NFR BLD AUTO: 0.3 % (ref 0.3–6.2)
ERYTHROCYTE [DISTWIDTH] IN BLOOD BY AUTOMATED COUNT: 15 % (ref 12.3–15.4)
GLOBULIN UR ELPH-MCNC: 2.1 GM/DL
GLUCOSE SERPL-MCNC: 111 MG/DL (ref 65–99)
HCT VFR BLD AUTO: 32.1 % (ref 37.5–51)
HGB BLD-MCNC: 10.1 G/DL (ref 13–17.7)
IMM GRANULOCYTES # BLD AUTO: 0.07 10*3/MM3 (ref 0–0.05)
IMM GRANULOCYTES NFR BLD AUTO: 0.7 % (ref 0–0.5)
LYMPHOCYTES # BLD AUTO: 1.31 10*3/MM3 (ref 0.7–3.1)
LYMPHOCYTES NFR BLD AUTO: 12.2 % (ref 19.6–45.3)
MCH RBC QN AUTO: 30.2 PG (ref 26.6–33)
MCHC RBC AUTO-ENTMCNC: 31.5 G/DL (ref 31.5–35.7)
MCV RBC AUTO: 96.1 FL (ref 79–97)
MONOCYTES # BLD AUTO: 1.24 10*3/MM3 (ref 0.1–0.9)
MONOCYTES NFR BLD AUTO: 11.5 % (ref 5–12)
NEUTROPHILS NFR BLD AUTO: 74.9 % (ref 42.7–76)
NEUTROPHILS NFR BLD AUTO: 8.07 10*3/MM3 (ref 1.7–7)
NRBC BLD AUTO-RTO: 0 /100 WBC (ref 0–0.2)
PLATELET # BLD AUTO: 142 10*3/MM3 (ref 140–450)
PMV BLD AUTO: 10 FL (ref 6–12)
POTASSIUM SERPL-SCNC: 4.4 MMOL/L (ref 3.5–5.2)
PROT SERPL-MCNC: 5.3 G/DL (ref 6–8.5)
RBC # BLD AUTO: 3.34 10*6/MM3 (ref 4.14–5.8)
SODIUM SERPL-SCNC: 139 MMOL/L (ref 136–145)
WBC NRBC COR # BLD: 10.76 10*3/MM3 (ref 3.4–10.8)

## 2023-01-27 PROCEDURE — 80053 COMPREHEN METABOLIC PANEL: CPT | Performed by: NURSE PRACTITIONER

## 2023-01-27 PROCEDURE — 25010000002 METHYLNALTREXONE 12 MG/0.6ML SOLUTION: Performed by: NURSE PRACTITIONER

## 2023-01-27 PROCEDURE — 85025 COMPLETE CBC W/AUTO DIFF WBC: CPT | Performed by: NURSE PRACTITIONER

## 2023-01-27 PROCEDURE — 25010000002 HEPARIN (PORCINE) PER 1000 UNITS: Performed by: NURSE PRACTITIONER

## 2023-01-27 PROCEDURE — 97535 SELF CARE MNGMENT TRAINING: CPT

## 2023-01-27 PROCEDURE — 99024 POSTOP FOLLOW-UP VISIT: CPT | Performed by: NURSE PRACTITIONER

## 2023-01-27 PROCEDURE — 97116 GAIT TRAINING THERAPY: CPT

## 2023-01-27 RX ADMIN — LEVOTHYROXINE SODIUM 112 MCG: 112 TABLET ORAL at 05:29

## 2023-01-27 RX ADMIN — OXYCODONE AND ACETAMINOPHEN 1 TABLET: 325; 10 TABLET ORAL at 05:15

## 2023-01-27 RX ADMIN — HEPARIN SODIUM 5000 UNITS: 5000 INJECTION INTRAVENOUS; SUBCUTANEOUS at 09:23

## 2023-01-27 RX ADMIN — DOCUSATE SODIUM 50 MG AND SENNOSIDES 8.6 MG 2 TABLET: 8.6; 5 TABLET, FILM COATED ORAL at 20:44

## 2023-01-27 RX ADMIN — HEPARIN SODIUM 5000 UNITS: 5000 INJECTION INTRAVENOUS; SUBCUTANEOUS at 20:44

## 2023-01-27 RX ADMIN — Medication 10 ML: at 20:44

## 2023-01-27 RX ADMIN — OXYCODONE AND ACETAMINOPHEN 1 TABLET: 325; 10 TABLET ORAL at 11:19

## 2023-01-27 RX ADMIN — PANTOPRAZOLE SODIUM 40 MG: 40 TABLET, DELAYED RELEASE ORAL at 05:15

## 2023-01-27 RX ADMIN — CYCLOBENZAPRINE HYDROCHLORIDE 5 MG: 5 TABLET, FILM COATED ORAL at 20:44

## 2023-01-27 RX ADMIN — METHYLNALTREXONE BROMIDE 6 MG: 12 INJECTION, SOLUTION SUBCUTANEOUS at 10:42

## 2023-01-27 RX ADMIN — BISACODYL 10 MG: 10 SUPPOSITORY RECTAL at 09:25

## 2023-01-27 RX ADMIN — BISOPROLOL FUMARATE 10 MG: 5 TABLET ORAL at 09:23

## 2023-01-27 RX ADMIN — ROSUVASTATIN CALCIUM 20 MG: 20 TABLET, FILM COATED ORAL at 20:44

## 2023-01-27 RX ADMIN — CYCLOBENZAPRINE HYDROCHLORIDE 5 MG: 5 TABLET, FILM COATED ORAL at 09:24

## 2023-01-27 RX ADMIN — POLYETHYLENE GLYCOL 3350 17 G: 17 POWDER, FOR SOLUTION ORAL at 09:23

## 2023-01-27 RX ADMIN — Medication 10 ML: at 09:24

## 2023-01-27 RX ADMIN — DOCUSATE SODIUM 50 MG AND SENNOSIDES 8.6 MG 2 TABLET: 8.6; 5 TABLET, FILM COATED ORAL at 09:23

## 2023-01-28 LAB
ALBUMIN SERPL-MCNC: 3.2 G/DL (ref 3.5–5.2)
ALBUMIN/GLOB SERPL: 1.1 G/DL
ALP SERPL-CCNC: 97 U/L (ref 39–117)
ALT SERPL W P-5'-P-CCNC: 42 U/L (ref 1–41)
ANION GAP SERPL CALCULATED.3IONS-SCNC: 11 MMOL/L (ref 5–15)
AST SERPL-CCNC: 40 U/L (ref 1–40)
BACTERIA UR QL AUTO: ABNORMAL /HPF
BASOPHILS # BLD AUTO: 0.05 10*3/MM3 (ref 0–0.2)
BASOPHILS NFR BLD AUTO: 0.4 % (ref 0–1.5)
BILIRUB SERPL-MCNC: 0.5 MG/DL (ref 0–1.2)
BILIRUB UR QL STRIP: ABNORMAL
BUN SERPL-MCNC: 26 MG/DL (ref 8–23)
BUN/CREAT SERPL: 21.8 (ref 7–25)
CALCIUM SPEC-SCNC: 8.7 MG/DL (ref 8.6–10.5)
CHLORIDE SERPL-SCNC: 100 MMOL/L (ref 98–107)
CLARITY UR: CLEAR
CO2 SERPL-SCNC: 26 MMOL/L (ref 22–29)
COLOR UR: ABNORMAL
CREAT SERPL-MCNC: 1.19 MG/DL (ref 0.76–1.27)
DEPRECATED RDW RBC AUTO: 51.2 FL (ref 37–54)
EGFRCR SERPLBLD CKD-EPI 2021: 62.5 ML/MIN/1.73
EOSINOPHIL # BLD AUTO: 0.05 10*3/MM3 (ref 0–0.4)
EOSINOPHIL NFR BLD AUTO: 0.4 % (ref 0.3–6.2)
ERYTHROCYTE [DISTWIDTH] IN BLOOD BY AUTOMATED COUNT: 14.7 % (ref 12.3–15.4)
GLOBULIN UR ELPH-MCNC: 2.8 GM/DL
GLUCOSE SERPL-MCNC: 109 MG/DL (ref 65–99)
GLUCOSE UR STRIP-MCNC: NEGATIVE MG/DL
HCT VFR BLD AUTO: 31.6 % (ref 37.5–51)
HGB BLD-MCNC: 10.1 G/DL (ref 13–17.7)
HGB UR QL STRIP.AUTO: ABNORMAL
HYALINE CASTS UR QL AUTO: ABNORMAL /LPF
IMM GRANULOCYTES # BLD AUTO: 0.06 10*3/MM3 (ref 0–0.05)
IMM GRANULOCYTES NFR BLD AUTO: 0.5 % (ref 0–0.5)
KETONES UR QL STRIP: ABNORMAL
LEUKOCYTE ESTERASE UR QL STRIP.AUTO: ABNORMAL
LYMPHOCYTES # BLD AUTO: 1.96 10*3/MM3 (ref 0.7–3.1)
LYMPHOCYTES NFR BLD AUTO: 15.6 % (ref 19.6–45.3)
MCH RBC QN AUTO: 29.9 PG (ref 26.6–33)
MCHC RBC AUTO-ENTMCNC: 32 G/DL (ref 31.5–35.7)
MCV RBC AUTO: 93.5 FL (ref 79–97)
MONOCYTES # BLD AUTO: 1.72 10*3/MM3 (ref 0.1–0.9)
MONOCYTES NFR BLD AUTO: 13.7 % (ref 5–12)
NEUTROPHILS NFR BLD AUTO: 69.4 % (ref 42.7–76)
NEUTROPHILS NFR BLD AUTO: 8.71 10*3/MM3 (ref 1.7–7)
NITRITE UR QL STRIP: NEGATIVE
NRBC BLD AUTO-RTO: 0 /100 WBC (ref 0–0.2)
PH UR STRIP.AUTO: 5.5 [PH] (ref 5–8)
PLATELET # BLD AUTO: 169 10*3/MM3 (ref 140–450)
PMV BLD AUTO: 10.4 FL (ref 6–12)
POTASSIUM SERPL-SCNC: 4.2 MMOL/L (ref 3.5–5.2)
PROT SERPL-MCNC: 6 G/DL (ref 6–8.5)
PROT UR QL STRIP: ABNORMAL
RBC # BLD AUTO: 3.38 10*6/MM3 (ref 4.14–5.8)
RBC # UR STRIP: ABNORMAL /HPF
REF LAB TEST METHOD: ABNORMAL
SODIUM SERPL-SCNC: 137 MMOL/L (ref 136–145)
SP GR UR STRIP: 1.02 (ref 1–1.03)
SQUAMOUS #/AREA URNS HPF: ABNORMAL /HPF
UROBILINOGEN UR QL STRIP: ABNORMAL
WBC # UR STRIP: ABNORMAL /HPF
WBC NRBC COR # BLD: 12.55 10*3/MM3 (ref 3.4–10.8)

## 2023-01-28 PROCEDURE — 85025 COMPLETE CBC W/AUTO DIFF WBC: CPT | Performed by: NURSE PRACTITIONER

## 2023-01-28 PROCEDURE — 97535 SELF CARE MNGMENT TRAINING: CPT

## 2023-01-28 PROCEDURE — 25010000002 HEPARIN (PORCINE) PER 1000 UNITS: Performed by: NURSE PRACTITIONER

## 2023-01-28 PROCEDURE — 97116 GAIT TRAINING THERAPY: CPT

## 2023-01-28 PROCEDURE — 81001 URINALYSIS AUTO W/SCOPE: CPT | Performed by: NURSE PRACTITIONER

## 2023-01-28 PROCEDURE — 80053 COMPREHEN METABOLIC PANEL: CPT | Performed by: NURSE PRACTITIONER

## 2023-01-28 PROCEDURE — 99222 1ST HOSP IP/OBS MODERATE 55: CPT | Performed by: UROLOGY

## 2023-01-28 PROCEDURE — 87086 URINE CULTURE/COLONY COUNT: CPT | Performed by: NURSE PRACTITIONER

## 2023-01-28 RX ORDER — TERAZOSIN 1 MG/1
1 CAPSULE ORAL NIGHTLY
Status: DISCONTINUED | OUTPATIENT
Start: 2023-01-28 | End: 2023-01-29 | Stop reason: HOSPADM

## 2023-01-28 RX ORDER — TERAZOSIN 1 MG/1
1 CAPSULE ORAL NIGHTLY
Qty: 30 CAPSULE | Refills: 0 | Status: SHIPPED | OUTPATIENT
Start: 2023-01-28 | End: 2023-02-13

## 2023-01-28 RX ORDER — AMOXICILLIN 250 MG
1 CAPSULE ORAL DAILY
Status: DISCONTINUED | OUTPATIENT
Start: 2023-01-29 | End: 2023-01-29 | Stop reason: HOSPADM

## 2023-01-28 RX ADMIN — TERAZOSIN HYDROCHLORIDE 1 MG: 1 CAPSULE ORAL at 21:54

## 2023-01-28 RX ADMIN — HEPARIN SODIUM 5000 UNITS: 5000 INJECTION INTRAVENOUS; SUBCUTANEOUS at 08:39

## 2023-01-28 RX ADMIN — ROSUVASTATIN CALCIUM 20 MG: 20 TABLET, FILM COATED ORAL at 21:08

## 2023-01-28 RX ADMIN — Medication 10 ML: at 08:42

## 2023-01-28 RX ADMIN — LEVOTHYROXINE SODIUM 112 MCG: 112 TABLET ORAL at 06:27

## 2023-01-28 RX ADMIN — CYCLOBENZAPRINE HYDROCHLORIDE 5 MG: 5 TABLET, FILM COATED ORAL at 16:11

## 2023-01-28 RX ADMIN — DOCUSATE SODIUM 50 MG AND SENNOSIDES 8.6 MG 2 TABLET: 8.6; 5 TABLET, FILM COATED ORAL at 08:38

## 2023-01-28 RX ADMIN — HEPARIN SODIUM 5000 UNITS: 5000 INJECTION INTRAVENOUS; SUBCUTANEOUS at 21:08

## 2023-01-28 RX ADMIN — BISOPROLOL FUMARATE 10 MG: 5 TABLET ORAL at 08:39

## 2023-01-28 RX ADMIN — CYCLOBENZAPRINE HYDROCHLORIDE 5 MG: 5 TABLET, FILM COATED ORAL at 21:08

## 2023-01-28 RX ADMIN — CYCLOBENZAPRINE HYDROCHLORIDE 5 MG: 5 TABLET, FILM COATED ORAL at 08:39

## 2023-01-28 RX ADMIN — PANTOPRAZOLE SODIUM 40 MG: 40 TABLET, DELAYED RELEASE ORAL at 06:27

## 2023-01-28 RX ADMIN — Medication 10 ML: at 21:08

## 2023-01-29 ENCOUNTER — READMISSION MANAGEMENT (OUTPATIENT)
Dept: CALL CENTER | Facility: HOSPITAL | Age: 78
End: 2023-01-29
Payer: MEDICARE

## 2023-01-29 ENCOUNTER — HOME HEALTH ADMISSION (OUTPATIENT)
Dept: HOME HEALTH SERVICES | Facility: HOME HEALTHCARE | Age: 78
End: 2023-01-29
Payer: MEDICARE

## 2023-01-29 VITALS
TEMPERATURE: 98 F | SYSTOLIC BLOOD PRESSURE: 112 MMHG | BODY MASS INDEX: 24.74 KG/M2 | WEIGHT: 172.84 LBS | DIASTOLIC BLOOD PRESSURE: 52 MMHG | OXYGEN SATURATION: 98 % | HEART RATE: 72 BPM | RESPIRATION RATE: 16 BRPM | HEIGHT: 70 IN

## 2023-01-29 PROBLEM — R33.8 ACUTE URINARY RETENTION: Status: ACTIVE | Noted: 2023-01-29

## 2023-01-29 LAB
ALBUMIN SERPL-MCNC: 2.9 G/DL (ref 3.5–5.2)
ALBUMIN/GLOB SERPL: 1.1 G/DL
ALP SERPL-CCNC: 105 U/L (ref 39–117)
ALT SERPL W P-5'-P-CCNC: 100 U/L (ref 1–41)
ANION GAP SERPL CALCULATED.3IONS-SCNC: 9 MMOL/L (ref 5–15)
AST SERPL-CCNC: 109 U/L (ref 1–40)
BASOPHILS # BLD AUTO: 0.03 10*3/MM3 (ref 0–0.2)
BASOPHILS NFR BLD AUTO: 0.4 % (ref 0–1.5)
BILIRUB SERPL-MCNC: 0.5 MG/DL (ref 0–1.2)
BUN SERPL-MCNC: 23 MG/DL (ref 8–23)
BUN/CREAT SERPL: 22.8 (ref 7–25)
CALCIUM SPEC-SCNC: 8.3 MG/DL (ref 8.6–10.5)
CHLORIDE SERPL-SCNC: 100 MMOL/L (ref 98–107)
CO2 SERPL-SCNC: 26 MMOL/L (ref 22–29)
CREAT SERPL-MCNC: 1.01 MG/DL (ref 0.76–1.27)
DEPRECATED RDW RBC AUTO: 50.4 FL (ref 37–54)
EGFRCR SERPLBLD CKD-EPI 2021: 76.1 ML/MIN/1.73
EOSINOPHIL # BLD AUTO: 0.14 10*3/MM3 (ref 0–0.4)
EOSINOPHIL NFR BLD AUTO: 1.7 % (ref 0.3–6.2)
ERYTHROCYTE [DISTWIDTH] IN BLOOD BY AUTOMATED COUNT: 14.6 % (ref 12.3–15.4)
GLOBULIN UR ELPH-MCNC: 2.6 GM/DL
GLUCOSE SERPL-MCNC: 102 MG/DL (ref 65–99)
HCT VFR BLD AUTO: 28.4 % (ref 37.5–51)
HGB BLD-MCNC: 9.4 G/DL (ref 13–17.7)
IMM GRANULOCYTES # BLD AUTO: 0.05 10*3/MM3 (ref 0–0.05)
IMM GRANULOCYTES NFR BLD AUTO: 0.6 % (ref 0–0.5)
LYMPHOCYTES # BLD AUTO: 1.64 10*3/MM3 (ref 0.7–3.1)
LYMPHOCYTES NFR BLD AUTO: 20.4 % (ref 19.6–45.3)
MCH RBC QN AUTO: 31 PG (ref 26.6–33)
MCHC RBC AUTO-ENTMCNC: 33.1 G/DL (ref 31.5–35.7)
MCV RBC AUTO: 93.7 FL (ref 79–97)
MONOCYTES # BLD AUTO: 1.26 10*3/MM3 (ref 0.1–0.9)
MONOCYTES NFR BLD AUTO: 15.7 % (ref 5–12)
NEUTROPHILS NFR BLD AUTO: 4.93 10*3/MM3 (ref 1.7–7)
NEUTROPHILS NFR BLD AUTO: 61.2 % (ref 42.7–76)
NRBC BLD AUTO-RTO: 0 /100 WBC (ref 0–0.2)
PLATELET # BLD AUTO: 175 10*3/MM3 (ref 140–450)
PMV BLD AUTO: 10.2 FL (ref 6–12)
POTASSIUM SERPL-SCNC: 3.4 MMOL/L (ref 3.5–5.2)
PROT SERPL-MCNC: 5.5 G/DL (ref 6–8.5)
RBC # BLD AUTO: 3.03 10*6/MM3 (ref 4.14–5.8)
SODIUM SERPL-SCNC: 135 MMOL/L (ref 136–145)
WBC NRBC COR # BLD: 8.05 10*3/MM3 (ref 3.4–10.8)

## 2023-01-29 PROCEDURE — 97116 GAIT TRAINING THERAPY: CPT

## 2023-01-29 PROCEDURE — 80053 COMPREHEN METABOLIC PANEL: CPT | Performed by: NURSE PRACTITIONER

## 2023-01-29 PROCEDURE — 97530 THERAPEUTIC ACTIVITIES: CPT

## 2023-01-29 PROCEDURE — 99024 POSTOP FOLLOW-UP VISIT: CPT | Performed by: NEUROLOGICAL SURGERY

## 2023-01-29 PROCEDURE — 25010000002 HEPARIN (PORCINE) PER 1000 UNITS: Performed by: NURSE PRACTITIONER

## 2023-01-29 PROCEDURE — 85025 COMPLETE CBC W/AUTO DIFF WBC: CPT | Performed by: NURSE PRACTITIONER

## 2023-01-29 PROCEDURE — 97535 SELF CARE MNGMENT TRAINING: CPT

## 2023-01-29 RX ORDER — CYCLOBENZAPRINE HCL 5 MG
5 TABLET ORAL 3 TIMES DAILY
Qty: 90 TABLET | Refills: 0 | Status: SHIPPED | OUTPATIENT
Start: 2023-01-29 | End: 2023-03-08 | Stop reason: SDUPTHER

## 2023-01-29 RX ORDER — OXYCODONE AND ACETAMINOPHEN 10; 325 MG/1; MG/1
1 TABLET ORAL EVERY 4 HOURS PRN
Qty: 42 TABLET | Refills: 0 | Status: SHIPPED | OUTPATIENT
Start: 2023-01-29 | End: 2023-02-05

## 2023-01-29 RX ADMIN — LEVOTHYROXINE SODIUM 112 MCG: 112 TABLET ORAL at 05:25

## 2023-01-29 RX ADMIN — CYCLOBENZAPRINE HYDROCHLORIDE 5 MG: 5 TABLET, FILM COATED ORAL at 16:51

## 2023-01-29 RX ADMIN — CYCLOBENZAPRINE HYDROCHLORIDE 5 MG: 5 TABLET, FILM COATED ORAL at 09:27

## 2023-01-29 RX ADMIN — PANTOPRAZOLE SODIUM 40 MG: 40 TABLET, DELAYED RELEASE ORAL at 05:25

## 2023-01-29 RX ADMIN — Medication 10 ML: at 09:28

## 2023-01-29 RX ADMIN — HEPARIN SODIUM 5000 UNITS: 5000 INJECTION INTRAVENOUS; SUBCUTANEOUS at 09:27

## 2023-01-29 RX ADMIN — OXYCODONE AND ACETAMINOPHEN 1 TABLET: 325; 10 TABLET ORAL at 12:54

## 2023-01-29 RX ADMIN — BISOPROLOL FUMARATE 10 MG: 5 TABLET ORAL at 09:26

## 2023-01-29 NOTE — OUTREACH NOTE
Prep Survey    Flowsheet Row Responses   [facility] discharged from? [facility]   Is LACE score < 7 ? No   Eligibility [facility]   [facility]   Date of Admission 01/24/23   Date of Discharge 01/29/23   Discharge Disposition Home-Health Care Svc   Discharge diagnosis lumbar spinal stenosis, lumbar lateral interbody fusion, laminectomy   Does the patient have one of the following disease processes/diagnoses(primary or secondary)? General Surgery   Does the patient have Home health ordered? Yes   What is the Home health agency?  [facility]   Is there a DME ordered? No   Prep survey completed? Yes          RAFAEL MEDEIROS - Registered Nurse

## 2023-01-30 ENCOUNTER — TELEPHONE (OUTPATIENT)
Dept: INTERNAL MEDICINE | Facility: CLINIC | Age: 78
End: 2023-01-30

## 2023-01-30 ENCOUNTER — TRANSITIONAL CARE MANAGEMENT TELEPHONE ENCOUNTER (OUTPATIENT)
Dept: CALL CENTER | Facility: HOSPITAL | Age: 78
End: 2023-01-30
Payer: MEDICARE

## 2023-01-30 LAB — BACTERIA SPEC AEROBE CULT: NO GROWTH

## 2023-01-30 NOTE — TELEPHONE ENCOUNTER
PATIENT RETURNED HOME ON 01/29/2023 FROM BACK SURGERY.     PATIENTS WIFE HAS CALLED ASKING IF ITS OK TO RE-START THE ELIQUIS?  PATIENTS WIFE STATED THAT IT IS ON HIS DISCHARGE MEDICATIONS AND SHE STATED THAT THEY WAS NOT TOLD ANYTHING REGARDING RE-STARTING.      [phone]

## 2023-01-30 NOTE — OUTREACH NOTE
Call Center TCM Note    Flowsheet Row Responses   [facility] patient discharged from? [place]   Does the patient have one of the following disease processes/diagnoses(primary or secondary)? General Surgery   TCM attempt successful? Yes   Call start time [phone]   Call end time 1557   Discharge diagnosis lumbar spinal stenosis, lumbar lateral interbody fusion, laminectomy   Meds reviewed with patient/caregiver? Yes   Is the patient having any side effects they believe may be caused by any medication additions or changes? No   Does the patient have all medications related to this admission filled (includes all antibiotics, pain medications, etc.) Yes   Is the patient taking all medications as directed (includes completed medication regime)? Yes   Comments Has appt on 2/24 with Dr. Kramer and does not want sooner appt.    Does the patient have an appointment with their PCP within 7 days of discharge? Other   What is the Home health agency?  [facility]   Has home health visited the patient within 72 hours of discharge? Call prior to 72 hours   Home health comments Will be there tomorrow morning.   Psychosocial issues? No   What is the patient's perception of their health status since discharge? Improving   Nursing interventions Nurse provided patient education   Is the patient /caregiver able to teach back basic post-op care? Practice 'cough and deep breath', Drive as instructed by MD in discharge instructions, No tub bath, swimming, or hot tub until instructed by MD, Lifting as instructed by MD in discharge instructions   Is the patient/caregiver able to teach back signs and symptoms of incisional infection? Fever, Pus or odor from incision, Incisional warmth, Increased drainage or bleeding, Increased redness, swelling or pain at the incisonal site   Is the patient/caregiver able to teach back steps to recovery at home? Set small, achievable goals for return to baseline health, Rest and rebuild strength, gradually increase  activity, Eat a well-balance diet, Make a list of questions for surgeon's appointment   If the patient is a current smoker, are they able to teach back resources for cessation? 9-638-IwduZro, Smoking cessation medications, Smoking cessation support groups   Is the patient/caregiver able to teach back the hierarchy of who to call/visit for symptoms/problems? PCP, Specialist, Home health nurse, Urgent Care, ED, 911 Yes   TCM call completed? Yes   Wrap up additional comments Denies questions or needs at this time.   Call end time [phone]          Shavonne Fontenot LPN    1/30/2023, 15:58 CST

## 2023-01-31 ENCOUNTER — HOME CARE VISIT (OUTPATIENT)
Dept: HOME HEALTH SERVICES | Facility: CLINIC | Age: 78
End: 2023-01-31
Payer: MEDICARE

## 2023-01-31 PROCEDURE — G0299 HHS/HOSPICE OF RN EA 15 MIN: HCPCS

## 2023-02-02 ENCOUNTER — OFFICE VISIT (OUTPATIENT)
Dept: UROLOGY | Facility: CLINIC | Age: 78
End: 2023-02-02
Payer: MEDICARE

## 2023-02-02 VITALS — BODY MASS INDEX: 24.34 KG/M2 | HEIGHT: 70 IN | WEIGHT: 170 LBS | TEMPERATURE: 98.2 F

## 2023-02-02 VITALS
DIASTOLIC BLOOD PRESSURE: 84 MMHG | HEART RATE: 74 BPM | SYSTOLIC BLOOD PRESSURE: 138 MMHG | BODY MASS INDEX: 24.7 KG/M2 | TEMPERATURE: 98 F | RESPIRATION RATE: 18 BRPM | WEIGHT: 170 LBS

## 2023-02-02 DIAGNOSIS — R33.9 RETENTION OF URINE: Primary | ICD-10-CM

## 2023-02-02 PROCEDURE — 51798 US URINE CAPACITY MEASURE: CPT | Performed by: PHYSICIAN ASSISTANT

## 2023-02-02 PROCEDURE — 99213 OFFICE O/P EST LOW 20 MIN: CPT | Performed by: PHYSICIAN ASSISTANT

## 2023-02-02 RX ORDER — TERAZOSIN 5 MG/1
5 CAPSULE ORAL NIGHTLY
Qty: 30 CAPSULE | Refills: 2 | Status: SHIPPED | OUTPATIENT
Start: 2023-02-02 | End: 2023-03-07

## 2023-02-02 NOTE — PROGRESS NOTES
Subjective    Mr. Bravo is 77 y.o. male    Chief Complaint: 1 week voiding trial-Urinary Retention    History of Present Illness  Patient is a 77-year-old gentleman who presents for 1 week voiding trial/catheter removal for urinary retention.  Patient underwent laminectomy 01/24/2023 by Dr. Cifuentes.  He developed urinary retention and required intermittent catheterization was in the hospital and was sent home with a catheter.  He was on 1 mg Hytrin or terazosin.    The following portions of the patient's history were reviewed and updated as appropriate: allergies, current medications, past family history, past medical history, past social history, past surgical history and problem list.    Review of Systems   HENT: Negative.    Gastrointestinal: Negative.    Genitourinary: Positive for difficulty urinating.        Urinary retention   All other systems reviewed and are negative.        Current Outpatient Medications:   •  acetaminophen (TYLENOL) 325 MG tablet, Take 650 mg by mouth As Needed. Indications: Fever, Pain, Disp: , Rfl:   •  apixaban (ELIQUIS) 5 MG tablet tablet, Take 1 tablet by mouth 2 (Two) Times a Day., Disp: 180 tablet, Rfl: 1  •  bisoprolol (ZEBeta) 10 MG tablet, Take 1 tablet by mouth Daily., Disp: 90 tablet, Rfl: 1  •  Cholecalciferol 100 MCG (4000 UT) capsule, Take 4,000 Units by mouth Daily. Indications: Vitamin D Deficiency, Disp: , Rfl:   •  coenzyme Q10 100 MG capsule, Take 1 capsule by mouth Daily. Indications: Heart Rhythm Disorder, Disp: , Rfl:   •  Cyanocobalamin 2500 MCG sublingual tablet, Take 2,500 mcg by mouth Daily. Indications: Inadequate Vitamin B12, Disp: , Rfl:   •  cyclobenzaprine (FLEXERIL) 5 MG tablet, Take 1 tablet by mouth 3 (Three) Times a Day., Disp: 90 tablet, Rfl: 0  •  fexofenadine (ALLEGRA) 180 MG tablet, Take 1 tablet by mouth Daily. Indications: Hayfever, Disp: , Rfl:   •  fluticasone (FLONASE) 50 MCG/ACT nasal spray, 1 spray into the nostril(s) as directed by  provider Daily., Disp: 32 g, Rfl: 3  •  levothyroxine (SYNTHROID, LEVOTHROID) 112 MCG tablet, Take 1 tablet by mouth Daily., Disp: 90 tablet, Rfl: 1  •  multivitamin with minerals tablet tablet, Take 1 tablet by mouth Daily. Indications: Vitamin Deficiency, Disp: , Rfl:   •  omeprazole (priLOSEC) 20 MG capsule, Take 1 capsule by mouth Daily., Disp: 90 capsule, Rfl: 3  •  oxyCODONE-acetaminophen (PERCOCET)  MG per tablet, Take 1 tablet by mouth Every 4 (Four) Hours As Needed for Severe Pain for up to 7 days., Disp: 42 tablet, Rfl: 0  •  rosuvastatin (CRESTOR) 20 MG tablet, Take 1 tablet by mouth Daily., Disp: 90 tablet, Rfl: 3  •  sennosides-docusate (senna-docusate sodium) 8.6-50 MG per tablet, Take 2 tablets by mouth Daily., Disp: 60 tablet, Rfl: 0  •  terazosin (HYTRIN) 1 MG capsule, Take 1 capsule by mouth Every Night for 30 days., Disp: 30 capsule, Rfl: 0  •  Zinc 50 MG capsule, Take 1 capsule by mouth Daily. Indications: Zinc Deficiency, Disp: , Rfl:     Past Medical History:   Diagnosis Date   • Arthritis    • Atrial fibrillation (HCC) 08/31/2021   • Cataracts, bilateral    • Disease of thyroid gland    • Diverticulitis    • GERD (gastroesophageal reflux disease)    • Hyperlipidemia    • Hypertension    • Mononucleosis 1964       Past Surgical History:   Procedure Laterality Date   • ANTERIOR CERVICAL DISCECTOMY W/ FUSION N/A 10/11/2022    Procedure: CERVICAL DISCECTOMY ANTERIOR WITH FUSION, Cervical 4/5 and 5/6;  Surgeon: Durga Cifuentes MD;  Location: [facility] OR;  Service: Neurosurgery;  Laterality: N/A;   • CATARACT EXTRACTION Right    • COLON SURGERY  01/2022    Dr Fairbanks at [facility]   • COLONOSCOPY N/A 09/28/2021    Procedure: COLONOSCOPY WITH ANESTHESIA;  Surgeon: Thomas Dc DO;  Location: [facility] ENDOSCOPY;  Service: Gastroenterology;  Laterality: N/A;  pre op; abnormal ct scan  post op: diverticulosis ; polyps   PCP: Vaughn Kramer DO   • HERNIA REPAIR     •  "LUMBAR DIRECT LATERAL INTERBODY FUSION N/A 1/24/2023    Procedure: LUMBAR DIRECT LATERAL INTERBODY FUSION; LUMBAR 2-5;  Surgeon: Durga Cifuentes MD;  Location:  [facility] OR;  Service: Neurosurgery;  Laterality: N/A;   • LUMBAR FUSION N/A 1/24/2023    Procedure: LUMBAR DIRECT LATERAL INTERBODY FUSION; LUMBAR 2-5, LUMBAR LAMINECTOMY TRANSFORAMINAL LUMBAR INTERBODY FUSION; LUMBAR 2-5;  Surgeon: Durga Cifuentes MD;  Location:  [facility] OR;  Service: Robotics - Neuro;  Laterality: N/A;       Social History     Socioeconomic History   • Marital status:    Tobacco Use   • Smoking status: Every Day     Packs/day: 0.50     Years: 52.00     Pack years: 26.00     Types: Cigarettes     Start date: 1967   • Smokeless tobacco: Never   • Tobacco comments:     down to 1/2 pack a day 1/11/2023   Vaping Use   • Vaping Use: Never used   Substance and Sexual Activity   • Alcohol use: Yes     Comment: rare   • Drug use: Never   • Sexual activity: Defer       Family History   Problem Relation Age of Onset   • Lung cancer Mother    • Brain cancer Mother    • Heart disease Father    • Hyperlipidemia Father    • Hypertension Father    • Prostate cancer Maternal Aunt    • Colon cancer Maternal Uncle    • Colon polyps Neg Hx    • Esophageal cancer Neg Hx    • Liver cancer Neg Hx        Objective    Temp 98.2 °F (36.8 °C)   Ht 176.5 cm (69.5\")   Wt 77.1 kg (170 lb)   BMI 24.74 kg/m²     Physical Exam  Vitals reviewed.   Constitutional:       Appearance: Normal appearance.   HENT:      Head: Normocephalic and atraumatic.   Pulmonary:      Effort: Pulmonary effort is normal.   Neurological:      Mental Status: He is alert and oriented to person, place, and time.   Psychiatric:         Mood and Affect: Mood normal.         Behavior: Behavior normal.             Assessment and Plan    Diagnoses and all orders for this visit:    1. Retention of urine (Primary)  Catheter is removed I went ahead and increased his terazosin to 5 mg " daily I stated 1 mg therapeutic dose.  Patient will return in 2 weeks to see how he is doing and perform bladder scan.

## 2023-02-02 NOTE — PROGRESS NOTES
Pt came back into the office thought needed to since he only went to the  once. I done a bladder scan on the pt and it was only 51ml. I told him he was ok and to go home and still continue with no caffeine and start taking the medication Wyatt started him on today. Pt will follow up and  v/u

## 2023-02-03 ENCOUNTER — TELEPHONE (OUTPATIENT)
Dept: INTERNAL MEDICINE | Facility: CLINIC | Age: 78
End: 2023-02-03

## 2023-02-03 ENCOUNTER — HOME CARE VISIT (OUTPATIENT)
Dept: HOME HEALTH SERVICES | Facility: CLINIC | Age: 78
End: 2023-02-03
Payer: MEDICARE

## 2023-02-03 VITALS
SYSTOLIC BLOOD PRESSURE: 150 MMHG | HEART RATE: 76 BPM | OXYGEN SATURATION: 96 % | DIASTOLIC BLOOD PRESSURE: 72 MMHG | TEMPERATURE: 98.7 F | RESPIRATION RATE: 18 BRPM

## 2023-02-03 VITALS
HEART RATE: 66 BPM | SYSTOLIC BLOOD PRESSURE: 136 MMHG | DIASTOLIC BLOOD PRESSURE: 70 MMHG | TEMPERATURE: 98.3 F | OXYGEN SATURATION: 96 % | RESPIRATION RATE: 18 BRPM

## 2023-02-03 DIAGNOSIS — M47.12 CERVICAL SPONDYLOSIS WITH MYELOPATHY: ICD-10-CM

## 2023-02-03 DIAGNOSIS — M48.062 SPINAL STENOSIS OF LUMBAR REGION WITH NEUROGENIC CLAUDICATION: Primary | ICD-10-CM

## 2023-02-03 PROCEDURE — G0151 HHCP-SERV OF PT,EA 15 MIN: HCPCS

## 2023-02-03 PROCEDURE — G0495 RN CARE TRAIN/EDU IN HH: HCPCS

## 2023-02-03 NOTE — TELEPHONE ENCOUNTER
ORDER, INSURANCE CARD AND DEMOGRAPHICS HAS BEEN FAXED TO [facility]    ROSSANA, PHYSICAL THERAPIST HAS BEEN CALLED AND GRATEFUL UNDERSTANDING VOICED THAT HE HAD BEEN NOTIFIED.

## 2023-02-03 NOTE — HOME HEALTH
Pt admitted to [facility] post hospital stay due to lumbar fusion. SN for wd assessment and care, med teaching and assessment. PT for strengthening andf safety.

## 2023-02-03 NOTE — TELEPHONE ENCOUNTER
ROSSANA, PHYSICAL THERAPIST WITH [facility] HAS CALLED.  HE STATED THAT HE WOULD LIKE TO HAVE A SCRIPT FOR A BEDSIDE COMMODE SENT TO [facility].      [phone]

## 2023-02-04 NOTE — HOME HEALTH
FOCUS OF CARE/SKILLED NEED: S/p laminectomy, incision assessment and care, teaching/education medication, pain management, fall safety/prevention    TEACHING/INTERVENTIONS: A&O X 4, VSS. Pt denies falls, chest pain or worsening shortness of breath. Pt c/o moderate back pain. Surgical incisions clean, dry and intact. No s/s of infection. S/p laminectomy 1/24/23. Black catheter removed 2-2-23 at urology appointment.     PROGRESS TOWARD GOALS: Progressing as expected.     PHYSICIAN CONTACT:  None needed     INSURANCE CHANGES?  Denies     FALLS SINCE LAST VISIT?  Denies     MEDICATION CHANGES? Denies     PRE-SCREENED FOR COVID? No s/s

## 2023-02-06 ENCOUNTER — HOME CARE VISIT (OUTPATIENT)
Dept: HOME HEALTH SERVICES | Facility: CLINIC | Age: 78
End: 2023-02-06
Payer: MEDICARE

## 2023-02-06 VITALS
OXYGEN SATURATION: 98 % | TEMPERATURE: 97.6 F | DIASTOLIC BLOOD PRESSURE: 80 MMHG | SYSTOLIC BLOOD PRESSURE: 142 MMHG | RESPIRATION RATE: 20 BRPM | HEART RATE: 76 BPM

## 2023-02-06 VITALS
TEMPERATURE: 97.4 F | DIASTOLIC BLOOD PRESSURE: 78 MMHG | HEART RATE: 76 BPM | OXYGEN SATURATION: 99 % | SYSTOLIC BLOOD PRESSURE: 145 MMHG | RESPIRATION RATE: 18 BRPM

## 2023-02-06 PROCEDURE — G0299 HHS/HOSPICE OF RN EA 15 MIN: HCPCS

## 2023-02-06 PROCEDURE — G0151 HHCP-SERV OF PT,EA 15 MIN: HCPCS

## 2023-02-07 ENCOUNTER — OFFICE VISIT (OUTPATIENT)
Dept: NEUROSURGERY | Facility: CLINIC | Age: 78
End: 2023-02-07
Payer: MEDICARE

## 2023-02-07 ENCOUNTER — HOME CARE VISIT (OUTPATIENT)
Dept: HOME HEALTH SERVICES | Facility: CLINIC | Age: 78
End: 2023-02-07
Payer: MEDICARE

## 2023-02-07 VITALS
OXYGEN SATURATION: 99 % | TEMPERATURE: 97.5 F | RESPIRATION RATE: 18 BRPM | HEART RATE: 64 BPM | SYSTOLIC BLOOD PRESSURE: 160 MMHG | DIASTOLIC BLOOD PRESSURE: 80 MMHG

## 2023-02-07 VITALS — HEIGHT: 70 IN | WEIGHT: 170 LBS | BODY MASS INDEX: 24.34 KG/M2

## 2023-02-07 DIAGNOSIS — M48.062 SPINAL STENOSIS OF LUMBAR REGION WITH NEUROGENIC CLAUDICATION: Primary | ICD-10-CM

## 2023-02-07 DIAGNOSIS — Z98.1 S/P LUMBAR FUSION: ICD-10-CM

## 2023-02-07 PROCEDURE — 99024 POSTOP FOLLOW-UP VISIT: CPT | Performed by: NURSE PRACTITIONER

## 2023-02-07 PROCEDURE — G0152 HHCP-SERV OF OT,EA 15 MIN: HCPCS

## 2023-02-07 NOTE — HOME HEALTH
SUBJECTIVE: Had follow up appt today with surgeon and reports all went well  DX: laminectomy   PMH: Hx neck surgery in Oct, hx spinal stenosis  SURGICAL PROCEDURE: laminectomy   PRECAUTIONS: back precautions  OXYGEN USE: no  EQUIPMENT: walker, built in shower seat, grab bars, long handle sponge, reacher, long handle shoe horn, BSC  PRIOR LEVEL OF FUNCTION: lives with spouse, was independent with most ADLs, pain limiting at times  MEDICAL NECESSITY: skilled OT for ADL training on safety, back precautions, AE use   PATIENT GOALS: to return to [facility]  DATE OF NEXT APPOINTMENT WITH DOCTOR: Follow up this morning with Dr. Guzman office  ANTICIPATED DISCHARGE PLAN: to self care when goals met  PATIENT/CAREGIVER AGREE WITH DISCHARGE PLAN: yes  SPECIFIC INTERVENTIONS AND GOALS TO ADDRESS ON NEXT VISIT: ADL training on back precautions during bathing, dressing  FREQUENCY AND DURATION: 1 wk 2      PATIENT HAS RECEIVED EDUCATION ON COVID-19, PREVENTION, HANDWASHING, SOCIAL DISTANCING PER CDC

## 2023-02-07 NOTE — PATIENT INSTRUCTIONS
Advance Care Planning and Advance Directives     You make decisions on a daily basis - decisions about where you want to live, your career, your home, your life. Perhaps one of the most important decisions you face is your choice for future medical care. Take time to talk with your family and your healthcare team and start planning today.  Advance Care Planning is a process that can help you:  Understand possible future healthcare decisions in light of your own experiences  Reflect on those decision in light of your goals and values  Discuss your decisions with those closest to you and the healthcare professionals that care for you  Make a plan by creating a document that reflects your wishes    Surrogate Decision Maker  In the event of a medical emergency, which has left you unable to communicate or to make your own decisions, you would need someone to make decisions for you.  It is important to discuss your preferences for medical treatment with this person while you are in good health.     Qualities of a surrogate decision maker:  Willing to take on this role and responsibility  Knows what you want for future medical care  Willing to follow your wishes even if they don't agree with them  Able to make difficult medical decisions under stressful circumstances    Advance Directives  These are legal documents you can create that will guide your healthcare team and decision maker(s) when needed. These documents can be stored in the electronic medical record.    Living Will - a legal document to guide your care if you have a terminal condition or a serious illness and are unable to communicate. States vary by statute in document names/types, but most forms may include one or more of the following:        -  Directions regarding life-prolonging treatments        -  Directions regarding artificially provided nutrition/hydration        -  Choosing a healthcare decision maker        -  Direction regarding organ/tissue  donation    Durable Power of  for Healthcare - this document names an -in-fact to make medical decisions for you, but it may also allow this person to make personal and financial decisions for you. Please seek the advice of an  if you need this type of document.    **Advance Directives are not required and no one may discriminate against you if you do not sign one.    Medical Orders  Many states allow specific forms/orders signed by your physician to record your wishes for medical treatment in your current state of health. This form, signed in personal communication with your physician, addresses resuscitation and other medical interventions that you may or may not want.      For more information or to schedule a time with a [facility] Advance Care Planning Facilitator contact: [email or web address] or call [phone] and someone will contact you directly.

## 2023-02-07 NOTE — HOME HEALTH
COVID SCREENING:No s/s of covid19    FOCUS OF CARE/SKILLED NEED: Wd assessment. Pain and med teaching and assessment. Falls assessment and teaching regarding prevention.    TEACHING/INTERVENTIONS: Instructed to report med complications, increased pain or drainage or pain at wound sites    PATIENT/CG RESPONSE:cooperative    PROGRESS TOWARD GOALS:Black removed. Voiding in good ampunts. Firmness noted at base of incisionline on right side. Reported to MD. No redness or discoloration. Pt states he noticed firmness last week but forgot to tell anyone.    PHYSICIAN CONTACT: Firmness at  base of right incision line    FALLS SINCE LAST VISIT?none    MEDICATION CHANGES SINCE LAST VISIT?none    PLAN FOR NEXT VISIT:Wd assessment, pain assessment, med compliance, falls

## 2023-02-07 NOTE — PROGRESS NOTES
Chief complaint:   Chief Complaint   Patient presents with   • Post-op     Pt is here for 2 wk PO visit.         Subjective     HPI:   Interval History: Sonu Bravo Jr. is a 77 y.o.  male who presents today for post operative follow-up from a L2-5 DLIF open laminectomy and TLIF on 1/24/2023.  Mr. Bravo has been well since we last saw him.  He continues to complain of back pain, primarily with position change from a seated to standing position.  He continues to complain of mild intermittent radicular pain and dysesthesias to the bilateral anterior thighs.  He is currently participating in physical therapy with home health, performing exercises 3 times daily.  He currently ambulates with a walker and denies falls.  He additionally denies fevers, chills, concern for postoperative incision infection, saddle anesthesia, or bowel incontinence.  Since discharge she has been evaluated by urology where his catheter has been removed.  He is urinating without complication with the assist of terazosin.  He rates the severity of symptoms 6/10.  No additional concerns at this time.    [facility]  Would you say your health is: 3 (1/10/2023  2:07 PM)  How would you rate your quality of life?: 1 (1/10/2023  2:07 PM)  How would you rate your physical health?: 1 (1/10/2023  2:07 PM)  How would you rate your mental health, including your mood and your ability to think?: 4 (1/10/2023  2:07 PM)  How would you rate your satisfaction with your social activities and relationships?: 4 (1/10/2023  2:07 PM)  Please rate how well you carry out your usual social activities and roles (i.e. activities at home, work and in your community, and responsibilities as a parent, child, spouse, employee, friend): 2 (1/10/2023  2:07 PM)  To what extent are you able to carry out your everyday physical activities such as walking, climbing stairs or carrying groceries?: 2 (1/10/2023  2:07 PM)  In the past 7 DAYS, how often have you been  bothered by emotional problems such as feeling anxious, depressed or irritable?: 4 (1/10/2023  2:07 PM)  In the past 7 DAYS, how would you rate your fatigue on average?: 4 (1/10/2023  2:07 PM)  How would you rate your pain on average? (0-10): 3 (1/10/2023  2:07 PM)    PROMIS Global Health Physical and Mental Health Scores  PROMIS Global Physical Health Raw Score: 10 (1/10/2023  2:07 PM)  PROMIS Global Physical Health T Score: 34.9 (1/10/2023  2:07 PM)  PROMIS Global Mental Health Raw Score: 13 (1/10/2023  2:07 PM)  PROMIS Global Mental Health T Score: 45.8 (1/10/2023  2:07 PM)      Neck Disability Index  Section 1 - Pain Intensity: 3 (2023 11:23 AM)  Section 2 - Personal Care: 3 (2023 11:23 AM)  Section 3 - Liftin (2023 11:23 AM)  Section 4 - Work: 2 (2023 11:23 AM)  Section 5 - Headaches: 2 (2023 11:23 AM)  Section 6 - Concentration: 0 (2023 11:23 AM)  Section 7 - Sleepin (2023 11:23 AM)  Section 8 - Drivin (2023 11:23 AM)  Section 9 - Readin (2023 11:23 AM)  Section 10 - Recreation: 5 (2023 11:23 AM)  Neck Disability Index Score: 17 (2023 11:23 AM)      PROMIS Physical Function  Are you able to do chores such as vacuuming or yard work?: 1 (1/10/2023  2:07 PM)  Are you able to go up and down stairs at a normal pace?: 3 (1/10/2023  2:07 PM)  Are you able to go for a walk of at least 15 minutes?: 1 (1/10/2023  2:07 PM)  Are you able to run errands and shop?: 1 (1/10/2023  2:07 PM)  Does your health now limit you in doing two hours of physical labor?: 1 (1/10/2023  2:07 PM)  Does your health now limit you in doing moderate work around the house like vacuuming, sweeping floors, or carrying in groceries?: 1 (1/10/2023  2:07 PM)  PROMIS Physical Function Raw Summed Score: 8 (1/10/2023  2:07 PM)  PROMIS Physical Function T Score: 27.1 (1/10/2023  2:07 PM)      FSH:  Past Medical History:   Diagnosis Date   • Arthritis    • Atrial fibrillation (HCC) 2021   •  Cataracts, bilateral    • Disease of thyroid gland    • Diverticulitis    • GERD (gastroesophageal reflux disease)    • Hyperlipidemia    • Hypertension    • Mononucleosis 1964     Past Surgical History:   Procedure Laterality Date   • ANTERIOR CERVICAL DISCECTOMY W/ FUSION N/A 10/11/2022    Procedure: CERVICAL DISCECTOMY ANTERIOR WITH FUSION, Cervical 4/5 and 5/6;  Surgeon: Durga Cifuentes MD;  Location: [facility] OR;  Service: Neurosurgery;  Laterality: N/A;   • CATARACT EXTRACTION Right    • COLON SURGERY  01/2022    Dr Fairbanks at [facility]   • COLONOSCOPY N/A 09/28/2021    Procedure: COLONOSCOPY WITH ANESTHESIA;  Surgeon: Thomas Dc DO;  Location: [facility] ENDOSCOPY;  Service: Gastroenterology;  Laterality: N/A;  pre op; abnormal ct scan  post op: diverticulosis ; polyps   PCP: Vaughn Kramer DO   • HERNIA REPAIR     • LUMBAR DIRECT LATERAL INTERBODY FUSION N/A 1/24/2023    Procedure: LUMBAR DIRECT LATERAL INTERBODY FUSION; LUMBAR 2-5;  Surgeon: Durga Cifuentes MD;  Location: [facility] OR;  Service: Neurosurgery;  Laterality: N/A;   • LUMBAR FUSION N/A 1/24/2023    Procedure: LUMBAR DIRECT LATERAL INTERBODY FUSION; LUMBAR 2-5, LUMBAR LAMINECTOMY TRANSFORAMINAL LUMBAR INTERBODY FUSION; LUMBAR 2-5;  Surgeon: Durga Cifuentes MD;  Location: [facility] OR;  Service: Rob[employer] - Neuro;  Laterality: N/A;     Objective      Current Outpatient Medications   Medication Sig Dispense Refill   • acetaminophen (TYLENOL) 325 MG tablet Take 650 mg by mouth As Needed. Indications: Fever, Pain     • apixaban (ELIQUIS) 5 MG tablet tablet Take 1 tablet by mouth 2 (Two) Times a Day. 180 tablet 1   • bisoprolol (ZEBeta) 10 MG tablet Take 1 tablet by mouth Daily. 90 tablet 1   • Cholecalciferol 100 MCG (4000 UT) capsule Take 4,000 Units by mouth Daily. Indications: Vitamin D Deficiency     • coenzyme Q10 100 MG capsule Take 1 capsule by mouth Daily. Indications: Heart Rhythm Disorder     •  "Cyanocobalamin 2500 MCG sublingual tablet Take 2,500 mcg by mouth Daily. Indications: Inadequate Vitamin B12     • cyclobenzaprine (FLEXERIL) 5 MG tablet Take 1 tablet by mouth 3 (Three) Times a Day. (Patient taking differently: Take 1 tablet by mouth 3 (Three) Times a Day As Needed for Muscle Spasms. Indications: Muscle Spasm) 90 tablet 0   • fexofenadine (ALLEGRA) 180 MG tablet Take 1 tablet by mouth Daily. Indications: Hayfever     • fluticasone (FLONASE) 50 MCG/ACT nasal spray 1 spray into the nostril(s) as directed by provider Daily. 32 g 3   • levothyroxine (SYNTHROID, LEVOTHROID) 112 MCG tablet Take 1 tablet by mouth Daily. 90 tablet 1   • multivitamin with minerals tablet tablet Take 1 tablet by mouth Daily. Indications: Vitamin Deficiency     • omeprazole (priLOSEC) 20 MG capsule Take 1 capsule by mouth Daily. 90 capsule 3   • rosuvastatin (CRESTOR) 20 MG tablet Take 1 tablet by mouth Daily. 90 tablet 3   • sennosides-docusate (senna-docusate sodium) 8.6-50 MG per tablet Take 2 tablets by mouth Daily. 60 tablet 0   • terazosin (HYTRIN) 1 MG capsule Take 1 capsule by mouth Every Night for 30 days. (Patient not taking: Reported on 2/3/2023) 30 capsule 0   • terazosin (HYTRIN) 5 MG capsule Take 1 capsule by mouth Every Night. 30 capsule 2   • Zinc 50 MG capsule Take 1 capsule by mouth Daily. Indications: Zinc Deficiency       No current facility-administered medications for this visit.     Vital Signs  Ht 176.5 cm (69.5\")   Wt 77.1 kg (170 lb)   BMI 24.74 kg/m²   Physical Exam  Vitals and nursing note reviewed.   Constitutional:       General: He is not in acute distress.     Appearance: Normal appearance. He is well-developed, well-groomed and normal weight. He is not ill-appearing, toxic-appearing or diaphoretic.       HENT:      Head: Normocephalic and atraumatic.      Right Ear: Hearing normal.      Left Ear: Hearing normal.   Eyes:      Extraocular Movements: EOM normal.      Conjunctiva/sclera: " Conjunctivae normal.      Pupils: Pupils are equal, round, and reactive to light.   Neck:      Trachea: Trachea normal.   Cardiovascular:      Rate and Rhythm: Normal rate and regular rhythm.   Pulmonary:      Effort: Pulmonary effort is normal. No tachypnea, bradypnea, accessory muscle usage or respiratory distress.   Abdominal:      Palpations: Abdomen is soft.   Musculoskeletal:      Cervical back: Full passive range of motion without pain and neck supple.   Skin:     General: Skin is warm and dry.   Neurological:      Mental Status: He is alert and oriented to person, place, and time.      GCS: GCS eye subscore is 4. GCS verbal subscore is 5. GCS motor subscore is 6.   Psychiatric:         Speech: Speech normal.         Behavior: Behavior normal. Behavior is cooperative.       Neurologic Exam     Mental Status   Oriented to person, place, and time.   Attention: normal. Concentration: normal.   Speech: speech is normal   Level of consciousness: alert    Cranial Nerves     CN II   Visual fields full to confrontation.     CN III, IV, VI   Pupils are equal, round, and reactive to light.  Extraocular motions are normal.     CN V   Facial sensation intact.     CN VII   Facial expression full, symmetric.     CN VIII   CN VIII normal.     CN IX, X   CN IX normal.     CN XI   CN XI normal.     Motor Exam   Right arm tone: normal  Left arm tone: normal  Right leg tone: normal  Left leg tone: normal    Strength   Right deltoid: 5/5  Left deltoid: 5/5  Right biceps: 5/5  Left biceps: 5/5  Right triceps: 5/5  Left triceps: 5/5  Right wrist extension: 5/5  Left wrist extension: 5/5  Right iliopsoas: 4/5  Left iliopsoas: 3/5  Right quadriceps: 4/5  Left quadriceps: 4/5  Right anterior tibial: 5/5  Left anterior tibial: 5/5  Right gastroc: 5/5  Left gastroc: 5/5  Right EHL 5/5  Left EHL 5/5       Sensory Exam   Right arm light touch: normal  Left arm light touch: normal  Right leg light touch: decreased from knee  Left leg  light touch: decreased from knee  Sensory deficit distribution on right: L2  Sensory deficit distribution on left: L2    Gait, Coordination, and Reflexes     Tremor   Resting tremor: absent  Intention tremor: absent  Action tremor: absent  Well-maintained gait with walker       Incision: WOUND IMAGE - Postop lumbar (02/07/2023)  (Consent to obtain photo of postoperative site for documentation purposes only obtained verbally by Mr. Bravo)    Results Review: no new imaging      Assessment/Plan:   Stenosis with neurogenic claudication  Status post L2-5 DLIF open laminectomy and TLIF   Sonu Bravo Jr. is a 77 y.o. male who presents today for post operative wound check following a L2-5 DLIF open laminectomy and TLIF on 1/24/2023.  Mr. Bravo has done well since we last saw him.   His symptoms are stable and improving daily.  His post operative incision is clean, dry, intact, and well approximated.  There is mild erythema without edema or evidence of drainage or discharge from his wound.   We discussed the signs and symptoms of a soft tissue infection and I recommended they call immediately for any concerns. He may continue current pain medication regimen with tapering instructions as previously provided.  B/R/AE discussed. I advised the patient to keep scheduled appointment with Dr. Matthew for reassessment on 3/6/2023.  Obtain x-rays of the lumbar spine prior to appointment.  Sonu knows to call the neurosurgical clinic to return sooner for any new or additional concerns.      Diagnoses and all orders for this visit:    1. Spinal stenosis of lumbar region with neurogenic claudication (Primary)    2. S/P lumbar fusion      Return for KEEP SCHEDULED APPT WITH DR. MATTHEW.    I discussed the patients findings and my recommendations with patient    MARY Pal

## 2023-02-08 ENCOUNTER — HOME CARE VISIT (OUTPATIENT)
Dept: HOME HEALTH SERVICES | Facility: CLINIC | Age: 78
End: 2023-02-08
Payer: MEDICARE

## 2023-02-08 ENCOUNTER — TELEPHONE (OUTPATIENT)
Dept: INTERNAL MEDICINE | Facility: CLINIC | Age: 78
End: 2023-02-08

## 2023-02-08 VITALS
SYSTOLIC BLOOD PRESSURE: 140 MMHG | TEMPERATURE: 98.5 F | HEART RATE: 64 BPM | RESPIRATION RATE: 18 BRPM | OXYGEN SATURATION: 99 % | DIASTOLIC BLOOD PRESSURE: 76 MMHG

## 2023-02-08 PROCEDURE — G0299 HHS/HOSPICE OF RN EA 15 MIN: HCPCS

## 2023-02-08 NOTE — TELEPHONE ENCOUNTER
PATIENTS WIFE HAS BEEN CALLED, GIVEN MESSAGE AND STATED THAT THE HOME HEALTH NURSE WAS THERE TODAY AND HAD PATIENT TO ELEVATE HIS LEGS AND PATIENTS WIFE STATED THAT THEY HAVE COMPRESSION STOCKINGS BUT FEET ARE TO SWOLLEN TO GET THEM ON AT THIS TIME.

## 2023-02-08 NOTE — HOME HEALTH
FOCUS OF CARE/SKILLED NEED: S/p laminectomy, incision assessment and care, teaching/education medication, pain management, fall safety/prevention     TEACHING/INTERVENTIONS: A&O X 4, VSS. Pt denies falls, chest pain or shortness of breath. Pt c/o moderate back pain. Surgical incisions clean, dry and intact. No s/s of infection. S/p laminectomy 1/24/23.    PROGRESS TOWARD GOALS: Ongoing/progressing    PHYSICIAN CONTACT: No    INSURANCE CHANGES? Denies     FALLS SINCE LAST VISIT? Denies     MEDICATION CHANGES? Denies     PRE-SCREENED FOR COVID? No s/s

## 2023-02-08 NOTE — TELEPHONE ENCOUNTER
If swelling is in both feet, I would recommend elevation and compression stockings throughout the day. If not improving he should schedule a visit.

## 2023-02-08 NOTE — TELEPHONE ENCOUNTER
PATIENT SEEN [facility] ON 02/07/2023 AND WAS TOLD TO CONTACT DR ESCOBAR'S OFFICE.  PATIENT HAS VERY SWOLLEN FEET.  THE HOME HEALTH NURSE WILL BE THERE TODAY AROUND 3 OR 4pm.   PATIENTS WIFE IS ASKING IF SOMETHING COULD BE PRESCRIBED TO HELP THE SWELLING?    PLEASE ADVISE.

## 2023-02-09 ENCOUNTER — HOME CARE VISIT (OUTPATIENT)
Dept: HOME HEALTH SERVICES | Facility: CLINIC | Age: 78
End: 2023-02-09
Payer: MEDICARE

## 2023-02-09 VITALS
RESPIRATION RATE: 18 BRPM | HEART RATE: 72 BPM | SYSTOLIC BLOOD PRESSURE: 142 MMHG | TEMPERATURE: 97.5 F | DIASTOLIC BLOOD PRESSURE: 82 MMHG | OXYGEN SATURATION: 100 %

## 2023-02-09 PROCEDURE — G0157 HHC PT ASSISTANT EA 15: HCPCS

## 2023-02-09 PROCEDURE — G0180 MD CERTIFICATION HHA PATIENT: HCPCS | Performed by: NEUROLOGICAL SURGERY

## 2023-02-09 NOTE — PROGRESS NOTES
Subjective    Mr. Bravo is 77 y.o. male    Chief Complaint: 2 week follow up Urinary Retention/medication     History of Present Illness  Patient is a 77-year-old gentleman who presents for 2-week follow-up after catheter removal 02/02/2023.  Patient had undergone a laminectomy and developed urinary retention.  When I saw him at his last visit he was only taking 1 mg terazosin so I increased his dose to 5 mg.  He is voiding well he empties his bladder well his bladder scan today was 0 mL.  He has some increased frequency but he just started hydrochlorothiazide and is urinating more.  The following portions of the patient's history were reviewed and updated as appropriate: allergies, current medications, past family history, past medical history, past social history, past surgical history and problem list.    Review of Systems   Gastrointestinal: Negative.    Genitourinary: Positive for frequency. Negative for difficulty urinating.   All other systems reviewed and are negative.        Current Outpatient Medications:   •  acetaminophen (TYLENOL) 325 MG tablet, Take 650 mg by mouth As Needed. Indications: Fever, Pain, Disp: , Rfl:   •  apixaban (ELIQUIS) 5 MG tablet tablet, Take 1 tablet by mouth 2 (Two) Times a Day., Disp: 180 tablet, Rfl: 1  •  bisoprolol (ZEBeta) 10 MG tablet, Take 1 tablet by mouth Daily., Disp: 90 tablet, Rfl: 1  •  Cholecalciferol 100 MCG (4000 UT) capsule, Take 4,000 Units by mouth Daily. Indications: Vitamin D Deficiency, Disp: , Rfl:   •  coenzyme Q10 100 MG capsule, Take 1 capsule by mouth Daily. Indications: Heart Rhythm Disorder, Disp: , Rfl:   •  Cyanocobalamin 2500 MCG sublingual tablet, Take 2,500 mcg by mouth Daily. Indications: Inadequate Vitamin B12, Disp: , Rfl:   •  cyclobenzaprine (FLEXERIL) 5 MG tablet, Take 1 tablet by mouth 3 (Three) Times a Day. (Patient taking differently: Take 5 mg by mouth 3 (Three) Times a Day As Needed for Muscle Spasms. Indications: Muscle Spasm), Disp: 90  tablet, Rfl: 0  •  fexofenadine (ALLEGRA) 180 MG tablet, Take 1 tablet by mouth Daily. Indications: Hayfever, Disp: , Rfl:   •  fluticasone (FLONASE) 50 MCG/ACT nasal spray, 1 spray into the nostril(s) as directed by provider Daily., Disp: 32 g, Rfl: 3  •  hydroCHLOROthiazide (HYDRODIURIL) 25 MG tablet, Take 1 tablet by mouth Daily As Needed (as needed for edema)., Disp: 30 tablet, Rfl: 1  •  levothyroxine (SYNTHROID, LEVOTHROID) 112 MCG tablet, Take 1 tablet by mouth Daily., Disp: 90 tablet, Rfl: 1  •  multivitamin with minerals tablet tablet, Take 1 tablet by mouth Daily. Indications: Vitamin Deficiency, Disp: , Rfl:   •  omeprazole (priLOSEC) 20 MG capsule, Take 1 capsule by mouth Daily., Disp: 90 capsule, Rfl: 3  •  oxyCODONE-acetaminophen (PERCOCET)  MG per tablet, Take 1 tablet by mouth Every 4 (Four) Hours As Needed for Moderate Pain. Indications: Pain, Disp: , Rfl:   •  potassium chloride (K-DUR,KLOR-CON) 10 MEQ ER tablet, Take 2 tablets by mouth Daily for 30 days., Disp: 60 tablet, Rfl: 0  •  potassium chloride (K-DUR,KLOR-CON) 20 MEQ tablet controlled-release ER tablet, Take 1 tablet by mouth Daily., Disp: 30 tablet, Rfl: 1  •  rosuvastatin (CRESTOR) 20 MG tablet, Take 1 tablet by mouth Daily., Disp: 90 tablet, Rfl: 3  •  sennosides-docusate (senna-docusate sodium) 8.6-50 MG per tablet, Take 2 tablets by mouth Daily., Disp: 60 tablet, Rfl: 0  •  terazosin (HYTRIN) 5 MG capsule, Take 1 capsule by mouth Every Night., Disp: 30 capsule, Rfl: 2  •  Zinc 50 MG capsule, Take 1 capsule by mouth Daily. Indications: Zinc Deficiency, Disp: , Rfl:     Past Medical History:   Diagnosis Date   • Arthritis    • Atrial fibrillation (HCC) 08/31/2021   • Cataracts, bilateral    • Disease of thyroid gland    • Diverticulitis    • GERD (gastroesophageal reflux disease)    • Hyperlipidemia    • Hypertension    • Mononucleosis 1964       Past Surgical History:   Procedure Laterality Date   • ANTERIOR CERVICAL DISCECTOMY  W/ FUSION N/A 10/11/2022    Procedure: CERVICAL DISCECTOMY ANTERIOR WITH FUSION, Cervical 4/5 and 5/6;  Surgeon: Durga Cifuentes MD;  Location:  [facility] OR;  Service: Neurosurgery;  Laterality: N/A;   • CATARACT EXTRACTION Right    • COLON SURGERY  01/2022    Dr Fairbanks at [facility]   • COLONOSCOPY N/A 09/28/2021    Procedure: COLONOSCOPY WITH ANESTHESIA;  Surgeon: Thomas Dc DO;  Location:  [facility] ENDOSCOPY;  Service: Gastroenterology;  Laterality: N/A;  pre op; abnormal ct scan  post op: diverticulosis ; polyps   PCP: Vaughn Kramer DO   • HERNIA REPAIR     • LUMBAR DIRECT LATERAL INTERBODY FUSION N/A 1/24/2023    Procedure: LUMBAR DIRECT LATERAL INTERBODY FUSION; LUMBAR 2-5;  Surgeon: Durga Cifuentes MD;  Location:  [facility] OR;  Service: Neurosurgery;  Laterality: N/A;   • LUMBAR FUSION N/A 1/24/2023    Procedure: LUMBAR DIRECT LATERAL INTERBODY FUSION; LUMBAR 2-5, LUMBAR LAMINECTOMY TRANSFORAMINAL LUMBAR INTERBODY FUSION; LUMBAR 2-5;  Surgeon: Durga Cifuentes MD;  Location:  [facility] OR;  Service: Robotics - Neuro;  Laterality: N/A;       Social History     Socioeconomic History   • Marital status:    Tobacco Use   • Smoking status: Every Day     Packs/day: 0.50     Years: 52.00     Pack years: 26.00     Types: Cigarettes     Start date: 1967   • Smokeless tobacco: Never   • Tobacco comments:     down to 1/2 pack a day 1/11/2023   Vaping Use   • Vaping Use: Never used   Substance and Sexual Activity   • Alcohol use: Yes     Comment: rare   • Drug use: Never   • Sexual activity: Defer       Family History   Problem Relation Age of Onset   • Lung cancer Mother    • Brain cancer Mother    • Heart disease Father    • Hyperlipidemia Father    • Hypertension Father    • Prostate cancer Maternal Aunt    • Colon cancer Maternal Uncle    • Colon polyps Neg Hx    • Esophageal cancer Neg Hx    • Liver cancer Neg Hx        Objective    Temp 96.9 °F (36.1 °C)   Ht 177.8 cm  "(70\")   Wt 79.8 kg (176 lb)   BMI 25.25 kg/m²     Physical Exam  Vitals reviewed.   Constitutional:       Appearance: Normal appearance.   HENT:      Head: Normocephalic and atraumatic.   Pulmonary:      Effort: Pulmonary effort is normal.   Skin:     Coloration: Skin is not pale.   Neurological:      Mental Status: He is alert and oriented to person, place, and time.   Psychiatric:         Mood and Affect: Mood normal.         Behavior: Behavior normal.       Bladder Scan interpretation  Estimation of residual urine via abdominal ultrasound  Residual Urine: 0ml  Indication: retention  Position: Supine  Examination: Incremental scanning of the suprapubic area using 3 MHz transducer using copious amounts of acoustic gel.   Findings: An anechoic area was demonstrated which represented the bladder, with measurement of residual urine as noted. I inspected this myself. In that the residual urine was stable or insignificant, no treatment will be necessary at this time.     Assessment and Plan    Diagnoses and all orders for this visit:    1. Retention of urine (Primary)  -     Cancel: POC Urinalysis Dipstick, Multipro    Patient doing well now increasing to 5 mg terazosin.  He is going more frequently but he was a started on hydrochlorothiazide.  His bladder scan was 0 mL.  He had voided prior to his appointment so was not able to void.  He has not had to go to the ER to have catheter replaced he is doing well 2 weeks post catheter removal.  Recommend 3-month follow-up            "

## 2023-02-09 NOTE — TELEPHONE ENCOUNTER
I think it would be best to see him in person to assess possible causes. His most recent labs show electrolyte abnormalities which diuretics may worsen. Repeating these labs prior to prescribing medication would be helpful.

## 2023-02-09 NOTE — HOME HEALTH
SUBJECTIVE: Patient states he has experienced more soreness since increasing reps in HEP. He reports pain in usually higher in the mornings and improves in the afternoon. He notes he continues to have some swelling in legs so he has been elevating legs. He says he trying to drink more water.     MEDICATION CHANGES: none    FALLS SINCE LAST VISIT: none    CHANGES TO INSURANCE: none    MENTAL STATUS: alert and oriented    PAIN: yes    EDEMA: 1+ B LE's     WOUND / SKIN CONDITION: post surgical incisions healing well    OXYGEN USE: no    PLAN:

## 2023-02-13 ENCOUNTER — LAB (OUTPATIENT)
Dept: LAB | Facility: HOSPITAL | Age: 78
End: 2023-02-13
Payer: MEDICARE

## 2023-02-13 ENCOUNTER — HOME CARE VISIT (OUTPATIENT)
Dept: HOME HEALTH SERVICES | Facility: CLINIC | Age: 78
End: 2023-02-13
Payer: MEDICARE

## 2023-02-13 ENCOUNTER — OFFICE VISIT (OUTPATIENT)
Dept: INTERNAL MEDICINE | Facility: CLINIC | Age: 78
End: 2023-02-13
Payer: MEDICARE

## 2023-02-13 VITALS
HEART RATE: 68 BPM | RESPIRATION RATE: 18 BRPM | TEMPERATURE: 98.6 F | DIASTOLIC BLOOD PRESSURE: 84 MMHG | OXYGEN SATURATION: 100 % | SYSTOLIC BLOOD PRESSURE: 140 MMHG

## 2023-02-13 VITALS
TEMPERATURE: 97.7 F | WEIGHT: 180 LBS | SYSTOLIC BLOOD PRESSURE: 140 MMHG | RESPIRATION RATE: 16 BRPM | OXYGEN SATURATION: 99 % | DIASTOLIC BLOOD PRESSURE: 76 MMHG | HEART RATE: 60 BPM | HEIGHT: 70 IN | BODY MASS INDEX: 25.77 KG/M2

## 2023-02-13 DIAGNOSIS — R60.9 PERIPHERAL EDEMA: ICD-10-CM

## 2023-02-13 DIAGNOSIS — R60.9 PERIPHERAL EDEMA: Primary | ICD-10-CM

## 2023-02-13 DIAGNOSIS — I10 ESSENTIAL HYPERTENSION: ICD-10-CM

## 2023-02-13 DIAGNOSIS — E87.6 HYPOKALEMIA: ICD-10-CM

## 2023-02-13 LAB
ALBUMIN SERPL-MCNC: 3.5 G/DL (ref 3.5–5.2)
ALBUMIN/GLOB SERPL: 1.3 G/DL
ALP SERPL-CCNC: 105 U/L (ref 39–117)
ALT SERPL W P-5'-P-CCNC: 16 U/L (ref 1–41)
ANION GAP SERPL CALCULATED.3IONS-SCNC: 11 MMOL/L (ref 5–15)
AST SERPL-CCNC: 21 U/L (ref 1–40)
BILIRUB SERPL-MCNC: <0.2 MG/DL (ref 0–1.2)
BUN SERPL-MCNC: 14 MG/DL (ref 8–23)
BUN/CREAT SERPL: 14.6 (ref 7–25)
CALCIUM SPEC-SCNC: 8.7 MG/DL (ref 8.6–10.5)
CHLORIDE SERPL-SCNC: 105 MMOL/L (ref 98–107)
CO2 SERPL-SCNC: 26 MMOL/L (ref 22–29)
CREAT SERPL-MCNC: 0.96 MG/DL (ref 0.76–1.27)
DEPRECATED RDW RBC AUTO: 53.1 FL (ref 37–54)
EGFRCR SERPLBLD CKD-EPI 2021: 80.9 ML/MIN/1.73
ERYTHROCYTE [DISTWIDTH] IN BLOOD BY AUTOMATED COUNT: 15.3 % (ref 12.3–15.4)
GLOBULIN UR ELPH-MCNC: 2.8 GM/DL
GLUCOSE SERPL-MCNC: 126 MG/DL (ref 65–99)
HCT VFR BLD AUTO: 32.9 % (ref 37.5–51)
HGB BLD-MCNC: 10.2 G/DL (ref 13–17.7)
MCH RBC QN AUTO: 29.7 PG (ref 26.6–33)
MCHC RBC AUTO-ENTMCNC: 31 G/DL (ref 31.5–35.7)
MCV RBC AUTO: 95.9 FL (ref 79–97)
PLATELET # BLD AUTO: 337 10*3/MM3 (ref 140–450)
PMV BLD AUTO: 9.4 FL (ref 6–12)
POTASSIUM SERPL-SCNC: 3.7 MMOL/L (ref 3.5–5.2)
PROT SERPL-MCNC: 6.3 G/DL (ref 6–8.5)
RBC # BLD AUTO: 3.43 10*6/MM3 (ref 4.14–5.8)
SODIUM SERPL-SCNC: 142 MMOL/L (ref 136–145)
WBC NRBC COR # BLD: 8.77 10*3/MM3 (ref 3.4–10.8)

## 2023-02-13 PROCEDURE — 36415 COLL VENOUS BLD VENIPUNCTURE: CPT

## 2023-02-13 PROCEDURE — G0299 HHS/HOSPICE OF RN EA 15 MIN: HCPCS

## 2023-02-13 PROCEDURE — 80053 COMPREHEN METABOLIC PANEL: CPT

## 2023-02-13 PROCEDURE — 99213 OFFICE O/P EST LOW 20 MIN: CPT | Performed by: NURSE PRACTITIONER

## 2023-02-13 PROCEDURE — 85027 COMPLETE CBC AUTOMATED: CPT

## 2023-02-13 RX ORDER — OXYCODONE AND ACETAMINOPHEN 10; 325 MG/1; MG/1
1 TABLET ORAL EVERY 4 HOURS PRN
COMMUNITY
End: 2023-02-17

## 2023-02-13 RX ORDER — POTASSIUM CHLORIDE 750 MG/1
20 TABLET, FILM COATED, EXTENDED RELEASE ORAL DAILY
Qty: 60 TABLET | Refills: 0 | Status: SHIPPED | OUTPATIENT
Start: 2023-02-13 | End: 2023-03-07

## 2023-02-13 RX ORDER — POTASSIUM CHLORIDE 1500 MG/1
20 TABLET, FILM COATED, EXTENDED RELEASE ORAL DAILY
Qty: 30 TABLET | Refills: 1 | Status: SHIPPED | OUTPATIENT
Start: 2023-02-13 | End: 2023-02-13 | Stop reason: SDUPTHER

## 2023-02-13 RX ORDER — POTASSIUM CHLORIDE 1500 MG/1
20 TABLET, FILM COATED, EXTENDED RELEASE ORAL DAILY
Qty: 30 TABLET | Refills: 1 | Status: SHIPPED | OUTPATIENT
Start: 2023-02-13 | End: 2023-02-24

## 2023-02-13 RX ORDER — HYDROCHLOROTHIAZIDE 25 MG/1
25 TABLET ORAL DAILY PRN
Qty: 30 TABLET | Refills: 1 | Status: SHIPPED | OUTPATIENT
Start: 2023-02-13 | End: 2023-02-24

## 2023-02-13 NOTE — PROGRESS NOTES
Chief Complaint   Patient presents with   • Leg Swelling     Legs, feet, and ankles swollen        HPI     Sonu Bravo Jr. is a 77 y.o. male presents for the above problem. He says swelling started about 10 days ago. He has been unable to wear compression stockings due to edema. Elevation does not seem to help symptoms improve either. They are swollen upon waking. He denies any difficulty urinating and no shortness of breath.           ROS:  Review of Systems   Constitutional: Negative for fever.   HENT: Negative.    Respiratory: Negative for cough and shortness of breath.    Cardiovascular: Positive for leg swelling. Negative for chest pain and palpitations.   Genitourinary: Negative for difficulty urinating and dysuria.          reports that he has been smoking cigarettes. He started smoking about 56 years ago. He has a 26.00 pack-year smoking history. He has never used smokeless tobacco. He reports current alcohol use. He reports that he does not use drugs.    Current Outpatient Medications:   •  acetaminophen (TYLENOL) 325 MG tablet, Take 650 mg by mouth As Needed. Indications: Fever, Pain, Disp: , Rfl:   •  apixaban (ELIQUIS) 5 MG tablet tablet, Take 1 tablet by mouth 2 (Two) Times a Day., Disp: 180 tablet, Rfl: 1  •  bisoprolol (ZEBeta) 10 MG tablet, Take 1 tablet by mouth Daily., Disp: 90 tablet, Rfl: 1  •  Cholecalciferol 100 MCG (4000 UT) capsule, Take 4,000 Units by mouth Daily. Indications: Vitamin D Deficiency, Disp: , Rfl:   •  coenzyme Q10 100 MG capsule, Take 1 capsule by mouth Daily. Indications: Heart Rhythm Disorder, Disp: , Rfl:   •  Cyanocobalamin 2500 MCG sublingual tablet, Take 2,500 mcg by mouth Daily. Indications: Inadequate Vitamin B12, Disp: , Rfl:   •  cyclobenzaprine (FLEXERIL) 5 MG tablet, Take 1 tablet by mouth 3 (Three) Times a Day. (Patient taking differently: Take 5 mg by mouth 3 (Three) Times a Day As Needed for Muscle Spasms. Indications: Muscle Spasm), Disp: 90 tablet, Rfl:  "0  •  fexofenadine (ALLEGRA) 180 MG tablet, Take 1 tablet by mouth Daily. Indications: Hayfever, Disp: , Rfl:   •  fluticasone (FLONASE) 50 MCG/ACT nasal spray, 1 spray into the nostril(s) as directed by provider Daily., Disp: 32 g, Rfl: 3  •  levothyroxine (SYNTHROID, LEVOTHROID) 112 MCG tablet, Take 1 tablet by mouth Daily., Disp: 90 tablet, Rfl: 1  •  multivitamin with minerals tablet tablet, Take 1 tablet by mouth Daily. Indications: Vitamin Deficiency, Disp: , Rfl:   •  omeprazole (priLOSEC) 20 MG capsule, Take 1 capsule by mouth Daily., Disp: 90 capsule, Rfl: 3  •  oxyCODONE-acetaminophen (Percocet)  MG per tablet, Take 1 tablet by mouth Every 4 (Four) Hours As Needed for Moderate Pain., Disp: , Rfl:   •  rosuvastatin (CRESTOR) 20 MG tablet, Take 1 tablet by mouth Daily., Disp: 90 tablet, Rfl: 3  •  sennosides-docusate (senna-docusate sodium) 8.6-50 MG per tablet, Take 2 tablets by mouth Daily., Disp: 60 tablet, Rfl: 0  •  terazosin (HYTRIN) 5 MG capsule, Take 1 capsule by mouth Every Night., Disp: 30 capsule, Rfl: 2  •  Zinc 50 MG capsule, Take 1 capsule by mouth Daily. Indications: Zinc Deficiency, Disp: , Rfl:   •  hydroCHLOROthiazide (HYDRODIURIL) 25 MG tablet, Take 1 tablet by mouth Daily As Needed (as needed for edema)., Disp: 30 tablet, Rfl: 1  •  potassium chloride (K-DUR,KLOR-CON) 20 MEQ tablet controlled-release ER tablet, Take 1 tablet by mouth Daily., Disp: 30 tablet, Rfl: 1  •  terazosin (HYTRIN) 1 MG capsule, Take 1 capsule by mouth Every Night for 30 days. (Patient not taking: Reported on 2/3/2023), Disp: 30 capsule, Rfl: 0    OBJECTIVE:  /76 (BP Location: Left arm, Patient Position: Sitting, Cuff Size: Adult)   Pulse 60   Temp 97.7 °F (36.5 °C) (Temporal)   Resp 16   Ht 176.5 cm (69.5\")   Wt 81.6 kg (180 lb)   SpO2 99%   BMI 26.20 kg/m²    Physical Exam  Constitutional:       General: He is not in acute distress.  Cardiovascular:      Rate and Rhythm: Normal rate and regular " rhythm.      Heart sounds: Normal heart sounds.   Pulmonary:      Effort: Pulmonary effort is normal.      Breath sounds: Normal breath sounds.   Musculoskeletal:      Right lower leg: Edema (+3) present.      Left lower leg: Edema (+3) present.         ASSESSMENT/PLAN:     Diagnoses and all orders for this visit:    1. Peripheral edema (Primary)  2. Hypokalemia   -     Comprehensive metabolic panel; Future  -     hydroCHLOROthiazide (HYDRODIURIL) 25 MG tablet; Take 1 tablet by mouth Daily As Needed (as needed for edema).  Dispense: 30 tablet; Refill: 1  -     potassium chloride (K-DUR,KLOR-CON) 20 MEQ tablet controlled-release ER tablet; Take 1 tablet by mouth Daily.  Dispense: 30 tablet; Refill: 1  Labs to evaluate renal function and electrolytes. Prescribe HCTZ as needed for edema given hypocalcemia and take potassium with diuretic to prevent hypokalemia. He did recently start terazosin around the time of symptom onset. Encourage compression stockings as able. Keep 2 week follow up with Dr. Kramer to reevaluate.        An After Visit Summary was printed and given to the patient at discharge.  Return if symptoms worsen or fail to improve.          MARY Hogan 2/13/2023   Electronically signed.

## 2023-02-13 NOTE — HOME HEALTH
FOCUS OF CARE/SKILLED NEED: S/p laminectomy 1/24/23, incision assessment and care, teaching/education medication, pain management, fall safety/prevention     TEACHING/INTERVENTIONS: A&O X 4, VSS. Pt denies falls, chest pain or shortness of breath. Pt c/o moderate back pain. Surgical incisions clean, dry and intact. No s/s of infection. 1+ non pitting edema to BLE. Pt is starting HCTZ 25 mg  today for edema. Educated on the need to weigh daily after voiding on same scale, same time and same clothes. Pt verbalized understanding.     PROGRESS TOWARD GOALS: Ongoing/progressing     PHYSICIAN CONTACT: No     INSURANCE CHANGES? Denies     FALLS SINCE LAST VISIT? Denies     MEDICATION CHANGES? HCTZ and Potassium added to current meds    PRE-SCREENED FOR COVID? Yes. No s/s reported

## 2023-02-14 ENCOUNTER — HOME CARE VISIT (OUTPATIENT)
Dept: HOME HEALTH SERVICES | Facility: CLINIC | Age: 78
End: 2023-02-14
Payer: MEDICARE

## 2023-02-14 VITALS
TEMPERATURE: 97.6 F | DIASTOLIC BLOOD PRESSURE: 70 MMHG | OXYGEN SATURATION: 96 % | RESPIRATION RATE: 16 BRPM | SYSTOLIC BLOOD PRESSURE: 134 MMHG | HEART RATE: 63 BPM

## 2023-02-14 VITALS
HEART RATE: 61 BPM | RESPIRATION RATE: 18 BRPM | DIASTOLIC BLOOD PRESSURE: 65 MMHG | TEMPERATURE: 98.4 F | SYSTOLIC BLOOD PRESSURE: 140 MMHG | OXYGEN SATURATION: 100 %

## 2023-02-14 PROCEDURE — G0152 HHCP-SERV OF OT,EA 15 MIN: HCPCS

## 2023-02-14 PROCEDURE — G0157 HHC PT ASSISTANT EA 15: HCPCS

## 2023-02-14 NOTE — HOME HEALTH
COVID SCREENING: no signs/symptoms of COVID      SUBJECTIVE:  reports he was able to get recommended AE and is doing well with bathing and dressing except unable to naomie compression stockings even with wife assisting.    FOCUS OF CARE/SKILLED NEED: ADL training    TEACHING/INTERVENTIONS:  ADL training on safety and back precautions during kitchen tasks. Follow up training on shower and dressing and use of AE. Caregiver training on assisting with compression stockings.    PATIENT/CG RESPONSE: Patient demo good safety and understanding of back precautions, ability to use AE for bathing and dressing and safety with kitchen tasks.     PROGRESS TOWARD GOALS: goals met    PHYSICIAN CONTACT:  tomorrow at Urology clinic     FALLS SINCE LAST VISIT? no     MEDICATION CHANGES SINCE LAST VISIT? no

## 2023-02-15 ENCOUNTER — OFFICE VISIT (OUTPATIENT)
Dept: UROLOGY | Facility: CLINIC | Age: 78
End: 2023-02-15
Payer: MEDICARE

## 2023-02-15 VITALS — TEMPERATURE: 96.9 F | HEIGHT: 70 IN | BODY MASS INDEX: 25.2 KG/M2 | WEIGHT: 176 LBS

## 2023-02-15 DIAGNOSIS — R33.9 RETENTION OF URINE: Primary | ICD-10-CM

## 2023-02-15 PROCEDURE — 51798 US URINE CAPACITY MEASURE: CPT | Performed by: PHYSICIAN ASSISTANT

## 2023-02-15 PROCEDURE — 99213 OFFICE O/P EST LOW 20 MIN: CPT | Performed by: PHYSICIAN ASSISTANT

## 2023-02-16 ENCOUNTER — HOME CARE VISIT (OUTPATIENT)
Dept: HOME HEALTH SERVICES | Facility: CLINIC | Age: 78
End: 2023-02-16
Payer: MEDICARE

## 2023-02-16 NOTE — CASE COMMUNICATION
Patient missed a PT visit from [facility] on 2/16/23     Reason: Pt's spouse declined PT visit until insurance approval.  Spouse agreed to call her insurance contact to expedite approvals.      For your records only.   As per home health protocol, MD must be notified of missed/cancelled visits; therefore the prescribed frequency was not met.

## 2023-02-17 ENCOUNTER — TELEPHONE (OUTPATIENT)
Dept: INTERNAL MEDICINE | Facility: CLINIC | Age: 78
End: 2023-02-17

## 2023-02-17 DIAGNOSIS — Z98.1 S/P LUMBAR FUSION: Primary | ICD-10-CM

## 2023-02-17 RX ORDER — HYDROCODONE BITARTRATE AND ACETAMINOPHEN 7.5; 325 MG/1; MG/1
1 TABLET ORAL EVERY 8 HOURS PRN
Qty: 21 TABLET | Refills: 0 | Status: SHIPPED | OUTPATIENT
Start: 2023-02-17 | End: 2023-02-24

## 2023-02-17 RX ORDER — OXYCODONE AND ACETAMINOPHEN 7.5; 325 MG/1; MG/1
1 TABLET ORAL EVERY 6 HOURS PRN
Qty: 12 TABLET | Refills: 0 | OUTPATIENT
Start: 2023-02-17

## 2023-02-17 NOTE — TELEPHONE ENCOUNTER
Caller: APURVA RENEE    Relationship: SELF    Best call back number: [phone]    Requested Prescriptions:   Requested Prescriptions      No prescriptions requested or ordered in this encounter        Pharmacy where request should be sent:  [facility]   [address]  PHONE NUMBER#[phone]  FAX #[phone]        Additional details provided by patient  PT STATES AFTER SURGERY HE WAS PRESCRIBED oxyCODONE-acetaminophen (PERCOCET)  MG per tablet [[id number]] (Order [id number])    AND HE STATES HE IS COMPLETELY OUT AND WOULD LIKE A REFILL.  PT IS SCHEDULED AN APPT ON 03/06/2023 WITH DR. MATTHEW.      Does the patient have less than a 3 day supply:  [x] Yes  [] No    Would you like a call back once the refill request has been completed: [x] Yes [] No    If the office needs to give you a call back, can they leave a voicemail: [x] Yes [] No    Elan Calvert Rep   02/17/23 09:24 CST

## 2023-02-17 NOTE — TELEPHONE ENCOUNTER
Caller: Sonu Bravo Jr.    Relationship: Self    Best call back number: [phone]        Who are you requesting to speak with (clinical staff, provider,  specific staff member): CLINICAL STAFF  Do you know the name of the person who called:     What was the call regarding: hydroCHLOROthiazide (HYDRODIURIL) 25 MG tablet    IS HELPING WITH SWELLING; HOWEVER PATIENT FEELS DIZZY IN THE MORNINGS    Do you require a callback: YES        [facility] - [place], [place] - [address] [phone] [place], [place] [id number] FX    PATIENT IS WEARING COMPRESSION SOCKS

## 2023-02-20 ENCOUNTER — HOME CARE VISIT (OUTPATIENT)
Dept: HOME HEALTH SERVICES | Facility: CLINIC | Age: 78
End: 2023-02-20
Payer: MEDICARE

## 2023-02-20 VITALS
HEART RATE: 63 BPM | SYSTOLIC BLOOD PRESSURE: 128 MMHG | OXYGEN SATURATION: 99 % | DIASTOLIC BLOOD PRESSURE: 80 MMHG | RESPIRATION RATE: 16 BRPM | TEMPERATURE: 97.5 F

## 2023-02-20 PROCEDURE — G0157 HHC PT ASSISTANT EA 15: HCPCS

## 2023-02-21 ENCOUNTER — HOME CARE VISIT (OUTPATIENT)
Dept: HOME HEALTH SERVICES | Facility: CLINIC | Age: 78
End: 2023-02-21
Payer: MEDICARE

## 2023-02-21 VITALS
SYSTOLIC BLOOD PRESSURE: 138 MMHG | OXYGEN SATURATION: 99 % | DIASTOLIC BLOOD PRESSURE: 82 MMHG | HEART RATE: 64 BPM | RESPIRATION RATE: 16 BRPM | TEMPERATURE: 98.6 F

## 2023-02-21 PROCEDURE — G0495 RN CARE TRAIN/EDU IN HH: HCPCS

## 2023-02-21 NOTE — HOME HEALTH
FOCUS OF CARE/SKILLED NEED:  Skilled nursing discharge    TEACHING/INTERVENTIONS: Patient agreeable to homecare discharge skilled nursing. Discharge instructions reviewed and signed by the pt. Medication reconciliation completed and no issues found. Pt had no further questions or concerns regarding the pt's medications or discharge. A&O X 4, VSS. Pt c/o mild back pain today. Pt states pain is gradually improving.      PROGRESS TOWARD GOALS: Goals met    PHYSICIAN CONTACT: No    INSURANCE CHANGES? No    FALLS SINCE LAST VISIT? No    MEDICATION CHANGES? d/c'd HCTZ and potassium    PRE-SCREENED FOR COVID? Yes, No s/s of COVID. No recent exposure.

## 2023-02-23 ENCOUNTER — HOME CARE VISIT (OUTPATIENT)
Dept: HOME HEALTH SERVICES | Facility: CLINIC | Age: 78
End: 2023-02-23
Payer: MEDICARE

## 2023-02-23 VITALS
HEART RATE: 66 BPM | DIASTOLIC BLOOD PRESSURE: 80 MMHG | SYSTOLIC BLOOD PRESSURE: 120 MMHG | OXYGEN SATURATION: 100 % | RESPIRATION RATE: 16 BRPM

## 2023-02-23 PROCEDURE — G0151 HHCP-SERV OF PT,EA 15 MIN: HCPCS

## 2023-02-23 NOTE — HOME HEALTH
Patient reports feeling well today. DEnies  signs and symptoms of COVID,  falls, denies medication changes. Reports  compliance with HEP . Reports  back pain 5/10 that increases with exercises, however is relieved in supine. Educated the patient in benefits of frequent rest breaks with HEP to allow reduction in mm fatigue of the back and to avoid episodes of pain increases. Educate the patient on benefits of frequent activity vc rest breaks throughout the day and to avoid overworking the back mm. Today increased reps with HEP to 15 with VC for seated rest breaks after each exs, added ankle pumps. Worked on standing dynamic balance with intorduciton of side stepping and reverse stepping with UE supported. PAtient continues to require the use of a walker for ambulation. DEmonstrated increased BLE mm strength. Progressed to independence with transfers, bed mobility, and with ambulation. Contginus to require assistance with lower body dressing du to the post-surgical orthopedic restrictions.  During this episode of care pt received education and trainning on safe transfer and ambulaiton technique with use of RWx. HEP was developed and pt demonstrated correct independent completion and daily compliance. Education was provided on medication management, home safety - pt and caregiver verbalized understanding. Discharged from skilled physical therapy and home health care today due to meeting goals and wishing to transition to the outpatient setting at the [facility] across the street from the [facility] in [place].

## 2023-02-24 ENCOUNTER — OFFICE VISIT (OUTPATIENT)
Dept: INTERNAL MEDICINE | Facility: CLINIC | Age: 78
End: 2023-02-24
Payer: MEDICARE

## 2023-02-24 VITALS
TEMPERATURE: 96.3 F | OXYGEN SATURATION: 94 % | RESPIRATION RATE: 16 BRPM | HEART RATE: 51 BPM | SYSTOLIC BLOOD PRESSURE: 160 MMHG | BODY MASS INDEX: 25.27 KG/M2 | DIASTOLIC BLOOD PRESSURE: 80 MMHG | HEIGHT: 70 IN | WEIGHT: 176.5 LBS

## 2023-02-24 DIAGNOSIS — Z98.890 HISTORY OF NECK SURGERY: ICD-10-CM

## 2023-02-24 DIAGNOSIS — M47.12 CERVICAL SPONDYLOSIS WITH MYELOPATHY: Primary | ICD-10-CM

## 2023-02-24 DIAGNOSIS — I10 ESSENTIAL HYPERTENSION: ICD-10-CM

## 2023-02-24 DIAGNOSIS — E03.9 ACQUIRED HYPOTHYROIDISM: ICD-10-CM

## 2023-02-24 DIAGNOSIS — Z98.890 HISTORY OF BACK SURGERY: ICD-10-CM

## 2023-02-24 DIAGNOSIS — M48.062 SPINAL STENOSIS, LUMBAR REGION WITH NEUROGENIC CLAUDICATION: ICD-10-CM

## 2023-02-24 PROCEDURE — 99214 OFFICE O/P EST MOD 30 MIN: CPT | Performed by: INTERNAL MEDICINE

## 2023-02-24 RX ORDER — DOXYCYCLINE 100 MG/1
1 CAPSULE ORAL 2 TIMES DAILY
COMMUNITY
Start: 2023-02-22 | End: 2023-03-07

## 2023-02-24 RX ORDER — LOSARTAN POTASSIUM 25 MG/1
25 TABLET ORAL DAILY
Qty: 90 TABLET | Refills: 1 | Status: SHIPPED | OUTPATIENT
Start: 2023-02-24

## 2023-02-24 RX ORDER — LEVOTHYROXINE SODIUM 112 UG/1
112 TABLET ORAL DAILY
Qty: 90 TABLET | Refills: 1 | Status: SHIPPED | OUTPATIENT
Start: 2023-02-24

## 2023-02-24 RX ORDER — BISOPROLOL FUMARATE 5 MG/1
5 TABLET, FILM COATED ORAL DAILY
Qty: 90 TABLET | Refills: 1 | Status: SHIPPED | OUTPATIENT
Start: 2023-02-24

## 2023-02-24 NOTE — PROGRESS NOTES
CC: f/u neck and back pain s/p surgery    History:  Sonu Bravo Jr. is a 77 y.o. male   He feels he has been improving and he has been dismissed from home health, but does desire to attend outpatient PT to continue his rehab process.       ROS:  Review of Systems   Respiratory: Negative for shortness of breath.    Cardiovascular: Negative for chest pain.   Musculoskeletal: Positive for back pain, gait problem and neck pain.        reports that he has been smoking cigarettes. He started smoking about 56 years ago. He has a 26.00 pack-year smoking history. He has been exposed to tobacco smoke. He has never used smokeless tobacco. He reports current alcohol use. He reports that he does not use drugs.      Current Outpatient Medications:   •  acetaminophen (TYLENOL) 325 MG tablet, Take 650 mg by mouth As Needed. Indications: Fever, Pain, Disp: , Rfl:   •  apixaban (ELIQUIS) 5 MG tablet tablet, Take 1 tablet by mouth 2 (Two) Times a Day., Disp: 180 tablet, Rfl: 1  •  bisoprolol (ZEBeta) 5 MG tablet, Take 1 tablet by mouth Daily. Indications: High Blood Pressure Disorder, Disp: 90 tablet, Rfl: 1  •  Cholecalciferol 100 MCG (4000 UT) capsule, Take 4,000 Units by mouth Daily. Indications: Vitamin D Deficiency, Disp: , Rfl:   •  coenzyme Q10 100 MG capsule, Take 1 capsule by mouth Daily. Indications: Heart Rhythm Disorder, Disp: , Rfl:   •  Cyanocobalamin 2500 MCG sublingual tablet, Take 2,500 mcg by mouth Daily. Indications: Inadequate Vitamin B12, Disp: , Rfl:   •  cyclobenzaprine (FLEXERIL) 5 MG tablet, Take 1 tablet by mouth 3 (Three) Times a Day. (Patient taking differently: Take 5 mg by mouth 3 (Three) Times a Day As Needed for Muscle Spasms. Indications: Muscle Spasm), Disp: 90 tablet, Rfl: 0  •  fexofenadine (ALLEGRA) 180 MG tablet, Take 1 tablet by mouth Daily. Indications: Hayfever, Disp: , Rfl:   •  fluticasone (FLONASE) 50 MCG/ACT nasal spray, 1 spray into the nostril(s) as directed by provider Daily., Disp:  "32 g, Rfl: 3  •  HYDROcodone-acetaminophen (NORCO) 7.5-325 MG per tablet, Take 1 tablet by mouth Every 8 (Eight) Hours As Needed for Moderate Pain for up to 7 days., Disp: 21 tablet, Rfl: 0  •  levothyroxine (SYNTHROID, LEVOTHROID) 112 MCG tablet, Take 1 tablet by mouth Daily. Indications: Underactive Thyroid, Disp: 90 tablet, Rfl: 1  •  multivitamin with minerals tablet tablet, Take 1 tablet by mouth Daily. Indications: Vitamin Deficiency, Disp: , Rfl:   •  omeprazole (priLOSEC) 20 MG capsule, Take 1 capsule by mouth Daily., Disp: 90 capsule, Rfl: 3  •  potassium chloride (K-DUR,KLOR-CON) 10 MEQ ER tablet, Take 2 tablets by mouth Daily for 30 days., Disp: 60 tablet, Rfl: 0  •  rosuvastatin (CRESTOR) 20 MG tablet, Take 1 tablet by mouth Daily., Disp: 90 tablet, Rfl: 3  •  sennosides-docusate (senna-docusate sodium) 8.6-50 MG per tablet, Take 2 tablets by mouth Daily., Disp: 60 tablet, Rfl: 0  •  terazosin (HYTRIN) 5 MG capsule, Take 1 capsule by mouth Every Night., Disp: 30 capsule, Rfl: 2  •  Zinc 50 MG capsule, Take 1 capsule by mouth Daily. Indications: Zinc Deficiency, Disp: , Rfl:   •  doxycycline (MONODOX) 100 MG capsule, Take 1 capsule by mouth 2 (Two) Times a Day., Disp: , Rfl:   •  losartan (Cozaar) 25 MG tablet, Take 1 tablet by mouth Daily., Disp: 90 tablet, Rfl: 1    OBJECTIVE:  /80 (BP Location: Left arm, Patient Position: Sitting, Cuff Size: Adult)   Pulse 51   Temp 96.3 °F (35.7 °C)   Resp 16   Ht 177.8 cm (70\")   Wt 80.1 kg (176 lb 8 oz)   SpO2 94%   BMI 25.33 kg/m²    Physical Exam  Constitutional:       General: He is not in acute distress.  Pulmonary:      Effort: Pulmonary effort is normal. No respiratory distress.   Neurological:      Mental Status: He is alert and oriented to person, place, and time.   Psychiatric:         Mood and Affect: Mood normal.         Behavior: Behavior normal.         Assessment/Plan     Diagnoses and all orders for this visit:    1. Cervical spondylosis " with myelopathy (Primary)  2. History of neck surgery  3. History of back surgery  4. Spinal stenosis, lumbar region with neurogenic claudication  -     Ambulatory Referral to Physical Therapy Evaluate and treat; Stretching, ROM, Strengthening  Referral to outpatient PT to continue ongoing rehab.     5. Acquired hypothyroidism  -     levothyroxine (SYNTHROID, LEVOTHROID) 112 MCG tablet; Take 1 tablet by mouth Daily. Indications: Underactive Thyroid  Dispense: 90 tablet; Refill: 1  Last TSH within normal limits. We will refill levothyroxine.     6. Essential hypertension  -     bisoprolol (ZEBeta) 5 MG tablet; Take 1 tablet by mouth Daily. Indications: High Blood Pressure Disorder  Dispense: 90 tablet; Refill: 1  -     losartan (Cozaar) 25 MG tablet; Take 1 tablet by mouth Daily.  Dispense: 90 tablet; Refill: 1  Fair control, BP goal for age is <150/90 per JNC 8 guidelines and initiate losartan with decrease in bisoprolol to 5mg.     An After Visit Summary was printed and given to the patient at discharge.  Return in about 6 months (around 9/7/2023) for Medicare Wellness.      Vaughn Kramer D.O. 2/24/2023   Electronically signed.

## 2023-03-02 ENCOUNTER — TREATMENT (OUTPATIENT)
Dept: PHYSICAL THERAPY | Facility: CLINIC | Age: 78
End: 2023-03-02
Payer: MEDICARE

## 2023-03-02 DIAGNOSIS — M54.50 ACUTE BILATERAL LOW BACK PAIN WITHOUT SCIATICA: Primary | ICD-10-CM

## 2023-03-02 DIAGNOSIS — Z98.1 S/P LUMBAR FUSION: ICD-10-CM

## 2023-03-02 PROCEDURE — 97163 PT EVAL HIGH COMPLEX 45 MIN: CPT | Performed by: PHYSICAL THERAPIST

## 2023-03-02 NOTE — PROGRESS NOTES
Physical Therapy Initial Evaluation and Plan of Care  [address] [place] [address]    Patient: Sonu Bravo Jr.               : 1945  Visit Date: 3/2/2023  Referring practitioner: Vaughn Kramer DO  Date of Initial Visit: 3/2/2023  Patient seen for 1 sessions    Visit Diagnoses:    ICD-10-CM ICD-9-CM   1. Acute bilateral low back pain without sciatica  M54.50 724.2     338.19   2. S/P lumbar fusion  Z98.1 V45.4     Past Medical History:   Diagnosis Date   • Arthritis    • Atrial fibrillation (HCC) 2021   • Cataracts, bilateral    • Disease of thyroid gland    • Diverticulitis    • GERD (gastroesophageal reflux disease)    • Hyperlipidemia    • Hypertension    • Mononucleosis 1964     Past Surgical History:   Procedure Laterality Date   • ANTERIOR CERVICAL DISCECTOMY W/ FUSION N/A 10/11/2022    Procedure: CERVICAL DISCECTOMY ANTERIOR WITH FUSION, Cervical 4/5 and 5/6;  Surgeon: Durga Cifuentes MD;  Location: [facility] OR;  Service: Neurosurgery;  Laterality: N/A;   • CATARACT EXTRACTION Right    • COLON SURGERY  2022    Dr Fairbanks at [facility]   • COLONOSCOPY N/A 2021    Procedure: COLONOSCOPY WITH ANESTHESIA;  Surgeon: Thomas Dc DO;  Location: [facility] ENDOSCOPY;  Service: Gastroenterology;  Laterality: N/A;  pre op; abnormal ct scan  post op: diverticulosis ; polyps   PCP: Vaughn Kramer DO   • HERNIA REPAIR     • LUMBAR DIRECT LATERAL INTERBODY FUSION N/A 2023    Procedure: LUMBAR DIRECT LATERAL INTERBODY FUSION; LUMBAR 2-5;  Surgeon: Durga Cifuentes MD;  Location: [facility] OR;  Service: Neurosurgery;  Laterality: N/A;   • LUMBAR FUSION N/A 2023    Procedure: LUMBAR DIRECT LATERAL INTERBODY FUSION; LUMBAR 2-5, LUMBAR LAMINECTOMY TRANSFORAMINAL LUMBAR INTERBODY FUSION; LUMBAR 2-5;  Surgeon: Durga Cifuentes MD;  Location: [facility] OR;  Service: Robotics - Neuro;   Laterality: N/A;         SUBJECTIVE     Subjective Evaluation    History of Present Illness  Date of onset: 2023  Date of surgery: 2023  Mechanism of injury: He has a long h/o LBP with edward radicular pain. He had a lumbar fusion from L2/5. In the hospital he has had problem emptying bladder. He was put on meds and this was better. He still gets dizzy when he gets OOB saying it feels like he is spinning. He received HH for a few weeks and has been doing his HEP but still has some pain.     Pain  Current pain ratin  At best pain ratin  At worst pain ratin  Location: lumbar/pelvis  Quality: dull ache    Social Support  Lives with: spouse    Patient Goals  Patient goals for therapy: decreased pain, increased motion, increased strength, independence with ADLs/IADLs and return to sport/leisure activities  Patient goal: walk without walker; walk without pain; shower independently including washing and drying self       Outcome Measure:   ROSA MARIA:        OBJECTIVE     Objective          Postural Observations    Additional Postural Observation Details  Healing lumbar incision with a couple of scabs left. No s/s of infection.     Palpation   Left   Hypertonic in the erector spinae.     Right   Hypertonic in the erector spinae.     Tenderness     Lumbar Spine  No tenderness in the facet joint.     Neurological Testing     Sensation     Lumbar   Left   Intact: light touch    Right   Intact: light touch    Reflexes   Left   Patellar (L4): brisk (3+)  Achilles (S1): normal (2+)    Right   Patellar (L4): brisk (3+)  Achilles (S1): normal (2+)    Active Range of Motion     Additional Active Range of Motion Details  Lumbar full AROM not assessed due to fusion L2-5. Able to forward bend to mid thighs and extend to neutral upright.     Passive Range of Motion   Left Hip   Flexion: 100 degrees   Extension: 5 degrees   External rotation (90/90): 30 degrees   Internal rotation (90/90): 5 degrees     Right Hip    Flexion: 100 degrees   Extension: 5 degrees   External rotation (90/90): 40 degrees   Internal rotation (90/90): 10 degrees     Strength/Myotome Testing     Left Hip   Planes of Motion   Flexion: 4  Extension: 4  Abduction: 4    Right Hip   Planes of Motion   Flexion: 4+  Extension: 4  Abduction: 4+    Tests     Left Hip   Modified Edwardo: Positive.     Right Hip   Modified Edwardo: Positive.     Ambulation     Comments   Walks with a straight cane and occasionally a rolling walker if pain worse.     Functional Assessment     Comments  Unable to SLS either leg 1 sec without UE support.         Therapy Education/Self Care [id number]   Education offered today HEP below   Lesly Code [id number]   Ongoing HEP     Date: 03/02/2023  Prepared by: Spike Ramirez    Exercises  Supine Piriformis Stretch with Foot on Ground - 2 x daily - 7 x weekly - 2 sets - 30 hold  Supine Hip External Rotation - 2 x daily - 7 x weekly - 2 sets - 10 reps  Standing Hip Flexor Stretch - 2 x daily - 7 x weekly - 2 sets - 10 reps  Bird Dog on Counter - 2 x daily - 7 x weekly - 2 sets - 10 reps  Standing Hip Abduction with Counter Support - 10 x daily - 7 x weekly - 2 sets - 10 reps - 10 hold     Timed Minutes        Total Timed Treatment:     0   mins  Total Time of Visit:            50   mins    ASSESSMENT/PLAN     GOALS:  Goals                                                  Progress Note due by 4/1/23                                                              Recert due by 5/30/23   LTG by: 6 weeks Comments Date Status   Improve  bilateral passive hip ext to 10 deg        Able to wash and dry self in shower independently       Able to walk 20 minutes without exacerbation of pain       SLS either leg x 5 secs       Reports pain no greater than 1-2/10 when it does occur.       Improve Modified Oswestry to 10      Independent with HEP for flexibility and core/hip stability.           Assessment & Plan     Assessment  Impairments: abnormal muscle  firing, abnormal muscle tone, abnormal or restricted ROM, activity intolerance, impaired physical strength, lacks appropriate home exercise program, pain with function and safety issue  Functional Limitations: lifting, walking, uncomfortable because of pain, sitting and standing  Assessment details: He is healing well from his back fusion. His hips are quite tight and weak making it difficult to wean from his cane. If we can improve the mobility and stability of these, it will offer less stress to his lumbar and prolong his L5/S1 segment.   This patient would benefit from skilled PT.  Prognosis: good    Plan  Therapy options: will be seen for skilled therapy services  Planned modality interventions: dry needling, low level laser therapy, TENS and traction  Planned therapy interventions: abdominal trunk stabilization, flexibility, functional ROM exercises, home exercise program, joint mobilization, manual therapy, motor coordination training, neuromuscular re-education, postural training, soft tissue mobilization, spinal/joint mobilization, strengthening, stretching and therapeutic activities  Frequency: 3x week  Duration in weeks: 12  Treatment plan discussed with: patient  Plan details: Focus early on pain relief with soft tissue work and modalities as needed. Work on  mobility and flexibility through the spine and hips. Progress with postural/core/hip stability to improve postural alignment and mechanics.Progress the HEP for the same.        SIGNATURE: Spike Ramirez PT, [place] License #: [id number]  Electronically Signed on 3/2/2023      Initial Certification  Certification Period: 3/2/2023 through 5/30/2023  I certify that the therapy services are furnished while this patient is under my care.  The services outlined above are required by this patient, and will be reviewed every 90 days.     PHYSICIAN: Vaughn Kramer DO (NPI: [id number])    Signature____________________________________________DATE:  _________     Please sign and return via fax to [phone].   Thank you so much for letting us work with Sonu. I appreciate your letting us work with your patients. If you have any questions or concerns, please don't hesitate to contact me.          [address] [address]  [phone]

## 2023-03-06 ENCOUNTER — TELEPHONE (OUTPATIENT)
Dept: VASCULAR SURGERY | Facility: CLINIC | Age: 78
End: 2023-03-06
Payer: MEDICARE

## 2023-03-06 ENCOUNTER — HOSPITAL ENCOUNTER (OUTPATIENT)
Dept: GENERAL RADIOLOGY | Facility: HOSPITAL | Age: 78
Discharge: HOME | End: 2023-03-06
Admitting: NURSE PRACTITIONER
Payer: MEDICARE

## 2023-03-06 ENCOUNTER — OFFICE VISIT (OUTPATIENT)
Dept: NEUROSURGERY | Facility: CLINIC | Age: 78
End: 2023-03-06
Payer: MEDICARE

## 2023-03-06 VITALS — HEIGHT: 70 IN | BODY MASS INDEX: 25.2 KG/M2 | WEIGHT: 176 LBS

## 2023-03-06 DIAGNOSIS — M48.062 SPINAL STENOSIS OF LUMBAR REGION WITH NEUROGENIC CLAUDICATION: ICD-10-CM

## 2023-03-06 DIAGNOSIS — Z98.1 S/P LUMBAR FUSION: Primary | ICD-10-CM

## 2023-03-06 DIAGNOSIS — M43.10 ACQUIRED SPONDYLOLISTHESIS: ICD-10-CM

## 2023-03-06 DIAGNOSIS — Z98.1 STATUS POST LUMBAR SPINAL FUSION: ICD-10-CM

## 2023-03-06 PROCEDURE — 99024 POSTOP FOLLOW-UP VISIT: CPT | Performed by: NEUROLOGICAL SURGERY

## 2023-03-06 PROCEDURE — 72100 X-RAY EXAM L-S SPINE 2/3 VWS: CPT

## 2023-03-06 NOTE — PROGRESS NOTES
Chief Complaint   Patient presents with   • Back Pain     Patient here for 6wk post op visit with xrays today for review. He had a DLIF on 1/24/23.        HPI:   Interval History: Cyndi returns today postoperatively after having a three-level spinal fusion.  Pain today is a 5 out of 10.  He expects that he would be further along today.  He has completed a 6-7 session course of home health physical therapy and Occupational Therapy.  He is now transition ambulatory physical therapy and Occupational Therapy.  He is continue with hip exercises as well as back exercises.  He is currently with a walker today.  Although he states he uses a cane around the house.  He is able to rise from a seated position independently.  He still has some pain radiating around his back to his hips and to his anterior thighs with some associated numbness.  Sensation from his knees to his calves is improved significantly but he still has numbness and tingling in his feet.  Of note he does have a concomitant peripheral neuropathy.    Current Outpatient Medications   Medication Sig Dispense Refill   • acetaminophen (TYLENOL) 325 MG tablet Take 650 mg by mouth As Needed. Indications: Fever, Pain     • apixaban (ELIQUIS) 5 MG tablet tablet Take 1 tablet by mouth 2 (Two) Times a Day. 180 tablet 1   • bisoprolol (ZEBeta) 5 MG tablet Take 1 tablet by mouth Daily. Indications: High Blood Pressure Disorder 90 tablet 1   • Cholecalciferol 100 MCG (4000 UT) capsule Take 4,000 Units by mouth Daily. Indications: Vitamin D Deficiency     • coenzyme Q10 100 MG capsule Take 1 capsule by mouth Daily. Indications: Heart Rhythm Disorder     • Cyanocobalamin 2500 MCG sublingual tablet Take 2,500 mcg by mouth Daily. Indications: Inadequate Vitamin B12     • cyclobenzaprine (FLEXERIL) 5 MG tablet Take 1 tablet by mouth 3 (Three) Times a Day. (Patient taking differently: Take 5 mg by mouth 3 (Three) Times a Day As Needed for Muscle Spasms. Indications: Muscle  "Spasm) 90 tablet 0   • doxycycline (MONODOX) 100 MG capsule Take 1 capsule by mouth 2 (Two) Times a Day.     • fexofenadine (ALLEGRA) 180 MG tablet Take 1 tablet by mouth Daily. Indications: Hayfever     • fluticasone (FLONASE) 50 MCG/ACT nasal spray 1 spray into the nostril(s) as directed by provider Daily. 32 g 3   • levothyroxine (SYNTHROID, LEVOTHROID) 112 MCG tablet Take 1 tablet by mouth Daily. Indications: Underactive Thyroid 90 tablet 1   • losartan (Cozaar) 25 MG tablet Take 1 tablet by mouth Daily. 90 tablet 1   • multivitamin with minerals tablet tablet Take 1 tablet by mouth Daily. Indications: Vitamin Deficiency     • omeprazole (priLOSEC) 20 MG capsule Take 1 capsule by mouth Daily. 90 capsule 3   • potassium chloride (K-DUR,KLOR-CON) 10 MEQ ER tablet Take 2 tablets by mouth Daily for 30 days. 60 tablet 0   • rosuvastatin (CRESTOR) 20 MG tablet Take 1 tablet by mouth Daily. 90 tablet 3   • sennosides-docusate (senna-docusate sodium) 8.6-50 MG per tablet Take 2 tablets by mouth Daily. 60 tablet 0   • terazosin (HYTRIN) 5 MG capsule Take 1 capsule by mouth Every Night. 30 capsule 2   • Zinc 50 MG capsule Take 1 capsule by mouth Daily. Indications: Zinc Deficiency       No current facility-administered medications for this visit.       Vital Signs  Ht 177.8 cm (70\")   Wt 79.8 kg (176 lb)   BMI 25.25 kg/m²   Physical Exam  Eyes:      Extraocular Movements: EOM normal.      Pupils: Pupils are equal, round, and reactive to light.   Neurological:      Mental Status: He is oriented to person, place, and time.      Gait: Gait is intact.      Deep Tendon Reflexes:      Reflex Scores:       Tricep reflexes are 2+ on the right side and 2+ on the left side.       Bicep reflexes are 2+ on the right side and 2+ on the left side.       Brachioradialis reflexes are 2+ on the right side and 2+ on the left side.       Patellar reflexes are 2+ on the right side and 2+ on the left side.       Achilles reflexes are 0 on " the right side and 0 on the left side.  Psychiatric:         Speech: Speech normal.       Neurologic Exam     Mental Status   Oriented to person, place, and time.   Attention: normal. Concentration: normal.   Speech: speech is normal   Level of consciousness: alert    Cranial Nerves     CN II   Visual fields full to confrontation.     CN III, IV, VI   Pupils are equal, round, and reactive to light.  Extraocular motions are normal.     CN V   Facial sensation intact.     CN VII   Facial expression full, symmetric.     CN VIII   CN VIII normal.     CN IX, X   CN IX normal.     CN XI   CN XI normal.     Motor Exam   Right arm tone: normal  Left arm tone: normal  Right leg tone: normal  Left leg tone: normal    Strength   Right deltoid: 5/5  Left deltoid: 5/5  Right biceps: 5/5  Left biceps: 5/5  Right triceps: 5/5  Left triceps: 5/5  Right wrist extension: 5/5  Left wrist extension: 5/5  Right interossei: 5/5  Left interossei: 5/5  Right iliopsoas: 5/5  Left iliopsoas: 5/5  Right quadriceps: 5/5  Left quadriceps: 5/5  Right anterior tibial: 5/5  Left anterior tibial: 5/5  Right gastroc: 5/5  Left gastroc: 5/5  Right EHL 5/5  Left EHL 5/5       Sensory Exam   Right arm light touch: normal  Left arm light touch: normal  Right leg light touch: decreased from knee  Left leg light touch: decreased from knee  Sensory deficit distribution on right: L1  Sensory deficit distribution on left: L1  Improved sensation from knees to his ankles.  Some numbness and tingling in his feet most likely secondary from peripheral neuropathy.     Gait, Coordination, and Reflexes     Gait  Gait: normal    Tremor   Resting tremor: absent  Intention tremor: absent  Action tremor: absent    Reflexes   Right brachioradialis: 2+  Left brachioradialis: 2+  Right biceps: 2+  Left biceps: 2+  Right triceps: 2+  Left triceps: 2+  Right patellar: 2+  Left patellar: 2+  Right achilles: 0  Left achilles: 0  Right Joel: absent  Left Joel:  absent  Right ankle clonus: absent  Left ankle clonus: absent  Right pendular knee jerk: absent  Left pendular knee jerk: absentMore upright and no longer kyphotic kyphotic with a walker.  No longer wide-based.         Incision: Clean dry and intact.  Some abdominal weakness on the right side.      Results Review:   XR Spine Lumbar 2 or 3 View    Result Date: 3/6/2023  Stable lumbar spine radiographs with previous instrumented posterior fusion identified with hardware extending from L2 through L5, there are 3 levels of grade 1 spondylolisthesis. There is no obvious significant bony bridging at the fused levels. This report was finalized on 03/06/2023 11:36 by Dr. Aakash Carolina MD.      Male  strength (pounds)  AGE Right Hand RH Norms Left Hand LH Norms   20-24   121+20.6   104+21.8   25-29   120+23.0   110+16.2   30-34   121+22.4   110+21.7   35-39   119+24   113+21.7   40-44   117+20.7   112+18.7   45-49   110+23.0   101+22.8   50-54   113+18.1   102+17   55-59   101+26.7   83+23.4   60-64   90+20.4   77+20.3   65-69   91+20.6   76.8+19.8   70-74   75+21.5   65+18.1   75+ 59>73 66+21.0 75>69 55+17.0   (DANIELLE Cleary et al; Hand Dynometer: Effects of trials and sessions.  Perpetual and Motor Skills 61:195-8, 1985)  Key  * = Dominant hand  > = Intervention     Incision: WOUND IMAGE - Sp ACDF (10/31/2022)  (Consent to obtain photo of postoperative site for documentation purposes only obtained verbally by Mr. Bravo)     Review of results:                      Assessment/Plan:   Cervical spondylitic myelopathy  Status post ACDF C4-5 and C5-6  -Continue routine postoperative care     Lumbar back pain  Bilateral lower extremity numbness and tingling  Lumbar Spondylolisthesis, L2/3, 3/4 and 4/5  Lumbar Stenosis secondary to above  Status post L2-5 instrumented fusion  Sonu, his wife, and I discussed his recovery.  He is continuing with physical therapy and Occupational Therapy on an ambulatory basis.  He is off of his  postoperative pain medication only taking some muscle relaxants.  Overall I think his recovery is excellent.  However they feel that it should be progressing faster.  Encouraged him to continue with physical therapy and Occupational Therapy.  -X-rays of the lumbar spine are reviewed today without evidence of complication  -Follow-up in 6 months    Radicular pain to bilateral hips versus L1 radiculopathy  Differential diagnosis includes adjacent segment disease either at L1/2 or L5/S1 versus a combination of multiple nervous system hits including cervical spondylitic myelopathy, lumbar stenosis, and a concomitant peripheral neuropathy versus normal recovery versus hip pathology.  -MRI of the lumbar spine without contrast to evaluate for adjacent segment disease both proximal and distal  -Bilateral hip x-rays  -Once MRI is reviewed we will call with results.    Tobacco abuse  The patient understands the many dangers of continuing to use tobacco.  If quitting becomes an increased priority Sonu knows to contact us for help with quitting.              1. S/P lumbar fusion    2. Spinal stenosis of lumbar region with neurogenic claudication    3. Acquired spondylolisthesis        Diagnoses and all orders for this visit:    1. S/P lumbar fusion (Primary)  -     MRI Lumbar Spine Without Contrast; Future    2. Spinal stenosis of lumbar region with neurogenic claudication  -     MRI Lumbar Spine Without Contrast; Future    3. Acquired spondylolisthesis  -     MRI Lumbar Spine Without Contrast; Future        I discussed the patients findings and my recommendations with patient    Durga Cifuentes MD

## 2023-03-06 NOTE — TELEPHONE ENCOUNTER
Called to remind the patient of his appt for testing and to be seen in the office tomorrow.  Pt confirmed.

## 2023-03-07 ENCOUNTER — OFFICE VISIT (OUTPATIENT)
Dept: VASCULAR SURGERY | Facility: CLINIC | Age: 78
End: 2023-03-07
Payer: MEDICARE

## 2023-03-07 ENCOUNTER — HOSPITAL ENCOUNTER (OUTPATIENT)
Dept: ULTRASOUND IMAGING | Facility: HOSPITAL | Age: 78
Discharge: HOME | End: 2023-03-07
Payer: MEDICARE

## 2023-03-07 VITALS
DIASTOLIC BLOOD PRESSURE: 84 MMHG | OXYGEN SATURATION: 97 % | SYSTOLIC BLOOD PRESSURE: 144 MMHG | HEART RATE: 64 BPM | HEIGHT: 70 IN | BODY MASS INDEX: 25.25 KG/M2

## 2023-03-07 DIAGNOSIS — I10 ESSENTIAL HYPERTENSION: ICD-10-CM

## 2023-03-07 DIAGNOSIS — E78.2 MIXED HYPERLIPIDEMIA: ICD-10-CM

## 2023-03-07 DIAGNOSIS — I87.323 CHRONIC VENOUS HYPERTENSION WITH INFLAMMATION INVOLVING BOTH SIDES: ICD-10-CM

## 2023-03-07 DIAGNOSIS — I77.1 CELIAC ARTERY STENOSIS: ICD-10-CM

## 2023-03-07 DIAGNOSIS — I65.23 BILATERAL CAROTID ARTERY STENOSIS: ICD-10-CM

## 2023-03-07 DIAGNOSIS — K55.1 SUPERIOR MESENTERIC ARTERY STENOSIS: ICD-10-CM

## 2023-03-07 DIAGNOSIS — Z13.6 ENCOUNTER FOR SCREENING FOR STENOSIS OF CAROTID ARTERY: ICD-10-CM

## 2023-03-07 DIAGNOSIS — I73.9 PAD (PERIPHERAL ARTERY DISEASE): Primary | ICD-10-CM

## 2023-03-07 DIAGNOSIS — I79.8 OTHER DISORDERS OF ARTERIES, ARTERIOLES AND CAPILLARIES IN DISEASES CLASSIFIED ELSEWHERE: ICD-10-CM

## 2023-03-07 DIAGNOSIS — I73.9 PAD (PERIPHERAL ARTERY DISEASE): ICD-10-CM

## 2023-03-07 PROCEDURE — 99214 OFFICE O/P EST MOD 30 MIN: CPT | Performed by: SURGERY

## 2023-03-07 PROCEDURE — 93880 EXTRACRANIAL BILAT STUDY: CPT | Performed by: SURGERY

## 2023-03-07 PROCEDURE — 93923 UPR/LXTR ART STDY 3+ LVLS: CPT | Performed by: SURGERY

## 2023-03-07 PROCEDURE — 93923 UPR/LXTR ART STDY 3+ LVLS: CPT

## 2023-03-07 PROCEDURE — 93880 EXTRACRANIAL BILAT STUDY: CPT

## 2023-03-07 NOTE — PROGRESS NOTES
"3/7/2023       Vaughn Kramer, DO  [address] [facility] [facility] [address] [facility] [address]  [facility] [address]    Sonu Bravo Jr.  1945    Chief Complaint   Patient presents with   • Follow-up     6 month f/u with carotids and ABIs.  Last seen in the office on 04/28/22.  Pt denies any changes in his legs.  Pt does c/o having issues with his BP jumping around for a while and nothing seems to be helping to keep it under control.  Pt denies any stroke type symptoms       Dear Vaughn Kramer,    HPI  I had the pleasure of seeing your patient Sonu Bravo Jr. in the office today.  As you recall, Sonu Bravo Jr. is a 77 y.o.  male who you are following for routine health maintenance.  He did undergo a three-level spinal fusion since he was last here.  He continues to have pain to his back, hips and thighs with numbness and tingling to his feet.  He does have likely venous insufficiency however would like to hold off testing at this time.  He has significant vascular disease as noted on previous CT of the abdomen pelvis including renal artery stenosis, celiac stenosis, SMA stenosis, and aortoiliac disease.  He denies any symptoms of mesenteric stenosis.  He reports he will be having upcoming MRI with Dr. Cifuentes and was just seen yesterday.  He is maintained on Eliquis and Crestor.  He did have noninvasive testing performed, which I did review in office.    Review of Systems   Constitutional: Negative.    HENT: Negative.    Eyes: Negative.    Respiratory: Negative.    Cardiovascular: Negative.    Gastrointestinal: Negative.    Endocrine: Negative.    Genitourinary: Negative.    Musculoskeletal: Positive for back pain.   Skin: Positive for color change.   Allergic/Immunologic: Negative.    Neurological: Positive for numbness.   Hematological: Negative.    Psychiatric/Behavioral: Negative.    All other systems reviewed and are negative.       /84   Pulse 64   Ht 177.8 cm (70\")   SpO2 97%   BMI 25.25 kg/m² "     Physical Exam  Vitals and nursing note reviewed.   Constitutional:       Appearance: Normal appearance. He is well-developed.   HENT:      Head: Normocephalic and atraumatic.   Eyes:      General: No scleral icterus.     Pupils: Pupils are equal, round, and reactive to light.   Neck:      Thyroid: No thyromegaly.      Vascular: No carotid bruit or JVD.   Cardiovascular:      Rate and Rhythm: Normal rate. Rhythm irregular.      Pulses:           Carotid pulses are 2+ on the right side and 2+ on the left side.       Femoral pulses are 2+ on the right side and 2+ on the left side.       Popliteal pulses are 2+ on the right side and 2+ on the left side.        Dorsalis pedis pulses are 2+ on the right side and 2+ on the left side.        Posterior tibial pulses are 2+ on the right side and 2+ on the left side.      Heart sounds: Normal heart sounds.      Comments: Varicose veins with venous congestion to his feet.    Pulmonary:      Effort: Pulmonary effort is normal.      Breath sounds: Normal breath sounds.   Abdominal:      General: Bowel sounds are normal. There is no distension or abdominal bruit.      Palpations: Abdomen is soft. There is no mass.      Tenderness: There is no abdominal tenderness.   Musculoskeletal:         General: Swelling present. Normal range of motion.      Cervical back: Neck supple.   Lymphadenopathy:      Cervical: No cervical adenopathy.   Skin:     General: Skin is warm and dry.   Neurological:      Mental Status: He is alert and oriented to person, place, and time.      Cranial Nerves: No cranial nerve deficit.      Sensory: No sensory deficit.   Psychiatric:         Mood and Affect: Mood normal.         Behavior: Behavior normal.         Thought Content: Thought content normal.         Judgment: Judgment normal.       Diagnostic data:  Noninvasive testing including ABIs show right JAZMIN of 1.1 and a left JAZMIN of 0.8.  His carotid duplex shows less than 50% carotid stenosis bilateral  bilateral antegrade vertebral flow.      Patient Active Problem List   Diagnosis   • Abdominal aortic ectasia (HCC)   • Vitamin D deficiency   • Spinal stenosis of lumbar region with neurogenic claudication   • Mixed hyperlipidemia   • Acquired hypothyroidism   • Gastro-esophageal reflux disease with esophagitis   • Essential hypertension   • Former smoker   • Paroxysmal atrial fibrillation (HCC)   • Current use of long term anticoagulation   • Cavitary lesion of lung   • Colonic diverticular abscess   • History of DVT (deep vein thrombosis)   • Cervical spondylosis with myelopathy   • Spinal stenosis, lumbar region with neurogenic claudication   • Acute urinary retention        Diagnosis Plan   1. PAD (peripheral artery disease) (HCC)  US Ankle / Brachial Indices Extremity Complete      2. Bilateral carotid artery stenosis  US Carotid Bilateral      3. Chronic venous hypertension with inflammation involving both sides        4. Mixed hyperlipidemia        5. Essential hypertension        6. Celiac artery stenosis (HCC)        7. Superior mesenteric artery stenosis (HCC)            Plan: After thoroughly evaluating Sonuean Bravo Jr., I believe the best course of action is to remain conservative from vascular surgery standpoint.  I did review his testing which shows no arterial insufficiency to his right lower extremity and mild to the left.  His carotid duplex shows less than 50% carotid stenosis bilaterally.  He would like to hold off on any further testing for his venous insufficiency since this would not relieve his neuropathy symptoms.  He can continue wearing compression stockings on a daily basis.  He does have extensive vascular disease as evidenced on previous CTA.  He reports recent problems controlling blood pressure.  Currently he is on 2 blood pressure medications and slightly elevated in office today.  On previous testing he did have plaque noted takeoff of both renal arteries.  If this cannot be  managed with pressure medications, this may need further evaluation.  I did discuss vascular risk factors as they pertain to the progression of vascular disease including controlling his hypertension and hyperlipidemia.  His blood pressure and worked on by his primary care provider.  He is maintained on Crestor for his hyperlipidemia.  This was all discussed in full with complete understanding.  Thank you for allowing me to participate in the care of your patient.  Please do not hesitate to call with any questions or concerns.  We will keep you aware of any further encounters with Sonu Barvo Jr..        Sincerely yours,         MARY Limon, Vaughn Finn, DO

## 2023-03-08 DIAGNOSIS — Z98.1 S/P LUMBAR FUSION: Primary | ICD-10-CM

## 2023-03-08 RX ORDER — CYCLOBENZAPRINE HCL 5 MG
5 TABLET ORAL 3 TIMES DAILY
Qty: 90 TABLET | Refills: 0 | Status: SHIPPED | OUTPATIENT
Start: 2023-03-08 | End: 2023-03-27

## 2023-03-09 ENCOUNTER — TREATMENT (OUTPATIENT)
Dept: PHYSICAL THERAPY | Facility: CLINIC | Age: 78
End: 2023-03-09
Payer: MEDICARE

## 2023-03-09 DIAGNOSIS — M54.50 ACUTE BILATERAL LOW BACK PAIN WITHOUT SCIATICA: Primary | ICD-10-CM

## 2023-03-09 DIAGNOSIS — M47.12 CERVICAL SPONDYLOSIS WITH MYELOPATHY: ICD-10-CM

## 2023-03-09 DIAGNOSIS — Z98.1 S/P LUMBAR FUSION: ICD-10-CM

## 2023-03-09 PROCEDURE — 97140 MANUAL THERAPY 1/> REGIONS: CPT | Performed by: PHYSICAL THERAPIST

## 2023-03-09 PROCEDURE — 97110 THERAPEUTIC EXERCISES: CPT | Performed by: PHYSICAL THERAPIST

## 2023-03-09 NOTE — PROGRESS NOTES
Physical Therapy Treatment Note  [address] [place] [address]    Patient: Sonu Bravo Jr.                                                 Visit Date: 3/9/2023  :     1945    Referring practitioner:    Vaughn Kramer DO  Date of Initial Visit:          Type: THERAPY  Noted: 3/2/2023    Patient seen for 2 sessions    Visit Diagnoses:    ICD-10-CM ICD-9-CM   1. Acute bilateral low back pain without sciatica  M54.50 724.2     338.19   2. S/P lumbar fusion  Z98.1 V45.4   3. Cervical spondylosis with myelopathy  M47.12 721.1     SUBJECTIVE     Subjective He is struggling with weakness in his legs and having spasms. He saw Dr. Cifuentes who said he may ultimately need to go back in. He's having an MRI.     PAIN: 5/10         OBJECTIVE     Objective        Manual Therapy     89208  Comments   Prone edward lumbar STM    Prone LS man distraction    Prone thoracic extension Foam roll and manual   Percussive IASTM using Powerboost to edward glutes at L2 using ball attachment          Timed Minutes 30     Therapeutic Exercises    18973 Units Comments   Prone hip extension stretch  1/2 foam roll under thigh   Prone scapular retraction with cervical retraction                    Timed Minutes 15         Therapy Education/Self Care 94266   Education offered today HEP below   [facility] [id number]   Ongoing HEP     Date: 2023  Prepared by: Spike Ramirez    Exercises  Supine Piriformis Stretch with Foot on Ground - 2 x daily - 7 x weekly - 2 sets - 30 hold  Supine Hip External Rotation - 2 x daily - 7 x weekly - 2 sets - 10 reps  Standing Hip Flexor Stretch - 2 x daily - 7 x weekly - 2 sets - 10 reps  Bird Dog on Counter - 2 x daily - 7 x weekly - 2 sets - 10 reps  Standing Hip Abduction with Counter Support - 10 x daily - 7 x weekly - 2 sets - 10 reps - 10 hold     Timed Minutes        Total Timed Treatment:     45   mins  Total Time of Visit:             45   mins    ASSESSMENT/PLAN     GOALS:  Goals                                                  Progress Note due by 4/1/23                                                              Recert due by 5/30/23   LTG by: 6 weeks Comments Date Status   Improve  bilateral passive hip ext to 10 deg        Able to wash and dry self in shower independently       Able to walk 20 minutes without exacerbation of pain       SLS either leg x 5 secs       Reports pain no greater than 1-2/10 when it does occur.  5/10 3/8 ongoing   Improve Modified Oswestry to 10      Independent with HEP for flexibility and core/hip stability.           Assessment/Plan     ASSESSMENT: He is frustrated that he is still having radicular pain and there is a chance he may still need to have another surgery. I told him that we would still work on the same things.     PLAN: continue to work on flexibility of hips and thoracic.     SIGNATURE: Spike Ramirez, PT, [place] License #: [id number]  Electronically Signed on 3/9/2023        [facility] [place] [address]  [phone]

## 2023-03-17 ENCOUNTER — TREATMENT (OUTPATIENT)
Dept: PHYSICAL THERAPY | Facility: CLINIC | Age: 78
End: 2023-03-17
Payer: MEDICARE

## 2023-03-17 DIAGNOSIS — M54.50 ACUTE BILATERAL LOW BACK PAIN WITHOUT SCIATICA: Primary | ICD-10-CM

## 2023-03-17 DIAGNOSIS — Z98.1 S/P LUMBAR FUSION: ICD-10-CM

## 2023-03-17 PROCEDURE — 97110 THERAPEUTIC EXERCISES: CPT | Performed by: PHYSICAL THERAPIST

## 2023-03-17 PROCEDURE — 97140 MANUAL THERAPY 1/> REGIONS: CPT | Performed by: PHYSICAL THERAPIST

## 2023-03-17 NOTE — PROGRESS NOTES
Physical Therapy Treatment Note  [address] [place] [address]    Patient: Sonu Bravo Jr.                                                 Visit Date: 3/17/2023  :     1945    Referring practitioner:    Vaughn Kramer DO  Date of Initial Visit:          Type: THERAPY  Noted: 3/2/2023    Patient seen for 3 sessions    Visit Diagnoses:    ICD-10-CM ICD-9-CM   1. Acute bilateral low back pain without sciatica  M54.50 724.2     338.19   2. S/P lumbar fusion  Z98.1 V45.4     SUBJECTIVE     Subjective He will have good days and bad day. Some days they are weak, particularly in the morning. His exercises loosen him up. He has an MRI next Friday.    PAIN: 5/10         OBJECTIVE     Objective        Manual Therapy     29861  Comments   Prone edward lumbar STM    Prone LS man distraction    Prone thoracic extension Foam roll and manual   Percussive IASTM using Powerboost to edward glutes at L2 using ball attachment        Timed Minutes 30     Therapeutic Exercises    65840 Units Comments   Prone hip extension stretch  1/2 foam roll under thigh   Prone hip extension SLR 10 each                   Timed Minutes 15         Therapy Education/Self Care 12214   Education offered today HEP below   [facility] [id number]   Ongoing HEP     Date: 2023  Prepared by: Spike Ramirez    Exercises  Supine Piriformis Stretch with Foot on Ground - 2 x daily - 7 x weekly - 2 sets - 30 hold  Supine Hip External Rotation - 2 x daily - 7 x weekly - 2 sets - 10 reps  Standing Hip Flexor Stretch - 2 x daily - 7 x weekly - 2 sets - 10 reps  Bird Dog on Counter - 2 x daily - 7 x weekly - 2 sets - 10 reps  Standing Hip Abduction with Counter Support - 10 x daily - 7 x weekly - 2 sets - 10 reps - 10 hold     Timed Minutes        Total Timed Treatment:     45   mins  Total Time of Visit:            45   mins    ASSESSMENT/PLAN     GOALS:  Goals                                                   Progress Note due by 4/1/23                                                              Recert due by 5/30/23   LTG by: 6 weeks Comments Date Status   Improve  bilateral passive hip ext to 10 deg        Able to wash and dry self in shower independently       Able to walk 20 minutes without exacerbation of pain  still needing rwx for gait due to sciatica 3/17 ongoing   SLS either leg x 5 secs       Reports pain no greater than 1-2/10 when it does occur.  5/10 3/17 ongoing   Improve Modified Oswestry to 10      Independent with HEP for flexibility and core/hip stability.           Assessment/Plan     ASSESSMENT: He is still struggling with sciatica. Hopefully the MRI will reveal what may be going on with his pain. We are still focusing on improving his mobility as best we can even if he needs less conservative measures.     PLAN: continue to work on flexibility of hips and thoracic and progress with core and hip stability.     SIGNATURE: Spike Ramirez, PT, [place] License #: [id number]  Electronically Signed on 3/17/2023        [place] [address]  [phone]

## 2023-03-20 ENCOUNTER — TREATMENT (OUTPATIENT)
Dept: PHYSICAL THERAPY | Facility: CLINIC | Age: 78
End: 2023-03-20
Payer: MEDICARE

## 2023-03-20 DIAGNOSIS — Z98.1 S/P LUMBAR FUSION: ICD-10-CM

## 2023-03-20 DIAGNOSIS — M54.50 ACUTE BILATERAL LOW BACK PAIN WITHOUT SCIATICA: Primary | ICD-10-CM

## 2023-03-20 PROCEDURE — 97110 THERAPEUTIC EXERCISES: CPT | Performed by: PHYSICAL THERAPIST

## 2023-03-20 PROCEDURE — 97140 MANUAL THERAPY 1/> REGIONS: CPT | Performed by: PHYSICAL THERAPIST

## 2023-03-20 NOTE — PROGRESS NOTES
"                                                                Physical Therapy Treatment Note  [address] [place] [address]    Patient: Sonu Bravo Jr.                                                 Visit Date: 3/20/2023  :     1945    Referring practitioner:    Vaughn Kramer DO  Date of Initial Visit:          Type: THERAPY  Noted: 3/2/2023    Patient seen for 4 sessions    Visit Diagnoses:    ICD-10-CM ICD-9-CM   1. Acute bilateral low back pain without sciatica  M54.50 724.2     338.19   2. S/P lumbar fusion  Z98.1 V45.4     SUBJECTIVE     Subjective He had a good weekend but today, his legs feel weak.     PAIN: 5/10, \"much better\" after PT         OBJECTIVE     Objective        Manual Therapy     89432  Comments   Prone edward lumbar STM    Prone LS man distraction    Prone thoracic extension Foam roll and manual   Percussive IASTM using Powerboost to edward glutes at L2 using ball attachment        Timed Minutes 20     Therapeutic Exercises    94949 Units Comments   Prone hip extension stretch  1/2 foam roll under thigh   Prone hip extension SLR 10 each                   Timed Minutes 10     Therapy Education/Self Care 64502   Education offered today HEP below   [facility] Code [id number]   Ongoing HEP     Date: 2023  Prepared by: Spike Ramirez    Exercises  Supine Piriformis Stretch with Foot on Ground - 2 x daily - 7 x weekly - 2 sets - 30 hold  Supine Hip External Rotation - 2 x daily - 7 x weekly - 2 sets - 10 reps  Standing Hip Flexor Stretch - 2 x daily - 7 x weekly - 2 sets - 10 reps  Bird Dog on Counter - 2 x daily - 7 x weekly - 2 sets - 10 reps  Standing Hip Abduction with Counter Support - 10 x daily - 7 x weekly - 2 sets - 10 reps - 10 hold     Timed Minutes        Total Timed Treatment:     30   mins  Total Time of Visit:             30   mins    ASSESSMENT/PLAN     GOALS:  Goals                                                  Progress Note due by 23                     "                                          Recert due by 5/30/23   LTG by: 6 weeks Comments Date Status   Improve  bilateral passive hip ext to 10 deg        Able to wash and dry self in shower independently       Able to walk 20 minutes without exacerbation of pain  still needing rwx for gait due to sciatica 3/17 ongoing   SLS either leg x 5 secs       Reports pain no greater than 1-2/10 when it does occur.  5/10 3/17 ongoing   Improve Modified Oswestry to 10      Independent with HEP for flexibility and core/hip stability.           Assessment/Plan     ASSESSMENT: He still has episodes of radicular symptoms and his legs will be weak. Today was a bad day but he was better leaving.     PLAN: continue to work on flexibility of hips and thoracic and progress with core and hip stability.     SIGNATURE: Spike Ramirez, PT, [place] License #: [id number]  Electronically Signed on 3/20/2023        [facility] [place]  [place] [address]  [phone]

## 2023-03-24 ENCOUNTER — HOSPITAL ENCOUNTER (OUTPATIENT)
Dept: MRI IMAGING | Facility: HOSPITAL | Age: 78
Discharge: HOME | End: 2023-03-24
Admitting: NEUROLOGICAL SURGERY
Payer: MEDICARE

## 2023-03-24 DIAGNOSIS — M48.062 SPINAL STENOSIS OF LUMBAR REGION WITH NEUROGENIC CLAUDICATION: ICD-10-CM

## 2023-03-24 DIAGNOSIS — M43.10 ACQUIRED SPONDYLOLISTHESIS: ICD-10-CM

## 2023-03-24 DIAGNOSIS — Z98.1 S/P LUMBAR FUSION: ICD-10-CM

## 2023-03-24 PROCEDURE — 72148 MRI LUMBAR SPINE W/O DYE: CPT

## 2023-03-27 ENCOUNTER — OFFICE VISIT (OUTPATIENT)
Dept: INTERNAL MEDICINE | Facility: CLINIC | Age: 78
End: 2023-03-27
Payer: MEDICARE

## 2023-03-27 ENCOUNTER — LAB REQUISITION (OUTPATIENT)
Dept: LAB | Facility: HOSPITAL | Age: 78
End: 2023-03-27
Payer: MEDICARE

## 2023-03-27 ENCOUNTER — TREATMENT (OUTPATIENT)
Dept: PHYSICAL THERAPY | Facility: CLINIC | Age: 78
End: 2023-03-27
Payer: MEDICARE

## 2023-03-27 VITALS
HEIGHT: 70 IN | BODY MASS INDEX: 24.91 KG/M2 | WEIGHT: 174 LBS | OXYGEN SATURATION: 99 % | TEMPERATURE: 97.8 F | DIASTOLIC BLOOD PRESSURE: 84 MMHG | RESPIRATION RATE: 16 BRPM | HEART RATE: 64 BPM | SYSTOLIC BLOOD PRESSURE: 146 MMHG

## 2023-03-27 DIAGNOSIS — Z98.1 S/P LUMBAR FUSION: ICD-10-CM

## 2023-03-27 DIAGNOSIS — M54.50 ACUTE BILATERAL LOW BACK PAIN WITHOUT SCIATICA: Primary | ICD-10-CM

## 2023-03-27 DIAGNOSIS — R09.81 NASAL CONGESTION: ICD-10-CM

## 2023-03-27 DIAGNOSIS — Z00.00 ENCOUNTER FOR GENERAL ADULT MEDICAL EXAMINATION WITHOUT ABNORMAL FINDINGS: ICD-10-CM

## 2023-03-27 DIAGNOSIS — J01.10 ACUTE NON-RECURRENT FRONTAL SINUSITIS: Primary | ICD-10-CM

## 2023-03-27 LAB
FLUAV RNA RESP QL NAA+PROBE: NOT DETECTED
FLUBV RNA RESP QL NAA+PROBE: NOT DETECTED
SARS-COV-2 RNA RESP QL NAA+PROBE: NOT DETECTED

## 2023-03-27 PROCEDURE — 87636 SARSCOV2 & INF A&B AMP PRB: CPT | Performed by: NURSE PRACTITIONER

## 2023-03-27 PROCEDURE — 97110 THERAPEUTIC EXERCISES: CPT | Performed by: PHYSICAL THERAPIST

## 2023-03-27 PROCEDURE — 97140 MANUAL THERAPY 1/> REGIONS: CPT | Performed by: PHYSICAL THERAPIST

## 2023-03-27 RX ORDER — DOXYCYCLINE HYCLATE 100 MG/1
100 CAPSULE ORAL 2 TIMES DAILY
Qty: 14 CAPSULE | Refills: 0 | Status: SHIPPED | OUTPATIENT
Start: 2023-03-27 | End: 2023-04-03

## 2023-03-27 RX ORDER — TERAZOSIN 5 MG/1
5 CAPSULE ORAL NIGHTLY
COMMUNITY
Start: 2023-03-11

## 2023-03-27 NOTE — PROGRESS NOTES
"                                                                Physical Therapy Treatment Note  [address] [place] [address]    Patient: Sonu Bravo Jr.                                                 Visit Date: 3/27/2023  :     1945    Referring practitioner:    Vaughn Kramer DO  Date of Initial Visit:          Type: THERAPY  Noted: 3/2/2023    Patient seen for 5 sessions    Visit Diagnoses:    ICD-10-CM ICD-9-CM   1. Acute bilateral low back pain without sciatica  M54.50 724.2     338.19   2. S/P lumbar fusion  Z98.1 V45.4     SUBJECTIVE     Subjective He had a good weekend but today, his legs feel weak.     PAIN: 4/10,          OBJECTIVE     Objective        Manual Therapy     15950  Comments   Prone edward lumbar STM    Prone LS man distraction        Percussive IASTM using Powerboost to edward glutes at L2 using ball attachment        Timed Minutes 15     Therapeutic Exercises    86898 Units Comments   Prone hip extension stretch  1/2 foam roll under thigh   Prone hip extension SLR 10 each    Clamshells edward 5\" x 10    bridges          Timed Minutes 15     Therapy Education/Self Care 14210   Education offered today HEP below   [facility] Code [id number]   Ongoing HEP     Date: 2023  Prepared by: Spike Ramirez    Exercises  Supine Piriformis Stretch with Foot on Ground - 2 x daily - 7 x weekly - 2 sets - 30 hold  Supine Hip External Rotation - 2 x daily - 7 x weekly - 2 sets - 10 reps  Standing Hip Flexor Stretch - 2 x daily - 7 x weekly - 2 sets - 10 reps  Bird Dog on Counter - 2 x daily - 7 x weekly - 2 sets - 10 reps  Standing Hip Abduction with Counter Support - 10 x daily - 7 x weekly - 2 sets - 10 reps - 10 hold     Timed Minutes        Total Timed Treatment:     30   mins  Total Time of Visit:             30   mins    ASSESSMENT/PLAN     GOALS:  Goals                                                  Progress Note due by 23                                                           "    Recert due by 5/30/23   LTG by: 6 weeks Comments Date Status   Improve  bilateral passive hip ext to 10 deg        Able to wash and dry self in shower independently       Able to walk 20 minutes without exacerbation of pain  still needing rwx for gait due to sciatica 3/17 ongoing   SLS either leg x 5 secs       Reports pain no greater than 1-2/10 when it does occur.  4/10 3/27 ongoing   Improve Modified Oswestry to 10      Independent with HEP for flexibility and core/hip stability.           Assessment/Plan     ASSESSMENT: He is slowly improving and has been released with no restrictions other than lifting. He would benefit from coming twice a week to bolster his core and hip stability.    PLAN: continue to work on flexibility of hips and thoracic and progress with core and hip stability. Schedule 2x/week    SIGNATURE: Spike Ramirez, PT, [place] License #: [id number]  Electronically Signed on 3/27/2023        [facility] [place] [address]  [phone]

## 2023-03-27 NOTE — PROGRESS NOTES
Chief Complaint   Patient presents with   • Sinus Problem     Sinus drainage     • Cough   • Sore Throat        HPI     Sonu Bravo Jr. is a 77 y.o. male presents for the above symptoms which have been present for about 10 days. He has some sinus pressure and throat irritation with cough.        ROS:  Review of Systems   Constitutional: Negative for fever.   HENT: Positive for congestion, sinus pressure, sinus pain and sore throat.    Respiratory: Positive for cough. Negative for shortness of breath.    Cardiovascular: Negative.    Neurological: Negative for headaches.          reports that he has been smoking cigarettes. He started smoking about 56 years ago. He has a 26.00 pack-year smoking history. He has been exposed to tobacco smoke. He has never used smokeless tobacco. He reports current alcohol use. He reports that he does not use drugs.    Current Outpatient Medications:   •  acetaminophen (TYLENOL) 325 MG tablet, Take 2 tablets by mouth As Needed. Indications: Fever, Pain, Disp: , Rfl:   •  apixaban (ELIQUIS) 5 MG tablet tablet, Take 1 tablet by mouth 2 (Two) Times a Day., Disp: 180 tablet, Rfl: 1  •  bisoprolol (ZEBeta) 5 MG tablet, Take 1 tablet by mouth Daily. Indications: High Blood Pressure Disorder, Disp: 90 tablet, Rfl: 1  •  Cholecalciferol 100 MCG (4000 UT) capsule, Take 4,000 Units by mouth Daily. Indications: Vitamin D Deficiency, Disp: , Rfl:   •  coenzyme Q10 100 MG capsule, Take 1 capsule by mouth Daily. Indications: Heart Rhythm Disorder, Disp: , Rfl:   •  Cyanocobalamin 2500 MCG sublingual tablet, Take 2,500 mcg by mouth Daily. Indications: Inadequate Vitamin B12, Disp: , Rfl:   •  cyclobenzaprine (FLEXERIL) 5 MG tablet, Take 1 tablet by mouth 3 (Three) Times a Day. Indications: Muscle Spasm, Disp: 90 tablet, Rfl: 0  •  fexofenadine (ALLEGRA) 180 MG tablet, Take 1 tablet by mouth Daily. Indications: Hayfever, Disp: , Rfl:   •  fluticasone (FLONASE) 50 MCG/ACT nasal spray, 1 spray into  "the nostril(s) as directed by provider Daily., Disp: 32 g, Rfl: 3  •  levothyroxine (SYNTHROID, LEVOTHROID) 112 MCG tablet, Take 1 tablet by mouth Daily. Indications: Underactive Thyroid, Disp: 90 tablet, Rfl: 1  •  losartan (Cozaar) 25 MG tablet, Take 1 tablet by mouth Daily., Disp: 90 tablet, Rfl: 1  •  multivitamin with minerals tablet tablet, Take 1 tablet by mouth Daily. Indications: Vitamin Deficiency, Disp: , Rfl:   •  omeprazole (priLOSEC) 20 MG capsule, Take 1 capsule by mouth Daily., Disp: 90 capsule, Rfl: 3  •  rosuvastatin (CRESTOR) 20 MG tablet, Take 1 tablet by mouth Daily., Disp: 90 tablet, Rfl: 3  •  sennosides-docusate (senna-docusate sodium) 8.6-50 MG per tablet, Take 2 tablets by mouth Daily., Disp: 60 tablet, Rfl: 0  •  terazosin (HYTRIN) 5 MG capsule, Take 1 capsule by mouth Every Night., Disp: , Rfl:   •  Zinc 50 MG capsule, Take 1 capsule by mouth Daily. Indications: Zinc Deficiency, Disp: , Rfl:   •  doxycycline (VIBRAMYCIN) 100 MG capsule, Take 1 capsule by mouth 2 (Two) Times a Day for 7 days., Disp: 14 capsule, Rfl: 0    OBJECTIVE:  /84 (BP Location: Left arm, Patient Position: Sitting, Cuff Size: Adult)   Pulse 64   Temp 97.8 °F (36.6 °C) (Temporal)   Resp 16   Ht 177.8 cm (70\")   Wt 78.9 kg (174 lb)   SpO2 99%   BMI 24.97 kg/m²    Physical Exam  Constitutional:       General: He is not in acute distress.  HENT:      Right Ear: There is impacted cerumen.      Left Ear: Tympanic membrane normal.      Nose: Congestion present.      Right Sinus: Frontal sinus tenderness present. No maxillary sinus tenderness.      Left Sinus: Frontal sinus tenderness present. No maxillary sinus tenderness.      Mouth/Throat:      Pharynx: Posterior oropharyngeal erythema present.      Tonsils: No tonsillar exudate or tonsillar abscesses.   Cardiovascular:      Rate and Rhythm: Normal rate and regular rhythm.      Heart sounds: Normal heart sounds.   Pulmonary:      Breath sounds: Normal breath " sounds.         ASSESSMENT/PLAN:     Diagnoses and all orders for this visit:    1. Acute non-recurrent frontal sinusitis (Primary)  -     doxycycline (VIBRAMYCIN) 100 MG capsule; Take 1 capsule by mouth 2 (Two) Times a Day for 7 days.  Dispense: 14 capsule; Refill: 0  Continue Flonase and Robitussin as needed.     2. Nasal congestion  -     COVID-19 and FLU A/B PCR - Swab, Nasopharynx; Future    BMI is within normal parameters. No other follow-up for BMI required.       An After Visit Summary was printed and given to the patient at discharge.  No follow-ups on file.          Katharina Soni, MARY 3/27/2023   Electronically signed.

## 2023-04-04 ENCOUNTER — TREATMENT (OUTPATIENT)
Dept: PHYSICAL THERAPY | Facility: CLINIC | Age: 78
End: 2023-04-04
Payer: MEDICARE

## 2023-04-04 DIAGNOSIS — Z98.1 S/P LUMBAR FUSION: ICD-10-CM

## 2023-04-04 DIAGNOSIS — M54.50 ACUTE BILATERAL LOW BACK PAIN WITHOUT SCIATICA: Primary | ICD-10-CM

## 2023-04-04 PROCEDURE — 97140 MANUAL THERAPY 1/> REGIONS: CPT | Performed by: PHYSICAL THERAPIST

## 2023-04-04 PROCEDURE — 97110 THERAPEUTIC EXERCISES: CPT | Performed by: PHYSICAL THERAPIST

## 2023-04-04 NOTE — PROGRESS NOTES
"                                                                Physical Therapy Treatment Note and 30 Day Progress Note  [address] [place] [address]    Patient: Sonu Bravo Jr.                                                 Visit Date: 2023  :     1945    Referring practitioner:    Vaughn Kramer DO  Date of Initial Visit:          Type: THERAPY  Noted: 3/2/2023    Patient seen for 6 sessions    Visit Diagnoses:    ICD-10-CM ICD-9-CM   1. Acute bilateral low back pain without sciatica  M54.50 724.2     338.19   2. S/P lumbar fusion  Z98.1 V45.4     SUBJECTIVE     Subjective He says he overdid it this weekend walking. His back is sore and his left thigh is weak.     PAIN: 4-5/10         OBJECTIVE     Objective     Manual Therapy     80708  Comments   Prone edward lumbar IASTM Foam roller   Prone LS man distraction        Percussive IASTM using Powerboost to edward glutes at L2 using ball attachment        Timed Minutes 20     Therapeutic Exercises    15832 Units Comments   Prone hip extension stretch  1/2 foam roll under thigh   Prone knee flex neural flossing     Prone hip extension SLR 10 each    Bent knee hip abd edward 5\" x 10    Bridges edward LE on swiss ball     LTR LE on swiss ball 10 edward    Timed Minutes 25     Therapy Education/Self Care 72578   Education offered today HEP below   [employer] Code [email or web address]   Ongoing HEP     Date: 2023  Prepared by: Spike Ramirez    Exercises  Supine Piriformis Stretch with Foot on Ground - 2 x daily - 7 x weekly - 2 sets - 30 hold  Supine Hip External Rotation - 2 x daily - 7 x weekly - 2 sets - 10 reps  Standing Hip Flexor Stretch - 2 x daily - 7 x weekly - 2 sets - 10 reps  Bird Dog on Counter - 2 x daily - 7 x weekly - 2 sets - 10 reps  Standing Hip Abduction with Counter Support - 10 x daily - 7 x weekly - 2 sets - 10 reps - 10 hold     Timed Minutes        Total Timed Treatment:     45   mins  Total Time of Visit:             45   " "mins    ASSESSMENT/PLAN     GOALS:  Goals                                                  Progress Note due by 4/4/23                                                              Recert due by 5/30/23   LTG by: 6 weeks Comments Date Status   Improve  bilateral passive hip ext to 10 deg  10+ deg 4/4 MET    Able to wash and dry self in shower independently  his wife helps him dry his legs 4/4 progressing   Able to walk 20 minutes without exacerbation of pain  able to walk \"well over 20 min\" using a cane 4/4 MET   SLS either leg x 5 secs       Reports pain no greater than 1-2/10 when it does occur.  4-5/10 4/4 ongoing   Improve Modified Oswestry to 10 14/50 4/4 ongoing   Independent with HEP for flexibility and core/hip stability.  focusing on hip stability 4/4 ongoing       Assessment & Plan     Assessment  Impairments: abnormal muscle firing, abnormal muscle tone, abnormal or restricted ROM, activity intolerance, impaired physical strength, lacks appropriate home exercise program, pain with function and safety issue  Functional Limitations: lifting, walking, uncomfortable because of pain, sitting and standingPrognosis: good    Plan  Therapy options: will be seen for skilled therapy services  Planned modality interventions: dry needling, low level laser therapy, TENS and traction  Planned therapy interventions: abdominal trunk stabilization, flexibility, functional ROM exercises, home exercise program, joint mobilization, manual therapy, motor coordination training, neuromuscular re-education, postural training, soft tissue mobilization, spinal/joint mobilization, strengthening, stretching and therapeutic activities  Frequency: 3x week  Duration in weeks: 12  Treatment plan discussed with: patient         ASSESSMENT: He has met his goal for distance walking and hip extension mobility. He is progressing on his goals for balance and pain. His c/c is still left leg weakness and back pain.       PLAN: continue to work on " flexibility of hips and thoracic and progress with core and hip stability.     SIGNATURE: Spike Ramirez, PT, [place] License #: [id number]  Electronically Signed on 4/4/2023        [address] [facility] [place] [address]  [phone]

## 2023-04-11 ENCOUNTER — TREATMENT (OUTPATIENT)
Dept: PHYSICAL THERAPY | Facility: CLINIC | Age: 78
End: 2023-04-11
Payer: MEDICARE

## 2023-04-11 DIAGNOSIS — Z98.1 S/P LUMBAR FUSION: ICD-10-CM

## 2023-04-11 DIAGNOSIS — M54.50 ACUTE BILATERAL LOW BACK PAIN WITHOUT SCIATICA: Primary | ICD-10-CM

## 2023-04-11 PROCEDURE — 97110 THERAPEUTIC EXERCISES: CPT | Performed by: PHYSICAL THERAPIST

## 2023-04-11 PROCEDURE — 97530 THERAPEUTIC ACTIVITIES: CPT | Performed by: PHYSICAL THERAPIST

## 2023-04-11 PROCEDURE — 97140 MANUAL THERAPY 1/> REGIONS: CPT | Performed by: PHYSICAL THERAPIST

## 2023-04-11 NOTE — PROGRESS NOTES
"                                                                Physical Therapy Treatment Note and 30 Day Progress Note  [address] [place] [address]    Patient: Sonu Bravo Jr.                                                 Visit Date: 2023  :     1945    Referring practitioner:    Vaughn Kramer DO  Date of Initial Visit:          Type: THERAPY  Noted: 3/2/2023    Patient seen for 7 sessions    Visit Diagnoses:    ICD-10-CM ICD-9-CM   1. Acute bilateral low back pain without sciatica  M54.50 724.2     338.19   2. S/P lumbar fusion  Z98.1 V45.4     SUBJECTIVE     Subjective He says he feels about the same. He still feels weak in both legs. He was driving for multiple hours this weekend and reports soreness of his legs from sitting.     PAIN: 4/10         OBJECTIVE     Objective     Manual Therapy     87556  Comments   Prone edward lumbar IASTM Foam roller   Prone LS man distraction    Percussive IASTM using Powerboost to edward glutes at L2 using ball attachment        Timed Minutes 15     Therapeutic Exercises    08651 Units Comments   Prone knee flex neural flossing     Prone hip extension SLR 10 each    Bent knee hip abd edward 5\" x 10 RTB   Bridges edward LE on swiss ball     LTR LE on swiss ball 10 edward    Timed Minutes 15     Therapeutic Activities    23185 Comments   Shuttle BL Squats - 5 cords        Timed Minutes 15     Therapy Education/Self Care 81912   Education offered today HEP below   [facility] Code [email or web address]   Ongoing HEP     Date: 2023  Prepared by: Spike Ramirez    Exercises  Supine Piriformis Stretch with Foot on Ground - 2 x daily - 7 x weekly - 2 sets - 30 hold  Supine Hip External Rotation - 2 x daily - 7 x weekly - 2 sets - 10 reps  Standing Hip Flexor Stretch - 2 x daily - 7 x weekly - 2 sets - 10 reps  Bird Dog on Counter - 2 x daily - 7 x weekly - 2 sets - 10 reps  Standing Hip Abduction with Counter Support - 10 x daily - 7 x weekly - 2 sets - 10 reps - 10 hold   " "  Timed Minutes        Total Timed Treatment:     45  mins  Total Time of Visit:             45   mins    ASSESSMENT/PLAN     GOALS:  Goals                                                  Progress Note due by 5/4/23                                                              Recert due by 5/30/23   LTG by: 6 weeks Comments Date Status   Improve  bilateral passive hip ext to 10 deg  10+ deg 4/4 MET    Able to wash and dry self in shower independently  his wife helps him dry his legs 4/4 progressing   Able to walk 20 minutes without exacerbation of pain  able to walk \"well over 20 min\" using a cane 4/4 MET   SLS either leg x 5 secs       Reports pain no greater than 1-2/10 when it does occur.  4/10 4/11 ongoing   Improve Modified Oswestry to 10 14/50 4/4 ongoing   Independent with HEP for flexibility and core/hip stability.  focusing on hip stability 4/4 ongoing       Assessment/Plan     ASSESSMENT: He was able to perform BLKFO with a RTB demonstrating increased strength. Pt reported a decrease in pain by the end of the therapy session 2/10. He reports wanting to be able to go fishing at the end of July. Pt is progressing well towards all his goals at this time.       PLAN: continue to work on flexibility of hips and thoracic and progress with core and hip stability as tolerable    SIGNATURE: Chanelle Dixon PT Student  Electronically Signed on 4/11/2023    The clinical instructor and/or supervising staff, Spike Ramirez PT, was present in clinic guiding the visit by approving, concurring, and confirming the skilled judgement for all services rendered.    Signature:  Spike Ramirez PT, [place] License #: [id number]  Electronically signed on 4/11/2023        [address] [place] [address] [place] [address]  [phone]  "

## 2023-04-18 ENCOUNTER — TREATMENT (OUTPATIENT)
Dept: PHYSICAL THERAPY | Facility: CLINIC | Age: 78
End: 2023-04-18
Payer: MEDICARE

## 2023-04-18 DIAGNOSIS — Z98.1 S/P LUMBAR FUSION: ICD-10-CM

## 2023-04-18 DIAGNOSIS — M54.50 ACUTE BILATERAL LOW BACK PAIN WITHOUT SCIATICA: Primary | ICD-10-CM

## 2023-04-18 NOTE — PROGRESS NOTES
Physical Therapy Treatment Note  [address] [place] [address]    Patient: Sonu Bravo Jr.                                                 Visit Date: 2023  :     1945    Referring practitioner:    Vaughn Kramer DO  Date of Initial Visit:          Type: THERAPY  Noted: 3/2/2023    Patient seen for 8 sessions    Visit Diagnoses:    ICD-10-CM ICD-9-CM   1. Acute bilateral low back pain without sciatica  M54.50 724.2     338.19   2. S/P lumbar fusion  Z98.1 V45.4     SUBJECTIVE     Subjective Pain level not real high today, nothing new to report. He would really like to work on his balance.    PAIN: 2/10 > no change         OBJECTIVE     Objective     Therapeutic Exercises    51357 Units Comments   DL leg press 60 sec x2 5 bands   TKE theradisc 5 sec holds 10 B Red TB   Resisted hip ABD tapping cone 2*10 B Yellow TB   Timed Minutes 30     Neuromuscular Reeducation     34461 Comments   SLS on airex  10 sec x10 B   BOSU ball side static lunge EO/EC 5 sec holds 10 B   Timed Minutes 15       Therapy Education/Self Care 60107   Education offered today HEP below   [facility] Code [id number]; [id number]   Ongoing HEP     Date: 2023  Prepared by: Spike Ramirez    Exercises  Supine Piriformis Stretch with Foot on Ground - 2 x daily - 7 x weekly - 2 sets - 30 hold  Supine Hip External Rotation - 2 x daily - 7 x weekly - 2 sets - 10 reps  Standing Hip Flexor Stretch - 2 x daily - 7 x weekly - 2 sets - 10 reps  Bird Dog on Counter - 2 x daily - 7 x weekly - 2 sets - 10 reps  Standing Hip Abduction with Counter Support - 10 x daily - 7 x weekly - 2 sets - 10 reps - 10 hold      Date: 2023  Prepared by: Judy Nowak    Exercises  - Narrow Stance with Counter Support  - 2-3 x daily - 7 x weekly - 2 reps - 30 sec hold  - Standing Tandem Balance with Counter Support  - 2-3 x daily - 7 x weekly - 2 reps - 30 sec hold  - Standing Single  "Leg Stance with Unilateral Counter Support  - 2-3 x daily - 7 x weekly - 2 reps - 30 sec hold     Timed Minutes        Total Timed Treatment:     45   mins  Total Time of Visit:             45   mins    ASSESSMENT/PLAN       GOALS:  Goals                                                  Progress Note due by 5/4/23                                                              Recert due by 5/30/23   LTG by: 6 weeks Comments Date Status   Improve  bilateral passive hip ext to 10 deg  10+ deg 4/4 MET    Able to wash and dry self in shower independently  his wife helps him dry his legs 4/4 progressing   Able to walk 20 minutes without exacerbation of pain  able to walk \"well over 20 min\" using a cane 4/4 MET   SLS either leg x 5 secs       Reports pain no greater than 1-2/10 when it does occur.  4/10 4/11 ongoing   Improve Modified Oswestry to 10 14/50 4/4 ongoing   Independent with HEP for flexibility and core/hip stability.  focusing on hip stability 4/4 ongoing         Assessment/Plan     ASSESSMENT: Pt exhibits early muscular fatigue onset and functional RLE > LLE weakness which makes static and dynamic standing balance tasks challenging for him. He is especially challenged under EC conditions which is likely attributable in part d/t the neuropathy in his feet.     PLAN: Static/dynamic standing balance and NMR activities. Continue to work on flexibility of hips and thoracic and progress with core and hip stability.     SIGNATURE: Judy Nowak, [place], [place] License #: [id number]  Electronically Signed on 4/18/2023        [place] [address]  [phone]  "

## 2023-04-21 ENCOUNTER — TREATMENT (OUTPATIENT)
Dept: PHYSICAL THERAPY | Facility: CLINIC | Age: 78
End: 2023-04-21
Payer: MEDICARE

## 2023-04-21 DIAGNOSIS — M54.50 ACUTE BILATERAL LOW BACK PAIN WITHOUT SCIATICA: Primary | ICD-10-CM

## 2023-04-21 DIAGNOSIS — E03.9 ACQUIRED HYPOTHYROIDISM: ICD-10-CM

## 2023-04-21 DIAGNOSIS — Z98.1 S/P LUMBAR FUSION: ICD-10-CM

## 2023-04-21 DIAGNOSIS — R33.9 RETENTION OF URINE: Primary | ICD-10-CM

## 2023-04-21 RX ORDER — TERAZOSIN 5 MG/1
5 CAPSULE ORAL NIGHTLY
Qty: 30 CAPSULE | Refills: 2 | Status: SHIPPED | OUTPATIENT
Start: 2023-04-21

## 2023-04-21 NOTE — PROGRESS NOTES
Physical Therapy Treatment Note  [address], [place] [address]    Patient: Sonu Bravo Jr.                                                 Visit Date: 2023  :     1945    Referring practitioner:    Vaughn Kramer DO  Date of Initial Visit:          Type: THERAPY  Noted: 3/2/2023    Patient seen for 9 sessions    Visit Diagnoses:    ICD-10-CM ICD-9-CM   1. Acute bilateral low back pain without sciatica  M54.50 724.2     338.19   2. S/P lumbar fusion  Z98.1 V45.4     SUBJECTIVE     Subjective He states his pain is about the same, his pain is across the low back. He uses the walker in public or he uses his cane.  He doesn't use the walker at home.  He feels like he needs to work on his balance more.     PAIN: 2/10          OBJECTIVE     Objective     Therapeutic Exercises    98683 Units Comments   At the parallel bars hip abduction  2 x 10    At the parallel bars SLS X 4 each    Sit to stand  2 x 5    Sit to stand at lower surface 2 x 5    Sidestepping in smith  Cga x 2   Walking without walker  Cga x 2             Timed Minutes 30     Neuromuscular Reeducation     05754 Comments   Tandem standing eyes open Cga x 2   Tandem stand eyes closed Cga x 2   Standing cone taps for hip flexion and abduction Cga x 2           Timed Minutes 15       Therapy Education/Self Care 33100   Education offered today HEP below.  Work on standing exercises in front of a mirror in the bathroom.   [employer] Code [id number]; [email or web address]   Ongoing HEP     Date: 2023  Prepared by: Spike Ramirez    Exercises  Supine Piriformis Stretch with Foot on Ground - 2 x daily - 7 x weekly - 2 sets - 30 hold  Supine Hip External Rotation - 2 x daily - 7 x weekly - 2 sets - 10 reps  Standing Hip Flexor Stretch - 2 x daily - 7 x weekly - 2 sets - 10 reps  Bird Dog on Counter - 2 x daily - 7 x weekly - 2 sets - 10 reps  Standing Hip Abduction with Counter Support - 10  "x daily - 7 x weekly - 2 sets - 10 reps - 10 hold      Date: 04/18/2023  Prepared by: Judy Nowak    Exercises  - Narrow Stance with Counter Support  - 2-3 x daily - 7 x weekly - 2 reps - 30 sec hold  - Standing Tandem Balance with Counter Support  - 2-3 x daily - 7 x weekly - 2 reps - 30 sec hold  - Standing Single Leg Stance with Unilateral Counter Support  - 2-3 x daily - 7 x weekly - 2 reps - 30 sec hold     Timed Minutes        Total Timed Treatment:     45   mins  Total Time of Visit:             45   mins    ASSESSMENT/PLAN       GOALS:  Goals                                                  Progress Note due by 5/4/23                                                              Recert due by 5/30/23   LTG by: 6 weeks Comments Date Status   Improve  bilateral passive hip ext to 10 deg  10+ deg 4/4 MET    Able to wash and dry self in shower independently  his wife helps him dry his legs 4/4 progressing   Able to walk 20 minutes without exacerbation of pain  able to walk \"well over 20 min\" using a cane 4/4 MET   SLS either leg x 5 secs       Reports pain no greater than 1-2/10 when it does occur.  2/10 today 4/21 ongoing   Improve Modified Oswestry to 10 14/50 4/4 ongoing   Independent with HEP for flexibility and core/hip stability.  focusing on hip stability 4/4 ongoing         Assessment/Plan     ASSESSMENT: Patient requires several rests after standing exercises due to fatigue in the LE's. He tends to ambulate with B knee flexion.  He has difficulty with his balance today with tandem standing with his eyes closed.  The more he practices the exercise the more he improves.  He will work on standing exercises in front of a mirror in the so he has visual cues on how to correct his posture.     PLAN: Static/dynamic standing balance and NMR activities. Continue to work on flexibility of hips and thoracic and progress with core and hip stability.     SIGNATURE: Sharon Worley PTA, CLT-EDI, [place] License #: " [id number]  Electronically Signed on 4/21/2023        [address] [place] [address]  [phone]

## 2023-04-24 DIAGNOSIS — E03.9 ACQUIRED HYPOTHYROIDISM: ICD-10-CM

## 2023-04-24 RX ORDER — LEVOTHYROXINE SODIUM 112 UG/1
112 TABLET ORAL DAILY
Qty: 90 TABLET | Refills: 1 | OUTPATIENT
Start: 2023-04-24

## 2023-04-24 NOTE — TELEPHONE ENCOUNTER
Caller: LawrenceSonu Jr.    Relationship: Self    Best call back number: [phone]    Requested Prescriptions:   Requested Prescriptions     Pending Prescriptions Disp Refills   • levothyroxine (SYNTHROID, LEVOTHROID) 112 MCG tablet 90 tablet 1     Sig: Take 1 tablet by mouth Daily. Indications: Underactive Thyroid        Pharmacy where request should be sent: [facility] - [facility] [address] - [phone] [facility] [phone]      Last office visit with prescribing clinician: 2/24/2023   Last telemedicine visit with prescribing clinician: 9/11/2023   Next office visit with prescribing clinician: 9/11/2023     Additional details provided by patient: A FEW DAYS LEFT    Does the patient have less than a 3 day supply:  [] Yes  [x] No    Would you like a call back once the refill request has been completed: [] Yes [] No    If the office needs to give you a call back, can they leave a voicemail: [] Yes [] No    Elan Crow Rep   04/24/23 14:31 CDT

## 2023-04-26 ENCOUNTER — TREATMENT (OUTPATIENT)
Dept: PHYSICAL THERAPY | Facility: CLINIC | Age: 78
End: 2023-04-26
Payer: MEDICARE

## 2023-04-26 DIAGNOSIS — Z98.1 S/P LUMBAR FUSION: ICD-10-CM

## 2023-04-26 DIAGNOSIS — M54.50 ACUTE BILATERAL LOW BACK PAIN WITHOUT SCIATICA: Primary | ICD-10-CM

## 2023-04-26 DIAGNOSIS — E03.9 ACQUIRED HYPOTHYROIDISM: ICD-10-CM

## 2023-04-26 RX ORDER — LEVOTHYROXINE SODIUM 112 UG/1
112 TABLET ORAL DAILY
Qty: 90 TABLET | Refills: 1 | Status: SHIPPED | OUTPATIENT
Start: 2023-04-26

## 2023-04-26 NOTE — PROGRESS NOTES
Physical Therapy Treatment Note  [address] [place] [address]    Patient: Sonu Bravo Jr.                                                 Visit Date: 2023  :     1945    Referring practitioner:    Vaughn Kramer DO  Date of Initial Visit:          Type: THERAPY  Noted: 3/2/2023    Patient seen for 10 sessions    Visit Diagnoses:    ICD-10-CM ICD-9-CM   1. Acute bilateral low back pain without sciatica  M54.50 724.2     338.19     SUBJECTIVE     Subjective His L shoulder started bothering him after Friday and can't attribute it to any specific activity. He reports his back is good a little discomfort in his low back. He states his issues are weakness in the legs and his balance.     PAIN: 2/10         OBJECTIVE     Objective      Manual Therapy     71931  Comments   STM B lower lumbar region L SL                   Timed Minutes 15        Therapeutic Exercises    23216 Units Comments   Unicam seat# 5 res 2 x 6 min     Resisted gait with TRX Rip Trainer x 40 ft x 8 with Rwx      Walking with #15 vest x 410 ft     Sit stands with #15 vest x 15 with table at lowest setting     Walking with Rwx 190 ft     Timed Minutes 30       Therapy Education/Self Care 74065   Education offered today    [facility] Code [email or web address]; [id number]   Ongoing HEP   Date: 2023  Prepared by: Spike Ramirez     Exercises  Supine Piriformis Stretch with Foot on Ground - 2 x daily - 7 x weekly - 2 sets - 30 hold  Supine Hip External Rotation - 2 x daily - 7 x weekly - 2 sets - 10 reps  Standing Hip Flexor Stretch - 2 x daily - 7 x weekly - 2 sets - 10 reps  Bird Dog on Counter - 2 x daily - 7 x weekly - 2 sets - 10 reps  Standing Hip Abduction with Counter Support - 10 x daily - 7 x weekly - 2 sets - 10 reps - 10 hold        Date: 2023  Prepared by: Judy Nowak     Exercises  - Narrow Stance with Counter Support  - 2-3 x daily - 7 x weekly - 2 reps -  "30 sec hold  - Standing Tandem Balance with Counter Support  - 2-3 x daily - 7 x weekly - 2 reps - 30 sec hold  - Standing Single Leg Stance with Unilateral Counter Support  - 2-3 x daily - 7 x weekly - 2 reps - 30 sec hold      Timed Minutes        Total Timed Treatment:     45  mins  Total Time of Visit:             45   mins         ASSESSMENT/PLAN     GOALS  Goals                                                  Progress Note due by 5/4/23                                                              Recert due by 5/30/23   LTG by: 6 weeks Comments Date Status   Improve  bilateral passive hip ext to 10 deg  10+ deg 4/4 MET    Able to wash and dry self in shower independently  his wife helps him dry his legs 4/4 progressing   Able to walk 20 minutes without exacerbation of pain  able to walk \"well over 20 min\" using a cane 4/4 MET   SLS either leg x 5 secs         Reports pain no greater than 1-2/10 when it does occur.  2/10 today 4/21 ongoing   Improve Modified Oswestry to 10 14/50 4/4 ongoing   Independent with HEP for flexibility and core/hip stability. Reinforced today 4/26 ongoing         Assessment/Plan     ASSESSMENT: He struggled with the last six reps sit<>stands from a low surface with the weighted vest. I was a little surprised that he is using a Rwx for long distances but I think it's more due to a lack of confidence at this point.     PLAN: Continue to challenge his balance as well as increase the functional strengthening activities.    SIGNATURE: Ruiz Frias PTA, [place] License #: [id number]  Electronically Signed on 4/26/2023        [address] [place] [address]. [address]  [phone]  "

## 2023-04-26 NOTE — TELEPHONE ENCOUNTER
PATIENT HAS CALLED, HE STATED THAT HIS LEVOTHYROXINE WENT TO [facility] AND IT NEEDS TO BE SENT TO [facility].

## 2023-04-28 ENCOUNTER — TREATMENT (OUTPATIENT)
Dept: PHYSICAL THERAPY | Facility: CLINIC | Age: 78
End: 2023-04-28
Payer: MEDICARE

## 2023-04-28 DIAGNOSIS — M54.50 ACUTE BILATERAL LOW BACK PAIN WITHOUT SCIATICA: Primary | ICD-10-CM

## 2023-04-28 NOTE — PROGRESS NOTES
"                                                                Physical Therapy Treatment Note  [address] [place] [address]    Patient: Sonu Bravo Jr.                                                 Visit Date: 2023  :     1945    Referring practitioner:    Vaughn Kramer DO  Date of Initial Visit:          Type: THERAPY  Noted: 3/2/2023    Patient seen for 11 sessions    Visit Diagnoses:    ICD-10-CM ICD-9-CM   1. Acute bilateral low back pain without sciatica  M54.50 724.2     338.19     SUBJECTIVE     Subjective Nothing new to report. He felt really good after all the strengthening things he did with Ruiz last time.    PAIN: 0/10         OBJECTIVE     Objective       Neuromuscular Reeducation     53712 Comments   Ball side BOSU lunges limiting UE support/a 2*10 B   Timed Minutes 8     Therapeutic Exercises    78885 Units Comments   Flat side BOSU squats limiting UE support/a 2*10    Resisted 3 way SLR limiting UE support/a 10 B Red TB   ecc floor taps 2 stacked theradiscs 10 B  limiting UE support/a   Resisted gait with TRX Rip Trainer x 40 ft x 8 with Rwx      2# Ankle weight on B LEs, stepping onto 8\" step limiting UE support/a 10    Timed Minutes 37       Therapy Education/Self Care 77651   Education offered today    [facility] Code [id number]; [id number]   Ongoing HEP   Date: 2023  Prepared by: Spike Ramirez     Exercises  Supine Piriformis Stretch with Foot on Ground - 2 x daily - 7 x weekly - 2 sets - 30 hold  Supine Hip External Rotation - 2 x daily - 7 x weekly - 2 sets - 10 reps  Standing Hip Flexor Stretch - 2 x daily - 7 x weekly - 2 sets - 10 reps  Bird Dog on Counter - 2 x daily - 7 x weekly - 2 sets - 10 reps  Standing Hip Abduction with Counter Support - 10 x daily - 7 x weekly - 2 sets - 10 reps - 10 hold     Date: 2023  Prepared by: Judy Nowak     Exercises  - Narrow Stance with Counter Support  - 2-3 x daily - 7 x weekly - 2 reps - 30 sec hold  - " "Standing Tandem Balance with Counter Support  - 2-3 x daily - 7 x weekly - 2 reps - 30 sec hold  - Standing Single Leg Stance with Unilateral Counter Support  - 2-3 x daily - 7 x weekly - 2 reps - 30 sec hold      Timed Minutes        Total Timed Treatment:     45  mins  Total Time of Visit:             45   mins         ASSESSMENT/PLAN     GOALS  Goals                                                  Progress Note due by 5/4/23                                                              Recert due by 5/30/23   LTG by: 6 weeks Comments Date Status   Improve  bilateral passive hip ext to 10 deg  10+ deg 4/4 MET    Able to wash and dry self in shower independently  his wife helps him dry his legs 4/4 progressing   Able to walk 20 minutes without exacerbation of pain  able to walk \"well over 20 min\" using a cane 4/4 MET   SLS either leg x 5 secs  addressed w/ NMR activities today  4/28 progressing   Reports pain no greater than 1-2/10 when it does occur.  2/10 today 4/21 ongoing   Improve Modified Oswestry to 10 14/50 4/4 ongoing   Independent with HEP for flexibility and core/hip stability. Reinforced today 4/26 ongoing         Assessment/Plan     ASSESSMENT: Pt has difficulty w/ activities involving SLS limiting UE support/a d/t functional hip weakness which will cont to be addressed in subsequent sessions.     PLAN: Continue to challenge his balance as well as increase the functional strengthening activities.    SIGNATURE: Judy Nowak, [place], [place] License #: [id number]  Electronically Signed on 4/28/2023        [place] [address]  [phone]  "

## 2023-05-02 ENCOUNTER — TREATMENT (OUTPATIENT)
Dept: PHYSICAL THERAPY | Facility: CLINIC | Age: 78
End: 2023-05-02
Payer: MEDICARE

## 2023-05-02 DIAGNOSIS — M54.50 ACUTE BILATERAL LOW BACK PAIN WITHOUT SCIATICA: Primary | ICD-10-CM

## 2023-05-02 NOTE — PROGRESS NOTES
"                                                                Physical Therapy Treatment Note  [address] [place] [address]    Patient: Sonu Bravo Jr.                                                 Visit Date: 2023  :     1945    Referring practitioner:    Vaughn Kramer DO  Date of Initial Visit:          Type: THERAPY  Noted: 3/2/2023    Patient seen for 12 sessions    Visit Diagnoses:    ICD-10-CM ICD-9-CM   1. Acute bilateral low back pain without sciatica  M54.50 724.2     338.19     SUBJECTIVE     Subjective Nothing new to report.    PAIN: 0/10         OBJECTIVE     Objective       Neuromuscular Reeducation     [id number] Comments   3 way SLR limited UE support Emphasis on SLS   Ball side BOSU lunges limiting UE support/a 2*10 B   Timed Minutes 15     Therapeutic Exercises    06411 Units Comments   Flat side BOSU squats limiting UE support/a 2*10    ecc floor taps 2 stacked theradiscs 10 B  limiting UE support/a   Resisted gait with TRX Rip Trainer x 40 ft x 8 with Rwx      2# Ankle weight on B LEs, stepping onto 8\" step limiting UE support/a 10    Timed Minutes 30       Therapy Education/Self Care 18683   Education offered today    [facility] Code [id number]; [id number]   Ongoing HEP   Date: 2023  Prepared by: Spike Ramirez     Exercises  Supine Piriformis Stretch with Foot on Ground - 2 x daily - 7 x weekly - 2 sets - 30 hold  Supine Hip External Rotation - 2 x daily - 7 x weekly - 2 sets - 10 reps  Standing Hip Flexor Stretch - 2 x daily - 7 x weekly - 2 sets - 10 reps  Bird Dog on Counter - 2 x daily - 7 x weekly - 2 sets - 10 reps  Standing Hip Abduction with Counter Support - 10 x daily - 7 x weekly - 2 sets - 10 reps - 10 hold     Date: 2023  Prepared by: Judy Nowak     Exercises  - Narrow Stance with Counter Support  - 2-3 x daily - 7 x weekly - 2 reps - 30 sec hold  - Standing Tandem Balance with Counter Support  - 2-3 x daily - 7 x weekly - 2 reps - 30 sec hold  - " "Standing Single Leg Stance with Unilateral Counter Support  - 2-3 x daily - 7 x weekly - 2 reps - 30 sec hold      Timed Minutes        Total Timed Treatment:     45  mins  Total Time of Visit:             45   mins         ASSESSMENT/PLAN     GOALS  Goals                                                  Progress Note due by 5/4/23                                                              Recert due by 5/30/23   LTG by: 6 weeks Comments Date Status   Improve  bilateral passive hip ext to 10 deg  10+ deg 4/4 MET   Able to wash and dry self in shower independently  his wife helps him dry his legs 4/4 progressing   Able to walk 20 minutes without exacerbation of pain  able to walk \"well over 20 min\" using a cane 4/4 MET   SLS either leg x 5 secs  addressed w/ NMR activities today  4/28 progressing   Reports pain no greater than 1-2/10 when it does occur.  0/10 today 5/2 ongoing   Improve Modified Oswestry to 10 14/50 4/4 ongoing   Independent with HEP for flexibility and core/hip stability. Reinforced today 4/26 ongoing         Assessment/Plan     ASSESSMENT: Pt has difficulty w/ activities involving SLS so we focused on SLS w/ 3 way kicks with emphasis on maintaining SLS. We also focused on dynamic balance and strengthening exercises which he tolerated well.     PLAN: Continue to challenge his balance as well as increase the functional strengthening activities.    SIGNATURE: Chanelle Dixon PT Student  Electronically Signed on 5/2/2023    The clinical instructor and/or supervising staff, Spike Ramirez PT, was present in clinic guiding the visit by approving, concurring, and confirming the skilled judgement for all services rendered.    Signature:  Spike Ramirez PT, [place] License #: [id number]  Electronically signed on 5/2/2023        [facility] [address]  [phone]  "

## 2023-05-04 ENCOUNTER — TREATMENT (OUTPATIENT)
Dept: PHYSICAL THERAPY | Facility: CLINIC | Age: 78
End: 2023-05-04
Payer: MEDICARE

## 2023-05-04 DIAGNOSIS — M54.50 ACUTE BILATERAL LOW BACK PAIN WITHOUT SCIATICA: Primary | ICD-10-CM

## 2023-05-04 NOTE — PROGRESS NOTES
"                                                                Physical Therapy Treatment Note and 30 Day Progress Note  [address] [place] [address]    Patient: Sonu Bravo Jr.                                                 Visit Date: 2023  :     1945    Referring practitioner:    Vaughn Kramer DO  Date of Initial Visit:          Type: THERAPY  Noted: 3/2/2023    Patient seen for 13 sessions    Visit Diagnoses:    ICD-10-CM ICD-9-CM   1. Acute bilateral low back pain without sciatica  M54.50 724.2     338.19     SUBJECTIVE     Subjective Nothing new to report, he reports legs feel a little weak.     PAIN: 0/10         OBJECTIVE     Objective         Therapeutic Exercises    51314 Units Comments   Ball side BOSU lunges limiting UE support 2*10    ecc floor taps 2 stacked theradiscs 10 B  limiting UE support/a   Slider lunges (abduction/extension) X 10     Squats standing on airex 2 x 10  No UE support    stepping onto 12\" step limiting UE support/a 2 x10    Timed Minutes 23        Therapeutic Activities    43011 Comments   Shuttle BL squats 6 cords    Shuttle UL squats 4 cords    Bike Level 3             Timed Minutes 23     Therapy Education/Self Care 14795   Education offered today    [facility] Code [id number]; [id number]   Ongoing HEP   Date: 2023  Prepared by: Spike Ramirez     Exercises  Supine Piriformis Stretch with Foot on Ground - 2 x daily - 7 x weekly - 2 sets - 30 hold  Supine Hip External Rotation - 2 x daily - 7 x weekly - 2 sets - 10 reps  Standing Hip Flexor Stretch - 2 x daily - 7 x weekly - 2 sets - 10 reps  Bird Dog on Counter - 2 x daily - 7 x weekly - 2 sets - 10 reps  Standing Hip Abduction with Counter Support - 10 x daily - 7 x weekly - 2 sets - 10 reps - 10 hold     Date: 2023  Prepared by: Judy Nowak     Exercises  - Narrow Stance with Counter Support  - 2-3 x daily - 7 x weekly - 2 reps - 30 sec hold  - Standing Tandem Balance with Counter Support  " "- 2-3 x daily - 7 x weekly - 2 reps - 30 sec hold  - Standing Single Leg Stance with Unilateral Counter Support  - 2-3 x daily - 7 x weekly - 2 reps - 30 sec hold      Timed Minutes        Total Timed Treatment:     46  mins  Total Time of Visit:             46   mins         ASSESSMENT/PLAN     GOALS  Goals                                                  Progress Note due by 6/4/23                                                              Recert due by 5/30/23   LTG by: 6 weeks Comments Date Status   Improve  bilateral passive hip ext to 10 deg  10+ deg 4/4 MET   Able to wash and dry self in shower independently  He is able to do this independently, he has grab bars now 5/4 MET   Able to walk 20 minutes without exacerbation of pain  able to walk \"well over 20 min\" using a cane 4/4 MET   SLS either leg x 5 secs  2 sec both legs 5/4 progressing   Reports pain no greater than 1-2/10 when it does occur.  0/10 pain usually, sometimes the lower back or knee will act up but not as frequently 5/4 progressing   Improve Modified Oswestry to 10 5/50 5/4 MET   Independent with HEP for flexibility and core/hip stability. He has been compliant with HEP 5/4 ongoing         Assessment & Plan     Assessment  Impairments: abnormal muscle firing, abnormal or restricted ROM, impaired balance, impaired physical strength and pain with function  Functional Limitations: carrying objects, lifting, walking, pulling, pushing, sitting, standing and unable to perform repetitive tasksPrognosis: good    Plan  Planned modality interventions: cryotherapy, iontophoresis, low level laser therapy, TENS, thermotherapy (hydrocollator packs), traction, ultrasound and electrical stimulation/Russian stimulation  Planned therapy interventions: abdominal trunk stabilization, manual therapy, balance/weight-bearing training, flexibility, functional ROM exercises, home exercise program, joint mobilization, soft tissue mobilization, spinal/joint " mobilization, strengthening, stretching, therapeutic activities, postural training, gait training and neuromuscular re-education  Frequency: 2x week  Duration in weeks: 8  Treatment plan discussed with: patient         ASSESSMENT: Today was a progress note, and overall he is doing well and progressing as planned. Pt reports he still has difficulty with balance and global LE weakness. He is now able to perform most ADLs w/ no assistance, he just feels like his legs are weak most of the time. He still has difficulty maintaining SLS, which is something we need to work on. Today we focused on closed chain activities with emphasis on balance and strengthening. We also incorporated a lot of endurance and targeting the quads today since he reports a lot of weakness in both thighs. He reports feeling really good after treatment. Pt should continue w/ skilled physical therapy to address his impairments in order to achieve functional independence.       PLAN: Continue to challenge his balance as well as increase the functional strengthening activities.    SIGNATURE: Chanelle Dixon PT Student  Electronically Signed on 5/4/2023    The clinical instructor and/or supervising staff, Spike Ramirez PT, was present in clinic guiding the visit by approving, concurring, and confirming the skilled judgement for all services rendered.    Signature:  Spike Ramirez PT, [place] License #: [id number]  Electronically signed on 5/4/2023          [place]. [id number]  [phone]

## 2023-05-09 ENCOUNTER — TREATMENT (OUTPATIENT)
Dept: PHYSICAL THERAPY | Facility: CLINIC | Age: 78
End: 2023-05-09
Payer: MEDICARE

## 2023-05-09 DIAGNOSIS — M54.50 ACUTE BILATERAL LOW BACK PAIN WITHOUT SCIATICA: Primary | ICD-10-CM

## 2023-05-09 NOTE — PROGRESS NOTES
"                                                                Physical Therapy Treatment Note  [address] [place] [address]    Patient: Sonu Bravo Jr.                                                 Visit Date: 2023  :     1945    Referring practitioner:    Vaughn Kramer DO  Date of Initial Visit:          Type: THERAPY  Noted: 3/2/2023    Patient seen for 14 sessions    Visit Diagnoses:    ICD-10-CM ICD-9-CM   1. Acute bilateral low back pain without sciatica  M54.50 724.2     338.19     SUBJECTIVE     Subjective Nothing new to report, he reports feeling sore after last visit    PAIN: 3-4/10 > 1/10          OBJECTIVE     Objective       Manual Therapy     59931  Comments   IASTM w/ blue ridged roller        Timed Minutes 15        Therapeutic Exercises    28182 Units Comments   ecc floor taps 2 stacked theradiscs 10 B  limiting UE support/a   Slider lunges (abduction/extension) X 10     Shuttle BL squat 6 cords     Shuttle UL squat 4 cords     stepping onto 12\" step limiting UE support/a 2 x10    Timed Minutes 28          Therapy Education/Self Care 08473   Education offered today    [facility] Code [id number]; [id number]   Ongoing HEP   Date: 2023  Prepared by: Spike Ramirez     Exercises  Supine Piriformis Stretch with Foot on Ground - 2 x daily - 7 x weekly - 2 sets - 30 hold  Supine Hip External Rotation - 2 x daily - 7 x weekly - 2 sets - 10 reps  Standing Hip Flexor Stretch - 2 x daily - 7 x weekly - 2 sets - 10 reps  Bird Dog on Counter - 2 x daily - 7 x weekly - 2 sets - 10 reps  Standing Hip Abduction with Counter Support - 10 x daily - 7 x weekly - 2 sets - 10 reps - 10 hold     Date: 2023  Prepared by: Judy Nowak     Exercises  - Narrow Stance with Counter Support  - 2-3 x daily - 7 x weekly - 2 reps - 30 sec hold  - Standing Tandem Balance with Counter Support  - 2-3 x daily - 7 x weekly - 2 reps - 30 sec hold  - Standing Single Leg Stance with Unilateral Counter " "Support  - 2-3 x daily - 7 x weekly - 2 reps - 30 sec hold      Timed Minutes        Total Timed Treatment:     43  mins  Total Time of Visit:             43   mins         ASSESSMENT/PLAN     GOALS  Goals                                                  Progress Note due by 6/4/23                                                              Recert due by 5/30/23   LTG by: 6 weeks Comments Date Status   Improve  bilateral passive hip ext to 10 deg  10+ deg 4/4 MET   Able to wash and dry self in shower independently  He is able to do this independently, he has grab bars now 5/4 MET   Able to walk 20 minutes without exacerbation of pain  able to walk \"well over 20 min\" using a cane 4/4 MET   SLS either leg x 5 secs  2 sec both legs 5/4 progressing   Reports pain no greater than 1-2/10 when it does occur.  0/10 pain usually, sometimes the lower back or knee will act up but not as frequently 5/4 progressing   Improve Modified Oswestry to 10 5/50 5/4 MET   Independent with HEP for flexibility and core/hip stability. He has been compliant with HEP 5/4 ongoing         Assessment/Plan     ASSESSMENT: We focused on closed chain strengthening activities and dynamic balance activities. He did better with slider lunges demonstrating good muscular control w/ minimal UE use. Overall, pt is progressing well towards all his goals at this time.     PLAN: Continue to challenge his balance as well as increase the functional strengthening activities.    SIGNATURE: Chanelle Dixon, PT Student  Electronically Signed on 5/9/2023    The clinical instructor and/or supervising staff, Spike Ramirez PT, was present in clinic guiding the visit by approving, concurring, and confirming the skilled judgement for all services rendered.    Signature:  Spike Ramirez PT, [place] License #: [id number]  Electronically signed on 5/9/2023        [place]. [id number]  [phone]  "

## 2023-05-11 ENCOUNTER — TREATMENT (OUTPATIENT)
Dept: PHYSICAL THERAPY | Facility: CLINIC | Age: 78
End: 2023-05-11
Payer: MEDICARE

## 2023-05-11 DIAGNOSIS — Z98.1 S/P LUMBAR FUSION: ICD-10-CM

## 2023-05-11 DIAGNOSIS — M54.50 ACUTE BILATERAL LOW BACK PAIN WITHOUT SCIATICA: Primary | ICD-10-CM

## 2023-05-11 NOTE — PROGRESS NOTES
"                                                                Physical Therapy Treatment Note  [address] [place] [address]    Patient: Sonu Bravo Jr.                                                 Visit Date: 2023  :     1945    Referring practitioner:    Vaughn Kramer DO  Date of Initial Visit:          Type: THERAPY  Noted: 3/2/2023    Patient seen for 15 sessions    Visit Diagnoses:    ICD-10-CM ICD-9-CM   1. Acute bilateral low back pain without sciatica  M54.50 724.2     338.19   2. S/P lumbar fusion  Z98.1 V45.4     SUBJECTIVE     Subjective He reports about a week ago he started hurting along the top of his hips and sometimes \" it runs a trigger point down the R leg\"     PAIN: 2/10         OBJECTIVE     Objective      Therapeutic Exercises    75262 Units Comments   Unicam seat # 6 res 2 x 6 min     Walking with #18 vest x 410 ft     Ascend/descend stair model both sides with #18 vest x 3     Walking with SC X 240 ft          Timed Minutes 20        Manual Therapy     21316  Comments   STM B lower lumbar region L SL   DFR/TMR B unilateral piriformis prone               Timed Minutes 23        Therapy Education/Self Care 32276   Education offered today    [facility] Code [id number]; [id number]   Ongoing HEP   Date: 2023  Prepared by: Spike Ramirez     Exercises  Supine Piriformis Stretch with Foot on Ground - 2 x daily - 7 x weekly - 2 sets - 30 hold  Supine Hip External Rotation - 2 x daily - 7 x weekly - 2 sets - 10 reps  Standing Hip Flexor Stretch - 2 x daily - 7 x weekly - 2 sets - 10 reps  Bird Dog on Counter - 2 x daily - 7 x weekly - 2 sets - 10 reps  Standing Hip Abduction with Counter Support - 10 x daily - 7 x weekly - 2 sets - 10 reps - 10 hold     Date: 2023  Prepared by: Judy Nowak     Exercises  - Narrow Stance with Counter Support  - 2-3 x daily - 7 x weekly - 2 reps - 30 sec hold  - Standing Tandem Balance with Counter Support  - 2-3 x daily - 7 x " "weekly - 2 reps - 30 sec hold  - Standing Single Leg Stance with Unilateral Counter Support  - 2-3 x daily - 7 x weekly - 2 reps - 30 sec hold      Timed Minutes        Total Timed Treatment:     43   mins  Total Time of Visit:             43   mins         ASSESSMENT/PLAN     GOALS    Goals                                                  Progress Note due by 6/4/23                                                              Recert due by 5/30/23   LTG by: 6 weeks Comments Date Status   Improve  bilateral passive hip ext to 10 deg  10+ deg 4/4 MET   Able to wash and dry self in shower independently  He is able to do this independently, he has grab bars now 5/4 MET   Able to walk 20 minutes without exacerbation of pain  able to walk \"well over 20 min\" using a cane 4/4 MET   SLS either leg x 5 secs  2 sec both legs 5/4 progressing   Reports pain no greater than 1-2/10 when it does occur.  0/10 pain usually, sometimes the lower back or knee will act up but not as frequently 5/4 progressing   Improve Modified Oswestry to 10 5/50 5/4 MET   Independent with HEP for flexibility and core/hip stability. He has been compliant with HEP 5/4 ongoing       Assessment/Plan     ASSESSMENT: He walks well with a SC and I advised him to use one more with community ambulation.    PLAN: Continue strength and conditioning training, consider outdoor ambulation with a SC to boost his confidence.    SIGNATURE: Ruiz Frias [facility], [place] License #: [id number]  Electronically Signed on 5/11/2023        [address] [place] [address]. [id number]  [phone]  "

## 2023-05-11 NOTE — PROGRESS NOTES
Subjective    Mr. Bravo is 77 y.o. male    Chief Complaint: Urinary Retention    History of Present Illness  Patient here for follow-up he has history of postoperative urinary retention I saw him in follow-up after catheter was removed and he was voiding well on the 5 mg Terazosin.  At t his previous visit his bladder scan 0 mL.  He has noticed he has been having more urgency and leaking episodes per tickly at nighttime and he thought it was being caused by the Maguire and so he has not taken it daily.  He denies any pain or discomfort in his suprapubic or bladder area.    The following portions of the patient's history were reviewed and updated as appropriate: allergies, current medications, past family history, past medical history, past social history, past surgical history and problem list.    Review of Systems   Genitourinary:  Positive for difficulty urinating, frequency and urgency.       Current Outpatient Medications:     acetaminophen (TYLENOL) 325 MG tablet, Take 2 tablets by mouth As Needed. Indications: Fever, Pain, Disp: , Rfl:     apixaban (Eliquis) 5 MG tablet tablet, Take 1 tablet by mouth 2 (Two) Times a Day., Disp: 180 tablet, Rfl: 1    bisoprolol (ZEBeta) 5 MG tablet, Take 1 tablet by mouth Daily. Indications: High Blood Pressure Disorder, Disp: 90 tablet, Rfl: 1    Cholecalciferol 100 MCG (4000 UT) capsule, Take 4,000 Units by mouth Daily. Indications: Vitamin D Deficiency, Disp: , Rfl:     coenzyme Q10 100 MG capsule, Take 1 capsule by mouth Daily. Indications: Heart Rhythm Disorder, Disp: , Rfl:     Cyanocobalamin 2500 MCG sublingual tablet, Take 2,500 mcg by mouth Daily. Indications: Inadequate Vitamin B12, Disp: , Rfl:     fexofenadine (ALLEGRA) 180 MG tablet, Take 1 tablet by mouth Daily. Indications: Hayfever, Disp: , Rfl:     fluticasone (FLONASE) 50 MCG/ACT nasal spray, 1 spray into the nostril(s) as directed by provider Daily., Disp: 32 g, Rfl: 3    levothyroxine (SYNTHROID, LEVOTHROID)  112 MCG tablet, Take 1 tablet by mouth Daily. Indications: Underactive Thyroid, Disp: 90 tablet, Rfl: 1    losartan (Cozaar) 25 MG tablet, Take 1 tablet by mouth Daily., Disp: 90 tablet, Rfl: 1    multivitamin with minerals tablet tablet, Take 1 tablet by mouth Daily. Indications: Vitamin Deficiency, Disp: , Rfl:     omeprazole (priLOSEC) 20 MG capsule, Take 1 capsule by mouth Daily., Disp: 90 capsule, Rfl: 3    rosuvastatin (CRESTOR) 20 MG tablet, Take 1 tablet by mouth Daily., Disp: 90 tablet, Rfl: 3    sennosides-docusate (senna-docusate sodium) 8.6-50 MG per tablet, Take 2 tablets by mouth Daily., Disp: 60 tablet, Rfl: 0    terazosin (HYTRIN) 5 MG capsule, TAKE 1 CAPSULE BY MOUTH EVERY NIGHT., Disp: 30 capsule, Rfl: 2    Zinc 50 MG capsule, Take 1 capsule by mouth Daily. Indications: Zinc Deficiency, Disp: , Rfl:     alfuzosin (UROXATRAL) 10 MG 24 hr tablet, Take 1 tablet by mouth Daily., Disp: 30 tablet, Rfl: 2    Past Medical History:   Diagnosis Date    Arthritis     Atrial fibrillation 08/31/2021    Cataracts, bilateral     Disease of thyroid gland     Diverticulitis     GERD (gastroesophageal reflux disease)     Hyperlipidemia     Hypertension     Mononucleosis 1964       Past Surgical History:   Procedure Laterality Date    ANTERIOR CERVICAL DISCECTOMY W/ FUSION N/A 10/11/2022    Procedure: CERVICAL DISCECTOMY ANTERIOR WITH FUSION, Cervical 4/5 and 5/6;  Surgeon: Durga Cifuentes MD;  Location: [facility] OR;  Service: Neurosurgery;  Laterality: N/A;    CATARACT EXTRACTION Right     COLON SURGERY  01/2022    Dr Fairbanks at [facility]    COLONOSCOPY N/A 09/28/2021    Procedure: COLONOSCOPY WITH ANESTHESIA;  Surgeon: Thomas Dc DO;  Location: [facility] ENDOSCOPY;  Service: Gastroenterology;  Laterality: N/A;  pre op; abnormal ct scan  post op: diverticulosis ; polyps   PCP: Vaughn Kramer DO    HERNIA REPAIR      LUMBAR DIRECT LATERAL INTERBODY FUSION N/A 01/24/2023    Procedure:  "LUMBAR DIRECT LATERAL INTERBODY FUSION; LUMBAR 2-5;  Surgeon: Durga Cifuentes MD;  Location:  [facility] OR;  Service: Neurosurgery;  Laterality: N/A;    LUMBAR FUSION N/A 01/24/2023    Procedure: LUMBAR DIRECT LATERAL INTERBODY FUSION; LUMBAR 2-5, LUMBAR LAMINECTOMY TRANSFORAMINAL LUMBAR INTERBODY FUSION; LUMBAR 2-5;  Surgeon: Durga Cifuentes MD;  Location:  [facility] OR;  Service: Robotics - Neuro;  Laterality: N/A;    SKIN SURGERY      Skin cancer surgery.       Social History     Socioeconomic History    Marital status:    Tobacco Use    Smoking status: Every Day     Packs/day: 0.50     Years: 52.00     Pack years: 26.00     Types: Cigarettes     Start date: 1967     Passive exposure: Current    Smokeless tobacco: Never    Tobacco comments:     down to 1/2 pack a day 2/24/2023   Vaping Use    Vaping Use: Never used   Substance and Sexual Activity    Alcohol use: Yes     Comment: rare    Drug use: Never    Sexual activity: Defer       Family History   Problem Relation Age of Onset    Lung cancer Mother     Brain cancer Mother     Heart disease Father     Hyperlipidemia Father     Hypertension Father     Prostate cancer Maternal Aunt     Colon cancer Maternal Uncle     Colon polyps Neg Hx     Esophageal cancer Neg Hx     Liver cancer Neg Hx        Objective    Temp 97.2 °F (36.2 °C)   Ht 177.8 cm (70\")   Wt 80.3 kg (177 lb)   BMI 25.40 kg/m²     Physical Exam  Vitals reviewed.   Constitutional:       Appearance: Normal appearance.   HENT:      Head: Normocephalic and atraumatic.   Pulmonary:      Effort: Pulmonary effort is normal.   Genitourinary:     Rectum: Normal.      Comments: Digital rectal exam revealed approximate 45 gm prostate with digital rectal exam it was smooth symmetric no suspicious lesions nodules or asymmetry were palpated.  Skin:     General: Skin is warm and dry.      Coloration: Skin is not pale.   Neurological:      Mental Status: He is alert and oriented to person, place, " and time.   Psychiatric:         Mood and Affect: Mood normal.         Behavior: Behavior normal.           Incomplete emptying  Over the past month, how often have you had a sensation of not emptying your bladder completely after you finish?: Less than 1 time in 5 (05/16/23 1129)  Frequency  Over the past month, how often have you had to urinate again less than two hours after you finishing urinating ?: Less than half the time (05/16/23 1129)  Intermittency  Over the past month, how often have you found you stopped and started again several time when you urinated ?: Almost always (05/16/23 1129)  Urgency  Over the last month, how difficult  have you found it to postpone urination ?: more than half the time (05/16/23 1129)  Weak Stream  Over the past month, how often have you had a weak urinary stream ?: Less than half the time (05/16/23 1129)  Straining  Over the past month, how often have you had to push or strain to begin urination ?: Less than half the time (05/16/23 1129)  Nocturia  Over the past month, how many times did you most typically get up to urinate from the time you went to bed until the time you got up in the morning ?: Less than 1 time in 5 (05/16/23 1129)  Quality of life due to urinary symptoms  If you were to spend the rest of your life with your urinary condition the way it is now, how would feel about that?: Mostly dissatified (05/16/23 1129)    Scores  Total IPSS Score: 16 (05/16/23 1129)  Total Score = Symtomatic Level: moderately symptomatic: 8-19 (05/16/23 1129)       Bladder Scan interpretation  Estimation of residual urine via abdominal ultrasound  Residual Urine: 789 ml  Indication: Urinary Retention  Position: Supine  Examination: Incremental scanning of the suprapubic area using 3 MHz transducer using copious amounts of acoustic gel.   Findings: An anechoic area was demonstrated which represented the bladder, with measurement of residual urine as noted. I inspected this myself. In that  the residual urine was stable or insignificant, no treatment will be necessary at this time.       Assessment and Plan    Diagnoses and all orders for this visit:    1. Retention of urine (Primary)  -     POC Urinalysis Dipstick, Multipro  -     alfuzosin (UROXATRAL) 10 MG 24 hr tablet; Take 1 tablet by mouth Daily.  Dispense: 30 tablet; Refill: 2  Patient who had postoperative urinary retention when I saw him his catheter had been removed and after 2-week.  He returned subjectively he was voiding fine his bladder scan was 0 mL at that time.  Comes in today he does have worsening urgency and urge incontinence particularly in the nighttime I explained to him that with a full bladder this is most likely overflow incontinence and symptoms are caused by incomplete bladder emptying.  Also stated he was not taking the 5 mg Hytrin every day.  After discussion I Lucía switch him to alfuzosin and I stressed that he must take it 1 daily.  I will have him return in 2 weeks keep closer follow-up on him repeat bladder scan and if still elevated will need to be scheduled for cystoscopy.  Said they would not want to do in and out cathing.    I did explain should he have increasing discomfort pressure or inability to void he needs to return sooner or go the ER

## 2023-05-15 ENCOUNTER — TREATMENT (OUTPATIENT)
Dept: PHYSICAL THERAPY | Facility: CLINIC | Age: 78
End: 2023-05-15
Payer: MEDICARE

## 2023-05-15 DIAGNOSIS — M54.50 ACUTE BILATERAL LOW BACK PAIN WITHOUT SCIATICA: Primary | ICD-10-CM

## 2023-05-15 DIAGNOSIS — I48.0 PAROXYSMAL ATRIAL FIBRILLATION: ICD-10-CM

## 2023-05-15 DIAGNOSIS — Z98.1 S/P LUMBAR FUSION: ICD-10-CM

## 2023-05-15 DIAGNOSIS — Z86.718 HISTORY OF DVT (DEEP VEIN THROMBOSIS): ICD-10-CM

## 2023-05-15 PROCEDURE — 97110 THERAPEUTIC EXERCISES: CPT | Performed by: PHYSICAL THERAPIST

## 2023-05-15 NOTE — PROGRESS NOTES
"                                                                Physical Therapy Treatment Note  [address] [place] [address]    Patient: Sonu Bravo Jr.                                                 Visit Date: 5/15/2023  :     1945    Referring practitioner:    Vaughn Kramer DO  Date of Initial Visit:          Type: THERAPY  Noted: 3/2/2023    Patient seen for 16 sessions    Visit Diagnoses:    ICD-10-CM ICD-9-CM   1. Acute bilateral low back pain without sciatica  M54.50 724.2     338.19   2. S/P lumbar fusion  Z98.1 V45.4     SUBJECTIVE     Subjective He reports his energy level is decent. A little pain in his \"back area\" but it's a little better than last time    PAIN: 2/10         OBJECTIVE     Objective      Therapeutic Exercises    55975 Units Comments   Unicam seat # 6 res 2 x 6 min     Walking on paved sloped surfaces, grassy sloped surfaces down front walk to back lot to culdesec back to front     B LE Shuttle Press with eccentric focus 7 bands x 6 min     B unilateral LE Shuttle Press with eccentric focus 5 bands x 6 min each     Walking with SC carrying #6 x 240 ft.      Timed Minutes 45       Therapy Education/Self Care 04068   Education offered today    [facility] Code [id number]; [id number]   Ongoing HEP   Date: 2023  Prepared by: Spike Ramirez     Exercises  Supine Piriformis Stretch with Foot on Ground - 2 x daily - 7 x weekly - 2 sets - 30 hold  Supine Hip External Rotation - 2 x daily - 7 x weekly - 2 sets - 10 reps  Standing Hip Flexor Stretch - 2 x daily - 7 x weekly - 2 sets - 10 reps  Bird Dog on Counter - 2 x daily - 7 x weekly - 2 sets - 10 reps  Standing Hip Abduction with Counter Support - 10 x daily - 7 x weekly - 2 sets - 10 reps - 10 hold     Date: 2023  Prepared by: Judy Nowak     Exercises  - Narrow Stance with Counter Support  - 2-3 x daily - 7 x weekly - 2 reps - 30 sec hold  - Standing Tandem Balance with Counter Support  - 2-3 x daily - 7 x " "weekly - 2 reps - 30 sec hold  - Standing Single Leg Stance with Unilateral Counter Support  - 2-3 x daily - 7 x weekly - 2 reps - 30 sec hold      Timed Minutes        Total Timed Treatment:     45   mins  Total Time of Visit:             45   mins         ASSESSMENT/PLAN     GOALS  Goals                                                  Progress Note due by 6/4/23                                                              Recert due by 5/30/23   LTG by: 6 weeks Comments Date Status   Improve  bilateral passive hip ext to 10 deg  10+ deg 4/4 MET   Able to wash and dry self in shower independently  He is able to do this independently, he has grab bars now 5/4 MET   Able to walk 20 minutes without exacerbation of pain  able to walk \"well over 20 min\" using a cane 4/4 MET   SLS either leg x 5 secs  2 sec both legs 5/4 progressing   Reports pain no greater than 1-2/10 when it does occur.  2/10  5/15 progressing   Improve Modified Oswestry to 10 5/50 5/4 MET   Independent with HEP for flexibility and core/hip stability. He has been compliant with HEP 5/4 ongoing         Assessment/Plan     ASSESSMENT: He did well with outdoor ambulation with his SC. He also arrived using his SC as opposed to his Rwx for community ambulation.     PLAN: Continue to challenge his balance as well as increase the functional strengthening activities.    SIGNATURE: Ruiz Frias PTA, [place] License #: [id number]  Electronically Signed on 5/15/2023        [address] [place] [address] [place] [address]  [phone]  "

## 2023-05-16 ENCOUNTER — OFFICE VISIT (OUTPATIENT)
Dept: UROLOGY | Facility: CLINIC | Age: 78
End: 2023-05-16
Payer: MEDICARE

## 2023-05-16 VITALS — TEMPERATURE: 97.2 F | BODY MASS INDEX: 25.34 KG/M2 | HEIGHT: 70 IN | WEIGHT: 177 LBS

## 2023-05-16 DIAGNOSIS — R33.9 RETENTION OF URINE: Primary | ICD-10-CM

## 2023-05-16 PROCEDURE — 99214 OFFICE O/P EST MOD 30 MIN: CPT | Performed by: PHYSICIAN ASSISTANT

## 2023-05-16 PROCEDURE — 1159F MED LIST DOCD IN RCRD: CPT | Performed by: PHYSICIAN ASSISTANT

## 2023-05-16 PROCEDURE — 1160F RVW MEDS BY RX/DR IN RCRD: CPT | Performed by: PHYSICIAN ASSISTANT

## 2023-05-16 PROCEDURE — 51798 US URINE CAPACITY MEASURE: CPT | Performed by: PHYSICIAN ASSISTANT

## 2023-05-16 RX ORDER — ALFUZOSIN HYDROCHLORIDE 10 MG/1
10 TABLET, EXTENDED RELEASE ORAL DAILY
Qty: 30 TABLET | Refills: 2 | Status: SHIPPED | OUTPATIENT
Start: 2023-05-16

## 2023-05-18 ENCOUNTER — TREATMENT (OUTPATIENT)
Dept: PHYSICAL THERAPY | Facility: CLINIC | Age: 78
End: 2023-05-18
Payer: MEDICARE

## 2023-05-18 DIAGNOSIS — M54.50 ACUTE BILATERAL LOW BACK PAIN WITHOUT SCIATICA: Primary | ICD-10-CM

## 2023-05-18 DIAGNOSIS — Z98.1 S/P LUMBAR FUSION: ICD-10-CM

## 2023-05-18 NOTE — PROGRESS NOTES
"                                                                Physical Therapy Treatment Note  [address] [place] [address]    Patient: Sonu Bravo Jr.                                                 Visit Date: 2023  :     1945    Referring practitioner:    Vaughn Kramer DO  Date of Initial Visit:          Type: THERAPY  Noted: 3/2/2023    Patient seen for 17 sessions    Visit Diagnoses:    ICD-10-CM ICD-9-CM   1. Acute bilateral low back pain without sciatica  M54.50 724.2     338.19   2. S/P lumbar fusion  Z98.1 V45.4     SUBJECTIVE     Subjective He reports his energy level is decent. A little pain in his \"back area\" but it's a little better than last time    PAIN: 2/10         OBJECTIVE     Objective      Neuromuscular Reeducation     03564 Comments   TRX assisted SLS with hip flex/abd/ext                    Timed Minutes 10     Therapeutic Activities    54896 Comments   Walking on paved sloped surfaces, grassy up/down hills, and curbs  Used cane                   Timed Minutes 10       Therapeutic Exercises    19866 Units Comments        B unilateral LE Shuttle Press with eccentric focus 6 bands x 6 min each     sidestepping 40' edward    Forward/back lunges  Used cane as needed             Timed Minutes 20       Therapy Education/Self Care 60140   Education offered today    [facility] Code [id number]; [email or web address]   Ongoing HEP   Date: 2023  Prepared by: Spike Ramirez     Exercises  Supine Piriformis Stretch with Foot on Ground - 2 x daily - 7 x weekly - 2 sets - 30 hold  Supine Hip External Rotation - 2 x daily - 7 x weekly - 2 sets - 10 reps  Standing Hip Flexor Stretch - 2 x daily - 7 x weekly - 2 sets - 10 reps  Bird Dog on Counter - 2 x daily - 7 x weekly - 2 sets - 10 reps  Standing Hip Abduction with Counter Support - 10 x daily - 7 x weekly - 2 sets - 10 reps - 10 hold     Date: 2023  Prepared by: Judy Nowak     Exercises  - Narrow Stance with Counter Support  - 2-3 " "x daily - 7 x weekly - 2 reps - 30 sec hold  - Standing Tandem Balance with Counter Support  - 2-3 x daily - 7 x weekly - 2 reps - 30 sec hold  - Standing Single Leg Stance with Unilateral Counter Support  - 2-3 x daily - 7 x weekly - 2 reps - 30 sec hold      Timed Minutes        Total Timed Treatment:     40   mins  Total Time of Visit:             40   mins         ASSESSMENT/PLAN     GOALS  Goals                                                  Progress Note due by 6/4/23                                                              Recert due by 5/30/23   LTG by: 6 weeks Comments Date Status   Improve  bilateral passive hip ext to 10 deg  10+ deg 4/4 MET   Able to wash and dry self in shower independently  He is able to do this independently, he has grab bars now 5/4 MET   Able to walk 20 minutes without exacerbation of pain  able to walk \"well over 20 min\" using a cane 4/4 MET   SLS either leg x 5 secs  2 sec both legs 5/4 progressing   Reports pain no greater than 1-2/10 when it does occur.  2/10  5/18 progressing   Improve Modified Oswestry to 10 5/50 5/4 MET   Independent with HEP for flexibility and core/hip stability. He has been compliant with HEP 5/4 ongoing         Assessment/Plan     ASSESSMENT: He continues to do well. He fatigues quickly but is getting stronger with every visit.     PLAN: Continue to challenge his balance as well as increase the functional strengthening activities.    SIGNATURE: Spike Ramirez, PT, [place] License #: [id number]  Electronically Signed on 5/18/2023        [facility] [address] [place] [address]  [phone]  "

## 2023-05-22 DIAGNOSIS — I10 ESSENTIAL HYPERTENSION: ICD-10-CM

## 2023-05-22 RX ORDER — LOSARTAN POTASSIUM 25 MG/1
25 TABLET ORAL DAILY
Qty: 90 TABLET | Refills: 1 | OUTPATIENT
Start: 2023-05-22

## 2023-05-23 ENCOUNTER — TREATMENT (OUTPATIENT)
Dept: PHYSICAL THERAPY | Facility: CLINIC | Age: 78
End: 2023-05-23
Payer: MEDICARE

## 2023-05-23 DIAGNOSIS — M54.50 ACUTE BILATERAL LOW BACK PAIN WITHOUT SCIATICA: Primary | ICD-10-CM

## 2023-05-23 DIAGNOSIS — Z98.1 S/P LUMBAR FUSION: ICD-10-CM

## 2023-05-23 NOTE — PROGRESS NOTES
Physical Therapy Treatment Note  [address] [place] [address]    Patient: Sonu Bravo Jr.                                                 Visit Date: 2023  :     1945    Referring practitioner:    Vaughn Kramer DO  Date of Initial Visit:          Type: THERAPY  Noted: 3/2/2023    Patient seen for 18 sessions    Visit Diagnoses:    ICD-10-CM ICD-9-CM   1. Acute bilateral low back pain without sciatica  M54.50 724.2     338.19   2. S/P lumbar fusion  Z98.1 V45.4     SUBJECTIVE     Subjective Nothing new to report.    PAIN: 0/10 in the back, just weakness in the knees         OBJECTIVE     Objective      Neuromuscular Reeducation     31343 Comments   Stepping onto trampoline no UE support/a, Vcs for posture    Stepping onto rockerboard M/L/A/P airex on top limiting UE support/a    Timed Minutes 25     Therapeutic Activities    90945 Comments   Walking on paved sloped surfaces, grassy up/down hills, and curbs  Used cane   Timed Minutes 10       Therapeutic Exercises    04487 Units Comments   B + unilateral LE Shuttle Press with eccentric focus 6 bands x 1 min each     Timed Minutes 10       Therapy Education/Self Care 43198   Education offered today    [facility] Code [id number]; [id number]   Ongoing HEP   Date: 2023  Prepared by: Spike Ramirez     Exercises  Supine Piriformis Stretch with Foot on Ground - 2 x daily - 7 x weekly - 2 sets - 30 hold  Supine Hip External Rotation - 2 x daily - 7 x weekly - 2 sets - 10 reps  Standing Hip Flexor Stretch - 2 x daily - 7 x weekly - 2 sets - 10 reps  Bird Dog on Counter - 2 x daily - 7 x weekly - 2 sets - 10 reps  Standing Hip Abduction with Counter Support - 10 x daily - 7 x weekly - 2 sets - 10 reps - 10 hold     Date: 2023  Prepared by: Judy Nowak     Exercises  - Narrow Stance with Counter Support  - 2-3 x daily - 7 x weekly - 2 reps - 30 sec hold  - Standing Tandem  "Balance with Counter Support  - 2-3 x daily - 7 x weekly - 2 reps - 30 sec hold  - Standing Single Leg Stance with Unilateral Counter Support  - 2-3 x daily - 7 x weekly - 2 reps - 30 sec hold      Timed Minutes        Total Timed Treatment:     45   mins  Total Time of Visit:             45   mins         ASSESSMENT/PLAN     GOALS  Goals                                                  Progress Note due by 6/4/23                                                              Recert due by 5/30/23   LTG by: 6 weeks Comments Date Status   Improve  bilateral passive hip ext to 10 deg  10+ deg 4/4 MET   Able to wash and dry self in shower independently  He is able to do this independently, he has grab bars now 5/4 MET   Able to walk 20 minutes without exacerbation of pain  able to walk \"well over 20 min\" using a cane 4/4 MET   SLS either leg x 5 secs  2 sec both legs 5/4 progressing   Reports pain no greater than 1-2/10 when it does occur.  2/10  5/18 progressing   Improve Modified Oswestry to 10 5/50 5/4 MET   Independent with HEP for flexibility and core/hip stability. He has been compliant with HEP 5/4 ongoing         Assessment/Plan     ASSESSMENT: He was challenged w/ NMR activities simulating stepping into a boat or on unstable surface but improved w/ repetition. He continues to require Vcs for upright posture.    PLAN: Continue to challenge his balance as well as increase the functional strengthening activities.    SIGNATURE: Judy Nowak, PT, [place] License #: [id number]  Electronically Signed on 5/23/2023        [place] [address]  [phone]  "

## 2023-05-25 ENCOUNTER — TREATMENT (OUTPATIENT)
Dept: PHYSICAL THERAPY | Facility: CLINIC | Age: 78
End: 2023-05-25
Payer: MEDICARE

## 2023-05-25 DIAGNOSIS — Z98.1 S/P LUMBAR FUSION: ICD-10-CM

## 2023-05-25 DIAGNOSIS — M54.50 ACUTE BILATERAL LOW BACK PAIN WITHOUT SCIATICA: Primary | ICD-10-CM

## 2023-05-25 NOTE — PROGRESS NOTES
Physical Therapy Treatment Note  [address], [place] [address]    Patient: Sonu Bravo Jr.                                                 Visit Date: 2023  :     1945    Referring practitioner:    Vaughn Kramer DO  Date of Initial Visit:          Type: THERAPY  Noted: 3/2/2023    Patient seen for 19 sessions    Visit Diagnoses:    ICD-10-CM ICD-9-CM   1. Acute bilateral low back pain without sciatica  M54.50 724.2     338.19   2. S/P lumbar fusion  Z98.1 V45.4     SUBJECTIVE     Subjective Nothing new to report. He says his knees tend to want to bend with walking.     PAIN: 0/10 in the back, just weakness in the knees         OBJECTIVE     Objective      Neuromuscular Reeducation     08868 Comments   Wobble board AP Focused on hip/ankle strategy; tended to want to bend knees to compensate. Pushed board forward and back to challenge balance. Attempted EC but too difficult and fell back immediately so changed to head turns.        Timed Minutes 25     Therapeutic Activities    17322 Comments   Walking in halls with cues for landing softer and stable core    Walking on paved sloped surfaces, grassy up/down hills, and curbs  Used cane about 50% of the time   Timed Minutes 10       Therapeutic Exercises    45070 Units Comments   Squats in // bars 10    Standing edward lung stretch for hip flexors  In // bars   Timed Minutes 10       Therapy Education/Self Care 96431   Education offered today    [facility] Code [id number]; [id number]   Ongoing HEP   Date: 2023  Prepared by: Spike Ramirez     Exercises  Supine Piriformis Stretch with Foot on Ground - 2 x daily - 7 x weekly - 2 sets - 30 hold  Supine Hip External Rotation - 2 x daily - 7 x weekly - 2 sets - 10 reps  Standing Hip Flexor Stretch - 2 x daily - 7 x weekly - 2 sets - 10 reps  Bird Dog on Counter - 2 x daily - 7 x weekly - 2 sets - 10 reps  Standing Hip Abduction with  "Counter Support - 10 x daily - 7 x weekly - 2 sets - 10 reps - 10 hold     Date: 04/18/2023  Prepared by: Judy Nowak     Exercises  - Narrow Stance with Counter Support  - 2-3 x daily - 7 x weekly - 2 reps - 30 sec hold  - Standing Tandem Balance with Counter Support  - 2-3 x daily - 7 x weekly - 2 reps - 30 sec hold  - Standing Single Leg Stance with Unilateral Counter Support  - 2-3 x daily - 7 x weekly - 2 reps - 30 sec hold      Timed Minutes        Total Timed Treatment:     45   mins  Total Time of Visit:             45   mins         ASSESSMENT/PLAN     GOALS  Goals                                                  Progress Note due by 6/4/23                                                              Recert due by 5/30/23   LTG by: 6 weeks Comments Date Status   Improve  bilateral passive hip ext to 10 deg  10+ deg 4/4 MET   Able to wash and dry self in shower independently  He is able to do this independently, he has grab bars now 5/4 MET   Able to walk 20 minutes without exacerbation of pain  able to walk \"well over 20 min\" using a cane 4/4 MET   SLS either leg x 5 secs  2 sec both legs 5/4 progressing   Reports pain no greater than 1-2/10 when it does occur.  0 5/25 MET   Improve Modified Oswestry to 10 5/50 5/4 MET   Independent with HEP for flexibility and core/hip stability. He has been compliant with HEP 5/4 ongoing         Assessment/Plan     ASSESSMENT:   He is doing well overall but he still lacks the hip stability with walking. He's improving and now using a cane only about 50% of the time.     PLAN: Continue to challenge his balance as well as increase the functional strengthening activities.    SIGNATURE: Spike Ramirez, PT, [place] License #: [id number]  Electronically Signed on 5/25/2023        [place] [address]  [phone]  "

## 2023-05-25 NOTE — PROGRESS NOTES
Subjective    Mr. Bravo is 77 y.o. male    Chief Complaint: Urinary retention    History of Present Illness  Patient with incomplete bladder emptying is here for follow-up he had postoperative urinary retention and his initial postop follow-up showed 0 Mallen bladder scan.  However during the visit 05/16/2023 his postvoid residual bladder scan was 789 mL.  He complained of no suprapubic discomfort.  No bladder pressure or bladder pain.  Subjectively feels like he empties his bladder well with a good flow and stream.  He might have some increased frequency and urgency but no other acute symptoms.  I placed him on alfuzosin and had him stop the Hytrin.    The following portions of the patient's history were reviewed and updated as appropriate: allergies, current medications, past family history, past medical history, past social history, past surgical history and problem list.    Review of Systems   Genitourinary:  Positive for frequency and urgency.       Current Outpatient Medications:     acetaminophen (TYLENOL) 325 MG tablet, Take 2 tablets by mouth As Needed. Indications: Fever, Pain, Disp: , Rfl:     alfuzosin (UROXATRAL) 10 MG 24 hr tablet, Take 1 tablet by mouth Daily., Disp: 30 tablet, Rfl: 2    apixaban (Eliquis) 5 MG tablet tablet, Take 1 tablet by mouth 2 (Two) Times a Day., Disp: 180 tablet, Rfl: 1    bisoprolol (ZEBeta) 5 MG tablet, Take 1 tablet by mouth Daily. Indications: High Blood Pressure Disorder, Disp: 90 tablet, Rfl: 1    Cholecalciferol 100 MCG (4000 UT) capsule, Take 4,000 Units by mouth Daily. Indications: Vitamin D Deficiency, Disp: , Rfl:     coenzyme Q10 100 MG capsule, Take 1 capsule by mouth Daily. Indications: Heart Rhythm Disorder, Disp: , Rfl:     Cyanocobalamin 2500 MCG sublingual tablet, Take 2,500 mcg by mouth Daily. Indications: Inadequate Vitamin B12, Disp: , Rfl:     fexofenadine (ALLEGRA) 180 MG tablet, Take 1 tablet by mouth Daily. Indications: Hayfever, Disp: , Rfl:      fluticasone (FLONASE) 50 MCG/ACT nasal spray, 1 spray into the nostril(s) as directed by provider Daily., Disp: 32 g, Rfl: 3    levothyroxine (SYNTHROID, LEVOTHROID) 112 MCG tablet, Take 1 tablet by mouth Daily. Indications: Underactive Thyroid, Disp: 90 tablet, Rfl: 1    losartan (Cozaar) 25 MG tablet, Take 1 tablet by mouth Daily., Disp: 90 tablet, Rfl: 1    multivitamin with minerals tablet tablet, Take 1 tablet by mouth Daily. Indications: Vitamin Deficiency, Disp: , Rfl:     omeprazole (priLOSEC) 20 MG capsule, Take 1 capsule by mouth Daily., Disp: 90 capsule, Rfl: 3    rosuvastatin (CRESTOR) 20 MG tablet, Take 1 tablet by mouth Daily., Disp: 90 tablet, Rfl: 3    sennosides-docusate (senna-docusate sodium) 8.6-50 MG per tablet, Take 2 tablets by mouth Daily., Disp: 60 tablet, Rfl: 0    terazosin (HYTRIN) 5 MG capsule, TAKE 1 CAPSULE BY MOUTH EVERY NIGHT., Disp: 30 capsule, Rfl: 2    Zinc 50 MG capsule, Take 1 capsule by mouth Daily. Indications: Zinc Deficiency, Disp: , Rfl:     Past Medical History:   Diagnosis Date    Arthritis     Atrial fibrillation 08/31/2021    Cataracts, bilateral     Disease of thyroid gland     Diverticulitis     GERD (gastroesophageal reflux disease)     Hyperlipidemia     Hypertension     Mononucleosis 1964       Past Surgical History:   Procedure Laterality Date    ANTERIOR CERVICAL DISCECTOMY W/ FUSION N/A 10/11/2022    Procedure: CERVICAL DISCECTOMY ANTERIOR WITH FUSION, Cervical 4/5 and 5/6;  Surgeon: Durga Cifuentes MD;  Location: [facility] OR;  Service: Neurosurgery;  Laterality: N/A;    CATARACT EXTRACTION Right     COLON SURGERY  01/2022    Dr Fairbanks at [facility] in [facility]    COLONOSCOPY N/A 09/28/2021    Procedure: COLONOSCOPY WITH ANESTHESIA;  Surgeon: Thomas Dc DO;  Location: [facility] ENDOSCOPY;  Service: Gastroenterology;  Laterality: N/A;  pre op; abnormal ct scan  post op: diverticulosis ; polyps   PCP: Vaughn Kramer DO    HERNIA REPAIR       "LUMBAR DIRECT LATERAL INTERBODY FUSION N/A 01/24/2023    Procedure: LUMBAR DIRECT LATERAL INTERBODY FUSION; LUMBAR 2-5;  Surgeon: Durga Cifuentes MD;  Location:  [facility] OR;  Service: Neurosurgery;  Laterality: N/A;    LUMBAR FUSION N/A 01/24/2023    Procedure: LUMBAR DIRECT LATERAL INTERBODY FUSION; LUMBAR 2-5, LUMBAR LAMINECTOMY TRANSFORAMINAL LUMBAR INTERBODY FUSION; LUMBAR 2-5;  Surgeon: Durga Cifuentes MD;  Location:  [facility] OR;  Service: Robotics - Neuro;  Laterality: N/A;    SKIN SURGERY      Skin cancer surgery.       Social History     Socioeconomic History    Marital status:    Tobacco Use    Smoking status: Every Day     Packs/day: 0.50     Years: 52.00     Pack years: 26.00     Types: Cigarettes     Start date: 1967     Passive exposure: Current    Smokeless tobacco: Never    Tobacco comments:     down to 1/2 pack a day 2/24/2023   Vaping Use    Vaping Use: Never used   Substance and Sexual Activity    Alcohol use: Yes     Comment: rare    Drug use: Never    Sexual activity: Defer       Family History   Problem Relation Age of Onset    Lung cancer Mother     Brain cancer Mother     Heart disease Father     Hyperlipidemia Father     Hypertension Father     Prostate cancer Maternal Aunt     Colon cancer Maternal Uncle     Colon polyps Neg Hx     Esophageal cancer Neg Hx     Liver cancer Neg Hx        Objective    Temp 97.7 °F (36.5 °C)   Ht 177.8 cm (70\")   Wt 79.4 kg (175 lb)   BMI 25.11 kg/m²     Physical Exam  Vitals reviewed.   Constitutional:       Appearance: Normal appearance.   HENT:      Head: Normocephalic and atraumatic.   Pulmonary:      Effort: Pulmonary effort is normal.   Skin:     Coloration: Skin is not pale.   Neurological:      Mental Status: He is alert and oriented to person, place, and time.   Psychiatric:         Mood and Affect: Mood normal.         Behavior: Behavior normal.           Results for orders placed or performed in visit on 03/27/23   COVID-19 and " FLU A/B PCR - Swab, Nasopharynx    Specimen: Nasopharynx; Swab   Result Value Ref Range    COVID19 Not Detected Not Detected - Ref. Range    Influenza A PCR Not Detected Not Detected    Influenza B PCR Not Detected Not Detected     Bladder Scan interpretation  Estimation of residual urine via abdominal ultrasound  Residual Urine: 559 ml  Indication: Urinary retention  Position: Supine  Examination: Incremental scanning of the suprapubic area using 3 MHz transducer using copious amounts of acoustic gel.   Findings: An anechoic area was demonstrated which represented the bladder, with measurement of residual urine as noted. I inspected this myself. In that the residual urine was stable or insignificant, no treatment will be necessary at this time.     Assessment and Plan    Diagnoses and all orders for this visit:    1. Retention of urine (Primary)  -     POC Urinalysis Dipstick, Multipro  -     Basic Metabolic Panel  -     US Renal Bilateral; Future    2. Incomplete bladder emptying  -     Basic Metabolic Panel  -     US Renal Bilateral; Future    Patient presents for follow-up with incomplete bladder emptying.  His bladder scan was slightly improved at 559 mL.  Started him on alfuzosin.  I suspect he has a element of hypotonic bladder due to the fact he does not have a lot of urgency or any discomfort with large volumes of urine.  I explained the dangers with having an elevated residuals a can put strain on the kidneys cause obstructive changes and also be an increased risk of urinary tract infections.  I would like him to get a renal ultrasound as well as a BMP to assess kidney function if there is any evidence of obstruction on the ultrasound or creatinine is elevated from previous then we will need to discuss either placing indwelling or changing him on a deviated out catheterization.  I will also schedule him for cystoscopy to evaluate for any obvious obstructive changes.

## 2023-05-31 ENCOUNTER — OFFICE VISIT (OUTPATIENT)
Dept: UROLOGY | Facility: CLINIC | Age: 78
End: 2023-05-31

## 2023-05-31 VITALS — TEMPERATURE: 97.7 F | BODY MASS INDEX: 25.05 KG/M2 | WEIGHT: 175 LBS | HEIGHT: 70 IN

## 2023-05-31 DIAGNOSIS — R33.9 RETENTION OF URINE: Primary | ICD-10-CM

## 2023-05-31 DIAGNOSIS — R33.9 INCOMPLETE BLADDER EMPTYING: ICD-10-CM

## 2023-06-01 ENCOUNTER — TREATMENT (OUTPATIENT)
Dept: PHYSICAL THERAPY | Facility: CLINIC | Age: 78
End: 2023-06-01

## 2023-06-01 DIAGNOSIS — Z98.1 S/P LUMBAR FUSION: ICD-10-CM

## 2023-06-01 DIAGNOSIS — M54.50 ACUTE BILATERAL LOW BACK PAIN WITHOUT SCIATICA: Primary | ICD-10-CM

## 2023-06-01 LAB
BUN SERPL-MCNC: 20 MG/DL (ref 8–23)
BUN/CREAT SERPL: 20 (ref 7–25)
CALCIUM SERPL-MCNC: 9.4 MG/DL (ref 8.6–10.5)
CHLORIDE SERPL-SCNC: 103 MMOL/L (ref 98–107)
CO2 SERPL-SCNC: 28.7 MMOL/L (ref 22–29)
CREAT SERPL-MCNC: 1 MG/DL (ref 0.76–1.27)
EGFRCR SERPLBLD CKD-EPI 2021: 77 ML/MIN/1.73
GLUCOSE SERPL-MCNC: 90 MG/DL (ref 65–99)
POTASSIUM SERPL-SCNC: 4.1 MMOL/L (ref 3.5–5.2)
SODIUM SERPL-SCNC: 140 MMOL/L (ref 136–145)

## 2023-06-01 NOTE — PROGRESS NOTES
Physical Therapy Treatment Note, 30 Day Progress Note, and 90 Day Recertification Note  [address] [place] [address]    Patient: Sonu Bravo Jr.                                                 Visit Date: 2023  :     1945    Referring practitioner:    Vaughn Kramer DO  Date of Initial Visit:          Type: THERAPY  Noted: 3/2/2023    Patient seen for 20 sessions    Visit Diagnoses:    ICD-10-CM ICD-9-CM   1. Acute bilateral low back pain without sciatica  M54.50 724.2     338.19   2. S/P lumbar fusion  Z98.1 V45.4     SUBJECTIVE     Subjective Nothing new to report.     PAIN: 0/10          OBJECTIVE     Objective      Neuromuscular Reeducation     83323 Comments   FGA:     MINIbest:     Timed Minutes 37     Gait Training          55220   Task/Terrain Asst AD Comments   6 MWT   20 Kimberly RPE, HR 76 BPM SpO2 99% RA   Timed Minutes 8        Therapy Education/Self Care 89004   Education offered today    [employer] Code [id number]; [id number]   Ongoing HEP   Date: 2023  Prepared by: Spike Ramirez     Exercises  Supine Piriformis Stretch with Foot on Ground - 2 x daily - 7 x weekly - 2 sets - 30 hold  Supine Hip External Rotation - 2 x daily - 7 x weekly - 2 sets - 10 reps  Standing Hip Flexor Stretch - 2 x daily - 7 x weekly - 2 sets - 10 reps  Bird Dog on Counter - 2 x daily - 7 x weekly - 2 sets - 10 reps  Standing Hip Abduction with Counter Support - 10 x daily - 7 x weekly - 2 sets - 10 reps - 10 hold     Date: 2023  Prepared by: Judy Nowak     Exercises  - Narrow Stance with Counter Support  - 2-3 x daily - 7 x weekly - 2 reps - 30 sec hold  - Standing Tandem Balance with Counter Support  - 2-3 x daily - 7 x weekly - 2 reps - 30 sec hold  - Standing Single Leg Stance with Unilateral Counter Support  - 2-3 x daily - 7 x weekly - 2 reps - 30 sec hold      Timed Minutes        Total Timed Treatment:     45    "mins  Total Time of Visit:             45   mins         ASSESSMENT/PLAN     GOALS  Goals                                                  Progress Note due by 6/30/23                                                              Recert due by 8/30/23   LTG by: 6 weeks Comments Date Status   Improve  bilateral passive hip ext to 10 deg  10+ deg 4/4 MET   Able to wash and dry self in shower independently  He is able to do this independently, he has grab bars now 5/4 MET   Able to walk 20 minutes without exacerbation of pain  able to walk \"well over 20 min\" using a cane 4/4 MET   SLS either leg x 5 secs  L 4 sec, R 5 sec 6/1 MET   Reports pain no greater than 1-2/10 when it does occur.  0 5/25 MET   Improve Modified Oswestry to 10 5/50 5/4 MET   Pt will improve FGA to >/=22/30 6/1 NEW   Pt will perform 6 MWT outdoors variable surfaces w/ Kimberly RPE </=10  6/1 NEW   Pt will improve miniBEST to >/=22/28 6/1 NEW   Independent with HEP for flexibility and core/hip stability. He has been compliant with HEP 5/31 ongoing         Assessment & Plan     Assessment  Impairments: abnormal muscle firing, abnormal or restricted ROM, impaired balance, impaired physical strength and pain with function  Functional Limitations: carrying objects, lifting, walking, pulling, pushing, sitting, standing and unable to perform repetitive tasksPrognosis: good    Plan  Planned modality interventions: cryotherapy, iontophoresis, low level laser therapy, TENS, thermotherapy (hydrocollator packs), traction, ultrasound and electrical stimulation/Russian stimulation  Planned therapy interventions: abdominal trunk stabilization, manual therapy, balance/weight-bearing training, flexibility, functional ROM exercises, home exercise program, joint mobilization, soft tissue mobilization, spinal/joint mobilization, strengthening, stretching, therapeutic activities, postural training, gait training and neuromuscular re-education  Frequency: 2x week  Duration " in weeks: 8  Treatment plan discussed with: patient       ASSESSMENT: Progress note and re-cert performed per POC. Pt achieved 6/7 initial STGs however he continues to exhibit static/dynamic balance and functional endurance deficits as evidenced by testing today. He is especially challenged under EC on compliant surface conditions and instability increases w/ fatigue. New goals were added to address.     PLAN: Continue to challenge his balance as well as increase the functional strengthening and endurance activities.    SIGNATURE: Judy Nowak PT, [place] License #: [id number]  Electronically Signed on 6/1/2023      Clinical Progress: improved  Home Program Compliance: Yes  Progress toward previous goals: Partially Met      90 Day Recertification  Certification Period: 6/1/2023 through 8/29/2023  I certify that the therapy services are furnished while this patient is under my care.  The services outlined above are required by this patient, and will be reviewed every 90 days.     PHYSICIAN: Vaughn Kramer DO (NPI: [id number])    Signature:___________________________________________DATE: _________    Please sign and return via fax to [phone].   Thank you so much for letting us work with Sonu. I appreciate your letting us work with your patients. If you have any questions or concerns, please don't hesitate to contact me.               [address] [address]  [phone]

## 2023-06-05 ENCOUNTER — TREATMENT (OUTPATIENT)
Dept: PHYSICAL THERAPY | Facility: CLINIC | Age: 78
End: 2023-06-05
Payer: MEDICARE

## 2023-06-05 DIAGNOSIS — M54.50 ACUTE BILATERAL LOW BACK PAIN WITHOUT SCIATICA: Primary | ICD-10-CM

## 2023-06-05 DIAGNOSIS — Z98.1 S/P LUMBAR FUSION: ICD-10-CM

## 2023-06-05 NOTE — PROGRESS NOTES
Physical Therapy Treatment Note  [address] [place] [address]    Patient: Sonu Bravo Jr.                                                 Visit Date: 2023  :     1945    Referring practitioner:    Vaughn Kramer DO  Date of Initial Visit:          Type: THERAPY  Noted: 3/2/2023    Patient seen for 21 sessions    Visit Diagnoses:    ICD-10-CM ICD-9-CM   1. Acute bilateral low back pain without sciatica  M54.50 724.2     338.19   2. S/P lumbar fusion  Z98.1 V45.4     SUBJECTIVE     Subjective   He says he's been doing better and trying to do more things out in the community.    PAIN: 0/10          OBJECTIVE     Objective      Therapeutic Activities    02347 Comments   Walking on varying surfaces.  Hallways, curbs, hills                   Timed Minutes 10     Therapeutic Exercises    44979 Units Comments   Hip abd wipers edward 3 set Held in flexion, neutral and ext with emphasis on control   Marching bridges 3 sets x 8                   Timed Minutes 15     Neuromuscular Reeducation     27316 Comments   SLS with TRX strap assistance Added hip flex/abd/ext                   Timed Minutes 15         Therapy Education/Self Care 83132   Education offered today    [facility] Code [email or web address]; [id number]   Ongoing HEP   Date: 2023  Prepared by: Spike Ramirez     Exercises  Supine Piriformis Stretch with Foot on Ground - 2 x daily - 7 x weekly - 2 sets - 30 hold  Supine Hip External Rotation - 2 x daily - 7 x weekly - 2 sets - 10 reps  Standing Hip Flexor Stretch - 2 x daily - 7 x weekly - 2 sets - 10 reps  Bird Dog on Counter - 2 x daily - 7 x weekly - 2 sets - 10 reps  Standing Hip Abduction with Counter Support - 10 x daily - 7 x weekly - 2 sets - 10 reps - 10 hold     Date: 2023  Prepared by: Judy Nowak     Exercises  - Narrow Stance with Counter Support  - 2-3 x daily - 7 x weekly - 2 reps - 30 sec hold  - Standing Tandem  "Balance with Counter Support  - 2-3 x daily - 7 x weekly - 2 reps - 30 sec hold  - Standing Single Leg Stance with Unilateral Counter Support  - 2-3 x daily - 7 x weekly - 2 reps - 30 sec hold      Timed Minutes        Total Timed Treatment:     40   mins  Total Time of Visit:            40   mins         ASSESSMENT/PLAN     GOALS  Goals                                                  Progress Note due by 6/30/23                                                              Recert due by 8/30/23   LTG by: 6 weeks Comments Date Status   Improve  bilateral passive hip ext to 10 deg  10+ deg 4/4 MET   Able to wash and dry self in shower independently  He is able to do this independently, he has grab bars now 5/4 MET   Able to walk 20 minutes without exacerbation of pain  able to walk \"well over 20 min\" using a cane 4/4 MET   SLS either leg x 5 secs  L 4 sec, R 5 sec 6/1 MET   Reports pain no greater than 1-2/10 when it does occur.  0 5/25 MET   Improve Modified Oswestry to 10 5/50 5/4 MET   Pt will improve FGA to >/=22/30 Worked on walking on uneven surfaces and addressed hip SLS to accommodated improved walking 6/5 ongoing   Pt will perform 6 MWT outdoors variable surfaces w/ Kimberly RPE </=10 Worked on walking outdoors, needed a cane but was SBA 6/5 NEW   Pt will improve miniBEST to >/=22/28  6/1 NEW   Independent with HEP for flexibility and core/hip stability. He has been compliant with HEP 5/31 ongoing         Assessment/Plan     ASSESSMENT:   He continues to progress well. His hip stability is still an issue. He is stronger but doesn't show the coordinate both with a hip abd SLR as well as SLS to be confident with his walking.     PLAN:   Continue to challenge his balance as well as increase the functional strengthening and endurance activities. Emphasize hip stability.    SIGNATURE: Spike Ramirez, PT, [place] License #: [id number]  Electronically Signed on 6/5/2023             [address] [facility] [place]. " [id number]  [phone]

## 2023-06-06 ENCOUNTER — HOSPITAL ENCOUNTER (OUTPATIENT)
Dept: ULTRASOUND IMAGING | Facility: HOSPITAL | Age: 78
Discharge: HOME | End: 2023-06-06
Admitting: PHYSICIAN ASSISTANT
Payer: MEDICARE

## 2023-06-06 DIAGNOSIS — R33.9 RETENTION OF URINE: ICD-10-CM

## 2023-06-06 DIAGNOSIS — R33.9 INCOMPLETE BLADDER EMPTYING: ICD-10-CM

## 2023-06-06 PROCEDURE — 76775 US EXAM ABDO BACK WALL LIM: CPT

## 2023-06-08 ENCOUNTER — TREATMENT (OUTPATIENT)
Dept: PHYSICAL THERAPY | Facility: CLINIC | Age: 78
End: 2023-06-08
Payer: MEDICARE

## 2023-06-08 DIAGNOSIS — M54.50 ACUTE BILATERAL LOW BACK PAIN WITHOUT SCIATICA: Primary | ICD-10-CM

## 2023-06-08 NOTE — PROGRESS NOTES
Physical Therapy Treatment Note  [address] [place] [address]    Patient: Sonu Bravo Jr.                                                 Visit Date: 2023  :     1945    Referring practitioner:    Vaughn Kramer DO  Date of Initial Visit:          Type: THERAPY  Noted: 3/2/2023    Patient seen for 22 sessions    Visit Diagnoses:    ICD-10-CM ICD-9-CM   1. Acute bilateral low back pain without sciatica  M54.50 724.2     338.19     SUBJECTIVE     Subjective   He had nothing new to report.     PAIN: 0/10          OBJECTIVE     Objective      Therapeutic Exercises    56584 Units Comments   TRX squats 10x3    TRX heel raises 10x2    Hip abd wipers edward 3 set Held in flexion, neutral and ext with emphasis on control   Bridge on orange swiss ball 15                   Timed Minutes 15     Therapeutic Activities    69644 Comments   Walking outside with can Uneven surfaces and curb, 2 laps around the building                   Timed Minutes 15       Neuromuscular Reeducation     52848 Comments   SLS with TRX strap assistance Added hip flex/abd/ext                   Timed Minutes 10       Therapy Education/Self Care 01274   Education offered today    [facility] Code [id number]; [id number]   Ongoing HEP   Date: 2023  Prepared by: Spike Ramirez     Exercises  Supine Piriformis Stretch with Foot on Ground - 2 x daily - 7 x weekly - 2 sets - 30 hold  Supine Hip External Rotation - 2 x daily - 7 x weekly - 2 sets - 10 reps  Standing Hip Flexor Stretch - 2 x daily - 7 x weekly - 2 sets - 10 reps  Bird Dog on Counter - 2 x daily - 7 x weekly - 2 sets - 10 reps  Standing Hip Abduction with Counter Support - 10 x daily - 7 x weekly - 2 sets - 10 reps - 10 hold     Date: 2023  Prepared by: Judy Nowak     Exercises  - Narrow Stance with Counter Support  - 2-3 x daily - 7 x weekly - 2 reps - 30 sec hold  - Standing Tandem Balance with  "Counter Support  - 2-3 x daily - 7 x weekly - 2 reps - 30 sec hold  - Standing Single Leg Stance with Unilateral Counter Support  - 2-3 x daily - 7 x weekly - 2 reps - 30 sec hold      Timed Minutes        Total Timed Treatment:     40   mins  Total Time of Visit:            40   mins         ASSESSMENT/PLAN     GOALS  Goals                                                  Progress Note due by 6/30/23                                                              Recert due by 8/30/23   LTG by: 6 weeks Comments Date Status   Improve  bilateral passive hip ext to 10 deg  10+ deg 4/4 MET   Able to wash and dry self in shower independently  He is able to do this independently, he has grab bars now 5/4 MET   Able to walk 20 minutes without exacerbation of pain  able to walk \"well over 20 min\" using a cane 4/4 MET   SLS either leg x 5 secs  L 4 sec, R 5 sec 6/1 MET   Reports pain no greater than 1-2/10 when it does occur.  0 5/25 MET   Improve Modified Oswestry to 10 5/50 5/4 MET   Pt will improve FGA to >/=22/30 Worked on walking on uneven surfaces and addressed hip SLS to accommodated improved walking 6/5 ongoing   Pt will perform 6 MWT outdoors variable surfaces w/ Kimberly RPE </=10 Worked on walking outdoors, needed a cane but was SBA 6/5 NEW   Pt will improve miniBEST to >/=22/28  6/1 NEW   Independent with HEP for flexibility and core/hip stability. He has been compliant with HEP. Emphasis is on left hip stability.  6/8 ongoing         Assessment/Plan     ASSESSMENT:   He continues to do very well. His left hip is still weak as seen with a trendelenberg gait with walking but he corrected this with cues. He just fatigues quickly.     PLAN:   Continue to challenge his balance as well as increase the functional strengthening and endurance activities. Emphasize hip stability.    SIGNATURE: Spike Rmairez, PT, [place] License #: [id number]  Electronically Signed on 6/8/2023             [address] [place] [address] [place]. " [id number]  [phone]

## 2023-06-09 ENCOUNTER — PROCEDURE VISIT (OUTPATIENT)
Dept: UROLOGY | Facility: CLINIC | Age: 78
End: 2023-06-09
Payer: MEDICARE

## 2023-06-09 DIAGNOSIS — R33.9 RETENTION OF URINE: Primary | ICD-10-CM

## 2023-06-09 NOTE — LETTER
June 9, 2023     Vaughn Kramer DO  [address] [facility] [address]  [address]  [place] [address]    Patient: Sonu Brvao Jr.   YOB: 1945   Date of Visit: 6/9/2023     Dear Dr. Kramer, :    Thank you for referring Sonu Bravo to me for evaluation. Below are the relevant portions of my assessment and plan of care.    If you have questions, please do not hesitate to call me. I look forward to following Sonu along with you.         Sincerely,        Evaristo Jules MD        CC: No Recipients      Progress Notes:  Pre- operative diagnosis:  Urinary retention after multiple back surgery most recently in January.  He has had large volume postvoid residuals despite alpha-blocker therapy.    Post operative diagnosis:  Same    Procedure:  The patient was prepped and draped in a normal sterile fashion.  The urethra was anesthetized with 2% lidocaine jelly.  A flexible cystoscope was introduced per urethra.      Urethra:  Normal    Bladder:  Markedly distended upon entering the bladder clearly with large volume urinary retention prior to the procedure.  Normal mucosa, No bladder tumor and moderate trabeculation    Ureteral orifices:  Normal position bilaterally and Clear efflux bilaterally    Prostate:  Minimal enlargement, not visually obstructing    Patient tolerated the procedure well    Complications: none    Blood loss: minimal    Follow up:    We had a long discussion regarding the natural history of large volume urinary retention.  His prostate is nonobstructing.  I do not believe he would benefit from TUIP/TURP.  We discussed that I do not have urodynamics capability at this time.  We discussed options for bladder management including clean intermittent catheterization versus indwelling Black catheter to allow for continued bladder rest and referral to a tertiary care center for further evaluation with urodynamics given his history of back surgery.  Continue alpha-blocker.  Patient would  like to be referred to [facility]. I referred to Dr. STUART Lindsay MD for UDS/treatment.       Patient initially wanted to try to learn clean intermittent catheterization.  However, he was having difficulty placing the catheter beyond the prostate.  Medical assistants tried to place an 18 [place] silicone coudé catheter but this also had difficulty passing the prostate and he had bleeding per urethra with passage of this larger coudé.  I then under sterile conditions passed a softer 16 [place]ian coudé catheter without difficulty with return of clear urine.  I recommended keeping this catheter in place for the next 4 weeks.  Catheter can be changed in if there is a delay in getting to [facility].

## 2023-06-09 NOTE — PROGRESS NOTES
Pre- operative diagnosis:  Urinary retention after multiple back surgery most recently in January.  He has had large volume postvoid residuals despite alpha-blocker therapy.    Post operative diagnosis:  Same    Procedure:  The patient was prepped and draped in a normal sterile fashion.  The urethra was anesthetized with 2% lidocaine jelly.  A flexible cystoscope was introduced per urethra.      Urethra:  Normal    Bladder:  Markedly distended upon entering the bladder clearly with large volume urinary retention prior to the procedure.  Normal mucosa, No bladder tumor and moderate trabeculation    Ureteral orifices:  Normal position bilaterally and Clear efflux bilaterally    Prostate:  Minimal enlargement, not visually obstructing    Patient tolerated the procedure well    Complications: none    Blood loss: minimal    Follow up:    We had a long discussion regarding the natural history of large volume urinary retention.  His prostate is nonobstructing.  I do not believe he would benefit from TUIP/TURP.  We discussed that I do not have urodynamics capability at this time.  We discussed options for bladder management including clean intermittent catheterization versus indwelling Black catheter to allow for continued bladder rest and referral to a tertiary care center for further evaluation with urodynamics given his history of back surgery.  Continue alpha-blocker.  Patient would like to be referred to [facility]. I referred to Dr. STUART Lindsay MD for UDS/treatment.       Patient initially wanted to try to learn clean intermittent catheterization.  However, he was having difficulty placing the catheter beyond the prostate.  Medical assistants tried to place an 18 Mexican silicone coudé catheter but this also had difficulty passing the prostate and he had bleeding per urethra with passage of this larger coudé.  I then under sterile conditions passed a softer 16 Mexican coudé catheter without difficulty with return  of clear urine.  I recommended keeping this catheter in place for the next 4 weeks.  Catheter can be changed in if there is a delay in getting to [facility].

## 2023-06-13 ENCOUNTER — TREATMENT (OUTPATIENT)
Dept: PHYSICAL THERAPY | Facility: CLINIC | Age: 78
End: 2023-06-13
Payer: MEDICARE

## 2023-06-13 DIAGNOSIS — Z98.1 S/P LUMBAR FUSION: ICD-10-CM

## 2023-06-13 DIAGNOSIS — M54.50 ACUTE BILATERAL LOW BACK PAIN WITHOUT SCIATICA: Primary | ICD-10-CM

## 2023-06-13 NOTE — PROGRESS NOTES
"                                                                Physical Therapy Treatment Note  [address] [place] [address]    Patient: Sonu Bravo Jr.                                                 Visit Date: 2023  :     1945    Referring practitioner:    Vaughn Kramer DO  Date of Initial Visit:          Type: THERAPY  Noted: 3/2/2023    Patient seen for 23 sessions    Visit Diagnoses:    ICD-10-CM ICD-9-CM   1. Acute bilateral low back pain without sciatica  M54.50 724.2     338.19   2. S/P lumbar fusion  Z98.1 V45.4     SUBJECTIVE     Subjective   He was down today because he was told that his bladder was \"shot\" and had to use an indwelling cath. He says his main problem has been retaining urine.    PAIN: 0/10          OBJECTIVE     Objective      Therapeutic Activities    06842 Comments   Walking outside with can Uneven surfaces and curb, 2 laps around the building       Timed Minutes 15       Neuromuscular Reeducation     [id number] Comments   Assessed PF tone including OI tenderness externally Unable to palpate a contraction of LA; externally, OI not tender       Timed Minutes 15       Therapy Education/Self Care 94123   Education offered today Educated on anatomy of PF and the pudendal nerve including entrapment under obturator internus. 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 Added Happy Baby stretch.   [facility] Code [id number]; [id number]   Ongoing HEP   Date: 2023  Prepared by: Spike Ramirez     Exercises  Supine Piriformis Stretch with Foot on Ground - 2 x daily - 7 x weekly - 2 sets - 30 hold  Supine Hip External Rotation - 2 x daily - 7 x weekly - 2 sets - 10 reps  Standing Hip Flexor Stretch - 2 x daily - 7 x weekly - 2 sets - 10 reps  Bird Dog on Counter - 2 x daily - 7 x weekly - 2 sets - 10 reps  Standing Hip Abduction with Counter Support " "- 10 x daily - 7 x weekly - 2 sets - 10 reps - 10 hold     Date: 04/18/2023  Prepared by: Judy Nowak     Exercises  - Narrow Stance with Counter Support  - 2-3 x daily - 7 x weekly - 2 reps - 30 sec hold  - Standing Tandem Balance with Counter Support  - 2-3 x daily - 7 x weekly - 2 reps - 30 sec hold  - Standing Single Leg Stance with Unilateral Counter Support  - 2-3 x daily - 7 x weekly - 2 reps - 30 sec hold      Timed Minutes 15       Total Timed Treatment:     45   mins  Total Time of Visit:            45   mins         ASSESSMENT/PLAN     GOALS  Goals                                                  Progress Note due by 6/30/23                                                              Recert due by 8/30/23   LTG by: 6 weeks Comments Date Status   Improve  bilateral passive hip ext to 10 deg  10+ deg 4/4 MET   Able to wash and dry self in shower independently  He is able to do this independently, he has grab bars now 5/4 MET   Able to walk 20 minutes without exacerbation of pain  able to walk \"well over 20 min\" using a cane 4/4 MET   SLS either leg x 5 secs  L 4 sec, R 5 sec 6/1 MET   Reports pain no greater than 1-2/10 when it does occur.  0 5/25 MET   Improve Modified Oswestry to 10 5/50 5/4 MET   Pt will improve FGA to >/=22/30 Worked on walking on uneven surfaces and addressed hip SLS to accommodated improved walking 6/5 ongoing   Pt will perform 6 MWT outdoors variable surfaces w/ Kimberly RPE </=10 Worked on walking outdoors, needed a cane but was SBA 6/5 NEW   Pt will improve miniBEST to >/=22/28  6/1 NEW   Independent with HEP for flexibility and core/hip stability. Discussed PF exercises including happy baby  6/13 ongoing         Assessment/Plan     ASSESSMENT:   I focused more on his PF today as he is struggling with the idea of needing cath indefinitely. He may have a neurogenic bladder from his cervical myelopathy on top of the trauma of the back surgery. He is also having some numbness at the " base of his penis. I wonder if there could be some pudendal nerve entrapment.     PLAN:   Inquire about implanted devise meant for neurogenic bladder and see if hip stretches help him any.     SIGNATURE: Spike Ramirez, PT, [place] License #: [id number]  Electronically Signed on 6/13/2023             [address] [place] [address]  [phone]

## 2023-06-19 ENCOUNTER — TREATMENT (OUTPATIENT)
Dept: PHYSICAL THERAPY | Facility: CLINIC | Age: 78
End: 2023-06-19
Payer: MEDICARE

## 2023-06-19 DIAGNOSIS — Z98.1 S/P LUMBAR FUSION: ICD-10-CM

## 2023-06-19 DIAGNOSIS — M54.50 ACUTE BILATERAL LOW BACK PAIN WITHOUT SCIATICA: Primary | ICD-10-CM

## 2023-06-19 PROCEDURE — 97140 MANUAL THERAPY 1/> REGIONS: CPT | Performed by: PHYSICAL THERAPIST

## 2023-06-19 PROCEDURE — 97110 THERAPEUTIC EXERCISES: CPT | Performed by: PHYSICAL THERAPIST

## 2023-06-19 NOTE — PROGRESS NOTES
Physical Therapy Treatment Note  [address], [place] [address]    Patient: Sonu Bravo Jr.                                                 Visit Date: 2023  :     1945    Referring practitioner:    Vaughn Kramer DO  Date of Initial Visit:          Type: THERAPY  Noted: 3/2/2023    Patient seen for 24 sessions    Visit Diagnoses:    ICD-10-CM ICD-9-CM   1. Acute bilateral low back pain without sciatica  M54.50 724.2     338.19   2. S/P lumbar fusion  Z98.1 V45.4     SUBJECTIVE     Subjective   He says his quads have been going well but he fell stepping down from a curb this weekend. He didn't hurt himself other than scraping his elbows.     PAIN: 0/10        OBJECTIVE     Objective      Therapeutic Activities    62229 Comments   Walking outside with cane Uneven surfaces and curb, 1 lap around the building       Timed Minutes 5     Manual Therapy     [id number]  Comments   Prone thoracic upper lumbar PA mob Gr 3   Prone neural flossing femoral and obturator nerves                Timed Minutes 30        Therapeutic Exercises    52332 Units Comments   Passive edward piriformis/oi stretch                         Timed Minutes 8           Therapy Education/Self Care 79605   Education offered today Educated on anatomy of PF and the pudendal nerve including entrapment under obturator internus. 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 Added Happy Baby stretch.   [employer] Code [id number]; [id number]   Ongoing HEP   Date: 2023  Prepared by: Spike Ramirez     Exercises  Supine Piriformis Stretch with Foot on Ground - 2 x daily - 7 x weekly - 2 sets - 30 hold  Supine Hip External Rotation - 2 x daily - 7 x weekly - 2 sets - 10 reps  Standing Hip Flexor Stretch - 2 x daily - 7 x weekly - 2 sets - 10 reps  Bird Dog  "on Counter - 2 x daily - 7 x weekly - 2 sets - 10 reps  Standing Hip Abduction with Counter Support - 10 x daily - 7 x weekly - 2 sets - 10 reps - 10 hold     Date: 04/18/2023  Prepared by: Judy Nowak     Exercises  - Narrow Stance with Counter Support  - 2-3 x daily - 7 x weekly - 2 reps - 30 sec hold  - Standing Tandem Balance with Counter Support  - 2-3 x daily - 7 x weekly - 2 reps - 30 sec hold  - Standing Single Leg Stance with Unilateral Counter Support  - 2-3 x daily - 7 x weekly - 2 reps - 30 sec hold      Timed Minutes        Total Timed Treatment:     43   mins  Total Time of Visit:            43   mins         ASSESSMENT/PLAN     GOALS  Goals                                                  Progress Note due by 6/30/23                                                              Recert due by 8/30/23   LTG by: 6 weeks Comments Date Status   Improve  bilateral passive hip ext to 10 deg  10+ deg 4/4 MET   Able to wash and dry self in shower independently  He is able to do this independently, he has grab bars now 5/4 MET   Able to walk 20 minutes without exacerbation of pain  able to walk \"well over 20 min\" using a cane 4/4 MET   SLS either leg x 5 secs  L 4 sec, R 5 sec 6/1 MET   Reports pain no greater than 1-2/10 when it does occur.  0 5/25 MET   Improve Modified Oswestry to 10 5/50 5/4 MET   Pt will improve FGA to >/=22/30 Fell walking this weekend 6/19 ongoing   Pt will perform 6 MWT outdoors variable surfaces w/ Kimberly RPE </=10 Worked on walking outdoors, needed a cane but was SBA 6/5 NEW   Pt will improve miniBEST to >/=22/28  6/1 NEW   Independent with HEP for flexibility and core/hip stability. Discussed PF exercises including happy baby  6/13 ongoing         Assessment/Plan     ASSESSMENT:   He has done better with quad strength since the dry needling. He is still weak and fell after trying to maneuver a curb when he was a bit distracted.     PLAN:   Cont to emphasize core stability with " function.    SIGNATURE: Spike Ramirez, PT, [place] License #: [id number]  Electronically Signed on 6/19/2023             [address] [place] [address]  [phone]

## 2023-08-01 DIAGNOSIS — I10 ESSENTIAL HYPERTENSION: ICD-10-CM

## 2023-08-01 RX ORDER — BISOPROLOL FUMARATE 5 MG/1
5 TABLET, FILM COATED ORAL DAILY
Qty: 90 TABLET | Refills: 1 | Status: SHIPPED | OUTPATIENT
Start: 2023-08-01

## 2023-08-07 ENCOUNTER — PROCEDURE VISIT (OUTPATIENT)
Dept: UROLOGY | Facility: CLINIC | Age: 78
End: 2023-08-07
Payer: MEDICARE

## 2023-08-07 VITALS — HEART RATE: 59 BPM | DIASTOLIC BLOOD PRESSURE: 82 MMHG | SYSTOLIC BLOOD PRESSURE: 173 MMHG | OXYGEN SATURATION: 95 %

## 2023-08-07 DIAGNOSIS — R33.9 RETENTION OF URINE: Primary | ICD-10-CM

## 2023-08-07 DIAGNOSIS — R10.9 FLANK PAIN: ICD-10-CM

## 2023-08-07 PROCEDURE — 87086 URINE CULTURE/COLONY COUNT: CPT

## 2023-08-07 PROCEDURE — 99211 OFF/OP EST MAY X REQ PHY/QHP: CPT

## 2023-08-07 NOTE — PROGRESS NOTES
Sonu Bravo Jr. is here today for catheter change.  Patient is seen by Sandi HERNANDEZ.  The old catheter was removed without difficulty. Using sterile technique the patient was prepped with iodine and new 16F coude catheter was placed. 10 cc of sterile water used to inflated the balloon and catheter was then attached to a leg bag. Patient tolerated the procedure well.  Sandi HERNANDEZ was in the office at the time of procedure. The patient was advised to return in 1 month for next catheter change. Patient verbalized understanding. Patient states they are having flank pain and chills, urine from new catheter sent for culture.  Lorenzo LAL have reviewed and agree with medical assistance documentation above

## 2023-08-08 ENCOUNTER — TELEPHONE (OUTPATIENT)
Dept: UROLOGY | Facility: CLINIC | Age: 78
End: 2023-08-08
Payer: MEDICARE

## 2023-08-08 LAB — BACTERIA SPEC AEROBE CULT: NORMAL

## 2023-08-08 NOTE — TELEPHONE ENCOUNTER
V/m left for patient    ----- Message from MARY Fragoso sent at 8/8/2023  9:50 AM CDT -----  Mixed anisa noted. No abx warranted at this time unless remains symptomatic at which time recommend sending repeat fresh straight cath specimen for resolve MDX

## 2023-08-08 NOTE — PROGRESS NOTES
Mixed anisa noted. No abx warranted at this time unless remains symptomatic at which time recommend sending repeat fresh straight cath specimen for resolve MDX

## 2023-08-09 ENCOUNTER — OFFICE VISIT (OUTPATIENT)
Dept: INTERNAL MEDICINE | Facility: CLINIC | Age: 78
End: 2023-08-09
Payer: MEDICARE

## 2023-08-09 ENCOUNTER — HOSPITAL ENCOUNTER (OUTPATIENT)
Dept: GENERAL RADIOLOGY | Facility: HOSPITAL | Age: 78
Discharge: HOME | End: 2023-08-09
Admitting: INTERNAL MEDICINE
Payer: MEDICARE

## 2023-08-09 VITALS
HEIGHT: 70 IN | OXYGEN SATURATION: 97 % | HEART RATE: 56 BPM | SYSTOLIC BLOOD PRESSURE: 140 MMHG | DIASTOLIC BLOOD PRESSURE: 80 MMHG | WEIGHT: 172 LBS | BODY MASS INDEX: 24.62 KG/M2 | TEMPERATURE: 97.2 F

## 2023-08-09 DIAGNOSIS — M54.50 ACUTE BILATERAL LOW BACK PAIN WITHOUT SCIATICA: ICD-10-CM

## 2023-08-09 DIAGNOSIS — R33.9 URINE RETENTION: ICD-10-CM

## 2023-08-09 DIAGNOSIS — Z98.890 HISTORY OF BACK SURGERY: ICD-10-CM

## 2023-08-09 DIAGNOSIS — M54.50 ACUTE BILATERAL LOW BACK PAIN WITHOUT SCIATICA: Primary | ICD-10-CM

## 2023-08-09 PROCEDURE — 74018 RADEX ABDOMEN 1 VIEW: CPT

## 2023-08-09 RX ORDER — LOSARTAN POTASSIUM 25 MG/1
25 TABLET ORAL DAILY
Qty: 90 TABLET | Refills: 1 | Status: CANCELLED | OUTPATIENT
Start: 2023-08-09

## 2023-08-09 RX ORDER — METHYLPREDNISOLONE ACETATE 40 MG/ML
40 INJECTION, SUSPENSION INTRA-ARTICULAR; INTRALESIONAL; INTRAMUSCULAR; SOFT TISSUE ONCE
Status: COMPLETED | OUTPATIENT
Start: 2023-08-09 | End: 2023-08-09

## 2023-08-09 RX ADMIN — METHYLPREDNISOLONE ACETATE 40 MG: 40 INJECTION, SUSPENSION INTRA-ARTICULAR; INTRALESIONAL; INTRAMUSCULAR; SOFT TISSUE at 11:13

## 2023-08-09 NOTE — PROGRESS NOTES
Chief Complaint   Patient presents with    Back Pain     Reports something triggered back pain and wondering if it may be possible UTI. Urinalysis completed 8/7 results mixed anisa-no antibiotic prescribed. Symptoms for over 2 weeks now         History:  Sonu Bravo Jr. is a 78 y.o. male who presents today for evaluation of the above problems.      Acute visit for bilateral lower back pain that started 2 weeks ago.  He has chronic back issues that have been under good control as of late.  However, he developed worsening lower back pain after returning from [place] a couple weeks ago.  He was more active while there, and he drove there and back.  He states this type of pain is different than what he has experienced previously.  It is located bilaterally in his lower back.  He has side effects with oral steroids but has tolerated IM steroids in the past.  He has been taking 800 mg of ibuprofen if he needs it which provides minimal benefit.    He is concerned that he may have a urinary tract infection given his urinary retention.  He saw urology on Monday and had a urinary catheter exchange.  I do not have urinalysis results, but a urine culture showed mixed anisa.  It was felt he did not have a urinary tract infection.    He is called Dr. Cifuentes's office but cannot get in.      HPI      ROS:  Review of Systems    As above      Current Outpatient Medications:     acetaminophen (TYLENOL) 325 MG tablet, Take 2 tablets by mouth As Needed. Indications: Fever, Pain, Disp: , Rfl:     alfuzosin (UROXATRAL) 10 MG 24 hr tablet, Take 1 tablet by mouth Daily., Disp: 30 tablet, Rfl: 2    apixaban (Eliquis) 5 MG tablet tablet, Take 1 tablet by mouth 2 (Two) Times a Day., Disp: 180 tablet, Rfl: 1    bisoprolol (ZEBeta) 5 MG tablet, Take 1 tablet by mouth Daily., Disp: 90 tablet, Rfl: 1    Cholecalciferol 100 MCG (4000 UT) capsule, Take 4,000 Units by mouth Daily. Indications: Vitamin D Deficiency, Disp: , Rfl:     coenzyme  Q10 100 MG capsule, Take 1 capsule by mouth Daily. Indications: Heart Rhythm Disorder, Disp: , Rfl:     Cyanocobalamin 2500 MCG sublingual tablet, Take 2,500 mcg by mouth Daily. Indications: Inadequate Vitamin B12, Disp: , Rfl:     fexofenadine (ALLEGRA) 180 MG tablet, Take 1 tablet by mouth Daily. Indications: Hayfever, Disp: , Rfl:     fluticasone (FLONASE) 50 MCG/ACT nasal spray, 1 spray into the nostril(s) as directed by provider Daily., Disp: 32 g, Rfl: 3    levothyroxine (SYNTHROID, LEVOTHROID) 112 MCG tablet, Take 1 tablet by mouth Daily. Indications: Underactive Thyroid, Disp: 90 tablet, Rfl: 1    losartan (Cozaar) 25 MG tablet, Take 1 tablet by mouth Daily., Disp: 90 tablet, Rfl: 1    multivitamin with minerals tablet tablet, Take 1 tablet by mouth Daily. Indications: Vitamin Deficiency, Disp: , Rfl:     omeprazole (priLOSEC) 20 MG capsule, Take 1 capsule by mouth Daily., Disp: 90 capsule, Rfl: 3    rosuvastatin (CRESTOR) 20 MG tablet, Take 1 tablet by mouth Daily., Disp: 90 tablet, Rfl: 3    sennosides-docusate (senna-docusate sodium) 8.6-50 MG per tablet, Take 2 tablets by mouth Daily., Disp: 60 tablet, Rfl: 0    terazosin (HYTRIN) 5 MG capsule, TAKE 1 CAPSULE BY MOUTH EVERY NIGHT., Disp: 30 capsule, Rfl: 2    Zinc 50 MG capsule, Take 1 capsule by mouth Daily. Indications: Zinc Deficiency, Disp: , Rfl:   No current facility-administered medications for this visit.    Lab Results   Component Value Date    GLUCOSE 90 05/31/2023    BUN 20 05/31/2023    CREATININE 1.00 05/31/2023    EGFRRESULT 77.0 05/31/2023    EGFR 80.9 02/13/2023    BCR 20.0 05/31/2023    K 4.1 05/31/2023    CO2 28.7 05/31/2023    CALCIUM 9.4 05/31/2023    ALBUMIN 3.5 02/13/2023    BILITOT <0.2 02/13/2023    AST 21 02/13/2023    ALT 16 02/13/2023       WBC   Date Value Ref Range Status   02/13/2023 8.77 3.40 - 10.80 10*3/mm3 Final   01/12/2022 8.7 3.8 - 9.9 K/cumm Final     RBC   Date Value Ref Range Status   02/13/2023 3.43 (L) 4.14 -  5.80 10*6/mm3 Final     Hemoglobin   Date Value Ref Range Status   02/13/2023 10.2 (L) 13.0 - 17.7 g/dL Final   01/12/2022 12.0 (L) 13.0 - 17.5 g/dL Final     Hematocrit   Date Value Ref Range Status   02/13/2023 32.9 (L) 37.5 - 51.0 % Final   01/12/2022 35.8 (L) 38.9 - 50.3 % Final     MCV   Date Value Ref Range Status   02/13/2023 95.9 79.0 - 97.0 fL Final   01/12/2022 90.6 81.3 - 96.4 fL Final     MCH   Date Value Ref Range Status   02/13/2023 29.7 26.6 - 33.0 pg Final   01/12/2022 3.95 (L) 4.30 - 5.80 M/cumm Final   01/12/2022 30.4 27.1 - 33.3 pg Final     MCHC   Date Value Ref Range Status   02/13/2023 31.0 (L) 31.5 - 35.7 g/dL Final   01/12/2022 33.5 32.3 - 35.7 g/dL Final     RDW   Date Value Ref Range Status   02/13/2023 15.3 12.3 - 15.4 % Final     RDW-SD   Date Value Ref Range Status   02/13/2023 53.1 37.0 - 54.0 fl Final   01/12/2022 49.9 (H) 35.7 - 48.1 fL Final     MPV   Date Value Ref Range Status   02/13/2023 9.4 6.0 - 12.0 fL Final   01/12/2022 9.9 9.1 - 12.3 fL Final     Platelets   Date Value Ref Range Status   02/13/2023 337 140 - 450 10*3/mm3 Final   01/12/2022 151 150 - 400 K/cumm Final     Neutrophil %   Date Value Ref Range Status   01/29/2023 61.2 42.7 - 76.0 % Final     Lymphocyte %   Date Value Ref Range Status   01/29/2023 20.4 19.6 - 45.3 % Final     Monocyte %   Date Value Ref Range Status   01/29/2023 15.7 (H) 5.0 - 12.0 % Final     Eosinophil %   Date Value Ref Range Status   01/29/2023 1.7 0.3 - 6.2 % Final     Basophil %   Date Value Ref Range Status   01/29/2023 0.4 0.0 - 1.5 % Final     Immature Grans %   Date Value Ref Range Status   01/29/2023 0.6 (H) 0.0 - 0.5 % Final     Neutrophils, Absolute   Date Value Ref Range Status   01/29/2023 4.93 1.70 - 7.00 10*3/mm3 Final     Lymphocytes, Absolute   Date Value Ref Range Status   01/29/2023 1.64 0.70 - 3.10 10*3/mm3 Final     Monocytes, Absolute   Date Value Ref Range Status   01/29/2023 1.26 (H) 0.10 - 0.90 10*3/mm3 Final  "    Eosinophils, Absolute   Date Value Ref Range Status   01/29/2023 0.14 0.00 - 0.40 10*3/mm3 Final     Basophils, Absolute   Date Value Ref Range Status   01/29/2023 0.03 0.00 - 0.20 10*3/mm3 Final     Immature Grans, Absolute   Date Value Ref Range Status   01/29/2023 0.05 0.00 - 0.05 10*3/mm3 Final     nRBC   Date Value Ref Range Status   01/29/2023 0.0 0.0 - 0.2 /100 WBC Final         OBJECTIVE:  Visit Vitals  /80 (BP Location: Left arm, Patient Position: Sitting, Cuff Size: Adult)   Pulse 56   Temp 97.2 øF (36.2 øC) (Temporal)   Ht 177.8 cm (70\")   Wt 78 kg (172 lb)   SpO2 97%   BMI 24.68 kg/mý      Physical Exam  Vitals and nursing note reviewed.   Constitutional:       Appearance: Normal appearance. He is not ill-appearing.      Comments: Pleasant.  Accompanied by his wife.   Abdominal:      Tenderness: There is no abdominal tenderness. There is no right CVA tenderness or left CVA tenderness.   Genitourinary:     Comments: Urinary catheter in place  Musculoskeletal:        Back:    Neurological:      Mental Status: He is alert.       Assessment/Plan    Diagnoses and all orders for this visit:    1. Acute bilateral low back pain without sciatica (Primary)  -     XR Abdomen KUB; Future  -     methylPREDNISolone acetate (DEPO-medrol) injection 40 mg    2. Urine retention  -     XR Abdomen KUB; Future    3. History of back surgery  -     methylPREDNISolone acetate (DEPO-medrol) injection 40 mg      Will obtain a KUB to evaluate for possibility of any stones.  Suspect his recent increase in activity has caused his symptoms.  He has not done well with oral steroids in the past, so he received Depo-Medrol today.  Recommend he contact urology to see if he does need a straight catheter urinalysis with culture.    He may need to try to get in with Dr. Cifuentes if the acute pain does not improve    Keep next follow-up with Dr. Kramer or follow-up sooner if indicated.    Return for Next scheduled follow up with " Dr. Kramer.      CHANDA Chaves MD  10:33 CDT  8/9/2023

## 2023-08-15 ENCOUNTER — TREATMENT (OUTPATIENT)
Dept: PHYSICAL THERAPY | Facility: CLINIC | Age: 78
End: 2023-08-15
Payer: MEDICARE

## 2023-08-15 DIAGNOSIS — M48.062 SPINAL STENOSIS OF LUMBAR REGION WITH NEUROGENIC CLAUDICATION: ICD-10-CM

## 2023-08-15 DIAGNOSIS — M47.12 CERVICAL SPONDYLOSIS WITH MYELOPATHY: ICD-10-CM

## 2023-08-15 DIAGNOSIS — Z98.1 S/P LUMBAR FUSION: ICD-10-CM

## 2023-08-15 DIAGNOSIS — I10 ESSENTIAL HYPERTENSION: ICD-10-CM

## 2023-08-15 DIAGNOSIS — M54.50 ACUTE BILATERAL LOW BACK PAIN WITHOUT SCIATICA: Primary | ICD-10-CM

## 2023-08-15 RX ORDER — LOSARTAN POTASSIUM 25 MG/1
25 TABLET ORAL DAILY
Qty: 90 TABLET | Refills: 1 | Status: SHIPPED | OUTPATIENT
Start: 2023-08-15

## 2023-08-15 NOTE — PROGRESS NOTES
"                                                                Physical Therapy Treatment Note, 30 Day Progress Note, and 90 Day Recertification Note  [address] [place] [address]    Patient: Sonu Bravo Jr.                                                 Visit Date: 8/15/2023  :     1945    Referring practitioner:    Vaughn Kramer DO  Date of Initial Visit:          Type: THERAPY  Noted: 3/2/2023    Patient seen for 30 sessions    Visit Diagnoses:    ICD-10-CM ICD-9-CM   1. Acute bilateral low back pain without sciatica  M54.50 724.2     338.19   2. S/P lumbar fusion  Z98.1 V45.4   3. Cervical spondylosis with myelopathy  M47.12 721.1   4. Spinal stenosis of lumbar region with neurogenic claudication  M48.062 724.03     SUBJECTIVE     Subjective:He says he's \"not good\". He went to [place] and says his low back has been \"killing me\" ever since. He didn't have a UTI or kidney stone. He says until the last couple of weeks, his legs had been doing well. He has an appointment with Dr. Cifuentes in September.     PAIN: 0/10       OBJECTIVE     Objective        Therapy Education/Self Care [address]   Education offered today We discussed possible reasons for his continued struggle with difficulty walking and back pain. I explained some of his pain could be originating above and below his fusion and emphasizing the importance of improved flexibility through his hips and thoracic and improved strength through his core and hips to hold his spine more neutral with most activities.   I modified and clarified his HEP below and explained to him and his wife what to emphasize with these exercises.   With walking, emphasized walking with more stable hips and landing softer with every step.    [employer] Code Access Code: [email or web address]  URL: [email or web address]   Ongoing HEP     Date: 08/15/2023  Prepared by: Spike Ramirez    Exercises  - Supine Piriformis Stretch with Foot on Ground  - 2 x daily - 7 x " "weekly - 2 sets - 30 hold  - Supine Hip External Rotation  - 2 x daily - 7 x weekly - 2 sets - 10 reps  - Standing Hip Flexor Stretch  - 2 x daily - 7 x weekly - 2 sets - 10 reps  - Bird Dog on Counter  - 2 x daily - 7 x weekly - 2 sets - 10 reps  - Standing Hip Abduction with Counter Support  - 10 x daily - 7 x weekly - 2 sets - 10 reps - 10 hold  - Bird Dog  - 2 x daily - 7 x weekly - 2 sets - 10 reps  - Mini Lunge  - 2 x daily - 7 x weekly - 2 sets - 10 reps  - Mini Squat with Counter Support  - 2 x daily - 7 x weekly - 2 sets - 10 reps     Timed Minutes 45       Total Timed Treatment:      45   mins  Total Time of Visit:             45   mins         ASSESSMENT/PLAN     GOALS  Goals                                                  Progress Note due by 9/15/23                                                              Recert due by 11/12/23   LTG by: 6 weeks Comments Date Status   Improve  bilateral passive hip ext to 10 deg  10+ deg 4/4 MET   Able to wash and dry self in shower independently  He is able to do this independently, he has grab bars now 5/4 MET   Able to walk 20 minutes without exacerbation of pain  able to walk \"well over 20 min\" using a cane 4/4 MET   SLS either leg x 5 secs  L 4 sec, R 5 sec 6/1 MET   Reports pain no greater than 1-2/10 when it does occur.  0 5/25 MET   Improve Modified Oswestry to 10 5/50 5/4 MET   Pt will improve FGA to >/=22/30 23 7/14 Met   Pt will perform 6 MWT outdoors variable surfaces w/ Kimberly RPE </=10 Walked on curb EC 7/14 ongoing   Pt will improve miniBEST to >/=22/28 22 7/14 ongoing   Independent with HEP for flexibility and core/hip stability. Updated and reinforced the importance of frequency through the day to make the best improved 7/14 MET       Assessment & Plan       Assessment  Impairments: abnormal muscle firing, abnormal or restricted ROM, impaired balance, impaired physical strength and pain with function   Functional limitations: carrying objects, " lifting, walking, pulling, pushing, sitting, standing and unable to perform repetitive tasks   Prognosis: good    Plan  Planned modality interventions: cryotherapy, iontophoresis, low level laser therapy, TENS, thermotherapy (hydrocollator packs), traction, ultrasound and electrical stimulation/Russian stimulation  Planned therapy interventions: abdominal trunk stabilization, manual therapy, balance/weight-bearing training, flexibility, functional ROM exercises, home exercise program, joint mobilization, soft tissue mobilization, spinal/joint mobilization, strengthening, stretching, therapeutic activities, postural training, gait training and neuromuscular re-education  Frequency: 1-2x/week.  Duration in weeks: 8  Treatment plan discussed with: patient       ASSESSMENT:   Overall, he is better but still has some areas that he is struggling. His quads will suddenly become very weak and difficult to support his weight weight with walking. His quad weakness may be the segment above his surgery being irritated with the increased lateral shift with walking. The same for his lower lumbar causing more facet irritation. I reinforced his HEP for focus more on frequency of functional strengthening. This may take months for him to be fully strong enough to protect his spine adequately. He sees Dr. Cifuentes next month and I advised we could see where he stands then and if he might need to return to PT vs continue with his HEP independently. We scheduled to have them in case he returns.     PLAN:   Hold til he sees his MD and possibly return to PT with emphasis on progression of his HEP for hip and core stability.     SIGNATURE: Spike Ramirez, PT, [place] License #: [id number]  Electronically Signed on 8/15/2023    Clinical Progress: improved  Home Program Compliance: Yes  Progress toward previous goals: Partially Met      90 Day Recertification  Certification Period: 8/15/2023 through 11/12/2023  I certify that the therapy services  are furnished while this patient is under my care.  The services outlined above are required by this patient, and will be reviewed every 90 days.     PHYSICIAN: Vaughn Kramer DO (NPI: [id number])    Signature:___________________________________________DATE: _________    Please sign and return via fax to [phone].   Thank you so much for letting us work with Sonu. I appreciate your letting us work with your patients. If you have any questions or concerns, please don't hesitate to contact me.              [address] [address]  [phone]

## 2023-09-06 ENCOUNTER — OFFICE VISIT (OUTPATIENT)
Dept: NEUROSURGERY | Facility: CLINIC | Age: 78
End: 2023-09-06
Payer: MEDICARE

## 2023-09-06 VITALS — BODY MASS INDEX: 24.77 KG/M2 | HEIGHT: 70 IN | WEIGHT: 173 LBS

## 2023-09-06 DIAGNOSIS — Z98.1 S/P LUMBAR FUSION: Primary | ICD-10-CM

## 2023-09-06 NOTE — PROGRESS NOTES
No chief complaint on file.       HPI:   Interval History: Sonu returns today postoperatively after having undergone two-level anterior cervical discectomy fusion on 10/2022.  And subsequently undergoing L2-L5 instrumented fusion 1/2023.    In regards to his neck he has little to no pain.  He does have some difficulty swallowing.  He is able to get food down.  Finds that he eats too fast and occasionally when he slows and chews more he does better.    In regards to his low back.  His pain is 5 out of 10.  100% back pain.  He does have some numbness and tingling radiating around to his groin and then his anterior thighs.  He also has numbness and tingling in his low feet but this is most likely attributed to peripheral neuropathy.  He has been having constipation requiring digital impaction for bowel movements.  He also has an atonic urinary bladder for which she is seeing Erazomaritza Butler.  Of note Sonu had a longstanding lumbar stenosis prior to decompression.  No atrophy in the feet.  He continues to work with physical therapy.  Is undergone 45 sessions with Dr. Spike Ramirez.  Patient's back pain was acutely exacerbated after a fishing trip in July 2022.  Currently he walks with a cane.        Current Outpatient Medications   Medication Sig Dispense Refill    acetaminophen (TYLENOL) 325 MG tablet Take 2 tablets by mouth As Needed. Indications: Fever, Pain      alfuzosin (UROXATRAL) 10 MG 24 hr tablet Take 1 tablet by mouth Daily. 30 tablet 2    apixaban (Eliquis) 5 MG tablet tablet Take 1 tablet by mouth 2 (Two) Times a Day. 180 tablet 1    bisoprolol (ZEBeta) 5 MG tablet Take 1 tablet by mouth Daily. 90 tablet 1    Cholecalciferol 100 MCG (4000 UT) capsule Take 4,000 Units by mouth Daily. Indications: Vitamin D Deficiency      coenzyme Q10 100 MG capsule Take 1 capsule by mouth Daily. Indications: Heart Rhythm Disorder      Cyanocobalamin 2500 MCG sublingual tablet Take 2,500 mcg by mouth Daily. Indications:  Inadequate Vitamin B12      fexofenadine (ALLEGRA) 180 MG tablet Take 1 tablet by mouth Daily. Indications: Hayfever      fluticasone (FLONASE) 50 MCG/ACT nasal spray 1 spray into the nostril(s) as directed by provider Daily. 32 g 3    levothyroxine (SYNTHROID, LEVOTHROID) 112 MCG tablet Take 1 tablet by mouth Daily. Indications: Underactive Thyroid 90 tablet 1    losartan (Cozaar) 25 MG tablet Take 1 tablet by mouth Daily. 90 tablet 1    multivitamin with minerals tablet tablet Take 1 tablet by mouth Daily. Indications: Vitamin Deficiency      omeprazole (priLOSEC) 20 MG capsule Take 1 capsule by mouth Daily. 90 capsule 3    rosuvastatin (CRESTOR) 20 MG tablet Take 1 tablet by mouth Daily. 90 tablet 3    sennosides-docusate (senna-docusate sodium) 8.6-50 MG per tablet Take 2 tablets by mouth Daily. 60 tablet 0    terazosin (HYTRIN) 5 MG capsule TAKE 1 CAPSULE BY MOUTH EVERY NIGHT. 30 capsule 2    Zinc 50 MG capsule Take 1 capsule by mouth Daily. Indications: Zinc Deficiency       No current facility-administered medications for this visit.       Vital Signs  There were no vitals taken for this visit.  Physical Exam  Eyes:      Extraocular Movements: EOM normal.      Pupils: Pupils are equal, round, and reactive to light.   Neurological:      Mental Status: He is oriented to person, place, and time.      Gait: Gait is intact.      Deep Tendon Reflexes:      Reflex Scores:       Tricep reflexes are 2+ on the right side and 2+ on the left side.       Bicep reflexes are 2+ on the right side and 2+ on the left side.       Brachioradialis reflexes are 2+ on the right side and 2+ on the left side.       Patellar reflexes are 3+ on the right side and 3+ on the left side.       Achilles reflexes are 2+ on the right side and 2+ on the left side.  Psychiatric:         Speech: Speech normal.     Neurologic Exam     Mental Status   Oriented to person, place, and time.   Attention: normal. Concentration: normal.   Speech: speech  is normal   Level of consciousness: alert    Cranial Nerves     CN II   Visual fields full to confrontation.     CN III, IV, VI   Pupils are equal, round, and reactive to light.  Extraocular motions are normal.     CN V   Facial sensation intact.     CN VII   Facial expression full, symmetric.     CN VIII   CN VIII normal.     CN IX, X   CN IX normal.     CN XI   CN XI normal.     Motor Exam   Right arm tone: normal  Left arm tone: normal  Right leg tone: increased  Left leg tone: increased    Strength   Right deltoid: 5/5  Left deltoid: 5/5  Right biceps: 5/5  Left biceps: 5/5  Right triceps: 5/5  Left triceps: 5/5  Right wrist extension: 5/5  Left wrist extension: 5/5  Right interossei: 5/5  Left interossei: 5/5  Right iliopsoas: 5/5  Left iliopsoas: 5/5  Right quadriceps: 5/5  Left quadriceps: 5/5  Right anterior tibial: 5/5  Left anterior tibial: 5/5  Right gastroc: 5/5  Left gastroc: 5/5  Right EHL 5/5  Left EHL 5/5       Sensory Exam   Right arm light touch: normal  Left arm light touch: normal  Right leg light touch: decreased from knee  Left leg light touch: decreased from knee  Sensory deficit distribution on right: L1  Sensory deficit distribution on left: L1  Improved sensation from knees to his ankles.  Some numbness and tingling in his feet most likely secondary from peripheral neuropathy.     Gait, Coordination, and Reflexes     Gait  Gait: normal    Tremor   Resting tremor: absent  Intention tremor: absent  Action tremor: absent    Reflexes   Right brachioradialis: 2+  Left brachioradialis: 2+  Right biceps: 2+  Left biceps: 2+  Right triceps: 2+  Left triceps: 2+  Right patellar: 3+  Left patellar: 3+  Right achilles: 2+  Left achilles: 2+  Right plantar: upgoing  Left plantar: upgoing  Right Joel: absent  Left Joel: absent  Right ankle clonus: absent  Left ankle clonus: absent  Right pendular knee jerk: absent  Left pendular knee jerk: absentMore upright and no longer kyphotic kyphotic with  cane instead of walker as at last visit       Incision: Clean dry and intact.  Some abdominal weakness on the right side.      Results Review:   XR Abdomen KUB    Result Date: 8/9/2023  Moderate constipation. The volume of stool within the colon partially obscures the kidneys and ureters, no definite ureterolithiasis is identified. This report was finalized on 08/09/2023 11:38 by Dr. Aakash Carolina MD.       Male  strength (pounds)  AGE Right Hand RH Norms Left Hand LH Norms   20-24   121+20.6   104+21.8   25-29   120+23.0   110+16.2   30-34   121+22.4   110+21.7   35-39   119+24   113+21.7   40-44   117+20.7   112+18.7   45-49   110+23.0   101+22.8   50-54   113+18.1   102+17   55-59   101+26.7   83+23.4   60-64   90+20.4   77+20.3   65-69   91+20.6   76.8+19.8   70-74   75+21.5   65+18.1   75+ 59>73 66+21.0 75>69 55+17.0   (DANIELLE Cleary et al; Hand Dynometer: Effects of trials and sessions.  Perpetual and Motor Skills 61:195-8, 1985)  Key  * = Dominant hand  > = Intervention     Incision: WOUND IMAGE - Sp ACDF (10/31/2022)  (Consent to obtain photo of postoperative site for documentation purposes only obtained verbally by Mr. Bravo)     Review of results:                  AP and lateral x-rays without evidence of hardware failure or fracture      Postoperative MRI showing good decompression and placement of all grafts.      ABIs  IMPRESSION:  Impression:  1. No significant arterial insufficiency of the right lower extremity at  rest.  2. Mild arterial insufficiency of the left lower extremity at rest.        Assessment/Plan:   Cervical spondylitic myelopathy  Status post ACDF C4-5 and C5-6  -Continue routine postoperative care     Lumbar back pain  Bilateral lower extremity numbness and tingling  Lumbar Spondylolisthesis, L2/3, 3/4 and 4/5  Lumbar Stenosis secondary to above  Status post L2-5 instrumented fusion  Sonu, his wife, and I discussed his recovery.  He is off of his postoperative pain medication only  taking some muscle relaxants.  We reviewed his postoperative MRI.  This shows no new area of stenosis or adjacent segment disease.  His x-rays show no evidence of hardware complication.  Unfortunately I believe that his weakness and difficulty walking as well as bowel and bladder issues were due to long-term compression from both his cervical spine, lumbar spine and peripheral neuropathy.  We did discuss briefly a spinal cord stimulator today.  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    Radicular pain to bilateral hips versus L1 radiculopathy  MRI shows no evidence of stenosis or surgical instability.  He still has persistent back and difficulty walking even after physical therapy.  Given his persistent difficulty with walking I would like to check his thoracic spine for stenosis.  -MRI of the T-spine without contrast.    Suicidal Thoughts, new  Patient stated today that if his pain does not improve he would consider taking his own life  - Discussed with PCP  - recommended to patient to consider taking antidepressant  - No active plans to hurt self or others.  - Agreed to not self harm until seeing PCP    Tobacco abuse  The patient understands the many dangers of continuing to use tobacco.  If quitting becomes an increased priority Sonu knows to contact us for help with quitting.        I spent 38 minutes caring for Sonu on this date of service. This time includes time spent by me in the following activities: preparing for the visit, reviewing tests, obtaining and/or reviewing a separately obtained history, performing a medically appropriate examination and/or evaluation, counseling and educating the patient/family/caregiver, ordering medications, tests, or procedures, referring and communicating with other health care professionals, documenting information in the medical record,  independently interpreting results and communicating that information with the patient/family/caregiver, and/or care coordination.      Medical Decision Making (2/3)  Problem Points (2,3,4 or more)  Undiagnosed new problem (Mod)  Chronic Stable, 2 or more (Mod)  Data Points (2,3,4 or more)  CATEGORY 1  Imaging Independent Review = 3  Reviewed other tests (EMG, NCS, JAZMIN, echo, ekg, PFT) = 1.  CATEGORY 2  Independent interpretation of test performed by another physician/NPP (Cat 2)  CATEGORY 3  Risk (Low, Mod, High)  LOW  PT/OT (Low)    E/M = MDM 2 out of 3   or  Time  Est Level 4 - 68739 = Mod (Cat1=3pts or Cat2 or Cat3)   or   45-59 min        No diagnosis found.      There are no diagnoses linked to this encounter.      I discussed the patients findings and my recommendations with patient    Durga Cifuentes MD

## 2023-09-11 ENCOUNTER — LAB (OUTPATIENT)
Dept: LAB | Facility: HOSPITAL | Age: 78
End: 2023-09-11
Payer: MEDICARE

## 2023-09-11 ENCOUNTER — OFFICE VISIT (OUTPATIENT)
Dept: INTERNAL MEDICINE | Facility: CLINIC | Age: 78
End: 2023-09-11
Payer: MEDICARE

## 2023-09-11 VITALS
DIASTOLIC BLOOD PRESSURE: 93 MMHG | HEIGHT: 70 IN | WEIGHT: 174.5 LBS | SYSTOLIC BLOOD PRESSURE: 173 MMHG | BODY MASS INDEX: 24.98 KG/M2 | RESPIRATION RATE: 16 BRPM

## 2023-09-11 DIAGNOSIS — E03.9 ACQUIRED HYPOTHYROIDISM: ICD-10-CM

## 2023-09-11 DIAGNOSIS — E78.2 MIXED HYPERLIPIDEMIA: ICD-10-CM

## 2023-09-11 DIAGNOSIS — R73.02 IMPAIRED GLUCOSE TOLERANCE: ICD-10-CM

## 2023-09-11 DIAGNOSIS — M48.062 SPINAL STENOSIS, LUMBAR REGION WITH NEUROGENIC CLAUDICATION: Primary | ICD-10-CM

## 2023-09-11 DIAGNOSIS — K59.04 CHRONIC IDIOPATHIC CONSTIPATION: ICD-10-CM

## 2023-09-11 DIAGNOSIS — I10 ESSENTIAL HYPERTENSION: ICD-10-CM

## 2023-09-11 PROBLEM — K57.20 COLONIC DIVERTICULAR ABSCESS: Status: RESOLVED | Noted: 2021-10-11 | Resolved: 2023-09-11

## 2023-09-11 LAB
ALBUMIN SERPL-MCNC: 4.8 G/DL (ref 3.5–5.2)
ALBUMIN/GLOB SERPL: 1.8 G/DL
ALP SERPL-CCNC: 71 U/L (ref 39–117)
ALT SERPL W P-5'-P-CCNC: 21 U/L (ref 1–41)
ANION GAP SERPL CALCULATED.3IONS-SCNC: 12 MMOL/L (ref 5–15)
AST SERPL-CCNC: 21 U/L (ref 1–40)
BILIRUB SERPL-MCNC: 0.3 MG/DL (ref 0–1.2)
BUN SERPL-MCNC: 13 MG/DL (ref 8–23)
BUN/CREAT SERPL: 13.7 (ref 7–25)
CALCIUM SPEC-SCNC: 9.4 MG/DL (ref 8.6–10.5)
CHLORIDE SERPL-SCNC: 105 MMOL/L (ref 98–107)
CHOLEST SERPL-MCNC: 128 MG/DL (ref 0–200)
CO2 SERPL-SCNC: 27 MMOL/L (ref 22–29)
CREAT SERPL-MCNC: 0.95 MG/DL (ref 0.76–1.27)
DEPRECATED RDW RBC AUTO: 56.7 FL (ref 37–54)
EGFRCR SERPLBLD CKD-EPI 2021: 81.4 ML/MIN/1.73
ERYTHROCYTE [DISTWIDTH] IN BLOOD BY AUTOMATED COUNT: 17.8 % (ref 12.3–15.4)
GLOBULIN UR ELPH-MCNC: 2.6 GM/DL
GLUCOSE SERPL-MCNC: 96 MG/DL (ref 65–99)
HBA1C MFR BLD: 6.1 % (ref 4.8–5.6)
HCT VFR BLD AUTO: 44.3 % (ref 37.5–51)
HDLC SERPL-MCNC: 45 MG/DL (ref 40–60)
HGB BLD-MCNC: 13.6 G/DL (ref 13–17.7)
LDLC SERPL CALC-MCNC: 58 MG/DL (ref 0–100)
LDLC/HDLC SERPL: 1.21 {RATIO}
MCH RBC QN AUTO: 26.6 PG (ref 26.6–33)
MCHC RBC AUTO-ENTMCNC: 30.7 G/DL (ref 31.5–35.7)
MCV RBC AUTO: 86.5 FL (ref 79–97)
PLATELET # BLD AUTO: 194 10*3/MM3 (ref 140–450)
PMV BLD AUTO: 9.2 FL (ref 6–12)
POTASSIUM SERPL-SCNC: 4 MMOL/L (ref 3.5–5.2)
PROT SERPL-MCNC: 7.4 G/DL (ref 6–8.5)
RBC # BLD AUTO: 5.12 10*6/MM3 (ref 4.14–5.8)
SODIUM SERPL-SCNC: 144 MMOL/L (ref 136–145)
TRIGL SERPL-MCNC: 142 MG/DL (ref 0–150)
TSH SERPL DL<=0.05 MIU/L-ACNC: 3.34 UIU/ML (ref 0.27–4.2)
VLDLC SERPL-MCNC: 25 MG/DL (ref 5–40)
WBC NRBC COR # BLD: 7.66 10*3/MM3 (ref 3.4–10.8)

## 2023-09-11 PROCEDURE — 84443 ASSAY THYROID STIM HORMONE: CPT

## 2023-09-11 PROCEDURE — 85027 COMPLETE CBC AUTOMATED: CPT

## 2023-09-11 PROCEDURE — 80061 LIPID PANEL: CPT

## 2023-09-11 PROCEDURE — 80053 COMPREHEN METABOLIC PANEL: CPT

## 2023-09-11 PROCEDURE — 83036 HEMOGLOBIN GLYCOSYLATED A1C: CPT

## 2023-09-11 PROCEDURE — 36415 COLL VENOUS BLD VENIPUNCTURE: CPT

## 2023-09-11 RX ORDER — CIPROFLOXACIN 500 MG/1
500 TABLET, FILM COATED ORAL 2 TIMES DAILY
COMMUNITY
Start: 2023-09-07 | End: 2023-09-15

## 2023-09-11 RX ORDER — LOSARTAN POTASSIUM 50 MG/1
50 TABLET ORAL DAILY
Qty: 90 TABLET | Refills: 1 | Status: SHIPPED | OUTPATIENT
Start: 2023-09-11

## 2023-09-11 RX ORDER — SENNOSIDES 8.6 MG
17.2 CAPSULE ORAL NIGHTLY
Qty: 60 EACH | Refills: 3 | Status: SHIPPED | OUTPATIENT
Start: 2023-09-11

## 2023-09-11 RX ORDER — TERAZOSIN 2 MG/1
2 CAPSULE ORAL NIGHTLY
COMMUNITY

## 2023-09-11 NOTE — PROGRESS NOTES
The ABCs of the Annual Wellness Visit  Subsequent Medicare Wellness Visit    Subjective    Sonu Bravo Jr. is a 78 y.o. male who presents for a Subsequent Medicare Wellness Visit.    The following portions of the patient's history were reviewed and   updated as appropriate: allergies, current medications, past family history, past medical history, past social history, past surgical history, and problem list.    Compared to one year ago, the patient feels his physical   health is the same.    Compared to one year ago, the patient feels his mental   health is worse.    Recent Hospitalizations:  This patient has had a [facility] admission record on file within the last 365 days.    Current Medical Providers:  Patient Care Team:  Vaughn Kramer DO as PCP - General (Internal Medicine)  Thomas Dc DO as Consulting Physician (Gastroenterology)  Tien Canchola MD as Consulting Physician (Pulmonary Disease)  Cb Fairbanks MD as Surgeon (General Surgery)  Durga Cifuentes MD as Surgeon (Neurosurgery)  Brennan Salinas APRN as Nurse Practitioner (Nurse Practitioner)  Jan Saleem DO as Consulting Physician (Pain Medicine)    Outpatient Medications Prior to Visit   Medication Sig Dispense Refill    acetaminophen (TYLENOL) 325 MG tablet Take 2 tablets by mouth As Needed. Indications: Fever, Pain      apixaban (Eliquis) 5 MG tablet tablet Take 1 tablet by mouth 2 (Two) Times a Day. 180 tablet 1    bisoprolol (ZEBeta) 5 MG tablet Take 1 tablet by mouth Daily. 90 tablet 1    Cholecalciferol 100 MCG (4000 UT) capsule Take 4,000 Units by mouth Daily. Indications: Vitamin D Deficiency      ciprofloxacin (CIPRO) 500 MG tablet Take 1 tablet by mouth 2 (Two) Times a Day.      coenzyme Q10 100 MG capsule Take 1 capsule by mouth Daily. Indications: Heart Rhythm Disorder      Cyanocobalamin 2500 MCG sublingual tablet Take 2,500 mcg by mouth Daily. Indications: Inadequate Vitamin B12       fexofenadine (ALLEGRA) 180 MG tablet Take 1 tablet by mouth Daily. Indications: Hayfever      fluticasone (FLONASE) 50 MCG/ACT nasal spray 1 spray into the nostril(s) as directed by provider Daily. 32 g 3    levothyroxine (SYNTHROID, LEVOTHROID) 112 MCG tablet Take 1 tablet by mouth Daily. Indications: Underactive Thyroid 90 tablet 1    omeprazole (priLOSEC) 20 MG capsule Take 1 capsule by mouth Daily. 90 capsule 3    rosuvastatin (CRESTOR) 20 MG tablet Take 1 tablet by mouth Daily. 90 tablet 3    terazosin (HYTRIN) 2 MG capsule Take 1 capsule by mouth Every Night.      Zinc 50 MG capsule Take 1 capsule by mouth Daily. Indications: Zinc Deficiency      alfuzosin (UROXATRAL) 10 MG 24 hr tablet Take 1 tablet by mouth Daily. 30 tablet 2    losartan (Cozaar) 25 MG tablet Take 1 tablet by mouth Daily. 90 tablet 1    multivitamin with minerals tablet tablet Take 1 tablet by mouth Daily. Indications: Vitamin Deficiency      sennosides-docusate (senna-docusate sodium) 8.6-50 MG per tablet Take 2 tablets by mouth Daily. 60 tablet 0    terazosin (HYTRIN) 5 MG capsule TAKE 1 CAPSULE BY MOUTH EVERY NIGHT. 30 capsule 2     No facility-administered medications prior to visit.       No opioid medication identified on active medication list. I have reviewed chart for other potential  high risk medication/s and harmful drug interactions in the elderly.        Aspirin is not on active medication list.  Aspirin use is not indicated based on review of current medical condition/s. Risk of harm outweighs potential benefits.  .    Patient Active Problem List   Diagnosis    Abdominal aortic ectasia    Vitamin D deficiency    Mixed hyperlipidemia    Acquired hypothyroidism    Gastro-esophageal reflux disease with esophagitis    Essential hypertension    Former smoker    Paroxysmal atrial fibrillation    Current use of long term anticoagulation    Cavitary lesion of lung    History of DVT (deep vein thrombosis)    Cervical spondylosis with  "myelopathy    Spinal stenosis, lumbar region with neurogenic claudication    Acute urinary retention    Impaired glucose tolerance     Advance Care Planning   Advance Care Planning     Advance Directive is on file.  ACP discussion was held with the patient during this visit. Patient has an advance directive in EMR which is still valid.      Objective    Vitals:    23 1413 23 1528   BP: (!) 187/97 173/93   BP Location: Left arm    Patient Position: Sitting    Cuff Size: Adult    Resp: 16    Weight: 79.2 kg (174 lb 8 oz)    Height: 177.8 cm (70\")      Estimated body mass index is 25.04 kg/m² as calculated from the following:    Height as of this encounter: 177.8 cm (70\").    Weight as of this encounter: 79.2 kg (174 lb 8 oz).    BMI is >= 25 and <30. (Overweight) The following options were offered after discussion;: exercise counseling/recommendations and nutrition counseling/recommendations      Does the patient have evidence of cognitive impairment? No    Lab Results   Component Value Date    GLU 91 2023    TRIG 142 2023    HDL 45 2023    LDL 58 2023    VLDL 25 2023    HGBA1C 6.10 (H) 2023        HEALTH RISK ASSESSMENT    Smoking Status:  Social History     Tobacco Use   Smoking Status Every Day    Packs/day: 0.50    Years: 52.00    Pack years: 26.00    Types: Cigarettes    Start date:     Passive exposure: Current   Smokeless Tobacco Never   Tobacco Comments    down to 1/2 pack a day 23--23     Alcohol Consumption:  Social History     Substance and Sexual Activity   Alcohol Use Yes    Comment: rare     Fall Risk Screen:    STEADI Fall Risk Assessment was completed, and patient is at MODERATE risk for falls. Assessment completed on:2023    Depression Screenin/11/2023     2:24 PM   PHQ-2/PHQ-9 Depression Screening   Little Interest or Pleasure in Doing Things 0-->not at all   Feeling Down, Depressed or Hopeless 0-->not at all   PHQ-9: Brief " Depression Severity Measure Score 0       Health Habits and Functional and Cognitive Screenin/11/2023     2:00 PM   Functional & Cognitive Status   Do you have difficulty preparing food and eating? No   Do you have difficulty bathing yourself, getting dressed or grooming yourself? No   Do you have difficulty using the toilet? No   Do you have difficulty moving around from place to place? No   Do you have trouble with steps or getting out of a bed or a chair? No   Current Diet Unhealthy Diet   Dental Exam Up to date   Eye Exam Up to date   Exercise (times per week) 3 times per week   Current Exercises Include Other   Do you need help using the phone?  No   Are you deaf or do you have serious difficulty hearing?  No   Do you need help to go to places out of walking distance? Yes   Do you need help shopping? Yes   Do you need help preparing meals?  No   Do you need help with housework?  No   Do you need help with laundry? No   Do you need help taking your medications? No   Do you need help managing money? No   Do you ever drive or ride in a car without wearing a seat belt? No   Have you felt unusual stress, anger or loneliness in the last month? No   Who do you live with? Spouse   If you need help, do you have trouble finding someone available to you? No   Have you been bothered in the last four weeks by sexual problems? No   Do you have difficulty concentrating, remembering or making decisions? No       Age-appropriate Screening Schedule:  Refer to the list below for future screening recommendations based on patient's age, sex and/or medical conditions. Orders for these recommended tests are listed in the plan section. The patient has been provided with a written plan.    Health Maintenance   Topic Date Due    COVID-19 Vaccine (5 - Moderna series) 2023    ANNUAL WELLNESS VISIT  2023    INFLUENZA VACCINE  10/01/2023    BMI FOLLOWUP  2024    DXA SCAN  2024    LIPID PANEL  2024     COLORECTAL CANCER SCREENING  09/28/2024    TDAP/TD VACCINES (2 - Td or Tdap) 11/01/2026    HEPATITIS C SCREENING  Completed    Pneumococcal Vaccine 65+  Completed    ZOSTER VACCINE  Completed                  CMS Preventative Services Quick Reference  Risk Factors Identified During Encounter  Chronic Pain:  Seeing pain management.   Fall Risk-High or Moderate: Discussed Fall Prevention in the home  Immunizations Discussed/Encouraged: Tdap, Influenza, Shingrix, and COVID19  Dental Screening Recommended  Vision Screening Recommended  The above risks/problems have been discussed with the patient.  Pertinent information has been shared with the patient in the After Visit Summary.  An After Visit Summary and PPPS were made available to the patient.    Follow Up:   Next Medicare Wellness visit to be scheduled in 1 year.       Additional E&M Note during same encounter follows:  Patient has multiple medical problems which are significant and separately identifiable that require additional work above and beyond the Medicare Wellness Visit.      Chief Complaint  Back Pain    Subjective        HPI  Sonuean Bravo . is also being seen today for follow-up of his low back pain.  He is frustrated that he has not recovered.  He notes he was on vacation and when he returned it seems that his function had worsened according to testing at PT.  He saw Dr. Cifuentes last week who indicated that things may not get better for him in the long run.  Spinal stimulator is an option, but an MRI of his thoracic spine has also been ordered for further evaluation.  He notes numbness from about his bellybutton to his knees.  He has been dealing with urinary retention and has been cathing 3 times daily under the direction of a urologist in [place].  He also has had issues with constipation.  A short trial of MiraLAX resulted in diarrhea and he stopped this.    Review of Systems   Respiratory:  Negative for shortness of breath.   "  Cardiovascular:  Negative for chest pain.   Musculoskeletal:  Positive for back pain and gait problem. Negative for neck pain.   Neurological:  Positive for dizziness and weakness.     Objective   Vital Signs:  /93   Resp 16   Ht 177.8 cm (70\")   Wt 79.2 kg (174 lb 8 oz)   BMI 25.04 kg/m²     Physical Exam  Constitutional:       General: He is not in acute distress.  Cardiovascular:      Rate and Rhythm: Normal rate and regular rhythm.      Heart sounds: Normal heart sounds. No murmur heard.  Pulmonary:      Effort: Pulmonary effort is normal.      Breath sounds: Normal breath sounds. No wheezing.   Musculoskeletal:      Comments: Antalgic gait   Neurological:      Mental Status: He is alert and oriented to person, place, and time.      Gait: Gait normal.   Psychiatric:         Mood and Affect: Mood normal.         Behavior: Behavior normal.                       Assessment and Plan   Diagnoses and all orders for this visit:    1. Spinal stenosis, lumbar region with neurogenic claudication (Primary)  Agree with MRI T spine since he is having some symptoms in the dermatomal territory of the lower T spine. He also has option to consider spinal stimulator with Dr. Saleem's evaluation if not improving.  He admitted he had thoughts of whether he would want to continue to live like this, but admits he would not carry out any thoughts of hurting himself unless he knew there was no hope with improvement.  We discussed there are still many steps to consider further before we would concede that things will get no better.  This discussion was somewhat encouraging to him and he declines any pharmacologic or psychologic intervention at this time with regard to his mental health.    2. Chronic idiopathic constipation  -     Sennosides (Senna) 8.6 MG capsule; Take 17.2 mg by mouth Every Night.  Dispense: 60 each; Refill: 3  -     Ambulatory Referral to Gastroenterology  Add senna and use miralax QOD to manage. He " requests referral to Dr. Dc for consideration of association with previous bowel issues or how this functional problem will affect him long-term.     3. Mixed hyperlipidemia  -     Lipid Panel; Future  Stable on moderate intensity statin therapy per ACC/AHA guidelines.    4. Essential hypertension  -     Comprehensive Metabolic Panel; Future  -     CBC (No Diff); Future  -     losartan (Cozaar) 50 MG tablet; Take 1 tablet by mouth Daily.  Dispense: 90 tablet; Refill: 1  Poorly controlled, BP goal for age is <140/90 per JNC 8 guidelines, and increase losartan.    5. Acquired hypothyroidism  -     TSH; Future    6. Impaired glucose tolerance  -     Hemoglobin A1c; Future             Follow Up   Return in about 3 months (around 12/11/2023) for Recheck.  Patient was given instructions and counseling regarding his condition or for health maintenance advice. Please see specific information pulled into the AVS if appropriate.

## 2023-09-11 NOTE — PATIENT INSTRUCTIONS
Medicare Wellness  Personal Prevention Plan of Service     Date of Office Visit:    Encounter Provider:  Vaughn Kramer DO  Place of Service:  [facility]  Patient Name: Sonu Bravo Jr.  :  1945    As part of the Medicare Wellness portion of your visit today, we are providing you with this personalized preventive plan of services (PPPS). This plan is based upon recommendations of the [place] Preventive Services Task Force (USPSTF) and the Advisory Committee on Immunization Practices (ACIP).    This lists the preventive care services that should be considered, and provides dates of when you are due. Items listed as completed are up-to-date and do not require any further intervention.    Health Maintenance   Topic Date Due    COVID-19 Vaccine (5 - Moderna series) 2023    INFLUENZA VACCINE  10/01/2023    BMI FOLLOWUP  2024    DXA SCAN  2024    ANNUAL WELLNESS VISIT  2024    LIPID PANEL  2024    COLORECTAL CANCER SCREENING  2024    TDAP/TD VACCINES (2 - Td or Tdap) 2026    HEPATITIS C SCREENING  Completed    Pneumococcal Vaccine 65+  Completed    ZOSTER VACCINE  Completed       Orders Placed This Encounter   Procedures    TSH     Standing Status:   Future     Number of Occurrences:   1     Standing Expiration Date:   9/10/2024     Order Specific Question:   Release to patient     Answer:   Routine Release [[id number]]    Comprehensive Metabolic Panel     Standing Status:   Future     Number of Occurrences:   1     Standing Expiration Date:   2024     Order Specific Question:   Release to patient     Answer:   Routine Release [[id number]]    Hemoglobin A1c     Standing Status:   Future     Number of Occurrences:   1     Standing Expiration Date:   2024     Order Specific Question:   Release to patient     Answer:   Routine Release [[id number]]    Lipid Panel     Standing Status:   Future     Number of  Occurrences:   1     Standing Expiration Date:   9/11/2024     Order Specific Question:   Release to patient     Answer:   Routine Release [[id number]]    CBC (No Diff)     Standing Status:   Future     Number of Occurrences:   1     Standing Expiration Date:   9/10/2024     Order Specific Question:   Release to patient     Answer:   Routine Release [[id number]]    Ambulatory Referral to Gastroenterology     Referral Priority:   Routine     Referral Type:   Consultation     Referral Reason:   Specialty Services Required     Referred to Provider:   Thomas Dc,      Requested Specialty:   Gastroenterology     Number of Visits Requested:   1       Return in about 3 months (around 12/11/2023) for Recheck.

## 2023-09-14 DIAGNOSIS — K21.00 GASTRO-ESOPHAGEAL REFLUX DISEASE WITH ESOPHAGITIS: ICD-10-CM

## 2023-09-14 RX ORDER — OMEPRAZOLE 20 MG/1
20 CAPSULE, DELAYED RELEASE ORAL DAILY
Qty: 90 CAPSULE | Refills: 3 | Status: SHIPPED | OUTPATIENT
Start: 2023-09-14

## 2023-09-18 ENCOUNTER — TREATMENT (OUTPATIENT)
Dept: PHYSICAL THERAPY | Facility: CLINIC | Age: 78
End: 2023-09-18
Payer: MEDICARE

## 2023-09-18 DIAGNOSIS — E03.9 ACQUIRED HYPOTHYROIDISM: ICD-10-CM

## 2023-09-18 DIAGNOSIS — Z98.1 S/P LUMBAR FUSION: ICD-10-CM

## 2023-09-18 DIAGNOSIS — M54.50 ACUTE BILATERAL LOW BACK PAIN WITHOUT SCIATICA: Primary | ICD-10-CM

## 2023-09-18 RX ORDER — LEVOTHYROXINE SODIUM 112 UG/1
TABLET ORAL
Qty: 90 TABLET | Refills: 1 | Status: SHIPPED | OUTPATIENT
Start: 2023-09-18

## 2023-09-18 NOTE — PROGRESS NOTES
Physical Therapy Treatment Note and 30 Day Progress Note  [address] [place] [address]    Patient: Sonu Bravo Jr.                                                 Visit Date: 2023  :     1945    Referring practitioner:    Vaughn Kramer DO  Date of Initial Visit:          Type: THERAPY  Noted: 3/2/2023    Patient seen for 31 sessions    Visit Diagnoses:    ICD-10-CM ICD-9-CM   1. Acute bilateral low back pain without sciatica  M54.50 724.2     338.19   2. S/P lumbar fusion  Z98.1 V45.4     SUBJECTIVE     Subjective:He reports he has had a major step backwards. He came back from vacation and has had more back pain.       PAIN: 4/10    PT G-Codes  Outcome Measure Options: Mini-Best Test    OBJECTIVE     Objective     Therapeutic Activities    [id number] Comments   Mini Best assessment                    Timed Minutes 20        Therapy Education/Self Care [id number]   Education offered today At length discussion addressing his and his wife's concerns, imaging results, recent wellness follow up and post op follow up with Dr. Myers, sleep patterns, metabolic changes with aging and exercise   Medbride Code [id number]    Ongoing HEP   Date: 08/15/2023  Prepared by: Spike Ramirez     Exercises  - Supine Piriformis Stretch with Foot on Ground  - 2 x daily - 7 x weekly - 2 sets - 30 hold  - Supine Hip External Rotation  - 2 x daily - 7 x weekly - 2 sets - 10 reps  - Standing Hip Flexor Stretch  - 2 x daily - 7 x weekly - 2 sets - 10 reps  - Bird Dog on Counter  - 2 x daily - 7 x weekly - 2 sets - 10 reps  - Standing Hip Abduction with Counter Support  - 10 x daily - 7 x weekly - 2 sets - 10 reps - 10 hold  - Bird Dog  - 2 x daily - 7 x weekly - 2 sets - 10 reps  - Mini Lunge  - 2 x daily - 7 x weekly - 2 sets - 10 reps  - Mini Squat with Counter Support  - 2 x daily - 7 x weekly - 2 sets - 10 reps      Timed Minutes 40       Total Timed  "Treatment:     60   mins  Total Time of Visit:             60   mins         ASSESSMENT/PLAN     GOALS  Goals                                                  Progress Note due by 10/18/23                                                              Recert due by 11/12/23   LTG by: 6 weeks Comments Date Status   Improve  bilateral passive hip ext to 10 deg  10+ deg 4/4 MET   Able to wash and dry self in shower independently  He is able to do this independently, he has grab bars now 5/4 MET   Able to walk 20 minutes without exacerbation of pain  able to walk \"well over 20 min\" using a cane 4/4 MET   SLS either leg x 5 secs  L 4 sec, R 5 sec 6/1 MET   Reports pain no greater than 1-2/10 when it does occur.  0 5/25 MET   Improve Modified Oswestry to 10 5/50 5/4 MET   Pt will improve FGA to >/=22/30 23 7/14 Met   Pt will perform 6 MWT outdoors variable surfaces w/ Kimberly RPE </=10 deferred due to time issues 9/18  ongoing   Pt will improve miniBEST to >/=22/28 22/28 today 9/18 ongoing   Independent with HEP for flexibility and core/hip stability. Updated and reinforced the importance of frequency through the day to make the best improved 7/14 MET       Assessment/Plan     ASSESSMENT:   He had had a recent follow up with his PCP Dr Kramer after his recent follow up with Dr. Myers and to summarize they were under the impression that he had made a statement that he was having thought of harming himself. He, his wife, and I spoke at length today in regards to his concerns. He's more frustrated about his recent set back than depressed although he uses the term \"down.\"  I inquired at length about his concerns and at one point he stated he was \"pissed off\" about his pain and numbness symptoms but more bothered by his strength and energy level. His wife stated that she is concerned about him falling because he leans against the wall when standing to cath. His Mini Best score increased by two points today from 20 to 22. " During his vacation there was a change in his sleep habits and he reported that when he was getting out of the boat onto the dock he felt something in his back and has been concerned since.     PLAN:   Focus on strength and endurance training as well as patient education and encouragement. Consider higher level cognitive tasks combined with challenging dynamic balance activities as well.     SIGNATURE: Ruiz Frias PTA [place] License #: [id number]  Electronically Signed on 9/18/2023        [address] [place] [address] [place] [address]  [phone]

## 2023-09-19 NOTE — PROGRESS NOTES
Progress Note Addendum      Patient: Sonu Bravo Jr.           : 1945  Visit Date: 2023  Referring practitioner: Vaughn Kramer DO  Date of Initial Visit: Type: THERAPY  Noted: 3/2/2023  Patient seen for 31 sessions  Visit Diagnoses:    ICD-10-CM ICD-9-CM   1. Acute bilateral low back pain without sciatica  M54.50 724.2     338.19   2. S/P lumbar fusion  Z98.1 V45.4       PT G-Codes  Outcome Measure Options: Mini-Best Test  Clinical Progress: unchanged  Home Program Compliance: Yes  Progress toward previous goals: Partially Met  Prognosis to achieve goals: fair    Objective     Assessment & Plan       Assessment  Impairments: abnormal muscle firing, abnormal or restricted ROM, impaired balance, impaired physical strength and pain with function   Functional limitations: carrying objects, lifting, walking, pulling, pushing, sitting, standing and unable to perform repetitive tasks   Assessment details: Sonu hasn't been here in a month due to being on vacation. He has been struggling with what appears to be nerve injuries. There are times when I will work around his anterior hips and he feels like his quads completely shut down very quickly. Other times, he is able to walk better and does OK. He is using a cath now as he wasn't emptying. He is quite frustrated and I'm not sure what to do to fix the sensitivity of his femoral nerve. I worry that above his fusion may be causing problems.   Prognosis: good    Plan  Planned modality interventions: cryotherapy, iontophoresis, low level laser therapy, TENS, thermotherapy (hydrocollator packs), traction, ultrasound and electrical stimulation/Russian stimulation  Planned therapy interventions: abdominal trunk stabilization, manual therapy, balance/weight-bearing training, flexibility, functional ROM exercises, home exercise program, joint mobilization, soft tissue mobilization, spinal/joint mobilization, strengthening, stretching, therapeutic activities,  postural training, gait training and neuromuscular re-education  Frequency: 1-2x/week.  Duration in weeks: 8  Treatment plan discussed with: patient      I reviewed the treatment and goals with Ruiz Frias PTA and agree with the POC.    SIGNATURE: Spike Ramirez PT, License #: [id number]  Electronically Signed on 9/19/2023

## 2023-09-25 ENCOUNTER — TREATMENT (OUTPATIENT)
Dept: PHYSICAL THERAPY | Facility: CLINIC | Age: 78
End: 2023-09-25

## 2023-09-25 DIAGNOSIS — Z98.1 S/P LUMBAR FUSION: ICD-10-CM

## 2023-09-25 DIAGNOSIS — M54.50 ACUTE BILATERAL LOW BACK PAIN WITHOUT SCIATICA: Primary | ICD-10-CM

## 2023-09-25 NOTE — PROGRESS NOTES
Physical Therapy Treatment Note  [address] [place] [address]    Patient: Sonu Bravo Jr.                                                 Visit Date: 2023  :     1945    Referring practitioner:    Vaughn Kramer DO  Date of Initial Visit:          Type: THERAPY  Noted: 3/2/2023    Patient seen for 32 sessions    Visit Diagnoses:    ICD-10-CM ICD-9-CM   1. Acute bilateral low back pain without sciatica  M54.50 724.2     338.19   2. S/P lumbar fusion  Z98.1 V45.4     SUBJECTIVE     Subjective:He reports he is till the same but he has some better days still having back pain and numbness      PAIN: 4/10         OBJECTIVE     Objective     Therapeutic Exercises    16408 Units Comments   Wall push with dynamometer   38.2 lbs max    Dynamometer pull   138.2 lbs max    B unilateral LE modified leg press with Dynamometer   L  Lbs R LE 91.7 lb   Resisted gait with TRX Rip Trainer x 40 ft x 6      Manual B pec and thoracic stretches in sitting with 1/2 roller      Resisted sit<>stands with TRX Rip [place]      B LAQ's on Nautilus  # 30 x 20          Timed Minutes 43        Therapy Education/Self Care 66431   Education offered today    Medbride Code [id number]     Ongoing HEP   Date: 08/15/2023  Prepared by: Spike Ramirez     Exercises  - Supine Piriformis Stretch with Foot on Ground  - 2 x daily - 7 x weekly - 2 sets - 30 hold  - Supine Hip External Rotation  - 2 x daily - 7 x weekly - 2 sets - 10 reps  - Standing Hip Flexor Stretch  - 2 x daily - 7 x weekly - 2 sets - 10 reps  - Bird Dog on Counter  - 2 x daily - 7 x weekly - 2 sets - 10 reps  - Standing Hip Abduction with Counter Support  - 10 x daily - 7 x weekly - 2 sets - 10 reps - 10 hold  - Bird Dog  - 2 x daily - 7 x weekly - 2 sets - 10 reps  - Mini Lunge  - 2 x daily - 7 x weekly - 2 sets - 10 reps  - Mini Squat with Counter Support  - 2 x daily - 7 x weekly - 2 sets - 10  "reps   Timed Minutes        Total Timed Treatment:     43   mins  Total Time of Visit:             43   mins         ASSESSMENT/PLAN     GOALS  Goals                                                  Progress Note due by 10/18/23                                                              Recert due by 11/12/23   LTG by: 6 weeks Comments Date Status   Improve  bilateral passive hip ext to 10 deg  10+ deg 4/4 MET   Able to wash and dry self in shower independently  He is able to do this independently, he has grab bars now 5/4 MET   Able to walk 20 minutes without exacerbation of pain  able to walk \"well over 20 min\" using a cane 4/4 MET   SLS either leg x 5 secs  L 4 sec, R 5 sec 6/1 MET   Reports pain no greater than 1-2/10 when it does occur.  0 5/25 MET   Improve Modified Oswestry to 10 5/50 5/4 MET   Pt will improve FGA to >/=22/30 23 7/14 Met   Pt will perform 6 MWT outdoors variable surfaces w/ Kimberly RPE </=10 deferred due to time issues 9/18  ongoing   Pt will improve miniBEST to >/=22/28 22/28 today 9/18 ongoing   Independent with HEP for flexibility and core/hip stability. Updated and reinforced the importance of frequency through the day to make the best improved 7/14 MET       Assessment/Plan     ASSESSMENT:   We utilized the digital dynamometer  for some objective strength measures today. We also focused on B LE functional strengthening tasks.    PLAN:   Continue to work on overall strengthening     SIGNATURE: Ruiz Frias PTA, [place] License #: [id number]   Electronically Signed on 9/25/2023        [address] [place] [address]. [address]  [phone]   "

## 2023-10-02 ENCOUNTER — TREATMENT (OUTPATIENT)
Dept: PHYSICAL THERAPY | Facility: CLINIC | Age: 78
End: 2023-10-02
Payer: MEDICARE

## 2023-10-02 ENCOUNTER — TELEPHONE (OUTPATIENT)
Dept: NEUROSURGERY | Facility: CLINIC | Age: 78
End: 2023-10-02
Payer: MEDICARE

## 2023-10-02 DIAGNOSIS — M54.50 ACUTE BILATERAL LOW BACK PAIN WITHOUT SCIATICA: Primary | ICD-10-CM

## 2023-10-02 DIAGNOSIS — Z98.1 S/P LUMBAR FUSION: Primary | ICD-10-CM

## 2023-10-02 DIAGNOSIS — Z98.1 S/P LUMBAR FUSION: ICD-10-CM

## 2023-10-02 DIAGNOSIS — M48.062 SPINAL STENOSIS OF LUMBAR REGION WITH NEUROGENIC CLAUDICATION: ICD-10-CM

## 2023-10-02 PROCEDURE — 97110 THERAPEUTIC EXERCISES: CPT | Performed by: PHYSICAL THERAPIST

## 2023-10-02 NOTE — TELEPHONE ENCOUNTER
Caller: Sonu Bravo Jr.    Relationship to patient: Self    Best call back number: [phone]    Patient is needing: PATIENT CALLED, PATIENT IS REQUESTING MRI BE ORDERED FOR HIS LUMBAR AS WELL. PLEASE REVIEW, PLEASE CALL PATIENT TO ADVISE.    THANK YOU

## 2023-10-02 NOTE — PROGRESS NOTES
Physical Therapy Treatment Note  [address] [place] [address]    Patient: Sonu Bravo Jr.                                                 Visit Date: 10/2/2023  :     1945    Referring practitioner:    Vaughn Kramer DO  Date of Initial Visit:          Type: THERAPY  Noted: 3/2/2023    Patient seen for 33 sessions    Visit Diagnoses:    ICD-10-CM ICD-9-CM   1. Acute bilateral low back pain without sciatica  M54.50 724.2     338.19   2. S/P lumbar fusion  Z98.1 V45.4     SUBJECTIVE     Subjective:He reports he is feeling about the same, no better no worse. Still has LBP will be having an MRI       PAIN: 4/10         OBJECTIVE     Objective     Therapeutic Exercises    05574 Units Comments   Push #75 sled 50 ft stripped 20 pushed another 90 ft stripped 20 pushed 90 ft stripped 15 pushed 50 ft     Push empty sled 70 added 20 pushed 90 ft added 15 pushed 50 ft added 10      Walking up side hill through [place] through parking lot and up back walkway with #20 vest               Timed Minutes 40        Therapy Education/Self Care 06121   Education offered today    Medbride Code [id number]      Ongoing HEP   Date: 08/15/2023  Prepared by: Spike aRmirez     Exercises  - Supine Piriformis Stretch with Foot on Ground  - 2 x daily - 7 x weekly - 2 sets - 30 hold  - Supine Hip External Rotation  - 2 x daily - 7 x weekly - 2 sets - 10 reps  - Standing Hip Flexor Stretch  - 2 x daily - 7 x weekly - 2 sets - 10 reps  - Bird Dog on Counter  - 2 x daily - 7 x weekly - 2 sets - 10 reps  - Standing Hip Abduction with Counter Support  - 10 x daily - 7 x weekly - 2 sets - 10 reps - 10 hold  - Bird Dog  - 2 x daily - 7 x weekly - 2 sets - 10 reps  - Mini Lunge  - 2 x daily - 7 x weekly - 2 sets - 10 reps  - Mini Squat with Counter Support  - 2 x daily - 7 x weekly - 2 sets - 10 reps   Timed Minutes        Total Timed Treatment:     40   mins  Total  "Time of Visit:             40   mins         ASSESSMENT/PLAN     GOALS  Goals                                                  Progress Note due by 10/18/23                                                              Recert due by 11/12/23   LTG by: 6 weeks Comments Date Status   Improve  bilateral passive hip ext to 10 deg  10+ deg 4/4 MET   Able to wash and dry self in shower independently  He is able to do this independently, he has grab bars now 5/4 MET   Able to walk 20 minutes without exacerbation of pain  able to walk \"well over 20 min\" using a cane 4/4 MET   SLS either leg x 5 secs  L 4 sec, R 5 sec 6/1 MET   Reports pain no greater than 1-2/10 when it does occur.  0 5/25 MET   Improve Modified Oswestry to 10 5/50 5/4 MET   Pt will improve FGA to >/=22/30 23 7/14 Met   Pt will perform 6 MWT outdoors variable surfaces w/ Kimberly RPE </=10 Performed outdoor ambulation with #20 vest today 10/2  ongoing   Pt will improve miniBEST to >/=22/28 22/28 today 9/18 ongoing   Independent with HEP for flexibility and core/hip stability. Updated and reinforced the importance of frequency through the day to make the best improved 7/14 MET       Assessment/Plan     ASSESSMENT:   He did exhibit a little fatigue throughout today's session and took occasional rests in standing.    PLAN:   Follow up on his authorization status and continue functional strengthening.    SIGNATURE: Ruiz Frias PTA, [place] License #: [id number]  Electronically Signed on 10/2/2023        [address] [place] [address] [place] [address]  [phone]   "

## 2023-10-06 ENCOUNTER — HOSPITAL ENCOUNTER (OUTPATIENT)
Dept: MRI IMAGING | Facility: HOSPITAL | Age: 78
Discharge: HOME | End: 2023-10-06
Admitting: NEUROLOGICAL SURGERY
Payer: MEDICARE

## 2023-10-06 DIAGNOSIS — Z98.1 S/P LUMBAR FUSION: ICD-10-CM

## 2023-10-06 PROCEDURE — 72146 MRI CHEST SPINE W/O DYE: CPT

## 2023-10-10 ENCOUNTER — TELEPHONE (OUTPATIENT)
Dept: INTERNAL MEDICINE | Facility: CLINIC | Age: 78
End: 2023-10-10

## 2023-10-10 ENCOUNTER — TREATMENT (OUTPATIENT)
Dept: PHYSICAL THERAPY | Facility: CLINIC | Age: 78
End: 2023-10-10
Payer: MEDICARE

## 2023-10-10 DIAGNOSIS — M54.50 ACUTE BILATERAL LOW BACK PAIN WITHOUT SCIATICA: Primary | ICD-10-CM

## 2023-10-10 DIAGNOSIS — Z98.1 S/P LUMBAR FUSION: ICD-10-CM

## 2023-10-10 PROCEDURE — 97535 SELF CARE MNGMENT TRAINING: CPT | Performed by: PHYSICAL THERAPIST

## 2023-10-10 PROCEDURE — 97112 NEUROMUSCULAR REEDUCATION: CPT | Performed by: PHYSICAL THERAPIST

## 2023-10-10 NOTE — TELEPHONE ENCOUNTER
Caller: Apurva Bravo Jr.    Relationship: Self    Best call back number: [phone]     What is the best time to reach you: ANYTIME    Who are you requesting to speak with (clinical staff, provider,  specific staff member): CLINICAL    Do you know the name of the person who called: APURVA    What was the call regarding: APURVA  IS NEEDING TO HAVE A SONOGRAM DONE AND RATHER THAN HIM TRAVELING BACK TO [facility] TO [facility], HE IS NEEDING TO HAVE IT DONE THERE IN [place].   HE IS NEEDING TO KNOW IF [facility] DOES SONOGRAMS AND IF SO HE IS NEEDING TO KNOW THE NAME OF WHERE IT WILL  BE DONE AND A FAX# SO THAT ORDERS CAN BE FAXED OVER.     Is it okay if the provider responds through We[facility]: NO

## 2023-10-10 NOTE — TELEPHONE ENCOUNTER
SPOKE WITH [facility] SCHEDULING.  THE SONOGRAM OF THE [facility] CAN BE DONE BUT THEY NEED AN ORDER FAXED TO THEM AND THEY WILL GET THE PATIENT SCHEDULED.      PATIENT HAS BEEN CALLED, GIVEN FAX NUMBER AND WILL CALL THE UROLOGY DEPARTMENT AT [facility] AND GIVE THEM THE FAX NUMBER FOR ORDER TO BE FAXED.

## 2023-10-10 NOTE — TELEPHONE ENCOUNTER
What is he need ultrasound of and which physician is recommending? We can order just need more information

## 2023-10-10 NOTE — PROGRESS NOTES
Physical Therapy Treatment Note  [address], [place] [address]    Patient: Sonu Bravo Jr.                                                 Visit Date: 10/10/2023  :     1945    Referring practitioner:    Vaughn Kramer DO  Date of Initial Visit:          Type: THERAPY  Noted: 3/2/2023    Patient seen for 34 sessions    Visit Diagnoses:    ICD-10-CM ICD-9-CM   1. Acute bilateral low back pain without sciatica  M54.50 724.2     338.19   2. S/P lumbar fusion  Z98.1 V45.4     SUBJECTIVE     Subjective:He reports he received word from Dr. Myers in regards to his MRI. His back pain has increased      PAIN: 4/10         OBJECTIVE     Objective   Neuromuscular Reeducation     15018 Comments   Walking with head turns reciting alphabet, animal/vegetable/mineral, naming states , counting by 3's    Walking with head turns backwards counting down from 100                Timed Minutes 25        Therapeutic Exercises    53068 Units Comments   Walking outside with #20 vest     Unicam seat #5 res 2 x 5 min                    Timed Minutes 10      Therapy Education/Self Care 72043   Education offered today MRI results, fistulas, recommendations for questions for surgeon   Lesly Code [id number]       Ongoing HEP   Date: 08/15/2023  Prepared by: Spike Ramirez     Exercises  - Supine Piriformis Stretch with Foot on Ground  - 2 x daily - 7 x weekly - 2 sets - 30 hold  - Supine Hip External Rotation  - 2 x daily - 7 x weekly - 2 sets - 10 reps  - Standing Hip Flexor Stretch  - 2 x daily - 7 x weekly - 2 sets - 10 reps  - Bird Dog on Counter  - 2 x daily - 7 x weekly - 2 sets - 10 reps  - Standing Hip Abduction with Counter Support  - 10 x daily - 7 x weekly - 2 sets - 10 reps - 10 hold  - Bird Dog  - 2 x daily - 7 x weekly - 2 sets - 10 reps  - Mini Lunge  - 2 x daily - 7 x weekly - 2 sets - 10 reps  - Mini Squat with Counter Support  - 2 x daily  "- 7 x weekly - 2 sets - 10 reps   Timed Minutes 10       Total Timed Treatment:     45   mins  Total Time of Visit:             45   mins         ASSESSMENT/PLAN     GOALS  Goals                                                  Progress Note due by 10/18/23                                                              Recert due by 11/12/23   LTG by: 6 weeks Comments Date Status   Improve  bilateral passive hip ext to 10 deg  10+ deg 4/4 MET   Able to wash and dry self in shower independently  He is able to do this independently, he has grab bars now 5/4 MET   Able to walk 20 minutes without exacerbation of pain  able to walk \"well over 20 min\" using a cane 4/4 MET   SLS either leg x 5 secs  L 4 sec, R 5 sec 6/1 MET   Reports pain no greater than 1-2/10 when it does occur.  0 5/25 MET   Improve Modified Oswestry to 10 5/50 5/4 MET   Pt will improve FGA to >/=22/30 23 7/14 Met   Pt will perform 6 MWT outdoors variable surfaces w/ Kimberly RPE </=10 Performed outdoor ambulation with #20 vest today 10/2  ongoing   Pt will improve miniBEST to >/=22/28 22/28 today 9/18 ongoing   Independent with HEP for flexibility and core/hip stability. Updated and reinforced the importance of frequency through the day to make the best improved 7/14 MET       Assessment/Plan     ASSESSMENT:   His MRI revealed an dura IVF and he will have a follow up with a specialist in [place] soon.    PLAN:   Place POC on hold    SIGNATURE: Ruiz Frias PTA, [place] License #: [id number]  Electronically Signed on 10/10/2023        [address] [place] [address]. [address]  [phone]   "

## 2023-10-11 ENCOUNTER — TELEPHONE (OUTPATIENT)
Dept: NEUROSURGERY | Facility: CLINIC | Age: 78
End: 2023-10-11
Payer: MEDICARE

## 2023-10-11 DIAGNOSIS — I67.1 CEREBROVASCULAR DURAL AV FISTULA: Primary | ICD-10-CM

## 2023-10-11 NOTE — TELEPHONE ENCOUNTER
Patient advised referral for dany garcia was entered and he should anticipate a call from their office. Patient appreciative of return call   [facility]: [employer]

## 2023-10-11 NOTE — TELEPHONE ENCOUNTER
Caller: PATIENT    Relationship: SELF    Best call back number: [phone]    What is the best time to reach you: ANYTIME    Who are you requesting to speak with (clinical staff, provider,  specific staff member): CLINICAL STAFF    Do you know the name of the person who called: NA    What was the call regarding: PT CALLED AND STATES THAT HE SPOKE WITH  ON SUNDAY-PT HAS NOT RECEIVED A CALL FROM THE OFFICE IN [place] THAT  WAS GOING TO REFER HIM TOO-PT IS CALLING BACK TO [facility] THANK YOU -PT ASKING FOR A CALL BACK    Is it okay if the provider responds through MyChart: NO

## 2023-10-23 ENCOUNTER — TRANSCRIBE ORDERS (OUTPATIENT)
Dept: ADMINISTRATIVE | Facility: HOSPITAL | Age: 78
End: 2023-10-23
Payer: MEDICARE

## 2023-10-26 ENCOUNTER — TRANSCRIBE ORDERS (OUTPATIENT)
Dept: ADMINISTRATIVE | Facility: HOSPITAL | Age: 78
End: 2023-10-26
Payer: MEDICARE

## 2023-10-26 DIAGNOSIS — K75.0 HEPATIC ABSCESS: Primary | ICD-10-CM

## 2023-10-31 ENCOUNTER — HOSPITAL ENCOUNTER (OUTPATIENT)
Dept: MRI IMAGING | Facility: HOSPITAL | Age: 78
Discharge: HOME | End: 2023-10-31
Admitting: NEUROLOGICAL SURGERY
Payer: MEDICARE

## 2023-10-31 DIAGNOSIS — M48.062 SPINAL STENOSIS OF LUMBAR REGION WITH NEUROGENIC CLAUDICATION: ICD-10-CM

## 2023-10-31 DIAGNOSIS — Z98.1 S/P LUMBAR FUSION: ICD-10-CM

## 2023-10-31 PROCEDURE — 72148 MRI LUMBAR SPINE W/O DYE: CPT

## 2023-11-02 ENCOUNTER — HOSPITAL ENCOUNTER (OUTPATIENT)
Dept: ULTRASOUND IMAGING | Facility: HOSPITAL | Age: 78
Discharge: HOME | End: 2023-11-02
Admitting: NURSE PRACTITIONER
Payer: MEDICARE

## 2023-11-02 DIAGNOSIS — K75.0 HEPATIC ABSCESS: ICD-10-CM

## 2023-11-02 PROCEDURE — 76775 US EXAM ABDO BACK WALL LIM: CPT

## 2023-11-09 DIAGNOSIS — E78.2 MIXED HYPERLIPIDEMIA: ICD-10-CM

## 2023-11-09 RX ORDER — ROSUVASTATIN CALCIUM 20 MG/1
20 TABLET, COATED ORAL DAILY
Qty: 90 TABLET | Refills: 3 | Status: SHIPPED | OUTPATIENT
Start: 2023-11-09

## 2023-12-06 ENCOUNTER — TELEPHONE (OUTPATIENT)
Dept: INTERNAL MEDICINE | Facility: CLINIC | Age: 78
End: 2023-12-06

## 2023-12-06 ENCOUNTER — TELEPHONE (OUTPATIENT)
Dept: PHYSICAL THERAPY | Facility: CLINIC | Age: 78
End: 2023-12-06

## 2023-12-06 NOTE — TELEPHONE ENCOUNTER
CALLED AND SPOKE TO PATIENTS WIFE, SHE WAS WONDERING IF DR. ESCOBAR HAD ANY SUGGESTIONS ON A GOOD PLACE FOR REHAB HERE IN [place]?

## 2023-12-06 NOTE — TELEPHONE ENCOUNTER
I do not keep up with those facilities, but she could go visit any of those. Much of the decision will be up to insurance and where beds are available, so /social workers can help to identify locations.

## 2023-12-06 NOTE — TELEPHONE ENCOUNTER
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  AND WILL NEED IN HOME PHYSICAL THERAPY AND WOULD LIKE TO KNOW WHO YOU WOULD RECOMMEND.  PLEASE CALL HER AT YOUR CONVENIENCE.

## 2023-12-06 NOTE — TELEPHONE ENCOUNTER
Caller: ISA RENEE    Relationship: Emergency Contact    Best call back number: [phone] LEAVE A MESSAGE         Who are you requesting to speak with (clinical staff, provider,  specific staff member): KIMBERLY, PLEASE CALL PATIENT'S WIFE ABOUT REHAB    Do you know the name of the person who called: PATIENT'S WIFE      What was the call regarding: KIMBERLY TO CALL [facility] REGARDING REHAB FOR HER SPOUSE-HE WILL BE DICHARGED TO A REHAB CENTER, ABOUT 12/12/23

## 2023-12-07 ENCOUNTER — HOSPITAL ENCOUNTER (INPATIENT)
Age: 78
LOS: 8 days | Discharge: HOME | DRG: 560 | End: 2023-12-15
Attending: PSYCHIATRY & NEUROLOGY | Admitting: PSYCHIATRY & NEUROLOGY
Payer: MEDICARE

## 2023-12-07 DIAGNOSIS — Q27.30 AVM (ARTERIOVENOUS MALFORMATION): ICD-10-CM

## 2023-12-07 DIAGNOSIS — Z98.1 S/P CERVICAL SPINAL FUSION: ICD-10-CM

## 2023-12-07 DIAGNOSIS — R53.1 WEAKNESS: ICD-10-CM

## 2023-12-07 DIAGNOSIS — I48.91 ATRIAL FIBRILLATION, UNSPECIFIED TYPE (HCC): Primary | ICD-10-CM

## 2023-12-07 DIAGNOSIS — M54.2 PAIN, NECK: ICD-10-CM

## 2023-12-07 PROBLEM — R33.9 URINARY RETENTION: Status: ACTIVE | Noted: 2023-12-07

## 2023-12-07 PROBLEM — E03.9 ACQUIRED HYPOTHYROIDISM: Status: ACTIVE | Noted: 2023-12-07

## 2023-12-07 PROBLEM — I15.9 SECONDARY HYPERTENSION: Status: ACTIVE | Noted: 2023-12-07

## 2023-12-07 PROCEDURE — 1180000000 HC REHAB R&B

## 2023-12-07 PROCEDURE — 6370000000 HC RX 637 (ALT 250 FOR IP): Performed by: PSYCHIATRY & NEUROLOGY

## 2023-12-07 PROCEDURE — 94760 N-INVAS EAR/PLS OXIMETRY 1: CPT

## 2023-12-07 RX ORDER — LANOLIN ALCOHOL/MO/W.PET/CERES
1000 CREAM (GRAM) TOPICAL DAILY
Status: ON HOLD | COMMUNITY
End: 2023-12-14 | Stop reason: SDUPTHER

## 2023-12-07 RX ORDER — ROSUVASTATIN CALCIUM 20 MG/1
20 TABLET, COATED ORAL NIGHTLY
Status: ON HOLD | COMMUNITY
End: 2023-12-14 | Stop reason: SDUPTHER

## 2023-12-07 RX ORDER — SENNOSIDES A AND B 8.6 MG/1
2 TABLET, FILM COATED ORAL NIGHTLY
Status: ON HOLD | COMMUNITY
End: 2023-12-14 | Stop reason: HOSPADM

## 2023-12-07 RX ORDER — DIMENHYDRINATE 50 MG
1 TABLET ORAL DAILY
COMMUNITY

## 2023-12-07 RX ORDER — CHOLECALCIFEROL (VITAMIN D3) 125 MCG
1000 CAPSULE ORAL DAILY
Status: DISCONTINUED | OUTPATIENT
Start: 2023-12-08 | End: 2023-12-15 | Stop reason: HOSPADM

## 2023-12-07 RX ORDER — UBIDECARENONE 100 MG
100 CAPSULE ORAL DAILY
Status: DISCONTINUED | OUTPATIENT
Start: 2023-12-08 | End: 2023-12-15 | Stop reason: HOSPADM

## 2023-12-07 RX ORDER — ZINC SULFATE 50(220)MG
50 CAPSULE ORAL NIGHTLY
Status: DISCONTINUED | OUTPATIENT
Start: 2023-12-07 | End: 2023-12-15 | Stop reason: HOSPADM

## 2023-12-07 RX ORDER — LOSARTAN POTASSIUM 50 MG/1
50 TABLET ORAL DAILY
Status: DISCONTINUED | OUTPATIENT
Start: 2023-12-08 | End: 2023-12-14

## 2023-12-07 RX ORDER — POLYETHYLENE GLYCOL 3350 17 G/17G
17 POWDER, FOR SOLUTION ORAL DAILY PRN
Status: DISCONTINUED | OUTPATIENT
Start: 2023-12-07 | End: 2023-12-15 | Stop reason: HOSPADM

## 2023-12-07 RX ORDER — DOXAZOSIN 2 MG/1
2 TABLET ORAL DAILY
Status: DISCONTINUED | OUTPATIENT
Start: 2023-12-08 | End: 2023-12-15 | Stop reason: HOSPADM

## 2023-12-07 RX ORDER — CETIRIZINE HYDROCHLORIDE 10 MG/1
10 TABLET ORAL DAILY
Status: DISCONTINUED | OUTPATIENT
Start: 2023-12-08 | End: 2023-12-15 | Stop reason: HOSPADM

## 2023-12-07 RX ORDER — FLUTICASONE PROPIONATE 50 MCG
1 SPRAY, SUSPENSION (ML) NASAL DAILY
Status: DISCONTINUED | OUTPATIENT
Start: 2023-12-08 | End: 2023-12-09

## 2023-12-07 RX ORDER — VITAMIN B COMPLEX
1000 TABLET ORAL DAILY
Status: DISCONTINUED | OUTPATIENT
Start: 2023-12-08 | End: 2023-12-15 | Stop reason: HOSPADM

## 2023-12-07 RX ORDER — SENNOSIDES A AND B 8.6 MG/1
2 TABLET, FILM COATED ORAL NIGHTLY
Status: DISCONTINUED | OUTPATIENT
Start: 2023-12-07 | End: 2023-12-12

## 2023-12-07 RX ORDER — LOSARTAN POTASSIUM 50 MG/1
50 TABLET ORAL DAILY
Status: ON HOLD | COMMUNITY
End: 2023-12-14 | Stop reason: HOSPADM

## 2023-12-07 RX ORDER — ROSUVASTATIN CALCIUM 20 MG/1
20 TABLET, COATED ORAL NIGHTLY
Status: DISCONTINUED | OUTPATIENT
Start: 2023-12-07 | End: 2023-12-15 | Stop reason: HOSPADM

## 2023-12-07 RX ORDER — FLUTICASONE PROPIONATE 50 MCG
1 SPRAY, SUSPENSION (ML) NASAL DAILY
Status: ON HOLD | COMMUNITY
End: 2023-12-14 | Stop reason: HOSPADM

## 2023-12-07 RX ORDER — LEVOTHYROXINE SODIUM 112 UG/1
112 TABLET ORAL DAILY
Status: DISCONTINUED | OUTPATIENT
Start: 2023-12-08 | End: 2023-12-15 | Stop reason: HOSPADM

## 2023-12-07 RX ORDER — ACETAMINOPHEN 325 MG/1
650 TABLET ORAL EVERY 4 HOURS PRN
Status: DISCONTINUED | OUTPATIENT
Start: 2023-12-07 | End: 2023-12-15 | Stop reason: HOSPADM

## 2023-12-07 RX ORDER — LEVOTHYROXINE SODIUM 112 UG/1
112 TABLET ORAL DAILY
Status: ON HOLD | COMMUNITY
End: 2023-12-14 | Stop reason: HOSPADM

## 2023-12-07 RX ORDER — METOPROLOL SUCCINATE 50 MG/1
50 TABLET, EXTENDED RELEASE ORAL DAILY
Status: DISCONTINUED | OUTPATIENT
Start: 2023-12-08 | End: 2023-12-15 | Stop reason: HOSPADM

## 2023-12-07 RX ORDER — ZINC SULFATE 50(220)MG
50 CAPSULE ORAL NIGHTLY
COMMUNITY

## 2023-12-07 RX ORDER — PANTOPRAZOLE SODIUM 40 MG/1
40 TABLET, DELAYED RELEASE ORAL
Status: DISCONTINUED | OUTPATIENT
Start: 2023-12-08 | End: 2023-12-15 | Stop reason: HOSPADM

## 2023-12-07 RX ORDER — TERAZOSIN 2 MG/1
2 CAPSULE ORAL NIGHTLY
Status: ON HOLD | COMMUNITY
End: 2023-12-14 | Stop reason: HOSPADM

## 2023-12-07 RX ORDER — OMEPRAZOLE 20 MG/1
20 CAPSULE, DELAYED RELEASE ORAL EVERY EVENING
Status: ON HOLD | COMMUNITY
End: 2023-12-14 | Stop reason: SDUPTHER

## 2023-12-07 RX ORDER — HYDROCODONE BITARTRATE AND ACETAMINOPHEN 10; 325 MG/1; MG/1
1 TABLET ORAL EVERY 4 HOURS PRN
Status: DISCONTINUED | OUTPATIENT
Start: 2023-12-07 | End: 2023-12-15 | Stop reason: HOSPADM

## 2023-12-07 RX ORDER — BISOPROLOL FUMARATE 5 MG/1
5 TABLET, FILM COATED ORAL DAILY
Status: ON HOLD | COMMUNITY
End: 2023-12-14 | Stop reason: HOSPADM

## 2023-12-07 RX ORDER — FEXOFENADINE HCL 180 MG/1
180 TABLET ORAL DAILY
Status: ON HOLD | COMMUNITY
End: 2023-12-14 | Stop reason: SDUPTHER

## 2023-12-07 RX ADMIN — ROSUVASTATIN CALCIUM 20 MG: 20 TABLET, COATED ORAL at 22:50

## 2023-12-07 RX ADMIN — SENNOSIDES 17.2 MG: 8.6 TABLET, FILM COATED ORAL at 22:50

## 2023-12-07 RX ADMIN — ZINC SULFATE 220 MG (50 MG) CAPSULE 50 MG: CAPSULE at 22:50

## 2023-12-07 RX ADMIN — HYDROCODONE BITARTRATE AND ACETAMINOPHEN 1 TABLET: 10; 325 TABLET ORAL at 23:37

## 2023-12-07 ASSESSMENT — PAIN SCALES - GENERAL: PAINLEVEL_OUTOF10: 8

## 2023-12-07 ASSESSMENT — PAIN DESCRIPTION - LOCATION: LOCATION: BACK

## 2023-12-07 ASSESSMENT — LIFESTYLE VARIABLES
HOW OFTEN DO YOU HAVE A DRINK CONTAINING ALCOHOL: MONTHLY OR LESS
HOW MANY STANDARD DRINKS CONTAINING ALCOHOL DO YOU HAVE ON A TYPICAL DAY: 1 OR 2

## 2023-12-07 ASSESSMENT — PAIN DESCRIPTION - ORIENTATION: ORIENTATION: MID

## 2023-12-07 ASSESSMENT — PAIN DESCRIPTION - DESCRIPTORS: DESCRIPTORS: THROBBING

## 2023-12-08 PROBLEM — R53.1 WEAKNESS: Status: ACTIVE | Noted: 2023-12-08

## 2023-12-08 PROBLEM — E78.49 OTHER HYPERLIPIDEMIA: Status: ACTIVE | Noted: 2023-12-08

## 2023-12-08 PROBLEM — M54.2 PAIN, NECK: Status: ACTIVE | Noted: 2023-12-08

## 2023-12-08 PROBLEM — Z98.1 S/P CERVICAL SPINAL FUSION: Status: ACTIVE | Noted: 2023-12-08

## 2023-12-08 PROBLEM — I48.91 ATRIAL FIBRILLATION (HCC): Status: ACTIVE | Noted: 2023-12-08

## 2023-12-08 LAB
ALBUMIN SERPL-MCNC: 3 G/DL (ref 3.5–5.2)
ALP SERPL-CCNC: 81 U/L (ref 40–130)
ALT SERPL-CCNC: 65 U/L (ref 5–41)
ANION GAP SERPL CALCULATED.3IONS-SCNC: 9 MMOL/L (ref 7–19)
AST SERPL-CCNC: 72 U/L (ref 5–40)
BILIRUB SERPL-MCNC: 0.5 MG/DL (ref 0.2–1.2)
BUN SERPL-MCNC: 18 MG/DL (ref 8–23)
CALCIUM SERPL-MCNC: 8.2 MG/DL (ref 8.8–10.2)
CHLORIDE SERPL-SCNC: 105 MMOL/L (ref 98–111)
CO2 SERPL-SCNC: 24 MMOL/L (ref 22–29)
CREAT SERPL-MCNC: 1 MG/DL (ref 0.5–1.2)
ERYTHROCYTE [DISTWIDTH] IN BLOOD BY AUTOMATED COUNT: 16.3 % (ref 11.5–14.5)
GLUCOSE SERPL-MCNC: 89 MG/DL (ref 74–109)
HCT VFR BLD AUTO: 30.5 % (ref 42–52)
HGB BLD-MCNC: 9.5 G/DL (ref 14–18)
MCH RBC QN AUTO: 27.1 PG (ref 27–31)
MCHC RBC AUTO-ENTMCNC: 31.1 G/DL (ref 33–37)
MCV RBC AUTO: 86.9 FL (ref 80–94)
PLATELET # BLD AUTO: 167 K/UL (ref 130–400)
PMV BLD AUTO: 10.5 FL (ref 9.4–12.4)
POTASSIUM SERPL-SCNC: 3.8 MMOL/L (ref 3.5–5)
PREALB SERPL-MCNC: 6 MG/DL (ref 20–40)
PROT SERPL-MCNC: 4.9 G/DL (ref 6.6–8.7)
RBC # BLD AUTO: 3.51 M/UL (ref 4.7–6.1)
SODIUM SERPL-SCNC: 138 MMOL/L (ref 136–145)
T4 FREE SERPL-MCNC: 1.24 NG/DL (ref 0.93–1.7)
TSH SERPL DL<=0.005 MIU/L-ACNC: 6.34 UIU/ML (ref 0.35–5.5)
VIT B12 SERPL-MCNC: >2000 PG/ML (ref 211–946)
WBC # BLD AUTO: 7.8 K/UL (ref 4.8–10.8)

## 2023-12-08 PROCEDURE — 51701 INSERT BLADDER CATHETER: CPT

## 2023-12-08 PROCEDURE — 85027 COMPLETE CBC AUTOMATED: CPT

## 2023-12-08 PROCEDURE — 84443 ASSAY THYROID STIM HORMONE: CPT

## 2023-12-08 PROCEDURE — 94150 VITAL CAPACITY TEST: CPT

## 2023-12-08 PROCEDURE — 97161 PT EVAL LOW COMPLEX 20 MIN: CPT

## 2023-12-08 PROCEDURE — 99223 1ST HOSP IP/OBS HIGH 75: CPT | Performed by: PSYCHIATRY & NEUROLOGY

## 2023-12-08 PROCEDURE — 51798 US URINE CAPACITY MEASURE: CPT

## 2023-12-08 PROCEDURE — 97165 OT EVAL LOW COMPLEX 30 MIN: CPT

## 2023-12-08 PROCEDURE — 97116 GAIT TRAINING THERAPY: CPT

## 2023-12-08 PROCEDURE — 82607 VITAMIN B-12: CPT

## 2023-12-08 PROCEDURE — 97535 SELF CARE MNGMENT TRAINING: CPT

## 2023-12-08 PROCEDURE — 94760 N-INVAS EAR/PLS OXIMETRY 1: CPT

## 2023-12-08 PROCEDURE — 97110 THERAPEUTIC EXERCISES: CPT

## 2023-12-08 PROCEDURE — 1180000000 HC REHAB R&B

## 2023-12-08 PROCEDURE — 6370000000 HC RX 637 (ALT 250 FOR IP): Performed by: PSYCHIATRY & NEUROLOGY

## 2023-12-08 PROCEDURE — 84134 ASSAY OF PREALBUMIN: CPT

## 2023-12-08 PROCEDURE — 36415 COLL VENOUS BLD VENIPUNCTURE: CPT

## 2023-12-08 PROCEDURE — 84439 ASSAY OF FREE THYROXINE: CPT

## 2023-12-08 PROCEDURE — 80053 COMPREHEN METABOLIC PANEL: CPT

## 2023-12-08 RX ADMIN — LOSARTAN POTASSIUM 50 MG: 50 TABLET, FILM COATED ORAL at 08:51

## 2023-12-08 RX ADMIN — Medication 100 MG: at 08:51

## 2023-12-08 RX ADMIN — CYANOCOBALAMIN TAB 500 MCG 1000 MCG: 500 TAB at 08:51

## 2023-12-08 RX ADMIN — PANTOPRAZOLE SODIUM 40 MG: 40 TABLET, DELAYED RELEASE ORAL at 05:37

## 2023-12-08 RX ADMIN — HYDROCODONE BITARTRATE AND ACETAMINOPHEN 1 TABLET: 10; 325 TABLET ORAL at 17:32

## 2023-12-08 RX ADMIN — DOXAZOSIN 2 MG: 2 TABLET ORAL at 08:51

## 2023-12-08 RX ADMIN — METOPROLOL SUCCINATE 50 MG: 50 TABLET, EXTENDED RELEASE ORAL at 08:51

## 2023-12-08 RX ADMIN — LEVOTHYROXINE SODIUM 112 MCG: 112 TABLET ORAL at 05:37

## 2023-12-08 RX ADMIN — ROSUVASTATIN CALCIUM 20 MG: 20 TABLET, COATED ORAL at 21:46

## 2023-12-08 RX ADMIN — SENNOSIDES 17.2 MG: 8.6 TABLET, FILM COATED ORAL at 21:45

## 2023-12-08 RX ADMIN — CETIRIZINE HYDROCHLORIDE 10 MG: 10 TABLET ORAL at 08:51

## 2023-12-08 RX ADMIN — ZINC SULFATE 220 MG (50 MG) CAPSULE 50 MG: CAPSULE at 21:46

## 2023-12-08 RX ADMIN — Medication 1000 UNITS: at 08:51

## 2023-12-08 ASSESSMENT — PAIN SCALES - GENERAL
PAINLEVEL_OUTOF10: 8
PAINLEVEL_OUTOF10: 4

## 2023-12-08 ASSESSMENT — PAIN DESCRIPTION - LOCATION: LOCATION: BACK

## 2023-12-08 ASSESSMENT — PAIN DESCRIPTION - ORIENTATION: ORIENTATION: LOWER;MID

## 2023-12-08 ASSESSMENT — PAIN - FUNCTIONAL ASSESSMENT: PAIN_FUNCTIONAL_ASSESSMENT: ACTIVITIES ARE NOT PREVENTED

## 2023-12-08 ASSESSMENT — PAIN DESCRIPTION - DESCRIPTORS: DESCRIPTORS: SHARP

## 2023-12-08 NOTE — H&P
[facility]   History and Physical        Patient:   Lis Guthrie  MR#:    [id number]  Account Number:                   [id number]      Room:    [facility]15-   YOB: 1945  Date of Progress Note: 12/8/2023  Time of Note                           7:43 AM  Attending Physician:  Elsa Lackey MD        Admitting diagnosis:Arteriovenous fistula, acquired    Secondary diagnoses:Paroxysmal atrial fibrillation,Essential (primary) hypertension,Mixed hyperlipidemia,Hypothyroidism, unspecified,Gastro-esophageal reflux disease without esophagitis,Chronic idiopathic constipation,Full incontinence of feces,Flaccid neuropathic bladder, not elsewhere classified,Nicotine dependence, cigarettes, uncomplicated,Paresthesia of skin,Retention of urine, unspecified    CHIEF COMPLAINT: Spinal AVM repair      HISTORY OF PRESENT ILLNESS:   This 77 y.o. male  with history of HTN, GERD, hypothyroidism, DVT, tobacco abuse, PAF, neurogenic bladder doing self-caths at home, mixed hyperlipidemia, chronic idiopathic constipation, Diverticulitis, arthritis, and bilateral cataracts. Patient had had a 2-level ACDF October 2022 and lumbar fusion January 2023 by Dr. Murtaza Yang at [facility]. He improved immediately post op but then presented with progressively worsening bilateral lower extremity paresthesia, difficulty ambulating, urinary retention requiring in and out catheterizations at home, incontinence of bowel since July 2023. 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 He was referred by Dr. Murtaza Yang to Dr. Caleb Turcios at [facility]. He underwent a diagnosis spinal angiogram on 11/29/23 that revealed a left T12 spinal DAVF and the DAVF was partially embolized with coiling of the left T12 segmental artery distal main trunk.  He was taken to OR on 12/3/23 T11 partial and T12 total laminectomies for ligation of left T12 spinal dural arteriovenous fistula (laminectomy for occlusion of arteriovenous malformation of

## 2023-12-09 PROCEDURE — 99233 SBSQ HOSP IP/OBS HIGH 50: CPT | Performed by: PSYCHIATRY & NEUROLOGY

## 2023-12-09 PROCEDURE — 6370000000 HC RX 637 (ALT 250 FOR IP): Performed by: PSYCHIATRY & NEUROLOGY

## 2023-12-09 PROCEDURE — 97116 GAIT TRAINING THERAPY: CPT

## 2023-12-09 PROCEDURE — 51701 INSERT BLADDER CATHETER: CPT

## 2023-12-09 PROCEDURE — 94760 N-INVAS EAR/PLS OXIMETRY 1: CPT

## 2023-12-09 PROCEDURE — 97530 THERAPEUTIC ACTIVITIES: CPT

## 2023-12-09 PROCEDURE — 97535 SELF CARE MNGMENT TRAINING: CPT

## 2023-12-09 PROCEDURE — 51798 US URINE CAPACITY MEASURE: CPT

## 2023-12-09 PROCEDURE — 97110 THERAPEUTIC EXERCISES: CPT

## 2023-12-09 PROCEDURE — 1180000000 HC REHAB R&B

## 2023-12-09 RX ORDER — FLUTICASONE PROPIONATE 50 MCG
1 SPRAY, SUSPENSION (ML) NASAL NIGHTLY
Status: DISCONTINUED | OUTPATIENT
Start: 2023-12-09 | End: 2023-12-15 | Stop reason: HOSPADM

## 2023-12-09 RX ADMIN — ROSUVASTATIN CALCIUM 20 MG: 20 TABLET, COATED ORAL at 19:06

## 2023-12-09 RX ADMIN — SENNOSIDES 17.2 MG: 8.6 TABLET, FILM COATED ORAL at 19:06

## 2023-12-09 RX ADMIN — LEVOTHYROXINE SODIUM 112 MCG: 112 TABLET ORAL at 06:01

## 2023-12-09 RX ADMIN — CYANOCOBALAMIN TAB 500 MCG 1000 MCG: 500 TAB at 08:02

## 2023-12-09 RX ADMIN — ZINC SULFATE 220 MG (50 MG) CAPSULE 50 MG: CAPSULE at 19:06

## 2023-12-09 RX ADMIN — LOSARTAN POTASSIUM 50 MG: 50 TABLET, FILM COATED ORAL at 08:02

## 2023-12-09 RX ADMIN — METOPROLOL SUCCINATE 50 MG: 50 TABLET, EXTENDED RELEASE ORAL at 08:02

## 2023-12-09 RX ADMIN — DOXAZOSIN 2 MG: 2 TABLET ORAL at 08:02

## 2023-12-09 RX ADMIN — HYDROCODONE BITARTRATE AND ACETAMINOPHEN 1 TABLET: 10; 325 TABLET ORAL at 18:59

## 2023-12-09 RX ADMIN — Medication 100 MG: at 08:07

## 2023-12-09 RX ADMIN — Medication 1000 UNITS: at 08:02

## 2023-12-09 RX ADMIN — CETIRIZINE HYDROCHLORIDE 10 MG: 10 TABLET ORAL at 08:07

## 2023-12-09 RX ADMIN — PANTOPRAZOLE SODIUM 40 MG: 40 TABLET, DELAYED RELEASE ORAL at 06:01

## 2023-12-09 ASSESSMENT — PAIN DESCRIPTION - ORIENTATION
ORIENTATION: MID
ORIENTATION: MID

## 2023-12-09 ASSESSMENT — PAIN SCALES - GENERAL
PAINLEVEL_OUTOF10: 3
PAINLEVEL_OUTOF10: 6

## 2023-12-09 ASSESSMENT — PAIN DESCRIPTION - LOCATION
LOCATION: BACK
LOCATION: BACK

## 2023-12-09 ASSESSMENT — PAIN DESCRIPTION - DESCRIPTORS
DESCRIPTORS: DISCOMFORT
DESCRIPTORS: STABBING;SHARP

## 2023-12-10 PROCEDURE — 51798 US URINE CAPACITY MEASURE: CPT

## 2023-12-10 PROCEDURE — 51701 INSERT BLADDER CATHETER: CPT

## 2023-12-10 PROCEDURE — 6370000000 HC RX 637 (ALT 250 FOR IP): Performed by: PSYCHIATRY & NEUROLOGY

## 2023-12-10 PROCEDURE — 1180000000 HC REHAB R&B

## 2023-12-10 PROCEDURE — 94760 N-INVAS EAR/PLS OXIMETRY 1: CPT

## 2023-12-10 PROCEDURE — 99232 SBSQ HOSP IP/OBS MODERATE 35: CPT | Performed by: PSYCHIATRY & NEUROLOGY

## 2023-12-10 RX ADMIN — Medication 1000 UNITS: at 08:01

## 2023-12-10 RX ADMIN — APIXABAN 5 MG: 5 TABLET, FILM COATED ORAL at 08:01

## 2023-12-10 RX ADMIN — CYANOCOBALAMIN TAB 500 MCG 1000 MCG: 500 TAB at 08:01

## 2023-12-10 RX ADMIN — LEVOTHYROXINE SODIUM 112 MCG: 112 TABLET ORAL at 05:20

## 2023-12-10 RX ADMIN — FLUTICASONE PROPIONATE 1 SPRAY: 50 SPRAY, METERED NASAL at 22:58

## 2023-12-10 RX ADMIN — Medication 100 MG: at 08:01

## 2023-12-10 RX ADMIN — DOXAZOSIN 2 MG: 2 TABLET ORAL at 08:01

## 2023-12-10 RX ADMIN — SENNOSIDES 17.2 MG: 8.6 TABLET, FILM COATED ORAL at 22:58

## 2023-12-10 RX ADMIN — ZINC SULFATE 220 MG (50 MG) CAPSULE 50 MG: CAPSULE at 22:58

## 2023-12-10 RX ADMIN — METOPROLOL SUCCINATE 50 MG: 50 TABLET, EXTENDED RELEASE ORAL at 08:01

## 2023-12-10 RX ADMIN — HYDROCODONE BITARTRATE AND ACETAMINOPHEN 1 TABLET: 10; 325 TABLET ORAL at 19:47

## 2023-12-10 RX ADMIN — APIXABAN 5 MG: 5 TABLET, FILM COATED ORAL at 22:58

## 2023-12-10 RX ADMIN — LOSARTAN POTASSIUM 50 MG: 50 TABLET, FILM COATED ORAL at 08:01

## 2023-12-10 RX ADMIN — CETIRIZINE HYDROCHLORIDE 10 MG: 10 TABLET ORAL at 08:01

## 2023-12-10 RX ADMIN — ROSUVASTATIN CALCIUM 20 MG: 20 TABLET, COATED ORAL at 22:58

## 2023-12-10 RX ADMIN — PANTOPRAZOLE SODIUM 40 MG: 40 TABLET, DELAYED RELEASE ORAL at 05:20

## 2023-12-10 ASSESSMENT — PAIN DESCRIPTION - DESCRIPTORS: DESCRIPTORS: ACHING

## 2023-12-10 ASSESSMENT — PAIN DESCRIPTION - LOCATION: LOCATION: BACK

## 2023-12-10 ASSESSMENT — PAIN SCALES - GENERAL: PAINLEVEL_OUTOF10: 1

## 2023-12-10 ASSESSMENT — PAIN DESCRIPTION - ORIENTATION: ORIENTATION: MID

## 2023-12-11 LAB
ALBUMIN SERPL-MCNC: 2.9 G/DL (ref 3.5–5.2)
ALP SERPL-CCNC: 71 U/L (ref 40–130)
ALT SERPL-CCNC: 47 U/L (ref 5–41)
ANION GAP SERPL CALCULATED.3IONS-SCNC: 10 MMOL/L (ref 7–19)
AST SERPL-CCNC: 25 U/L (ref 5–40)
BACTERIA #/AREA URNS HPF: ABNORMAL /HPF
BASOPHILS # BLD: 0.1 K/UL (ref 0–0.2)
BASOPHILS NFR BLD: 0.6 % (ref 0–1)
BILIRUB SERPL-MCNC: 0.4 MG/DL (ref 0.2–1.2)
BILIRUB UR QL STRIP: NEGATIVE
BUN SERPL-MCNC: 11 MG/DL (ref 8–23)
CALCIUM SERPL-MCNC: 8.4 MG/DL (ref 8.8–10.2)
CHLORIDE SERPL-SCNC: 104 MMOL/L (ref 98–111)
CLARITY UR: ABNORMAL
CO2 SERPL-SCNC: 26 MMOL/L (ref 22–29)
COLOR UR: YELLOW
CREAT SERPL-MCNC: 0.9 MG/DL (ref 0.5–1.2)
CRYSTALS URNS MICRO: ABNORMAL /HPF
EOSINOPHIL # BLD: 0.3 K/UL (ref 0–0.6)
EOSINOPHIL NFR BLD: 3.2 % (ref 0–5)
ERYTHROCYTE [DISTWIDTH] IN BLOOD BY AUTOMATED COUNT: 16 % (ref 11.5–14.5)
GLUCOSE SERPL-MCNC: 99 MG/DL (ref 74–109)
GLUCOSE UR STRIP.AUTO-MCNC: NEGATIVE MG/DL
HCT VFR BLD AUTO: 30.4 % (ref 42–52)
HGB BLD-MCNC: 9.6 G/DL (ref 14–18)
HGB UR STRIP.AUTO-MCNC: ABNORMAL MG/L
IMM GRANULOCYTES # BLD: 0.1 K/UL
KETONES UR STRIP.AUTO-MCNC: NEGATIVE MG/DL
LEUKOCYTE ESTERASE UR QL STRIP.AUTO: ABNORMAL
LYMPHOCYTES # BLD: 1 K/UL (ref 1.1–4.5)
LYMPHOCYTES NFR BLD: 13.2 % (ref 20–40)
MAGNESIUM SERPL-MCNC: 2.2 MG/DL (ref 1.6–2.4)
MCH RBC QN AUTO: 27.1 PG (ref 27–31)
MCHC RBC AUTO-ENTMCNC: 31.6 G/DL (ref 33–37)
MCV RBC AUTO: 85.9 FL (ref 80–94)
MONOCYTES # BLD: 1 K/UL (ref 0–0.9)
MONOCYTES NFR BLD: 12.5 % (ref 0–10)
NEUTROPHILS # BLD: 5.5 K/UL (ref 1.5–7.5)
NEUTS SEG NFR BLD: 69.6 % (ref 50–65)
NITRITE UR QL STRIP.AUTO: POSITIVE
PH UR STRIP.AUTO: 6 [PH] (ref 5–8)
PLATELET # BLD AUTO: 230 K/UL (ref 130–400)
PMV BLD AUTO: 9.5 FL (ref 9.4–12.4)
POTASSIUM SERPL-SCNC: 3.5 MMOL/L (ref 3.5–5)
PROT SERPL-MCNC: 5.7 G/DL (ref 6.6–8.7)
PROT UR STRIP.AUTO-MCNC: NEGATIVE MG/DL
RBC # BLD AUTO: 3.54 M/UL (ref 4.7–6.1)
RBC #/AREA URNS HPF: ABNORMAL /HPF (ref 0–2)
SODIUM SERPL-SCNC: 140 MMOL/L (ref 136–145)
SP GR UR STRIP.AUTO: 1.01 (ref 1–1.03)
UROBILINOGEN UR STRIP.AUTO-MCNC: 0.2 E.U./DL
WBC # BLD AUTO: 7.9 K/UL (ref 4.8–10.8)
WBC #/AREA URNS HPF: ABNORMAL /HPF (ref 0–5)

## 2023-12-11 PROCEDURE — 97110 THERAPEUTIC EXERCISES: CPT

## 2023-12-11 PROCEDURE — 87186 SC STD MICRODIL/AGAR DIL: CPT

## 2023-12-11 PROCEDURE — 99232 SBSQ HOSP IP/OBS MODERATE 35: CPT | Performed by: PSYCHIATRY & NEUROLOGY

## 2023-12-11 PROCEDURE — 85025 COMPLETE CBC W/AUTO DIFF WBC: CPT

## 2023-12-11 PROCEDURE — 80053 COMPREHEN METABOLIC PANEL: CPT

## 2023-12-11 PROCEDURE — 97530 THERAPEUTIC ACTIVITIES: CPT

## 2023-12-11 PROCEDURE — 51798 US URINE CAPACITY MEASURE: CPT

## 2023-12-11 PROCEDURE — 81001 URINALYSIS AUTO W/SCOPE: CPT

## 2023-12-11 PROCEDURE — 1180000000 HC REHAB R&B

## 2023-12-11 PROCEDURE — 97116 GAIT TRAINING THERAPY: CPT

## 2023-12-11 PROCEDURE — 87086 URINE CULTURE/COLONY COUNT: CPT

## 2023-12-11 PROCEDURE — 6370000000 HC RX 637 (ALT 250 FOR IP): Performed by: PSYCHIATRY & NEUROLOGY

## 2023-12-11 PROCEDURE — 83735 ASSAY OF MAGNESIUM: CPT

## 2023-12-11 PROCEDURE — 97535 SELF CARE MNGMENT TRAINING: CPT

## 2023-12-11 PROCEDURE — 36415 COLL VENOUS BLD VENIPUNCTURE: CPT

## 2023-12-11 RX ORDER — NITROFURANTOIN 25; 75 MG/1; MG/1
100 CAPSULE ORAL EVERY 12 HOURS SCHEDULED
Status: DISCONTINUED | OUTPATIENT
Start: 2023-12-11 | End: 2023-12-13

## 2023-12-11 RX ADMIN — FLUTICASONE PROPIONATE 1 SPRAY: 50 SPRAY, METERED NASAL at 20:53

## 2023-12-11 RX ADMIN — LOSARTAN POTASSIUM 50 MG: 50 TABLET, FILM COATED ORAL at 08:23

## 2023-12-11 RX ADMIN — Medication 100 MG: at 08:24

## 2023-12-11 RX ADMIN — CYANOCOBALAMIN TAB 500 MCG 1000 MCG: 500 TAB at 08:23

## 2023-12-11 RX ADMIN — DOXAZOSIN 2 MG: 2 TABLET ORAL at 08:23

## 2023-12-11 RX ADMIN — Medication 1000 UNITS: at 08:23

## 2023-12-11 RX ADMIN — ROSUVASTATIN CALCIUM 20 MG: 20 TABLET, COATED ORAL at 20:53

## 2023-12-11 RX ADMIN — APIXABAN 5 MG: 5 TABLET, FILM COATED ORAL at 08:23

## 2023-12-11 RX ADMIN — CETIRIZINE HYDROCHLORIDE 10 MG: 10 TABLET ORAL at 08:23

## 2023-12-11 RX ADMIN — ZINC SULFATE 220 MG (50 MG) CAPSULE 50 MG: CAPSULE at 20:53

## 2023-12-11 RX ADMIN — LEVOTHYROXINE SODIUM 112 MCG: 112 TABLET ORAL at 06:14

## 2023-12-11 RX ADMIN — PANTOPRAZOLE SODIUM 40 MG: 40 TABLET, DELAYED RELEASE ORAL at 06:14

## 2023-12-11 RX ADMIN — NITROFURANTOIN MONOHYDRATE/MACROCRYSTALS 100 MG: 75; 25 CAPSULE ORAL at 08:23

## 2023-12-11 RX ADMIN — SENNOSIDES 17.2 MG: 8.6 TABLET, FILM COATED ORAL at 20:53

## 2023-12-11 RX ADMIN — METOPROLOL SUCCINATE 50 MG: 50 TABLET, EXTENDED RELEASE ORAL at 08:23

## 2023-12-11 RX ADMIN — NITROFURANTOIN MONOHYDRATE/MACROCRYSTALS 100 MG: 75; 25 CAPSULE ORAL at 20:53

## 2023-12-11 RX ADMIN — APIXABAN 5 MG: 5 TABLET, FILM COATED ORAL at 20:53

## 2023-12-12 PROBLEM — N31.9 NEUROGENIC BLADDER: Status: ACTIVE | Noted: 2023-12-12

## 2023-12-12 PROCEDURE — 6370000000 HC RX 637 (ALT 250 FOR IP): Performed by: PSYCHIATRY & NEUROLOGY

## 2023-12-12 PROCEDURE — 97535 SELF CARE MNGMENT TRAINING: CPT

## 2023-12-12 PROCEDURE — 94760 N-INVAS EAR/PLS OXIMETRY 1: CPT

## 2023-12-12 PROCEDURE — 99222 1ST HOSP IP/OBS MODERATE 55: CPT | Performed by: NURSE PRACTITIONER

## 2023-12-12 PROCEDURE — 1180000000 HC REHAB R&B

## 2023-12-12 PROCEDURE — 97116 GAIT TRAINING THERAPY: CPT

## 2023-12-12 PROCEDURE — 99233 SBSQ HOSP IP/OBS HIGH 50: CPT | Performed by: PSYCHIATRY & NEUROLOGY

## 2023-12-12 PROCEDURE — 97110 THERAPEUTIC EXERCISES: CPT

## 2023-12-12 PROCEDURE — 97530 THERAPEUTIC ACTIVITIES: CPT

## 2023-12-12 RX ORDER — SENNOSIDES A AND B 8.6 MG/1
2 TABLET, FILM COATED ORAL 2 TIMES DAILY
Status: DISCONTINUED | OUTPATIENT
Start: 2023-12-12 | End: 2023-12-15 | Stop reason: HOSPADM

## 2023-12-12 RX ADMIN — LOSARTAN POTASSIUM 50 MG: 50 TABLET, FILM COATED ORAL at 08:16

## 2023-12-12 RX ADMIN — NITROFURANTOIN MONOHYDRATE/MACROCRYSTALS 100 MG: 75; 25 CAPSULE ORAL at 20:50

## 2023-12-12 RX ADMIN — ROSUVASTATIN CALCIUM 20 MG: 20 TABLET, COATED ORAL at 20:50

## 2023-12-12 RX ADMIN — CYANOCOBALAMIN TAB 500 MCG 1000 MCG: 500 TAB at 08:16

## 2023-12-12 RX ADMIN — Medication 1000 UNITS: at 08:16

## 2023-12-12 RX ADMIN — Medication 100 MG: at 08:16

## 2023-12-12 RX ADMIN — ZINC SULFATE 220 MG (50 MG) CAPSULE 50 MG: CAPSULE at 20:50

## 2023-12-12 RX ADMIN — STANDARDIZED SENNA CONCENTRATE 17.2 MG: 8.6 TABLET ORAL at 10:38

## 2023-12-12 RX ADMIN — PANTOPRAZOLE SODIUM 40 MG: 40 TABLET, DELAYED RELEASE ORAL at 05:46

## 2023-12-12 RX ADMIN — DOXAZOSIN 2 MG: 2 TABLET ORAL at 08:16

## 2023-12-12 RX ADMIN — STANDARDIZED SENNA CONCENTRATE 17.2 MG: 8.6 TABLET ORAL at 20:50

## 2023-12-12 RX ADMIN — APIXABAN 5 MG: 5 TABLET, FILM COATED ORAL at 20:50

## 2023-12-12 RX ADMIN — METOPROLOL SUCCINATE 50 MG: 50 TABLET, EXTENDED RELEASE ORAL at 08:16

## 2023-12-12 RX ADMIN — APIXABAN 5 MG: 5 TABLET, FILM COATED ORAL at 08:16

## 2023-12-12 RX ADMIN — CETIRIZINE HYDROCHLORIDE 10 MG: 10 TABLET ORAL at 08:16

## 2023-12-12 RX ADMIN — FLUTICASONE PROPIONATE 1 SPRAY: 50 SPRAY, METERED NASAL at 20:50

## 2023-12-12 RX ADMIN — NITROFURANTOIN MONOHYDRATE/MACROCRYSTALS 100 MG: 75; 25 CAPSULE ORAL at 08:16

## 2023-12-12 RX ADMIN — LEVOTHYROXINE SODIUM 112 MCG: 112 TABLET ORAL at 05:46

## 2023-12-12 NOTE — NURSING NOTE
Patient's pleural drain changed to dry suction water seal by [facility] ICU RN per Dr. Iniguez. Flushed with 10mL of sterile saline per Dr. Iniguez.    Him/He

## 2023-12-13 LAB
BACTERIA UR CULT: ABNORMAL
BACTERIA UR CULT: ABNORMAL
ORGANISM: ABNORMAL

## 2023-12-13 PROCEDURE — 94760 N-INVAS EAR/PLS OXIMETRY 1: CPT

## 2023-12-13 PROCEDURE — 99232 SBSQ HOSP IP/OBS MODERATE 35: CPT | Performed by: PSYCHIATRY & NEUROLOGY

## 2023-12-13 PROCEDURE — 97535 SELF CARE MNGMENT TRAINING: CPT

## 2023-12-13 PROCEDURE — 1180000000 HC REHAB R&B

## 2023-12-13 PROCEDURE — 6370000000 HC RX 637 (ALT 250 FOR IP): Performed by: PSYCHIATRY & NEUROLOGY

## 2023-12-13 PROCEDURE — 97110 THERAPEUTIC EXERCISES: CPT

## 2023-12-13 PROCEDURE — 97116 GAIT TRAINING THERAPY: CPT

## 2023-12-13 PROCEDURE — 99231 SBSQ HOSP IP/OBS SF/LOW 25: CPT | Performed by: NURSE PRACTITIONER

## 2023-12-13 PROCEDURE — 97530 THERAPEUTIC ACTIVITIES: CPT

## 2023-12-13 RX ORDER — LEVOFLOXACIN 500 MG/1
500 TABLET, FILM COATED ORAL DAILY
Status: DISCONTINUED | OUTPATIENT
Start: 2023-12-13 | End: 2023-12-15 | Stop reason: HOSPADM

## 2023-12-13 RX ORDER — LEVOFLOXACIN 500 MG/1
500 TABLET, FILM COATED ORAL DAILY
Status: DISCONTINUED | OUTPATIENT
Start: 2023-12-13 | End: 2023-12-13

## 2023-12-13 RX ADMIN — DOXAZOSIN 2 MG: 2 TABLET ORAL at 08:39

## 2023-12-13 RX ADMIN — STANDARDIZED SENNA CONCENTRATE 17.2 MG: 8.6 TABLET ORAL at 08:40

## 2023-12-13 RX ADMIN — LOSARTAN POTASSIUM 50 MG: 50 TABLET, FILM COATED ORAL at 08:39

## 2023-12-13 RX ADMIN — APIXABAN 5 MG: 5 TABLET, FILM COATED ORAL at 08:39

## 2023-12-13 RX ADMIN — APIXABAN 5 MG: 5 TABLET, FILM COATED ORAL at 21:30

## 2023-12-13 RX ADMIN — ZINC SULFATE 220 MG (50 MG) CAPSULE 50 MG: CAPSULE at 21:30

## 2023-12-13 RX ADMIN — LEVOTHYROXINE SODIUM 112 MCG: 112 TABLET ORAL at 05:56

## 2023-12-13 RX ADMIN — Medication 100 MG: at 08:39

## 2023-12-13 RX ADMIN — PANTOPRAZOLE SODIUM 40 MG: 40 TABLET, DELAYED RELEASE ORAL at 05:56

## 2023-12-13 RX ADMIN — METOPROLOL SUCCINATE 50 MG: 50 TABLET, EXTENDED RELEASE ORAL at 08:39

## 2023-12-13 RX ADMIN — CETIRIZINE HYDROCHLORIDE 10 MG: 10 TABLET ORAL at 08:39

## 2023-12-13 RX ADMIN — STANDARDIZED SENNA CONCENTRATE 17.2 MG: 8.6 TABLET ORAL at 21:30

## 2023-12-13 RX ADMIN — ROSUVASTATIN CALCIUM 20 MG: 20 TABLET, COATED ORAL at 21:30

## 2023-12-13 RX ADMIN — LEVOFLOXACIN 500 MG: 500 TABLET, FILM COATED ORAL at 17:38

## 2023-12-13 RX ADMIN — CYANOCOBALAMIN TAB 500 MCG 1000 MCG: 500 TAB at 08:39

## 2023-12-13 RX ADMIN — Medication 1000 UNITS: at 08:39

## 2023-12-13 RX ADMIN — FLUTICASONE PROPIONATE 1 SPRAY: 50 SPRAY, METERED NASAL at 21:31

## 2023-12-13 RX ADMIN — NITROFURANTOIN MONOHYDRATE/MACROCRYSTALS 100 MG: 75; 25 CAPSULE ORAL at 08:39

## 2023-12-13 NOTE — CONSULTS
January. Can also follow-up with his established urologist at [facility].  Continue alpha-blocker. 2.  UTI. Urine culture positive for serratia marcescens. Patient asymptomatic. Currently being treated with Macrobid day #2. Discussed with him asymptomatic bacteriuria. Will treat according to culture at this time. Would not recommend treating if asymptomatic.       [place] Elizondo CNP  12/12/2023 9:30 PM

## 2023-12-14 ENCOUNTER — TRANSCRIBE ORDERS (OUTPATIENT)
Dept: PHYSICAL THERAPY | Facility: CLINIC | Age: 78
End: 2023-12-14
Payer: MEDICARE

## 2023-12-14 DIAGNOSIS — Q27.30 AVM (ARTERIOVENOUS MALFORMATION): ICD-10-CM

## 2023-12-14 DIAGNOSIS — I48.91 ATRIAL FIBRILLATION, UNSPECIFIED TYPE: ICD-10-CM

## 2023-12-14 DIAGNOSIS — R53.1 WEAKNESS: ICD-10-CM

## 2023-12-14 DIAGNOSIS — Z98.1 S/P CERVICAL SPINAL FUSION: Primary | ICD-10-CM

## 2023-12-14 DIAGNOSIS — M54.2 PAIN, NECK: ICD-10-CM

## 2023-12-14 LAB
ALBUMIN SERPL-MCNC: 3.1 G/DL (ref 3.5–5.2)
ALP SERPL-CCNC: 59 U/L (ref 40–130)
ALT SERPL-CCNC: 26 U/L (ref 5–41)
ANION GAP SERPL CALCULATED.3IONS-SCNC: 9 MMOL/L (ref 7–19)
AST SERPL-CCNC: 18 U/L (ref 5–40)
BASOPHILS # BLD: 0.1 K/UL (ref 0–0.2)
BASOPHILS NFR BLD: 0.8 % (ref 0–1)
BILIRUB SERPL-MCNC: 0.4 MG/DL (ref 0.2–1.2)
BUN SERPL-MCNC: 9 MG/DL (ref 8–23)
CALCIUM SERPL-MCNC: 8.4 MG/DL (ref 8.8–10.2)
CHLORIDE SERPL-SCNC: 105 MMOL/L (ref 98–111)
CO2 SERPL-SCNC: 26 MMOL/L (ref 22–29)
CREAT SERPL-MCNC: 0.9 MG/DL (ref 0.5–1.2)
EOSINOPHIL # BLD: 0.2 K/UL (ref 0–0.6)
EOSINOPHIL NFR BLD: 3.2 % (ref 0–5)
ERYTHROCYTE [DISTWIDTH] IN BLOOD BY AUTOMATED COUNT: 16.2 % (ref 11.5–14.5)
GLUCOSE SERPL-MCNC: 98 MG/DL (ref 74–109)
HCT VFR BLD AUTO: 30.4 % (ref 42–52)
HGB BLD-MCNC: 9.4 G/DL (ref 14–18)
IMM GRANULOCYTES # BLD: 0 K/UL
LYMPHOCYTES # BLD: 1.3 K/UL (ref 1.1–4.5)
LYMPHOCYTES NFR BLD: 19.4 % (ref 20–40)
MCH RBC QN AUTO: 26.9 PG (ref 27–31)
MCHC RBC AUTO-ENTMCNC: 30.9 G/DL (ref 33–37)
MCV RBC AUTO: 87.1 FL (ref 80–94)
MONOCYTES # BLD: 0.9 K/UL (ref 0–0.9)
MONOCYTES NFR BLD: 13.2 % (ref 0–10)
NEUTROPHILS # BLD: 4.1 K/UL (ref 1.5–7.5)
NEUTS SEG NFR BLD: 62.8 % (ref 50–65)
PLATELET # BLD AUTO: 294 K/UL (ref 130–400)
PMV BLD AUTO: 9.3 FL (ref 9.4–12.4)
POTASSIUM SERPL-SCNC: 4 MMOL/L (ref 3.5–5)
PROT SERPL-MCNC: 5.6 G/DL (ref 6.6–8.7)
RBC # BLD AUTO: 3.49 M/UL (ref 4.7–6.1)
SODIUM SERPL-SCNC: 140 MMOL/L (ref 136–145)
WBC # BLD AUTO: 6.6 K/UL (ref 4.8–10.8)

## 2023-12-14 PROCEDURE — 85025 COMPLETE CBC W/AUTO DIFF WBC: CPT

## 2023-12-14 PROCEDURE — 97110 THERAPEUTIC EXERCISES: CPT

## 2023-12-14 PROCEDURE — 6370000000 HC RX 637 (ALT 250 FOR IP): Performed by: PSYCHIATRY & NEUROLOGY

## 2023-12-14 PROCEDURE — 1180000000 HC REHAB R&B

## 2023-12-14 PROCEDURE — 80053 COMPREHEN METABOLIC PANEL: CPT

## 2023-12-14 PROCEDURE — 97116 GAIT TRAINING THERAPY: CPT

## 2023-12-14 PROCEDURE — 36415 COLL VENOUS BLD VENIPUNCTURE: CPT

## 2023-12-14 PROCEDURE — 94760 N-INVAS EAR/PLS OXIMETRY 1: CPT

## 2023-12-14 PROCEDURE — 97530 THERAPEUTIC ACTIVITIES: CPT

## 2023-12-14 PROCEDURE — 99231 SBSQ HOSP IP/OBS SF/LOW 25: CPT | Performed by: NURSE PRACTITIONER

## 2023-12-14 PROCEDURE — 99232 SBSQ HOSP IP/OBS MODERATE 35: CPT | Performed by: PSYCHIATRY & NEUROLOGY

## 2023-12-14 RX ORDER — LANOLIN ALCOHOL/MO/W.PET/CERES
1000 CREAM (GRAM) TOPICAL DAILY
Qty: 30 TABLET | Refills: 0 | Status: SHIPPED | OUTPATIENT
Start: 2023-12-14

## 2023-12-14 RX ORDER — OMEPRAZOLE 20 MG/1
20 CAPSULE, DELAYED RELEASE ORAL EVERY EVENING
Qty: 30 CAPSULE | Refills: 0 | Status: SHIPPED | OUTPATIENT
Start: 2023-12-14

## 2023-12-14 RX ORDER — HYDROCODONE BITARTRATE AND ACETAMINOPHEN 10; 325 MG/1; MG/1
1 TABLET ORAL EVERY 8 HOURS PRN
Qty: 30 TABLET | Refills: 0 | Status: SHIPPED | OUTPATIENT
Start: 2023-12-14 | End: 2024-01-13

## 2023-12-14 RX ORDER — SENNOSIDES A AND B 8.6 MG/1
2 TABLET, FILM COATED ORAL 2 TIMES DAILY
Qty: 120 TABLET | Refills: 0 | Status: SHIPPED | OUTPATIENT
Start: 2023-12-14 | End: 2024-01-13

## 2023-12-14 RX ORDER — ROSUVASTATIN CALCIUM 20 MG/1
20 TABLET, COATED ORAL NIGHTLY
Qty: 30 TABLET | Refills: 0 | Status: SHIPPED | OUTPATIENT
Start: 2023-12-14

## 2023-12-14 RX ORDER — DOXAZOSIN 2 MG/1
2 TABLET ORAL DAILY
Qty: 30 TABLET | Refills: 0 | Status: SHIPPED | OUTPATIENT
Start: 2023-12-15

## 2023-12-14 RX ORDER — FLUTICASONE PROPIONATE 50 MCG
1 SPRAY, SUSPENSION (ML) NASAL NIGHTLY
Qty: 16 G | Refills: 0 | Status: SHIPPED | OUTPATIENT
Start: 2023-12-14

## 2023-12-14 RX ORDER — METOPROLOL SUCCINATE 50 MG/1
50 TABLET, EXTENDED RELEASE ORAL DAILY
Qty: 30 TABLET | Refills: 0 | Status: SHIPPED | OUTPATIENT
Start: 2023-12-15

## 2023-12-14 RX ORDER — LOSARTAN POTASSIUM 100 MG/1
100 TABLET ORAL DAILY
Qty: 30 TABLET | Refills: 0 | Status: SHIPPED | OUTPATIENT
Start: 2023-12-15

## 2023-12-14 RX ORDER — LEVOTHYROXINE SODIUM 112 UG/1
112 TABLET ORAL DAILY
Qty: 30 TABLET | Refills: 0 | Status: SHIPPED | OUTPATIENT
Start: 2023-12-15

## 2023-12-14 RX ORDER — FEXOFENADINE HCL 180 MG/1
180 TABLET ORAL DAILY
Qty: 30 TABLET | Refills: 0 | Status: SHIPPED | OUTPATIENT
Start: 2023-12-14

## 2023-12-14 RX ORDER — LEVOFLOXACIN 500 MG/1
500 TABLET, FILM COATED ORAL DAILY
Qty: 2 TABLET | Refills: 0 | Status: SHIPPED | OUTPATIENT
Start: 2023-12-17 | End: 2023-12-19

## 2023-12-14 RX ORDER — LOSARTAN POTASSIUM 100 MG/1
100 TABLET ORAL DAILY
Status: DISCONTINUED | OUTPATIENT
Start: 2023-12-14 | End: 2023-12-15 | Stop reason: HOSPADM

## 2023-12-14 RX ADMIN — METOPROLOL SUCCINATE 50 MG: 50 TABLET, EXTENDED RELEASE ORAL at 08:02

## 2023-12-14 RX ADMIN — PANTOPRAZOLE SODIUM 40 MG: 40 TABLET, DELAYED RELEASE ORAL at 05:37

## 2023-12-14 RX ADMIN — STANDARDIZED SENNA CONCENTRATE 17.2 MG: 8.6 TABLET ORAL at 21:49

## 2023-12-14 RX ADMIN — DOXAZOSIN 2 MG: 2 TABLET ORAL at 08:02

## 2023-12-14 RX ADMIN — APIXABAN 5 MG: 5 TABLET, FILM COATED ORAL at 21:48

## 2023-12-14 RX ADMIN — FLUTICASONE PROPIONATE 1 SPRAY: 50 SPRAY, METERED NASAL at 21:49

## 2023-12-14 RX ADMIN — APIXABAN 5 MG: 5 TABLET, FILM COATED ORAL at 08:02

## 2023-12-14 RX ADMIN — Medication 1000 UNITS: at 08:02

## 2023-12-14 RX ADMIN — Medication 100 MG: at 08:02

## 2023-12-14 RX ADMIN — ZINC SULFATE 220 MG (50 MG) CAPSULE 50 MG: CAPSULE at 21:49

## 2023-12-14 RX ADMIN — LEVOTHYROXINE SODIUM 112 MCG: 112 TABLET ORAL at 05:37

## 2023-12-14 RX ADMIN — STANDARDIZED SENNA CONCENTRATE 17.2 MG: 8.6 TABLET ORAL at 08:02

## 2023-12-14 RX ADMIN — ROSUVASTATIN CALCIUM 20 MG: 20 TABLET, COATED ORAL at 21:49

## 2023-12-14 RX ADMIN — CYANOCOBALAMIN TAB 500 MCG 1000 MCG: 500 TAB at 08:02

## 2023-12-14 RX ADMIN — LOSARTAN POTASSIUM 100 MG: 100 TABLET, FILM COATED ORAL at 08:10

## 2023-12-14 RX ADMIN — LEVOFLOXACIN 500 MG: 500 TABLET, FILM COATED ORAL at 08:10

## 2023-12-14 RX ADMIN — CETIRIZINE HYDROCHLORIDE 10 MG: 10 TABLET ORAL at 08:02

## 2023-12-14 NOTE — DISCHARGE INSTRUCTIONS
Discharge Instructions      Patient Instructions:   Home  Therapy orders: PT   Discharge lab work: none  Code status: Full Code   Activity: activity as tolerated  Diet: ADULT DIET;  Regular    Wound Care: as directed  Equipment: as per therapy

## 2023-12-14 NOTE — DISCHARGE SUMMARY
that revealed a left T12 spinal DAVF and the DAVF was partially embolized with coiling of the left T12 segmental artery distal main trunk. He was taken to OR on 12/3/23 T11 partial and T12 total laminectomies for ligation of left T12 spinal dural arteriovenous fistula (laminectomy for occlusion of arteriovenous malformation of spinal cord, thoracic). Patient tolerated the procedure well. Post-op patient is worse with ambulation than he was prior to surgery. Patient very frustrated with the fact of being in the hospital for another holiday season. Patient continued to have c/o of numbness in both feet, bowel incontinence and had a aden catheter in place for urinary retention. He was participating in both PT/OT. He was felt to need a stay on Rehab to work towards his goal of returning home with his wife. The patient was admitted to inpatient rehab with improvement. Pain is well-controlled. He did require Aden catheter to be placed but this was removed. Seen by urology. Continued In-N-Out cath. Plan outpatient follow-up with his urologist.  Plan discharge home with outpatient physical therapy. Discharge Instructions     Patient Instructions:   Home  Therapy orders: PT   Discharge lab work: none  Code status: Full Code   Activity: activity as tolerated  Diet: ADULT DIET;  Regular    Wound Care: as directed  Equipment: as per therapy      Fausto Lind MD, MD    At least 35 minutes were spent in discharging the patient

## 2023-12-15 VITALS
DIASTOLIC BLOOD PRESSURE: 83 MMHG | WEIGHT: 168.65 LBS | OXYGEN SATURATION: 97 % | HEIGHT: 70 IN | HEART RATE: 64 BPM | SYSTOLIC BLOOD PRESSURE: 135 MMHG | BODY MASS INDEX: 24.14 KG/M2 | TEMPERATURE: 97.2 F | RESPIRATION RATE: 16 BRPM

## 2023-12-15 PROCEDURE — 6370000000 HC RX 637 (ALT 250 FOR IP): Performed by: PSYCHIATRY & NEUROLOGY

## 2023-12-15 PROCEDURE — 99239 HOSP IP/OBS DSCHRG MGMT >30: CPT | Performed by: PSYCHIATRY & NEUROLOGY

## 2023-12-15 RX ADMIN — CYANOCOBALAMIN TAB 500 MCG 1000 MCG: 500 TAB at 10:29

## 2023-12-15 RX ADMIN — CETIRIZINE HYDROCHLORIDE 10 MG: 10 TABLET ORAL at 10:29

## 2023-12-15 RX ADMIN — LEVOTHYROXINE SODIUM 112 MCG: 112 TABLET ORAL at 05:33

## 2023-12-15 RX ADMIN — PANTOPRAZOLE SODIUM 40 MG: 40 TABLET, DELAYED RELEASE ORAL at 05:33

## 2023-12-15 RX ADMIN — METOPROLOL SUCCINATE 50 MG: 50 TABLET, EXTENDED RELEASE ORAL at 10:29

## 2023-12-15 RX ADMIN — LEVOFLOXACIN 500 MG: 500 TABLET, FILM COATED ORAL at 10:29

## 2023-12-15 RX ADMIN — STANDARDIZED SENNA CONCENTRATE 17.2 MG: 8.6 TABLET ORAL at 10:29

## 2023-12-15 RX ADMIN — Medication 1000 UNITS: at 10:29

## 2023-12-15 RX ADMIN — DOXAZOSIN 2 MG: 2 TABLET ORAL at 10:29

## 2023-12-15 RX ADMIN — LOSARTAN POTASSIUM 100 MG: 100 TABLET, FILM COATED ORAL at 10:29

## 2023-12-15 RX ADMIN — APIXABAN 5 MG: 5 TABLET, FILM COATED ORAL at 10:29

## 2023-12-15 RX ADMIN — Medication 100 MG: at 10:28

## 2023-12-15 NOTE — CARE COORDINATION
Mr Ren Melita, being discharged today, going home. Setting up outpatient physical therapy with start of care January 2024, as he is changing managed care medicare carriers.

## 2023-12-17 ENCOUNTER — READMISSION MANAGEMENT (OUTPATIENT)
Dept: CALL CENTER | Facility: HOSPITAL | Age: 78
End: 2023-12-17
Payer: MEDICARE

## 2023-12-18 ENCOUNTER — TRANSITIONAL CARE MANAGEMENT TELEPHONE ENCOUNTER (OUTPATIENT)
Dept: CALL CENTER | Facility: HOSPITAL | Age: 78
End: 2023-12-18
Payer: MEDICARE

## 2023-12-18 NOTE — OUTREACH NOTE
Call Center TCM Note      Flowsheet Row Responses   [facility] patient discharged from? Non-BH   Does the patient have one of the following disease processes/diagnoses(primary or secondary)? Other   TCM attempt successful? No   Unsuccessful attempts Attempt 2        Pt returned call.  States he is doing well since discharge from [facility].  Has new prescription for pain medication and completed course of antibiotic for UTI.  Went over s/s of an infection.  Denies any questions or needs at this time.  Has follow up scheduled with PCP.      Shavonne Fontenot LPN    12/18/2023, 14:55 CST

## 2023-12-18 NOTE — OUTREACH NOTE
Call Center TCM Note      Flowsheet Row Responses   [facility] patient discharged from? Non-BH   Does the patient have one of the following disease processes/diagnoses(primary or secondary)? Other   TCM attempt successful? No   Unsuccessful attempts Attempt 1   Call Status Left message            Shavonne Fontenot LPN    12/18/2023, 13:55 CST

## 2023-12-18 NOTE — OUTREACH NOTE
Prep Survey      Flowsheet Row Responses   [facility]   Is LACE score < 7 ? Non-BH Discharge   Eligibility [facility]   Date of Admission 12/07/23   Date of Discharge 12/15/23   Discharge Disposition Home or Self Care   Discharge diagnosis AVM (arteriovenous malformation)   Does the patient have one of the following disease processes/diagnoses(primary or secondary)? Other   Prep survey completed? Yes            Shelia LEZAMA - Registered Nurse

## 2023-12-20 ENCOUNTER — OFFICE VISIT (OUTPATIENT)
Dept: INTERNAL MEDICINE | Facility: CLINIC | Age: 78
End: 2023-12-20
Payer: MEDICARE

## 2023-12-20 VITALS
HEIGHT: 70 IN | RESPIRATION RATE: 16 BRPM | SYSTOLIC BLOOD PRESSURE: 151 MMHG | HEART RATE: 64 BPM | WEIGHT: 175.1 LBS | DIASTOLIC BLOOD PRESSURE: 82 MMHG | OXYGEN SATURATION: 98 % | BODY MASS INDEX: 25.07 KG/M2

## 2023-12-20 DIAGNOSIS — N31.9 NEUROGENIC BLADDER: Primary | ICD-10-CM

## 2023-12-20 DIAGNOSIS — K59.04 CHRONIC IDIOPATHIC CONSTIPATION: ICD-10-CM

## 2023-12-20 DIAGNOSIS — I10 ESSENTIAL HYPERTENSION: ICD-10-CM

## 2023-12-20 DIAGNOSIS — Z86.718 HISTORY OF DVT (DEEP VEIN THROMBOSIS): ICD-10-CM

## 2023-12-20 DIAGNOSIS — Z78.9 SELF-CATHETERIZES URINARY BLADDER: ICD-10-CM

## 2023-12-20 DIAGNOSIS — I48.0 PAROXYSMAL ATRIAL FIBRILLATION: ICD-10-CM

## 2023-12-20 PROBLEM — R33.8 ACUTE URINARY RETENTION: Status: RESOLVED | Noted: 2023-01-29 | Resolved: 2023-12-20

## 2023-12-20 PROBLEM — I77.0: Status: ACTIVE | Noted: 2023-11-20

## 2023-12-20 RX ORDER — LOSARTAN POTASSIUM 100 MG/1
100 TABLET ORAL DAILY
Qty: 90 TABLET | Refills: 1 | Status: SHIPPED | OUTPATIENT
Start: 2023-12-20

## 2023-12-20 RX ORDER — HYDROCODONE BITARTRATE AND ACETAMINOPHEN 10; 325 MG/1; MG/1
1 TABLET ORAL AS NEEDED
COMMUNITY
Start: 2023-12-14 | End: 2024-01-14

## 2023-12-20 NOTE — PROGRESS NOTES
Transitional Care Follow Up Visit  Subjective     Sonu Micki Bravo Jr. is a 78 y.o. male who presents for a transitional care management visit.    Within 48 business hours after discharge our office contacted him via telephone to coordinate his care and needs.      I reviewed and discussed the details of that call along with the discharge summary, hospital problems, inpatient lab results, inpatient diagnostic studies, and consultation reports with Sonu.     Current outpatient and discharge medications have been reconciled for the patient.  Reviewed by: Vaughn Kramer DO          12/17/2023     6:04 PM   Date of TCM Phone Call   [facility]   Date of Admission 12/7/2023   Date of Discharge 12/15/2023   Discharge Disposition Home or Self Care     Risk for Readmission (LACE) No data recorded    History of Present Illness  He feels he is progressing well.  He has some urinary retention and continues to self cath 3 times daily, but is able to pass some urine on his own sometimes.  He is ambulating with a walker and plans to start with physical therapy in January as an outpatient.     Course During Hospital Stay: After previous lumbar surgery in January 2023, he had initially gotten better, but progressively worsened from a bilateral sensation of paresthesia, difficulty ambulating, urinary retention, and incontinence of bowel.  He was subsequently admitted by Dr. Barros in [place] to undergo spinal angiogram that revealed DAVF that was partially embolized with coiling and he subsequently on December 3 underwent T11 and T12 total laminectomies for ligation of left T12 spinal DAVF.  He feels he is recovering well and has a positive outlook on things.  He is now scheduled to engage with outpatient physical therapy in January to continue his rehab.  He is still self cathing, but is able to intermittently pass some urine.     The following portions of the patient's history were reviewed and updated as  appropriate: allergies, current medications, past family history, past medical history, past social history, past surgical history, and problem list.    Review of Systems   Constitutional:  Negative for fever.   Respiratory:  Negative for shortness of breath.    Cardiovascular:  Negative for chest pain.   Gastrointestinal:  Negative for constipation.   Musculoskeletal:  Positive for back pain.   Neurological:  Positive for weakness and numbness.       Objective   Physical Exam  Constitutional:       General: He is not in acute distress.  Cardiovascular:      Rate and Rhythm: Normal rate and regular rhythm.      Heart sounds: Normal heart sounds. No murmur heard.  Pulmonary:      Effort: Pulmonary effort is normal.      Breath sounds: Normal breath sounds. No wheezing.   Skin:     Comments: Lumbar incision site is c/d/I with appropriate scabbing and pink tissue.    Neurological:      Mental Status: He is alert and oriented to person, place, and time.      Gait: Gait normal.   Psychiatric:         Mood and Affect: Mood normal.         Behavior: Behavior normal.         Assessment & Plan   Diagnoses and all orders for this visit:    1. Neurogenic bladder (Primary)  Improving to some degree. Still self-cathing with urology follow-up.     2. Chronic idiopathic constipation  Fair control with senna and miralax.     3. Paroxysmal atrial fibrillation  4. History of DVT (deep vein thrombosis)  No palpitations nor bleeding on anticoagulation with rate control.     5. Essential hypertension  -     losartan (Cozaar) 100 MG tablet; Take 1 tablet by mouth Daily.  Dispense: 90 tablet; Refill: 1  Poorly controlled, BP goal for age is <140/90 per JNC 8 guidelines, and increase losartan to 100mg.     6. Self-catheterizes urinary bladder

## 2024-01-04 ENCOUNTER — TREATMENT (OUTPATIENT)
Dept: PHYSICAL THERAPY | Facility: CLINIC | Age: 79
End: 2024-01-04
Payer: MEDICARE

## 2024-01-04 DIAGNOSIS — I10 ESSENTIAL HYPERTENSION: ICD-10-CM

## 2024-01-04 DIAGNOSIS — E78.2 MIXED HYPERLIPIDEMIA: ICD-10-CM

## 2024-01-04 DIAGNOSIS — E03.9 ACQUIRED HYPOTHYROIDISM: ICD-10-CM

## 2024-01-04 DIAGNOSIS — M47.14 THORACIC MYELOPATHY: ICD-10-CM

## 2024-01-04 DIAGNOSIS — I48.0 PAROXYSMAL ATRIAL FIBRILLATION: ICD-10-CM

## 2024-01-04 DIAGNOSIS — K59.04 CHRONIC IDIOPATHIC CONSTIPATION: ICD-10-CM

## 2024-01-04 DIAGNOSIS — R26.9 ABNORMALITY OF GAIT AND MOBILITY: Primary | ICD-10-CM

## 2024-01-04 DIAGNOSIS — K21.00 GASTRO-ESOPHAGEAL REFLUX DISEASE WITH ESOPHAGITIS: ICD-10-CM

## 2024-01-04 DIAGNOSIS — Z86.718 HISTORY OF DVT (DEEP VEIN THROMBOSIS): ICD-10-CM

## 2024-01-04 PROCEDURE — 97535 SELF CARE MNGMENT TRAINING: CPT | Performed by: PHYSICAL THERAPIST

## 2024-01-04 PROCEDURE — 97163 PT EVAL HIGH COMPLEX 45 MIN: CPT | Performed by: PHYSICAL THERAPIST

## 2024-01-04 RX ORDER — FLUTICASONE PROPIONATE 50 MCG
1 SPRAY, SUSPENSION (ML) NASAL DAILY
Qty: 32 G | Refills: 3 | Status: SHIPPED | OUTPATIENT
Start: 2024-01-04

## 2024-01-04 RX ORDER — BISOPROLOL FUMARATE 5 MG/1
5 TABLET, FILM COATED ORAL DAILY
Qty: 90 TABLET | Refills: 1 | Status: SHIPPED | OUTPATIENT
Start: 2024-01-04

## 2024-01-04 RX ORDER — LOSARTAN POTASSIUM 100 MG/1
100 TABLET ORAL DAILY
Qty: 90 TABLET | Refills: 1 | Status: SHIPPED | OUTPATIENT
Start: 2024-01-04

## 2024-01-04 RX ORDER — OMEPRAZOLE 20 MG/1
20 CAPSULE, DELAYED RELEASE ORAL DAILY
Qty: 90 CAPSULE | Refills: 3 | Status: SHIPPED | OUTPATIENT
Start: 2024-01-04

## 2024-01-04 RX ORDER — SENNOSIDES 8.6 MG
17.2 CAPSULE ORAL NIGHTLY
Qty: 60 EACH | Refills: 3 | Status: SHIPPED | OUTPATIENT
Start: 2024-01-04

## 2024-01-04 RX ORDER — ROSUVASTATIN CALCIUM 20 MG/1
20 TABLET, COATED ORAL DAILY
Qty: 90 TABLET | Refills: 3 | Status: SHIPPED | OUTPATIENT
Start: 2024-01-04

## 2024-01-04 RX ORDER — LEVOTHYROXINE SODIUM 112 UG/1
112 TABLET ORAL
Qty: 90 TABLET | Refills: 1 | Status: SHIPPED | OUTPATIENT
Start: 2024-01-04

## 2024-01-04 NOTE — TELEPHONE ENCOUNTER
Rx Refill Note  Requested Prescriptions     Pending Prescriptions Disp Refills    apixaban (Eliquis) 5 MG tablet tablet 180 tablet 1     Sig: Take 1 tablet by mouth 2 (Two) Times a Day.    bisoprolol (ZEBeta) 5 MG tablet 90 tablet 1     Sig: Take 1 tablet by mouth Daily.    fluticasone (FLONASE) 50 MCG/ACT nasal spray 32 g 3     Si spray into the nostril(s) as directed by provider Daily. Indications: Stuffy Nose    levothyroxine (SYNTHROID, LEVOTHROID) 112 MCG tablet 90 tablet 1     Sig: Take 1 tablet by mouth Every Morning.    losartan (Cozaar) 100 MG tablet 90 tablet 1     Sig: Take 1 tablet by mouth Daily.    omeprazole (priLOSEC) 20 MG capsule 90 capsule 3     Sig: Take 1 capsule by mouth Daily.    rosuvastatin (CRESTOR) 20 MG tablet 90 tablet 3     Sig: Take 1 tablet by mouth Daily.    Sennosides (Senna) 8.6 MG capsule 60 each 3     Sig: Take 17.2 mg by mouth Every Night.      Last office visit with prescribing clinician: 2023   Last telemedicine visit with prescribing clinician: Visit date not found   Next office visit with prescribing clinician: Visit date not found                         Would you like a call back once the refill request has been completed: [] Yes [] No    If the office needs to give you a call back, can they leave a voicemail: [] Yes [] No    Jairo Cano MA  24, 13:18 CST

## 2024-01-04 NOTE — PROGRESS NOTES
Physical Therapy Initial Evaluation and Plan of Care  [address] [place] [address]    Patient: Sonu Bravo Jr.   : 1945  Referring practitioner: Gucci Brandon MD  Date of Initial Visit: 2024  Today's Date: 2024  Patient seen for 1 sessions    Visit Diagnoses:    ICD-10-CM ICD-9-CM   1. Abnormality of gait and mobility  R26.9 781.2     Past Medical History:   Diagnosis Date    Arthritis     Atrial fibrillation 2021    Cataracts, bilateral     Disease of thyroid gland     Diverticulitis     GERD (gastroesophageal reflux disease)     Hyperlipidemia     Hypertension     Mononucleosis 1964     Past Surgical History:   Procedure Laterality Date    ANTERIOR CERVICAL DISCECTOMY W/ FUSION N/A 10/11/2022    Procedure: CERVICAL DISCECTOMY ANTERIOR WITH FUSION, Cervical 4/5 and 5/6;  Surgeon: Durga Cifuentes MD;  Location: [facility] OR;  Service: Neurosurgery;  Laterality: N/A;    CATARACT EXTRACTION Right     COLON SURGERY  2022    Dr Fairbanks at [facility]    COLONOSCOPY N/A 2021    Procedure: COLONOSCOPY WITH ANESTHESIA;  Surgeon: Thomas Dc DO;  Location: [facility] ENDOSCOPY;  Service: Gastroenterology;  Laterality: N/A;  pre op; abnormal ct scan  post op: diverticulosis ; polyps   PCP: Vaughn Kramer DO    HERNIA REPAIR      LUMBAR DIRECT LATERAL INTERBODY FUSION N/A 2023    Procedure: LUMBAR DIRECT LATERAL INTERBODY FUSION; LUMBAR 2-5;  Surgeon: Durga Cifuentes MD;  Location: [facility] OR;  Service: Neurosurgery;  Laterality: N/A;    LUMBAR FUSION N/A 2023    Procedure: LUMBAR DIRECT LATERAL INTERBODY FUSION; LUMBAR 2-5, LUMBAR LAMINECTOMY TRANSFORAMINAL LUMBAR INTERBODY FUSION; LUMBAR 2-5;  Surgeon: Durga Cifuentes MD;  Location: [facility] OR;  Service: Robotics - Neuro;  Laterality: N/A;    SKIN SURGERY      Skin cancer surgery.         SUBJECTIVE     Subjective  Evaluation    History of Present Illness  Date of surgery: 12/3/2023  Mechanism of injury: He had a been seen in PT last fall but was struggling with progressive weakness with his quads and being unable to empty his bladder needing to cath. He was found to have an AVM in his spinal canal. He had a laminectomy on 12/3/23 to repair this. Since then, he has started to be able to empty his bladder more frequently without cathing. [facility] to [facility] and is here for more strengthening to see how far he can progress.     Pain  No pain reported    Social Support  Lives with: spouse    Treatments  Current treatment: physical therapy  Patient Goals  Patient goals for therapy: improved balance, increased motion, increased strength and independence with ADLs/IADLs         Outcome Measure:   TUG: (with cane)  first attemp: 14.47, second attempt 14.21; av.34    FGA:        OBJECTIVE     Objective   Vision assessment: wears corrected lenses/contact.    Cognitive status: oriented to Person, Place, Time, and Situation    Reflexes: DTR 3 out of 4 of the patellar and Achilles bilateral     Tone: RLE is hypertonic and LLE is hypertonic    Midline orientation: Midline    LE MMT   HIP Strength L Strength R   flexion 4    extension 4 4   ABD 4 4        KNEE     flexion 5 5   extension 5 5         ANKLE     dorsiflexion 4+ 4+   plantarflexion 5 5   *pain     ROM:   WFL except ankle DF edward -5 deg passively and edward hip extension 0 deg passively.     BALANCE TESTING  Single Leg Stance Right   unable to perform test  Single Leg Stance Left      unable to perform test    Mobility:  Walks with a SC with a wide BRENDEN and edward knees bent and a forward stooped posture. Toes will occasionally drag. Lost balance walking backward. Pulls self up with rail on stairs and heel caught the step descending consistently.     Therapy Education/Self Care [id number]   Education offered today HEP  Educated on what an AVM is and the surgery they did.   "  Lesly Code Access Code: [email or web address]  URL: [email or web address]   Ongoing HEP     Date: 01/04/2024  Prepared by: Spike Ramirez    Exercises  - Standing Hip Flexor Stretch  - 2 x daily - 7 x weekly - 2 sets - 10 reps  - Gastroc Stretch on Wall  - 2 x daily - 7 x weekly - 2 sets - 30 sec hold  - Bird Dog on Counter  - 2 x daily - 7 x weekly - 2 sets - 10 reps  - Standing Hip Abduction with Counter Support  - 10 x daily - 7 x weekly - 2 sets - 10 reps - 10 hold  - Mini Squat with Counter Support  - 2 x daily - 7 x weekly - 2 sets - 10 reps  - Mini Lunge  - 2 x daily - 7 x weekly - 2 sets - 10 reps  - Bird Dog  - 2 x daily - 7 x weekly - 2 sets - 10 reps  - Supine Piriformis Stretch with Foot on Ground  - 2 x daily - 7 x weekly - 2 sets - 30 hold   Timed Minutes 15       Total Timed Treatment:     15   mins  Total Time of Visit:            45   mins    ASSESSMENT/PLAN     GOALS:  Goals                                          Progress Note due by 2/3/24                                                      Recert due by 4/2/24   LTG by: 8 weeks Comments Date Status   SLS edward legs x 5 sec      TUG score of <12\"      FGA score of 22 or better      Descend flight of stairs without proper weight shift without catching heel      Improve edward DF to 10 deg passively      Improve edward hip ext to 10 deg passively      Walk with normal gait pattern with AAD      Ind with HEP           Assessment & Plan       Assessment  Impairments: abnormal muscle tone, abnormal or restricted ROM, activity intolerance, impaired balance, impaired physical strength, lacks appropriate home exercise program and safety issue   Functional limitations: walking and stooping   Assessment details: He is improved since his surgery. I'm encouraged that he is able to empty his bladder without cathing most of the time now. Hopefully his legs will follow. He shows hypertonicity in edward LE but it hasn't really affected him with walking by tripping him. He " lacks good hip stability making his walking a bit precarious. He would benefit from PT.   Prognosis: good    Plan  Therapy options: will be seen for skilled therapy services  Planned modality interventions: dry needling, low level laser therapy, TENS and electrical stimulation/Russian stimulation  Planned therapy interventions: manual therapy, strengthening, stretching, therapeutic activities, gait training, home exercise program, soft tissue mobilization, abdominal trunk stabilization and neuromuscular re-education  Frequency: 2x week  Duration in weeks: 8  Treatment plan discussed with: patient and caregiver  Plan details: Focus early on hip stability and balance and progress with incorporating into functional activities.         SIGNATURE: Spike Ramirez PT, [place] License #: [id number]  Electronically Signed on 1/4/2024    Initial Certification  Certification Period: 1/4/2024 through 4/2/2024  I certify that the therapy services are furnished while this patient is under my care.  The services outlined above are required by this patient, and will be reviewed every 90 days.     PHYSICIAN: Gucci Brandon MD (NPI: [id number])    Signature: _______________________________________DATE: _________    Please sign and return via fax to [phone].   Thank you so much for letting us work with Sonu. I appreciate your letting us work with your patients. If you have any questions or concerns, please don't hesitate to contact me.          [address] [address]  [phone]

## 2024-01-08 ENCOUNTER — OFFICE VISIT (OUTPATIENT)
Dept: GASTROENTEROLOGY | Facility: CLINIC | Age: 79
End: 2024-01-08
Payer: MEDICARE

## 2024-01-08 VITALS
HEIGHT: 70 IN | HEART RATE: 70 BPM | SYSTOLIC BLOOD PRESSURE: 148 MMHG | OXYGEN SATURATION: 97 % | TEMPERATURE: 98 F | WEIGHT: 175 LBS | DIASTOLIC BLOOD PRESSURE: 84 MMHG | BODY MASS INDEX: 25.05 KG/M2

## 2024-01-08 DIAGNOSIS — Z86.010 HISTORY OF COLON POLYPS: ICD-10-CM

## 2024-01-08 DIAGNOSIS — K59.01 SLOW TRANSIT CONSTIPATION: ICD-10-CM

## 2024-01-08 DIAGNOSIS — R19.8 ALTERED BOWEL FUNCTION: Primary | ICD-10-CM

## 2024-01-08 PROCEDURE — 1159F MED LIST DOCD IN RCRD: CPT | Performed by: NURSE PRACTITIONER

## 2024-01-08 PROCEDURE — 3079F DIAST BP 80-89 MM HG: CPT | Performed by: NURSE PRACTITIONER

## 2024-01-08 PROCEDURE — 99214 OFFICE O/P EST MOD 30 MIN: CPT | Performed by: NURSE PRACTITIONER

## 2024-01-08 PROCEDURE — 3077F SYST BP >= 140 MM HG: CPT | Performed by: NURSE PRACTITIONER

## 2024-01-08 PROCEDURE — 1160F RVW MEDS BY RX/DR IN RCRD: CPT | Performed by: NURSE PRACTITIONER

## 2024-01-08 RX ORDER — SODIUM, POTASSIUM,MAG SULFATES 17.5-3.13G
177 SOLUTION, RECONSTITUTED, ORAL ORAL TAKE AS DIRECTED
Qty: 177 ML | Refills: 0 | Status: SHIPPED | OUTPATIENT
Start: 2024-01-08

## 2024-01-08 RX ORDER — POLYETHYLENE GLYCOL 3350 17 G/17G
17 POWDER, FOR SOLUTION ORAL AS NEEDED
COMMUNITY

## 2024-01-08 NOTE — PROGRESS NOTES
Chief Complaint   Patient presents with    Constipation     Had colon surgery 1-2022 his stools are larger since surgery       PCP: Vaughn Kramer DO  REFER: Vaughn Kramer DO    Subjective     HPI    Sonu Micki Lawrence Sinha. Presents with with complains of change in stool since undergoing sigmoid colectomy for diverticulitis Jan 2022.  Stool is described large.  No signs of blood.  No melena.  No abdominal pain.  Appetite and weight are stable.  Bowel habit described as cluster of stool and occasionally requires manual disimpaction.  Bowels do not move daily.    Difficulty with bowels started soon after colectomy.       COLONOSCOPY (09/28/2021 12:12) -sessile polyps - 3 years     Past Medical History:   Diagnosis Date    Arthritis     Atrial fibrillation 08/31/2021    Cataracts, bilateral     Disease of thyroid gland     Diverticulitis     GERD (gastroesophageal reflux disease)     Hyperlipidemia     Hypertension     Mononucleosis 1964       Past Surgical History:   Procedure Laterality Date    ANTERIOR CERVICAL DISCECTOMY W/ FUSION N/A 10/11/2022    Procedure: CERVICAL DISCECTOMY ANTERIOR WITH FUSION, Cervical 4/5 and 5/6;  Surgeon: Durga Cifuentes MD;  Location: [facility] OR;  Service: Neurosurgery;  Laterality: N/A;    CATARACT EXTRACTION Right     COLON SURGERY  01/2022    Dr Fairbanks at [facility]    COLONOSCOPY N/A 09/28/2021    Procedure: COLONOSCOPY WITH ANESTHESIA;  Surgeon: Thomas Dc DO;  Location: [facility] ENDOSCOPY;  Service: Gastroenterology;  Laterality: N/A;  pre op; abnormal ct scan  post op: diverticulosis ; polyps   PCP: Vaughn Kramer DO    HERNIA REPAIR      LUMBAR DIRECT LATERAL INTERBODY FUSION N/A 01/24/2023    Procedure: LUMBAR DIRECT LATERAL INTERBODY FUSION; LUMBAR 2-5;  Surgeon: Durga Cifuentes MD;  Location: [facility] OR;  Service: Neurosurgery;  Laterality: N/A;    LUMBAR FUSION N/A 01/24/2023    Procedure: LUMBAR DIRECT LATERAL INTERBODY  FUSION; LUMBAR 2-5, LUMBAR LAMINECTOMY TRANSFORAMINAL LUMBAR INTERBODY FUSION; LUMBAR 2-5;  Surgeon: Durga Cifuentes MD;  Location: [facility];  Service: Robotics - Neuro;  Laterality: N/A;    SKIN SURGERY      Skin cancer surgery.       Outpatient Medications Marked as Taking for the 1/8/24 encounter (Office Visit) with Ghassan Padron APRN   Medication Sig Dispense Refill    acetaminophen (TYLENOL) 325 MG tablet Take 2 tablets by mouth As Needed. Indications: Fever, Pain      apixaban (Eliquis) 5 MG tablet tablet Take 1 tablet by mouth 2 (Two) Times a Day. 180 tablet 1    bisoprolol (ZEBeta) 5 MG tablet Take 1 tablet by mouth Daily. 90 tablet 1    Cholecalciferol 100 MCG (4000 UT) capsule Take 4,000 Units by mouth Daily. Indications: Vitamin D Deficiency      coenzyme Q10 100 MG capsule Take 1 capsule by mouth Daily. Indications: Heart Rhythm Disorder      Cyanocobalamin 2500 MCG sublingual tablet Take 2,500 mcg by mouth Daily. Indications: Inadequate Vitamin B12      fexofenadine (ALLEGRA) 180 MG tablet Take 1 tablet by mouth Daily. Indications: Hayfever      fluticasone (FLONASE) 50 MCG/ACT nasal spray 1 spray into the nostril(s) as directed by provider Daily. Indications: Stuffy Nose 32 g 3    HYDROcodone-acetaminophen (NORCO)  MG per tablet Take 1 tablet by mouth As Needed.      levothyroxine (SYNTHROID, LEVOTHROID) 112 MCG tablet Take 1 tablet by mouth Every Morning. 90 tablet 1    losartan (Cozaar) 100 MG tablet Take 1 tablet by mouth Daily. 90 tablet 1    omeprazole (priLOSEC) 20 MG capsule Take 1 capsule by mouth Daily. 90 capsule 3    polyethylene glycol (MiraLax) 17 GM/SCOOP powder Take 17 g by mouth As Needed.      rosuvastatin (CRESTOR) 20 MG tablet Take 1 tablet by mouth Daily. 90 tablet 3    Sennosides (Senna) 8.6 MG capsule Take 17.2 mg by mouth Every Night. 60 each 3    terazosin (HYTRIN) 2 MG capsule Take 1 capsule by mouth Every Night.      Zinc 50 MG capsule Take 1 capsule by  "mouth Daily. Indications: Zinc Deficiency         Allergies   Allergen Reactions    Penicillins Hives    Sulfa Antibiotics Hives, Itching and Rash    Sulfamethoxazole-Trimethoprim Hives, Itching and Rash    Diphenhydramine Mental Status Change     hiper       Social History     Socioeconomic History    Marital status:    Tobacco Use    Smoking status: Every Day     Packs/day: 0.50     Years: 52.00     Additional pack years: 0.00     Total pack years: 26.00     Types: Cigarettes     Start date: 1967     Passive exposure: Current    Smokeless tobacco: Never    Tobacco comments:     down to 1/2 pack a day 12/20/23   Vaping Use    Vaping Use: Never used   Substance and Sexual Activity    Alcohol use: Yes     Comment: rare    Drug use: Never    Sexual activity: Defer       Review of Systems   Constitutional:  Negative for fever and unexpected weight change.   HENT:  Negative for trouble swallowing.    Respiratory:  Negative for shortness of breath.    Cardiovascular:  Negative for chest pain.   Gastrointestinal:  Negative for abdominal pain and anal bleeding.       Objective     Vitals:    01/08/24 1300   BP: 148/84   Pulse: 70   Temp: 98 °F (36.7 °C)   SpO2: 97%   Weight: 79.4 kg (175 lb)   Height: 177.8 cm (70\")     Body mass index is 25.11 kg/m².    Physical Exam  Constitutional:       Appearance: Normal appearance. He is well-developed.   Eyes:      General: No scleral icterus.  Cardiovascular:      Heart sounds: Normal heart sounds. No murmur heard.  Pulmonary:      Effort: Pulmonary effort is normal.   Abdominal:      General: Bowel sounds are normal. There is no distension.      Palpations: Abdomen is soft.      Tenderness: There is no abdominal tenderness. There is no guarding.   Skin:     General: Skin is warm and dry.      Coloration: Skin is not jaundiced.   Neurological:      Mental Status: He is alert.   Psychiatric:         Behavior: Behavior is cooperative.         Imaging Results (Most Recent)  "      None            Body mass index is 25.11 kg/m².    Assessment & Plan     Diagnoses and all orders for this visit:    1. Altered bowel function (Primary)  -     Case Request; Standing  -     Implement Anesthesia Orders Day of Procedure; Standing  -     Case Request    2. Slow transit constipation    3. History of colon polyps    Other orders  -     sodium-potassium-magnesium sulfates (Suprep Bowel Prep Kit) 17.5-3.13-1.6 GM/177ML solution oral solution; Take 1 bottle by mouth Take As Directed. Take as directed  Dispense: 177 mL; Refill: 0         COLONOSCOPY WITH ANESTHESIA (N/A)    I have suggested utilizing Miralax starting 7 days prior to colonoscopy to help improve prep.  Ok to adjust dose of miralax as needed, ok to use dulcolax with miralax if needed.    I have explained stool may be loose but we do not want Sonu Bravo Jr. to be uncomfortable or experiencing fecal incontinence.  Miralax should be adjusted as needed.       Recommend daily use of Miralax, adjust as needed  Encouraged addition of dietary fiber, increasing daily water consumption, as well daily physical activity    Discussed Miralax prep v suprep - Sonu Bravo Jr. Wished to proceed with suprep      All risks, benefits, alternatives, and indications of colonoscopy procedure have been discussed with the patient. Risks to include perforation of the colon requiring possible surgery or colostomy, risk of bleeding from biopsies or removal of colon tissue, possibility of missing a colon polyp or cancer, or adverse drug reaction.  Benefits to include the diagnosis and management of disease of the colon and rectum. Alternatives to include barium enema, radiographic evaluation, lab testing or no intervention. Pt verbalizes understanding and agrees to proceed with procedure.             Ghassan Padron, APRN  01/09/24          There are no Patient Instructions on file for this visit.

## 2024-01-08 NOTE — H&P (VIEW-ONLY)
Chief Complaint   Patient presents with    Constipation     Had colon surgery 1-2022 his stools are larger since surgery       PCP: Vaughn Kramer DO  REFER: Vaughn Kramer DO    Subjective     HPI    Sonu Micki Lawrence Sinha. Presents with with complains of change in stool since undergoing sigmoid colectomy for diverticulitis Jan 2022.  Stool is described large.  No signs of blood.  No melena.  No abdominal pain.  Appetite and weight are stable.  Bowel habit described as cluster of stool and occasionally requires manual disimpaction.  Bowels do not move daily.    Difficulty with bowels started soon after colectomy.       COLONOSCOPY (09/28/2021 12:12) -sessile polyps - 3 years     Past Medical History:   Diagnosis Date    Arthritis     Atrial fibrillation 08/31/2021    Cataracts, bilateral     Disease of thyroid gland     Diverticulitis     GERD (gastroesophageal reflux disease)     Hyperlipidemia     Hypertension     Mononucleosis 1964       Past Surgical History:   Procedure Laterality Date    ANTERIOR CERVICAL DISCECTOMY W/ FUSION N/A 10/11/2022    Procedure: CERVICAL DISCECTOMY ANTERIOR WITH FUSION, Cervical 4/5 and 5/6;  Surgeon: Durga Cifuentes MD;  Location: [facility] OR;  Service: Neurosurgery;  Laterality: N/A;    CATARACT EXTRACTION Right     COLON SURGERY  01/2022    Dr Fairbanks at [facility]    COLONOSCOPY N/A 09/28/2021    Procedure: COLONOSCOPY WITH ANESTHESIA;  Surgeon: Thomas Dc DO;  Location: [facility] ENDOSCOPY;  Service: Gastroenterology;  Laterality: N/A;  pre op; abnormal ct scan  post op: diverticulosis ; polyps   PCP: Vaughn Kramer DO    HERNIA REPAIR      LUMBAR DIRECT LATERAL INTERBODY FUSION N/A 01/24/2023    Procedure: LUMBAR DIRECT LATERAL INTERBODY FUSION; LUMBAR 2-5;  Surgeon: Durga Cifuentes MD;  Location: [facility] OR;  Service: Neurosurgery;  Laterality: N/A;    LUMBAR FUSION N/A 01/24/2023    Procedure: LUMBAR DIRECT LATERAL INTERBODY  FUSION; LUMBAR 2-5, LUMBAR LAMINECTOMY TRANSFORAMINAL LUMBAR INTERBODY FUSION; LUMBAR 2-5;  Surgeon: Druga Cifuentes MD;  Location: [facility];  Service: Robotics - Neuro;  Laterality: N/A;    SKIN SURGERY      Skin cancer surgery.       Outpatient Medications Marked as Taking for the 1/8/24 encounter (Office Visit) with Ghassan Padron APRN   Medication Sig Dispense Refill    acetaminophen (TYLENOL) 325 MG tablet Take 2 tablets by mouth As Needed. Indications: Fever, Pain      apixaban (Eliquis) 5 MG tablet tablet Take 1 tablet by mouth 2 (Two) Times a Day. 180 tablet 1    bisoprolol (ZEBeta) 5 MG tablet Take 1 tablet by mouth Daily. 90 tablet 1    Cholecalciferol 100 MCG (4000 UT) capsule Take 4,000 Units by mouth Daily. Indications: Vitamin D Deficiency      coenzyme Q10 100 MG capsule Take 1 capsule by mouth Daily. Indications: Heart Rhythm Disorder      Cyanocobalamin 2500 MCG sublingual tablet Take 2,500 mcg by mouth Daily. Indications: Inadequate Vitamin B12      fexofenadine (ALLEGRA) 180 MG tablet Take 1 tablet by mouth Daily. Indications: Hayfever      fluticasone (FLONASE) 50 MCG/ACT nasal spray 1 spray into the nostril(s) as directed by provider Daily. Indications: Stuffy Nose 32 g 3    HYDROcodone-acetaminophen (NORCO)  MG per tablet Take 1 tablet by mouth As Needed.      levothyroxine (SYNTHROID, LEVOTHROID) 112 MCG tablet Take 1 tablet by mouth Every Morning. 90 tablet 1    losartan (Cozaar) 100 MG tablet Take 1 tablet by mouth Daily. 90 tablet 1    omeprazole (priLOSEC) 20 MG capsule Take 1 capsule by mouth Daily. 90 capsule 3    polyethylene glycol (MiraLax) 17 GM/SCOOP powder Take 17 g by mouth As Needed.      rosuvastatin (CRESTOR) 20 MG tablet Take 1 tablet by mouth Daily. 90 tablet 3    Sennosides (Senna) 8.6 MG capsule Take 17.2 mg by mouth Every Night. 60 each 3    terazosin (HYTRIN) 2 MG capsule Take 1 capsule by mouth Every Night.      Zinc 50 MG capsule Take 1 capsule by  "mouth Daily. Indications: Zinc Deficiency         Allergies   Allergen Reactions    Penicillins Hives    Sulfa Antibiotics Hives, Itching and Rash    Sulfamethoxazole-Trimethoprim Hives, Itching and Rash    Diphenhydramine Mental Status Change     hiper       Social History     Socioeconomic History    Marital status:    Tobacco Use    Smoking status: Every Day     Packs/day: 0.50     Years: 52.00     Additional pack years: 0.00     Total pack years: 26.00     Types: Cigarettes     Start date: 1967     Passive exposure: Current    Smokeless tobacco: Never    Tobacco comments:     down to 1/2 pack a day 12/20/23   Vaping Use    Vaping Use: Never used   Substance and Sexual Activity    Alcohol use: Yes     Comment: rare    Drug use: Never    Sexual activity: Defer       Review of Systems   Constitutional:  Negative for fever and unexpected weight change.   HENT:  Negative for trouble swallowing.    Respiratory:  Negative for shortness of breath.    Cardiovascular:  Negative for chest pain.   Gastrointestinal:  Negative for abdominal pain and anal bleeding.       Objective     Vitals:    01/08/24 1300   BP: 148/84   Pulse: 70   Temp: 98 °F (36.7 °C)   SpO2: 97%   Weight: 79.4 kg (175 lb)   Height: 177.8 cm (70\")     Body mass index is 25.11 kg/m².    Physical Exam  Constitutional:       Appearance: Normal appearance. He is well-developed.   Eyes:      General: No scleral icterus.  Cardiovascular:      Heart sounds: Normal heart sounds. No murmur heard.  Pulmonary:      Effort: Pulmonary effort is normal.   Abdominal:      General: Bowel sounds are normal. There is no distension.      Palpations: Abdomen is soft.      Tenderness: There is no abdominal tenderness. There is no guarding.   Skin:     General: Skin is warm and dry.      Coloration: Skin is not jaundiced.   Neurological:      Mental Status: He is alert.   Psychiatric:         Behavior: Behavior is cooperative.         Imaging Results (Most Recent)  "      None            Body mass index is 25.11 kg/m².    Assessment & Plan     Diagnoses and all orders for this visit:    1. Altered bowel function (Primary)  -     Case Request; Standing  -     Implement Anesthesia Orders Day of Procedure; Standing  -     Case Request    2. Slow transit constipation    3. History of colon polyps    Other orders  -     sodium-potassium-magnesium sulfates (Suprep Bowel Prep Kit) 17.5-3.13-1.6 GM/177ML solution oral solution; Take 1 bottle by mouth Take As Directed. Take as directed  Dispense: 177 mL; Refill: 0         COLONOSCOPY WITH ANESTHESIA (N/A)    I have suggested utilizing Miralax starting 7 days prior to colonoscopy to help improve prep.  Ok to adjust dose of miralax as needed, ok to use dulcolax with miralax if needed.    I have explained stool may be loose but we do not want Sonu Bravo Jr. to be uncomfortable or experiencing fecal incontinence.  Miralax should be adjusted as needed.       Recommend daily use of Miralax, adjust as needed  Encouraged addition of dietary fiber, increasing daily water consumption, as well daily physical activity    Discussed Miralax prep v suprep - Sonu Bravo Jr. Wished to proceed with suprep      All risks, benefits, alternatives, and indications of colonoscopy procedure have been discussed with the patient. Risks to include perforation of the colon requiring possible surgery or colostomy, risk of bleeding from biopsies or removal of colon tissue, possibility of missing a colon polyp or cancer, or adverse drug reaction.  Benefits to include the diagnosis and management of disease of the colon and rectum. Alternatives to include barium enema, radiographic evaluation, lab testing or no intervention. Pt verbalizes understanding and agrees to proceed with procedure.             Ghassan Padron, APRN  01/09/24          There are no Patient Instructions on file for this visit.

## 2024-01-09 ENCOUNTER — TREATMENT (OUTPATIENT)
Dept: PHYSICAL THERAPY | Facility: CLINIC | Age: 79
End: 2024-01-09
Payer: MEDICARE

## 2024-01-09 DIAGNOSIS — R26.9 ABNORMALITY OF GAIT AND MOBILITY: ICD-10-CM

## 2024-01-09 DIAGNOSIS — M47.14 THORACIC MYELOPATHY: Primary | ICD-10-CM

## 2024-01-09 PROBLEM — R19.8 ALTERED BOWEL FUNCTION: Status: ACTIVE | Noted: 2024-01-08

## 2024-01-09 NOTE — PROGRESS NOTES
Physical Therapy Treatment Note  [address] [place] [address]    Patient: Sonu Bravo Jr.                                                 Visit Date: 2024  :     1945    Referring practitioner:    Gucci Brandon MD  Date of Initial Visit:          Type: THERAPY  Noted: 2024    Patient seen for 2 sessions    Visit Diagnoses:    ICD-10-CM ICD-9-CM   1. Thoracic myelopathy  M47.14 721.41   2. Abnormality of gait and mobility  R26.9 781.2     SUBJECTIVE     Subjective: He reports it's been a long road, feels like he takes one step forward and ten steps back. He is self-cathing less for voiding bladder. No new c/c, no falls/near falls.     [Surgery Date: 12/3/2023]    PAIN: 4-10     OBJECTIVE     Objective     Therapeutic Exercises    14090 Units Comments   B unilateral BKFO with RTB  2 x 10     B SL clamshells with RTB  2 x 10    B SL hip abd against gravity  2 x 10    Bridges  2 x 10     STS 2 x 10          Timed Minutes 40     Therapy Education/Self Care 11891   Education offered today    Med[employer] Code [id number]    Ongoing HEP   Date: 2024  Prepared by: Spike Ramirez     Exercises  - Standing Hip Flexor Stretch  - 2 x daily - 7 x weekly - 2 sets - 10 reps  - Gastroc Stretch on Wall  - 2 x daily - 7 x weekly - 2 sets - 30 sec hold  - Bird Dog on Counter  - 2 x daily - 7 x weekly - 2 sets - 10 reps  - Standing Hip Abduction with Counter Support  - 10 x daily - 7 x weekly - 2 sets - 10 reps - 10 hold  - Mini Squat with Counter Support  - 2 x daily - 7 x weekly - 2 sets - 10 reps  - Mini Lunge  - 2 x daily - 7 x weekly - 2 sets - 10 reps  - Bird Dog  - 2 x daily - 7 x weekly - 2 sets - 10 reps  - Supine Piriformis Stretch with Foot on Ground  - 2 x daily - 7 x weekly - 2 sets - 30 hold   Timed Minutes      Total Timed Treatment:     40   mins  Total Time of Visit:             45   mins         ASSESSMENT/PLAN     GOALS  Goals   "                                        Progress Note due by 2/3/24                                                             Recert due by 4/2/24   LTG by: 8 weeks Comments Date Status   SLS edward legs x 5 sec     ongoing   TUG score of <12\"     ongoing   FGA score of 22 or better     ongoing   Descend flight of stairs without proper weight shift without catching heel     ongoing   Improve edward DF to 10 deg passively     ongoing   Improve edward hip ext to 10 deg passively     ongoing   Walk with normal gait pattern with AAD  contin use of SPC  1/9 ongoing   Ind with HEP   ongoing     Assessment/Plan     ASSESSMENT: No goals have been met at this time; however, this was his first tx session since initial evaluation. Prioritized hip strengthening utilizing moderate resistance today to progress hip stability. He tolerated this well, did not c/o increased pain; however, did require several cues for decreasing speed of movement and d/c compensation with hip flexors during hip abd.     PLAN: Continue to progress with strength and stability as symptoms allow.     SIGNATURE: Chanelle Richmond PTA, [place] License #: [id number]  Electronically Signed on 1/9/2024        [facility] [place] [address]  [phone]    "

## 2024-01-10 ENCOUNTER — TELEPHONE (OUTPATIENT)
Dept: GASTROENTEROLOGY | Facility: CLINIC | Age: 79
End: 2024-01-10
Payer: MEDICARE

## 2024-01-10 NOTE — TELEPHONE ENCOUNTER
----- Message from Vaughn Kramer DO sent at 1/10/2024 11:08 AM CST -----  He may hold his Eliquis (indication of Atrial fibrillation) as requested for planned procedure. May restart on hemostasis per his endoscopist.   ----- Message -----  From: Stanley Burnette MA  Sent: 1/8/2024   1:47 PM CST  To: Vaughn Kramer DO

## 2024-01-12 ENCOUNTER — TREATMENT (OUTPATIENT)
Dept: PHYSICAL THERAPY | Facility: CLINIC | Age: 79
End: 2024-01-12
Payer: MEDICARE

## 2024-01-12 DIAGNOSIS — R26.9 ABNORMALITY OF GAIT AND MOBILITY: ICD-10-CM

## 2024-01-12 DIAGNOSIS — M47.14 THORACIC MYELOPATHY: Primary | ICD-10-CM

## 2024-01-12 PROCEDURE — 97110 THERAPEUTIC EXERCISES: CPT | Performed by: PHYSICAL THERAPIST

## 2024-01-12 NOTE — PROGRESS NOTES
"                                                                Physical Therapy Treatment Note  [address] [place] [address]    Patient: Sonu Bravo Jr.                                                 Visit Date: 2024  :     1945    Referring practitioner:    Gucci Brandon MD  Date of Initial Visit:          Type: THERAPY  Noted: 2024    Patient seen for 3 sessions    Visit Diagnoses:    ICD-10-CM ICD-9-CM   1. Thoracic myelopathy  M47.14 721.41   2. Abnormality of gait and mobility  R26.9 781.2     SUBJECTIVE     Subjective:He reports feeling pain in his lower back but he is able to empty his bladder       PAIN: 5/10         OBJECTIVE     Objective     Therapeutic Exercises    78307 Units Comments   B unilateral step ups retro step downs with 4\" with unilateral UE support x 20 each       B unilateral LAQ's Nautilus #13.3 x 20     B LAQ's Nautilus #16 7x 20     B pec and thoracic stretches with 1/2 roller     Seated ML w/o LE with TRX Rip Keys all planes      Unicam seat#5 res 2 8 min          Timed Minutes 45        Therapy Education/Self Care 51129   Education offered today    Med[employer] Code [email or web address]     Ongoing HEP   Date: 2024  Prepared by: Spike Ramirez     Exercises  - Standing Hip Flexor Stretch  - 2 x daily - 7 x weekly - 2 sets - 10 reps  - Gastroc Stretch on Wall  - 2 x daily - 7 x weekly - 2 sets - 30 sec hold  - Bird Dog on Counter  - 2 x daily - 7 x weekly - 2 sets - 10 reps  - Standing Hip Abduction with Counter Support  - 10 x daily - 7 x weekly - 2 sets - 10 reps - 10 hold  - Mini Squat with Counter Support  - 2 x daily - 7 x weekly - 2 sets - 10 reps  - Mini Lunge  - 2 x daily - 7 x weekly - 2 sets - 10 reps  - Bird Dog  - 2 x daily - 7 x weekly - 2 sets - 10 reps  - Supine Piriformis Stretch with Foot on Ground  - 2 x daily - 7 x weekly - 2 sets - 30 hold   Timed Minutes        Total Timed Treatment:     45   mins  Total Time of Visit:             45   mins      " "   ASSESSMENT/PLAN     GOALS  Goals                                          Progress Note due by 2/3/24                                                             Recert due by 4/2/24   LTG by: 8 weeks Comments Date Status   SLS edward legs x 5 sec     ongoing   TUG score of <12\"     ongoing   FGA score of 22 or better     ongoing   Descend flight of stairs without proper weight shift without catching heel  addressed with 4\" step today   1/12 ongoing   Improve edward DF to 10 deg passively     ongoing   Improve edward hip ext to 10 deg passively     ongoing   Walk with normal gait pattern with AAD  continue use of SPC  1/9 ongoing   Ind with HEP     ongoing       Assessment/Plan     ASSESSMENT:   His B LE strength is still less than I'd like to see but hopefully the second surgery will resolve the issue and it will just take time to renew his strength.     PLAN:   Continue working on his overall strength and endurance.    SIGNATURE: [facility], [place] License #: [id number]  Electronically Signed on 1/12/2024        Nakul Hurtado  [place] [address]  [phone]    "

## 2024-01-16 ENCOUNTER — TREATMENT (OUTPATIENT)
Dept: PHYSICAL THERAPY | Facility: CLINIC | Age: 79
End: 2024-01-16
Payer: MEDICARE

## 2024-01-16 DIAGNOSIS — M47.14 THORACIC MYELOPATHY: Primary | ICD-10-CM

## 2024-01-16 DIAGNOSIS — R26.9 ABNORMALITY OF GAIT AND MOBILITY: ICD-10-CM

## 2024-01-16 NOTE — PROGRESS NOTES
"                                                                Physical Therapy Treatment Note  [address] [place] [address]    Patient: Sonu Bravo Jr.                                                 Visit Date: 2024  :     1945    Referring practitioner:    Gucci Brandon MD  Date of Initial Visit:          Type: THERAPY  Noted: 2024    Patient seen for 4 sessions    Visit Diagnoses:    ICD-10-CM ICD-9-CM   1. Thoracic myelopathy  M47.14 721.41   2. Abnormality of gait and mobility  R26.9 781.2     SUBJECTIVE     Subjective: He reports his legs are sore b/c he's been going up/down 7 steps ~12x/day (168 stairs/day) so that he doesn't have to  the cold on the back porch to smoke. He's also been doing his HEP, reports he notices he will get stiff when he doesn't do them.     PAIN: 4/10 > 2/10 (reports movement decreased pain)     OBJECTIVE     Objective     Gait Training          93939   Task/Terrain Asst AD Comments   Weighted vest in hallway with SPC SBA SPC  17 lb weighted vest, adjusted SPC down 1 notch to be appropriately sized though returned to previous setting as he'd begun to laterally flex at trunk and lean/rely heavily on AD.                Timed Minutes 15     Therapeutic Exercises    96420 Units Comments   B pec and thoracic stretches with 1/2 roller x30 sec  2 x 60 sec Reports this felt very good    STS with weighted vest* 3 x 5  Cues for anterior WS   Step ups onto 4\" step with wt vest* 2 x 10 ea    Heel taps from 4\" step with wt vest* x10 ea D/C after 8 reps L d/t increased fatigue/pain   Resisted gait with RIP  while pulling therapist on rolling stool 30 ft x5 Very fatigued at end, NO vest     *17 lb weighted power vest    Timed Minutes 30      Therapy Education/Self Care 50898   Education offered today    Med[employer]ide Code [email or web address]     Ongoing HEP   Date: 2024  Prepared by: Spike Ramirez     Exercises  - Standing Hip Flexor Stretch  - 2 x daily - 7 x " "weekly - 2 sets - 10 reps  - Gastroc Stretch on Wall  - 2 x daily - 7 x weekly - 2 sets - 30 sec hold  - Bird Dog on Counter  - 2 x daily - 7 x weekly - 2 sets - 10 reps  - Standing Hip Abduction with Counter Support  - 10 x daily - 7 x weekly - 2 sets - 10 reps - 10 hold  - Mini Squat with Counter Support  - 2 x daily - 7 x weekly - 2 sets - 10 reps  - Mini Lunge  - 2 x daily - 7 x weekly - 2 sets - 10 reps  - Bird Dog  - 2 x daily - 7 x weekly - 2 sets - 10 reps  - Supine Piriformis Stretch with Foot on Ground  - 2 x daily - 7 x weekly - 2 sets - 30 hold   Timed Minutes      Total Timed Treatment:     45   mins  Total Time of Visit:             45   mins         ASSESSMENT/PLAN     GOALS  Goals                                          Progress Note due by 2/3/24                                                             Recert due by 4/2/24   LTG by: 8 weeks Comments Date Status   SLS edward legs x 5 sec     ongoing   TUG score of <12\" Difficulty especially with eccentric portion during STS today. 1/16 ongoing   FGA score of 22 or better     ongoing   Descend flight of stairs without proper weight shift without catching heel  addressed with 4\" step today   1/12 ongoing   Improve edward DF to 10 deg passively     ongoing   Improve edward hip ext to 10 deg passively     ongoing   Walk with normal gait pattern with AAD  continue use of SPC  1/9 ongoing   Ind with HEP     ongoing     Assessment/Plan     ASSESSMENT: Utilized weighted vest this session, which did present a challenge for him, with s/s and c/o increased fatigue with wear. Did initially decrease SPC height by 1 notch, though compensations increased therefore returned to previous height. Cues for consistent use of SPC as he was initially utilizing AD approx. every third step. He also required cues for anterior WS as he demonstrated near LOB posteriorly x2-3 during gait training.     PLAN: Continue working on his overall strength and endurance, emphasizing anterior " WS as appropriate.     SIGNATURE: Chanelle Richmond PTA, [place] License #: [id number]  Electronically Signed on 1/16/2024        [address] [facility] [place] [address]  [phone]

## 2024-01-19 ENCOUNTER — TREATMENT (OUTPATIENT)
Dept: PHYSICAL THERAPY | Facility: CLINIC | Age: 79
End: 2024-01-19
Payer: MEDICARE

## 2024-01-19 DIAGNOSIS — R26.9 ABNORMALITY OF GAIT AND MOBILITY: ICD-10-CM

## 2024-01-19 DIAGNOSIS — M47.14 THORACIC MYELOPATHY: Primary | ICD-10-CM

## 2024-01-19 NOTE — PROGRESS NOTES
"                                                                Physical Therapy Treatment Note  [address] [place] [address]    Patient: Sonu Bravo Jr.                                                 Visit Date: 2024  :     1945    Referring practitioner:    Gucci Brandon MD  Date of Initial Visit:          Type: THERAPY  Noted: 2024    Patient seen for 5 sessions    Visit Diagnoses:    ICD-10-CM ICD-9-CM   1. Thoracic myelopathy  M47.14 721.41   2. Abnormality of gait and mobility  R26.9 781.2     SUBJECTIVE     Subjective: He reports good day yesterday where his legs and back didn't really hurt. It's back to hurting today, which he reports isn't unusual.     PAIN: 4/10     OBJECTIVE     Objective     Neuromuscular Reeducation     21208 Comments   A/P WS on wobble board  2 x 10    SLS on A/P wobble board toe taps (6\")  2 x 10 ea   SLS on airex: post toe taps 2 x 10 ea   SLS on airex: hip abd 2 x 10 ea       Timed Minutes 40      Therapy Education/Self Care 65044   Education offered today    Med[employer]ide Code [email or web address]     Ongoing HEP   Date: 2024  Prepared by: Spike Ramirez     Exercises  - Standing Hip Flexor Stretch  - 2 x daily - 7 x weekly - 2 sets - 10 reps  - Gastroc Stretch on Wall  - 2 x daily - 7 x weekly - 2 sets - 30 sec hold  - Bird Dog on Counter  - 2 x daily - 7 x weekly - 2 sets - 10 reps  - Standing Hip Abduction with Counter Support  - 10 x daily - 7 x weekly - 2 sets - 10 reps - 10 hold  - Mini Squat with Counter Support  - 2 x daily - 7 x weekly - 2 sets - 10 reps  - Mini Lunge  - 2 x daily - 7 x weekly - 2 sets - 10 reps  - Bird Dog  - 2 x daily - 7 x weekly - 2 sets - 10 reps  - Supine Piriformis Stretch with Foot on Ground  - 2 x daily - 7 x weekly - 2 sets - 30 hold   Timed Minutes      Total Timed Treatment:     40   mins  Total Time of Visit:             40   mins         ASSESSMENT/PLAN     GOALS  Goals                                          Progress Note " "due by 2/3/24                                                             Recert due by 4/2/24   LTG by: 8 weeks Comments Date Status   SLS edward legs x 5 sec  often required UE support x1 at min for >2 sec 1/19 ongoing   TUG score of <12\" Difficulty especially with eccentric portion during STS today. 1/16 ongoing   FGA score of 22 or better     ongoing   Descend flight of stairs without proper weight shift without catching heel  addressed with 4\" step today   1/12 ongoing   Improve edward DF to 10 deg passively     ongoing   Improve edward hip ext to 10 deg passively     ongoing   Walk with normal gait pattern with AAD  continue use of SPC  1/9 ongoing   Ind with HEP     ongoing     Assessment/Plan     ASSESSMENT: Challenged pt's balance today, to which was especially a challenge when adding A/P movements. He often required UE support when balance challenged, especially SLS >2 sec.     PLAN: Continue working on his overall strength and endurance, emphasizing anterior WS as appropriate. May incorporate cognitive component during strengthening, would benefit from improved hip stability.     SIGNATURE: Chanelle Richmond PTA, [place] License #: [id number]  Electronically Signed on 1/19/2024        [facility] [place] [address]  [phone]    "

## 2024-01-22 ENCOUNTER — TREATMENT (OUTPATIENT)
Dept: PHYSICAL THERAPY | Facility: CLINIC | Age: 79
End: 2024-01-22
Payer: MEDICARE

## 2024-01-22 DIAGNOSIS — R26.9 ABNORMALITY OF GAIT AND MOBILITY: ICD-10-CM

## 2024-01-22 DIAGNOSIS — M47.14 THORACIC MYELOPATHY: Primary | ICD-10-CM

## 2024-01-22 PROCEDURE — 97112 NEUROMUSCULAR REEDUCATION: CPT | Performed by: PHYSICAL THERAPIST

## 2024-01-22 NOTE — PROGRESS NOTES
Physical Therapy Treatment Note  [address] [place] [address]    Patient: Sonu Bravo Jr.                                                 Visit Date: 2024  :     1945    Referring practitioner:    Gucci Brandon MD  Date of Initial Visit:          Type: THERAPY  Noted: 2024    Patient seen for 6 sessions    Visit Diagnoses:    ICD-10-CM ICD-9-CM   1. Thoracic myelopathy  M47.14 721.41   2. Abnormality of gait and mobility  R26.9 781.2     SUBJECTIVE     Subjective: After his therapy Friday his neuropathy was really bad. He did wear compression socks that day. His pain is a little better. He is having a colonoscopy tomorrow. Cathing is still better.     PAIN: 4/10     OBJECTIVE     Objective     Neuromuscular Reeducation     82186 Comments   Marching w/ 2 lb cuff weights on each foot 30 ft x2   B side stepping w/ 2 lb cuff weights on each foot 30 ft x2   Amb against TRX rip  under armpits  30 ft x2   Amb holding 5 lb wand like a tray  30 ft x2   Side stepping holding 5 lb wand like a tray  30 ft x2   Amb with RWX and blue TB for flexion resistance  30 ft x2   Unicam bike seat 5, resistance 2  5 min   Timed Minutes 40      Therapy Education/Self Care 35016   Education offered today    [employer] Code [id number]     Ongoing HEP   Date: 2024  Prepared by: Spike Ramirez     Exercises  - Standing Hip Flexor Stretch  - 2 x daily - 7 x weekly - 2 sets - 10 reps  - Gastroc Stretch on Wall  - 2 x daily - 7 x weekly - 2 sets - 30 sec hold  - Bird Dog on Counter  - 2 x daily - 7 x weekly - 2 sets - 10 reps  - Standing Hip Abduction with Counter Support  - 10 x daily - 7 x weekly - 2 sets - 10 reps - 10 hold  - Mini Squat with Counter Support  - 2 x daily - 7 x weekly - 2 sets - 10 reps  - Mini Lunge  - 2 x daily - 7 x weekly - 2 sets - 10 reps  - Bird Dog  - 2 x daily - 7 x weekly - 2 sets - 10 reps  - Supine Piriformis Stretch  "with Foot on Ground  - 2 x daily - 7 x weekly - 2 sets - 30 hold   Timed Minutes      Total Timed Treatment:     40   mins  Total Time of Visit:             40   mins         ASSESSMENT/PLAN     GOALS  Goals                                          Progress Note due by 2/3/24                                                             Recert due by 4/2/24   LTG by: 8 weeks Comments Date Status   SLS edward legs x 5 sec  often required UE support x1 at min for >2 sec 1/19 ongoing   TUG score of <12\" Difficulty especially with eccentric portion during STS today. 1/16 ongoing   FGA score of 22 or better     ongoing   Descend flight of stairs without proper weight shift without catching heel  addressed with 4\" step today   1/12 ongoing   Improve edward DF to 10 deg passively     ongoing   Improve edward hip ext to 10 deg passively     ongoing   Walk with normal gait pattern with AAD  continue use of SPC  1/9 ongoing   Ind with HEP     ongoing     Assessment/Plan     ASSESSMENT: Today we focused on B hip strength, balance, and posture. He did well but required increased rest breaks between activities.     PLAN: Continue working on his overall strength and endurance, emphasizing anterior WS as appropriate. May incorporate cognitive component during strengthening, would benefit from improved hip stability.     SIGNATURE: Karlee Ruiz PTA, [place] License #: [id number]  Electronically Signed on 1/22/2024        [address] [place] [address] [facility]  [place] [address]  [phone]    "

## 2024-01-23 ENCOUNTER — TELEPHONE (OUTPATIENT)
Dept: GASTROENTEROLOGY | Facility: CLINIC | Age: 79
End: 2024-01-23
Payer: MEDICARE

## 2024-01-23 ENCOUNTER — HOSPITAL ENCOUNTER (OUTPATIENT)
Facility: HOSPITAL | Age: 79
Setting detail: HOSPITAL OUTPATIENT SURGERY
Discharge: HOME | End: 2024-01-23
Attending: INTERNAL MEDICINE | Admitting: INTERNAL MEDICINE
Payer: MEDICARE

## 2024-01-23 ENCOUNTER — ANESTHESIA EVENT (OUTPATIENT)
Dept: GASTROENTEROLOGY | Facility: HOSPITAL | Age: 79
End: 2024-01-23
Payer: MEDICARE

## 2024-01-23 ENCOUNTER — ANESTHESIA (OUTPATIENT)
Dept: GASTROENTEROLOGY | Facility: HOSPITAL | Age: 79
End: 2024-01-23
Payer: MEDICARE

## 2024-01-23 VITALS
HEART RATE: 57 BPM | OXYGEN SATURATION: 100 % | WEIGHT: 173 LBS | DIASTOLIC BLOOD PRESSURE: 76 MMHG | RESPIRATION RATE: 15 BRPM | SYSTOLIC BLOOD PRESSURE: 159 MMHG | HEIGHT: 71 IN | BODY MASS INDEX: 24.22 KG/M2 | TEMPERATURE: 96.8 F

## 2024-01-23 DIAGNOSIS — R19.8 ALTERED BOWEL FUNCTION: ICD-10-CM

## 2024-01-23 PROCEDURE — 25010000002 PROPOFOL 10 MG/ML EMULSION: Performed by: NURSE ANESTHETIST, CERTIFIED REGISTERED

## 2024-01-23 PROCEDURE — 45385 COLONOSCOPY W/LESION REMOVAL: CPT | Performed by: INTERNAL MEDICINE

## 2024-01-23 PROCEDURE — 88305 TISSUE EXAM BY PATHOLOGIST: CPT | Performed by: INTERNAL MEDICINE

## 2024-01-23 PROCEDURE — 25810000003 SODIUM CHLORIDE 0.9 % SOLUTION: Performed by: ANESTHESIOLOGY

## 2024-01-23 RX ORDER — PROPOFOL 10 MG/ML
VIAL (ML) INTRAVENOUS AS NEEDED
Status: DISCONTINUED | OUTPATIENT
Start: 2024-01-23 | End: 2024-01-23 | Stop reason: SURG

## 2024-01-23 RX ORDER — LIDOCAINE HYDROCHLORIDE 10 MG/ML
0.5 INJECTION, SOLUTION EPIDURAL; INFILTRATION; INTRACAUDAL; PERINEURAL ONCE AS NEEDED
Status: DISCONTINUED | OUTPATIENT
Start: 2024-01-23 | End: 2024-01-23 | Stop reason: HOSPADM

## 2024-01-23 RX ORDER — SODIUM CHLORIDE 9 MG/ML
500 INJECTION, SOLUTION INTRAVENOUS CONTINUOUS PRN
Status: DISCONTINUED | OUTPATIENT
Start: 2024-01-23 | End: 2024-01-23 | Stop reason: HOSPADM

## 2024-01-23 RX ORDER — LIDOCAINE HYDROCHLORIDE 20 MG/ML
INJECTION, SOLUTION EPIDURAL; INFILTRATION; INTRACAUDAL; PERINEURAL AS NEEDED
Status: DISCONTINUED | OUTPATIENT
Start: 2024-01-23 | End: 2024-01-23 | Stop reason: SURG

## 2024-01-23 RX ORDER — SODIUM CHLORIDE 0.9 % (FLUSH) 0.9 %
10 SYRINGE (ML) INJECTION AS NEEDED
Status: DISCONTINUED | OUTPATIENT
Start: 2024-01-23 | End: 2024-01-23 | Stop reason: HOSPADM

## 2024-01-23 RX ADMIN — PROPOFOL INJECTABLE EMULSION 250 MG: 10 INJECTION, EMULSION INTRAVENOUS at 08:54

## 2024-01-23 RX ADMIN — LIDOCAINE HYDROCHLORIDE 50 MG: 20 INJECTION, SOLUTION EPIDURAL; INFILTRATION; INTRACAUDAL; PERINEURAL at 08:54

## 2024-01-23 RX ADMIN — SODIUM CHLORIDE 500 ML: 9 INJECTION, SOLUTION INTRAVENOUS at 08:16

## 2024-01-23 NOTE — ANESTHESIA PREPROCEDURE EVALUATION
Anesthesia Evaluation     Patient summary reviewed   no history of anesthetic complications:   NPO Solid Status: > 8 hours  NPO Liquid Status: > 2 hours           Airway   Mallampati: III  TM distance: >3 FB  Neck ROM: limited  No difficulty expected  Dental - normal exam     Pulmonary    (+) a smoker Current,  (-) asthma, sleep apnea  Cardiovascular   Exercise tolerance: poor (<4 METS)    ECG reviewed    (+) hypertension, dysrhythmias Paroxysmal Atrial Fib, DVT, hyperlipidemia  (-) past MI, CAD, cardiac stents      Neuro/Psych  (-) seizures, TIA, CVA  GI/Hepatic/Renal/Endo    (+) GERD, thyroid problem hypothyroidism  (-) liver disease, no renal disease, diabetes    Musculoskeletal     (+) neck pain      ROS comment: Back problems, neurogenic bladder  Ambulates with cane  Abdominal    Substance History      OB/GYN          Other   arthritis,                   Anesthesia Plan    ASA 3     MAC     intravenous induction     Anesthetic plan, risks, benefits, and alternatives have been provided, discussed and informed consent has been obtained with: patient.      CODE STATUS:

## 2024-01-23 NOTE — ANESTHESIA POSTPROCEDURE EVALUATION
Patient: Sonu Bravo Jr.    Procedure Summary       Date: 01/23/24 Room / Location: [facility] ENDOSCOPY 5 / [facility] ENDOSCOPY    Anesthesia Start: 0850 Anesthesia Stop: 0908    Procedure: COLONOSCOPY WITH ANESTHESIA Diagnosis:       Altered bowel function      (Altered bowel function [R19.8])    Surgeons: Thomas Dc DO Provider: Calvin Lee CRNA    Anesthesia Type: MAC ASA Status: 3            Anesthesia Type: MAC    Vitals  Vitals Value Taken Time   /76 01/23/24 0926   Temp     Pulse 56 01/23/24 0928   Resp 15 01/23/24 0925   SpO2 91 % 01/23/24 0926   Vitals shown include unfiled device data.        Post Anesthesia Care and Evaluation    Patient location during evaluation: PHASE II  Level of consciousness: awake  Pain management: adequate    Airway patency: patent  Anesthetic complications: No anesthetic complications  PONV Status: none  Cardiovascular status: acceptable  Respiratory status: acceptable  Hydration status: acceptable

## 2024-01-24 LAB
CYTO UR: NORMAL
LAB AP CASE REPORT: NORMAL
Lab: NORMAL
PATH REPORT.FINAL DX SPEC: NORMAL
PATH REPORT.GROSS SPEC: NORMAL

## 2024-01-26 ENCOUNTER — TREATMENT (OUTPATIENT)
Dept: PHYSICAL THERAPY | Facility: CLINIC | Age: 79
End: 2024-01-26
Payer: MEDICARE

## 2024-01-26 DIAGNOSIS — M47.14 THORACIC MYELOPATHY: Primary | ICD-10-CM

## 2024-01-26 DIAGNOSIS — R26.9 ABNORMALITY OF GAIT AND MOBILITY: ICD-10-CM

## 2024-01-26 NOTE — PROGRESS NOTES
"                                                                Physical Therapy Treatment Note  [address] [place] [address]    Patient: Sonu Bravo Jr.                                                 Visit Date: 2024  :     1945    Referring practitioner:    Gucci Brandon MD  Date of Initial Visit:          Type: THERAPY  Noted: 2024    Patient seen for 7 sessions    Visit Diagnoses:    ICD-10-CM ICD-9-CM   1. Thoracic myelopathy  M47.14 721.41   2. Abnormality of gait and mobility  R26.9 781.2     SUBJECTIVE     Subjective: Reports pain is \"not bad at all.\" Reports he's always stiff the next day. Reports he had 4 polyps in his colon, must return in 3 years. Reports he's been doing [LAQ] and feels it's been helping his nerve pain.     PAIN: 2-3/10     OBJECTIVE     Objective     Gait Training          33306   Task/Terrain Asst AD Comments   1 lap around building with SPC Mod ind SPC R lateral lean, improper sequencing with AD    Gait with trekking poles sup Trekking poles Improved posture, contin R lateral lean. Cues for remaining erect and leaning nose of toes for anterior WS and decrease leading with B hips to avoid strain placed on hips.          Timed Minutes 30      Therapeutic Exercises    03673 Units Comments   Assessed pelvic alignment  L iliac crest elevated, no rotation    MET for elevated L iliac crest  x15 ea Level post-MET                  Timed Minutes 10     Therapy Education/Self Care 63332   Education offered today    [facility] Code [id number]     Ongoing HEP   Date: 2024  Prepared by: Spike Ramirez     Exercises  - Standing Hip Flexor Stretch  - 2 x daily - 7 x weekly - 2 sets - 10 reps  - Gastroc Stretch on Wall  - 2 x daily - 7 x weekly - 2 sets - 30 sec hold  - Bird Dog on Counter  - 2 x daily - 7 x weekly - 2 sets - 10 reps  - Standing Hip Abduction with Counter Support  - 10 x daily - 7 x weekly - 2 sets - 10 reps - 10 hold  - Mini Squat with Counter Support  - " "2 x daily - 7 x weekly - 2 sets - 10 reps  - Mini Lunge  - 2 x daily - 7 x weekly - 2 sets - 10 reps  - Bird Dog  - 2 x daily - 7 x weekly - 2 sets - 10 reps  - Supine Piriformis Stretch with Foot on Ground  - 2 x daily - 7 x weekly - 2 sets - 30 hold   Timed Minutes      Total Timed Treatment:     40   mins  Total Time of Visit:             45   mins         ASSESSMENT/PLAN     GOALS  Goals                                          Progress Note due by 2/3/24                                                             Recert due by 4/2/24   LTG by: 8 weeks Comments Date Status   SLS edward legs x 5 sec  often required UE support x1 at min for >2 sec 1/19 ongoing   TUG score of <12\" Difficulty especially with eccentric portion during STS today. 1/16 ongoing   FGA score of 22 or better     ongoing   Descend flight of stairs without proper weight shift without catching heel  addressed with 4\" step today   1/12 ongoing   Improve edward DF to 10 deg passively     ongoing   Improve edward hip ext to 10 deg passively     ongoing   Walk with normal gait pattern with AAD R lateral lean with improper sequencing of SPC. Introduced trekking pole which improved posture, though min R lateral lean continued. Cues for remaining erect and leaning nose of toes for anterior WS and decrease leading with B hips to avoid strain placed on hips.  1/26 ongoing   Ind with HEP     ongoing     Assessment/Plan     ASSESSMENT: Pt presents with significant R lateral lean at trunk when ambulating. Also demonstrated decreased anterior WS and hip extension, resulting in increased lumbar lordosis. Introduced trekking poles to improve posture and encouraged anterior WS which did correct many deviations while ambulating. He did demonstrate a L elevated iliac crest, which was corrected with MET today, demonstrating improved gait mechanics once pelvic alignment was corrected. Decreased core strength contributed to gait deficits as demonstrated during gait " analysis this session and would like benefit from a few sessions to address this deficit.     PLAN: emphasize core strength next session, incorporating hip stability where appropriate. Continue to refine gait mechanics.     SIGNATURE: Chanelle Richmond PTA, [place] License #: [id number]  Electronically Signed on 1/26/2024        [address] [place] [address]  [phone]

## 2024-01-30 ENCOUNTER — TREATMENT (OUTPATIENT)
Dept: PHYSICAL THERAPY | Facility: CLINIC | Age: 79
End: 2024-01-30
Payer: MEDICARE

## 2024-01-30 DIAGNOSIS — R26.9 ABNORMALITY OF GAIT AND MOBILITY: ICD-10-CM

## 2024-01-30 DIAGNOSIS — M47.14 THORACIC MYELOPATHY: Primary | ICD-10-CM

## 2024-01-30 PROCEDURE — 97110 THERAPEUTIC EXERCISES: CPT | Performed by: PHYSICAL THERAPIST

## 2024-01-30 NOTE — PROGRESS NOTES
Physical Therapy Treatment Note  [address] [place] [address]    Patient: Sonu Bravo Jr.                                                 Visit Date: 2024  :     1945    Referring practitioner:    Gucci Brandon MD  Date of Initial Visit:          Type: THERAPY  Noted: 2024    Patient seen for 8 sessions    Visit Diagnoses:    ICD-10-CM ICD-9-CM   1. Thoracic myelopathy  M47.14 721.41   2. Abnormality of gait and mobility  R26.9 781.2     SUBJECTIVE     Subjective:He reports he is noticing improvement and he has days without back pain and days when he feels like his legs are stronger.       PAIN: 2/10         OBJECTIVE     Objective     Therapeutic Exercises    24941 Units Comments   Unicam seat#5 res 2 6 mn     Resisted gait with TRX Rip Trainer x 40 ft. X 6     B ec and thoracic stretches with 1/2 roller in sitting     B unilateral SL HF stretches     B hamstring stretches                Timed Minutes 41        Therapy Education/Self Care 15031   Education offered today    [facility] Code [id number]      Ongoing HEP   Date: 2024  Prepared by: Spike Ramirez     Exercises  - Standing Hip Flexor Stretch  - 2 x daily - 7 x weekly - 2 sets - 10 reps  - Gastroc Stretch on Wall  - 2 x daily - 7 x weekly - 2 sets - 30 sec hold  - Bird Dog on Counter  - 2 x daily - 7 x weekly - 2 sets - 10 reps  - Standing Hip Abduction with Counter Support  - 10 x daily - 7 x weekly - 2 sets - 10 reps - 10 hold  - Mini Squat with Counter Support  - 2 x daily - 7 x weekly - 2 sets - 10 reps  - Mini Lunge  - 2 x daily - 7 x weekly - 2 sets - 10 reps  - Bird Dog  - 2 x daily - 7 x weekly - 2 sets - 10 reps  - Supine Piriformis Stretch with Foot on Ground  - 2 x daily - 7 x weekly - 2 sets - 30 hold   Timed Minutes        Total Timed Treatment:     41   mins  Total Time of Visit:             41   mins         ASSESSMENT/PLAN     GOALS  Goals            "                               Progress Note due by 2/3/24                                                             Recert due by 4/2/24   LTG by: 8 weeks Comments Date Status   SLS edward legs x 5 sec  often required UE support x1 at min for >2 sec 1/19 ongoing   TUG score of <12\" Difficulty especially with eccentric portion during STS today. 1/16 ongoing   FGA score of 22 or better     ongoing   Descend flight of stairs without proper weight shift without catching heel  addressed with 4\" step today   1/12 ongoing   Improve edward DF to 10 deg passively     ongoing   Improve edward hip ext to 10 deg passively     ongoing   Walk with normal gait pattern with AAD R lateral lean with improper sequencing of SPC. Introduced trekking pole which improved posture, though min R lateral lean continued. Cues for remaining erect and leaning nose of toes for anterior WS and decrease leading with B hips to avoid strain placed on hips.  1/26 ongoing   Ind with HEP  reinforced today  1/30 ongoing       Assessment/Plan     ASSESSMENT:   I have not treated him since 1/12 and I noticed increased strength and endurance. However, he has developed a poor habitual postural pattern exhibiting a R lateral shift with with his head and shoulders. In addition, his B hip flexors and hamstrings are very tight.    PLAN:   Continue to work on improving flexibility, have him perform some functional mobility activities with the weighted vest such as stair climbing and sit<>stands.    SIGNATURE: Ruiz Frias [facility], [place] License #: [id number]  Electronically Signed on 1/30/2024        [address] [place] [address]. [address]  [phone]    "

## 2024-02-02 ENCOUNTER — TREATMENT (OUTPATIENT)
Dept: PHYSICAL THERAPY | Facility: CLINIC | Age: 79
End: 2024-02-02
Payer: MEDICARE

## 2024-02-02 DIAGNOSIS — R26.9 ABNORMALITY OF GAIT AND MOBILITY: ICD-10-CM

## 2024-02-02 DIAGNOSIS — M47.14 THORACIC MYELOPATHY: Primary | ICD-10-CM

## 2024-02-02 PROCEDURE — 97110 THERAPEUTIC EXERCISES: CPT | Performed by: PHYSICAL THERAPIST

## 2024-02-02 PROCEDURE — 97530 THERAPEUTIC ACTIVITIES: CPT | Performed by: PHYSICAL THERAPIST

## 2024-02-02 NOTE — PROGRESS NOTES
Progress Note Addendum      Patient: Sonu Bravo Jr.           : 1945  Visit Date: 2024  Referring practitioner: Gucci Brandon MD  Date of Initial Visit: Type: THERAPY  Noted: 2024  Patient seen for 9 sessions  Visit Diagnoses:    ICD-10-CM ICD-9-CM   1. Thoracic myelopathy  M47.14 721.41   2. Abnormality of gait and mobility  R26.9 781.2          Clinical Progress: improved  Home Program Compliance: Yes  Progress toward previous goals: Partially Met  Prognosis to achieve goals: good    Objective     Assessment & Plan       Assessment  Impairments: abnormal muscle tone, abnormal or restricted ROM, activity intolerance, impaired balance, impaired physical strength, lacks appropriate home exercise program and safety issue   Functional limitations: walking and stooping   Prognosis: good    Plan  Therapy options: will be seen for skilled therapy services  Planned modality interventions: dry needling, low level laser therapy, TENS and electrical stimulation/Russian stimulation  Planned therapy interventions: manual therapy, strengthening, stretching, therapeutic activities, gait training, home exercise program, soft tissue mobilization, abdominal trunk stabilization and neuromuscular re-education  Frequency: 2x week  Duration in weeks: 8  Treatment plan discussed with: PTA        I reviewed the treatment and goals with Ruiz Frias PTA and agree with the POC.    SIGNATURE: Spike Ramirez PT, License #: [id number]  Electronically Signed on 2024

## 2024-02-02 NOTE — PROGRESS NOTES
"                                                                Physical Therapy Treatment Note and 30 Day Progress Note  [address] [place] [address]    Patient: Sonu Bravo Jr.                                                 Visit Date: 2024  :     1945    Referring practitioner:    Gucci Brandon MD  Date of Initial Visit:          Type: THERAPY  Noted: 2024    Patient seen for 9 sessions    Visit Diagnoses:    ICD-10-CM ICD-9-CM   1. Thoracic myelopathy  M47.14 721.41   2. Abnormality of gait and mobility  R26.9 781.2     SUBJECTIVE     Subjective: He reports he did a lot of walking this week. He reports Ruiz worked his hamstrings last Tuesday and feels this helped a lot. He reports feeling about 40% improved since his eval. He reports his back pain is a lot better, \"There's days where I don't have much back pain at all.\" His legs are a lot better, he doesn't have the days where his legs \"weren't hardly workin'\" like he used to. He still feels like he needs to build up his leg strength. He reports he went from cath'ing 3x/day to 1x/day before bedtime. He reports his bowel movements are better and his big issue remains his feet.     PAIN: 2/10 > unchanged, fatigued     OBJECTIVE     Objective     Therapeutic Activities    95190 Comments   TUG  See goals   Addressed all goals for progress note  See goals   SLS See goals   Assessed gait mechanics See goals            Timed Minutes 20     Therapeutic Exercises    47668 Units Comments   Amb outside around cul-de-sac  Fatigued increased significant at jail point   Mini squat with 45 cm SB 2 x 10     B ankle DF PROM   See goals   B hip ext PROM   See goals   Attempted SL STS x15 Adopted split stance             Timed Minutes 25     Therapy Education/Self Care 62390   Education offered today HEP, POC   [employer] Code [id number]      Ongoing HEP   Date: 2024  Prepared by: Spike Ramirez     Exercises  - Standing Hip Flexor Stretch  - 2 x " "daily - 7 x weekly - 2 sets - 10 reps  - Gastroc Stretch on Wall  - 2 x daily - 7 x weekly - 2 sets - 30 sec hold  - Bird Dog on Counter  - 2 x daily - 7 x weekly - 2 sets - 10 reps  - Standing Hip Abduction with Counter Support  - 10 x daily - 7 x weekly - 2 sets - 10 reps - 10 hold  - Mini Squat with Counter Support  - 2 x daily - 7 x weekly - 2 sets - 10 reps  - Mini Lunge  - 2 x daily - 7 x weekly - 2 sets - 10 reps  - Bird Dog  - 2 x daily - 7 x weekly - 2 sets - 10 reps  - Supine Piriformis Stretch with Foot on Ground  - 2 x daily - 7 x weekly - 2 sets - 30 hold   Timed Minutes      Total Timed Treatment:     45   mins  Total Time of Visit:             45   mins         ASSESSMENT/PLAN     GOALS  Goals                                          Progress Note due by 3/3/24                                                             Recert due by 4/2/24   LTG by: 8 weeks Comments Date Status   SLS edward legs x 5 sec  often required UE support x1 at min for >2 sec on 1/19/2024  Unable today, LOB req assist to recover 2/2 ongoing   TUG score of <12\" 10.85 sec, 8.78 sec 2/2 MET   FGA score of 22 or better  deferred today as was not available 2/2 ongoing   Descend flight of stairs without proper weight shift without catching heel Reciprocal ascend/descend with HR occasionally balance only, no catching heel 2/2 MET   Improve edward DF to 10 deg passively  R 11 deg L 8 deg  2/2 Partially met;  ongoing   Improve edward hip ext to 10 deg passively R 12 deg L 10 deg 2/2 MET   Walk with normal gait pattern with AAD Use of SPC, lateral leaning (trunk) to R, decreased trunk rotation 2/2 ongoing   Ind with HEP Reports daily compliance  2/2 ongoing     Assessment/Plan     ASSESSMENT: Patient has met three and partially met one of his eight goals today and reports feeling about 40% improved. Most significantly improved is his LBP and reduction of needing to self-cath 3x/day > 1x/day. Overall, he has made great progress, though remain " primarily limited with his endurance and balance.     PLAN:  May consider vibraflex and continue with emphasis on endurance and stability. Bolster and modify HEP to reflect as appropriate. May continue to utilize weighted vests for functional tasks (e.g. stairs, STS).    **OBTAIN UPDATED FGA**    SIGNATURE: Chanelle Richmond PTA, [place] License #: [id number]  Electronically Signed on 2/2/2024        [address] [place] [address] [place] [address]  [phone]

## 2024-02-02 NOTE — Clinical Note
PROGRESS NOTE -- making great progress -- going to finish out remaining 2 weeks sched 2x/week then reduce to 1x/week for 4 weeks

## 2024-02-06 ENCOUNTER — TREATMENT (OUTPATIENT)
Dept: PHYSICAL THERAPY | Facility: CLINIC | Age: 79
End: 2024-02-06
Payer: MEDICARE

## 2024-02-06 DIAGNOSIS — M47.14 THORACIC MYELOPATHY: Primary | ICD-10-CM

## 2024-02-06 DIAGNOSIS — R26.9 ABNORMALITY OF GAIT AND MOBILITY: ICD-10-CM

## 2024-02-06 PROCEDURE — 97116 GAIT TRAINING THERAPY: CPT | Performed by: PHYSICAL THERAPIST

## 2024-02-06 PROCEDURE — 97110 THERAPEUTIC EXERCISES: CPT | Performed by: PHYSICAL THERAPIST

## 2024-02-06 PROCEDURE — 97112 NEUROMUSCULAR REEDUCATION: CPT | Performed by: PHYSICAL THERAPIST

## 2024-02-06 NOTE — PROGRESS NOTES
Physical Therapy Treatment Note and 30 Day Progress Note  [address] [place] [address]    Patient: Sonu Bravo Jr.                                                 Visit Date: 2024  :     1945    Referring practitioner:    Gucci Brandon MD  Date of Initial Visit:          Type: THERAPY  Noted: 2024    Patient seen for 10 sessions    Visit Diagnoses:    ICD-10-CM ICD-9-CM   1. Thoracic myelopathy  M47.14 721.41   2. Abnormality of gait and mobility  R26.9 781.2     SUBJECTIVE     Subjective:  States his legs are feeling a little achy today, noting the neuropathy in his feet determine what kind of day he will have.     PAIN: 2/10      OBJECTIVE     Objective     Gait Training          71091   Task/Terrain Asst AD Comments   Gait with trekking poles sup Trekking poles Improved posture, cont R lateral lean. Cues for remaining erect and leaning nose of toes for anterior WS and decrease leading with B hips to avoid strain placed on hips.           Timed Minutes 10     Neuromuscular Reeducation     48509 Comments   NBOS on foam EC 3x30s   Tandem stance on foam 3x20s ea       Timed Minutes 12        Therapeutic Exercises    80197 Units Comments   Mini squat with 55 cm SB 2 x 10     Split stance STS X15 ea    Unicam seat #5 res 2 6 min    Timed Minutes 20     Therapy Education/Self Care 06778   Education offered today HEP, POC   [employer] Code [id number]      Ongoing HEP   Date: 2024  Prepared by: Spike Ramirez     Exercises  - Standing Hip Flexor Stretch  - 2 x daily - 7 x weekly - 2 sets - 10 reps  - Gastroc Stretch on Wall  - 2 x daily - 7 x weekly - 2 sets - 30 sec hold  - Bird Dog on Counter  - 2 x daily - 7 x weekly - 2 sets - 10 reps  - Standing Hip Abduction with Counter Support  - 10 x daily - 7 x weekly - 2 sets - 10 reps - 10 hold  - Mini Squat with Counter Support  - 2 x daily - 7 x weekly - 2 sets - 10 reps  - Mini Lunge  " - 2 x daily - 7 x weekly - 2 sets - 10 reps  - Bird Dog  - 2 x daily - 7 x weekly - 2 sets - 10 reps  - Supine Piriformis Stretch with Foot on Ground  - 2 x daily - 7 x weekly - 2 sets - 30 hold   Timed Minutes      Total Timed Treatment:     42   mins  Total Time of Visit:             42   mins         ASSESSMENT/PLAN     GOALS  Goals                                          Progress Note due by 3/3/24                                                             Recert due by 4/2/24   LTG by: 8 weeks Comments Date Status   SLS edward legs x 5 sec  often required UE support x1 at min for >2 sec on 1/19/2024  Unable today, LOB req assist to recover 2/2 ongoing   TUG score of <12\" 10.85 sec, 8.78 sec 2/2 MET   FGA score of 22 or better  deferred today as was not available 2/2 ongoing   Descend flight of stairs without proper weight shift without catching heel Reciprocal ascend/descend with HR occasionally balance only, no catching heel 2/2 MET   Improve edward DF to 10 deg passively  R 11 deg L 8 deg  2/2 Partially met;  ongoing   Improve edward hip ext to 10 deg passively R 12 deg L 10 deg 2/2 MET   Walk with normal gait pattern with AAD Use of SPC, lateral leaning (trunk) to R, decreased trunk rotation 2/2 ongoing   Ind with HEP Reports daily compliance  2/2 ongoing     Assessment/Plan     ASSESSMENT: Pt reported some BLE achiness today, noting the neuropathy in his feet is not that bad today. He noted no discomfort during treatment, but took rest breaks d/t some BLE fatigue. He required min-mod UE support to maintain his stability during balance activities. He reported decreased achiness and stiffness following treatment.    PLAN:  May consider vibraflex and continue with emphasis on endurance and stability. Bolster and modify HEP to reflect as appropriate. May continue to utilize weighted vests for functional tasks (e.g. stairs, STS).    **OBTAIN UPDATED FGA**    SIGNATURE: Gonzalez Grimaldo, [facility], [place] License #: " [id number]  Electronically Signed on 2/6/2024        [address] [place] [address]  [phone]

## 2024-02-09 ENCOUNTER — TREATMENT (OUTPATIENT)
Dept: PHYSICAL THERAPY | Facility: CLINIC | Age: 79
End: 2024-02-09
Payer: MEDICARE

## 2024-02-09 DIAGNOSIS — R26.9 ABNORMALITY OF GAIT AND MOBILITY: ICD-10-CM

## 2024-02-09 DIAGNOSIS — M47.14 THORACIC MYELOPATHY: Primary | ICD-10-CM

## 2024-02-09 PROCEDURE — 97110 THERAPEUTIC EXERCISES: CPT | Performed by: PHYSICAL THERAPIST

## 2024-02-09 PROCEDURE — 97140 MANUAL THERAPY 1/> REGIONS: CPT | Performed by: PHYSICAL THERAPIST

## 2024-02-09 NOTE — PROGRESS NOTES
Physical Therapy Treatment Note  [address] [place] [address]    Patient: Sonu Bravo Jr.                                                 Visit Date: 2024  :     1945    Referring practitioner:    Gucci Brandon MD  Date of Initial Visit:          Type: THERAPY  Noted: 2024    Patient seen for 11 sessions    Visit Diagnoses:    ICD-10-CM ICD-9-CM   1. Thoracic myelopathy  M47.14 721.41   2. Abnormality of gait and mobility  R26.9 781.2     SUBJECTIVE     Subjective:He reports his neuropathy is kicking him for the last few days. He reports the last time I saw him the hamstring stretches really helped he went out for dinner that night and didn't have to carry his SC.       PAIN: 2/10         OBJECTIVE     Objective       Manual Therapy     70104  Comments   TMR/DFR B unilateral hamstring complexes     Hamstring stretches     STM  B HF complexes with Powerboost L 1 bulb attachment     B SL HF stretches        Timed Minutes 36         Therapeutic Exercises    33946 Units Comments   Ascend/descend stair model with #20 vest x 5                         Timed Minutes 9      Therapy Education/Self Care 99760   Education offered today    Medbride Code [email or web address]       Ongoing HEP   Date: 2024  Prepared by: Spike Ramirez     Exercises  - Standing Hip Flexor Stretch  - 2 x daily - 7 x weekly - 2 sets - 10 reps  - Gastroc Stretch on Wall  - 2 x daily - 7 x weekly - 2 sets - 30 sec hold  - Bird Dog on Counter  - 2 x daily - 7 x weekly - 2 sets - 10 reps  - Standing Hip Abduction with Counter Support  - 10 x daily - 7 x weekly - 2 sets - 10 reps - 10 hold  - Mini Squat with Counter Support  - 2 x daily - 7 x weekly - 2 sets - 10 reps  - Mini Lunge  - 2 x daily - 7 x weekly - 2 sets - 10 reps  - Bird Dog  - 2 x daily - 7 x weekly - 2 sets - 10 reps  - Supine Piriformis Stretch with Foot on Ground  - 2 x daily - 7 x weekly - 2 sets - 30  "hold   Timed Minutes        Total Timed Treatment:     45   mins  Total Time of Visit:             45   mins         ASSESSMENT/PLAN     GOALS  Goals                                          Progress Note due by 3/3/24                                                             Recert due by 4/2/24   LTG by: 8 weeks Comments Date Status   SLS edward legs x 5 sec  often required UE support x1 at min for >2 sec on 1/19/2024  Unable today, LOB req assist to recover 2/2 ongoing   TUG score of <12\" 10.85 sec, 8.78 sec 2/2 MET   FGA score of 22 or better  deferred today as was not available 2/2 ongoing   Descend flight of stairs without proper weight shift without catching heel Reciprocal ascend/descend with HR occasionally balance only, no catching heel 2/2 MET   Improve edward DF to 10 deg passively  R 11 deg L 8 deg  2/2 Partially met;  ongoing   Improve edward hip ext to 10 deg passively R 12 deg L 10 deg 2/2 MET   Walk with normal gait pattern with AAD Use of SPC, lateral leaning (trunk) to R, decreased trunk rotation 2/2 ongoing   Ind with HEP Reports daily compliance  2/2 ongoing       Assessment/Plan     ASSESSMENT:   We focused primarily on stretching and improving his B LE flexibility today.    PLAN:   May consider vibraflex and continue with emphasis on endurance and stability. Bolster and modify HEP to reflect as appropriate. May continue to utilize weighted vests for functional tasks (e.g. stairs, STS).     SIGNATURE: Ruiz Frias PTA, [place] License #: [id number]  Electronically Signed on 2/9/2024        [address] [place] [address]. [address]  [phone]    "

## 2024-02-13 ENCOUNTER — TREATMENT (OUTPATIENT)
Dept: PHYSICAL THERAPY | Facility: CLINIC | Age: 79
End: 2024-02-13
Payer: MEDICARE

## 2024-02-13 DIAGNOSIS — R26.9 ABNORMALITY OF GAIT AND MOBILITY: ICD-10-CM

## 2024-02-13 DIAGNOSIS — M47.14 THORACIC MYELOPATHY: Primary | ICD-10-CM

## 2024-02-13 PROCEDURE — 97110 THERAPEUTIC EXERCISES: CPT | Performed by: PHYSICAL THERAPIST

## 2024-02-14 ENCOUNTER — TELEPHONE (OUTPATIENT)
Dept: INTERNAL MEDICINE | Facility: CLINIC | Age: 79
End: 2024-02-14

## 2024-02-14 NOTE — TELEPHONE ENCOUNTER
Caller: Sonu Bravo Jr.    Relationship: Self    Best call back number: [phone]     What medication are you requesting: DBBCG     What are your current symptoms: FEET NEUROPATHY     How long have you been experiencing symptoms: ON-GOING     Have you had these symptoms before:    [x] Yes  [] No    Have you been treated for these symptoms before:   [x] Yes  [] No    If a prescription is needed, what is your preferred pharmacy and phone number: [facility] - [facility] [address] - [phone] [facility] [phone] FX     Additional notes: WAS RECOMMENDED BY PHARMACIST AT [facility]

## 2024-02-16 ENCOUNTER — TREATMENT (OUTPATIENT)
Dept: PHYSICAL THERAPY | Facility: CLINIC | Age: 79
End: 2024-02-16
Payer: MEDICARE

## 2024-02-16 ENCOUNTER — TELEPHONE (OUTPATIENT)
Dept: INTERNAL MEDICINE | Facility: CLINIC | Age: 79
End: 2024-02-16
Payer: MEDICARE

## 2024-02-16 DIAGNOSIS — R26.9 ABNORMALITY OF GAIT AND MOBILITY: ICD-10-CM

## 2024-02-16 DIAGNOSIS — M47.14 THORACIC MYELOPATHY: Primary | ICD-10-CM

## 2024-02-16 PROCEDURE — 97140 MANUAL THERAPY 1/> REGIONS: CPT | Performed by: PHYSICAL THERAPIST

## 2024-02-16 PROCEDURE — 97110 THERAPEUTIC EXERCISES: CPT | Performed by: PHYSICAL THERAPIST

## 2024-02-16 NOTE — TELEPHONE ENCOUNTER
PATIENT HAS CALLED REGARDING THE NEUROPATHY IN HIS FEET.  PATIENT IS SCHEDULED TO BE SEEN ON 02/27/2024

## 2024-02-20 ENCOUNTER — TREATMENT (OUTPATIENT)
Dept: PHYSICAL THERAPY | Facility: CLINIC | Age: 79
End: 2024-02-20
Payer: MEDICARE

## 2024-02-20 DIAGNOSIS — M47.14 THORACIC MYELOPATHY: Primary | ICD-10-CM

## 2024-02-20 DIAGNOSIS — R26.9 ABNORMALITY OF GAIT AND MOBILITY: ICD-10-CM

## 2024-02-20 PROCEDURE — 97110 THERAPEUTIC EXERCISES: CPT | Performed by: PHYSICAL THERAPIST

## 2024-02-20 NOTE — PROGRESS NOTES
Physical Therapy Treatment Note  [address] [place] [address]    Patient: Sonu Bravo Jr.                                                 Visit Date: 2024  :     1945    Referring practitioner:    Gucci Brandon MD  Date of Initial Visit:          Type: THERAPY  Noted: 2024    Patient seen for 14 sessions    Visit Diagnoses:    ICD-10-CM ICD-9-CM   1. Thoracic myelopathy  M47.14 721.41   2. Abnormality of gait and mobility  R26.9 781.2     SUBJECTIVE     Subjective:Over the weekend he had some leg cramps and legs jumping       PAIN: 2/10         OBJECTIVE     Objective     Therapeutic Exercises    20270 Units Comments   B SL hip rainbows #3 x 10 each     Bridging with 55 cm SB x 12      Plantargrade pot stirrers with 55 cm SB x 15      B pec and thoracic stretches      B uni hamstring stretches      Unicam seat#6 res 3 x 6 min     B LE Shuttle Press 8 bands x 6 min     Timed Minutes 60        Therapy Education/Self Care 81242   Education offered today    Medbride Code    Ongoing HEP   Date: 2024  Prepared by: Spike Ramirez     Exercises  - Standing Hip Flexor Stretch  - 2 x daily - 7 x weekly - 2 sets - 10 reps  - Gastroc Stretch on Wall  - 2 x daily - 7 x weekly - 2 sets - 30 sec hold  - Bird Dog on Counter  - 2 x daily - 7 x weekly - 2 sets - 10 reps  - Standing Hip Abduction with Counter Support  - 10 x daily - 7 x weekly - 2 sets - 10 reps - 10 hold  - Mini Squat with Counter Support  - 2 x daily - 7 x weekly - 2 sets - 10 reps  - Mini Lunge  - 2 x daily - 7 x weekly - 2 sets - 10 reps  - Bird Dog  - 2 x daily - 7 x weekly - 2 sets - 10 reps  - Supine Piriformis Stretch with Foot on Ground  - 2 x daily - 7 x weekly - 2 sets - 30 hold   Timed Minutes        Total Timed Treatment:     60   mins  Total Time of Visit:             60   mins         ASSESSMENT/PLAN     GOALS  Goals                                           "Progress Note due by 3/3/24                                                             Recert due by 4/2/24   LTG by: 8 weeks Comments Date Status   SLS edward legs x 5 sec  often required UE support x1 at min for >2 sec on 1/19/2024  Unable today, LOB req assist to recover 2/2 ongoing   TUG score of <12\" 10.85 sec, 8.78 sec 2/2 MET   FGA score of 22 or better  deferred today as was not available 2/2 ongoing   Descend flight of stairs without proper weight shift without catching heel Reciprocal ascend/descend with HR occasionally balance only, no catching heel 2/2 MET   Improve edward DF to 10 deg passively  R 11 deg L 8 deg  2/2 Partially met;  ongoing   Improve edward hip ext to 10 deg passively R 12 deg L 10 deg 2/2 MET   Walk with normal gait pattern with AAD Use of SPC, lateral leaning (trunk) to R, decreased trunk rotation 2/2 ongoing   Ind with HEP Reports daily compliance  2/2 ongoing       Assessment/Plan     ASSESSMENT:   We focused more on strength and endurance today as he was having an overall better day.      PLAN:     Alternate the focus between therex and overall conditioning and balance and flexibiilty     SIGNATURE: Ruiz Frias, [place], [place] License #: [id number]  Electronically Signed on 2/20/2024        [address] [facility] [place] [address]  [phone]    "

## 2024-02-27 ENCOUNTER — OFFICE VISIT (OUTPATIENT)
Dept: INTERNAL MEDICINE | Facility: CLINIC | Age: 79
End: 2024-02-27
Payer: MEDICARE

## 2024-02-27 VITALS
OXYGEN SATURATION: 96 % | DIASTOLIC BLOOD PRESSURE: 100 MMHG | HEIGHT: 71 IN | SYSTOLIC BLOOD PRESSURE: 158 MMHG | HEART RATE: 51 BPM | RESPIRATION RATE: 16 BRPM | BODY MASS INDEX: 24.64 KG/M2 | WEIGHT: 176 LBS

## 2024-02-27 DIAGNOSIS — G57.93 NEUROPATHY INVOLVING BOTH LOWER EXTREMITIES: Primary | ICD-10-CM

## 2024-02-27 RX ORDER — IBUPROFEN 200 MG
200 TABLET ORAL EVERY 6 HOURS PRN
COMMUNITY

## 2024-02-27 NOTE — PROGRESS NOTES
Chief Complaint  Foot Problem (Neuropathy, was recommended during PT visit he get a compound cream for his feet form [facility])    Subjective        Sonuean Bravo . presents to [facility] for evaluation of the above complaint    History of Present Illness   Has a history of progressively worsening bilateral lower extremity neuropathy.  This problem began in 2016 after having multiple lumbar spinal surgeries.  Has completed multiple rounds of physical therapy and is currently in physical therapy.  Is working on building his strength because he has been having worsening balance as he cannot feel his feet which is causing him to be unsteady.  He ambulates with a cane.  Denies any falls but has had some near falls.  Does not have diabetes.  No vascular issues and has been evaluated by vascular surgery.  Says numbness is the same bilaterally.  He does have some burning and stinging at times as well as some restless leg symptoms  during sleep.  His last surgery was performed in [place] by Dr. Barros and previous surgeries were performed by Dr. Myers.  Does not follow back up with Dr. Barros until December 2024.    Has been to pain management and had epidural injections.  He is also had nerve conduction studies and multiple imaging.    Is discouraged because he cannot do the activities he used to do.  Is wanting to know how he can regain function and control pain.  Has not tried any medications for the neuropathy or restless leg.  His physical therapist recommended he try some compounded neuropathy cream to see if that helps so he wants to discuss this today.    Review of Systems  Constitutional: Negative for chills and fever.  Respiratory: Negative for cough and shortness of breath.    Cardiovascular: Negative for chest pain and palpitations.  Neurological: Negative for dizziness and headache  Psychiatric/Behavioral: Negative for dysphoric mood. The patient is not  "nervous/anxious.    Objective   Vital Signs:  /100 (BP Location: Left arm, Patient Position: Sitting, Cuff Size: Adult)   Pulse 51   Resp 16   Ht 180.3 cm (71\")   Wt 79.8 kg (176 lb)   SpO2 96%   BMI 24.55 kg/m²   Estimated body mass index is 24.55 kg/m² as calculated from the following:    Height as of this encounter: 180.3 cm (71\").    Weight as of this encounter: 79.8 kg (176 lb).           Physical Exam  Constitutional:       Appearance: Normal appearance.   Pulmonary:      Effort: Pulmonary effort is normal.   Musculoskeletal:      Comments: Ambulates with cane   Skin:     General: Skin is warm and dry.   Neurological:      General: No focal deficit present.      Mental Status: He is alert and oriented to person, place, and time.   Psychiatric:         Mood and Affect: Mood normal.         Behavior: Behavior normal.        Result Review :  The following data was reviewed by: MARY Chase on 02/27/2024:  Common labs          12/8/2023    03:43 12/11/2023    05:07 12/14/2023    03:37   Common Labs   WBC 7.8     7.9     6.6       Hemoglobin 9.5     9.6     9.4       Hematocrit 30.5     30.4     30.4       Platelets 167     230     294          Details          This result is from an external source.                          Assessment and Plan   Diagnoses and all orders for this visit:    1. Neuropathy involving both lower extremities (Primary)    I encouraged him to reach out to his neurosurgeon to discuss potential treatment such as nerve stimulators, etc. as this was more involved with their specialty.  In the meantime discussed medication options.  Wants to try compounded neuropathy cream prior to trying gabapentin orally.  We can try this.  I told him we typically do not prescribe compounded creams and start with the pills instead but he is fearful of sedation from the gabapentin.  He is going to try the cream for several days and then update me on whether or not he is having " improvement.  If he is not having improvement I will plan to order oral gabapentin at that time.  We have discussed potential side effects of gabapentin.    Blood pressure is noted to be elevated today.  He was very agitated and frustrated initially when he presented.  If blood pressure does not improve he is to notify the office for further instructions.         Follow Up   Return in about 3 months (around 5/27/2024) for Recheck.  Patient was given instructions and counseling regarding his condition or for health maintenance advice. Please see specific information pulled into the AVS if appropriate.

## 2024-02-28 ENCOUNTER — TREATMENT (OUTPATIENT)
Dept: PHYSICAL THERAPY | Facility: CLINIC | Age: 79
End: 2024-02-28
Payer: MEDICARE

## 2024-02-28 DIAGNOSIS — R26.9 ABNORMALITY OF GAIT AND MOBILITY: ICD-10-CM

## 2024-02-28 DIAGNOSIS — M47.14 THORACIC MYELOPATHY: Primary | ICD-10-CM

## 2024-02-28 PROCEDURE — 97140 MANUAL THERAPY 1/> REGIONS: CPT | Performed by: PHYSICAL THERAPIST

## 2024-02-28 PROCEDURE — 97112 NEUROMUSCULAR REEDUCATION: CPT | Performed by: PHYSICAL THERAPIST

## 2024-02-28 NOTE — PROGRESS NOTES
Physical Therapy Treatment Note and 30 Day Progress Note  [address] [place] [address]    Patient: Sonu Bravo Jr.                                                 Visit Date: 2024  :     1945    Referring practitioner:    Gucci Brandon MD  Date of Initial Visit:          Type: THERAPY  Noted: 2024    Patient seen for 15 sessions    Visit Diagnoses:    ICD-10-CM ICD-9-CM   1. Thoracic myelopathy  M47.14 721.41   2. Abnormality of gait and mobility  R26.9 781.2     SUBJECTIVE     Subjective:He reports he had a follow up with PCP and he got a prescription for a compound       PAIN: 2/10    PT G-Codes  Outcome Measure Options: FGA (Functional Gait Assessment)  FGA Total Score: 23    OBJECTIVE     Objective     Neuromuscular Reeducation     55425 Comments   FGA                    Timed Minutes 20      Manual Therapy     27424  Comments   STM B HF complexes with Powerboost L1     B unilateral HF stretches in SL                 Timed Minutes 17         Therapeutic Exercises    86026 Units Comments   Goblet squats #10 on wedges x 20                         Timed Minutes 5      Therapy Education/Self Care 78744   Education offered today    Medbride Code    Ongoing HEP   Access Code: [email or web address]  URL: [email or web address]  Date: 2024  Prepared by: Spike Ramirez     Exercises  - Standing Hip Flexor Stretch  - 2 x daily - 7 x weekly - 2 sets - 10 reps  - Gastroc Stretch on Wall  - 2 x daily - 7 x weekly - 2 sets - 30 sec hold  - Bird Dog on Counter  - 2 x daily - 7 x weekly - 2 sets - 10 reps  - Standing Hip Abduction with Counter Support  - 10 x daily - 7 x weekly - 2 sets - 10 reps - 10 hold  - Mini Squat with Counter Support  - 2 x daily - 7 x weekly - 2 sets - 10 reps  - Mini Lunge  - 2 x daily - 7 x weekly - 2 sets - 10 reps  - Bird Dog  - 2 x daily - 7 x weekly - 2 sets - 10 reps  - Supine Piriformis Stretch with Foot on  "Ground  - 2 x daily - 7 x weekly - 2 sets - 30 hold   Timed Minutes        Total Timed Treatment:     42   mins  Total Time of Visit:             42   mins         ASSESSMENT/PLAN     GOALS  Goals                                          Progress Note due by 3/29/24                                                             Recert due by 4/2/24   LTG by: 8 weeks Comments Date Status   SLS edward legs x 5 sec  often required UE support x1 at min for >2 sec on 1/19/2024  Unable today, LOB req assist to recover 2/2 ongoing   TUG score of <12\" 10.85 sec, 8.78 sec 2/2 MET   FGA score of 22 or better  23 2/28 Met   Descend flight of stairs without proper weight shift without catching heel Reciprocal ascend/descend with HR occasionally balance only, no catching heel 2/2 MET   Improve edward DF to 10 deg passively  R 12 deg L 9 deg  2/28 Partially met;  ongoing   Improve edward hip ext to 10 deg passively R 12 deg L 10 deg 2/2 MET   Walk with normal gait pattern with AAD Using of SPC, lateral leaning (trunk)  2/28 ongoing   Ind with HEP Reports daily compliance  2/28 ongoing       Assessment/Plan     ASSESSMENT:   He was able to meet his FGA goal today but I believe we can improve his score further. His B hip extension increased 1 degree each which is very close to meeting his goal.     PLAN:   Continue to work on functional strengthening activities, improving his overall flexibility, and spend some time challenging his dynamic balance and coordination.    SIGNATURE: Ruiz Frias PTA, [place] License #: [id number]  Electronically Signed on 2/28/2024        [facility] [place] [address]  [phone]    "

## 2024-02-29 NOTE — PROGRESS NOTES
Progress Note Addendum      Patient: Sonu Bravo Jr.           : 1945  Visit Date: 2024  Referring practitioner: Gucci Brandon MD  Date of Initial Visit: Type: THERAPY  Noted: 2024  Patient seen for 15 sessions  Visit Diagnoses:    ICD-10-CM ICD-9-CM   1. Thoracic myelopathy  M47.14 721.41   2. Abnormality of gait and mobility  R26.9 781.2       PT G-Codes  Outcome Measure Options: FGA (Functional Gait Assessment)  FGA Total Score: 23  Clinical Progress: improved  Home Program Compliance: Yes  Progress toward previous goals: Partially Met  Prognosis to achieve goals: good    Objective     Assessment & Plan       Assessment  Impairments: abnormal muscle tone, abnormal or restricted ROM, activity intolerance, impaired balance, impaired physical strength, lacks appropriate home exercise program and safety issue   Functional limitations: walking and stooping   Prognosis: good    Plan  Therapy options: will be seen for skilled therapy services  Planned modality interventions: dry needling, low level laser therapy, TENS and electrical stimulation/Russian stimulation  Planned therapy interventions: manual therapy, strengthening, stretching, therapeutic activities, gait training, home exercise program, soft tissue mobilization, abdominal trunk stabilization and neuromuscular re-education  Frequency: 2x week  Duration in weeks: 8  Treatment plan discussed with: PTA        I reviewed the treatment and goals with Ruiz Frias PTA and agree with the POC.    SIGNATURE: Spike Ramirez PT, License #: [id number]  Electronically Signed on 2024

## 2024-03-05 ENCOUNTER — TREATMENT (OUTPATIENT)
Dept: PHYSICAL THERAPY | Facility: CLINIC | Age: 79
End: 2024-03-05
Payer: MEDICARE

## 2024-03-05 DIAGNOSIS — R26.9 ABNORMALITY OF GAIT AND MOBILITY: ICD-10-CM

## 2024-03-05 DIAGNOSIS — M47.14 THORACIC MYELOPATHY: Primary | ICD-10-CM

## 2024-03-05 PROCEDURE — 97110 THERAPEUTIC EXERCISES: CPT | Performed by: PHYSICAL THERAPIST

## 2024-03-05 PROCEDURE — 97112 NEUROMUSCULAR REEDUCATION: CPT | Performed by: PHYSICAL THERAPIST

## 2024-03-05 NOTE — PROGRESS NOTES
Physical Therapy Treatment Note  [address] [place] [address]    Patient: Sonu Bravo Jr.                                                 Visit Date: 3/5/2024  :     1945    Referring practitioner:    Gucci Brandon MD  Date of Initial Visit:          Type: THERAPY  Noted: 2024    Patient seen for 16 sessions    Visit Diagnoses:    ICD-10-CM ICD-9-CM   1. Thoracic myelopathy  M47.14 721.41   2. Abnormality of gait and mobility  R26.9 781.2     SUBJECTIVE     Subjective:He's using the compounding cream and can't tell anything yet. Yesterday wasn't a good day.       PAIN: 2/10         OBJECTIVE     Objective     Therapeutic Exercises    43641 Units Comments   Sustained squat on Vibraflex 8, 10, 12 mHz     B pec thoracic stretches with 1/2 roller in sitting      Goblet squats #15 on wedges x 15      Seated ML with TRX Rip Trainer          Timed Minutes 30      Neuromuscular Reeducation     36970 Comments   Walking with vertical  and horizontal movement with counting by 3's EOL of alphabet, walking backwards counting down. Walking naming colors.                    Timed Minutes 15        Therapy Education/Self Care 16197   Education offered today    Lesly Code    Ongoing HEP   Access Code: [email or web address]  URL: [email or web address]  Date: 2024  Prepared by: Spike Ramirez     Exercises  - Standing Hip Flexor Stretch  - 2 x daily - 7 x weekly - 2 sets - 10 reps  - Gastroc Stretch on Wall  - 2 x daily - 7 x weekly - 2 sets - 30 sec hold  - Bird Dog on Counter  - 2 x daily - 7 x weekly - 2 sets - 10 reps  - Standing Hip Abduction with Counter Support  - 10 x daily - 7 x weekly - 2 sets - 10 reps - 10 hold  - Mini Squat with Counter Support  - 2 x daily - 7 x weekly - 2 sets - 10 reps  - Mini Lunge  - 2 x daily - 7 x weekly - 2 sets - 10 reps  - Bird Dog  - 2 x daily - 7 x weekly - 2 sets - 10 reps  - Supine Piriformis Stretch  "with Foot on Ground  - 2 x daily - 7 x weekly - 2 sets - 30 hold   Timed Minutes        Total Timed Treatment:     45   mins  Total Time of Visit:             45   mins         ASSESSMENT/PLAN     GOALS  Goals                                          Progress Note due by 3/29/24                                                             Recert due by 4/2/24   LTG by: 8 weeks Comments Date Status   SLS edward legs x 5 sec  often required UE support x1 at min for >2 sec on 1/19/2024  Unable today, LOB req assist to recover 2/2 ongoing   TUG score of <12\" 10.85 sec, 8.78 sec 2/2 MET   FGA score of 22 or better  23 2/28 Met   Descend flight of stairs without proper weight shift without catching heel Reciprocal ascend/descend with HR occasionally balance only, no catching heel 2/2 MET   Improve edward DF to 10 deg passively  R 12 deg L 9 deg  2/28 Partially met;  ongoing   Improve edward hip ext to 10 deg passively R 12 deg L 10 deg 2/2 MET   Walk with normal gait pattern with AAD Using of SPC, lateral leaning (trunk)  2/28 ongoing   Ind with HEP Reports daily compliance  2/28 ongoing       Assessment/Plan     ASSESSMENT:   We focused on strength and coordination and improving both today.     PLAN:   Continue utilizing Vibereflex and working on coordination    SIGNATURE: Ruiz Frias, [place], [place] License #: [id number]  Electronically Signed on 3/5/2024        [address] [place] [address]. [address]  [phone]    "

## 2024-03-08 ENCOUNTER — TELEPHONE (OUTPATIENT)
Dept: INTERNAL MEDICINE | Facility: CLINIC | Age: 79
End: 2024-03-08
Payer: MEDICARE

## 2024-03-08 NOTE — TELEPHONE ENCOUNTER
Will you call and see how he is doing on the compounded cream for neuropathy? I discussed with Dr. Kramer and Dr. Kramer says he definitely needs to discuss the worsening neuropathy issue with neurosurgery and if he needs further medications neurosurgery needs to order because it could be a sign of post surgical complication, etc and they need to monitor it. We can continue to fill cream for now if needed if it is helping him. Let me know if he has any questions or needs anything further.

## 2024-03-08 NOTE — TELEPHONE ENCOUNTER
PATIENT HAS BEEN CALLED, HE STATED THAT HE HAS NOT SEEN A BIG DIFFERENCE AS OF YET WITH THE CREAM.   HE STATED THAT HE WAS TOLD IT COULD TAKE UP TO 30 DAYS.   PATIENT GIVEN MESSAGE AND UNDERSTANDING  VOICED.   HE WILL CALL NEUROSURGERY AND MAKE AN APPOINTMENT

## 2024-03-12 ENCOUNTER — TREATMENT (OUTPATIENT)
Dept: PHYSICAL THERAPY | Facility: CLINIC | Age: 79
End: 2024-03-12
Payer: MEDICARE

## 2024-03-12 DIAGNOSIS — M47.14 THORACIC MYELOPATHY: Primary | ICD-10-CM

## 2024-03-12 DIAGNOSIS — R26.9 ABNORMALITY OF GAIT AND MOBILITY: ICD-10-CM

## 2024-03-12 PROCEDURE — 97110 THERAPEUTIC EXERCISES: CPT | Performed by: PHYSICAL THERAPIST

## 2024-03-12 PROCEDURE — 97140 MANUAL THERAPY 1/> REGIONS: CPT | Performed by: PHYSICAL THERAPIST

## 2024-03-12 PROCEDURE — 97112 NEUROMUSCULAR REEDUCATION: CPT | Performed by: PHYSICAL THERAPIST

## 2024-03-12 NOTE — PROGRESS NOTES
Physical Therapy Treatment Note  [address] [place] [address]    Patient: Sonu Bravo Jr.                                                 Visit Date: 3/12/2024  :     1945    Referring practitioner:    Gucci Brandon MD  Date of Initial Visit:          Type: THERAPY  Noted: 2024    Patient seen for 17 sessions    Visit Diagnoses:    ICD-10-CM ICD-9-CM   1. Thoracic myelopathy  M47.14 721.41   2. Abnormality of gait and mobility  R26.9 781.2     SUBJECTIVE     Subjective:He reports having a fairly decent week.       PAIN: 2/10         OBJECTIVE     Objective     Therapeutic Exercises    12701 Units Comments   Sustained squat on Vibraflex  10, 12, 14 mHz      B pec thoracic stretches with 1/2 roller in sitting       B LE Shuttle Press with eccentric focus 8 bands x 5 min     B unilateral LE Shuttle Press with eccentric focus 5 bands x 5 min          Timed Minutes 25      Manual Therapy     47382  Comments   STM B HF complexes with Powerboost L1      B unilateral HF stretches in SL                        Timed Minutes 20     Neuromuscular Reeducation     30413 Comments   B side stepping and forward stepping with hurdles while naming animals and cities in alphabetical order.                     Timed Minutes 15              Therapy Education/Self Care 78084   Education offered today    Medbride Code    Ongoing HEP   Access Code: [email or web address]  URL: [email or web address]  Date: 2024  Prepared by: Spike Ramirez     Exercises  - Standing Hip Flexor Stretch  - 2 x daily - 7 x weekly - 2 sets - 10 reps  - Gastroc Stretch on Wall  - 2 x daily - 7 x weekly - 2 sets - 30 sec hold  - Bird Dog on Counter  - 2 x daily - 7 x weekly - 2 sets - 10 reps  - Standing Hip Abduction with Counter Support  - 10 x daily - 7 x weekly - 2 sets - 10 reps - 10 hold  - Mini Squat with Counter Support  - 2 x daily - 7 x weekly - 2 sets - 10 reps  - Mini  "Lunge  - 2 x daily - 7 x weekly - 2 sets - 10 reps  - Bird Dog  - 2 x daily - 7 x weekly - 2 sets - 10 reps  - Supine Piriformis Stretch with Foot on Ground  - 2 x daily - 7 x weekly - 2 sets - 30 hold   Timed Minutes        Total Timed Treatment:     60   mins  Total Time of Visit:             60   mins         ASSESSMENT/PLAN     GOALS  Goals                                          Progress Note due by 3/29/24                                                             Recert due by 4/2/24   LTG by: 8 weeks Comments Date Status   SLS edward legs x 5 sec  often required UE support x1 at min for >2 sec on 1/19/2024  Unable today, LOB req assist to recover 2/2 ongoing   TUG score of <12\" 10.85 sec, 8.78 sec 2/2 MET   FGA score of 22 or better  23 2/28 Met   Descend flight of stairs without proper weight shift without catching heel Reciprocal ascend/descend with HR occasionally balance only, no catching heel 2/2 MET   Improve edward DF to 10 deg passively  R 12 deg L 9 deg  2/28 Partially met;  ongoing   Improve edward hip ext to 10 deg passively R 12 deg L 10 deg 2/2 MET   Walk with normal gait pattern with AAD Using of SPC, lateral leaning (trunk)  2/28 ongoing   Ind with HEP Reports daily compliance  2/28 ongoing       Assessment/Plan     ASSESSMENT:   We focused on strength and coordination and improving both today.     PLAN:   Continue to work on functional strengthening activities, improving his overall flexibility, and spend some time challenging his dynamic balance and coordination.     SIGNATURE: Ruiz Frias PTA, [place] License #: [id number]  Electronically Signed on 3/12/2024        [address] [place] [address] [place] [address]  [phone]    "

## 2024-03-20 ENCOUNTER — TREATMENT (OUTPATIENT)
Dept: PHYSICAL THERAPY | Facility: CLINIC | Age: 79
End: 2024-03-20
Payer: MEDICARE

## 2024-03-20 DIAGNOSIS — M47.14 THORACIC MYELOPATHY: Primary | ICD-10-CM

## 2024-03-20 DIAGNOSIS — R26.9 ABNORMALITY OF GAIT AND MOBILITY: ICD-10-CM

## 2024-03-20 PROCEDURE — 97110 THERAPEUTIC EXERCISES: CPT | Performed by: PHYSICAL THERAPIST

## 2024-03-20 PROCEDURE — 97112 NEUROMUSCULAR REEDUCATION: CPT | Performed by: PHYSICAL THERAPIST

## 2024-03-20 PROCEDURE — 97140 MANUAL THERAPY 1/> REGIONS: CPT | Performed by: PHYSICAL THERAPIST

## 2024-03-20 NOTE — PROGRESS NOTES
Physical Therapy Treatment Note  [address] [place] [address]    Patient: Sonu Bravo Jr.                                                 Visit Date: 3/20/2024  :     1945    Referring practitioner:    Gucci Brandon MD  Date of Initial Visit:          Type: THERAPY  Noted: 2024    Patient seen for 18 sessions    Visit Diagnoses:    ICD-10-CM ICD-9-CM   1. Thoracic myelopathy  M47.14 721.41   2. Abnormality of gait and mobility  R26.9 781.2     SUBJECTIVE     Subjective:He reports his feet feel better he thinks no LBP. His knees are bothering him don't hurt but don't feel strong. He had some soreness in his B hips after the last session      PAIN: 0/10         OBJECTIVE     Objective     Manual Therapy     90766  Comments   STM B HF complexes with Powerboost L1      B unilateral HF stretches in SL                        Timed Minutes 20     Therapeutic Exercises    03578 Units Comments   B pec thoracic stretches with 1/2 roller in sitting        Sustained squat on Vibraflex   12, 14, 16 mHz                     Timed Minutes 10        Neuromuscular Reeducation     85961 Comments   Walking rebounds with blue ball    Walking RH,LH, and crossover dribbles with 45 cm SB                Timed Minutes 15      Therapy Education/Self Care 58234   Education offered today    Lesly Code    Ongoing HEP   Access Code: [email or web address]  URL: [email or web address]  Date: 2024  Prepared by: Spike Ramirez     Exercises  - Standing Hip Flexor Stretch  - 2 x daily - 7 x weekly - 2 sets - 10 reps  - Gastroc Stretch on Wall  - 2 x daily - 7 x weekly - 2 sets - 30 sec hold  - Bird Dog on Counter  - 2 x daily - 7 x weekly - 2 sets - 10 reps  - Standing Hip Abduction with Counter Support  - 10 x daily - 7 x weekly - 2 sets - 10 reps - 10 hold  - Mini Squat with Counter Support  - 2 x daily - 7 x weekly - 2 sets - 10 reps  - Mini Lunge  - 2 x daily -  "7 x weekly - 2 sets - 10 reps  - Bird Dog  - 2 x daily - 7 x weekly - 2 sets - 10 reps  - Supine Piriformis Stretch with Foot on Ground  - 2 x daily - 7 x weekly - 2 sets - 30 hold   Timed Minutes        Total Timed Treatment:     45   mins  Total Time of Visit:             45   mins         ASSESSMENT/PLAN     GOALS  Goals                                          Progress Note due by 3/29/24                                                             Recert due by 4/2/24   LTG by: 8 weeks Comments Date Status   SLS edward legs x 5 sec  often required UE support x1 at min for >2 sec on 1/19/2024  Unable today, LOB req assist to recover 2/2 ongoing   TUG score of <12\" 10.85 sec, 8.78 sec 2/2 MET   FGA score of 22 or better  23 2/28 Met   Descend flight of stairs without proper weight shift without catching heel Reciprocal ascend/descend with HR occasionally balance only, no catching heel 2/2 MET   Improve edward DF to 10 deg passively  R 12 deg L 9 deg  2/28 Partially met;  ongoing   Improve edward hip ext to 10 deg passively R 12 deg L 10 deg 2/2 MET   Walk with normal gait pattern with AAD Using of SPC, lateral leaning (trunk)  2/28 ongoing   Ind with HEP Reports daily compliance  2/28 ongoing       Assessment/Plan     ASSESSMENT:   We focused on strength and coordination and improving both today.     PLAN:   Review all goals for a progress note and recert.    SIGNATURE: Ruiz Frias PTA, [place] License #: [id number]  Electronically Signed on 3/20/2024        [address] [place] [address]. [address]  [phone]    "

## 2024-03-22 ENCOUNTER — TELEPHONE (OUTPATIENT)
Dept: INTERNAL MEDICINE | Facility: CLINIC | Age: 79
End: 2024-03-22

## 2024-03-22 NOTE — TELEPHONE ENCOUNTER
Rx Refill Note  Requested Prescriptions      No prescriptions requested or ordered in this encounter      Last office visit with prescribing clinician: 12/20/2023   Last telemedicine visit with prescribing clinician: Visit date not found   Next office visit with prescribing clinician: Visit date not found                         Would you like a call back once the refill request has been completed: [] Yes [] No    If the office needs to give you a call back, can they leave a voicemail: [] Yes [] No    Jairo Cano MA  03/22/24, 09:06 CDT

## 2024-03-22 NOTE — TELEPHONE ENCOUNTER
SPOKE WITH [facility], A REQUEST FOR MEDICATION WILL BE FAXED TO THE OFFICE TO BE REFILLED.

## 2024-03-22 NOTE — TELEPHONE ENCOUNTER
Caller: Sonu Bravo Jr.    Relationship: Self    Best call back number: [phone]    Requested Prescriptions:   Requested Prescriptions      No prescriptions requested or ordered in this encounter      COMPOUND CREAM FOR NEUROPATHY     Pharmacy where request should be sent: [facility] [address] - [phone] PH - [phone] FX     Last office visit with prescribing clinician: 12/20/2023   Last telemedicine visit with prescribing clinician: Visit date not found   Next office visit with prescribing clinician: Visit date not found     Additional details provided by patient: PATIENT IS OUT OF CREAM COMPLETLEY     Does the patient have less than a 3 day supply:  [x] Yes  [] No    Would you like a call back once the refill request has been completed: [] Yes [x] No        Elan Wood Rep   03/22/24 08:50 CDT

## 2024-03-25 ENCOUNTER — TELEPHONE (OUTPATIENT)
Dept: INTERNAL MEDICINE | Facility: CLINIC | Age: 79
End: 2024-03-25
Payer: MEDICARE

## 2024-03-25 NOTE — TELEPHONE ENCOUNTER
Pt is calling to see if we had sent in his medication creme to \[facility]\"" for his neuropathy. Pharmacy told him they have sent us 2 requests.

## 2024-03-25 NOTE — TELEPHONE ENCOUNTER
Caller: Sonu Bravo Jr.    Relationship: Self    Best call back number:     [phone]        What is the best time to reach you: ANYTIME     Who are you requesting to speak with (clinical staff, provider,  specific staff member): CLINICAL     Do you know the name of the person who called: CLINICAL     What was the call regarding: STATES HE IS WAITING ON HIS COMPOUND CREAM  DBBCG HE STATES HE CALLED LAST FRIDAY AND IT IS NOT AT THE [facility] HE STATES IT TAKES THE PHARMACY TWO DAYS TO GET THE COMPOUND CREAM TOGETHER AND HE HAS BEEN WITH OUT THE MEDICATION FOR 5 DAYS     Is it okay if the provider responds through MyChart:CALL BACK

## 2024-03-27 ENCOUNTER — TREATMENT (OUTPATIENT)
Dept: PHYSICAL THERAPY | Facility: CLINIC | Age: 79
End: 2024-03-27
Payer: MEDICARE

## 2024-03-27 DIAGNOSIS — M47.14 THORACIC MYELOPATHY: Primary | ICD-10-CM

## 2024-03-27 DIAGNOSIS — R26.9 ABNORMALITY OF GAIT AND MOBILITY: ICD-10-CM

## 2024-03-27 PROCEDURE — 97112 NEUROMUSCULAR REEDUCATION: CPT | Performed by: PHYSICAL THERAPIST

## 2024-03-27 PROCEDURE — 97110 THERAPEUTIC EXERCISES: CPT | Performed by: PHYSICAL THERAPIST

## 2024-03-27 NOTE — PROGRESS NOTES
30 Day Progress Note and 90 Day Recertification Addendum      Patient: Sonu Bravo Jr.           : 1945  Visit Date: 3/27/2024  Referring practitioner: Gucci Brandon MD  Date of Initial Visit: Type: THERAPY  Noted: 2024  Patient seen for 19 sessions  Visit Diagnoses:    ICD-10-CM ICD-9-CM   1. Thoracic myelopathy  M47.14 721.41   2. Abnormality of gait and mobility  R26.9 781.2          Clinical Progress: improved  Home Program Compliance: Yes  Progress toward previous goals: Partially Met  Prognosis to achieve goals: good    Objective     Assessment & Plan       Assessment  Impairments: abnormal muscle tone, abnormal or restricted ROM, activity intolerance, impaired balance, impaired physical strength, lacks appropriate home exercise program and safety issue   Functional limitations: walking and stooping   Prognosis: good    Plan  Therapy options: will be seen for skilled therapy services  Planned modality interventions: dry needling, low level laser therapy, TENS and electrical stimulation/Russian stimulation  Planned therapy interventions: manual therapy, strengthening, stretching, therapeutic activities, gait training, home exercise program, soft tissue mobilization, abdominal trunk stabilization and neuromuscular re-education  Frequency: 2x week  Duration in weeks: 8  Treatment plan discussed with: PTA        I reviewed the treatment and goals with Ruiz Frias PTA and agree with the POC.    SIGNATURE: Spike Ramirez PT, License #: [id number]  Electronically Signed on 3/27/2024      90 Day Recertification  Certification Period: 3/27/2024 through 2024  Based upon review of the patient's progress and continued therapy plan, it is my medical opinion that Sonu Bravo should continue physical therapy treatment at [facility]     PHYSICIAN: Gucci Brandon MD (NPI: [id number])    Signature: __________________________________________________DATE: ___________________     Please  sign and return via fax to [phone].   Thank you so much for letting us work with Sonu. I appreciate your letting us work with your patients. If you have any questions or concerns, please don't hesitate to contact me.

## 2024-03-27 NOTE — PROGRESS NOTES
Physical Therapy Treatment Note and 90 Day Recertification Note  [address] [place] [address]    Patient: Sonu Bravo Jr.                                                 Visit Date: 3/27/2024  :     1945    Referring practitioner:    Gucci Brandon MD  Date of Initial Visit:          Type: THERAPY  Noted: 2024    Patient seen for 19 sessions    Visit Diagnoses:    ICD-10-CM ICD-9-CM   1. Thoracic myelopathy  M47.14 721.41   2. Abnormality of gait and mobility  R26.9 781.2     SUBJECTIVE     Subjective:He has been without his cream for over a week and he notices a difference       PAIN: 2/10         OBJECTIVE     Objective     Therapeutic Exercises    58348 Units Comments   B LE Shuttle Press 8 bands x 8 min      B unilateral LE Shuttle Press with eccentric focus 5 bands x 5 min each                    Timed Minutes 20      Neuromuscular Reeducation     56514 Comments   B SLS for max see goals section    Toss and catch on wobble board L<>R, A<>P                Timed Minutes 25        Therapy Education/Self Care 65830   Education offered today    Medbride Code    Ongoing HEP   Access Code: [email or web address]  URL: [email or web address]  Date: 2024  Prepared by: Spike Ramirez     Exercises  - Standing Hip Flexor Stretch  - 2 x daily - 7 x weekly - 2 sets - 10 reps  - Gastroc Stretch on Wall  - 2 x daily - 7 x weekly - 2 sets - 30 sec hold  - Bird Dog on Counter  - 2 x daily - 7 x weekly - 2 sets - 10 reps  - Standing Hip Abduction with Counter Support  - 10 x daily - 7 x weekly - 2 sets - 10 reps - 10 hold  - Mini Squat with Counter Support  - 2 x daily - 7 x weekly - 2 sets - 10 reps  - Mini Lunge  - 2 x daily - 7 x weekly - 2 sets - 10 reps  - Bird Dog  - 2 x daily - 7 x weekly - 2 sets - 10 reps  - Supine Piriformis Stretch with Foot on Ground  - 2 x daily - 7 x weekly - 2 sets - 30 hold   Timed Minutes        Total Timed  "Treatment:     45   mins  Total Time of Visit:             45   mins         ASSESSMENT/PLAN     GOALS  Goals                                          Progress Note due by 4/26/24                                                             Recert due by 6/25/24   LTG by: 8 weeks Comments Date Status   SLS edward legs x 5 sec  B LE 15 sec  3/27 ongoing   TUG score of <12\" 10.85 sec, 8.78 sec 2/2 MET   FGA score of 22 or better  23 2/28 Met   Descend flight of stairs without proper weight shift without catching heel Reciprocal ascend/descend with HR occasionally balance only, no catching heel 2/2 MET   Improve edward DF to 10 deg passively  R 12 deg L 9 deg  2/28 Partially met;  ongoing   Improve edward hip ext to 10 deg passively R 12 deg L 10 deg 2/2 MET   Walk with normal gait pattern with AAD Using SC decreased chriss 3/27 ongoing   Ind with HEP Reports daily compliance  3/27 ongoing       Assessment/Plan     ASSESSMENT:   He has four sessions remaining and we intend to DC at that point. He is compliant with his POC and the compound cream has really helped his symptoms. We are working to improve his endurance and working on improving his mobility with dynamic balance and coordination training.     PLAN:   Continue to work on functional strengthening activities, improving his overall flexibility, and spend some time challenging his dynamic balance and coordination.      SIGNATURE: Ruiz Frias PTA, [place] License #: [id number]  Electronically Signed on 3/27/2024        [place]. [address]  [phone]    "

## 2024-04-03 ENCOUNTER — TREATMENT (OUTPATIENT)
Dept: PHYSICAL THERAPY | Facility: CLINIC | Age: 79
End: 2024-04-03
Payer: MEDICARE

## 2024-04-03 DIAGNOSIS — R26.9 ABNORMALITY OF GAIT AND MOBILITY: ICD-10-CM

## 2024-04-03 DIAGNOSIS — M47.14 THORACIC MYELOPATHY: Primary | ICD-10-CM

## 2024-04-03 PROCEDURE — 97140 MANUAL THERAPY 1/> REGIONS: CPT | Performed by: PHYSICAL THERAPIST

## 2024-04-03 PROCEDURE — 97112 NEUROMUSCULAR REEDUCATION: CPT | Performed by: PHYSICAL THERAPIST

## 2024-04-03 NOTE — PROGRESS NOTES
Physical Therapy Treatment Note  [address] [place] [address]    Patient: Sonu Bravo Jr.                                                 Visit Date: 4/3/2024  :     1945    Referring practitioner:    Gucci Brandon MD  Date of Initial Visit:          Type: THERAPY  Noted: 2024    Patient seen for 20 sessions    Visit Diagnoses:    ICD-10-CM ICD-9-CM   1. Thoracic myelopathy  M47.14 721.41   2. Abnormality of gait and mobility  R26.9 781.2     SUBJECTIVE     Subjective: He reports he is a little stiff today. He reports he is having a little pain but not much. He reports he spent a few hours in a car and his hamstrings are tight.       PAIN: 1-2/10         OBJECTIVE     Objective     Manual Therapy     57802  Comments   STM B HF complexes with Powerboost L1 bulb attachment  supine   B unilateral HF stretches in SL      DFR/TMR B hamstring complexes  prone    B unilateral hamstring stretches           Timed Minutes 35     Neuromuscular Reeducation     65505 Comments   B sidestepping strategy with hurdles  performing toss and catch then progressed to same while naming animals alphabetically                    Timed Minutes 10            Therapy Education/Self Care 57550   Education offered today    Medbride Code    Ongoing HEP   Access Code: [email or web address]  URL: [email or web address]  Date: 2024  Prepared by: Spike Ramirez     Exercises  - Standing Hip Flexor Stretch  - 2 x daily - 7 x weekly - 2 sets - 10 reps  - Gastroc Stretch on Wall  - 2 x daily - 7 x weekly - 2 sets - 30 sec hold  - Bird Dog on Counter  - 2 x daily - 7 x weekly - 2 sets - 10 reps  - Standing Hip Abduction with Counter Support  - 10 x daily - 7 x weekly - 2 sets - 10 reps - 10 hold  - Mini Squat with Counter Support  - 2 x daily - 7 x weekly - 2 sets - 10 reps  - Mini Lunge  - 2 x daily - 7 x weekly - 2 sets - 10 reps  - Bird Dog  - 2 x daily - 7 x weekly  "- 2 sets - 10 reps  - Supine Piriformis Stretch with Foot on Ground  - 2 x daily - 7 x weekly - 2 sets - 30 hold   Timed Minutes        Total Timed Treatment:     45   mins  Total Time of Visit:             45   mins         ASSESSMENT/PLAN     GOALS        Goals                                          Progress Note due by 4/26/24                                                             Recert due by 6/25/24   LTG by: 8 weeks Comments Date Status   SLS edward legs x 5 sec  B LE 15 sec  3/27 Met   TUG score of <12\" 10.85 sec, 8.78 sec 2/2 MET   FGA score of 22 or better  23 2/28 Met   Descend flight of stairs without proper weight shift without catching heel Reciprocal ascend/descend with HR occasionally balance only, no catching heel 2/2 MET   Improve edward DF to 10 deg passively  R 12 deg L 9 deg  2/28 Partially met;  ongoing   Improve edward hip ext to 10 deg passively R 12 deg L 10 deg 2/2 MET   Walk with normal gait pattern with AAD Using SC decreased chriss 3/27 ongoing   Ind with HEP Reports daily compliance  3/27 ongoing       Assessment/Plan     ASSESSMENT:   We focused more on flexibility today as we have not worked on that at all over the last two sessions mostly due to his symptom reports.     PLAN:   Continue to work on functional strengthening activities, improving his overall flexibility, and spend some time challenging his dynamic balance and coordination.      SIGNATURE: Ruiz Frias [facility], [place] License #: [id number]  Electronically Signed on 4/3/2024        [facility] [place] [address]  [phone]   "

## 2024-04-10 ENCOUNTER — TREATMENT (OUTPATIENT)
Dept: PHYSICAL THERAPY | Facility: CLINIC | Age: 79
End: 2024-04-10
Payer: MEDICARE

## 2024-04-10 DIAGNOSIS — M47.14 THORACIC MYELOPATHY: Primary | ICD-10-CM

## 2024-04-10 DIAGNOSIS — R26.9 ABNORMALITY OF GAIT AND MOBILITY: ICD-10-CM

## 2024-04-10 NOTE — PROGRESS NOTES
Physical Therapy Treatment Note  [address] [place] [address]    Patient: Sonu Bravo Jr.                                                 Visit Date: 4/10/2024  :     1945    Referring practitioner:    Gucci Brandon MD  Date of Initial Visit:          Type: THERAPY  Noted: 2024    Patient seen for 21 sessions    Visit Diagnoses:    ICD-10-CM ICD-9-CM   1. Thoracic myelopathy  M47.14 721.41   2. Abnormality of gait and mobility  R26.9 781.2     SUBJECTIVE     Subjective:He reports feeling better still using the compound cream. He has a follow up with his PCP in about 2 weeks doesn't have a follow up with Dr. Brandon.       PAIN: 1/10         OBJECTIVE     Objective       Manual Therapy     32421  Comments   B pec and thoracic stretches with 1/2 foam roller     STM B HF complexes with Powerboost L1 bulb attachment     B unilateral HF stretches in SL      B hip inferior lateral glides Grade 2 sustained followed with ER stretches        Timed Minutes 60         Therapy Education/Self Care 05852   Education offered today    Medbride Code    Ongoing HEP   Access Code: [email or web address]  URL: [email or web address]  Date: 2024  Prepared by: Spike Ramirez     Exercises  - Standing Hip Flexor Stretch  - 2 x daily - 7 x weekly - 2 sets - 10 reps  - Gastroc Stretch on Wall  - 2 x daily - 7 x weekly - 2 sets - 30 sec hold  - Bird Dog on Counter  - 2 x daily - 7 x weekly - 2 sets - 10 reps  - Standing Hip Abduction with Counter Support  - 10 x daily - 7 x weekly - 2 sets - 10 reps - 10 hold  - Mini Squat with Counter Support  - 2 x daily - 7 x weekly - 2 sets - 10 reps  - Mini Lunge  - 2 x daily - 7 x weekly - 2 sets - 10 reps  - Bird Dog  - 2 x daily - 7 x weekly - 2 sets - 10 reps  - Supine Piriformis Stretch with Foot on Ground  - 2 x daily - 7 x weekly - 2 sets - 30 hold   Timed Minutes        Total Timed Treatment:     60   mins  Total  "Time of Visit:             60   mins         ASSESSMENT/PLAN     GOALS  Goals                                          Progress Note due by 4/26/24                                                             Recert due by 6/25/24   LTG by: 8 weeks Comments Date Status   SLS edward legs x 5 sec  B LE 15 sec  3/27 Met   TUG score of <12\" 10.85 sec, 8.78 sec 2/2 MET   FGA score of 22 or better  23 2/28 Met   Descend flight of stairs without proper weight shift without catching heel Reciprocal ascend/descend with HR occasionally balance only, no catching heel 2/2 MET   Improve edward DF to 10 deg passively  R 12 deg L 9 deg  2/28 Partially met;  ongoing   Improve edward hip ext to 10 deg passively R 12 deg L 10 deg 2/2 MET   Walk with normal gait pattern with AAD Using SC decreased chriss 3/27 ongoing   Ind with HEP Discussed a plan going forward  4/10 ongoing       Assessment/Plan     ASSESSMENT:   We focused strictly on improving flexibility today along with addressing questions and concerns going forward.     PLAN:   Review an updated HEP especially geared towards a supplement for Silver Sneakers.    SIGNATURE: Ruiz Frias [facility], [place] License #: [id number]  Electronically Signed on 4/10/2024        [address] [place] [address]. [address]  [phone]    "

## 2024-04-17 ENCOUNTER — TREATMENT (OUTPATIENT)
Dept: PHYSICAL THERAPY | Facility: CLINIC | Age: 79
End: 2024-04-17
Payer: MEDICARE

## 2024-04-17 DIAGNOSIS — M47.14 THORACIC MYELOPATHY: Primary | ICD-10-CM

## 2024-04-17 DIAGNOSIS — R26.9 ABNORMALITY OF GAIT AND MOBILITY: ICD-10-CM

## 2024-04-17 PROCEDURE — 97110 THERAPEUTIC EXERCISES: CPT | Performed by: PHYSICAL THERAPIST

## 2024-04-17 NOTE — PROGRESS NOTES
Physical Therapy Treatment Note  [address] [place] [address]    Patient: Sonu Bravo Jr.                                                 Visit Date: 2024  :     1945    Referring practitioner:    Gucci Brandon MD  Date of Initial Visit:          Type: THERAPY  Noted: 2024    Patient seen for 22 sessions    Visit Diagnoses:    ICD-10-CM ICD-9-CM   1. Thoracic myelopathy  M47.14 721.41   2. Abnormality of gait and mobility  R26.9 781.2     SUBJECTIVE     Subjective:He denies new complaints he's having some weakness in his knees       PAIN: 1/10         OBJECTIVE     Objective     Therapeutic Exercises    11958 Units Comments   B pec and thoracic stretches with 1/2 foam roller      B hip inferior lateral glides Grade 2 sustained followed with ER stretches      Reviewed hamstring stretches for home     Sustained squat on Vibraflex   18 and 20 mHz      B unilateral hamstring stretches     Timed Minutes 50        Therapy Education/Self Care [id number]   Education offered today    [employer] Code    Ongoing HEP   Access Code: [email or web address]  URL: [email or web address]  Date: 2024  Prepared by: Spike Ramirez     Exercises  - Standing Hip Flexor Stretch  - 2 x daily - 7 x weekly - 2 sets - 10 reps  - Gastroc Stretch on Wall  - 2 x daily - 7 x weekly - 2 sets - 30 sec hold  - Bird Dog on Counter  - 2 x daily - 7 x weekly - 2 sets - 10 reps  - Standing Hip Abduction with Counter Support  - 10 x daily - 7 x weekly - 2 sets - 10 reps - 10 hold  - Mini Squat with Counter Support  - 2 x daily - 7 x weekly - 2 sets - 10 reps  - Mini Lunge  - 2 x daily - 7 x weekly - 2 sets - 10 reps  - Bird Dog  - 2 x daily - 7 x weekly - 2 sets - 10 reps  - Supine Piriformis Stretch with Foot on Ground  - 2 x daily - 7 x weekly - 2 sets - 30 hold   Timed Minutes        Total Timed Treatment:     50   mins  Total Time of Visit:             50   mins        "  ASSESSMENT/PLAN     GOALS  Goals                                          Progress Note due by 4/26/24                                                             Recert due by 6/25/24   LTG by: 8 weeks Comments Date Status   SLS edward legs x 5 sec  B LE 15 sec  3/27 Met   TUG score of <12\" 10.85 sec, 8.78 sec 2/2 MET   FGA score of 22 or better  23 2/28 Met   Descend flight of stairs without proper weight shift without catching heel Reciprocal ascend/descend with HR occasionally balance only, no catching heel 2/2 MET   Improve edward DF to 10 deg passively  R 12 deg L 9 deg  2/28 Partially met;  ongoing   Improve edward hip ext to 10 deg passively R 12 deg L 10 deg 2/2 MET   Walk with normal gait pattern with AAD Using SC decreased chriss 3/27 ongoing   Ind with HEP Discussed a plan going forward  4/10 ongoing       Assessment/Plan     ASSESSMENT:   We reviewed at length hamstring stretches he should consider performing as well as some that he should not attempt. We also spoke at length in regard to the value of increased activity and he has sought out an individual that is a  for a consultation.     PLAN:   Review all goals and likely D/C.     SIGNATURE: Ruiz Frias [facility] [place] License #: [id number]  Electronically Signed on 4/17/2024        [address] [place] [address] [place] [address]  [phone]    "

## 2024-04-22 ENCOUNTER — OFFICE VISIT (OUTPATIENT)
Dept: INTERNAL MEDICINE | Facility: CLINIC | Age: 79
End: 2024-04-22
Payer: MEDICARE

## 2024-04-22 VITALS
HEIGHT: 71 IN | TEMPERATURE: 98 F | OXYGEN SATURATION: 97 % | SYSTOLIC BLOOD PRESSURE: 138 MMHG | BODY MASS INDEX: 24.22 KG/M2 | RESPIRATION RATE: 16 BRPM | HEART RATE: 65 BPM | DIASTOLIC BLOOD PRESSURE: 76 MMHG | WEIGHT: 173 LBS

## 2024-04-22 DIAGNOSIS — H61.21 IMPACTED CERUMEN OF RIGHT EAR: ICD-10-CM

## 2024-04-22 DIAGNOSIS — G57.93 NEUROPATHY INVOLVING BOTH LOWER EXTREMITIES: Primary | ICD-10-CM

## 2024-04-22 DIAGNOSIS — J30.2 SEASONAL ALLERGIES: ICD-10-CM

## 2024-04-22 PROCEDURE — 1160F RVW MEDS BY RX/DR IN RCRD: CPT | Performed by: NURSE PRACTITIONER

## 2024-04-22 PROCEDURE — 3075F SYST BP GE 130 - 139MM HG: CPT | Performed by: NURSE PRACTITIONER

## 2024-04-22 PROCEDURE — 69209 REMOVE IMPACTED EAR WAX UNI: CPT | Performed by: NURSE PRACTITIONER

## 2024-04-22 PROCEDURE — 1159F MED LIST DOCD IN RCRD: CPT | Performed by: NURSE PRACTITIONER

## 2024-04-22 PROCEDURE — 99214 OFFICE O/P EST MOD 30 MIN: CPT | Performed by: NURSE PRACTITIONER

## 2024-04-22 PROCEDURE — 3078F DIAST BP <80 MM HG: CPT | Performed by: NURSE PRACTITIONER

## 2024-04-22 RX ORDER — AZELASTINE 1 MG/ML
2 SPRAY, METERED NASAL 2 TIMES DAILY
Qty: 30 ML | Refills: 12 | Status: SHIPPED | OUTPATIENT
Start: 2024-04-22 | End: 2024-04-22 | Stop reason: SDUPTHER

## 2024-04-22 RX ORDER — AZELASTINE 1 MG/ML
2 SPRAY, METERED NASAL 2 TIMES DAILY
Qty: 30 ML | Refills: 12 | Status: SHIPPED | OUTPATIENT
Start: 2024-04-22

## 2024-04-22 NOTE — ASSESSMENT & PLAN NOTE
Refilled compounded cream.   Discussed spasms of lower extremities are most likely related to chronic back issues/neuropathy. I recommended he reach out to neurosurgery to discuss further as this could be a symptoms that warrants further investigation on their end. He verbalizes understanding.

## 2024-04-24 ENCOUNTER — TREATMENT (OUTPATIENT)
Dept: PHYSICAL THERAPY | Facility: CLINIC | Age: 79
End: 2024-04-24
Payer: MEDICARE

## 2024-04-24 DIAGNOSIS — M47.14 THORACIC MYELOPATHY: Primary | ICD-10-CM

## 2024-04-24 DIAGNOSIS — R26.9 ABNORMALITY OF GAIT AND MOBILITY: ICD-10-CM

## 2024-04-24 PROCEDURE — 97110 THERAPEUTIC EXERCISES: CPT | Performed by: PHYSICAL THERAPIST

## 2024-04-24 NOTE — PROGRESS NOTES
I have reviewed the notes, assessments, and/or procedures performed by Ruiz Frias PTA, I concur with her/his documentation of Sonu Bravo Jr..

## 2024-04-24 NOTE — PROGRESS NOTES
Physical Therapy Treatment Note and Discharge Note  [address] [place] [address]    Patient: Sonu Bravo Jr.                                                 Visit Date: 2024  :     1945    Referring practitioner:    Gucci Brandon MD  Date of Initial Visit:          Type: THERAPY  Noted: 2024    Patient seen for 23 sessions    Visit Diagnoses:    ICD-10-CM ICD-9-CM   1. Thoracic myelopathy  M47.14 721.41   2. Abnormality of gait and mobility  R26.9 781.2     SUBJECTIVE     Subjective:He reports he went to the Dr and he states it was uneventful.      PAIN: 0/10         OBJECTIVE     Objective     Therapeutic Exercises    49915 Units Comments   Reviewed all remaining goals for a progress note     B pec and thoracic stretches with 1/2 foam roller      B unilateral DF stretches with pink bolster     Sustained squat on Vibraflex   20 and 22 mHz      Resisted gait with TRX Rip Trainer x 50 ft x 6     B unilateral hamstring stretches      M seated w/o LE support with TRX Rip       Timed Minutes 45        Therapy Education/Self Care [id number]   Education offered today    Lesly Code    Ongoing HEP   Access Code: [email or web address]  URL: [email or web address]  Date: 2024  Prepared by: Spike Ramirez     Exercises  - Standing Hip Flexor Stretch  - 2 x daily - 7 x weekly - 2 sets - 10 reps  - Gastroc Stretch on Wall  - 2 x daily - 7 x weekly - 2 sets - 30 sec hold  - Bird Dog on Counter  - 2 x daily - 7 x weekly - 2 sets - 10 reps  - Standing Hip Abduction with Counter Support  - 10 x daily - 7 x weekly - 2 sets - 10 reps - 10 hold  - Mini Squat with Counter Support  - 2 x daily - 7 x weekly - 2 sets - 10 reps  - Mini Lunge  - 2 x daily - 7 x weekly - 2 sets - 10 reps  - Bird Dog  - 2 x daily - 7 x weekly - 2 sets - 10 reps  - Supine Piriformis Stretch with Foot on Ground  - 2 x daily - 7 x weekly - 2 sets - 30 hold   Timed  "Minutes        Total Timed Treatment:     45   mins  Total Time of Visit:             45   mins         ASSESSMENT/PLAN     GOALS  Goals                                          Progress Note due by 4/26/24                                                             Recert due by 6/25/24   LTG by: 8 weeks Comments Date Status   SLS edward legs x 5 sec  B LE 15 sec  3/27 Met   TUG score of <12\" 10.85 sec, 8.78 sec 2/2 Met   FGA score of 22 or better  23 2/28 Met   Descend flight of stairs without proper weight shift without catching heel Reciprocal ascend/descend with HR occasionally balance only, no catching heel 2/2 Met   Improve edward DF to 10 deg passively  R 12 deg L 9 deg  2/28 Partially met;  ongoing   Improve edward hip ext to 10 deg passively R 12 deg L 10 deg 2/2 Met   Walk with normal gait pattern with AAD Using SC WNL pattern 4/24 Met   Ind with HEP reinforced  4/24 Met       Assessment/Plan     ASSESSMENT:   At this point he no longer requires skilled services. We did discuss the benefits of the [facility] program as well as [facility]. He has met all his POC goals and has exhibited improvement with his balance and coordination as well.     PLAN:   D/C POC    SIGNATURE: Ruiz Frias, [facility], [place] License #: [id number]  Electronically Signed on 4/24/2024        [address] [place] [address]. [address]  [phone]    "

## 2024-05-01 NOTE — PROGRESS NOTES
"Chief Complaint  Ear Problem (Ears \"popping\"), Spasms (\"Involuntary spasms in my legs\"), and Med Refill    Subjective        Sonu Micki Bravo Jr. is a 78 y.o. male who presents today for evaluation of the above problems.      History of Present Illness  Started on gabapentin compounded cream for neuropathy about 3 months ago.  Needs refill.  Says it is working well for neuropathy.  Says he does run out before the 1 month vishnu and has to get refilled about every 3 weeks.  Wants to know if there is a way to get additional refills.  Is having some intermittent jerking of legs at night that comes and goes and does not seem to be triggered by anything.  He is still getting physical therapy.  He does not have an appointment in the near future with neurosurgery.  Denies any other new neurological symptoms.  He has had this symptom for quite some time it just seems to be worsening when it does occur.  Reports ear popping and fullness in ears.  Has had cerumen impaction in the past and feels like he has another 1.  Hearing is decreased.  He is taking Allegra and Flonase daily.    Review of Systems - History obtained from the patient  General ROS: negative for - chills, fatigue, or fever  ENT ROS: positive for - hearing change, nasal congestion, and ear fullness bilaterally  negative for - sinus pain, sore throat, or vertigo  Allergy and Immunology ROS: positive for - nasal congestion and seasonal allergies  Respiratory ROS: no cough, shortness of breath, or wheezing  Cardiovascular ROS: no chest pain or dyspnea on exertion  Neurological ROS: no TIA or stroke symptoms    Objective   Vital Signs:  /76 (BP Location: Left arm, Patient Position: Sitting, Cuff Size: Adult)   Pulse 65   Temp 98 °F (36.7 °C) (Temporal)   Resp 16   Ht 180.3 cm (71\")   Wt 78.5 kg (173 lb)   SpO2 97%   BMI 24.13 kg/m²   Estimated body mass index is 24.13 kg/m² as calculated from the following:    Height as of this encounter: 180.3 cm " "(71\").    Weight as of this encounter: 78.5 kg (173 lb).           Physical Exam  Vitals and nursing note reviewed.   Constitutional:       Appearance: Normal appearance. He is normal weight.   HENT:      Right Ear: There is no impacted cerumen.      Left Ear: Ear canal and external ear normal. Tympanic membrane is bulging. Tympanic membrane is not injected or erythematous.      Nose: Nose normal.      Mouth/Throat:      Lips: Pink.      Mouth: Mucous membranes are moist.      Pharynx: Oropharynx is clear. Uvula midline.   Cardiovascular:      Rate and Rhythm: Normal rate and regular rhythm.      Pulses: Normal pulses.      Heart sounds: Normal heart sounds. No murmur heard.     No friction rub. No gallop.   Pulmonary:      Effort: Pulmonary effort is normal. No respiratory distress.      Breath sounds: Normal breath sounds. No wheezing.   Musculoskeletal:      Cervical back: Normal range of motion and neck supple.      Comments: Ambulates with cane     Skin:     General: Skin is warm and dry.      Capillary Refill: Capillary refill takes less than 2 seconds.   Neurological:      General: No focal deficit present.      Mental Status: He is alert and oriented to person, place, and time.   Psychiatric:         Mood and Affect: Mood normal.          Result Review :  The following data was reviewed by: MARY Chase on 04/22/2024:  CMP          5/31/2023    10:33 8/22/2023    14:20 9/11/2023    15:51   CMP   Glucose 90   96    Glucose  91        BUN 20  18     13    Creatinine 1.00  1.14     0.95    EGFR   81.4    Sodium 140  141     144    Potassium 4.1  4.1     4.0    Chloride 103  104     105    Calcium 9.4  8.8     9.4    Total Protein   7.4    Albumin   4.8    Globulin   2.6    Total Bilirubin   0.3    Alkaline Phosphatase   71    AST (SGOT)   21    ALT (SGPT)   21    Albumin/Globulin Ratio   1.8    BUN/Creatinine Ratio 20.0   13.7    Anion Gap  8     12.0       Details          This result is from an " external source.             CBC          12/8/2023    03:43 12/11/2023    05:07 12/14/2023    03:37   CBC   WBC 7.8     7.9     6.6       RBC 3.51     3.54     3.49       Hemoglobin 9.5     9.6     9.4       Hematocrit 30.5     30.4     30.4       MCV 86.9     85.9     87.1       MCH 27.1     27.1     26.9       MCHC 31.1     31.6     30.9       RDW 16.3     16.0     16.2       Platelets 167     230     294          Details          This result is from an external source.             Lipid Panel          9/11/2023    15:51   Lipid Panel   Total Cholesterol 128    Triglycerides 142    HDL Cholesterol 45    VLDL Cholesterol 25    LDL Cholesterol  58    LDL/HDL Ratio 1.21      TSH          9/11/2023    15:51   TSH   TSH 3.340      Most Recent A1C          9/11/2023    15:51   HGBA1C Most Recent   Hemoglobin A1C 6.10            Ear Cerumen Removal    Date/Time: 4/22/2024 11:06 AM    Performed by: Veda Black APRN  Authorized by: Veda Black APRN    Anesthesia:  Local Anesthetic: none  Location details: right ear  Patient tolerance: patient tolerated the procedure well with no immediate complications  Comments: Moderate amt cerumen removed from right ear. IRENE Patel LPN, performed procedure. I re-examined TM which shows clear canal, TM non erythematous and bulging with clear fluid, non injected.   Procedure type: irrigation   Sedation:  Patient sedated: no              Assessment and Plan   Diagnoses and all orders for this visit:    1. Neuropathy involving both lower extremities (Primary)  Assessment & Plan:  Refilled compounded cream.   Discussed spasms of lower extremities are most likely related to chronic back issues/neuropathy. I recommended he reach out to neurosurgery to discuss further as this could be a symptoms that warrants further investigation on their end. He verbalizes understanding.       2. Impacted cerumen of right ear  Comments:  Tolerated well. No side effects of the  procedure.  Orders:  -     Ear Cerumen Removal    3. Seasonal allergies  Comments:  Start astelin in addition to flonase and allergra.  Orders:  -     Discontinue: azelastine (ASTELIN) 0.1 % nasal spray; 2 sprays into the nostril(s) as directed by provider 2 (Two) Times a Day. Use in each nostril as directed  Dispense: 30 mL; Refill: 12  -     azelastine (ASTELIN) 0.1 % nasal spray; 2 sprays into the nostril(s) as directed by provider 2 (Two) Times a Day. Use in each nostril as directed  Dispense: 30 mL; Refill: 12           I spent 30 minutes caring for Sonu on this date of service. This time includes time spent by me in the following activities:preparing for the visit, reviewing tests, obtaining and/or reviewing a separately obtained history, performing a medically appropriate examination and/or evaluation , counseling and educating the patient/family/caregiver, ordering medications, tests, or procedures, and documenting information in the medical record  Follow Up   Return in about 3 months (around 7/22/2024) for Recheck back with Dr. Kramer.  Patient was given instructions and counseling regarding his condition or for health maintenance advice. Please see specific information pulled into the AVS if appropriate.        no

## 2024-05-09 ENCOUNTER — TELEPHONE (OUTPATIENT)
Dept: PODIATRY | Facility: CLINIC | Age: 79
End: 2024-05-09
Payer: MEDICARE

## 2024-05-09 NOTE — PROGRESS NOTES
[facility] - PODIATRY    Today's Date: 05/16/2024     Patient Name: Sonu Bravo Jr.  MRN: [id number]  [facility]: [id number]  PCP: Vaughn Kramer DO  Referring Provider: No ref. provider found    SUBJECTIVE     Chief Complaint   Patient presents with    Follow-up     Vaughn Kramer 04/22/2024 RT 3RD TOENAIL INGROWING-pt states he is here today for toe nail that are curling around his toe/pt presents today with walking cane/neuropathy-pt denies pain     HPI: Sonu Bravo Jr., a 78 y.o.male, comes to clinic as a(n) new patient complaining of toenail/callus issues. Patient has h/o arthritis, AFib, GERD, HLD, HTN . Patient is non-diabetic.  Patient has been having pain to his left third toe due to elongated nails and callus.  Elates that over the past couple of years he has noticed a slow worsening of numbness and tingling in his feet and lower legs. He does have known low back issues pain and has had multiple back surgeries in the last few years.  Patient reports he is using compounded cream for his feet, with improvement noted.  Patient does have issues with balance and uses a cane for ambulation assistance.  He reports he has recently had multiple rounds of physical therapy.  He is unable to care for his nails due to back issues and inability to reach nails.  He reports he has visited a nail salon multiple times, but continues to have issues with the bilateral third toenails when walking.  Denies pain. Relates previous treatment(s) including PT . Denies any constitutional symptoms. No other pedal complaints at this time.    Past Medical History:   Diagnosis Date    Arthritis     Atrial fibrillation 08/31/2021    Cataracts, bilateral     Disease of thyroid gland     Diverticulitis     GERD (gastroesophageal reflux disease)     Hyperlipidemia     Hypertension     Mononucleosis 1964     Past Surgical History:   Procedure Laterality Date    ANTERIOR CERVICAL DISCECTOMY W/ FUSION N/A  10/11/2022    Procedure: CERVICAL DISCECTOMY ANTERIOR WITH FUSION, Cervical 4/5 and 5/6;  Surgeon: Durga Cifuentes MD;  Location:  [facility] OR;  Service: Neurosurgery;  Laterality: N/A;    CATARACT EXTRACTION Right     COLON SURGERY  01/2022    Dr Fairbanks at [facility]    COLONOSCOPY N/A 09/28/2021    Procedure: COLONOSCOPY WITH ANESTHESIA;  Surgeon: Thomas Dc DO;  Location:  [facility] ENDOSCOPY;  Service: Gastroenterology;  Laterality: N/A;  pre op; abnormal ct scan  post op: diverticulosis ; polyps   PCP: Vaughn Kramer DO    COLONOSCOPY N/A 1/23/2024    Procedure: COLONOSCOPY WITH ANESTHESIA;  Surgeon: Thomas Dc DO;  Location: [facility] ENDOSCOPY;  Service: Gastroenterology;  Laterality: N/A;  Pre: Altered bowel function;  Post: Polyps;  Vaughn Kramer DO    HERNIA REPAIR      LUMBAR DIRECT LATERAL INTERBODY FUSION N/A 01/24/2023    Procedure: LUMBAR DIRECT LATERAL INTERBODY FUSION; LUMBAR 2-5;  Surgeon: Durga Cifuentes MD;  Location:  [facility] OR;  Service: Neurosurgery;  Laterality: N/A;    LUMBAR FUSION N/A 01/24/2023    Procedure: LUMBAR DIRECT LATERAL INTERBODY FUSION; LUMBAR 2-5, LUMBAR LAMINECTOMY TRANSFORAMINAL LUMBAR INTERBODY FUSION; LUMBAR 2-5;  Surgeon: Durga Cifuentes MD;  Location:  [facility] OR;  Service: Robotics - Neuro;  Laterality: N/A;    SKIN SURGERY      Skin cancer surgery.     Family History   Problem Relation Age of Onset    Lung cancer Mother     Brain cancer Mother     Heart disease Father     Hyperlipidemia Father     Hypertension Father     Prostate cancer Maternal Aunt     Colon cancer Maternal Uncle     Colon polyps Neg Hx     Esophageal cancer Neg Hx     Liver cancer Neg Hx      Social History     Socioeconomic History    Marital status:    Tobacco Use    Smoking status: Every Day     Current packs/day: 0.50     Average packs/day: 0.5 packs/day for 57.4 years (28.7 ttl pk-yrs)     Types: Cigarettes     Start date: 1967      Passive exposure: Current    Smokeless tobacco: Never    Tobacco comments:     down to 1/2 pack a day 12/20/23   Vaping Use    Vaping status: Never Used   Substance and Sexual Activity    Alcohol use: Yes     Comment: rare    Drug use: Never    Sexual activity: Defer     Allergies   Allergen Reactions    Penicillins Hives    Sulfa Antibiotics Hives, Itching and Rash    Sulfamethoxazole-Trimethoprim Hives, Itching and Rash    Diphenhydramine Mental Status Change     hiper     Current Outpatient Medications   Medication Sig Dispense Refill    apixaban (Eliquis) 5 MG tablet tablet Take 1 tablet by mouth 2 (Two) Times a Day. 180 tablet 1    bisoprolol (ZEBeta) 5 MG tablet Take 1 tablet by mouth Daily. 90 tablet 1    Cholecalciferol 100 MCG (4000 UT) capsule Take 4,000 Units by mouth Daily. Indications: Vitamin D Deficiency      coenzyme Q10 100 MG capsule Take 1 capsule by mouth Daily. Indications: Heart Rhythm Disorder      Cyanocobalamin 2500 MCG sublingual tablet Take 2,500 mcg by mouth Daily. Indications: Inadequate Vitamin B12      fexofenadine (ALLEGRA) 180 MG tablet Take 1 tablet by mouth Daily. Indications: Hayfever      fluticasone (FLONASE) 50 MCG/ACT nasal spray 1 spray into the nostril(s) as directed by provider Daily. Indications: Stuffy Nose 32 g 3    ibuprofen (ADVIL,MOTRIN) 200 MG tablet Take 1 tablet by mouth Every 6 (Six) Hours As Needed for Mild Pain.      levothyroxine (SYNTHROID, LEVOTHROID) 112 MCG tablet Take 1 tablet by mouth Every Morning. 90 tablet 1    losartan (Cozaar) 100 MG tablet Take 1 tablet by mouth Daily. 90 tablet 1    omeprazole (priLOSEC) 20 MG capsule Take 1 capsule by mouth Daily. 90 capsule 3    polyethylene glycol (MiraLax) 17 GM/SCOOP powder Take 17 g by mouth As Needed.      rosuvastatin (CRESTOR) 20 MG tablet Take 1 tablet by mouth Daily. 90 tablet 3    Sennosides (Senna) 8.6 MG capsule Take 17.2 mg by mouth Every Night. (Patient taking differently: Take 17.2 mg by mouth As  Needed.) 60 each 3    terazosin (HYTRIN) 2 MG capsule Take 1 capsule by mouth Every Night.      Zinc 50 MG capsule Take 1 capsule by mouth Daily. Indications: Zinc Deficiency      azelastine (ASTELIN) 0.1 % nasal spray 2 sprays into the nostril(s) as directed by provider 2 (Two) Times a Day. Use in each nostril as directed (Patient not taking: Reported on 5/16/2024) 30 mL 12     No current facility-administered medications for this visit.     Review of Systems   Constitutional:  Negative for chills and fever.   HENT:  Negative for congestion.    Respiratory:  Negative for shortness of breath.    Cardiovascular:  Negative for chest pain and leg swelling.   Gastrointestinal:  Negative for constipation, diarrhea, nausea and vomiting.   Musculoskeletal:  Positive for arthralgias and gait problem.        Foot pain   Skin:  Negative for wound.   Neurological:  Positive for numbness.       OBJECTIVE     Vitals:    05/16/24 1139   BP: 124/86   Pulse: 65   SpO2: 99%         PHYSICAL EXAM  GEN:   Accompanied by none.     Foot/Ankle Exam    GENERAL  Orientation:  AAOx3  Affect:  appropriate  Gait:  shuffling  Assistance:  cane use  Right shoe gear: casual shoe  Left shoe gear: casual shoe    VASCULAR     Right Foot Vascularity   Dorsalis pedis:  2+  Posterior tibial:  2+  Skin temperature:  warm  Edema grading:  Trace  CFT:  3  Pedal hair growth:  Present  Varicosities:  mild varicosities (venous congestion to toes)     Left Foot Vascularity   Dorsalis pedis:  2+  Posterior tibial:  2+  Skin temperature:  warm  Edema grading:  Trace  CFT:  3  Pedal hair growth:  Present  Varicosities:  mild varicosities (venous congestion to toes)     NEUROLOGIC     Right Foot Neurologic   Light touch sensation: diminished  Vibratory sensation: normal  Hot/Cold sensation: normal     Left Foot Neurologic   Light touch sensation: diminished  Vibratory sensation: normal  Hot/Cold sensation:  normal    MUSCULOSKELETAL     Right Foot  Musculoskeletal   Tenderness:  toe 3 tenderness    Arch:  Normal  Hammertoe:  Second toe, third toe, fourth toe and fifth toe     Left Foot Musculoskeletal   Tenderness:  toe 3 tenderness  Arch:  Normal  Hammertoe:  Second toe, third toe, fourth toe and fifth toe    MUSCLE STRENGTH     Right Foot Muscle Strength   Foot dorsiflexion:  4+  Foot plantar flexion:  5  Foot inversion:  5  Foot eversion:  5     Left Foot Muscle Strength   Foot dorsiflexion:  4+  Foot plantar flexion:  5  Foot inversion:  5  Foot eversion:  5    RANGE OF MOTION     Right Foot Range of Motion   Foot and ankle ROM within normal limits       Left Foot Range of Motion   Foot and ankle ROM within normal limits      DERMATOLOGIC      Right Foot Dermatologic   Skin  Right foot skin is intact. (discoloration to toes)  Nails  1.  Positive for elongated and abnormal thickness.  2.  Positive for elongated and abnormal thickness.  3.  Positive for elongated and abnormal thickness.  4.  Positive for elongated and abnormal thickness.  5.  Positive for elongated and abnormal thickness.     Left Foot Dermatologic   Skin  Left foot skin is intact. (discoloration to toes)   Nails  1.  Positive for elongated and abnormal thickness.  2.  Positive for elongated and abnormal thickness.  3.  Positive for elongated and abnormal thickness.  4.  Positive for elongated and abnormally thick.  5.  Positive for elongated and abnormally thick.    Image:       RADIOLOGY/NUCLEAR:  No results found.    LABORATORY/CULTURE RESULTS:      PATHOLOGY RESULTS:       ASSESSMENT/PLAN     Diagnoses and all orders for this visit:    1. Thickened nails (Primary)    2. Gait abnormality    3. Neuropathy involving both lower extremities    4. Foot callus      Comprehensive lower extremity examination and evaluation was performed.  Discussed findings and treatment plan including risks, benefits, and treatment options with patient in detail. Patient agreed with treatment plan.  After verbal  consent obtained, nail(s) x10 debrided of length and thickness with nail nipper without incidence  After verbal consent obtained, calluses x1 pared utilizing dermal curette and/or scalpel without incidence  Patient may maintain nails and calluses at home utilizing emery board or pumice stone between visits as needed   Continue use of cane for ambulation assistance.  Continue use of compounded pain cream for feet.  An After Visit Summary was printed and given to the patient at discharge, including (if requested) any available informative/educational handouts regarding diagnosis, treatment, or medications. All questions were answered to patient/family satisfaction. Should symptoms fail to improve or worsen they agree to call or return to clinic or to go to the Emergency Department. Discussed the importance of following up with any needed screening tests/labs/specialist appointments and any requested follow-up recommended by me today. Importance of maintaining follow-up discussed and patient accepts that missed appointments can delay diagnosis and potentially lead to worsening of conditions.  Return in about 3 months (around 8/16/2024) for [facility], Follow-up with Podiatry APRN., or sooner if acute issues arise.    Lab Frequency Next Occurrence   Diet: Once 09/28/2021       This document has been electronically signed by MARY Gatica on May 16, 2024 13:02 CDT

## 2024-05-15 ENCOUNTER — TELEPHONE (OUTPATIENT)
Dept: PODIATRY | Facility: CLINIC | Age: 79
End: 2024-05-15
Payer: MEDICARE

## 2024-05-15 NOTE — TELEPHONE ENCOUNTER
Hub to relay  Spoke with patient regarding appt on 05/16/2024. Patient confirmed date and time off appt. 05/16/2024

## 2024-05-16 ENCOUNTER — OFFICE VISIT (OUTPATIENT)
Dept: PODIATRY | Facility: CLINIC | Age: 79
End: 2024-05-16
Payer: MEDICARE

## 2024-05-16 VITALS
HEART RATE: 65 BPM | OXYGEN SATURATION: 99 % | SYSTOLIC BLOOD PRESSURE: 124 MMHG | BODY MASS INDEX: 24.5 KG/M2 | HEIGHT: 71 IN | WEIGHT: 175 LBS | DIASTOLIC BLOOD PRESSURE: 86 MMHG

## 2024-05-16 DIAGNOSIS — L84 FOOT CALLUS: ICD-10-CM

## 2024-05-16 DIAGNOSIS — L60.2 THICKENED NAILS: Primary | ICD-10-CM

## 2024-05-16 DIAGNOSIS — G57.93 NEUROPATHY INVOLVING BOTH LOWER EXTREMITIES: ICD-10-CM

## 2024-05-16 DIAGNOSIS — R26.9 GAIT ABNORMALITY: ICD-10-CM

## 2024-05-16 PROBLEM — H35.369 RETINAL DRUSEN: Status: ACTIVE | Noted: 2024-04-01

## 2024-05-16 PROBLEM — D31.30 BENIGN NEOPLASM OF CHOROID: Status: ACTIVE | Noted: 2024-04-01

## 2024-05-16 PROBLEM — H25.12 AGE-RELATED NUCLEAR CATARACT, LEFT EYE: Status: ACTIVE | Noted: 2024-04-01

## 2024-05-16 PROBLEM — H43.819 VITREOUS DEGENERATION: Status: ACTIVE | Noted: 2024-04-01

## 2024-05-16 PROBLEM — Z96.1 PSEUDOPHAKIA: Status: ACTIVE | Noted: 2024-04-01

## 2024-05-22 ENCOUNTER — OFFICE VISIT (OUTPATIENT)
Dept: INTERNAL MEDICINE | Facility: CLINIC | Age: 79
End: 2024-05-22
Payer: MEDICARE

## 2024-05-22 ENCOUNTER — HOSPITAL ENCOUNTER (OUTPATIENT)
Dept: GENERAL RADIOLOGY | Facility: HOSPITAL | Age: 79
Discharge: HOME | End: 2024-05-22
Admitting: INTERNAL MEDICINE
Payer: MEDICARE

## 2024-05-22 VITALS
BODY MASS INDEX: 24.29 KG/M2 | RESPIRATION RATE: 16 BRPM | OXYGEN SATURATION: 98 % | WEIGHT: 173.5 LBS | HEIGHT: 71 IN | SYSTOLIC BLOOD PRESSURE: 139 MMHG | DIASTOLIC BLOOD PRESSURE: 80 MMHG | HEART RATE: 63 BPM

## 2024-05-22 DIAGNOSIS — G25.81 RESTLESS LEG SYNDROME: ICD-10-CM

## 2024-05-22 DIAGNOSIS — Z86.718 HISTORY OF DVT (DEEP VEIN THROMBOSIS): ICD-10-CM

## 2024-05-22 DIAGNOSIS — I10 PRIMARY HYPERTENSION: ICD-10-CM

## 2024-05-22 DIAGNOSIS — M54.12 CERVICAL RADICULOPATHY AT C7: ICD-10-CM

## 2024-05-22 DIAGNOSIS — I48.0 PAROXYSMAL ATRIAL FIBRILLATION: ICD-10-CM

## 2024-05-22 DIAGNOSIS — M54.12 CERVICAL RADICULOPATHY: ICD-10-CM

## 2024-05-22 DIAGNOSIS — M54.12 CERVICAL RADICULOPATHY AT C8: ICD-10-CM

## 2024-05-22 DIAGNOSIS — E03.9 ACQUIRED HYPOTHYROIDISM: ICD-10-CM

## 2024-05-22 DIAGNOSIS — J30.2 SEASONAL ALLERGIES: ICD-10-CM

## 2024-05-22 DIAGNOSIS — M54.12 CERVICAL RADICULOPATHY: Primary | ICD-10-CM

## 2024-05-22 PROCEDURE — 1160F RVW MEDS BY RX/DR IN RCRD: CPT | Performed by: INTERNAL MEDICINE

## 2024-05-22 PROCEDURE — 1125F AMNT PAIN NOTED PAIN PRSNT: CPT | Performed by: INTERNAL MEDICINE

## 2024-05-22 PROCEDURE — 72040 X-RAY EXAM NECK SPINE 2-3 VW: CPT

## 2024-05-22 PROCEDURE — G2211 COMPLEX E/M VISIT ADD ON: HCPCS | Performed by: INTERNAL MEDICINE

## 2024-05-22 PROCEDURE — 3075F SYST BP GE 130 - 139MM HG: CPT | Performed by: INTERNAL MEDICINE

## 2024-05-22 PROCEDURE — 1159F MED LIST DOCD IN RCRD: CPT | Performed by: INTERNAL MEDICINE

## 2024-05-22 PROCEDURE — 99214 OFFICE O/P EST MOD 30 MIN: CPT | Performed by: INTERNAL MEDICINE

## 2024-05-22 PROCEDURE — 3079F DIAST BP 80-89 MM HG: CPT | Performed by: INTERNAL MEDICINE

## 2024-05-22 RX ORDER — AZELASTINE 1 MG/ML
2 SPRAY, METERED NASAL 2 TIMES DAILY
Qty: 30 ML | Refills: 3 | Status: SHIPPED | OUTPATIENT
Start: 2024-05-22

## 2024-05-22 RX ORDER — ROPINIROLE 0.5 MG/1
.25-.5 TABLET, FILM COATED ORAL NIGHTLY
Qty: 30 TABLET | Refills: 1 | Status: SHIPPED | OUTPATIENT
Start: 2024-05-22

## 2024-05-22 RX ORDER — LOSARTAN POTASSIUM 100 MG/1
100 TABLET ORAL DAILY
Qty: 90 TABLET | Refills: 1 | Status: SHIPPED | OUTPATIENT
Start: 2024-05-22

## 2024-05-22 RX ORDER — LEVOTHYROXINE SODIUM 112 UG/1
112 TABLET ORAL
Qty: 90 TABLET | Refills: 1 | Status: SHIPPED | OUTPATIENT
Start: 2024-05-22

## 2024-05-22 RX ORDER — BISOPROLOL FUMARATE 5 MG/1
5 TABLET, FILM COATED ORAL DAILY
Qty: 90 TABLET | Refills: 1 | Status: SHIPPED | OUTPATIENT
Start: 2024-05-22

## 2024-05-22 NOTE — PROGRESS NOTES
CC: Right shoulder and arm pain    History:  Sonu Bravo Jr. is a 78 y.o. male   He notes the last week he has been having a deep aching, burning pain in the right shoulder and arm that centers around the scapula, but also radiates into the posterior upper arm.  He has not noticed any weakness, but the pain has been severe, especially when he tries to sleep.  Notably, he does often sleep on his side with his arm under his head.      ROS:  Review of Systems   Musculoskeletal:  Positive for arthralgias and myalgias. Negative for neck pain.   Neurological:  Negative for numbness.        reports that he has been smoking cigarettes. He started smoking about 57 years ago. He has a 28.7 pack-year smoking history. He has been exposed to tobacco smoke. He has never used smokeless tobacco. He reports current alcohol use. He reports that he does not use drugs.      Current Outpatient Medications:     apixaban (Eliquis) 5 MG tablet tablet, Take 1 tablet by mouth 2 (Two) Times a Day., Disp: 180 tablet, Rfl: 1    azelastine (ASTELIN) 0.1 % nasal spray, 2 sprays into the nostril(s) as directed by provider 2 (Two) Times a Day. Use in each nostril as directed, Disp: 30 mL, Rfl: 3    bisoprolol (ZEBeta) 5 MG tablet, Take 1 tablet by mouth Daily., Disp: 90 tablet, Rfl: 1    Cholecalciferol 100 MCG (4000 UT) capsule, Take 4,000 Units by mouth Daily. Indications: Vitamin D Deficiency, Disp: , Rfl:     coenzyme Q10 100 MG capsule, Take 1 capsule by mouth Daily. Indications: Heart Rhythm Disorder, Disp: , Rfl:     Cyanocobalamin 2500 MCG sublingual tablet, Take 2,500 mcg by mouth Daily. Indications: Inadequate Vitamin B12, Disp: , Rfl:     fexofenadine (ALLEGRA) 180 MG tablet, Take 1 tablet by mouth Daily. Indications: Hayfever, Disp: , Rfl:     fluticasone (FLONASE) 50 MCG/ACT nasal spray, 1 spray into the nostril(s) as directed by provider Daily. Indications: Stuffy Nose, Disp: 32 g, Rfl: 3    ibuprofen (ADVIL,MOTRIN) 200 MG tablet,  "Take 1 tablet by mouth Every 6 (Six) Hours As Needed for Mild Pain., Disp: , Rfl:     levothyroxine (SYNTHROID, LEVOTHROID) 112 MCG tablet, Take 1 tablet by mouth Every Morning., Disp: 90 tablet, Rfl: 1    losartan (Cozaar) 100 MG tablet, Take 1 tablet by mouth Daily., Disp: 90 tablet, Rfl: 1    omeprazole (priLOSEC) 20 MG capsule, Take 1 capsule by mouth Daily., Disp: 90 capsule, Rfl: 3    polyethylene glycol (MiraLax) 17 GM/SCOOP powder, Take 17 g by mouth As Needed., Disp: , Rfl:     rosuvastatin (CRESTOR) 20 MG tablet, Take 1 tablet by mouth Daily., Disp: 90 tablet, Rfl: 3    Sennosides (Senna) 8.6 MG capsule, Take 17.2 mg by mouth Every Night. (Patient taking differently: Take 17.2 mg by mouth As Needed.), Disp: 60 each, Rfl: 3    terazosin (HYTRIN) 2 MG capsule, Take 1 capsule by mouth Every Night., Disp: , Rfl:     Zinc 50 MG capsule, Take 1 capsule by mouth Daily. Indications: Zinc Deficiency, Disp: , Rfl:     rOPINIRole (REQUIP) 0.5 MG tablet, Take 0.5-1 tablets by mouth Every Night. Take 1 hour before bedtime., Disp: 30 tablet, Rfl: 1    OBJECTIVE:  /80 (BP Location: Left arm, Patient Position: Sitting, Cuff Size: Adult)   Pulse 63   Resp 16   Ht 180.3 cm (71\")   Wt 78.7 kg (173 lb 8 oz)   SpO2 98%   BMI 24.20 kg/m²    Physical Exam  Constitutional:       General: He is not in acute distress.  Pulmonary:      Effort: Pulmonary effort is normal. No respiratory distress.   Musculoskeletal:      Comments: Negative Neer's and Vega bilaterally.  Strength is 5/5 in the bilateral upper extremities.  He has mild tenderness to palpation about the trapezius and parascapular muscles on the right.   Neurological:      Mental Status: He is alert and oriented to person, place, and time.   Psychiatric:         Mood and Affect: Mood normal.         Behavior: Behavior normal.         Assessment/Plan     Diagnoses and all orders for this visit:    1. Cervical radiculopathy (Primary)  2. Cervical radiculopathy " at C7  4. Cervical radiculopathy at C8  -     XR Spine Cervical 2 or 3 View; Future  -     Ambulatory Referral to Physical Therapy  He has previous instrumentation and hardware at C4-6.  Given his symptoms, I suspect a possible disc bulge at C7-8 or C8-T1.  We will order roentgenogram of the neck to evaluate for possible listhesis or dislocation, but I suspect the area just below his previous hardware is the likely location of injury.  Discussed chin tucks and neck and shoulder mobility exercises in the office and we will refer him to physical therapy.  He may take some NSAID for pain and inflammation improvement.    3. Restless leg syndrome  -     rOPINIRole (REQUIP) 0.5 MG tablet; Take 0.5-1 tablets by mouth Every Night. Take 1 hour before bedtime.  Dispense: 30 tablet; Refill: 1  Initiate therapy with ropinirole.  He does have improvement in his symptoms with opioid, but we discussed this is not the ideal therapy.    5. Paroxysmal atrial fibrillation  -     apixaban (Eliquis) 5 MG tablet tablet; Take 1 tablet by mouth 2 (Two) Times a Day.  Dispense: 180 tablet; Refill: 1  No bleeding events and he is cautioned that bleeding risk may be increased during the time he is taking NSAID.  He has no uncontrolled palpitations or tachycardia.    6. History of DVT (deep vein thrombosis)  -     apixaban (Eliquis) 5 MG tablet tablet; Take 1 tablet by mouth 2 (Two) Times a Day.  Dispense: 180 tablet; Refill: 1    8. Acquired hypothyroidism  -     levothyroxine (SYNTHROID, LEVOTHROID) 112 MCG tablet; Take 1 tablet by mouth Every Morning.  Dispense: 90 tablet; Refill: 1  Stable without symptoms of thyroid dysfunction    9. Seasonal allergies  Comments:  Start astelin in addition to flonase and allergra.  Orders:  -     azelastine (ASTELIN) 0.1 % nasal spray; 2 sprays into the nostril(s) as directed by provider 2 (Two) Times a Day. Use in each nostril as directed  Dispense: 30 mL; Refill: 3    10. Primary hypertension  -      bisoprolol (ZEBeta) 5 MG tablet; Take 1 tablet by mouth Daily.  Dispense: 90 tablet; Refill: 1  -     losartan (Cozaar) 100 MG tablet; Take 1 tablet by mouth Daily.  Dispense: 90 tablet; Refill: 1  Well controlled, BP goal for age is <140/90 per JNC 8 guidelines, and continue current medications        An After Visit Summary was printed and given to the patient at discharge.  No follow-ups on file.      Vaughn Kramer D.O. 5/22/2024   Electronically signed.

## 2024-06-03 ENCOUNTER — TELEPHONE (OUTPATIENT)
Dept: INTERNAL MEDICINE | Facility: CLINIC | Age: 79
End: 2024-06-03
Payer: MEDICARE

## 2024-06-03 DIAGNOSIS — M54.12 CERVICAL RADICULOPATHY: Primary | ICD-10-CM

## 2024-06-03 DIAGNOSIS — M54.2 CHRONIC NECK PAIN: ICD-10-CM

## 2024-06-03 DIAGNOSIS — G89.29 CHRONIC NECK PAIN: ICD-10-CM

## 2024-06-03 RX ORDER — HYDROCODONE BITARTRATE AND ACETAMINOPHEN 7.5; 325 MG/1; MG/1
1 TABLET ORAL NIGHTLY PRN
Qty: 10 TABLET | Refills: 0 | Status: SHIPPED | OUTPATIENT
Start: 2024-06-03 | End: 2024-06-20 | Stop reason: SDUPTHER

## 2024-06-03 NOTE — TELEPHONE ENCOUNTER
Caller:     Sonu Bravo Jr.        Relationship: SELF     Best call back number:     [phone]        What medication are you requesting:  HYDROCODONE APAP 10325     What are your current symptoms: STATES PAIN SHOULDER     How long have you been experiencing symptoms:    Have you had these symptoms before:    [x] Yes  [] No    Have you been treated for these symptoms before:   [x] Yes  [] No    If a prescription is needed, what is your preferred pharmacy and phone number:  [id number] - [place], [place] - [address] AT ACROSS FROM [address] - [phone]  - [phone]  [phone]     Additional notes:  HE STATES HE CAN NOT GET IN FOR REHAB PHYSICAL THERAPY UNTIL JUNE 19TH AND OVER THE COUNTER MEDICATION IS NOT HELPING

## 2024-06-03 NOTE — TELEPHONE ENCOUNTER
PATIENT HAS BEEN CALLED, GIVEN MESSAGE AND IS OK WITH GOING TO [facility].     JASMIN HAS BEEN CALLED, SHE WILL GET IT RESENT TO [facility].

## 2024-06-03 NOTE — TELEPHONE ENCOUNTER
Sent short course of pain medication to help with relieving pain to help him get rest at night. If he is OK with it, we can send the PT referral to a different location that would likely be able to get him in within the next week or so. If so, can ask Karuna to send to [place].

## 2024-06-06 ENCOUNTER — TELEPHONE (OUTPATIENT)
Dept: VASCULAR SURGERY | Facility: CLINIC | Age: 79
End: 2024-06-06
Payer: MEDICARE

## 2024-06-06 NOTE — TELEPHONE ENCOUNTER
Called to reschedule patient appt from 02/2024. Patient states he does not to wish to reschedule.

## 2024-06-19 ENCOUNTER — TREATMENT (OUTPATIENT)
Dept: PHYSICAL THERAPY | Facility: CLINIC | Age: 79
End: 2024-06-19
Payer: MEDICARE

## 2024-06-19 DIAGNOSIS — M54.12 RADICULOPATHY, CERVICAL: Primary | ICD-10-CM

## 2024-06-19 DIAGNOSIS — M53.82 IMPAIRED RANGE OF MOTION OF CERVICAL SPINE: ICD-10-CM

## 2024-06-19 DIAGNOSIS — M79.10 TRIGGER POINT: ICD-10-CM

## 2024-06-19 NOTE — PROGRESS NOTES
Physical Therapy Initial Evaluation and Plan of Care  [address] [place] [address]    Patient: Sonu Bravo Jr.               : 1945  Visit Date: 2024  Referring practitioner: Vaughn Kramer DO  Date of Initial Visit: 2024  Patient seen for 1 sessions    Visit Diagnoses:    ICD-10-CM ICD-9-CM   1. Radiculopathy  M54.12 723.4   2. Trigger point  M79.10 729.1   3. Impaired range of motion of cervical spine  M53.82 724.9     Past Medical History:   Diagnosis Date    Arthritis     Atrial fibrillation 2021    Cataracts, bilateral     Disease of thyroid gland     Diverticulitis     GERD (gastroesophageal reflux disease)     Hyperlipidemia     Hypertension     Mononucleosis 1964     Past Surgical History:   Procedure Laterality Date    ANTERIOR CERVICAL DISCECTOMY W/ FUSION N/A 10/11/2022    Procedure: CERVICAL DISCECTOMY ANTERIOR WITH FUSION, Cervical 4/5 and 5/6;  Surgeon: Durga Cifuentes MD;  Location: [facility] OR;  Service: Neurosurgery;  Laterality: N/A;    CATARACT EXTRACTION Right     COLON SURGERY  2022    Dr Fairbanks at [facility]    COLONOSCOPY N/A 2021    Procedure: COLONOSCOPY WITH ANESTHESIA;  Surgeon: Thomas Dc DO;  Location: [facility] ENDOSCOPY;  Service: Gastroenterology;  Laterality: N/A;  pre op; abnormal ct scan  post op: diverticulosis ; polyps   PCP: Vaughn Kramer DO    COLONOSCOPY N/A 2024    Procedure: COLONOSCOPY WITH ANESTHESIA;  Surgeon: Thomas Dc DO;  Location: [facility] ENDOSCOPY;  Service: Gastroenterology;  Laterality: N/A;  Pre: Altered bowel function;  Post: Polyps;  Vaughn Kramer DO    HERNIA REPAIR      LUMBAR DIRECT LATERAL INTERBODY FUSION N/A 2023    Procedure: LUMBAR DIRECT LATERAL INTERBODY FUSION; LUMBAR 2-5;  Surgeon: Durga Cifuentes MD;  Location: [facility] OR;  Service: Neurosurgery;  Laterality: N/A;    LUMBAR FUSION  N/A 2023    Procedure: LUMBAR DIRECT LATERAL INTERBODY FUSION; LUMBAR 2-5, LUMBAR LAMINECTOMY TRANSFORAMINAL LUMBAR INTERBODY FUSION; LUMBAR 2-5;  Surgeon: Durga Cifuentes MD;  Location: [facility];  Service: Robotics - Neuro;  Laterality: N/A;    SKIN SURGERY      Skin cancer surgery.         SUBJECTIVE     Subjective Evaluation    History of Present Illness  Onset date: about a month ago.  Mechanism of injury: He woke up with neck pain for a month ago. He went to his PCP and his solution was to send him to PTx and take Ibuprofen. The only thing that helps is a hot shower. He has pain into his right mid scapular region, and occasionally sends n/t down the right arm, and this rarely reaches the elbow. It is generally worse in the mornings, though loosens up throughout the day. He has good days and bad days. Noticed no change in strength or shoulder ROM     Admits to intermittent saddle anesthesia, though onset of this started while he was at the [employer] (treadmill, bike, and leg press)     Quality of life: good    Pain  Current pain ratin  At best pain ratin  At worst pain ratin  Location: R upper shoulder, n/t into the arm  Quality: dull ache and sharp  Relieving factors: heat (massage gun)  Exacerbated by: sleeping with RUE overhead.  Progression: improved (has been to a chiro a few times, which helped a little bit)    Treatments  Previous treatment: chiropractic  Patient Goals  Patient goals for therapy: decreased pain         Outcome Measure:   N/a       OBJECTIVE     Objective        Special Questions      Additional Special Questions  Denies cervical       Palpation     Additional Palpation Details  TP noted in upper interscapular mm   No tenderness otherwise     Tenderness     Additional Tenderness Details  T4 tenderness, TP is right beside it, reproduces symptoms     Tests   Cervical     Left   Negative alar ligament integrity, cervical distraction and transverse ligament.     Right    Negative alar ligament integrity, cervical distraction, transverse ligament, ULTT1, ULTT2, ULTT3 and ULTT4.     Additional Tests Details  Scratch collapse: positive at posterior scapula, neg elsewhere           Spine ROM    Cervical  ROM  Pain / laterality    flexion 50    extension 25    L Lateral flexion  10    R Lateral flexion  10 Felt it crunch down there   L rotation  35    R rotation  35    *pain           UE MMT   SHOULDER Strength L Strength R   flexion 5 5   extension     ABD 5 4+   ADD     ER 5 4+   IR 5 5        SCAPULAR       Upper trap  4-  3+   Middle trap  4-  3+   Lower trap  3+  2+   Serratus anterior       *pain     Manual Therapy     08075  Comments   PA thoracic mobs    Percussive IASTM using PowerBoost lvl 1 using ball attachment to TP to the right of T4 in interscapular mm    Relieved symptoms                Timed Minutes           Therapy Education/Self Care [id number]   Education offered today Nature of condition, POC moving forward   Medbride Code    Ongoing HEP   UE phasic   Tennis ball on wall rolling to TP    Timed Minutes        Total Timed Treatment:     0   mins  Total Time of Visit:            45   mins    ASSESSMENT/PLAN     GOALS:  Goals                                          Progress Note due by 7/19/24                                                      Recert due by 9/16/24   LTG by: 6 weeks Comments Date Status   Pt will demonstrate improved thoracic mobility with PA mobs from severely stiff to moderately stiff      Pt will improve B cervical side bending to at least 20 deg bilateral      Pt will demonstrate decreased TP activity in R interscapular mm      Pt will be independent with HEP       Pt will experience no radicular symptoms for a week                Assessment & Plan       Assessment  Impairments: abnormal or restricted ROM, impaired physical strength, lacks appropriate home exercise program and pain with function   Other impairment: pain, n/t  Functional limitations:  sleeping and uncomfortable because of pain   Assessment details: Sonu Bravo is a 79 y/o male presenting with R interscapular pain and radicular symptoms into R arm to mid humeral region. He has no limitations in shoulder ROM or pain with cervical ROM, though can feel it some with R cervical lateral flexion and has severe ROM deficits in rotation and sidebending as well. Palpation to a trigger point to the right of T4 in his interscapular muscles in addition to PA mobs to T4 were the only things to reproduce his symptoms. His symptoms were relieved following Percussive IASTM using PowerBoost to the trigger point today. The Pt would benefit from skilled PTx to decrease pain, improve functional mobility, progress toward goals, and increase overall quality of life.      Prognosis: good    Plan  Therapy options: will be seen for skilled therapy services  Planned modality interventions: dry needling and low level laser therapy  Planned therapy interventions: abdominal trunk stabilization, body mechanics training, flexibility, functional ROM exercises, joint mobilization, manual therapy, motor coordination training, neuromuscular re-education, soft tissue mobilization, spinal/joint mobilization, strengthening, stretching and therapeutic activities  Frequency: 2x week  Duration in weeks: 8  Treatment plan discussed with: patient  Plan details: Work toward increasing postural stability, improving thoracic mobility, and decreasing trigger point activity.         SIGNATURE: Carlota Torres PT DPT, [place] License #: [id number]    Electronically Signed on 6/19/2024      Initial Certification  Certification Period: 6/19/2024 through 9/16/2024  I certify that the therapy services are furnished while this patient is under my care.  The services outlined above are required by this patient, and will be reviewed every 90 days.     PHYSICIAN: Vaughn Kramer DO (NPI:  [id number])    Signature____________________________________________DATE: _________     Please sign and return via fax to [phone].   [email or web address]          [facility] [place]. [id number]  [phone]

## 2024-06-20 ENCOUNTER — OFFICE VISIT (OUTPATIENT)
Dept: INTERNAL MEDICINE | Facility: CLINIC | Age: 79
End: 2024-06-20
Payer: MEDICARE

## 2024-06-20 ENCOUNTER — HOSPITAL ENCOUNTER (OUTPATIENT)
Dept: GENERAL RADIOLOGY | Facility: HOSPITAL | Age: 79
Discharge: HOME | End: 2024-06-20
Admitting: INTERNAL MEDICINE
Payer: MEDICARE

## 2024-06-20 VITALS
SYSTOLIC BLOOD PRESSURE: 130 MMHG | DIASTOLIC BLOOD PRESSURE: 71 MMHG | OXYGEN SATURATION: 98 % | RESPIRATION RATE: 16 BRPM | BODY MASS INDEX: 24.29 KG/M2 | HEART RATE: 67 BPM | HEIGHT: 71 IN | WEIGHT: 173.5 LBS

## 2024-06-20 DIAGNOSIS — M47.12 CERVICAL SPONDYLOSIS WITH MYELOPATHY: ICD-10-CM

## 2024-06-20 DIAGNOSIS — G89.29 CHRONIC GROIN PAIN, UNSPECIFIED LATERALITY: ICD-10-CM

## 2024-06-20 DIAGNOSIS — M54.2 CHRONIC NECK PAIN: ICD-10-CM

## 2024-06-20 DIAGNOSIS — M54.12 CERVICAL RADICULOPATHY: ICD-10-CM

## 2024-06-20 DIAGNOSIS — G25.81 RESTLESS LEG SYNDROME: Primary | ICD-10-CM

## 2024-06-20 DIAGNOSIS — R10.30 CHRONIC GROIN PAIN, UNSPECIFIED LATERALITY: ICD-10-CM

## 2024-06-20 DIAGNOSIS — I10 PRIMARY HYPERTENSION: ICD-10-CM

## 2024-06-20 DIAGNOSIS — G89.29 CHRONIC NECK PAIN: ICD-10-CM

## 2024-06-20 DIAGNOSIS — I48.0 PAROXYSMAL ATRIAL FIBRILLATION: ICD-10-CM

## 2024-06-20 PROCEDURE — 1160F RVW MEDS BY RX/DR IN RCRD: CPT | Performed by: INTERNAL MEDICINE

## 2024-06-20 PROCEDURE — 1125F AMNT PAIN NOTED PAIN PRSNT: CPT | Performed by: INTERNAL MEDICINE

## 2024-06-20 PROCEDURE — G2211 COMPLEX E/M VISIT ADD ON: HCPCS | Performed by: INTERNAL MEDICINE

## 2024-06-20 PROCEDURE — 1159F MED LIST DOCD IN RCRD: CPT | Performed by: INTERNAL MEDICINE

## 2024-06-20 PROCEDURE — 73521 X-RAY EXAM HIPS BI 2 VIEWS: CPT

## 2024-06-20 PROCEDURE — 99214 OFFICE O/P EST MOD 30 MIN: CPT | Performed by: INTERNAL MEDICINE

## 2024-06-20 PROCEDURE — 3075F SYST BP GE 130 - 139MM HG: CPT | Performed by: INTERNAL MEDICINE

## 2024-06-20 PROCEDURE — 3078F DIAST BP <80 MM HG: CPT | Performed by: INTERNAL MEDICINE

## 2024-06-20 RX ORDER — ROPINIROLE 1 MG/1
1 TABLET, FILM COATED ORAL NIGHTLY
Qty: 30 TABLET | Refills: 0 | Status: SHIPPED | OUTPATIENT
Start: 2024-06-20

## 2024-06-20 RX ORDER — HYDROCODONE BITARTRATE AND ACETAMINOPHEN 7.5; 325 MG/1; MG/1
1 TABLET ORAL NIGHTLY PRN
Qty: 12 TABLET | Refills: 0 | Status: SHIPPED | OUTPATIENT
Start: 2024-06-20

## 2024-06-21 NOTE — PROGRESS NOTES
"CC: f/u restless legs    History:  Sonu Bravo Jr. is a 78 y.o. male   History of Present Illness  The patient presents for evaluation of multiple medical concerns. He is accompanied by his wife.    The patient reports that the prescribed medication for his leg \"jumping\" has not yielded any noticeable improvement. He recounts a recent trip to [place], during which three nights out of the five-hour period, he experienced insomnia. He notes that combining the medication with hydrocodone allows him to sleep through the night. Despite being prescribed 10 pills, he continues to experience symptoms. He sought evaluation at [facility] yesterday, during which his back was administered a deep massage. Upon leaving, he experienced no pain in his legs and feet. However, he has recently developed pain on both sides of his leg, which is progressively worsening.  The pain intensifies during ambulation, but remains comfortable while sitting. The pain is more pronounced in his left leg than his right leg. He also reports numbness and tingling in his leg, with the left leg being more prominently numb.          ROS:  Review of Systems   Constitutional:  Negative for chills and fever.   Respiratory:  Negative for cough and shortness of breath.    Cardiovascular:  Negative for chest pain and palpitations.   Gastrointestinal:  Negative for abdominal pain and constipation.   Genitourinary:  Negative for difficulty urinating and dysuria.   Musculoskeletal:  Positive for back pain and myalgias.   Neurological:  Positive for numbness.        reports that he has been smoking cigarettes. He started smoking about 57 years ago. He has a 28.7 pack-year smoking history. He has been exposed to tobacco smoke. He has never used smokeless tobacco. He reports current alcohol use. He reports that he does not use drugs.      Current Outpatient Medications:     apixaban (Eliquis) 5 MG tablet tablet, Take 1 tablet by mouth 2 (Two) Times a Day., " Disp: 180 tablet, Rfl: 1    azelastine (ASTELIN) 0.1 % nasal spray, 2 sprays into the nostril(s) as directed by provider 2 (Two) Times a Day. Use in each nostril as directed, Disp: 30 mL, Rfl: 3    bisoprolol (ZEBeta) 5 MG tablet, Take 1 tablet by mouth Daily., Disp: 90 tablet, Rfl: 1    Cholecalciferol 100 MCG (4000 UT) capsule, Take 4,000 Units by mouth Daily. Indications: Vitamin D Deficiency, Disp: , Rfl:     coenzyme Q10 100 MG capsule, Take 1 capsule by mouth Daily. Indications: Heart Rhythm Disorder, Disp: , Rfl:     Cyanocobalamin 2500 MCG sublingual tablet, Take 2,500 mcg by mouth Daily. Indications: Inadequate Vitamin B12, Disp: , Rfl:     fexofenadine (ALLEGRA) 180 MG tablet, Take 1 tablet by mouth Daily. Indications: Hayfever, Disp: , Rfl:     fluticasone (FLONASE) 50 MCG/ACT nasal spray, 1 spray into the nostril(s) as directed by provider Daily. Indications: Stuffy Nose, Disp: 32 g, Rfl: 3    HYDROcodone-acetaminophen (NORCO) 7.5-325 MG per tablet, Take 1 tablet by mouth At Night As Needed for Moderate Pain or Severe Pain., Disp: 12 tablet, Rfl: 0    ibuprofen (ADVIL,MOTRIN) 200 MG tablet, Take 1 tablet by mouth Every 6 (Six) Hours As Needed for Mild Pain., Disp: , Rfl:     levothyroxine (SYNTHROID, LEVOTHROID) 112 MCG tablet, Take 1 tablet by mouth Every Morning., Disp: 90 tablet, Rfl: 1    losartan (Cozaar) 100 MG tablet, Take 1 tablet by mouth Daily., Disp: 90 tablet, Rfl: 1    omeprazole (priLOSEC) 20 MG capsule, Take 1 capsule by mouth Daily., Disp: 90 capsule, Rfl: 3    polyethylene glycol (MiraLax) 17 GM/SCOOP powder, Take 17 g by mouth As Needed., Disp: , Rfl:     rOPINIRole (REQUIP) 1 MG tablet, Take 1 tablet by mouth Every Night. Take 1 hour before bedtime., Disp: 30 tablet, Rfl: 0    rosuvastatin (CRESTOR) 20 MG tablet, Take 1 tablet by mouth Daily., Disp: 90 tablet, Rfl: 3    Sennosides (Senna) 8.6 MG capsule, Take 17.2 mg by mouth Every Night. (Patient taking differently: Take 17.2 mg by  "mouth As Needed.), Disp: 60 each, Rfl: 3    terazosin (HYTRIN) 2 MG capsule, Take 1 capsule by mouth Every Night., Disp: , Rfl:     Zinc 50 MG capsule, Take 1 capsule by mouth Daily. Indications: Zinc Deficiency, Disp: , Rfl:     OBJECTIVE:  /71 (BP Location: Left arm, Patient Position: Sitting, Cuff Size: Adult)   Pulse 67   Resp 16   Ht 180.3 cm (71\")   Wt 78.7 kg (173 lb 8 oz)   SpO2 98%   BMI 24.20 kg/m²    Physical Exam  Constitutional:       General: He is not in acute distress.  Cardiovascular:      Rate and Rhythm: Normal rate and regular rhythm.      Heart sounds: Normal heart sounds. No murmur heard.  Pulmonary:      Effort: Pulmonary effort is normal.      Breath sounds: Normal breath sounds. No wheezing.   Musculoskeletal:      Comments: Antalgic gait bilaterally   Neurological:      Mental Status: He is alert and oriented to person, place, and time.      Gait: Gait normal.   Psychiatric:         Mood and Affect: Mood normal.         Behavior: Behavior normal.           Results      Assessment/Plan     Diagnoses and all orders for this visit:    1. Restless leg syndrome (Primary)  -     rOPINIRole (REQUIP) 1 MG tablet; Take 1 tablet by mouth Every Night. Take 1 hour before bedtime.  Dispense: 30 tablet; Refill: 0  Increase Requip to 1mg.     2. Chronic groin pain, unspecified laterality  -     XR Hips Bilateral With or Without Pelvis 2 View; Future  XR to evaluate for degenerative changes.     4. Paroxysmal atrial fibrillation  Continue apixaban and rate control without symptoms.     5. Primary hypertension  Well controlled, BP goal for age is <140/90 per JNC 8 guidelines, and continue current medications    6. Cervical radiculopathy  3. Cervical spondylosis with myelopathy  7. Chronic neck pain  -     HYDROcodone-acetaminophen (NORCO) 7.5-325 MG per tablet; Take 1 tablet by mouth At Night As Needed for Moderate Pain or Severe Pain.  Dispense: 12 tablet; Refill: 0  PDMP data reviewed. Norco " refilled.     An After Visit Summary was printed and given to the patient at discharge.  Return in about 4 months (around 10/20/2024) for Medicare Wellness.      Patient or patient representative verbalized consent for the use of Ambient Listening during the visit with  Vaughn Kramer DO for chart documentation. 6/21/2024  11:16 CDT    Vaughn Kramer D.O. 6/21/2024   Electronically signed.

## 2024-06-26 ENCOUNTER — TREATMENT (OUTPATIENT)
Dept: PHYSICAL THERAPY | Facility: CLINIC | Age: 79
End: 2024-06-26
Payer: MEDICARE

## 2024-06-26 DIAGNOSIS — M79.10 TRIGGER POINT: ICD-10-CM

## 2024-06-26 DIAGNOSIS — M54.12 RADICULOPATHY, CERVICAL: Primary | ICD-10-CM

## 2024-06-26 DIAGNOSIS — M53.82 IMPAIRED RANGE OF MOTION OF CERVICAL SPINE: ICD-10-CM

## 2024-06-26 NOTE — PROGRESS NOTES
Physical Therapy Treatment Note  [address] [place] [address]    Patient: Sonu Bravo Jr.                                                 Visit Date: 2024  :     1945    Referring practitioner:    Vaughn Kramer DO  Date of Initial Visit:          Type: THERAPY  Noted: 2024    Patient seen for 2 sessions    Visit Diagnoses:    ICD-10-CM ICD-9-CM   1. Radiculopathy  M54.12 723.4   2. Trigger point  M79.10 729.1   3. Impaired range of motion of cervical spine  M53.82 724.9     SUBJECTIVE     Subjective:  The neck and pain has been unchanged. Reports pain in groin and numbness down legs since first of May. Reports that the message gun did help for a little bit for the TP but then pain returned. Hot shower povides relief.     PAIN: 2/10         OBJECTIVE     Objective       Manual Therapy     89030  Comments   IASTM powerboost Level 1 to TP on R middle trap     PA mobs to thoracic and CT Junction     Manual pec stretch with half foam roll    Scapular Mobs         Timed Minutes 35        Therapeutic Exercises    58845 Units Comments   Attempted front and backs with yellow band, increasing zing down arm so stopped after 5th rep      Scapular retractions 2 x 10   Cues to hold contraction                   Timed Minutes 10      Therapy Education/Self Care 61278   Education offered today Nature of condition, POC moving forward   Medbride Code    Ongoing HEP   UE phasic   Tennis ball on wall rolling to TP    Timed Minutes       Total Timed Treatment:     45   mins  Total Time of Visit:            45   mins    ASSESSMENT/PLAN     GOALS:  Goals                                          Progress Note due by 24                                                      Recert due by 24   LTG by: 6 weeks Comments Date Status   Pt will demonstrate improved thoracic mobility with PA mobs from severely stiff to moderately stiff       Pt will improve B cervical side bending to at least 20 deg bilateral      Pt will demonstrate decreased TP activity in R interscapular mm      Pt will be independent with HEP       Pt will experience no radicular symptoms for a week                 Assessment/Plan     ASSESSMENT:   Focused on stretching and strengthening postural muscles and releasing TP in the middle trap/ right to T4.     PLAN:   Assess effectiveness of today's session; continue to stretch and strengthen postural muscles as well as decreasing radiculopathy down R arm.     SIGNATURE: Taryn Duckworth, PT Student,   Electronically Signed on 6/26/2024    The clinical instructor and/or supervising staff, Spike Ramirez, PT, was present in clinic guiding the visit by approving, concurring, and confirming the skilled judgement for all services rendered.    Signature:  Spike Ramirez, PT, [place] License #: [id number]  Electronically signed on 6/27/2024        [address] [place] [address], [place] [address]  [phone]

## 2024-06-28 ENCOUNTER — TREATMENT (OUTPATIENT)
Dept: PHYSICAL THERAPY | Facility: CLINIC | Age: 79
End: 2024-06-28
Payer: MEDICARE

## 2024-06-28 DIAGNOSIS — M53.82 IMPAIRED RANGE OF MOTION OF CERVICAL SPINE: ICD-10-CM

## 2024-06-28 DIAGNOSIS — M79.10 TRIGGER POINT: ICD-10-CM

## 2024-06-28 DIAGNOSIS — M54.12 RADICULOPATHY, CERVICAL: Primary | ICD-10-CM

## 2024-06-28 NOTE — PROGRESS NOTES
Physical Therapy Treatment Note  [address] [place] [address]    Patient: Sonu Bravo Jr.                                                 Visit Date: 2024  :     1945    Referring practitioner:    Vaughn Kramer DO  Date of Initial Visit:          Type: THERAPY  Noted: 2024    Patient seen for 3 sessions    Visit Diagnoses:    ICD-10-CM ICD-9-CM   1. Radiculopathy  M54.12 723.4   2. Trigger point  M79.10 729.1   3. Impaired range of motion of cervical spine  M53.82 724.9     SUBJECTIVE     Subjective:  Pt reports Wednesday's session helped a lot to decrease pain in the back area with the TrP release. Pt said he had no pain for the rest of the day until the next morning. He would like that relief again.         PAIN: 2/10         OBJECTIVE     Objective       Manual Therapy     50248  Comments   TrPR to R paraspinal and trap muscles With hand and lac ball   PA mobs to thoracic and CT Junction     Manual pec stretch with half foam roll    Scapular Mobs         Timed Minutes 35        Therapeutic Exercises    89795 Units Comments   Shoulder flexion to lat pull down motion with resistance of YTB in each hand. Perform behind head  Pt lacks shoulder horizontal abduction and ER so movement was difficult. Explained to patient   Wall snowangels with focus on keeping arm on wall to encourage chest opening                      Timed Minutes 10      Therapy Education/Self Care 42360   Education offered today Importance of drinking water, nature of condition, centralizing nerve pain, and reeducated pt on tennis ball on wall exercise to help release TrP   Medbride Code    Ongoing HEP   UE phasic   Tennis ball on wall rolling to TP    Timed Minutes       Total Timed Treatment:     45   mins  Total Time of Visit:            45   mins    ASSESSMENT/PLAN     GOALS:  Goals                                          Progress Note due by  7/19/24                                                      Recert due by 9/16/24   LTG by: 6 weeks Comments Date Status   Pt will demonstrate improved thoracic mobility with PA mobs from severely stiff to moderately stiff      Pt will improve B cervical side bending to at least 20 deg bilateral      Pt will demonstrate decreased TP activity in R interscapular mm      Pt will be independent with HEP       Pt will experience no radicular symptoms for a week                 Assessment/Plan     ASSESSMENT:   Worked on TrPR as it helped pt significantly last session. Continued with postural exercises with new activities that promoted chest opening and UE mobility. Pt had no reports of radicular pain during throughout session.     PLAN:   Continue to strengthen and mobilize postural muscles; continue to continue activities that increaze cervical ROM, and decrease pain.     Signature:  Ashley Butler, PT, [place] License #: [id number]  Electronically signed on 6/28/2024        [place]. [id number]  [phone]

## 2024-07-01 ENCOUNTER — TREATMENT (OUTPATIENT)
Dept: PHYSICAL THERAPY | Facility: CLINIC | Age: 79
End: 2024-07-01
Payer: MEDICARE

## 2024-07-01 DIAGNOSIS — M79.10 TRIGGER POINT: ICD-10-CM

## 2024-07-01 DIAGNOSIS — M53.82 IMPAIRED RANGE OF MOTION OF CERVICAL SPINE: ICD-10-CM

## 2024-07-01 DIAGNOSIS — M54.12 RADICULOPATHY, CERVICAL: Primary | ICD-10-CM

## 2024-07-01 NOTE — PROGRESS NOTES
Physical Therapy Treatment Note  [address] [place] [address]    Patient: Sonu Bravo Jr.                                                 Visit Date: 2024  :     1945    Referring practitioner:    Vaughn Kramer DO  Date of Initial Visit:          Type: THERAPY  Noted: 2024    Patient seen for 4 sessions    Visit Diagnoses:    ICD-10-CM ICD-9-CM   1. Radiculopathy  M54.12 723.4   2. Trigger point  M79.10 729.1   3. Impaired range of motion of cervical spine  M53.82 724.9     SUBJECTIVE     Subjective:He reports N/T in R UE to the elbow denies neck pain but has some shoulder pain      PAIN: 4/10         OBJECTIVE     Objective     Manual Therapy     54653  Comments   STM mid thoracic prone   Thoracic extension mobilizations    Assessed B shoulder elevation and scapular position in sitting    Scratch Collapse test + R pec region only       Timed Minutes 30         Therapeutic Exercises    14453 Units Comments   B UE unweighted with towel roll along thoracic cervical B rotation, flexion/extension     B pec and thoracic stretches in sitting with pink roller     Supine t's with red T band x 15               Timed Minutes 15        Therapy Education/Self Care 17716   Education offered today    Medbride Code    Ongoing HEP   UE phasic   Tennis ball on wall rolling to TP    Timed Minutes        Total Timed Treatment:     45   mins  Total Time of Visit:             45   mins         ASSESSMENT/PLAN     GOALS  Goals                                          Progress Note due by 24                                                      Recert due by 24   LTG by: 6 weeks Comments Date Status   Pt will demonstrate improved thoracic mobility with PA mobs from severely stiff to moderately stiff         Pt will improve B cervical side bending to at least 20 deg bilateral         Pt will demonstrate decreased TP activity in R  interscapular mm         Pt will be independent with HEP          Pt will experience no radicular symptoms for a week   daily to R elbow 7/1  Ongoing       Assessment/Plan     ASSESSMENT:   His R scapula is protracted and his shoulder is in a lower position in comparison to the L. The difference in shoulder at rest position resolved with the intervention today     PLAN:   Continue STM and focus on pec flexibility.     SIGNATURE: Ruiz Frias PTA, [place] License #: [id number]  Electronically Signed on 7/1/2024        [address] [place] [address]. [address]  [phone]

## 2024-07-01 NOTE — PROGRESS NOTES
The clinical instructor and/or supervising staff, Spike Ramirez PT, was present in clinic guiding the visit by approving, concurring, and confirming the skilled judgement for all services rendered.    Signature:  Spike Ramirez PT, [place] License #: [id number]  Electronically signed on 7/1/2024

## 2024-07-08 ENCOUNTER — TELEPHONE (OUTPATIENT)
Dept: INTERNAL MEDICINE | Facility: CLINIC | Age: 79
End: 2024-07-08

## 2024-07-08 DIAGNOSIS — M54.12 CERVICAL RADICULOPATHY: ICD-10-CM

## 2024-07-08 DIAGNOSIS — M54.2 CHRONIC NECK PAIN: ICD-10-CM

## 2024-07-08 DIAGNOSIS — G89.29 CHRONIC NECK PAIN: ICD-10-CM

## 2024-07-08 RX ORDER — HYDROCODONE BITARTRATE AND ACETAMINOPHEN 7.5; 325 MG/1; MG/1
1 TABLET ORAL NIGHTLY PRN
Qty: 12 TABLET | Refills: 0 | Status: CANCELLED | OUTPATIENT
Start: 2024-07-08

## 2024-07-08 NOTE — TELEPHONE ENCOUNTER
"Patient informed of Veda's message.  He said he is just wanting a refill to \"bridge the gap\" because he is going out of town next week and he can barely sleep due to his shoulder pain and he only has 1 pill left.  "

## 2024-07-08 NOTE — TELEPHONE ENCOUNTER
Caller: LawrenceSonu Jr.    Relationship: Self    Best call back number: [phone]     Requested Prescriptions:   Requested Prescriptions     Pending Prescriptions Disp Refills    HYDROcodone-acetaminophen (NORCO) 7.5-325 MG per tablet 12 tablet 0     Sig: Take 1 tablet by mouth At Night As Needed for Moderate Pain or Severe Pain.        Pharmacy where request should be sent: [facility] #[id number] - [place], [place] - [address] [phone] [facility] [phone]      Last office visit with prescribing clinician: 6/20/2024   Last telemedicine visit with prescribing clinician: Visit date not found   Next office visit with prescribing clinician: 10/22/2024     Additional details provided by patient: WOULD LIKE A 30 DAY SUPPLY     Does the patient have less than a 3 day supply:  [x] Yes  [] No    Would you like a call back once the refill request has been completed: [x] Yes [] No    If the office needs to give you a call back, can they leave a voicemail: [x] Yes [] No    Jake Elliott III, Elan Rep   07/08/24 11:05 CDT

## 2024-07-08 NOTE — TELEPHONE ENCOUNTER
Caller: Sonu Bravo Jr.    Relationship: Self    Best call back number: [phone]     Which medication are you concerned about: ROPINIROLE 1 MG    Who prescribed you this medication: LUZ    When did you start taking this medication: SINCE 6-20-24 INCREASED TO 1 MG     What are your concerns: DOESN'T SEEM TO BE WORKING WAS ADVISED TO LET THE OFFICE KNOW.     How long have you had these concerns: ON-GOING

## 2024-07-08 NOTE — TELEPHONE ENCOUNTER
Patient informed.  He stated he is going on vacation next week so he will not be in town when Dr. Kramer is back in the office.  He was wanting to know if anything can be done now to help.

## 2024-07-09 ENCOUNTER — TELEPHONE (OUTPATIENT)
Dept: INTERNAL MEDICINE | Facility: CLINIC | Age: 79
End: 2024-07-09
Payer: MEDICARE

## 2024-07-09 DIAGNOSIS — M54.2 CHRONIC NECK PAIN: Primary | ICD-10-CM

## 2024-07-09 DIAGNOSIS — G89.29 CHRONIC NECK PAIN: Primary | ICD-10-CM

## 2024-07-09 NOTE — TELEPHONE ENCOUNTER
Kelly called and informed me patient will need a new referral to [facility]. She asked that we place a new referral. Veda informed.

## 2024-07-09 NOTE — TELEPHONE ENCOUNTER
Patient informed of Veda's message.  He insisted that only the Norco will help him rest and provide some relief from the shoulder pain.  He also stated the Norco and ropinirole when taken together will help lessen his RLS symptoms.   Patient insisted he be given a reason why the Norco will not be prescribed.  Patient was informed again of the message from yesterday explaining why the Norco will not be prescribed.  Patient stated again he did not understand why the medication could not be prescribed when Dr. Kramer has prescribed the medication for him more than once before.      I spoke to Veda regarding the prescription and she is not going to prescribe the Norco for patient due to the reason stated in previous message.  Patient was transferred to the  (Kelly) to discuss this issue.

## 2024-07-09 NOTE — TELEPHONE ENCOUNTER
Kelly just spoke to patient and informed him to call the [facility] office to schedule a sooner appointment.  Patient told Kelly that he will call their office.

## 2024-07-09 NOTE — TELEPHONE ENCOUNTER
Patient informed of Veda's message.  Patient has spoken to the  (Kelly) regarding this matter.  See message entered today in second note for patient.

## 2024-07-09 NOTE — TELEPHONE ENCOUNTER
Called patient told him that [facility] told me to have patient call their office for appointment for his leg pain that he is already a patient there . I ask if they could get him in this week due to him going on vacation Friday. IF patient calls they will try and get him in asap

## 2024-07-09 NOTE — TELEPHONE ENCOUNTER
I tried calling patient, no answer.  I left a VM message informing him to call [facility] as soon as possible to schedule a sooner appointment, per Veda.  I left their office number on patient's VM.

## 2024-07-10 ENCOUNTER — TELEPHONE (OUTPATIENT)
Dept: INTERNAL MEDICINE | Facility: CLINIC | Age: 79
End: 2024-07-10
Payer: MEDICARE

## 2024-07-10 ENCOUNTER — TREATMENT (OUTPATIENT)
Dept: PHYSICAL THERAPY | Facility: CLINIC | Age: 79
End: 2024-07-10
Payer: MEDICARE

## 2024-07-10 DIAGNOSIS — M54.12 RADICULOPATHY, CERVICAL: Primary | ICD-10-CM

## 2024-07-10 DIAGNOSIS — M79.10 TRIGGER POINT: ICD-10-CM

## 2024-07-10 DIAGNOSIS — M53.82 IMPAIRED RANGE OF MOTION OF CERVICAL SPINE: ICD-10-CM

## 2024-07-10 NOTE — TELEPHONE ENCOUNTER
Patient left a voice message wanting to know if I called [facility] to get him in earlier. I returned his  call and left a voice mail for him stating I did call and I was told by Susie at [facility] that patient did call yesterday to see if he could get in and they offer him 7/23/24 and he denied the appointment time and date.

## 2024-07-10 NOTE — PROGRESS NOTES
Physical Therapy Treatment Note  [address] [place] [address]    Patient: Sonu Bravo Jr.                                                 Visit Date: 7/10/2024  :     1945    Referring practitioner:    Vaughn Kramer DO  Date of Initial Visit:          Type: THERAPY  Noted: 2024    Patient seen for 5 sessions    Visit Diagnoses:    ICD-10-CM ICD-9-CM   1. Radiculopathy  M54.12 723.4   2. Trigger point  M79.10 729.1   3. Impaired range of motion of cervical spine  M53.82 724.9       SUBJECTIVE     Subjective:He reports N/T is doing better but still there.     PAIN: 2/10       OBJECTIVE     Objective     Manual Therapy     60849  Comments   STM pec minor    Timed Minutes 10         Therapeutic Exercises    75838 Units Comments   HEP instruction and performance     Timed Minutes 30        Therapy Education/Self Care 17194   Education offered today    Medbride Code [id number]   Ongoing HEP     Date: 07/10/2024  Prepared by: Judy Nowak    Exercises  - Shoulder External Rotation and Scapular Retraction  - 1 x daily - 4 x weekly - 2-3 sets - 10 reps  - Standing Scapular Depression  - 1 x daily - 4 x weekly - 2-3 sets - 10 reps  - Ulnar Nerve/Median Glide- Low Level  - 1 x daily - 4 x weekly - 2-3 sets - 10 reps  - Seated Cervical Retraction  - 1 x daily - 4 x weekly - 2-3 sets - 10 reps  - Chest and Bicep Stretch - Arms Behind Back  - 4 x daily - 4 x weekly - 4 reps - 30 sec hold  Tennis ball on wall rolling to TP    Timed Minutes        Total Timed Treatment:     40   mins  Total Time of Visit:             40   mins         ASSESSMENT/PLAN     GOALS  Goals                                          Progress Note due by 24                                                      Recert due by 24   LTG by: 6 weeks Comments Date Status   Pt will demonstrate improved thoracic mobility with PA mobs from severely stiff to  moderately stiff         Pt will improve B cervical side bending to at least 20 deg bilateral         Pt will demonstrate decreased TP activity in R interscapular mm         Pt will be independent with HEP          Pt will experience no radicular symptoms for a week   daily to R elbow 7/1  Ongoing       Assessment/Plan     ASSESSMENT: He was able to perform updated HEP w/ moderate VC/TC especially w/ scapular depression and cervical retraction.    PLAN: Continue STM and focus on pec flexibility.     SIGNATURE: Judy Nowak PT DPT, [place] License #: [id number]  Electronically Signed on 7/10/2024        [facility] [address]  [place] [address]  [phone]

## 2024-07-10 NOTE — TELEPHONE ENCOUNTER
Patient told me yesterday that he had appointment with [facility] and could not get in till 7/23/24. [facility] called [facility]  this morning to see if I could get him in earlier. Patient did call there yesterday they offer him an appointment on the 23rd but ,patient did not take that appointment time. Patient doesn't have appointment with [facility] at this time.Patient is wanting pain medication for his leg..

## 2024-07-11 ENCOUNTER — OFFICE VISIT (OUTPATIENT)
Dept: INTERNAL MEDICINE | Facility: CLINIC | Age: 79
End: 2024-07-11
Payer: MEDICARE

## 2024-07-11 VITALS
TEMPERATURE: 97.1 F | BODY MASS INDEX: 24.26 KG/M2 | OXYGEN SATURATION: 95 % | WEIGHT: 173.3 LBS | HEIGHT: 71 IN | DIASTOLIC BLOOD PRESSURE: 80 MMHG | SYSTOLIC BLOOD PRESSURE: 164 MMHG | HEART RATE: 62 BPM

## 2024-07-11 DIAGNOSIS — G25.81 RESTLESS LEG SYNDROME: ICD-10-CM

## 2024-07-11 DIAGNOSIS — M54.12 CERVICAL RADICULOPATHY: Primary | ICD-10-CM

## 2024-07-11 PROCEDURE — 99214 OFFICE O/P EST MOD 30 MIN: CPT | Performed by: NURSE PRACTITIONER

## 2024-07-11 PROCEDURE — 1125F AMNT PAIN NOTED PAIN PRSNT: CPT | Performed by: NURSE PRACTITIONER

## 2024-07-11 PROCEDURE — 3077F SYST BP >= 140 MM HG: CPT | Performed by: NURSE PRACTITIONER

## 2024-07-11 PROCEDURE — 3079F DIAST BP 80-89 MM HG: CPT | Performed by: NURSE PRACTITIONER

## 2024-07-11 RX ORDER — MULTIVITAMIN WITH IRON
TABLET ORAL
COMMUNITY

## 2024-07-11 RX ORDER — PREGABALIN 50 MG/1
50 CAPSULE ORAL 2 TIMES DAILY
Qty: 38 CAPSULE | Refills: 0 | Status: SHIPPED | OUTPATIENT
Start: 2024-07-11 | End: 2024-07-30

## 2024-07-11 NOTE — PROGRESS NOTES
Chief Complaint  Insomnia and Shoulder Pain    Subjective        Sonu Micki Bravo Jr. is a 79 y.o. male who presents today for evaluation of the above problems.      History of Present Illness  History of Present Illness  Presents with his spouse to discuss worsening issues with shoulder pain and insomnia.  , Kelly Canales, is present with me during the exam as patient has contacted our office multiple times throughout this week starting on July 8, 2024 requesting Norco is the only thing that helps relieve pain and sleep.  Myself, staff, and  have discussed with him that Norco is not appropriate treatment for sleep, restless leg syndrome, or shoulder pain.  He has been very argumentative both on the phone and today in person regarding this.  He is already established with pain management.  He had been offered appointment on July 23 but did not take it.  He had told our office he did take the appointment but when we called to confirm he did not.  He now says he has an appointment on July 30.  He is stating that he is going to have to cancel his vacation next week because our office will not give him Norco.  I reviewed his records.  He did go to physical therapy yesterday for shoulder pain and rated his pain a 2 out of 10 at that time.  He rates his pain a 5 out of 10 today.  He says the pain is worse upon awakening but it improves during the day as he moves his arm and exercises.  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  He has an appointment in the next month with his neurosurgeon to discuss.  At last appointment with Dr. Cosby Requip was increased to 1 mg/day.  Says the Requip does not work but the Norco does.  He is requesting Norco so he can sleep.  He says he has trouble sleeping because of his chronic pain in both his shoulder and legs.  He says he cannot sleep without the Norco.  In the past month he has been prescribed to emergency 3-day  "courses of [place].  He was then instructed to follow-up with pain management.  During the visit today he is very argumentative and says that our office will do nothing to help him and that he is going to have to cancel his vacation because of our office because we would not prescribe him Norco and he is not going on his medication list we prescribed him Norco.  Discussed with him that I am not prescribing Norco for either of these problems but that there are other medications that can be prescribed but would probably be more beneficial to him.  Patient says his daughter is a nurse and she recommended he try Lyrica.  Had tried gabapentin topical previously.  At previous visits with him I had recommended oral gabapentin but he did not want to try oral gabapentin because his daughter advised him not to because of multiple side effects.  I discussed with him that we could try Lyrica to see if it would help with restless leg and cervical radiculopathy.  Throughout the week he has been offered muscle relaxants and he declines.    Review of Systems - Negative except as noted per HPI    Objective   Vital Signs:  /80 (BP Location: Left arm, Patient Position: Sitting, Cuff Size: Adult)   Pulse 62   Temp 97.1 °F (36.2 °C) (Temporal)   Ht 180.3 cm (71\")   Wt 78.6 kg (173 lb 4.8 oz)   SpO2 95%   BMI 24.17 kg/m²   Estimated body mass index is 24.17 kg/m² as calculated from the following:    Height as of this encounter: 180.3 cm (71\").    Weight as of this encounter: 78.6 kg (173 lb 4.8 oz).           Physical Exam  Constitutional:       Appearance: Normal appearance. He is normal weight.   Pulmonary:      Effort: Pulmonary effort is normal.   Skin:     General: Skin is warm and dry.      Capillary Refill: Capillary refill takes less than 2 seconds.   Neurological:      General: No focal deficit present.      Mental Status: He is alert and oriented to person, place, and time.   Psychiatric:         Mood and Affect: " Affect is angry.         Behavior: Behavior is agitated and aggressive.        Physical Exam      Result Review :        Results             Assessment and Plan   Diagnoses and all orders for this visit:    1. Cervical radiculopathy (Primary)  -     pregabalin (Lyrica) 50 MG capsule; Take 1 capsule by mouth 2 (Two) Times a Day for 19 days.  Dispense: 38 capsule; Refill: 0    2. Restless leg syndrome  -     pregabalin (Lyrica) 50 MG capsule; Take 1 capsule by mouth 2 (Two) Times a Day for 19 days.  Dispense: 38 capsule; Refill: 0      Again, as per above, discussed Norco is not appropriate treatment for his above symptoms.  Discussed potential side effects of Lyrica including sedation.  Discussed that he should try taking at bedtime first to see how he responds.  He can take up to 2 times a day.  I am giving him enough Lyrica to last him until he has his pain management appointment on 7-.  I am concerned for addiction with his behavior and demanding of Norco.      Assessment & Plan       I spent 30 minutes caring for Sonu on this date of service. This time includes time spent by me in the following activities:preparing for the visit, reviewing tests, obtaining and/or reviewing a separately obtained history, performing a medically appropriate examination and/or evaluation , counseling and educating the patient/family/caregiver, ordering medications, tests, or procedures, documenting information in the medical record, and independently interpreting results and communicating that information with the patient/family/caregiver  Follow Up   Return if symptoms worsen or fail to improve.  Patient was given instructions and counseling regarding his condition or for health maintenance advice. Please see specific information pulled into the AVS if appropriate.

## 2024-07-12 DIAGNOSIS — G25.81 RESTLESS LEG SYNDROME: ICD-10-CM

## 2024-07-12 RX ORDER — ROPINIROLE 1 MG/1
1 TABLET, FILM COATED ORAL NIGHTLY
Qty: 30 TABLET | Refills: 2 | Status: SHIPPED | OUTPATIENT
Start: 2024-07-12 | End: 2024-07-12 | Stop reason: SDUPTHER

## 2024-07-12 RX ORDER — ROPINIROLE 1 MG/1
1 TABLET, FILM COATED ORAL NIGHTLY
Qty: 30 TABLET | Refills: 2 | Status: SHIPPED | OUTPATIENT
Start: 2024-07-12

## 2024-07-12 NOTE — TELEPHONE ENCOUNTER
Patient stated he was prescribed Lyrica yesterday and he just wants to know if it is okay to take the Lyrica and Requip together.  If so, he needs a new prescription for Requip sent to his pharmacy.

## 2024-07-16 ENCOUNTER — TREATMENT (OUTPATIENT)
Dept: PHYSICAL THERAPY | Facility: CLINIC | Age: 79
End: 2024-07-16
Payer: MEDICARE

## 2024-07-16 DIAGNOSIS — M53.82 IMPAIRED RANGE OF MOTION OF CERVICAL SPINE: ICD-10-CM

## 2024-07-16 DIAGNOSIS — M79.10 TRIGGER POINT: ICD-10-CM

## 2024-07-16 DIAGNOSIS — M54.12 RADICULOPATHY, CERVICAL: Primary | ICD-10-CM

## 2024-07-16 NOTE — PROGRESS NOTES
Physical Therapy Treatment Note  [address] [place] [address]    Patient: Soun Bravo Jr.                                                 Visit Date: 2024  :     1945    Referring practitioner:    Vaughn Kramer DO  Date of Initial Visit:          Type: THERAPY  Noted: 2024    Patient seen for 6 sessions    Visit Diagnoses:    ICD-10-CM ICD-9-CM   1. Radiculopathy  M54.12 723.4   2. Trigger point  M79.10 729.1   3. Impaired range of motion of cervical spine  M53.82 724.9     SUBJECTIVE     Subjective:He reports things are going well his shoulder is a lot better has intermittent N/T. He did fall yesterday and skinned his back up went to sit on a stool and the stool moved.      PAIN: 2/10         OBJECTIVE     Objective     Manual Therapy     49973  Comments   Cervical suboccipital releases manual traction and manual traction with B lateral bends Grade 2 HL   STM mid thoracic  prone   Thoracic extension mobilizations Grade 2 prone           Timed Minutes 42           Therapy Education/Self Care 11042   Education offered today    Lesly Code    Ongoing HEP   [id number]   Date: 07/10/2024  Prepared by: Judy Nowak     Exercises  - Shoulder External Rotation and Scapular Retraction  - 1 x daily - 4 x weekly - 2-3 sets - 10 reps  - Standing Scapular Depression  - 1 x daily - 4 x weekly - 2-3 sets - 10 reps  - Ulnar Nerve/Median Glide- Low Level  - 1 x daily - 4 x weekly - 2-3 sets - 10 reps  - Seated Cervical Retraction  - 1 x daily - 4 x weekly - 2-3 sets - 10 reps  - Chest and Bicep Stretch - Arms Behind Back  - 4 x daily - 4 x weekly - 4 reps - 30 sec hold  Tennis ball on wall rolling to TP    Timed Minutes        Total Timed Treatment:     42   mins  Total Time of Visit:             42   mins         ASSESSMENT/PLAN     GOALS  Goals                                          Progress Note due by 24                                                       Recert due by 9/16/24   LTG by: 6 weeks Comments Date Status   Pt will demonstrate improved thoracic mobility with PA mobs from severely stiff to moderately stiff         Pt will improve B cervical side bending to at least 20 deg bilateral         Pt will demonstrate decreased TP activity in R interscapular mm         Pt will be independent with HEP   reinforced today  7/16  Ongoing   Pt will experience no radicular symptoms for a week   daily to R elbow 7/1  Ongoing       Assessment/Plan     ASSESSMENT:   Overall he is doing well and he remarked today that his progress thus far makes him believe that he will not likely need all the visits he currently has scheduled.    PLAN:   Review all goals for a progress note.    SIGNATURE: Ruiz Frias PTA, [place] License #: [id number]  Electronically Signed on 7/16/2024        [address] [facility] [place] [address]  [phone]

## 2024-07-17 DIAGNOSIS — G25.81 RESTLESS LEG SYNDROME: ICD-10-CM

## 2024-07-17 RX ORDER — ROPINIROLE 0.5 MG/1
.25-.5 TABLET, FILM COATED ORAL NIGHTLY
Qty: 30 TABLET | Refills: 1 | OUTPATIENT
Start: 2024-07-17

## 2024-07-17 NOTE — TELEPHONE ENCOUNTER
Rx Refill Note  Requested Prescriptions     Pending Prescriptions Disp Refills    rOPINIRole (REQUIP) 0.5 MG tablet [Pharmacy Med Name: ROPINIROLE HCL 0.5 MG TABLET] 30 tablet 1     Sig: TAKE 0.5-1 TABLETS BY MOUTH EVERY NIGHT. TAKE 1 HOUR BEFORE BEDTIME.      Last office visit with prescribing clinician: 6/20/2024   Last telemedicine visit with prescribing clinician: Visit date not found   Next office visit with prescribing clinician: 10/22/2024                         Would you like a call back once the refill request has been completed: [] Yes [] No    If the office needs to give you a call back, can they leave a voicemail: [] Yes [] No    Jairo Cano MA  07/17/24, 08:30 CDT

## 2024-07-29 NOTE — PROGRESS NOTES
"Chief Complaint  Foot Swelling and Bleeding/Bruising (Right lower leg)    Subjective        Sonu Micki Bravo Jr. is a 79 y.o. male who presents today for evaluation of the above problems.      History of Present Illness  History of Present Illness  The patient presents for evaluation of right leg injury and swelling.     The patient experienced a fall while entering a boat from the dock, resulting in a trailing leg on the edge of a boat. The incident occurred a week ago (7/21/24). The injury initially appeared as a normal bruise, but subsequently turned red and swollen last Wednesday (7/24/24), which has progressively worsened. The patient's toes have also exhibited bruising. The swelling is absent in the morning, but increases as the day progresses. The patient has a history of neuropathy, which has resulted in numbness in the toes and foot. Ambulation exacerbates the discomfort. There was minor drainage on Wednesday, but no further drainage has been observed. The patient denies experiencing fever, body aches, or chills. Pain is located in the area of the lesion which is anterior mid calf.     ALLERGIES  The patient is allergic to PENICILLIN, SULFA, and BACTRIM.    Review of Systems - History obtained from the patient  General ROS: negative for - chills, fatigue, or fever  Respiratory ROS: no cough, shortness of breath, or wheezing  Cardiovascular ROS: no chest pain or dyspnea on exertion  Gastrointestinal ROS: no abdominal pain, change in bowel habits, or black or bloody stools  Neurological ROS: no TIA or stroke symptoms    Objective   Vital Signs:  /78 (BP Location: Right arm, Patient Position: Sitting, Cuff Size: Other (Comment))   Pulse 58   Temp 97.1 °F (36.2 °C) (Temporal)   Resp 16   Ht 180.3 cm (71\")   Wt 80.7 kg (178 lb)   SpO2 95%   BMI 24.83 kg/m²   Estimated body mass index is 24.83 kg/m² as calculated from the following:    Height as of this encounter: 180.3 cm (71\").    Weight as of this " encounter: 80.7 kg (178 lb).       Physical Exam  Vitals and nursing note reviewed.   Constitutional:       Appearance: Normal appearance. He is normal weight.   HENT:      Mouth/Throat:      Mouth: Mucous membranes are moist.   Cardiovascular:      Rate and Rhythm: Normal rate and regular rhythm.      Pulses: Normal pulses.      Heart sounds: Normal heart sounds. No murmur heard.     No friction rub. No gallop.   Pulmonary:      Effort: Pulmonary effort is normal. No respiratory distress.      Breath sounds: Normal breath sounds. No wheezing.   Musculoskeletal:         General: Normal range of motion.      Cervical back: Normal range of motion and neck supple.        Legs:       Comments: RLE: Significant erythema and 2+ pitting edema to the foot and right lower leg beneath the area above. No drainage or weeping. No increased skin temp. Tenderness noted to palpation. 2+ edema of right foot, cap refill less than 2 sec. Difficult to palpated good pulses due to the swelling but cap refill in all 5 toes less than 2 seconds. Normal range of motion of foot, ankle, and right lower leg.    Skin:     General: Skin is warm and dry.      Capillary Refill: Capillary refill takes less than 2 seconds.   Neurological:      General: No focal deficit present.      Mental Status: He is alert and oriented to person, place, and time.   Psychiatric:         Mood and Affect: Mood normal.         Behavior: Behavior normal.         Thought Content: Thought content normal.         Judgment: Judgment normal.         Physical Exam      Result Review :        Results             Assessment and Plan   Diagnoses and all orders for this visit:    1. Cellulitis of right lower extremity (Primary)  -     furosemide (Lasix) 20 MG tablet; Take 1 tablet by mouth Daily for 3 days.  Dispense: 3 tablet; Refill: 0  -     ciprofloxacin (Cipro) 500 MG tablet; Take 1 tablet by mouth 2 (Two) Times a Day for 7 days.  Dispense: 14 tablet; Refill: 0  -     XR  tibia fibula 2 vw right; Future  -     XR Foot 3+ View Right; Future  -     US Ankle / Brachial Indices Extremity Complete; Future    The patient presents with cellulitis. An x-ray of the entire leg and foot will be conducted to exclude the possibility of a fracture or osteomyelitis.  An arterial scan will be ordered. Lasix will be prescribed for 3 days to alleviate the swelling. Cipro will be prescribed. Should the patient experience a fever or if the swelling intensifies, or the foot begins to turn blue and cold, immediate emergency room visitation may be necessary for intravenous antibiotics and further evaluation. I will follow up with him in one week or sooner prn.     He also has an appointment with pain management later today for evaluation and Dr. Myers on August 13, 2024.     Assessment & Plan    I will be providing care over continum for this patient including management of both chronic and acute medical conditions        I spent 15 minutes caring for Sonu on this date of service. This time includes time spent by me in the following activities:preparing for the visit, reviewing tests, obtaining and/or reviewing a separately obtained history, performing a medically appropriate examination and/or evaluation , counseling and educating the patient/family/caregiver, ordering medications, tests, or procedures, referring and communicating with other health care professionals , documenting information in the medical record, and independently interpreting results and communicating that information with the patient/family/caregiver  Follow Up   Return in about 1 week (around 8/6/2024) for Recheck.  Patient was given instructions and counseling regarding his condition or for health maintenance advice. Please see specific information pulled into the AVS if appropriate.       Patient or patient representative verbalized consent for the use of Ambient Listening during the visit with  MARY Chase for  chart documentation. 7/30/2024  11:27 CDT

## 2024-07-30 ENCOUNTER — HOSPITAL ENCOUNTER (OUTPATIENT)
Dept: GENERAL RADIOLOGY | Facility: HOSPITAL | Age: 79
Discharge: HOME | End: 2024-07-30
Payer: MEDICARE

## 2024-07-30 ENCOUNTER — OFFICE VISIT (OUTPATIENT)
Dept: INTERNAL MEDICINE | Facility: CLINIC | Age: 79
End: 2024-07-30
Payer: MEDICARE

## 2024-07-30 VITALS
HEIGHT: 71 IN | BODY MASS INDEX: 24.92 KG/M2 | HEART RATE: 58 BPM | RESPIRATION RATE: 16 BRPM | OXYGEN SATURATION: 95 % | DIASTOLIC BLOOD PRESSURE: 78 MMHG | SYSTOLIC BLOOD PRESSURE: 159 MMHG | WEIGHT: 178 LBS | TEMPERATURE: 97.1 F

## 2024-07-30 DIAGNOSIS — L03.115 CELLULITIS OF RIGHT LOWER EXTREMITY: ICD-10-CM

## 2024-07-30 DIAGNOSIS — I73.9 ARTERIAL INSUFFICIENCY OF LOWER EXTREMITY: ICD-10-CM

## 2024-07-30 DIAGNOSIS — L03.115 CELLULITIS OF RIGHT LOWER EXTREMITY: Primary | ICD-10-CM

## 2024-07-30 PROCEDURE — 1125F AMNT PAIN NOTED PAIN PRSNT: CPT | Performed by: NURSE PRACTITIONER

## 2024-07-30 PROCEDURE — 73630 X-RAY EXAM OF FOOT: CPT

## 2024-07-30 PROCEDURE — 73590 X-RAY EXAM OF LOWER LEG: CPT

## 2024-07-30 PROCEDURE — 3078F DIAST BP <80 MM HG: CPT | Performed by: NURSE PRACTITIONER

## 2024-07-30 PROCEDURE — 1160F RVW MEDS BY RX/DR IN RCRD: CPT | Performed by: NURSE PRACTITIONER

## 2024-07-30 PROCEDURE — 1159F MED LIST DOCD IN RCRD: CPT | Performed by: NURSE PRACTITIONER

## 2024-07-30 PROCEDURE — 99213 OFFICE O/P EST LOW 20 MIN: CPT | Performed by: NURSE PRACTITIONER

## 2024-07-30 PROCEDURE — G2211 COMPLEX E/M VISIT ADD ON: HCPCS | Performed by: NURSE PRACTITIONER

## 2024-07-30 PROCEDURE — 3077F SYST BP >= 140 MM HG: CPT | Performed by: NURSE PRACTITIONER

## 2024-07-30 RX ORDER — FUROSEMIDE 20 MG/1
20 TABLET ORAL DAILY
Qty: 3 TABLET | Refills: 0 | Status: SHIPPED | OUTPATIENT
Start: 2024-07-30 | End: 2024-08-02

## 2024-07-30 RX ORDER — CIPROFLOXACIN 500 MG/1
500 TABLET, FILM COATED ORAL 2 TIMES DAILY
Qty: 14 TABLET | Refills: 0 | Status: SHIPPED | OUTPATIENT
Start: 2024-07-30 | End: 2024-08-06

## 2024-07-31 ENCOUNTER — HOSPITAL ENCOUNTER (OUTPATIENT)
Dept: ULTRASOUND IMAGING | Facility: HOSPITAL | Age: 79
Discharge: HOME | End: 2024-07-31
Admitting: NURSE PRACTITIONER
Payer: MEDICARE

## 2024-07-31 DIAGNOSIS — L03.115 CELLULITIS OF RIGHT LOWER EXTREMITY: ICD-10-CM

## 2024-07-31 PROCEDURE — 93923 UPR/LXTR ART STDY 3+ LVLS: CPT

## 2024-07-31 NOTE — PROGRESS NOTES
Call and let him know about the results of his vascular studies.  Both of his legs show arterial insufficiency meaning he is not getting enough blood flow to the bottoms of his legs.  This could be accounting for some of the pain he is experiencing as well.  I am going to put in a referral to vascular surgery to further evaluate this problem.  Tell him we need to monitor the cellulitis very carefully.  I will refer him to vascular surgery at [facility] unless he has a preference otherwise.  Also ask him if I need to refill his Lyrica or if pain management did.  Thanks.

## 2024-08-07 ENCOUNTER — OFFICE VISIT (OUTPATIENT)
Dept: INTERNAL MEDICINE | Facility: CLINIC | Age: 79
End: 2024-08-07
Payer: MEDICARE

## 2024-08-07 ENCOUNTER — NURSE TRIAGE (OUTPATIENT)
Dept: CALL CENTER | Facility: HOSPITAL | Age: 79
End: 2024-08-07
Payer: MEDICARE

## 2024-08-07 ENCOUNTER — LAB (OUTPATIENT)
Dept: LAB | Facility: HOSPITAL | Age: 79
End: 2024-08-07
Payer: MEDICARE

## 2024-08-07 VITALS
DIASTOLIC BLOOD PRESSURE: 73 MMHG | HEIGHT: 71 IN | OXYGEN SATURATION: 94 % | WEIGHT: 175 LBS | SYSTOLIC BLOOD PRESSURE: 153 MMHG | BODY MASS INDEX: 24.5 KG/M2 | HEART RATE: 66 BPM

## 2024-08-07 DIAGNOSIS — T83.511A URINARY TRACT INFECTION ASSOCIATED WITH CATHETERIZATION OF URINARY TRACT, UNSPECIFIED INDWELLING URINARY CATHETER TYPE, INITIAL ENCOUNTER: ICD-10-CM

## 2024-08-07 DIAGNOSIS — N39.0 URINARY TRACT INFECTION ASSOCIATED WITH CATHETERIZATION OF URINARY TRACT, UNSPECIFIED INDWELLING URINARY CATHETER TYPE, INITIAL ENCOUNTER: Primary | ICD-10-CM

## 2024-08-07 DIAGNOSIS — R30.0 DYSURIA: ICD-10-CM

## 2024-08-07 DIAGNOSIS — T83.511A URINARY TRACT INFECTION ASSOCIATED WITH CATHETERIZATION OF URINARY TRACT, UNSPECIFIED INDWELLING URINARY CATHETER TYPE, INITIAL ENCOUNTER: Primary | ICD-10-CM

## 2024-08-07 DIAGNOSIS — L03.90 CELLULITIS, UNSPECIFIED CELLULITIS SITE: Primary | ICD-10-CM

## 2024-08-07 DIAGNOSIS — N39.0 URINARY TRACT INFECTION ASSOCIATED WITH CATHETERIZATION OF URINARY TRACT, UNSPECIFIED INDWELLING URINARY CATHETER TYPE, INITIAL ENCOUNTER: ICD-10-CM

## 2024-08-07 LAB
BACTERIA UR QL AUTO: ABNORMAL /HPF
BILIRUB UR QL STRIP: NEGATIVE
CLARITY UR: ABNORMAL
COLOR UR: YELLOW
GLUCOSE UR STRIP-MCNC: NEGATIVE MG/DL
HGB UR QL STRIP.AUTO: ABNORMAL
HYALINE CASTS UR QL AUTO: ABNORMAL /LPF
KETONES UR QL STRIP: NEGATIVE
LEUKOCYTE ESTERASE UR QL STRIP.AUTO: ABNORMAL
NITRITE UR QL STRIP: POSITIVE
PH UR STRIP.AUTO: 6.5 [PH] (ref 5–8)
PROT UR QL STRIP: ABNORMAL
RBC # UR STRIP: ABNORMAL /HPF
REF LAB TEST METHOD: ABNORMAL
SP GR UR STRIP: 1.01 (ref 1–1.03)
SQUAMOUS #/AREA URNS HPF: ABNORMAL /HPF
UROBILINOGEN UR QL STRIP: ABNORMAL
WBC # UR STRIP: ABNORMAL /HPF

## 2024-08-07 PROCEDURE — 1160F RVW MEDS BY RX/DR IN RCRD: CPT | Performed by: NURSE PRACTITIONER

## 2024-08-07 PROCEDURE — 3078F DIAST BP <80 MM HG: CPT | Performed by: NURSE PRACTITIONER

## 2024-08-07 PROCEDURE — 87086 URINE CULTURE/COLONY COUNT: CPT

## 2024-08-07 PROCEDURE — 1125F AMNT PAIN NOTED PAIN PRSNT: CPT | Performed by: NURSE PRACTITIONER

## 2024-08-07 PROCEDURE — 99213 OFFICE O/P EST LOW 20 MIN: CPT | Performed by: NURSE PRACTITIONER

## 2024-08-07 PROCEDURE — 87186 SC STD MICRODIL/AGAR DIL: CPT

## 2024-08-07 PROCEDURE — G2211 COMPLEX E/M VISIT ADD ON: HCPCS | Performed by: NURSE PRACTITIONER

## 2024-08-07 PROCEDURE — 81001 URINALYSIS AUTO W/SCOPE: CPT

## 2024-08-07 PROCEDURE — 3077F SYST BP >= 140 MM HG: CPT | Performed by: NURSE PRACTITIONER

## 2024-08-07 PROCEDURE — 1159F MED LIST DOCD IN RCRD: CPT | Performed by: NURSE PRACTITIONER

## 2024-08-07 PROCEDURE — 87077 CULTURE AEROBIC IDENTIFY: CPT

## 2024-08-07 RX ORDER — CEFDINIR 300 MG/1
300 CAPSULE ORAL 2 TIMES DAILY
Qty: 14 CAPSULE | Refills: 0 | Status: SHIPPED | OUTPATIENT
Start: 2024-08-07 | End: 2024-08-07 | Stop reason: SDUPTHER

## 2024-08-07 RX ORDER — CEFDINIR 300 MG/1
300 CAPSULE ORAL 2 TIMES DAILY
Qty: 14 CAPSULE | Refills: 0 | Status: SHIPPED | OUTPATIENT
Start: 2024-08-07 | End: 2024-08-08 | Stop reason: SDUPTHER

## 2024-08-07 NOTE — PROGRESS NOTES
"Chief Complaint  Flank Pain (Pt thinks he may have UTI) and follow up cellulitis    Subjective        Sonu Micki Bravo Jr. is a 79 y.o. male who presents today for evaluation of the above problems.    History of Present Illness  Presents for 1 week follow-up of cellulitis to the right lower extremity.  The swelling has subsided but there is still some redness present.  Says the pain is much improved.  Did perform arterial studies which shows he has moderate blockages on bilateral lower extremities, worse on the right.  He has a consultation appointment with vascular surgery in 2 days.  Has to perform in and out caths and says for the last day he has had some lower back pain and cloudy urine and is afraid he may be getting a UTI.  He denies any fever or constitutional symptoms.  Otherwise he denies any acute problems or concerns today.    Review of Systems - History obtained from the patient  General ROS: negative  Respiratory ROS: no cough, shortness of breath, or wheezing  Cardiovascular ROS: no chest pain or dyspnea on exertion  Gastrointestinal ROS: no abdominal pain, change in bowel habits, or black or bloody stools  Neurological ROS: no TIA or stroke symptoms    Objective   Vital Signs:  /73 (BP Location: Left arm, Patient Position: Sitting, Cuff Size: Adult)   Pulse 66   Ht 180.3 cm (71\")   Wt 79.4 kg (175 lb)   SpO2 94%   BMI 24.41 kg/m²   Estimated body mass index is 24.41 kg/m² as calculated from the following:    Height as of this encounter: 180.3 cm (71\").    Weight as of this encounter: 79.4 kg (175 lb).           Physical Exam  Constitutional:       Appearance: Normal appearance. He is normal weight.   Pulmonary:      Effort: Pulmonary effort is normal.   Musculoskeletal:      Right lower leg: Edema present.      Left lower leg: No edema.        Legs:    Skin:     General: Skin is warm and dry.   Neurological:      General: No focal deficit present.      Mental Status: He is alert and oriented " to person, place, and time.   Psychiatric:         Mood and Affect: Mood normal.         Behavior: Behavior normal.         Thought Content: Thought content normal.         Judgment: Judgment normal.          Result Review :        Results             Assessment and Plan   Diagnoses and all orders for this visit:    1. Cellulitis, unspecified cellulitis site (Primary)    Is doing better.  There is still some infection present.  Will see what the UA shows and put him on something that covers both UTI and cellulitis.  Follow-up with vascular surgery as scheduled in 2 days.  For any worsening symptoms return sooner or seek emergency care.    2. Dysuria  -     Urinalysis With Culture If Indicated -; Future  Fluids.    I will be providing care over continum for this patient including management of both chronic and acute medical conditions       Assessment & Plan         Follow Up   Return if symptoms worsen or fail to improve.  Patient was given instructions and counseling regarding his condition or for health maintenance advice. Please see specific information pulled into the AVS if appropriate.       Patient or patient representative verbalized consent for the use of Ambient Listening during the visit with  MARY Chase for chart documentation. 8/7/2024  12:03 CDT

## 2024-08-07 NOTE — TELEPHONE ENCOUNTER
Patient seen today and prescribed Cefdinir. Prescription sent to wrong pharmacy. Prescription verified in EMR. EScribed to requested pharmacy.    Reason for Disposition   [1] Prescription prescribed recently is not at pharmacy AND [2] triager has access to patient's EMR AND [3] prescription is recorded in the EMR    Additional Information   Negative: [1] Intentional drug overdose AND [2] suicidal thoughts or ideas   Negative: Drug overdose and triager unable to answer question   Negative: Caller requesting a renewal or refill of a medicine patient is currently taking   Negative: Caller requesting information unrelated to medicine   Negative: Caller requesting information about COVID-19 Vaccine   Negative: Caller requesting information about Emergency Contraception   Negative: Caller requesting information about Combined Birth Control Pills   Negative: Caller requesting information about Progestin Birth Control Pills   Negative: Caller requesting information about Post-Op pain or medicines   Negative: Caller requesting a prescription antibiotic (such as Penicillin) for Strep throat and has a positive culture result   Negative: Caller requesting a prescription anti-viral med (such as Tamiflu) and has influenza (flu) symptoms   Negative: Immunization reaction suspected   Negative: Rash while taking a medicine or within 3 days of stopping it   Negative: [1] Asthma and [2] having symptoms of asthma (cough, wheezing, etc.)   Negative: [1] Symptom of illness (e.g., headache, abdominal pain, earache, vomiting) AND [2] more than mild   Negative: Breastfeeding questions about mother's medicines and diet   Negative: MORE THAN A DOUBLE DOSE of a prescription or over-the-counter (OTC) drug   Negative: [1] DOUBLE DOSE (an extra dose or lesser amount) of prescription drug AND [2] any symptoms (e.g., dizziness, nausea, pain, sleepiness)   Negative: [1] DOUBLE DOSE (an extra dose or lesser amount) of over-the-counter (OTC) drug AND  "[2] any symptoms (e.g., dizziness, nausea, pain, sleepiness)   Negative: Took another person's prescription drug   Negative: [1] DOUBLE DOSE (an extra dose or lesser amount) of prescription drug AND [2] NO symptoms  (Exception: A double dose of antibiotics.)   Negative: Diabetes drug error or overdose (e.g., took wrong type of insulin or took extra dose)   Negative: [1] Prescription not at pharmacy AND [2] was prescribed by PCP recently (Exception: Triager has access to EMR and prescription is recorded there. Go to Home Care and confirm for pharmacy.)   Negative: [1] Pharmacy calling with prescription question AND [2] triager unable to answer question   Negative: [1] Caller has URGENT medicine question about med that PCP or specialist prescribed AND [2] triager unable to answer question   Negative: Medicine patch causing local rash or itching   Negative: [1] Caller has medicine question about med NOT prescribed by PCP AND [2] triager unable to answer question (e.g., compatibility with other med, storage)   Negative: Prescription request for new medicine (not a refill)   Negative: [1] Caller has NON-URGENT medicine question about med that PCP prescribed AND [2] triager unable to answer question   Negative: Caller wants to use a complementary or alternative medicine    Answer Assessment - Initial Assessment Questions  1. NAME of MEDICINE: \"What medicine(s) are you calling about?\"      Cefdinir  2. QUESTION: \"What is your question?\" (e.g., double dose of medicine, side effect)      Sent to wrong pharmacy  3. PRESCRIBER: \"Who prescribed the medicine?\" Reason: if prescribed by specialist, call should be referred to that group.      [facility]   4. SYMPTOMS: \"Do you have any symptoms?\" If Yes, ask: \"What symptoms are you having?\"  \"How bad are the symptoms (e.g., mild, moderate, severe)      See office note  5. PREGNANCY:  \"Is there any chance that you are pregnant?\" \"When was your last menstrual period?\"      " N/A    Protocols used: Medication Question Call-ADULT-

## 2024-08-08 ENCOUNTER — TELEPHONE (OUTPATIENT)
Dept: INTERNAL MEDICINE | Facility: CLINIC | Age: 79
End: 2024-08-08
Payer: MEDICARE

## 2024-08-08 ENCOUNTER — TELEPHONE (OUTPATIENT)
Dept: VASCULAR SURGERY | Facility: CLINIC | Age: 79
End: 2024-08-08
Payer: MEDICARE

## 2024-08-08 DIAGNOSIS — T83.511A URINARY TRACT INFECTION ASSOCIATED WITH CATHETERIZATION OF URINARY TRACT, UNSPECIFIED INDWELLING URINARY CATHETER TYPE, INITIAL ENCOUNTER: ICD-10-CM

## 2024-08-08 DIAGNOSIS — N39.0 URINARY TRACT INFECTION ASSOCIATED WITH CATHETERIZATION OF URINARY TRACT, UNSPECIFIED INDWELLING URINARY CATHETER TYPE, INITIAL ENCOUNTER: ICD-10-CM

## 2024-08-08 RX ORDER — CEFDINIR 300 MG/1
300 CAPSULE ORAL 2 TIMES DAILY
Qty: 14 CAPSULE | Refills: 0 | Status: SHIPPED | OUTPATIENT
Start: 2024-08-08 | End: 2024-08-15

## 2024-08-08 NOTE — TELEPHONE ENCOUNTER
Rx Refill Note  Requested Prescriptions     Pending Prescriptions Disp Refills    cefdinir (OMNICEF) 300 MG capsule 14 capsule 0     Sig: Take 1 capsule by mouth 2 (Two) Times a Day for 7 days.      Last office visit with prescribing clinician: 6/20/2024   Last telemedicine visit with prescribing clinician: Visit date not found   Next office visit with prescribing clinician: 10/22/2024                         Would you like a call back once the refill request has been completed: [] Yes [] No    If the office needs to give you a call back, can they leave a voicemail: [] Yes [] No    Jairo Cano MA  08/08/24, 08:30 CDT      PATIENT IS NEEDING THIS MEDICATION SENT TO THE [facility] ON [address].  IT WAS SENT TO HIS MAIL ORDER.

## 2024-08-08 NOTE — TELEPHONE ENCOUNTER
The Cefdinir should be fine. He has received several antibiotics in this family over the past few years (ceftriaxone and cefepime in the hospital). There is a small risk of cross-reactivity with penicillin, but given that he has tolerated those previously, I have no concerns about him taking Cefdinir.

## 2024-08-08 NOTE — TELEPHONE ENCOUNTER
Patient has been prescribed cefdinir andhe is allergic to sulfa and penicillins.  He is asking what he should do?      Patient is asking for something different to be sent in ASAP.  He stated that he has not been able to start medication and he is needing it.

## 2024-08-09 ENCOUNTER — OFFICE VISIT (OUTPATIENT)
Dept: VASCULAR SURGERY | Facility: CLINIC | Age: 79
End: 2024-08-09
Payer: MEDICARE

## 2024-08-09 VITALS
BODY MASS INDEX: 24.36 KG/M2 | HEART RATE: 81 BPM | DIASTOLIC BLOOD PRESSURE: 70 MMHG | SYSTOLIC BLOOD PRESSURE: 132 MMHG | OXYGEN SATURATION: 96 % | WEIGHT: 174 LBS | HEIGHT: 71 IN

## 2024-08-09 DIAGNOSIS — L03.119 CELLULITIS AND ABSCESS OF LEG, EXCEPT FOOT: ICD-10-CM

## 2024-08-09 DIAGNOSIS — L02.419 CELLULITIS AND ABSCESS OF LEG, EXCEPT FOOT: ICD-10-CM

## 2024-08-09 DIAGNOSIS — I87.323 CHRONIC VENOUS HYPERTENSION WITH INFLAMMATION INVOLVING BOTH SIDES: Primary | ICD-10-CM

## 2024-08-09 DIAGNOSIS — Z72.0 TOBACCO USE: ICD-10-CM

## 2024-08-09 DIAGNOSIS — I10 PRIMARY HYPERTENSION: ICD-10-CM

## 2024-08-09 DIAGNOSIS — E78.2 MIXED HYPERLIPIDEMIA: ICD-10-CM

## 2024-08-09 LAB — BACTERIA SPEC AEROBE CULT: ABNORMAL

## 2024-08-09 RX ORDER — DOXYCYCLINE HYCLATE 100 MG/1
100 CAPSULE ORAL 2 TIMES DAILY
Qty: 7 CAPSULE | Refills: 0 | Status: SHIPPED | OUTPATIENT
Start: 2024-08-09

## 2024-08-09 NOTE — PROGRESS NOTES
08/09/2024      Vaughn Kramer,   [address]  [place] [address]    Sonu Bravo Jr.  1945    Chief Complaint   Patient presents with    SET PAD    Leg Swelling     Right leg is red/bridget, has numbness to feet with neuropathy.. has numbness to inner thighs, JAZMIN 7/31/2024       Dear Vaughn Kramer,    Leg Swelling  Associated symptoms include numbness. Pertinent negatives include no abdominal pain, chest pain, diaphoresis, fever or weakness.     I had the pleasure of seeing your patient Sonu Bravo Jr. in the office today.  As you recall, Sonu Bravo Jr. is a 79 y.o.  male who you are following for routine health maintenance.  He has previously underwent a 3 level spinal fusions since he was last here.  He continues to have pain to his back, hip, and thighs, with numbness and tingling to his feet.  He has complaints of right leg being red and bridget in color.  He was treated by his PCP with Cipro however it does not seem to have cleared.  He does complain of cramps to bilateral lower legs left worse than right at rest.  He does have an upcoming nerve conduction study next week.  He was previously offered venous insufficiency testing and wanted to hold off.  He has significant vascular disease as noted on previous CT of abdomen pelvis including renal artery stenosis, celiac stenosis, SMA stenosis, and aortoiliac disease.  He denies any symptoms of mesenteric stenosis.  He is maintained on Eliquis and Crestor.  He did have noninvasive testing performed, which I did review in office.       Review of Systems   Constitutional: Negative.  Negative for diaphoresis and fever.   HENT: Negative.     Eyes: Negative.    Respiratory: Negative.  Negative for shortness of breath and wheezing.    Cardiovascular: Negative.  Negative for chest pain and leg swelling.   Gastrointestinal: Negative.  Negative for abdominal pain.   Endocrine: Negative.    Genitourinary: Negative.  "   Musculoskeletal: Negative.  Positive for back pain.   Skin: Negative.  Positive for color change.   Allergic/Immunologic: Negative.    Neurological: Negative.  Positive for numbness. Negative for dizziness and weakness.   Hematological: Negative.    Psychiatric/Behavioral: Negative.          /70   Pulse 81   Ht 180.3 cm (71\")   Wt 78.9 kg (174 lb)   SpO2 96%   BMI 24.27 kg/m²     Physical Exam  Vitals and nursing note reviewed.   Constitutional:       General: He is not in acute distress.     Appearance: Normal appearance. He is not diaphoretic.   HENT:      Head: Normocephalic. No right periorbital erythema or left periorbital erythema.      Nose: Nose normal.   Eyes:      General: No scleral icterus.     Pupils: Pupils are equal.   Cardiovascular:      Rate and Rhythm: Normal rate and regular rhythm.      Pulses: Normal pulses.           Dorsalis pedis pulses are 2+ on the right side and 2+ on the left side.        Posterior tibial pulses are 2+ on the right side and 2+ on the left side.      Heart sounds: Normal heart sounds. No murmur heard.     Comments: Varicose veins with venous congestion to bilateral lower extremities.  Pulmonary:      Effort: Pulmonary effort is normal. No respiratory distress.      Breath sounds: Normal breath sounds.   Abdominal:      General: Bowel sounds are normal. There is no distension.      Palpations: Abdomen is soft.      Tenderness: There is no abdominal tenderness. There is no guarding.   Musculoskeletal:         General: Swelling present. No tenderness. Normal range of motion.      Cervical back: Normal range of motion and neck supple.      Right lower leg: No edema.      Left lower leg: No edema.   Feet:      Right foot:      Skin integrity: Skin integrity normal.      Left foot:      Skin integrity: Skin integrity normal.   Skin:     General: Skin is warm and dry.      Findings: No erythema or rash.   Neurological:      General: No focal deficit present.      " Mental Status: He is alert and oriented to person, place, and time. Mental status is at baseline.      Cranial Nerves: No cranial nerve deficit.      Gait: Gait normal.   Psychiatric:         Attention and Perception: Attention normal.         Mood and Affect: Mood normal.         Behavior: Behavior normal.         Thought Content: Thought content normal.         Judgment: Judgment normal.       Diagnostic data:      Patient Active Problem List   Diagnosis    Abdominal aortic ectasia    Vitamin D deficiency    Mixed hyperlipidemia    Acquired hypothyroidism    Gastro-esophageal reflux disease with esophagitis    Primary hypertension    Former smoker    Paroxysmal atrial fibrillation    Current use of long term anticoagulation    Cavitary lesion of lung    History of DVT (deep vein thrombosis)    Cervical spondylosis with myelopathy    Spinal stenosis, lumbar region with neurogenic claudication    Impaired glucose tolerance    Dural arteriovenous fistula of spinal cord    Neurogenic bladder    Self-catheterizes urinary bladder    Altered bowel function    Neuropathy involving both lower extremities    Vitreous degeneration    Retinal drusen    Pseudophakia    Benign neoplasm of choroid    Age-related nuclear cataract, left eye        Diagnosis Plan   1. Chronic venous hypertension with inflammation involving both sides  US Venous Doppler Lower Extremity Bilateral (duplex)      2. Cellulitis and abscess of leg, except foot  doxycycline (VIBRAMYCIN) 100 MG capsule      3. Primary hypertension        4. Mixed hyperlipidemia        5. Tobacco use              Plan: After thoroughly evaluating Sonuean Bravo Jr., I believe the best course of action is to remain conservative from vascular surgery standpoint.  I did review his testing and his ABIs that were performed by Veda Black did show mild arterial insufficiency to the right lower extremity and moderate arterial insufficiency to the left lower extremity.  He is  asymptomatic in regard to claudication symptoms at this time.  His bilateral feet are warm to touch and pulses are palpable.  I do believe he may be experiencing some neurogenic pain likely from his back.  He does have an upcoming nerve conduction study next week.  We will see him back in 1 month with noninvasive testing to include venous duplex for continued surveillance.  I will prescribe doxycycline for cellulitis to the right lower extremity.  He is maintained on Eliquis 5 mg twice daily, and Crestor 20 mg daily. I did discuss vascular risk factors as they pertain to the progression of vascular disease including controlling hypertension, hyperlipidemia, and smoking cessation.  His blood pressures currently stable today in office.  His lipid panel from 11 months ago shows all values within normal limits.  Unfortunately, he is a daily smoker and has no desire to quit smoking at this time.  He can continue taking all medications as previously planned.  This was all discussed in full with complete understanding.    Thank you for allowing me to participate in the care of your patient.  Please do not hesitate to call with any questions or concerns.  We will keep you aware of any further encounters with Sonu Bravo Jr..        Sincerely yours,         Mitzy De La Vega, MARY Kramer, Vaughn Finn, DO

## 2024-08-13 ENCOUNTER — HOSPITAL ENCOUNTER (OUTPATIENT)
Dept: GENERAL RADIOLOGY | Facility: HOSPITAL | Age: 79
Discharge: HOME | End: 2024-08-13
Admitting: NURSE PRACTITIONER
Payer: MEDICARE

## 2024-08-13 ENCOUNTER — OFFICE VISIT (OUTPATIENT)
Dept: NEUROSURGERY | Facility: CLINIC | Age: 79
End: 2024-08-13
Payer: MEDICARE

## 2024-08-13 VITALS — BODY MASS INDEX: 24.36 KG/M2 | WEIGHT: 174 LBS | HEIGHT: 71 IN

## 2024-08-13 DIAGNOSIS — M54.50 LUMBAR BACK PAIN: Primary | ICD-10-CM

## 2024-08-13 DIAGNOSIS — R20.2 NUMBNESS AND TINGLING OF BOTH LOWER EXTREMITIES: ICD-10-CM

## 2024-08-13 DIAGNOSIS — M79.605 PAIN IN BOTH LOWER EXTREMITIES: ICD-10-CM

## 2024-08-13 DIAGNOSIS — R20.0 NUMBNESS AND TINGLING OF BOTH LOWER EXTREMITIES: ICD-10-CM

## 2024-08-13 DIAGNOSIS — M54.50 LUMBAR BACK PAIN: ICD-10-CM

## 2024-08-13 DIAGNOSIS — Z72.0 TOBACCO ABUSE: ICD-10-CM

## 2024-08-13 DIAGNOSIS — Z91.81 AT RISK FOR FALLS: ICD-10-CM

## 2024-08-13 DIAGNOSIS — M79.604 PAIN IN BOTH LOWER EXTREMITIES: ICD-10-CM

## 2024-08-13 DIAGNOSIS — Z98.1 HISTORY OF LUMBAR FUSION: ICD-10-CM

## 2024-08-13 PROCEDURE — 1160F RVW MEDS BY RX/DR IN RCRD: CPT | Performed by: NURSE PRACTITIONER

## 2024-08-13 PROCEDURE — 99214 OFFICE O/P EST MOD 30 MIN: CPT | Performed by: NURSE PRACTITIONER

## 2024-08-13 PROCEDURE — 1159F MED LIST DOCD IN RCRD: CPT | Performed by: NURSE PRACTITIONER

## 2024-08-13 PROCEDURE — 72120 X-RAY BEND ONLY L-S SPINE: CPT

## 2024-08-13 NOTE — PATIENT INSTRUCTIONS
"DASH Eating Plan  DASH stands for Dietary Approaches to Stop Hypertension. The DASH eating plan is a healthy eating plan that has been shown to:  Lower high blood pressure (hypertension).  Reduce your risk for type 2 diabetes, heart disease, and stroke.  Help with weight loss.  What are tips for following this plan?  Reading food labels  Check food labels for the amount of salt (sodium) per serving. Choose foods with less than 5 percent of the Daily Value (DV) of sodium. In general, foods with less than 300 milligrams (mg) of sodium per serving fit into this eating plan.  To find whole grains, look for the word \"whole\" as the first word in the ingredient list.  Shopping  Buy products labeled as \"low-sodium\" or \"no salt added.\"  Buy fresh foods. Avoid canned foods and pre-made or frozen meals.  Cooking  Try not to add salt when you cook. Use salt-free seasonings or herbs instead of table salt or sea salt. Check with your health care provider or pharmacist before using salt substitutes.  Do not hogan foods. Cook foods in healthy ways, such as baking, boiling, grilling, roasting, or broiling.  Cook using oils that are good for your heart. These include olive, canola, avocado, soybean, and sunflower oil.  Meal planning    Eat a balanced diet. This should include:  4 or more servings of fruits and 4 or more servings of vegetables each day. Try to fill half of your plate with fruits and vegetables.  6-8 servings of whole grains each day.  6 or less servings of lean meat, poultry, or fish each day. 1 oz is 1 serving. A 3 oz (85 g) serving of meat is about the same size as the palm of your hand. One egg is 1 oz (28 g).  2-3 servings of low-fat dairy each day. One serving is 1 cup (237 mL).  1 serving of nuts, seeds, or beans 5 times each week.  2-3 servings of heart-healthy fats. Healthy fats called omega-3 fatty acids are found in foods such as walnuts, flaxseeds, fortified milks, and eggs. These fats are also found in " cold-water fish, such as sardines, salmon, and mackerel.  Limit how much you eat of:  Canned or prepackaged foods.  Food that is high in trans fat, such as fried foods.  Food that is high in saturated fat, such as fatty meat.  Desserts and other sweets, sugary drinks, and other foods with added sugar.  Full-fat dairy products.  Do not salt foods before eating.  Do not eat more than 4 egg yolks a week.  Try to eat at least 2 vegetarian meals a week.  Eat more home-cooked food and less restaurant, buffet, and fast food.  Lifestyle  When eating at a restaurant, ask if your food can be made with less salt or no salt.  If you drink alcohol:  Limit how much you have to:  0-1 drink a day if you are female.  0-2 drinks a day if you are male.  Know how much alcohol is in your drink. In the [place] one drink is one 12 oz bottle of beer (355 mL), one 5 oz glass of wine (148 mL), or one 1½ oz glass of hard liquor (44 mL).  General information  Avoid eating more than 2,300 mg of salt a day. If you have hypertension, you may need to reduce your sodium intake to 1,500 mg a day.  Work with your provider to stay at a healthy body weight or lose weight. Ask what the best weight range is for you.  On most days of the week, get at least 30 minutes of exercise that causes your heart to beat faster. This may include walking, swimming, or biking.  Work with your provider or dietitian to adjust your eating plan to meet your specific calorie needs.  What foods should I eat?  Fruits  All fresh, dried, or frozen fruit. Canned fruits that are in their natural juice and do not have sugar added to them.  Vegetables  Fresh or frozen vegetables that are raw, steamed, roasted, or grilled. Low-sodium or reduced-sodium tomato and vegetable juice. Low-sodium or reduced-sodium tomato sauce and tomato paste. Low-sodium or reduced-sodium canned vegetables.  Grains  Whole-grain or whole-wheat bread. Whole-grain or whole-wheat pasta. Brown rice. Oatmeal.  Quinoa. Bulgur. Whole-grain and low-sodium cereals. Meagan bread. Low-fat, low-sodium crackers. Whole-wheat flour tortillas.  Meats and other proteins  Skinless chicken or turkey. Ground chicken or turkey. Pork with fat trimmed off. Fish and seafood. Egg whites. Dried beans, peas, or lentils. Unsalted nuts, nut butters, and seeds. Unsalted canned beans. Lean cuts of beef with fat trimmed off. Low-sodium, lean precooked or cured meat, such as sausages or meat loaves.  Dairy  Low-fat (1%) or fat-free (skim) milk. Reduced-fat, low-fat, or fat-free cheeses. Nonfat, low-sodium ricotta or cottage cheese. Low-fat or nonfat yogurt. Low-fat, low-sodium cheese.  Fats and oils  Soft margarine without trans fats. Vegetable oil. Reduced-fat, low-fat, or light mayonnaise and salad dressings (reduced-sodium). Canola, safflower, olive, avocado, soybean, and sunflower oils. Avocado.  Seasonings and condiments  Herbs. Spices. Seasoning mixes without salt.  Other foods  Unsalted popcorn and pretzels. Fat-free sweets.  The items listed above may not be all the foods and drinks you can have. Talk to a dietitian to learn more.  What foods should I avoid?  Fruits  Canned fruit in a light or heavy syrup. Fried fruit. Fruit in cream or butter sauce.  Vegetables  Creamed or fried vegetables. Vegetables in a cheese sauce. Regular canned vegetables that are not marked as low-sodium or reduced-sodium. Regular canned tomato sauce and paste that are not marked as low-sodium or reduced-sodium. Regular tomato and vegetable juices that are not marked as low-sodium or reduced-sodium. Pickles. Olives.  Grains  Baked goods made with fat, such as croissants, muffins, or some breads. Dry pasta or rice meal packs.  Meats and other proteins  Fatty cuts of meat. Ribs. Fried meat. Weaver. Bologna, salami, and other precooked or cured meats, such as sausages or meat loaves, that are not lean and low in sodium. Fat from the back of a pig (fatback). Claudia.  Salted nuts and seeds. Canned beans with added salt. Canned or smoked fish. Whole eggs or egg yolks. Chicken or turkey with skin.  Dairy  Whole or 2% milk, cream, and half-and-half. Whole or full-fat cream cheese. Whole-fat or sweetened yogurt. Full-fat cheese. Nondairy creamers. Whipped toppings. Processed cheese and cheese spreads.  Fats and oils  Butter. Stick margarine. Lard. Shortening. Ghee. Weaver fat. Tropical oils, such as coconut, palm kernel, or palm oil.  Seasonings and condiments  Onion salt, garlic salt, seasoned salt, table salt, and sea salt. Worcestershire sauce. Tartar sauce. Barbecue sauce. Teriyaki sauce. Soy sauce, including reduced-sodium soy sauce. Steak sauce. Canned and packaged gravies. Fish sauce. Oyster sauce. Cocktail sauce. Store-bought horseradish. Ketchup. Mustard. Meat flavorings and tenderizers. Bouillon cubes. Hot sauces. Pre-made or packaged marinades. Pre-made or packaged taco seasonings. Relishes. Regular salad dressings.  Other foods  Salted popcorn and pretzels.  The items listed above may not be all the foods and drinks you should avoid. Talk to a dietitian to learn more.  Where to find more information  National Heart, Lung, and Blood [place] (NHLBI): nhlbi.nih.gov  American Heart Association (AHA): heart.org  Academy of Nutrition and Dietetics: eatright.org  National Kidney Foundation (NKF): kidney.org  This information is not intended to replace advice given to you by your health care provider. Make sure you discuss any questions you have with your health care provider.  Document Revised: 01/04/2024 Document Reviewed: 01/04/2024  Elsevier Patient Education © 2024 [employer]  Tobacco Use Disorder  Tobacco use disorder (TUD) occurs when a person craves, seeks, and uses tobacco, regardless of the consequences. This disorder can cause problems with mental and physical health. It can affect your ability to have healthy relationships. It can also keep you from meeting your  responsibilities at work, home, or school.  Tobacco products contain a dangerous chemical called nicotine. Nicotine triggers hormones that make the body feel stimulated and works on areas of the brain that make a person feel good. These effects can make the person depend on nicotine, which makes it hard to quit tobacco.  Tobacco may be:  Smoked as a cigarette or cigar.  Inhaled using vaping devices, such as e-cigarettes.  Smoked in a pipe or hookah.  Chewed as smokeless tobacco.  Inhaled into the nostrils as snuff.  What are the causes?  This condition is caused by using nicotine. Nicotine causes changes in your brain that make you want more and more. This is called addiction. This can make it hard to stop using tobacco once you start.  What are the signs or symptoms?  Symptoms of TUD may include:  Being unable to stop your tobacco use even after planned quit attempts.  Spending an abnormal amount of time getting or using tobacco.  Craving tobacco.  Tobacco use that:  Interferes with your work, school, or home life.  Interferes with your personal and social relationships.  Makes you give up activities that you once enjoyed or found important.  Using tobacco even though you know that it is:  Dangerous or bad for your health or someone else's health.  Causing problems in your life.  Needing more and more of the substance to get the same effect (developing tolerance).  Using the substance to avoid unpleasant symptoms if you do not use the substance (withdrawal).  How is this diagnosed?  This condition may be diagnosed based on:  Your current and past tobacco use. Your health care provider may ask questions about how your tobacco use affects your life.  A physical exam.  You may be diagnosed with TUD if you have at least two symptoms within a 12-month period.  How is this treated?  This condition is treated by stopping tobacco use. Many people are unable to quit on their own and need help. Treatment may  include:  Nicotine replacement therapy (NRT). NRT provides nicotine without the other harmful chemicals in tobacco. NRT gradually lowers the dosage of nicotine in the body and reduces withdrawal symptoms. NRT is available as:  Over-the-counter gums, lozenges, and skin patches.  Prescription mouth inhalers and nasal sprays.  Medicine that acts on the brain to reduce cravings and withdrawal symptoms.  A type of talk therapy that examines your triggers for tobacco use, how to avoid them, and how to cope with cravings (behavioral therapy).  Hypnosis. This may help with withdrawal symptoms.  Joining a support group for people coping with TUD.  The best treatment for TUD is usually a combination of medicine, talk therapy, and support groups. Recovery can be a long process. Many people start using tobacco again after stopping (relapse). If you relapse, it does not mean that treatment will not work.  Follow these instructions at home:    Lifestyle  Do not use any products that contain nicotine or tobacco. These products include cigarettes, chewing tobacco, and vaping devices, such as e-cigarettes. If you need help quitting, ask your health care provider.  Avoid things that trigger tobacco use as much as you can. Triggers include people and situations that usually cause you to use tobacco.  Avoid drinks that contain caffeine, including coffee. These may worsen some withdrawal symptoms.  Find ways to manage stress. Wanting to smoke may cause stress, and stress can make you want to smoke. Relaxation techniques such as deep breathing, meditation, and yoga may help.  Attend support groups as needed. These groups are an important part of long-term recovery for many people.  General instructions  Take over-the-counter and prescription medicines only as told by your health care provider.  Check with your health care provider before taking any new prescription or over-the-counter medicines.  Decide on a friend, family member, or  smoking quit-line (such as 1-800-QUIT-NOW in the [place] that you can call or text when you feel the urge to smoke or when you need help coping with cravings.  Keep all follow-up visits. This is important.  Contact a health care provider if:  You are not able to take your medicines as told by your health care provider.  Your symptoms get worse, even with treatment.  Summary  Tobacco use disorder (TUD) occurs when a person craves, seeks, and uses tobacco regardless of the consequences.  This condition may be diagnosed based on your current and past tobacco use and a physical exam.  Many people are unable to quit on their own and need help. Recovery can be a long process.  The most effective treatment for TUD is usually a combination of medicine, talk therapy, and support groups.  This information is not intended to replace advice given to you by your health care provider. Make sure you discuss any questions you have with your health care provider.  Document Revised: 12/13/2022 Document Reviewed: 12/13/2022  [employer] Patient Education © 2024 [employer]    Advance Care Planning and Advance Directives     You make decisions on a daily basis - decisions about where you want to live, your career, your home, your life. Perhaps one of the most important decisions you face is your choice for future medical care. Take time to talk with your family and your healthcare team and start planning today.  Advance Care Planning is a process that can help you:  Understand possible future healthcare decisions in light of your own experiences  Reflect on those decision in light of your goals and values  Discuss your decisions with those closest to you and the healthcare professionals that care for you  Make a plan by creating a document that reflects your wishes    Surrogate Decision Maker  In the event of a medical emergency, which has left you unable to communicate or to make your own decisions, you would need someone to make  decisions for you.  It is important to discuss your preferences for medical treatment with this person while you are in good health.     Qualities of a surrogate decision maker:  Willing to take on this role and responsibility  Knows what you want for future medical care  Willing to follow your wishes even if they don't agree with them  Able to make difficult medical decisions under stressful circumstances    Advance Directives  These are legal documents you can create that will guide your healthcare team and decision maker(s) when needed. These documents can be stored in the electronic medical record.    Living Will - a legal document to guide your care if you have a terminal condition or a serious illness and are unable to communicate. States vary by statute in document names/types, but most forms may include one or more of the following:        -  Directions regarding life-prolonging treatments        -  Directions regarding artificially provided nutrition/hydration        -  Choosing a healthcare decision maker        -  Direction regarding organ/tissue donation    Durable Power of  for Healthcare - this document names an -in-fact to make medical decisions for you, but it may also allow this person to make personal and financial decisions for you. Please seek the advice of an  if you need this type of document.    **Advance Directives are not required and no one may discriminate against you if you do not sign one.    Medical Orders  Many states allow specific forms/orders signed by your physician to record your wishes for medical treatment in your current state of health. This form, signed in personal communication with your physician, addresses resuscitation and other medical interventions that you may or may not want.      For more information or to schedule a time with a [facility] Advance Care Planning Facilitator contact: [facility] or call [phone] and someone  will contact you directly.Fall Prevention in the Home, Adult  Falls can cause injuries and can happen to people of all ages. There are many things you can do to make your home safer and to help prevent falls.  What actions can I take to prevent falls?  General information  Use good lighting in all rooms. Make sure to:  Replace any light bulbs that burn out.  Turn on the lights in dark areas and use night-lights.  Keep items that you use often in easy-to-reach places. Lower the shelves around your home if needed.  Move furniture so that there are clear paths around it.  Do not use throw rugs or other things on the floor that can make you trip.  If any of your floors are uneven, fix them.  Add color or contrast paint or tape to clearly vishnu and help you see:  Grab bars or handrails.  First and last steps of staircases.  Where the edge of each step is.  If you use a ladder or stepladder:  Make sure that it is fully opened. Do not climb a closed ladder.  Make sure the sides of the ladder are locked in place.  Have someone hold the ladder while you use it.  Know where your pets are as you move through your home.  What can I do in the bathroom?         Keep the floor dry. Clean up any water on the floor right away.  Remove soap buildup in the bathtub or shower. Buildup makes bathtubs and showers slippery.  Use non-skid mats or decals on the floor of the bathtub or shower.  Attach bath mats securely with double-sided, non-slip rug tape.  If you need to sit down in the shower, use a non-slip stool.  Install grab bars by the toilet and in the bathtub and shower. Do not use towel bars as grab bars.  What can I do in the bedroom?  Make sure that you have a light by your bed that is easy to reach.  Do not use any sheets or blankets on your bed that hang to the floor.  Have a firm chair or bench with side arms that you can use for support when you get dressed.  What can I do in the kitchen?  Clean up any spills right away.  If  you need to reach something above you, use a step stool with a grab bar.  Keep electrical cords out of the way.  Do not use floor polish or wax that makes floors slippery.  What can I do with my stairs?  Do not leave anything on the stairs.  Make sure that you have a light switch at the top and the bottom of the stairs.  Make sure that there are handrails on both sides of the stairs. Fix handrails that are broken or loose.  Install non-slip stair treads on all your stairs if they do not have carpet.  Avoid having throw rugs at the top or bottom of the stairs.  Choose a carpet that does not hide the edge of the steps on the stairs. Make sure that the carpet is firmly attached to the stairs. Fix carpet that is loose or worn.  What can I do on the outside of my home?  Use bright outdoor lighting.  Fix the edges of walkways and driveways and fix any cracks. Clear paths of anything that can make you trip, such as tools or rocks.  Add color or contrast paint or tape to clearly vishnu and help you see anything that might make you trip as you walk through a door, such as a raised step or threshold.  Trim any bushes or trees on paths to your home.  Check to see if handrails are loose or broken and that both sides of all steps have handrails. Install guardrails along the edges of any raised decks and porches.  Have leaves, snow, or ice cleared regularly. Use sand, salt, or ice melter on paths if you live where there is ice and snow during the winter.  Clean up any spills in your garage right away. This includes grease or oil spills.  What other actions can I take?  Review your medicines with your doctor. Some medicines can cause dizziness or changes in blood pressure, which increase your risk of falling.  Wear shoes that:  Have a low heel. Do not wear high heels.  Have rubber bottoms and are closed at the toe.  Feel good on your feet and fit well.  Use tools that help you move around if needed. These  include:  Canes.  Walkers.  Scooters.  Crutches.  Ask your doctor what else you can do to help prevent falls. This may include seeing a physical therapist to learn to do exercises to move better and get stronger.  Where to find more information  Centers for Disease Control and PreventionSANG: cdc.gov  National [place] on Aging: [place].nih.gov  National [place] on Aging: [place].nih.gov  Contact a doctor if:  You are afraid of falling at home.  You feel weak, drowsy, or dizzy at home.  You fall at home.  Get help right away if you:  Lose consciousness or have trouble moving after a fall.  Have a fall that causes a head injury.  These symptoms may be an emergency. Get help right away. Call 911.  Do not wait to see if the symptoms will go away.  Do not drive yourself to the hospital.  This information is not intended to replace advice given to you by your health care provider. Make sure you discuss any questions you have with your health care provider.  Document Revised: 08/21/2023 Document Reviewed: 08/21/2023  Elsevier Patient Education © 2024 [employer]

## 2024-08-13 NOTE — PROGRESS NOTES
"Chief complaint:   Chief Complaint   Patient presents with    Back Pain     Pt is here with complaints of back pain, bilateral leg pain.        Subjective     HPI:   Sonu Bravo Jr. is a 79 y.o.  male who presents today for with a complaint of lumbar back and bilateral leg pain, 80% legs, 20% back.  History of C4-5 and C5-6 ACDF 10/11/2022 followed by a DLIF with posterior instrumentation from L2-5 1/24/2023.      Gradual and progressive onset of worsening lumbar back pain over the past 2 years.  His back pain is currently \"mild\" and tolerable, predominantly multi dermatomal bilateral thigh pain, lower extremity cramping, as well as numbness and tingling dysesthesias to the bilateral groin, medial thighs, and feet in a stocking glove distribution from the ankle.  He additionally endorses generalized lower extremity weakness resulting in recurrent falls.  He currently ambulates with a cane.  His leg symptoms in general are most notable at night while lying down, otherwise varies from day-to-day.  Minimal alleviation of discomfort with elevation of his lower extremities and continued use of hydrocodone and ibuprofen which he obtains from Dr. Saleem.  He did trial Lyrica which made him groggy thus discontinued.  He additionally uses gabapentin compounding cream with some relief in his lower extremity symptoms.  Mr. Bravo recently underwent bilateral JAZMIN on 7/31/2024 which was positive for moderate bilateral arterial insufficiency at rest.  Vascular ultrasound pending.  He is also scheduled to undergo an EMG and nerve conduction study of the bilateral lower extremities with Dr. Liu on 8/14/2024 otherwise, he denies saddle anesthesia or bowel dysfunction.  He does require routine self-catheterization to void.  He currently rates the severity of his symptoms 4/10.    ROS  Review of Systems   HENT: Negative.     Eyes: Negative.    Respiratory: Negative.     Cardiovascular: Negative.    Gastrointestinal: " Negative.    Endocrine: Negative.    Genitourinary: Negative.    Musculoskeletal:  Positive for back pain and gait problem.   Skin: Negative.    Allergic/Immunologic: Negative.    Neurological:  Positive for weakness and numbness.   Hematological: Negative.    Psychiatric/Behavioral: Negative.     All other systems reviewed and are negative.    PFSH:  Past Medical History:   Diagnosis Date    Arthritis     Atrial fibrillation 08/31/2021    Cataracts, bilateral     Disease of thyroid gland     Diverticulitis     GERD (gastroesophageal reflux disease)     Hyperlipidemia     Hypertension     Low back pain     Mononucleosis 1964     Past Surgical History:   Procedure Laterality Date    ANTERIOR CERVICAL DISCECTOMY W/ FUSION N/A 10/11/2022    Procedure: CERVICAL DISCECTOMY ANTERIOR WITH FUSION, Cervical 4/5 and 5/6;  Surgeon: Durga Cifuentes MD;  Location: [facility] OR;  Service: Neurosurgery;  Laterality: N/A;    BACK SURGERY N/A     CATARACT EXTRACTION Right     COLON SURGERY  01/2022    Dr Fairbanks at [facility]    COLONOSCOPY N/A 09/28/2021    Procedure: COLONOSCOPY WITH ANESTHESIA;  Surgeon: Thomas Dc DO;  Location: [facility] ENDOSCOPY;  Service: Gastroenterology;  Laterality: N/A;  pre op; abnormal ct scan  post op: diverticulosis ; polyps   PCP: Vaughn Kramer DO    COLONOSCOPY N/A 01/23/2024    Procedure: COLONOSCOPY WITH ANESTHESIA;  Surgeon: Thomas Dc DO;  Location: [facility] ENDOSCOPY;  Service: Gastroenterology;  Laterality: N/A;  Pre: Altered bowel function;  Post: Polyps;  Vaughn Kramer DO    HERNIA REPAIR      LUMBAR DIRECT LATERAL INTERBODY FUSION N/A 01/24/2023    Procedure: LUMBAR DIRECT LATERAL INTERBODY FUSION; LUMBAR 2-5;  Surgeon: Durga Cifuentes MD;  Location: [facility] OR;  Service: Neurosurgery;  Laterality: N/A;    LUMBAR FUSION N/A 01/24/2023    Procedure: LUMBAR DIRECT LATERAL INTERBODY FUSION; LUMBAR 2-5, LUMBAR LAMINECTOMY TRANSFORAMINAL LUMBAR  INTERBODY FUSION; LUMBAR 2-5;  Surgeon: Durga Cifuentes MD;  Location: [facility];  Service: Robotics - Neuro;  Laterality: N/A;    SKIN SURGERY      Skin cancer surgery.     Objective      Current Outpatient Medications   Medication Sig Dispense Refill    apixaban (Eliquis) 5 MG tablet tablet Take 1 tablet by mouth 2 (Two) Times a Day. 180 tablet 1    azelastine (ASTELIN) 0.1 % nasal spray 2 sprays into the nostril(s) as directed by provider 2 (Two) Times a Day. Use in each nostril as directed 30 mL 3    bisoprolol (ZEBeta) 5 MG tablet Take 1 tablet by mouth Daily. 90 tablet 1    cefdinir (OMNICEF) 300 MG capsule Take 1 capsule by mouth 2 (Two) Times a Day for 7 days. 14 capsule 0    Cholecalciferol 100 MCG (4000 UT) capsule Take 4,000 Units by mouth Daily. Indications: Vitamin D Deficiency      coenzyme Q10 100 MG capsule Take 1 capsule by mouth Daily. Indications: Heart Rhythm Disorder      Cyanocobalamin 2500 MCG sublingual tablet Take 2,500 mcg by mouth Daily. Indications: Inadequate Vitamin B12      doxycycline (VIBRAMYCIN) 100 MG capsule Take 1 capsule by mouth 2 (Two) Times a Day. 7 capsule 0    fexofenadine (ALLEGRA) 180 MG tablet Take 1 tablet by mouth Daily. Indications: Hayfever      fluticasone (FLONASE) 50 MCG/ACT nasal spray 1 spray into the nostril(s) as directed by provider Daily. Indications: Stuffy Nose 32 g 3    HYDROcodone-acetaminophen (NORCO) 7.5-325 MG per tablet Take 1 tablet by mouth At Night As Needed for Moderate Pain or Severe Pain. 12 tablet 0    ibuprofen (ADVIL,MOTRIN) 200 MG tablet Take 1 tablet by mouth Every 6 (Six) Hours As Needed for Mild Pain.      levothyroxine (SYNTHROID, LEVOTHROID) 112 MCG tablet Take 1 tablet by mouth Every Morning. 90 tablet 1    losartan (Cozaar) 100 MG tablet Take 1 tablet by mouth Daily. 90 tablet 1    Magnesium 250 MG tablet Take  by mouth.      omeprazole (priLOSEC) 20 MG capsule Take 1 capsule by mouth Daily. 90 capsule 3    polyethylene  "glycol (MiraLax) 17 GM/SCOOP powder Take 17 g by mouth As Needed.      rosuvastatin (CRESTOR) 20 MG tablet Take 1 tablet by mouth Daily. 90 tablet 3    Sennosides (Senna) 8.6 MG capsule Take 17.2 mg by mouth Every Night. (Patient taking differently: Take 17.2 mg by mouth As Needed.) 60 each 3    terazosin (HYTRIN) 2 MG capsule Take 1 capsule by mouth Every Night.      Zinc 50 MG capsule Take 1 capsule by mouth Daily. Indications: Zinc Deficiency       No current facility-administered medications for this visit.     Vital Signs  Ht 180.3 cm (71\")   Wt 78.9 kg (174 lb)   BMI 24.27 kg/m²   Physical Exam  Vitals and nursing note reviewed.   Constitutional:       General: He is not in acute distress.     Appearance: Normal appearance. He is well-developed, well-groomed and overweight. He is not ill-appearing, toxic-appearing or diaphoretic.          Comments: BMI 24.27   HENT:      Head: Normocephalic and atraumatic.      Right Ear: Hearing normal.      Left Ear: Hearing normal.   Eyes:      Extraocular Movements: EOM normal.      Conjunctiva/sclera: Conjunctivae normal.      Pupils: Pupils are equal, round, and reactive to light.   Neck:      Trachea: Trachea normal.   Cardiovascular:      Rate and Rhythm: Normal rate and regular rhythm.   Pulmonary:      Effort: Pulmonary effort is normal. No tachypnea, bradypnea, accessory muscle usage or respiratory distress.   Abdominal:      Palpations: Abdomen is soft.   Musculoskeletal:      Cervical back: Full passive range of motion without pain and neck supple.   Skin:     General: Skin is warm and dry.   Neurological:      Mental Status: He is alert and oriented to person, place, and time.      GCS: GCS eye subscore is 4. GCS verbal subscore is 5. GCS motor subscore is 6.      Deep Tendon Reflexes:      Reflex Scores:       Tricep reflexes are 1+ on the right side and 1+ on the left side.       Bicep reflexes are 1+ on the right side and 1+ on the left side.       " Brachioradialis reflexes are 1+ on the right side and 1+ on the left side.       Patellar reflexes are 3+ on the right side and 1+ on the left side.       Achilles reflexes are 2+ on the right side and 1+ on the left side.  Psychiatric:         Speech: Speech normal.         Behavior: Behavior normal. Behavior is cooperative.       Neurologic Exam     Mental Status   Oriented to person, place, and time.   Attention: normal. Concentration: normal.   Speech: speech is normal   Level of consciousness: alert    Cranial Nerves     CN II   Visual fields full to confrontation.     CN III, IV, VI   Pupils are equal, round, and reactive to light.  Extraocular motions are normal.     CN V   Facial sensation intact.     CN VII   Facial expression full, symmetric.     CN VIII   CN VIII normal.     CN IX, X   CN IX normal.     CN XI   CN XI normal.     Motor Exam   Right arm tone: normal  Left arm tone: normal  Right leg tone: normal  Left leg tone: normal    Strength   Right deltoid: 5/5  Left deltoid: 5/5  Right biceps: 5/5  Left biceps: 5/5  Right triceps: 5/5  Left triceps: 5/5  Right wrist extension: 5/5  Left wrist extension: 5/5  Right interossei: 5/5  Left interossei: 5/5  Right iliopsoas: 5/5  Left iliopsoas: 5/5  Right quadriceps: 5/5  Left quadriceps: 5/5  Right anterior tibial: 5/5  Left anterior tibial: 5/5  Right gastroc: 5/5  Left gastroc: 5/5  Right EHL 5/5  Left EHL 5/5       Sensory Exam   Right arm light touch: normal  Left arm light touch: normal  Right leg light touch: decreased from ankle  Left leg light touch: decreased from ankle    Gait, Coordination, and Reflexes     Gait  Gait: shuffling    Tremor   Resting tremor: absent  Intention tremor: absent  Action tremor: absent    Reflexes   Right brachioradialis: 1+  Left brachioradialis: 1+  Right biceps: 1+  Left biceps: 1+  Right triceps: 1+  Left triceps: 1+  Right patellar: 3+  Left patellar: 1+  Right achilles: 2+  Left achilles: 1+  Right plantar:  normal  Left plantar: upgoing  Right Joel: absent  Left Joel: absent  Right ankle clonus: absent  Left ankle clonus: absent  Right pendular knee jerk: absent  Left pendular knee jerk: absent     (12 bullet pts)    Male  strength (pounds)  AGE Right Hand RH Norms Left Hand LH Norms   20-24   121+20.6   104+21.8   25-29   120+23.0   110+16.2   30-34   121+22.4   110+21.7   35-39   119+24   113+21.7   40-44   117+20.7   112+18.7   45-49   110+23.0   101+22.8   50-54   113+18.1   102+17   55-59   101+26.7   83+23.4   60-64   90+20.4   77+20.3   65-69   91+20.6   76.8+19.8   70-74   75+21.5   65+18.1   75+ 59>73, 75 66+21.0 75>69, 72 55+17.0   (DANIELLE Cleary et al; Hand Dynometer: Effects of trials and sessions.  Perpetual and Motor Skills 61:195-8, 1985)  Key  * = Dominant hand  > = Intervention    Results Review:       5/22/2024.  Stable appearing ACDF 4-C6      Preop          AP and lateral x-rays without evidence of hardware failure or fracture       Postoperative MRI showing good decompression and placement of all grafts.           Assessment/Plan:   Sonu Bravo Jr. is a 79 y.o. male with significant medical comorbidities to include C4-5 and C5-6 ACDF 10/11/2022, DLIF with posterior instrumentation from L2-5 1/24/2023, hypertension, hyperlipidemia, A-fib currently on Eliquis, mononucleosis, hypothyroidism, and tobacco abuse.  He presents today with a new complaint of lumbar back and bilateral leg pain and dysesthesias, 80% legs, 20% back.   Physical exam findings of healing area of cellulitis to the right anterior leg, gross hyporeflexia, left Babinski, and a shuffling gait.  No recent imaging of the lumbar spine.  Recent bilateral JAZMIN shows moderate arterial insufficiency of the bilateral lower extremities at rest.    Recommendations:  Lumbar back pain  Bilateral leg pain and dysesthesias  History of DLIF with posterior mentation L2-5 (1/24/2023)  Mr. Bravo recently completed a dedicated course of physician  directed physical therapy in April 2024 [facility].  For further evaluation we will send him for x-rays of the lumbar spine complete with flexion-extension to assess for areas of instability, as well as a noncontrasted CT of the lumbar spine to rule out pseudoarthrosis and hardware failure and a noncontrasted MRI of the lumbar spine to rule out lumbar stenosis and need for surgical intervention.  I would like him to return for reassessment with Dr. Cifuentes after all studies been completed.  In the interim, I recommended he obtain EMG and nerve conduction study, as well as proceed with bilateral lower extremity ultrasound per Dr. Estes.  Mr. Bravo knows he may contact the neurosurgical clinic to return sooner for any new or additional concerns and agrees with this plan of care.    Tobacco abuse  The patient understands the many dangers of continuing to use tobacco.  If quitting becomes an increased priority Sonu knows to contact us for help with quitting.       SANG Fall Risk Assessment was completed, and patient is at HIGH risk for falls. Assessment completed on:8/13/2024  Fall risk information provided in AVS.    Diagnoses and all orders for this visit:    1. Lumbar back pain (Primary)  -     XR Spine Lumbar Flex & Ext; Future  -     MRI Lumbar Spine Without Contrast; Future    2. Pain in both lower extremities  -     MRI Lumbar Spine Without Contrast; Future    3. Numbness and tingling of both lower extremities  -     MRI Lumbar Spine Without Contrast; Future    4. History of lumbar fusion  -     CT Lumbar Spine Without Contrast; Future    5. At risk for falls    6. Tobacco abuse      Return for FOLLOW UP WITH DR. CIFUENTES NEXT AVAILABLE WITH CT AND MRI..    Thank you, for allowing me to continue to participate in the care of this patient.    Sincerely,  MARY Pal

## 2024-09-10 ENCOUNTER — HOSPITAL ENCOUNTER (OUTPATIENT)
Dept: MRI IMAGING | Facility: HOSPITAL | Age: 79
Discharge: HOME | End: 2024-09-10
Payer: MEDICARE

## 2024-09-10 ENCOUNTER — HOSPITAL ENCOUNTER (OUTPATIENT)
Dept: CT IMAGING | Facility: HOSPITAL | Age: 79
Discharge: HOME | End: 2024-09-10
Payer: MEDICARE

## 2024-09-10 DIAGNOSIS — M79.604 PAIN IN BOTH LOWER EXTREMITIES: ICD-10-CM

## 2024-09-10 DIAGNOSIS — R20.2 NUMBNESS AND TINGLING OF BOTH LOWER EXTREMITIES: ICD-10-CM

## 2024-09-10 DIAGNOSIS — M79.605 PAIN IN BOTH LOWER EXTREMITIES: ICD-10-CM

## 2024-09-10 DIAGNOSIS — M54.50 LUMBAR BACK PAIN: ICD-10-CM

## 2024-09-10 DIAGNOSIS — R20.0 NUMBNESS AND TINGLING OF BOTH LOWER EXTREMITIES: ICD-10-CM

## 2024-09-10 DIAGNOSIS — Z98.1 HISTORY OF LUMBAR FUSION: ICD-10-CM

## 2024-09-10 PROCEDURE — 72131 CT LUMBAR SPINE W/O DYE: CPT

## 2024-09-10 PROCEDURE — 72148 MRI LUMBAR SPINE W/O DYE: CPT

## 2024-09-11 ENCOUNTER — TELEPHONE (OUTPATIENT)
Dept: VASCULAR SURGERY | Facility: CLINIC | Age: 79
End: 2024-09-11
Payer: MEDICARE

## 2024-09-11 ENCOUNTER — DOCUMENTATION (OUTPATIENT)
Dept: NEUROSURGERY | Facility: CLINIC | Age: 79
End: 2024-09-11
Payer: MEDICARE

## 2024-09-12 ENCOUNTER — OFFICE VISIT (OUTPATIENT)
Dept: VASCULAR SURGERY | Facility: CLINIC | Age: 79
End: 2024-09-12
Payer: MEDICARE

## 2024-09-12 ENCOUNTER — HOSPITAL ENCOUNTER (OUTPATIENT)
Dept: ULTRASOUND IMAGING | Facility: HOSPITAL | Age: 79
Discharge: HOME | End: 2024-09-12
Admitting: NURSE PRACTITIONER
Payer: MEDICARE

## 2024-09-12 VITALS
OXYGEN SATURATION: 95 % | BODY MASS INDEX: 23.8 KG/M2 | SYSTOLIC BLOOD PRESSURE: 132 MMHG | HEIGHT: 71 IN | DIASTOLIC BLOOD PRESSURE: 70 MMHG | WEIGHT: 170 LBS | HEART RATE: 59 BPM

## 2024-09-12 DIAGNOSIS — I65.23 BILATERAL CAROTID ARTERY STENOSIS: ICD-10-CM

## 2024-09-12 DIAGNOSIS — I87.323 CHRONIC VENOUS HYPERTENSION WITH INFLAMMATION INVOLVING BOTH SIDES: ICD-10-CM

## 2024-09-12 DIAGNOSIS — E78.2 MIXED HYPERLIPIDEMIA: ICD-10-CM

## 2024-09-12 DIAGNOSIS — I10 PRIMARY HYPERTENSION: ICD-10-CM

## 2024-09-12 DIAGNOSIS — I73.9 PAD (PERIPHERAL ARTERY DISEASE): Primary | ICD-10-CM

## 2024-09-12 DIAGNOSIS — Z72.0 TOBACCO USE: ICD-10-CM

## 2024-09-12 PROCEDURE — 93970 EXTREMITY STUDY: CPT

## 2024-09-12 NOTE — PROGRESS NOTES
"9/12/2024        Vaughn Kramer,   [address]  [place] [address]    Sonu Bravo Jr.  1945    Chief Complaint   Patient presents with    Follow-up     1 month follow up w/ testing. Last seen 8/9/24. Patient denies any changes since last visit.        Dear Vaughn Kramer DO     I had the pleasure of seeing your patient Sonu Bravo Jr. in the office today.  As you recall, Sonu Bravo Jr. is a 79 y.o.  male who you are following for routine health maintenance.  He is here today for 1 month follow-up with testing.  He continues to have pain to his back, hip, and thighs, with numbness and tingling to his feet.  He has complaints of right leg being red and bridget in color.  He also complained of cramps to bilateral lower legs left worse than right at rest.  He has a history of 3 level spinal fusions.  He has significant vascular disease as noted on previous CT of abdomen pelvis including renal artery stenosis, celiac stenosis, SMA stenosis and aortoiliac disease.  He is maintained on Eliquis and Crestor.  He did have noninvasive testing performed , which I did review in office.     Review of Systems   Constitutional: Negative.  Negative for diaphoresis and fever.   HENT: Negative.     Eyes: Negative.    Respiratory: Negative.  Negative for shortness of breath and wheezing.    Cardiovascular: Negative.  Negative for chest pain and leg swelling.   Gastrointestinal: Negative.  Negative for abdominal pain.   Endocrine: Negative.    Genitourinary: Negative.    Musculoskeletal:  Positive for back pain.   Skin: Negative.    Allergic/Immunologic: Negative.    Neurological: Negative.  Negative for dizziness and weakness.   Hematological: Negative.    Psychiatric/Behavioral: Negative.          /70   Pulse 59   Ht 180.3 cm (71\")   Wt 77.1 kg (170 lb)   SpO2 95%   BMI 23.71 kg/m²     Physical Exam  Vitals and nursing note reviewed.   Constitutional:       " General: He is not in acute distress.     Appearance: Normal appearance. He is not diaphoretic.   HENT:      Head: Normocephalic. No right periorbital erythema or left periorbital erythema.      Nose: Nose normal.   Eyes:      General: No scleral icterus.     Pupils: Pupils are equal.   Cardiovascular:      Rate and Rhythm: Normal rate and regular rhythm.      Pulses: Normal pulses.      Heart sounds: Normal heart sounds. No murmur heard.  Pulmonary:      Effort: Pulmonary effort is normal. No respiratory distress.      Breath sounds: Normal breath sounds.   Abdominal:      General: Bowel sounds are normal. There is no distension.      Palpations: Abdomen is soft.      Tenderness: There is no abdominal tenderness. There is no guarding.   Musculoskeletal:         General: No swelling or tenderness. Normal range of motion.      Cervical back: Normal range of motion and neck supple.      Right lower leg: No edema.      Left lower leg: No edema.   Feet:      Right foot:      Skin integrity: Skin integrity normal.      Left foot:      Skin integrity: Skin integrity normal.   Skin:     General: Skin is warm and dry.      Findings: No erythema or rash.   Neurological:      General: No focal deficit present.      Mental Status: He is alert and oriented to person, place, and time. Mental status is at baseline.      Cranial Nerves: No cranial nerve deficit.      Gait: Gait normal.   Psychiatric:         Attention and Perception: Attention normal.         Mood and Affect: Mood normal.         Behavior: Behavior normal.         Thought Content: Thought content normal.         Judgment: Judgment normal.       Diagnostic data:        Patient Active Problem List   Diagnosis    Abdominal aortic ectasia    Vitamin D deficiency    Mixed hyperlipidemia    Acquired hypothyroidism    Gastro-esophageal reflux disease with esophagitis    Primary hypertension    Former smoker    Paroxysmal atrial fibrillation    Current use of long term  anticoagulation    Cavitary lesion of lung    History of DVT (deep vein thrombosis)    Cervical spondylosis with myelopathy    Spinal stenosis, lumbar region with neurogenic claudication    Impaired glucose tolerance    Dural arteriovenous fistula of spinal cord    Neurogenic bladder    Self-catheterizes urinary bladder    Altered bowel function    Neuropathy involving both lower extremities    Vitreous degeneration    Retinal drusen    Pseudophakia    Benign neoplasm of choroid    Age-related nuclear cataract, left eye        Diagnosis Plan   1. PAD (peripheral artery disease)  US Ankle / Brachial Indices Extremity Complete      2. Bilateral carotid artery stenosis  US Carotid Bilateral      3. Primary hypertension        4. Mixed hyperlipidemia        5. Tobacco use                Plan: After thoroughly evaluating Sonu Bravo Jr., I believe the best course of action is to remain conservative from vascular surgery standpoint.  I did review his JAZMIN which shows no venous insufficiency to his bilateral lower extremities.  He did have previous ABIs which showed mild arterial insufficiency to the right lower extremity and moderate arterial insufficiency to the left lower extremity.  He is asymptomatic in regard to claudication symptoms at this time.  His bilateral feet are warm to touch and pulses are palpable.  We will see him back in 1 year with noninvasive testing to include carotid duplex and ABIs for continued surveillance.  He is maintained on Eliquis 5 mg twice daily and Crestor 20 mg daily.  I did discuss vascular risk factors as they pertain to the progression of vascular disease including controlling his hypertension, hyperlipidemia, and smoking cessation.  His blood pressure stable today in office.  His most recent lipid panel shows all values within normal limits.  Unfortunately, he is a daily smoker and does not have a desire to quit smoking at this time.    He can continue taking all medications as  previously planned.  This was all discussed in full with complete understanding.    Thank you for allowing me to participate in the care of your patient.  Please do not hesitate to call with any questions or concerns.  We will keep you aware of any further encounters with Sonu Bravo Jr..        Sincerely yours,         Mitzy De La Vega, MARY Kramer, Vaughn Finn, DO

## 2024-09-18 ENCOUNTER — TELEPHONE (OUTPATIENT)
Dept: NEUROSURGERY | Facility: CLINIC | Age: 79
End: 2024-09-18
Payer: MEDICARE

## 2024-09-23 ENCOUNTER — TELEPHONE (OUTPATIENT)
Dept: NEUROSURGERY | Facility: CLINIC | Age: 79
End: 2024-09-23
Payer: MEDICARE

## 2024-09-30 ENCOUNTER — TELEPHONE (OUTPATIENT)
Dept: MRI IMAGING | Facility: HOSPITAL | Age: 79
End: 2024-09-30
Payer: MEDICARE

## 2024-09-30 DIAGNOSIS — N31.9 NEUROGENIC BLADDER: Primary | ICD-10-CM

## 2024-09-30 DIAGNOSIS — Z78.9 SELF-CATHETERIZES URINARY BLADDER: ICD-10-CM

## 2024-09-30 DIAGNOSIS — N13.30 HYDRONEPHROSIS, UNSPECIFIED HYDRONEPHROSIS TYPE: ICD-10-CM

## 2024-10-01 ENCOUNTER — TELEPHONE (OUTPATIENT)
Dept: INTERNAL MEDICINE | Facility: CLINIC | Age: 79
End: 2024-10-01
Payer: MEDICARE

## 2024-10-01 NOTE — TELEPHONE ENCOUNTER
Caller: Sonu Bravo Jr.    Relationship: Self    Best call back number: [phone]     What is the best time to reach you: ANYTIME    Who are you requesting to speak with (clinical staff, provider,  specific staff member): CLINICAL     Do you know the name of the person who called:     What was the call regarding: PATIENT STATED HE RECEIVED A CALL FROM RADIOLOGY SAYING THEY FOUND SOMETHING ABOUT HIS KIDNEYS. THIS IS THE FIRST PATIENT HAS HEARD ABOUT THIS AND IS VERY CONCERNED.     Is it okay if the provider responds through MyChart: PHONE CALL

## 2024-10-01 NOTE — TELEPHONE ENCOUNTER
Spoke with patient explained to him that we just want to be sure there is nothing concerning. He was okay with that. He is scheduled for Oct 9th at 4pm voice understanding.

## 2024-10-09 ENCOUNTER — HOSPITAL ENCOUNTER (OUTPATIENT)
Dept: CT IMAGING | Facility: HOSPITAL | Age: 79
Discharge: HOME | End: 2024-10-09
Admitting: INTERNAL MEDICINE
Payer: MEDICARE

## 2024-10-09 DIAGNOSIS — N13.30 HYDRONEPHROSIS, UNSPECIFIED HYDRONEPHROSIS TYPE: ICD-10-CM

## 2024-10-09 DIAGNOSIS — N31.9 NEUROGENIC BLADDER: ICD-10-CM

## 2024-10-09 DIAGNOSIS — Z78.9 SELF-CATHETERIZES URINARY BLADDER: ICD-10-CM

## 2024-10-09 PROCEDURE — 74176 CT ABD & PELVIS W/O CONTRAST: CPT

## 2024-10-18 ENCOUNTER — TELEPHONE (OUTPATIENT)
Dept: INTERNAL MEDICINE | Facility: CLINIC | Age: 79
End: 2024-10-18
Payer: MEDICARE

## 2024-10-18 DIAGNOSIS — N31.9 NEUROGENIC BLADDER: Primary | ICD-10-CM

## 2024-10-18 DIAGNOSIS — Q62.11 HYDRONEPHROSIS WITH URETEROPELVIC JUNCTION (UPJ) OBSTRUCTION: ICD-10-CM

## 2024-10-18 NOTE — TELEPHONE ENCOUNTER
Caller: Sonu Bravo Jr.    Relationship: Self    Best call back number: [phone]     What is the medical concern/diagnosis: NEUROGENETIC BLADDER    What specialty or service is being requested: UROLOGY    What is the provider, practice or medical service name: [facility]    What is the office phone number: [phone]    Any additional details: TRANSFER OF CARE FROM DR.GILBERT OBANDO TO DR. HUMA JENKINS  WITH [facility].

## 2024-10-22 ENCOUNTER — OFFICE VISIT (OUTPATIENT)
Dept: INTERNAL MEDICINE | Facility: CLINIC | Age: 79
End: 2024-10-22
Payer: MEDICARE

## 2024-10-22 VITALS
SYSTOLIC BLOOD PRESSURE: 138 MMHG | BODY MASS INDEX: 24 KG/M2 | HEART RATE: 57 BPM | WEIGHT: 171.4 LBS | HEIGHT: 71 IN | DIASTOLIC BLOOD PRESSURE: 81 MMHG | OXYGEN SATURATION: 100 % | RESPIRATION RATE: 16 BRPM

## 2024-10-22 DIAGNOSIS — E78.2 MIXED HYPERLIPIDEMIA: ICD-10-CM

## 2024-10-22 DIAGNOSIS — N13.30 HYDRONEPHROSIS, UNSPECIFIED HYDRONEPHROSIS TYPE: ICD-10-CM

## 2024-10-22 DIAGNOSIS — G25.81 RLS (RESTLESS LEGS SYNDROME): ICD-10-CM

## 2024-10-22 DIAGNOSIS — E03.9 ACQUIRED HYPOTHYROIDISM: ICD-10-CM

## 2024-10-22 DIAGNOSIS — N31.9 NEUROGENIC BLADDER: ICD-10-CM

## 2024-10-22 DIAGNOSIS — R73.02 IMPAIRED GLUCOSE TOLERANCE: Primary | ICD-10-CM

## 2024-10-22 DIAGNOSIS — Z78.9 SELF-CATHETERIZES URINARY BLADDER: ICD-10-CM

## 2024-10-22 DIAGNOSIS — Z86.718 HISTORY OF DVT (DEEP VEIN THROMBOSIS): ICD-10-CM

## 2024-10-22 DIAGNOSIS — I10 PRIMARY HYPERTENSION: ICD-10-CM

## 2024-10-22 DIAGNOSIS — I48.0 PAROXYSMAL ATRIAL FIBRILLATION: ICD-10-CM

## 2024-10-22 DIAGNOSIS — J30.2 SEASONAL ALLERGIES: ICD-10-CM

## 2024-10-22 PROCEDURE — 3079F DIAST BP 80-89 MM HG: CPT | Performed by: INTERNAL MEDICINE

## 2024-10-22 PROCEDURE — 99214 OFFICE O/P EST MOD 30 MIN: CPT | Performed by: INTERNAL MEDICINE

## 2024-10-22 PROCEDURE — 1125F AMNT PAIN NOTED PAIN PRSNT: CPT | Performed by: INTERNAL MEDICINE

## 2024-10-22 PROCEDURE — 3075F SYST BP GE 130 - 139MM HG: CPT | Performed by: INTERNAL MEDICINE

## 2024-10-22 PROCEDURE — G0439 PPPS, SUBSEQ VISIT: HCPCS | Performed by: INTERNAL MEDICINE

## 2024-10-22 RX ORDER — LEVOTHYROXINE SODIUM 112 UG/1
112 TABLET ORAL
Qty: 90 TABLET | Refills: 1 | Status: SHIPPED | OUTPATIENT
Start: 2024-10-22

## 2024-10-22 RX ORDER — BISOPROLOL FUMARATE 5 MG/1
5 TABLET, FILM COATED ORAL DAILY
Qty: 90 TABLET | Refills: 1 | Status: SHIPPED | OUTPATIENT
Start: 2024-10-22

## 2024-10-22 RX ORDER — HYDROCODONE BITARTRATE AND ACETAMINOPHEN 10; 325 MG/1; MG/1
1 TABLET ORAL DAILY PRN
COMMUNITY
Start: 2024-10-01

## 2024-10-22 RX ORDER — LOSARTAN POTASSIUM 100 MG/1
100 TABLET ORAL DAILY
Qty: 90 TABLET | Refills: 1 | Status: SHIPPED | OUTPATIENT
Start: 2024-10-22

## 2024-10-22 RX ORDER — AZELASTINE 1 MG/ML
2 SPRAY, METERED NASAL 2 TIMES DAILY
Qty: 30 ML | Refills: 11 | Status: SHIPPED | OUTPATIENT
Start: 2024-10-22

## 2024-10-22 RX ORDER — PRAMIPEXOLE DIHYDROCHLORIDE 0.25 MG/1
.25-.5 TABLET ORAL NIGHTLY
Qty: 30 TABLET | Refills: 0 | Status: SHIPPED | OUTPATIENT
Start: 2024-10-22

## 2024-10-22 NOTE — PROGRESS NOTES
Subjective   The ABCs of the Annual Wellness Visit  Medicare Wellness Visit      Sonu Bravo Jr. is a 79 y.o. patient who presents for a Medicare Wellness Visit.    The following portions of the patient's history were reviewed and   updated as appropriate: allergies, current medications, past family history, past medical history, past social history, past surgical history, and problem list.    Compared to one year ago, the patient's physical   health is the same.  Compared to one year ago, the patient's mental   health is the same.    Recent Hospitalizations:  He was not admitted to the hospital during the last year.     Current Medical Providers:  Patient Care Team:  Vaughn Kramer DO as PCP - General (Internal Medicine)  Thomas Dc DO as Consulting Physician (Gastroenterology)  Tien Canchola MD as Consulting Physician (Pulmonary Disease)  Cb Fairbanks MD as Surgeon (General Surgery)  Durga Cifuentes MD as Surgeon (Neurosurgery)  Brennan Salinas APRN as Nurse Practitioner (Nurse Practitioner)  Jan Saleem DO as Consulting Physician (Pain Medicine)    Outpatient Medications Prior to Visit   Medication Sig Dispense Refill    Cholecalciferol 100 MCG (4000 UT) capsule Take 4,000 Units by mouth Daily. Indications: Vitamin D Deficiency      coenzyme Q10 100 MG capsule Take 1 capsule by mouth Daily. Indications: Heart Rhythm Disorder      Cyanocobalamin 2500 MCG sublingual tablet Take 2,500 mcg by mouth Daily. Indications: Inadequate Vitamin B12      doxycycline (VIBRAMYCIN) 100 MG capsule Take 1 capsule by mouth 2 (Two) Times a Day. 7 capsule 0    fexofenadine (ALLEGRA) 180 MG tablet Take 1 tablet by mouth Daily. Indications: Hayfever      fluticasone (FLONASE) 50 MCG/ACT nasal spray 1 spray into the nostril(s) as directed by provider Daily. Indications: Stuffy Nose 32 g 3    HYDROcodone-acetaminophen (NORCO)  MG per tablet Take 1 tablet by mouth Daily As Needed.       ibuprofen (ADVIL,MOTRIN) 200 MG tablet Take 1 tablet by mouth Every 6 (Six) Hours As Needed for Mild Pain.      NON FORMULARY Apply 1 Units topically to the appropriate area as directed 2 (Two) Times a Day.      omeprazole (priLOSEC) 20 MG capsule Take 1 capsule by mouth Daily. 90 capsule 3    polyethylene glycol (MiraLax) 17 GM/SCOOP powder Take 17 g by mouth As Needed.      rosuvastatin (CRESTOR) 20 MG tablet Take 1 tablet by mouth Daily. 90 tablet 3    terazosin (HYTRIN) 2 MG capsule Take 1 capsule by mouth Every Night.      Zinc 50 MG capsule Take 1 capsule by mouth Daily. Indications: Zinc Deficiency      apixaban (Eliquis) 5 MG tablet tablet Take 1 tablet by mouth 2 (Two) Times a Day. 180 tablet 1    azelastine (ASTELIN) 0.1 % nasal spray 2 sprays into the nostril(s) as directed by provider 2 (Two) Times a Day. Use in each nostril as directed 30 mL 3    bisoprolol (ZEBeta) 5 MG tablet Take 1 tablet by mouth Daily. 90 tablet 1    HYDROcodone-acetaminophen (NORCO) 7.5-325 MG per tablet Take 1 tablet by mouth At Night As Needed for Moderate Pain or Severe Pain. 12 tablet 0    levothyroxine (SYNTHROID, LEVOTHROID) 112 MCG tablet Take 1 tablet by mouth Every Morning. 90 tablet 1    losartan (Cozaar) 100 MG tablet Take 1 tablet by mouth Daily. 90 tablet 1    Magnesium 250 MG tablet Take  by mouth. (Patient not taking: Reported on 10/22/2024)       No facility-administered medications prior to visit.     Opioid medication/s are on active medication list.  and I have evaluated his active treatment plan and pain score trends (see table).  There were no vitals filed for this visit.  I have reviewed the chart for potential of high risk medication and harmful drug interactions in the elderly.        Aspirin is not on active medication list.  Aspirin use is not indicated based on review of current medical condition/s. Risk of harm outweighs potential benefits.  .    Patient Active Problem List   Diagnosis    Abdominal  "aortic ectasia    Vitamin D deficiency    Mixed hyperlipidemia    Acquired hypothyroidism    Gastro-esophageal reflux disease with esophagitis    Primary hypertension    Former smoker    Paroxysmal atrial fibrillation    Current use of long term anticoagulation    Cavitary lesion of lung    History of DVT (deep vein thrombosis)    Cervical spondylosis with myelopathy    Spinal stenosis, lumbar region with neurogenic claudication    Impaired glucose tolerance    Dural arteriovenous fistula of spinal cord    Neurogenic bladder    Self-catheterizes urinary bladder    Altered bowel function    Neuropathy involving both lower extremities    Vitreous degeneration    Retinal drusen    Pseudophakia    Benign neoplasm of choroid    Age-related nuclear cataract, left eye     Advance Care Planning Advance Directive is on file.  ACP discussion was held with the patient during this visit. Patient has an advance directive in EMR which is still valid.             Objective   Vitals:    10/22/24 1012   BP: 138/81   BP Location: Left arm   Patient Position: Sitting   Cuff Size: Adult   Pulse: 57   Resp: 16   SpO2: 100%   Weight: 77.7 kg (171 lb 6.4 oz)   Height: 180.3 cm (71\")       Estimated body mass index is 23.91 kg/m² as calculated from the following:    Height as of this encounter: 180.3 cm (71\").    Weight as of this encounter: 77.7 kg (171 lb 6.4 oz).    BMI is within normal parameters. No other follow-up for BMI required.       Does the patient have evidence of cognitive impairment? No                                                                                                Health  Risk Assessment    Smoking Status:  Social History     Tobacco Use   Smoking Status Every Day    Current packs/day: 0.50    Average packs/day: 0.5 packs/day for 57.8 years (28.9 ttl pk-yrs)    Types: Cigarettes    Start date: 1967    Passive exposure: Current   Smokeless Tobacco Never   Tobacco Comments    down to 1/2 pack a day " 12/20/23--5/22/24--6/20/24--10/22/24     Alcohol Consumption:  Social History     Substance and Sexual Activity   Alcohol Use Yes    Comment: rare       Fall Risk Screen  STEADI Fall Risk Assessment was completed, and patient is at MODERATE risk for falls. Assessment completed on:10/22/2024    Depression Screening:      10/22/2024    10:26 AM   PHQ-2/PHQ-9 Depression Screening   Little interest or pleasure in doing things Not at all   Feeling down, depressed, or hopeless Not at all     Health Habits and Functional and Cognitive Screening:      10/22/2024    10:00 AM   Functional & Cognitive Status   Do you have difficulty preparing food and eating? No   Do you have difficulty bathing yourself, getting dressed or grooming yourself? No   Do you have difficulty using the toilet? No   Do you have difficulty moving around from place to place? No   Do you have trouble with steps or getting out of a bed or a chair? No   Current Diet Unhealthy Diet   Dental Exam Up to date   Eye Exam Up to date   Exercise (times per week) 0 times per week   Current Exercises Include No Regular Exercise   Do you need help using the phone?  No   Are you deaf or do you have serious difficulty hearing?  No   Do you need help to go to places out of walking distance? No   Do you need help shopping? No   Do you need help preparing meals?  No   Do you need help with housework?  No   Do you need help with laundry? No   Do you need help taking your medications? No   Do you need help managing money? No   Do you ever drive or ride in a car without wearing a seat belt? No   Have you felt unusual stress, anger or loneliness in the last month? No   Who do you live with? Spouse   If you need help, do you have trouble finding someone available to you? No   Have you been bothered in the last four weeks by sexual problems? No   Do you have difficulty concentrating, remembering or making decisions? No           Age-appropriate Screening Schedule:  Refer to the  list below for future screening recommendations based on patient's age, sex and/or medical conditions. Orders for these recommended tests are listed in the plan section. The patient has been provided with a written plan.    Health Maintenance List  Health Maintenance   Topic Date Due    DXA SCAN  08/18/2024    ANNUAL WELLNESS VISIT  09/11/2024    LIPID PANEL  09/11/2024    TDAP/TD VACCINES (2 - Td or Tdap) 11/01/2026    COLORECTAL CANCER SCREENING  01/23/2027    COVID-19 Vaccine  Completed    RSV Vaccine - Adults  Completed    INFLUENZA VACCINE  Completed    Pneumococcal Vaccine 65+  Completed    ZOSTER VACCINE  Completed    LUNG CANCER SCREENING  Discontinued                                                                                                                                                CMS Preventative Services Quick Reference  Risk Factors Identified During Encounter  Fall Risk-High or Moderate: Discussed Fall Prevention in the home  Immunizations Discussed/Encouraged:  None needed at this time.  Dental Screening Recommended  Vision Screening Recommended    The above risks/problems have been discussed with the patient.  Pertinent information has been shared with the patient in the After Visit Summary.  An After Visit Summary and PPPS were made available to the patient.    Follow Up:   Next Medicare Wellness visit to be scheduled in 1 year.         Additional E&M Note during same encounter follows:  Patient has additional, significant, and separately identifiable condition(s)/problem(s) that require work above and beyond the Medicare Wellness Visit     Chief Complaint  No chief complaint on file.    Subjective    HPI  Sonu is also being seen today for additional medical problem/s.       The patient presents for evaluation of multiple medical concerns. He is accompanied by an adult female.    He plans to speak with Dr. Cifuentes regarding questions about his S1 nerve issues. Despite undergoing a CT  "scan and MRI, which revealed nerve problems at S1, he was informed that everything was fine. His nerve conduction test also indicated issues in the S1 area. He continues to experience leg spasms, which he manages with hydrocodone and a topical cream. He has noticed a pattern where his legs tend to spasm when his bladder is full. He believes his leg spasms began after his back surgery. He has tried ropinirole and Lyrica in the past, but found them ineffective and sedating, respectively.    He has been having difficulty obtaining his catheters due to insurance issues. He has not seen a urologist but has consulted with a nurse practitioner who conducted a urine retention test. He reports urine backing up from his bladder to his kidneys and is currently running low on his catheter supply. He had a kidney test about a year ago, which showed no issues. He has had a cystoscopy in the past and was advised to self-catheterize.    He is seeking an alternative to Eliquis due to cost concerns.    He reports issues with his toenails growing into his toes, causing soreness and bleeding. He has tried using a sleeve for protection.    Review of Systems   Respiratory:  Negative for shortness of breath.    Cardiovascular:  Negative for chest pain.          Objective   Vital Signs:  /81 (BP Location: Left arm, Patient Position: Sitting, Cuff Size: Adult)   Pulse 57   Resp 16   Ht 180.3 cm (71\")   Wt 77.7 kg (171 lb 6.4 oz)   SpO2 100%   BMI 23.91 kg/m²   Physical Exam  Constitutional:       General: He is not in acute distress.  Pulmonary:      Effort: Pulmonary effort is normal. No respiratory distress.   Neurological:      Mental Status: He is alert and oriented to person, place, and time.   Psychiatric:         Mood and Affect: Mood normal.         Behavior: Behavior normal.                        Assessment and Plan           1. Restless Legs Syndrome.  Symptoms suggest restless legs syndrome. Mirapex was recommended " for symptom management. A prescription for Mirapex 0.25 mg, to be taken once at night for a week, was provided. If symptoms persist, the dosage will be increased to two pills. If there is no improvement, further adjustments to the treatment plan will be considered.    2. Chronic Back Pain.  The patient continues to experience chronic back pain and leg jumping, which may be related to previous back surgery and nerve issues at S1. He is advised to discuss further surgical options and the possibility of additional therapy with Dr. Cifuentes. The patient is also encouraged to continue using hydrocodone and a topical cream for symptom relief.    3. Bladder Dysfunction.  The patient reports issues with bladder function, including urine retention and the need for catheterization. He is advised to follow up with Dr. Jules for further evaluation, including a potential cystoscopy to check for any blockages or narrowing at the UP junction. The patient is also instructed to contact [facility] for catheter supplies and consider paying cash if there are delays in shipment.    4. Anticoagulation Management.  The patient is currently on Eliquis for atrial fibrillation and a previous blood clot. Alternatives such as warfarin and Pradaxa were discussed due to the high cost of Eliquis. He is advised to review his drug plan during the open enrollment period to find a more cost-effective option. If the cost remains prohibitive, switching to Pradaxa may be considered.    5. Toenail Issues.  The patient reports issues with toenails growing over and causing pain. He is advised to look for toe caps or toe sleeves online or at local pharmacies to protect the toes and prevent further injury.    Follow-up  Return in 6 months for follow up.    Orders Placed This Encounter   Procedures    Comprehensive Metabolic Panel     Standing Status:   Future     Standing Expiration Date:   10/22/2025     Order Specific Question:   Release to patient      Answer:   Routine Release [[id number]]    Hemoglobin A1c     Standing Status:   Future     Standing Expiration Date:   10/22/2025     Order Specific Question:   Release to patient     Answer:   Routine Release [[id number]]    Lipid Panel     Standing Status:   Future     Standing Expiration Date:   10/22/2025     Order Specific Question:   Release to patient     Answer:   Routine Release [[id number]]    CBC (No Diff)     Standing Status:   Future     Standing Expiration Date:   10/22/2025     Order Specific Question:   Release to patient     Answer:   Routine Release [[id number]]    TSH     Standing Status:   Future     Standing Expiration Date:   10/22/2025     Order Specific Question:   Release to patient     Answer:   Routine Release [[id number]]     New Medications Ordered This Visit   Medications    losartan (Cozaar) 100 MG tablet     Sig: Take 1 tablet by mouth Daily.     Dispense:  90 tablet     Refill:  1    levothyroxine (SYNTHROID, LEVOTHROID) 112 MCG tablet     Sig: Take 1 tablet by mouth Every Morning.     Dispense:  90 tablet     Refill:  1    bisoprolol (ZEBeta) 5 MG tablet     Sig: Take 1 tablet by mouth Daily.     Dispense:  90 tablet     Refill:  1    azelastine (ASTELIN) 0.1 % nasal spray     Sig: Administer 2 sprays into the nostril(s) as directed by provider 2 (Two) Times a Day. Use in each nostril as directed     Dispense:  30 mL     Refill:  11    apixaban (Eliquis) 5 MG tablet tablet     Sig: Take 1 tablet by mouth 2 (Two) Times a Day.     Dispense:  180 tablet     Refill:  1          Follow Up   No follow-ups on file.  Patient was given instructions and counseling regarding his condition or for health maintenance advice. Please see specific information pulled into the AVS if appropriate.  Patient or patient representative verbalized consent for the use of Ambient Listening during the visit with  Vaughn Kramer DO for chart documentation. 10/24/2024  22:22 CDT

## 2024-10-22 NOTE — PATIENT INSTRUCTIONS
Medicare Wellness  Personal Prevention Plan of Service     Date of Office Visit:    Encounter Provider:  Vaughn Kramer DO  Place of Service:  [facility] INTERNAL MEDICINE  Patient Name: Sonu Bravo Jr.  :  1945    As part of the Medicare Wellness portion of your visit today, we are providing you with this personalized preventive plan of services (PPPS). This plan is based upon recommendations of the [place] Preventive Services Task Force (USPSTF) and the Advisory Committee on Immunization Practices (ACIP).    This lists the preventive care services that should be considered, and provides dates of when you are due. Items listed as completed are up-to-date and do not require any further intervention.    Health Maintenance   Topic Date Due    DXA SCAN  2024    LIPID PANEL  2024    ANNUAL WELLNESS VISIT  10/22/2025    TDAP/TD VACCINES (2 - Td or Tdap) 2026    COLORECTAL CANCER SCREENING  2027    COVID-19 Vaccine  Completed    RSV Vaccine - Adults  Completed    INFLUENZA VACCINE  Completed    Pneumococcal Vaccine 65+  Completed    ZOSTER VACCINE  Completed    LUNG CANCER SCREENING  Discontinued       Orders Placed This Encounter   Procedures    Comprehensive Metabolic Panel     Standing Status:   Future     Standing Expiration Date:   10/22/2025     Order Specific Question:   Release to patient     Answer:   Routine Release [[id number]]    Hemoglobin A1c     Standing Status:   Future     Standing Expiration Date:   10/22/2025     Order Specific Question:   Release to patient     Answer:   Routine Release [[id number]]    Lipid Panel     Standing Status:   Future     Standing Expiration Date:   10/22/2025     Order Specific Question:   Release to patient     Answer:   Routine Release [[id number]]    CBC (No Diff)     Standing Status:   Future     Standing Expiration Date:   10/22/2025     Order Specific Question:   Release to patient     Answer:   Routine  Release [[id number]]    TSH     Standing Status:   Future     Standing Expiration Date:   10/22/2025     Order Specific Question:   Release to patient     Answer:   Routine Release [[id number]]       Return in about 6 months (around 4/22/2025) for Recheck.

## 2024-10-23 ENCOUNTER — OFFICE VISIT (OUTPATIENT)
Dept: NEUROSURGERY | Facility: CLINIC | Age: 79
End: 2024-10-23
Payer: MEDICARE

## 2024-10-23 VITALS — WEIGHT: 171 LBS | HEIGHT: 71 IN | BODY MASS INDEX: 23.94 KG/M2

## 2024-10-23 DIAGNOSIS — Z98.1 HISTORY OF LUMBAR FUSION: Primary | ICD-10-CM

## 2024-10-23 DIAGNOSIS — Z72.0 TOBACCO ABUSE: ICD-10-CM

## 2024-10-23 DIAGNOSIS — M48.062 SPINAL STENOSIS OF LUMBAR REGION WITH NEUROGENIC CLAUDICATION: ICD-10-CM

## 2024-10-23 RX ORDER — BACLOFEN 10 MG/1
10 TABLET ORAL 3 TIMES DAILY
Qty: 90 TABLET | Refills: 0 | Status: SHIPPED | OUTPATIENT
Start: 2024-10-23 | End: 2024-11-22

## 2024-10-23 NOTE — PROGRESS NOTES
Chief complaint:   Chief Complaint   Patient presents with    Back Pain     Patient here for follow up with CT and MRI for review.     Subjective     HPI:   Sonu Bravo Jr. is a 79 y.o.  male who presents today for with a complaint of lumbar back and bilateral leg pain, 80% legs, 20% back.  History of C4-5 and C5-6 ACDF 10/11/2022 followed by a DLIF with posterior instrumentation from L2-5 1/24/2023.      History of Present Illness  The patient is an 79-year-old male who presents for evaluation of back pain. He is accompanied by an adult female.    He reports experiencing back pain, which he rates as 6 out of 10. His back surgery was performed on 01/23/2022. He received an epidural injection in the S1 area two weeks ago, which provided minimal relief. He continues to use a cane for mobility and has a lower back brace. He has attended over 100 therapy sessions in the past three years and performs exercises at home to maintain his hamstring strength.    He also mentions leg pain, which he attributes to a fistula procedure performed by Dr. Craven. He describes his legs as restless, often causing him to wake up. He experiences numbness in his right anterior thigh and left anterior thigh, with the latter being more severe. He reports pain across his knees and balance issues. He has been informed by therapists that he has extremely tight hamstrings. He applies a cream to his legs and takes hydrocodone at bedtime.    He underwent a spinal fistula procedure on 11/01/2023 but reports no improvement since then. He has undergone MRI, CT scan, and nerve conduction studies, all of which indicate issues in the S1 area. He does not experience any pain radiating down the back of his legs to his heel and the bottom of his feet.    He has been using a catheter twice daily and has not had any urinary tract infections for several months.         ROS  Review of Systems   HENT: Negative.     Eyes: Negative.    Respiratory:  Negative.     Cardiovascular: Negative.    Gastrointestinal: Negative.    Endocrine: Negative.    Genitourinary: Negative.    Musculoskeletal:  Positive for back pain and gait problem.   Skin: Negative.    Allergic/Immunologic: Negative.    Neurological:  Positive for weakness and numbness.   Hematological: Negative.    Psychiatric/Behavioral: Negative.     All other systems reviewed and are negative.    PFSH:  Past Medical History:   Diagnosis Date    Arthritis     Atrial fibrillation 08/31/2021    Cataracts, bilateral     Disease of thyroid gland     Diverticulitis     GERD (gastroesophageal reflux disease)     Hyperlipidemia     Hypertension     Low back pain     Mononucleosis 1964     Past Surgical History:   Procedure Laterality Date    ANTERIOR CERVICAL DISCECTOMY W/ FUSION N/A 10/11/2022    Procedure: CERVICAL DISCECTOMY ANTERIOR WITH FUSION, Cervical 4/5 and 5/6;  Surgeon: Durga Cifuentes MD;  Location: [facility] OR;  Service: Neurosurgery;  Laterality: N/A;    BACK SURGERY N/A     CATARACT EXTRACTION Right     COLON SURGERY  01/2022    Dr Fairbanks at [facility]    COLONOSCOPY N/A 09/28/2021    Procedure: COLONOSCOPY WITH ANESTHESIA;  Surgeon: Thomas Dc DO;  Location: [facility] ENDOSCOPY;  Service: Gastroenterology;  Laterality: N/A;  pre op; abnormal ct scan  post op: diverticulosis ; polyps   PCP: Vaughn Kramer DO    COLONOSCOPY N/A 01/23/2024    Procedure: COLONOSCOPY WITH ANESTHESIA;  Surgeon: Thomas Dc DO;  Location: [facility] ENDOSCOPY;  Service: Gastroenterology;  Laterality: N/A;  Pre: Altered bowel function;  Post: Polyps;  Vaughn Kramer DO    HERNIA REPAIR      LUMBAR DIRECT LATERAL INTERBODY FUSION N/A 01/24/2023    Procedure: LUMBAR DIRECT LATERAL INTERBODY FUSION; LUMBAR 2-5;  Surgeon: Durga Cifuentes MD;  Location: [facility] OR;  Service: Neurosurgery;  Laterality: N/A;    LUMBAR FUSION N/A 01/24/2023    Procedure: LUMBAR DIRECT LATERAL INTERBODY  FUSION; LUMBAR 2-5, LUMBAR LAMINECTOMY TRANSFORAMINAL LUMBAR INTERBODY FUSION; LUMBAR 2-5;  Surgeon: Durga Cifuentes MD;  Location: [facility];  Service: Robotics - Neuro;  Laterality: N/A;    SKIN SURGERY      Skin cancer surgery.     Objective      Current Outpatient Medications   Medication Sig Dispense Refill    apixaban (Eliquis) 5 MG tablet tablet Take 1 tablet by mouth 2 (Two) Times a Day. 180 tablet 1    azelastine (ASTELIN) 0.1 % nasal spray Administer 2 sprays into the nostril(s) as directed by provider 2 (Two) Times a Day. Use in each nostril as directed 30 mL 11    baclofen (LIORESAL) 10 MG tablet Take 1 tablet by mouth 3 (Three) Times a Day for 30 days. 90 tablet 0    bisoprolol (ZEBeta) 5 MG tablet Take 1 tablet by mouth Daily. 90 tablet 1    Cholecalciferol 100 MCG (4000 UT) capsule Take 4,000 Units by mouth Daily. Indications: Vitamin D Deficiency      coenzyme Q10 100 MG capsule Take 1 capsule by mouth Daily. Indications: Heart Rhythm Disorder      Cyanocobalamin 2500 MCG sublingual tablet Take 2,500 mcg by mouth Daily. Indications: Inadequate Vitamin B12      doxycycline (VIBRAMYCIN) 100 MG capsule Take 1 capsule by mouth 2 (Two) Times a Day. 7 capsule 0    fexofenadine (ALLEGRA) 180 MG tablet Take 1 tablet by mouth Daily. Indications: Hayfever      fluticasone (FLONASE) 50 MCG/ACT nasal spray 1 spray into the nostril(s) as directed by provider Daily. Indications: Stuffy Nose 32 g 3    HYDROcodone-acetaminophen (NORCO)  MG per tablet Take 1 tablet by mouth Daily As Needed.      ibuprofen (ADVIL,MOTRIN) 200 MG tablet Take 1 tablet by mouth Every 6 (Six) Hours As Needed for Mild Pain.      levothyroxine (SYNTHROID, LEVOTHROID) 112 MCG tablet Take 1 tablet by mouth Every Morning. 90 tablet 1    losartan (Cozaar) 100 MG tablet Take 1 tablet by mouth Daily. 90 tablet 1    Magnesium 250 MG tablet Take  by mouth. (Patient not taking: Reported on 10/22/2024)      NON FORMULARY Apply 1 Units  "topically to the appropriate area as directed 2 (Two) Times a Day.      omeprazole (priLOSEC) 20 MG capsule Take 1 capsule by mouth Daily. 90 capsule 3    polyethylene glycol (MiraLax) 17 GM/SCOOP powder Take 17 g by mouth As Needed.      pramipexole (MIRAPEX) 0.25 MG tablet Take 1-2 tablets by mouth Every Night. 30 tablet 0    rosuvastatin (CRESTOR) 20 MG tablet Take 1 tablet by mouth Daily. 90 tablet 3    terazosin (HYTRIN) 2 MG capsule Take 1 capsule by mouth Every Night.      Zinc 50 MG capsule Take 1 capsule by mouth Daily. Indications: Zinc Deficiency       No current facility-administered medications for this visit.     Vital Signs  Ht 180.3 cm (71\")   Wt 77.6 kg (171 lb)   BMI 23.85 kg/m²   Physical Exam  Vitals and nursing note reviewed.   Constitutional:       General: He is not in acute distress.     Appearance: Normal appearance. He is well-developed, well-groomed and overweight. He is not ill-appearing, toxic-appearing or diaphoretic.          Comments: BMI 24.27   HENT:      Head: Normocephalic and atraumatic.      Right Ear: Hearing normal.      Left Ear: Hearing normal.   Eyes:      Extraocular Movements: EOM normal.      Conjunctiva/sclera: Conjunctivae normal.      Pupils: Pupils are equal, round, and reactive to light.   Neck:      Trachea: Trachea normal.   Cardiovascular:      Rate and Rhythm: Normal rate and regular rhythm.   Pulmonary:      Effort: Pulmonary effort is normal. No tachypnea, bradypnea, accessory muscle usage or respiratory distress.   Abdominal:      Palpations: Abdomen is soft.   Musculoskeletal:      Cervical back: Full passive range of motion without pain and neck supple.   Skin:     General: Skin is warm and dry.   Neurological:      Mental Status: He is alert and oriented to person, place, and time.      GCS: GCS eye subscore is 4. GCS verbal subscore is 5. GCS motor subscore is 6.      Deep Tendon Reflexes:      Reflex Scores:       Tricep reflexes are 1+ on the right " side and 1+ on the left side.       Bicep reflexes are 1+ on the right side and 1+ on the left side.       Brachioradialis reflexes are 1+ on the right side and 1+ on the left side.       Patellar reflexes are 3+ on the right side and 1+ on the left side.       Achilles reflexes are 2+ on the right side and 1+ on the left side.  Psychiatric:         Speech: Speech normal.         Behavior: Behavior normal. Behavior is cooperative.       Neurologic Exam     Mental Status   Oriented to person, place, and time.   Attention: normal. Concentration: normal.   Speech: speech is normal   Level of consciousness: alert    Cranial Nerves     CN II   Visual fields full to confrontation.     CN III, IV, VI   Pupils are equal, round, and reactive to light.  Extraocular motions are normal.     CN V   Facial sensation intact.     CN VII   Facial expression full, symmetric.     CN VIII   CN VIII normal.     CN IX, X   CN IX normal.     CN XI   CN XI normal.     Motor Exam   Right arm tone: normal  Left arm tone: normal  Right leg tone: normal  Left leg tone: normal    Strength   Right deltoid: 5/5  Left deltoid: 5/5  Right biceps: 5/5  Left biceps: 5/5  Right triceps: 5/5  Left triceps: 5/5  Right wrist extension: 5/5  Left wrist extension: 5/5  Right interossei: 5/5  Left interossei: 5/5  Right iliopsoas: 5/5  Left iliopsoas: 5/5  Right quadriceps: 5/5  Left quadriceps: 5/5  Right anterior tibial: 5/5  Left anterior tibial: 5/5  Right gastroc: 5/5  Left gastroc: 5/5  Right EHL 5/5  Left EHL 5/5       Sensory Exam   Right arm light touch: normal  Left arm light touch: normal  Right leg light touch: decreased from ankle  Left leg light touch: decreased from ankle    Gait, Coordination, and Reflexes     Gait  Gait: shuffling    Tremor   Resting tremor: absent  Intention tremor: absent  Action tremor: absent    Reflexes   Right brachioradialis: 1+  Left brachioradialis: 1+  Right biceps: 1+  Left biceps: 1+  Right triceps: 1+  Left  triceps: 1+  Right patellar: 3+  Left patellar: 1+  Right achilles: 2+  Left achilles: 1+  Right plantar: normal  Left plantar: upgoing  Right Joel: absent  Left Joel: absent  Right ankle clonus: absent  Left ankle clonus: absent  Right pendular knee jerk: absent  Left pendular knee jerk: absent     (12 bullet pts)    Male  strength (pounds)  AGE Right Hand RH Norms Left Hand LH Norms   20-24   121+20.6   104+21.8   25-29   120+23.0   110+16.2   30-34   121+22.4   110+21.7   35-39   119+24   113+21.7   40-44   117+20.7   112+18.7   45-49   110+23.0   101+22.8   50-54   113+18.1   102+17   55-59   101+26.7   83+23.4   60-64   90+20.4   77+20.3   65-69   91+20.6   76.8+19.8   70-74   75+21.5   65+18.1   75+ 59>73, 75 66+21.0 75>69, 72 55+17.0   (DANIELLE Cleary et al; Hand Dynometer: Effects of trials and sessions.  Perpetual and Motor Skills 61:195-8, 1985)  Key  * = Dominant hand  > = Intervention    Results Review:       5/22/2024.  Stable appearing ACDF 4-C6      Preop          AP and lateral x-rays without evidence of hardware failure or fracture       9/2024 - Postoperative MRI showing good decompression and placement of all grafts.      9/2024           Assessment/Plan:   Sonu Sweet [place] Kuo is a 79 y.o. male with significant medical comorbidities to include C4-5 and C5-6 ACDF 10/11/2022, DLIF with posterior instrumentation from L2-5 1/24/2023, hypertension, hyperlipidemia, A-fib currently on Eliquis, mononucleosis, hypothyroidism, and tobacco abuse.  He presents today with a new complaint of lumbar back and bilateral leg pain and dysesthesias, 80% legs, 20% back.   Physical exam findings of healing area of cellulitis to the right anterior leg, gross hyporeflexia, left Babinski, and a shuffling gait.  No recent imaging of the lumbar spine.  Recent bilateral JAZMIN shows moderate arterial insufficiency of the bilateral lower extremities at rest.    Recommendations:  Lumbar back pain  Bilateral leg pain and  dysesthesias  History of DLIF with posterior mentation L2-5 (1/24/2023)  Assessment & Plan  1. Back pain.  The patient's back pain is likely due to a combination of cervical spondylitic myelopathy, thoracic dural AV fistula, and lumbar spine stenosis. The EMG and nerve conduction study indicate a radiculopathy, specifically a pinched nerve at S1. Despite previous interventions, the signal from the brain to the feet is still compromised, leading to high tone and reflexes in the legs. The patient's symptoms, including difficulty walking, bowel and bladder dysfunction, numbness and tingling in the feet, and a stiff gait, are consistent with these findings. The patient's fusions appear intact and all screws are in place, indicating successful decompression. A low back brace was suggested for additional support. Baclofen 10 mg was prescribed to be taken three times a day, with the option to adjust the dosage based on symptom relief and side effects. The patient was advised to continue follow-ups with Dr. Saleem and to consider a spinal cord stimulator trial if recommended. A handout on baclofen and radiculopathy was provided for further information.    2. Peripheral neuropathy.  The patient has peripheral neuropathy, which is contributing to his symptoms of numbness and tingling in the legs. This condition is characterized by the deterioration of nerves outside the spine. Despite previous surgeries to address spinal issues, the peripheral neuropathy remains a significant factor in his symptoms. The patient was educated on the nature of peripheral neuropathy and provided with a handout for further information.    3. Bilateral lower extremity peripheral neuropathy and involuntary muscle spasms.  The patient experiences bilateral lower extremity peripheral neuropathy and involuntary muscle spasms, which are contributing to his leg symptoms. Baclofen 10 mg was prescribed to help manage muscle spasms, with instructions to  adjust the dosage based on symptom relief and side effects. The patient was advised to try either baclofen or Mirapex 0.25 mg, but not both together, to see which medication provides better relief for his symptoms.    4. Left radiculopathy in the S1 distribution.  The patient has a left radiculopathy in the S1 distribution, which is contributing to his leg symptoms. This condition is characterized by a pinched nerve at the S1 level. The patient was advised that addressing this radiculopathy surgically would involve a complex procedure with limited potential benefit. Therefore, conservative management with medications and physical therapy was recommended.    5. Old L4 radiculopathy.  The patient has evidence of an old L4 radiculopathy, which is contributing to his symptoms. This condition is characterized by a previous pinched nerve at the L4 level. The patient was advised that this condition is part of his overall spinal issues and that conservative management is the best approach at this time.    6. Medication Management.  The patient was prescribed baclofen 10 mg to be taken three times a day, with the option to adjust the dosage based on symptom relief and side effects. He was also prescribed Mirapex 0.25 mg for restless legs, with instructions to try either baclofen or Mirapex, but not both together, to see which medication provides better relief. The prescriptions were sent to [facility] on [address].            Tobacco abuse  The patient understands the many dangers of continuing to use tobacco.  If quitting becomes an increased priority Sonu knows to contact us for help with quitting.       [facility] Fall Risk Assessment was completed, and patient is at MODERATE risk for falls. Assessment completed on:10/22/2024  Fall risk information provided in AVS.    Diagnoses and all orders for this visit:    1. History of lumbar fusion (Primary)    2. Spinal stenosis of lumbar region with neurogenic claudication    3. Tobacco  abuse    Other orders  -     baclofen (LIORESAL) 10 MG tablet; Take 1 tablet by mouth 3 (Three) Times a Day for 30 days.  Dispense: 90 tablet; Refill: 0        Return if symptoms worsen or fail to improve.    Thank you, for allowing me to continue to participate in the care of this patient.    I spent 39 minutes caring for Sonu on this date of service. This time includes time spent by me in the following activities: preparing for the visit, reviewing tests, obtaining and/or reviewing a separately obtained history, performing a medically appropriate examination and/or evaluation, counseling and educating the patient/family/caregiver, ordering medications, tests, or procedures, referring and communicating with other health care professionals, documenting information in the medical record, independently interpreting results and communicating that information with the patient/family/caregiver, and/or care coordination.           Sincerely,  Durga Cifuentes MD

## 2024-10-23 NOTE — PATIENT INSTRUCTIONS
"    Smoking Tobacco Information, Adult  Smoking tobacco can be harmful to your health. Tobacco contains a toxic colorless chemical called nicotine. Nicotine causes changes in your brain that make you want more and more. This is called addiction. This can make it hard to stop smoking once you start. Tobacco also has other toxic chemicals that can hurt your body and raise your risk of many cancers.  Menthol or \"lite\" tobacco or cigarette brands are not safer than regular brands.  How can smoking tobacco affect me?  Smoking tobacco puts you at risk for:  Cancer. Smoking is most commonly associated with lung cancer, but can also lead to cancer in other parts of the body.  Chronic obstructive pulmonary disease (COPD). This is a long-term lung condition that makes it hard to breathe. It also gets worse over time.  High blood pressure (hypertension), heart disease, stroke, heart attack, and lung infections, such as pneumonia.  Cataracts. This is when the lenses in the eyes become clouded.  Digestive problems. This may include peptic ulcers, heartburn, and gastroesophageal reflux disease (GERD).  Oral health problems, such as gum disease, mouth sores, and tooth loss.  Loss of taste and smell.  Smoking also affects how you look and smell. Smoking may cause:  Wrinkles.  Yellow or stained teeth, fingers, and fingernails.  Bad breath.  Bad-smelling clothes and hair.  Smoking tobacco can also affect your social life, because:  It may be challenging to find places to smoke when away from home. Many workplaces, restaurants, hotels, and public places are tobacco-free.  Smoking is expensive. This is due to the cost of tobacco and the long-term costs of treating health problems from smoking.  Secondhand smoke may affect those around you. Secondhand smoke can cause lung cancer, breathing problems, and heart disease. Children of smokers have a higher risk for:  Sudden infant death syndrome (SIDS).  Ear infections.  Lung " infections.  What actions can I take to prevent health problems?  Quit smoking    Do not start smoking. Quit if you already smoke.  Do not replace cigarette smoking with vaping devices, such as e-cigarettes.  Make a plan to quit smoking and commit to it. Look for programs to help you, and ask your health care provider for recommendations and ideas. Set a date and write down all the reasons you want to quit.  Let your friends and family know you are quitting so they can help and support you. Consider finding friends who also want to quit. It can be easier to quit with someone else, so that you can support each other.  Talk with your health care provider about using nicotine replacement medicines to help you quit. These include gum, lozenges, patches, sprays, or pills.  If you try to quit but return to smoking, stay positive. It is common to slip up when you first quit, so take it one day at a time.  Be prepared for cravings. When you feel the urge to smoke, chew gum or suck on hard candy.  Lifestyle  Stay busy.  Take care of your body. Get plenty of exercise, eat a healthy diet, and drink plenty of water.  Find ways to manage your stress, such as meditation, yoga, exercise, or time spent with friends and family.  Ask your health care provider about having regular tests (screenings) to check for cancer. This may include blood tests, imaging tests, and other tests.  Where to find support  To get support to quit smoking, consider:  Asking your health care provider for more information and resources.  Joining a support group for people who want to quit smoking in your local community. There are many effective programs that may help you to quit.  Calling the smokefree.gov counselor helpline at 5-800-QUIT-NOW (1-593.442.2502).  Where to find more information  You may find more information about quitting smoking from:  Centers for Disease Control and Prevention: cdc.gov/tobacco  Smokefree.gov: smokefree.gov  American Lung  Association: freedomfromsmoking.org  Contact a health care provider if:  You have problems breathing.  Your lips, nose, or fingers turn blue.  You have chest pain.  You are coughing up blood.  You feel like you will faint.  You have other health changes that cause you to worry.  Summary  Smoking tobacco can negatively affect your health, the health of those around you, your finances, and your social life.  Do not start smoking. Quit if you already smoke. If you need help quitting, ask your health care provider.  Consider joining a support group for people in your local community who want to quit smoking. There are many effective programs that may help you to quit.  This information is not intended to replace advice given to you by your health care provider. Make sure you discuss any questions you have with your health care provider.  Document Revised: 12/13/2022 Document Reviewed: 12/13/2022  Elsevier Patient Education © 2024 [employer]

## 2024-11-04 DIAGNOSIS — G25.81 RLS (RESTLESS LEGS SYNDROME): ICD-10-CM

## 2024-11-04 DIAGNOSIS — I10 PRIMARY HYPERTENSION: ICD-10-CM

## 2024-11-04 DIAGNOSIS — I48.0 PAROXYSMAL ATRIAL FIBRILLATION: ICD-10-CM

## 2024-11-04 DIAGNOSIS — J30.2 SEASONAL ALLERGIES: ICD-10-CM

## 2024-11-04 DIAGNOSIS — Z86.718 HISTORY OF DVT (DEEP VEIN THROMBOSIS): ICD-10-CM

## 2024-11-04 DIAGNOSIS — E03.9 ACQUIRED HYPOTHYROIDISM: ICD-10-CM

## 2024-11-04 RX ORDER — LOSARTAN POTASSIUM 100 MG/1
100 TABLET ORAL DAILY
Qty: 90 TABLET | Refills: 1 | Status: SHIPPED | OUTPATIENT
Start: 2024-11-04

## 2024-11-04 RX ORDER — AZELASTINE 1 MG/ML
2 SPRAY, METERED NASAL 2 TIMES DAILY
Qty: 30 ML | Refills: 11 | Status: SHIPPED | OUTPATIENT
Start: 2024-11-04

## 2024-11-04 RX ORDER — BISOPROLOL FUMARATE 5 MG/1
5 TABLET, FILM COATED ORAL DAILY
Qty: 90 TABLET | Refills: 1 | Status: SHIPPED | OUTPATIENT
Start: 2024-11-04

## 2024-11-04 RX ORDER — LEVOTHYROXINE SODIUM 112 UG/1
112 TABLET ORAL
Qty: 90 TABLET | Refills: 1 | Status: SHIPPED | OUTPATIENT
Start: 2024-11-04

## 2024-11-04 RX ORDER — PRAMIPEXOLE DIHYDROCHLORIDE 0.25 MG/1
.25-.5 TABLET ORAL NIGHTLY
Qty: 30 TABLET | Refills: 0 | Status: SHIPPED | OUTPATIENT
Start: 2024-11-04

## 2024-11-04 NOTE — PROGRESS NOTES
[facility] - PODIATRY    Today's Date: 11/06/2024     Patient Name: Sonu Bravo Jr.  MRN: [id number]  [facility]: [id number]  PCP: Vaughn Kramer DO  Referring Provider: No ref. provider found    SUBJECTIVE     Chief Complaint   Patient presents with   • Follow-up     Vaughn Kramer 04/22/2024 ROUTINE FOOT CARE- - pt states feet doing ok other than neuropathy issues- pt denies pain due to neuropathy     HPI: Sonu Bravo Jr., a 79 y.o.male, comes to clinic as a(n) established patient complaining of toenail/callus issues. Patient has h/o arthritis, AFib, GERD, HLD, HTN . Patient is non-diabetic.  Patient presents with thick, dystrophic nails.  Patient reports that he has been going to a nail salon in between visits at podiatry.  Patient also reports he has attempted to trim his own nails and caused multiple injuries to his toes.  Patient reports neuropathy that is caused by low back issues.  Reports numbness and tingling.  Denies any open sores or wounds today.  Using cane for ambulation assistance.  Denies pain. Relates previous treatment(s) including PT . Denies any constitutional symptoms. No other pedal complaints at this time.    Past Medical History:   Diagnosis Date   • Arthritis    • Atrial fibrillation 08/31/2021   • Cataracts, bilateral    • Disease of thyroid gland    • Diverticulitis    • GERD (gastroesophageal reflux disease)    • Hyperlipidemia    • Hypertension    • Low back pain    • Mononucleosis 1964     Past Surgical History:   Procedure Laterality Date   • ANTERIOR CERVICAL DISCECTOMY W/ FUSION N/A 10/11/2022    Procedure: CERVICAL DISCECTOMY ANTERIOR WITH FUSION, Cervical 4/5 and 5/6;  Surgeon: Durga Cifuentes MD;  Location: [facility];  Service: Neurosurgery;  Laterality: N/A;   • BACK SURGERY N/A    • CATARACT EXTRACTION Right    • COLON SURGERY  01/2022    Dr Fairbanks at [facility] in [facility]   • COLONOSCOPY N/A 09/28/2021    Procedure: COLONOSCOPY WITH  ANESTHESIA;  Surgeon: Thomas Dc DO;  Location:  [facility];  Service: Gastroenterology;  Laterality: N/A;  pre op; abnormal ct scan  post op: diverticulosis ; polyps   PCP: Vaughn Kramer DO   • COLONOSCOPY N/A 01/23/2024    Procedure: COLONOSCOPY WITH ANESTHESIA;  Surgeon: Thomas Dc DO;  Location:  [facility];  Service: Gastroenterology;  Laterality: N/A;  Pre: Altered bowel function;  Post: Polyps;  Vaughn Kramer DO   • HERNIA REPAIR     • LUMBAR DIRECT LATERAL INTERBODY FUSION N/A 01/24/2023    Procedure: LUMBAR DIRECT LATERAL INTERBODY FUSION; LUMBAR 2-5;  Surgeon: Durga Cifuentes MD;  Location:  [facility];  Service: Neurosurgery;  Laterality: N/A;   • LUMBAR FUSION N/A 01/24/2023    Procedure: LUMBAR DIRECT LATERAL INTERBODY FUSION; LUMBAR 2-5, LUMBAR LAMINECTOMY TRANSFORAMINAL LUMBAR INTERBODY FUSION; LUMBAR 2-5;  Surgeon: Durga Cifuentes MD;  Location:  [facility];  Service: Robotics - Neuro;  Laterality: N/A;   • SKIN SURGERY      Skin cancer surgery.     Family History   Problem Relation Age of Onset   • Lung cancer Mother    • Brain cancer Mother    • Heart disease Father    • Hyperlipidemia Father    • Hypertension Father    • Prostate cancer Maternal Aunt    • Colon cancer Maternal Uncle    • Colon polyps Neg Hx    • Esophageal cancer Neg Hx    • Liver cancer Neg Hx      Social History     Socioeconomic History   • Marital status:    Tobacco Use   • Smoking status: Every Day     Current packs/day: 0.50     Average packs/day: 0.5 packs/day for 57.8 years (28.9 ttl pk-yrs)     Types: Cigarettes     Start date: 1967     Passive exposure: Current   • Smokeless tobacco: Never   • Tobacco comments:     down to 1/2 pack a day 12/20/23--5/22/24--6/20/24--10/22/24   Vaping Use   • Vaping status: Never Used   Substance and Sexual Activity   • Alcohol use: Yes     Comment: rare   • Drug use: Never   • Sexual activity: Defer     Allergies   Allergen  Reactions   • Penicillins Hives   • Sulfa Antibiotics Hives, Itching and Rash   • Sulfamethoxazole-Trimethoprim Hives, Itching and Rash   • Diphenhydramine Mental Status Change     hiper     Current Outpatient Medications   Medication Sig Dispense Refill   • apixaban (Eliquis) 5 MG tablet tablet Take 1 tablet by mouth 2 (Two) Times a Day. 180 tablet 1   • azelastine (ASTELIN) 0.1 % nasal spray Administer 2 sprays into the nostril(s) as directed by provider 2 (Two) Times a Day. Use in each nostril as directed 30 mL 11   • baclofen (LIORESAL) 10 MG tablet Take 1 tablet by mouth 3 (Three) Times a Day for 30 days. 90 tablet 0   • bisoprolol (ZEBeta) 5 MG tablet Take 1 tablet by mouth Daily. 90 tablet 1   • Cholecalciferol 100 MCG (4000 UT) capsule Take 4,000 Units by mouth Daily. Indications: Vitamin D Deficiency     • coenzyme Q10 100 MG capsule Take 1 capsule by mouth Daily. Indications: Heart Rhythm Disorder     • Cyanocobalamin 2500 MCG sublingual tablet Take 2,500 mcg by mouth Daily. Indications: Inadequate Vitamin B12     • fexofenadine (ALLEGRA) 180 MG tablet Take 1 tablet by mouth Daily. Indications: Hayfever     • fluticasone (FLONASE) 50 MCG/ACT nasal spray 1 spray into the nostril(s) as directed by provider Daily. Indications: Stuffy Nose 32 g 3   • HYDROcodone-acetaminophen (NORCO)  MG per tablet Take 1 tablet by mouth Daily As Needed.     • ibuprofen (ADVIL,MOTRIN) 200 MG tablet Take 1 tablet by mouth Every 6 (Six) Hours As Needed for Mild Pain.     • levothyroxine (SYNTHROID, LEVOTHROID) 112 MCG tablet Take 1 tablet by mouth Every Morning. 90 tablet 1   • losartan (Cozaar) 100 MG tablet Take 1 tablet by mouth Daily. 90 tablet 1   • Magnesium 250 MG tablet Take  by mouth.     • NON FORMULARY Apply 1 Units topically to the appropriate area as directed 2 (Two) Times a Day.     • omeprazole (priLOSEC) 20 MG capsule Take 1 capsule by mouth Daily. 90 capsule 3   • polyethylene glycol (MiraLax) 17 GM/SCOOP  powder Take 17 g by mouth As Needed.     • pramipexole (MIRAPEX) 0.25 MG tablet Take 1-2 tablets by mouth Every Night. 30 tablet 0   • rosuvastatin (CRESTOR) 20 MG tablet Take 1 tablet by mouth Daily. 90 tablet 3   • terazosin (HYTRIN) 2 MG capsule Take 1 capsule by mouth Every Night.     • Zinc 50 MG capsule Take 1 capsule by mouth Daily. Indications: Zinc Deficiency       No current facility-administered medications for this visit.     Review of Systems   Constitutional:  Negative for chills and fever.   HENT:  Negative for congestion.    Respiratory:  Negative for shortness of breath.    Cardiovascular:  Negative for chest pain and leg swelling.   Gastrointestinal:  Negative for constipation, diarrhea, nausea and vomiting.   Musculoskeletal:  Positive for arthralgias and gait problem.        Foot pain   Skin:  Negative for wound.   Neurological:  Positive for numbness.       OBJECTIVE     Vitals:    11/06/24 1450   BP: 134/64       PHYSICAL EXAM  GEN:   Accompanied by none.     Foot/Ankle Exam    GENERAL  Orientation:  AAOx3  Affect:  appropriate  Gait:  shuffling  Assistance:  cane use  Right shoe gear: casual shoe  Left shoe gear: casual shoe    VASCULAR     Right Foot Vascularity   Dorsalis pedis:  2+  Posterior tibial:  2+  Skin temperature:  warm  Edema grading:  Trace  CFT:  3  Pedal hair growth:  Present  Varicosities:  mild varicosities (venous congestion to toes)     Left Foot Vascularity   Dorsalis pedis:  2+  Posterior tibial:  2+  Skin temperature:  warm  Edema grading:  Trace  CFT:  3  Pedal hair growth:  Present  Varicosities:  mild varicosities (venous congestion to toes)     NEUROLOGIC     Right Foot Neurologic   Light touch sensation: diminished  Vibratory sensation: normal  Hot/Cold sensation: normal     Left Foot Neurologic   Light touch sensation: diminished  Vibratory sensation: normal  Hot/Cold sensation:  normal    MUSCULOSKELETAL     Right Foot Musculoskeletal   Tenderness:  toe 3  tenderness    Arch:  Normal  Hammertoe:  Second toe, third toe, fourth toe and fifth toe     Left Foot Musculoskeletal   Tenderness:  toe 3 tenderness  Arch:  Normal  Hammertoe:  Second toe, third toe, fourth toe and fifth toe    MUSCLE STRENGTH     Right Foot Muscle Strength   Foot dorsiflexion:  4+  Foot plantar flexion:  5  Foot inversion:  5  Foot eversion:  5     Left Foot Muscle Strength   Foot dorsiflexion:  4+  Foot plantar flexion:  5  Foot inversion:  5  Foot eversion:  5    RANGE OF MOTION     Right Foot Range of Motion   Foot and ankle ROM within normal limits       Left Foot Range of Motion   Foot and ankle ROM within normal limits      DERMATOLOGIC      Right Foot Dermatologic   Skin  Right foot skin is intact. (discoloration to toes)  Nails  1.  Positive for elongated and abnormal thickness.  2.  Positive for elongated and abnormal thickness.  3.  Positive for elongated and abnormal thickness.  4.  Positive for elongated and abnormal thickness.  5.  Positive for elongated and abnormal thickness.     Left Foot Dermatologic   Skin  Left foot skin is intact. (discoloration to toes)   Nails  1.  Positive for elongated and abnormal thickness.  2.  Positive for elongated and abnormal thickness.  3.  Positive for elongated and abnormal thickness.  4.  Positive for elongated and abnormally thick.  5.  Positive for elongated and abnormally thick.    Image:       RADIOLOGY/NUCLEAR:  CT Abdomen Pelvis Without Contrast    Result Date: 10/9/2024  Narrative: EXAM: CT ABDOMEN PELVIS WO CONTRAST-  DATE: 10/9/2024 3:22 PM  HISTORY: hydronephrosis on imaging with neurogenic bladder and self-catheterization; N31.9-Neuromuscular dysfunction of bladder, unspecified; Z78.9-Other specified health status; N13.30-Unspecified hydronephrosis   COMPARISON: 10/28/2021.  DOSE LENGTH PRODUCT: 983.86 mGy.cm Automatic exposure control was utilized to make radiation dose as low as reasonably achievable.  TECHNIQUE: Unenhanced axial  images of the abdomen and pelvis obtained with coronal and sagittal reformats.  FINDINGS: Evaluation limited secondary to lack of intravenous contrast agent.  VISUALIZED CHEST: No pleural or pericardial effusion. Lung bases clear.  LIVER: Normal hepatic contour.  BILIARY: No calcified gallstone. No intrahepatic or extrahepatic bile duct dilation.  PANCREAS: Normal pancreas contour.  SPLEEN: Normal size and contour.  ADRENAL: Normal appearance of the bilateral adrenal glands.  GENITOURINARY: Prominence RIGHT renal pelvis with mild dilation of the calyces, transition point at the ureteropelvic junction, increased in conspicuity compared to 10/28/2021. No nephrolithiasis. No hydroureter or ureteral calculus.  Mild prominence of the LEFT upper renal collecting system, similar compared to 10/28/2021, favored to be an extrarenal pelvis. No LEFT hydroureter or ureteral calculus.  Urinary bladder is within normal limits. Borderline prostate size.  PERITONEUM: No free air or ascites.  GI TRACT: Normal configuration of the stomach and duodenum.  No abnormally dilated loops of bowel or bowel wall thickening. Normal appendix on axial series 2, images 100-97.  VESSELS: Severe calcified aortic atherosclerosis with probable narrowing of the distal aortic lumen. Ectatic infrarenal aorta measures up to 298 cm. Lack of intravenous contrast limits evaluation.  RETROPERITONEUM: No mass, lymphadenopathy or hemorrhage.  SOFT TISSUES: Normal appearance of the overlying abdominal soft tissues.   BONES: Postoperative changes of the lumbar spine, better demonstrated on recent dedicated spine imaging. No acute bony finding.       Impression: 1. Prominence of the LEFT renal pelvis with mild dilation of the calyces, transition point at the ureteropelvic junction. This appears mildly increased in conspicuity compared to 10/28/2021, favored to represent a chronic ureteropelvic junction obstruction.  2. Mild prominence of the LEFT upper renal  collecting system, similar compared to 2021, favor extrarenal pelvis.  3. Severe calcified aortic atherosclerosis with ectatic infrarenal aorta measuring 2.9 cm. Probable narrowing of the distal aortic lumen, limited in evaluation on this exam without intravenous contrast.  This report was signed and finalized on 10/9/2024 4:38 PM by Dr Theresa Contreras MD.       LABORATORY/CULTURE RESULTS:      PATHOLOGY RESULTS:       ASSESSMENT/PLAN     Diagnoses and all orders for this visit:    1. Thickened nails (Primary)    2. Gait abnormality    3. Neuropathy involving both lower extremities    4. Foot callus        Comprehensive lower extremity examination and evaluation was performed.  Discussed findings and treatment plan including risks, benefits, and treatment options with patient in detail. Patient agreed with treatment plan.  After verbal consent obtained, nail(s) x10 debrided of length and thickness with nail nipper without incidence  After verbal consent obtained, calluses x1 pared utilizing dermal curette and/or scalpel without incidence  Patient may maintain nails and calluses at home utilizing emery board or pumice stone between visits as needed  Continue use of cane for ambulation assistance.  Continue use of compounded pain cream for feet.  Discussed with patient importance of continuing to follow-up every 3 months.  Explained that injuries to the toe are hard to identify while clipping his own nails and having neuropathy.  An After Visit Summary was printed and given to the patient at discharge, including (if requested) any available informative/educational handouts regarding diagnosis, treatment, or medications. All questions were answered to patient/family satisfaction. Should symptoms fail to improve or worsen they agree to call or return to clinic or to go to the Emergency Department. Discussed the importance of following up with any needed screening tests/labs/specialist appointments and any requested  follow-up recommended by me today. Importance of maintaining follow-up discussed and patient accepts that missed appointments can delay diagnosis and potentially lead to worsening of conditions.  Return in about 3 months (around 2/6/2025) for With Taina HERNANDEZ, Follow-up with Podiatry Provider., or sooner if acute issues arise.    Lab Frequency Next Occurrence   Diet: Once 09/28/2021       This document has been electronically signed by MARY Gatica on November 6, 2024 16:01 CST

## 2024-11-04 NOTE — TELEPHONE ENCOUNTER
Rx Refill Note  Requested Prescriptions     Pending Prescriptions Disp Refills    losartan (Cozaar) 100 MG tablet 90 tablet 1     Sig: Take 1 tablet by mouth Daily.    levothyroxine (SYNTHROID, LEVOTHROID) 112 MCG tablet 90 tablet 1     Sig: Take 1 tablet by mouth Every Morning.    bisoprolol (ZEBeta) 5 MG tablet 90 tablet 1     Sig: Take 1 tablet by mouth Daily.    azelastine (ASTELIN) 0.1 % nasal spray 30 mL 11     Sig: Administer 2 sprays into the nostril(s) as directed by provider 2 (Two) Times a Day. Use in each nostril as directed    apixaban (Eliquis) 5 MG tablet tablet 180 tablet 1     Sig: Take 1 tablet by mouth 2 (Two) Times a Day.    pramipexole (MIRAPEX) 0.25 MG tablet 30 tablet 0     Sig: Take 1-2 tablets by mouth Every Night.      Last office visit with prescribing clinician: 10/22/2024   Last telemedicine visit with prescribing clinician: Visit date not found   Next office visit with prescribing clinician: Visit date not found                         Would you like a call back once the refill request has been completed: [] Yes [] No    If the office needs to give you a call back, can they leave a voicemail: [] Yes [] No    Jairo Cano MA  11/04/24, 09:29 CST      MR FAST SPOKE TO [employer] AND THEY HAD CANCELLED HIS MEDICATIONS.   HE IS ASKING THAT THEY BE RESENT TO [employer].   MR.FAST STATED THAT HE JUST ASK THEM TO PUT THEM ON HOLD BUT THE ENDED UP CANCELING       SPOKE TO [employer] AND SCRIPTS WAS CANCELLED AND THEY DO NEED SCRIPTS RESENT PLEASE.

## 2024-11-05 DIAGNOSIS — N31.9 NEUROGENIC BLADDER: Primary | ICD-10-CM

## 2024-11-05 DIAGNOSIS — Q62.11 HYDRONEPHROSIS WITH URETEROPELVIC JUNCTION (UPJ) OBSTRUCTION: ICD-10-CM

## 2024-11-06 ENCOUNTER — OFFICE VISIT (OUTPATIENT)
Age: 79
End: 2024-11-06
Payer: MEDICARE

## 2024-11-06 VITALS
WEIGHT: 171 LBS | BODY MASS INDEX: 23.94 KG/M2 | DIASTOLIC BLOOD PRESSURE: 64 MMHG | SYSTOLIC BLOOD PRESSURE: 134 MMHG | HEIGHT: 71 IN

## 2024-11-06 DIAGNOSIS — R26.9 GAIT ABNORMALITY: ICD-10-CM

## 2024-11-06 DIAGNOSIS — L84 FOOT CALLUS: ICD-10-CM

## 2024-11-06 DIAGNOSIS — G57.93 NEUROPATHY INVOLVING BOTH LOWER EXTREMITIES: ICD-10-CM

## 2024-11-06 DIAGNOSIS — L60.2 THICKENED NAILS: Primary | ICD-10-CM

## 2024-11-07 ENCOUNTER — TELEPHONE (OUTPATIENT)
Dept: INTERNAL MEDICINE | Facility: CLINIC | Age: 79
End: 2024-11-07
Payer: MEDICARE

## 2024-11-07 NOTE — TELEPHONE ENCOUNTER
Caller: Sonu Bravo Jr.    Relationship: Self    Best call back number: [phone]     Requested Prescriptions:   Requested Prescriptions      No prescriptions requested or ordered in this encounter    NEURO-FLEX CREAM (NOT IN CURRENT LIST)     Pharmacy where request should be sent: [facility] [address] - [phone] PH - [phone] FX     Last office visit with prescribing clinician: 10/22/2024   Last telemedicine visit with prescribing clinician: Visit date not found   Next office visit with prescribing clinician: Visit date not found     Additional details provided by patient:     Does the patient have less than a 3 day supply:  [x] Yes  [] No    Would you like a call back once the refill request has been completed: [] Yes [x] No    If the office needs to give you a call back, can they leave a voicemail: [] Yes [x] No    Jake Elliott III, RegSched Rep   11/07/24 13:48 CST

## 2024-12-05 ENCOUNTER — TRANSCRIBE ORDERS (OUTPATIENT)
Dept: ADMINISTRATIVE | Facility: HOSPITAL | Age: 79
End: 2024-12-05
Payer: MEDICARE

## 2024-12-05 DIAGNOSIS — R33.9 RETENTION OF URINE, UNSPECIFIED: Primary | ICD-10-CM

## 2024-12-19 DIAGNOSIS — G25.81 RLS (RESTLESS LEGS SYNDROME): ICD-10-CM

## 2024-12-19 RX ORDER — PRAMIPEXOLE DIHYDROCHLORIDE 0.25 MG/1
.25-.5 TABLET ORAL NIGHTLY
Qty: 30 TABLET | Refills: 0 | Status: SHIPPED | OUTPATIENT
Start: 2024-12-19

## 2024-12-30 ENCOUNTER — TELEPHONE (OUTPATIENT)
Dept: UROLOGY | Facility: CLINIC | Age: 79
End: 2024-12-30
Payer: MEDICARE

## 2024-12-30 NOTE — TELEPHONE ENCOUNTER
Called both numbers listed, left voicemail with both.    We have moved the patients' appt with Wyatt to 1:40 PM on 1/13/2025 as Wyatt will not be available that morning.    IF PATIENT CALLS BACK, IT IS OKAY FOR THE HUB TO MOVE APPT IF NECESSARY.

## 2025-01-14 ENCOUNTER — OFFICE VISIT (OUTPATIENT)
Dept: INTERNAL MEDICINE | Facility: CLINIC | Age: 80
End: 2025-01-14
Payer: MEDICARE

## 2025-01-14 ENCOUNTER — LAB (OUTPATIENT)
Dept: LAB | Facility: HOSPITAL | Age: 80
End: 2025-01-14
Payer: MEDICARE

## 2025-01-14 VITALS
HEIGHT: 71 IN | HEART RATE: 62 BPM | SYSTOLIC BLOOD PRESSURE: 120 MMHG | RESPIRATION RATE: 16 BRPM | OXYGEN SATURATION: 98 % | WEIGHT: 169.4 LBS | DIASTOLIC BLOOD PRESSURE: 88 MMHG | BODY MASS INDEX: 23.72 KG/M2

## 2025-01-14 DIAGNOSIS — R31.0 GROSS HEMATURIA: Primary | ICD-10-CM

## 2025-01-14 DIAGNOSIS — E03.9 ACQUIRED HYPOTHYROIDISM: ICD-10-CM

## 2025-01-14 DIAGNOSIS — E78.2 MIXED HYPERLIPIDEMIA: ICD-10-CM

## 2025-01-14 DIAGNOSIS — I10 PRIMARY HYPERTENSION: ICD-10-CM

## 2025-01-14 DIAGNOSIS — R31.0 GROSS HEMATURIA: ICD-10-CM

## 2025-01-14 DIAGNOSIS — R91.8 LUNG NODULES: ICD-10-CM

## 2025-01-14 DIAGNOSIS — Z78.9 SELF-CATHETERIZES URINARY BLADDER: ICD-10-CM

## 2025-01-14 DIAGNOSIS — R73.02 IMPAIRED GLUCOSE TOLERANCE: ICD-10-CM

## 2025-01-14 DIAGNOSIS — G25.81 RLS (RESTLESS LEGS SYNDROME): ICD-10-CM

## 2025-01-14 DIAGNOSIS — K21.00 GASTRO-ESOPHAGEAL REFLUX DISEASE WITH ESOPHAGITIS: ICD-10-CM

## 2025-01-14 DIAGNOSIS — N31.9 NEUROGENIC BLADDER: ICD-10-CM

## 2025-01-14 LAB
ALBUMIN SERPL-MCNC: 4.1 G/DL (ref 3.5–5.2)
ALBUMIN/GLOB SERPL: 1.3 G/DL
ALP SERPL-CCNC: 64 U/L (ref 39–117)
ALT SERPL W P-5'-P-CCNC: 15 U/L (ref 1–41)
ANION GAP SERPL CALCULATED.3IONS-SCNC: 14 MMOL/L (ref 5–15)
AST SERPL-CCNC: 21 U/L (ref 1–40)
BACTERIA UR QL AUTO: ABNORMAL /HPF
BILIRUB SERPL-MCNC: 0.4 MG/DL (ref 0–1.2)
BILIRUB UR QL STRIP: NEGATIVE
BUN SERPL-MCNC: 23 MG/DL (ref 8–23)
BUN/CREAT SERPL: 18.7 (ref 7–25)
CALCIUM SPEC-SCNC: 9.2 MG/DL (ref 8.6–10.5)
CHLORIDE SERPL-SCNC: 106 MMOL/L (ref 98–107)
CHOLEST SERPL-MCNC: 101 MG/DL (ref 0–200)
CLARITY UR: ABNORMAL
CO2 SERPL-SCNC: 24 MMOL/L (ref 22–29)
COLOR UR: ABNORMAL
CREAT SERPL-MCNC: 1.23 MG/DL (ref 0.76–1.27)
DEPRECATED RDW RBC AUTO: 55.4 FL (ref 37–54)
EGFRCR SERPLBLD CKD-EPI 2021: 59.3 ML/MIN/1.73
ERYTHROCYTE [DISTWIDTH] IN BLOOD BY AUTOMATED COUNT: 18.7 % (ref 12.3–15.4)
GLOBULIN UR ELPH-MCNC: 3.1 GM/DL
GLUCOSE SERPL-MCNC: 123 MG/DL (ref 65–99)
GLUCOSE UR STRIP-MCNC: NEGATIVE MG/DL
HBA1C MFR BLD: 6.1 % (ref 4.8–5.6)
HCT VFR BLD AUTO: 40.4 % (ref 37.5–51)
HDLC SERPL-MCNC: 45 MG/DL (ref 40–60)
HGB BLD-MCNC: 12.3 G/DL (ref 13–17.7)
HGB UR QL STRIP.AUTO: ABNORMAL
HYALINE CASTS UR QL AUTO: ABNORMAL /LPF
KETONES UR QL STRIP: ABNORMAL
LDLC SERPL CALC-MCNC: 35 MG/DL (ref 0–100)
LDLC/HDLC SERPL: 0.74 {RATIO}
LEUKOCYTE ESTERASE UR QL STRIP.AUTO: ABNORMAL
MCH RBC QN AUTO: 24.9 PG (ref 26.6–33)
MCHC RBC AUTO-ENTMCNC: 30.4 G/DL (ref 31.5–35.7)
MCV RBC AUTO: 81.8 FL (ref 79–97)
NITRITE UR QL STRIP: NEGATIVE
PH UR STRIP.AUTO: 6 [PH] (ref 5–8)
PLATELET # BLD AUTO: 221 10*3/MM3 (ref 140–450)
PMV BLD AUTO: 9.6 FL (ref 6–12)
POTASSIUM SERPL-SCNC: 4 MMOL/L (ref 3.5–5.2)
PROT SERPL-MCNC: 7.2 G/DL (ref 6–8.5)
PROT UR QL STRIP: ABNORMAL
RBC # BLD AUTO: 4.94 10*6/MM3 (ref 4.14–5.8)
RBC # UR STRIP: ABNORMAL /HPF
REF LAB TEST METHOD: ABNORMAL
SODIUM SERPL-SCNC: 144 MMOL/L (ref 136–145)
SP GR UR STRIP: 1.02 (ref 1–1.03)
SQUAMOUS #/AREA URNS HPF: ABNORMAL /HPF
TRIGL SERPL-MCNC: 113 MG/DL (ref 0–150)
TSH SERPL DL<=0.05 MIU/L-ACNC: 6.07 UIU/ML (ref 0.27–4.2)
UROBILINOGEN UR QL STRIP: ABNORMAL
VLDLC SERPL-MCNC: 21 MG/DL (ref 5–40)
WBC # UR STRIP: ABNORMAL /HPF
WBC NRBC COR # BLD AUTO: 9.13 10*3/MM3 (ref 3.4–10.8)

## 2025-01-14 PROCEDURE — 81001 URINALYSIS AUTO W/SCOPE: CPT

## 2025-01-14 PROCEDURE — 87186 SC STD MICRODIL/AGAR DIL: CPT

## 2025-01-14 PROCEDURE — 87086 URINE CULTURE/COLONY COUNT: CPT

## 2025-01-14 PROCEDURE — 80053 COMPREHEN METABOLIC PANEL: CPT

## 2025-01-14 PROCEDURE — 36415 COLL VENOUS BLD VENIPUNCTURE: CPT

## 2025-01-14 PROCEDURE — 80061 LIPID PANEL: CPT

## 2025-01-14 PROCEDURE — 83036 HEMOGLOBIN GLYCOSYLATED A1C: CPT

## 2025-01-14 PROCEDURE — 84443 ASSAY THYROID STIM HORMONE: CPT

## 2025-01-14 PROCEDURE — 87077 CULTURE AEROBIC IDENTIFY: CPT

## 2025-01-14 PROCEDURE — 85027 COMPLETE CBC AUTOMATED: CPT

## 2025-01-14 RX ORDER — PRAMIPEXOLE DIHYDROCHLORIDE 0.5 MG/1
0.5 TABLET ORAL NIGHTLY
Qty: 90 TABLET | Refills: 1 | Status: SHIPPED | OUTPATIENT
Start: 2025-01-14

## 2025-01-15 ENCOUNTER — TELEPHONE (OUTPATIENT)
Dept: INTERNAL MEDICINE | Facility: CLINIC | Age: 80
End: 2025-01-15
Payer: MEDICARE

## 2025-01-15 DIAGNOSIS — B96.20 E. COLI UTI: Primary | ICD-10-CM

## 2025-01-15 DIAGNOSIS — N39.0 E. COLI UTI: Primary | ICD-10-CM

## 2025-01-15 RX ORDER — CEFUROXIME AXETIL 250 MG/1
250 TABLET ORAL 2 TIMES DAILY
Qty: 14 TABLET | Refills: 0 | Status: SHIPPED | OUTPATIENT
Start: 2025-01-15 | End: 2025-01-16 | Stop reason: SDUPTHER

## 2025-01-15 NOTE — TELEPHONE ENCOUNTER
Caller: Sonu Bravo Jr.    Relationship: Self    Best call back number: [phone]         What test was performed: LABS URINALYSIS    When was the test performed: 01/14/2025    Where was the test performed: CLINIC    Additional notes: PATIENT WANTS RESULTS AND ANTIBIOTIC AS SOON AS HE CAN, IF NEEDED.                       PLEASE CALL PATIENT TO LET HIM KNOW.

## 2025-01-15 NOTE — PROGRESS NOTES
CC: cloudy urine    History:  Sonu Bravo Jr. is a 79 y.o. male   History of Present Illness  The patient presents for evaluation of cloudy urine, blood in urine, leg jumping, and weight loss. He is accompanied by his wife.    He reports experiencing cloudy urine and hematuria. He has not experienced any fevers or chills but notes a dull ache behind his eyes. He also reports a burning sensation in the pelvic region, which he attributes to a previous fistula surgery. He continues to perform self-catheterization 3 times daily.    He has been experiencing increased sleepiness, often falling asleep while seated. His energy levels have significantly decreased. He had previously sought medical attention for dizziness and respiratory issues, which have since resolved.    He reports experiencing leg jerking and tingling sensations. His wife corroborates this, noting that the severity of the leg jerking varies, with some instances being so severe that he unknowingly injures his leg against the bed. He has been on pramipexole 0.25 mg, which has provided some relief, but the leg jerking has increased over the past week. He is considering increasing the dosage of pramipexole. He also takes hydrocodone at bedtime.    His wife has observed weight loss, although his weight has remained stable over the past few months. He has a poor appetite. He has reduced his smoking from 3 packs to half a pack daily. He occasionally experiences coughing spells, but these have not occurred recently. He underwent neck surgery in 10/2022, after which he was informed that his esophagus could be affected. He has since adapted by taking smaller bites and consuming more fluids to prevent choking. He has a follow-up appointment with Dr. Barros on 01/20/2025 for an angiogram, as it has been a year since his last one.          ROS:  Review of Systems   Constitutional:  Positive for fatigue.   Genitourinary:  Positive for dysuria and hematuria. Negative  for penile discharge and urgency.   Musculoskeletal:  Positive for back pain.   Neurological:  Positive for numbness.        reports that he has been smoking cigarettes. He started smoking about 58 years ago. He has a 114.9 pack-year smoking history. He has been exposed to tobacco smoke. He has never used smokeless tobacco. He reports current alcohol use. He reports that he does not use drugs.      Current Outpatient Medications:     apixaban (Eliquis) 5 MG tablet tablet, Take 1 tablet by mouth 2 (Two) Times a Day., Disp: 180 tablet, Rfl: 1    azelastine (ASTELIN) 0.1 % nasal spray, Administer 2 sprays into the nostril(s) as directed by provider 2 (Two) Times a Day. Use in each nostril as directed, Disp: 30 mL, Rfl: 11    bisoprolol (ZEBeta) 5 MG tablet, Take 1 tablet by mouth Daily., Disp: 90 tablet, Rfl: 1    Cholecalciferol 100 MCG (4000 UT) capsule, Take 4,000 Units by mouth Daily. Indications: Vitamin D Deficiency, Disp: , Rfl:     coenzyme Q10 100 MG capsule, Take 1 capsule by mouth Daily. Indications: Heart Rhythm Disorder, Disp: , Rfl:     Cyanocobalamin 2500 MCG sublingual tablet, Take 2,500 mcg by mouth Daily. Indications: Inadequate Vitamin B12, Disp: , Rfl:     fexofenadine (ALLEGRA) 180 MG tablet, Take 1 tablet by mouth Daily. Indications: Hayfever, Disp: , Rfl:     fluticasone (FLONASE) 50 MCG/ACT nasal spray, 1 spray into the nostril(s) as directed by provider Daily. Indications: Stuffy Nose, Disp: 32 g, Rfl: 3    HYDROcodone-acetaminophen (NORCO)  MG per tablet, Take 1 tablet by mouth Daily As Needed., Disp: , Rfl:     ibuprofen (ADVIL,MOTRIN) 200 MG tablet, Take 1 tablet by mouth Every 6 (Six) Hours As Needed for Mild Pain., Disp: , Rfl:     levothyroxine (SYNTHROID, LEVOTHROID) 112 MCG tablet, Take 1 tablet by mouth Every Morning., Disp: 90 tablet, Rfl: 1    losartan (Cozaar) 100 MG tablet, Take 1 tablet by mouth Daily., Disp: 90 tablet, Rfl: 1    Magnesium 250 MG tablet, Take  by mouth.,  "Disp: , Rfl:     NON FORMULARY, Apply 1 Units topically to the appropriate area as directed 2 (Two) Times a Day., Disp: , Rfl:     omeprazole (priLOSEC) 20 MG capsule, Take 1 capsule by mouth Daily., Disp: 90 capsule, Rfl: 3    polyethylene glycol (MiraLax) 17 GM/SCOOP powder, Take 17 g by mouth As Needed., Disp: , Rfl:     pramipexole (MIRAPEX) 0.5 MG tablet, Take 1 tablet by mouth Every Night., Disp: 90 tablet, Rfl: 1    rosuvastatin (CRESTOR) 20 MG tablet, Take 1 tablet by mouth Daily., Disp: 90 tablet, Rfl: 3    terazosin (HYTRIN) 2 MG capsule, Take 1 capsule by mouth Every Night., Disp: , Rfl:     Zinc 50 MG capsule, Take 1 capsule by mouth Daily. Indications: Zinc Deficiency, Disp: , Rfl:     OBJECTIVE:  /88 (BP Location: Left arm, Patient Position: Sitting, Cuff Size: Adult)   Pulse 62   Resp 16   Ht 180.3 cm (71\")   Wt 76.8 kg (169 lb 6.4 oz)   SpO2 98%   BMI 23.63 kg/m²    Physical Exam  Constitutional:       General: He is not in acute distress.  Pulmonary:      Effort: Pulmonary effort is normal. No respiratory distress.   Neurological:      Mental Status: He is alert and oriented to person, place, and time.   Psychiatric:         Mood and Affect: Mood normal.         Behavior: Behavior normal.           Assessment/Plan     Diagnoses and all orders for this visit:    1. Gross hematuria (Primary)  -     Urinalysis With Culture If Indicated - Urine, Catheter In/Out; Future    2. Self-catheterizes urinary bladder  -     Urinalysis With Culture If Indicated - Urine, Catheter In/Out; Future    3. Neurogenic bladder  -     Urinalysis With Culture If Indicated - Urine, Catheter In/Out; Future    4. Gastro-esophageal reflux disease with esophagitis  -     omeprazole (priLOSEC) 20 MG capsule; Take 1 capsule by mouth Daily.  Dispense: 90 capsule; Refill: 3    5. RLS (restless legs syndrome)  -     pramipexole (MIRAPEX) 0.5 MG tablet; Take 1 tablet by mouth Every Night.  Dispense: 90 tablet; Refill: " 1    6. Lung nodules  -     CT Chest Without Contrast Diagnostic; Future      Assessment & Plan  1. Cloudy urine and hematuria.  He reports cloudy urine and some bleeding. There is no fever or chills, but he experiences a dull ache behind his eyes and burning in the pelvic area. A fresh urine sample will be collected for analysis to determine the cause of these symptoms.    2. RLS  He reports increased leg jumping and tingling, particularly in the last week. He is currently on pramipexole 0.25 mg. The dosage of pramipexole will be increased to 0.5 mg. A prescription refill will be sent to the local [facility] pharmacy.    3. Fatigue.  He reports increased fatigue and frequent dozing off. Blood work will be conducted to assess liver, kidney, glucose, cholesterol, and thyroid function to identify any underlying causes of his fatigue.    5. Smoking history.  He has reduced his smoking from 3 packs to half a pack a day since his diverticulitis diagnosis in 2021. Continued efforts to reduce smoking are encouraged. A CT scan of the chest will be scheduled to monitor previously identified lung nodules.    Follow-up  The patient is scheduled for a follow-up visit in April 2025.        An After Visit Summary was printed and given to the patient at discharge.  Return for Next scheduled follow up.      Patient or patient representative verbalized consent for the use of Ambient Listening during the visit with  Vaughn Kramer DO for chart documentation. 1/14/2025  22:28 SAE Kramer D.O. 1/14/2025   Electronically signed.

## 2025-01-16 ENCOUNTER — TELEPHONE (OUTPATIENT)
Dept: INTERNAL MEDICINE | Facility: CLINIC | Age: 80
End: 2025-01-16
Payer: MEDICARE

## 2025-01-16 DIAGNOSIS — N39.0 E. COLI UTI: ICD-10-CM

## 2025-01-16 DIAGNOSIS — E03.9 ACQUIRED HYPOTHYROIDISM: ICD-10-CM

## 2025-01-16 DIAGNOSIS — B96.20 E. COLI UTI: ICD-10-CM

## 2025-01-16 LAB — BACTERIA SPEC AEROBE CULT: ABNORMAL

## 2025-01-16 RX ORDER — CEFUROXIME AXETIL 250 MG/1
250 TABLET ORAL 2 TIMES DAILY
Qty: 14 TABLET | Refills: 0 | Status: SHIPPED | OUTPATIENT
Start: 2025-01-16 | End: 2025-01-23

## 2025-01-16 RX ORDER — LEVOTHYROXINE SODIUM 125 UG/1
125 TABLET ORAL
Qty: 90 TABLET | Refills: 0 | Status: SHIPPED | OUTPATIENT
Start: 2025-01-16

## 2025-01-16 NOTE — TELEPHONE ENCOUNTER
Caller: Sonu Bravo Jr.    Relationship: Self    Best call back number: [phone]     What is the best time to reach you: ANY    Who are you requesting to speak with (clinical staff, provider,  specific staff member): NONE SPECIFIED     What was the call regarding:     PATIENT STATES HE RECEIVED HIS MOST RECENT TEST RESULTS. PATIENT STATES HE HAS BEEN TAKING HIS THYROID MEDICATION AS DIRECTED. PATIENT IS OKAY WITH THE DOSAGE OF THIS MEDICATION BEING INCREASED IF THAT IS WHAT HIS PROVIDER RECOMMENDS. IF MEDICATION IS PRESCRIBED, PLEASE SEND TO [facility].     Is it okay if the provider responds through MyChart: PLEASE CALL

## 2025-01-16 NOTE — TELEPHONE ENCOUNTER
Caller: LawrenceSonu Jr.    Relationship: Self    Best call back number: [phone]     Requested Prescriptions:   Requested Prescriptions     Pending Prescriptions Disp Refills    cefuroxime (CEFTIN) 250 MG tablet 14 tablet 0     Sig: Take 1 tablet by mouth 2 (Two) Times a Day for 7 days.        Pharmacy where request should be sent: [facility]ACY #[id number] - [place] - [address]  [phone] [facility] [phone]      Last office visit with prescribing clinician: 1/14/2025   Last telemedicine visit with prescribing clinician: Visit date not found   Next office visit with prescribing clinician: Visit date not found     Additional details provided by patient: PATIENT IS WAITING FOR SEVERAL DAYS    Does the patient have less than a 3 day supply:  [x] Yes  [] No    Would you like a call back once the refill request has been completed: [x] Yes [] No    If the office needs to give you a call back, can they leave a voicemail: [x] Yes [] No    Elan Escamilla Rep   01/16/25 10:54 CST

## 2025-01-16 NOTE — TELEPHONE ENCOUNTER
Sent increase to 125mcg. No urgency and can wait until this arrives through the mail. Continue taking current dose until it is available.

## 2025-01-16 NOTE — TELEPHONE ENCOUNTER
Rx Refill Note  Requested Prescriptions     Pending Prescriptions Disp Refills    cefuroxime (CEFTIN) 250 MG tablet 14 tablet 0     Sig: Take 1 tablet by mouth 2 (Two) Times a Day for 7 days.      Last office visit with prescribing clinician: 1/14/2025   Last telemedicine visit with prescribing clinician: Visit date not found   Next office visit with prescribing clinician: Visit date not found                         Would you like a call back once the refill request has been completed: [] Yes [] No    If the office needs to give you a call back, can they leave a voicemail: [] Yes [] No    Jairo Cano MA  01/16/25, 11:50 CST      PATIENT IS NEEDING THIS MEDICATION TO BE SENT TO [facility].

## 2025-01-22 ENCOUNTER — TELEPHONE (OUTPATIENT)
Dept: INTERNAL MEDICINE | Facility: CLINIC | Age: 80
End: 2025-01-22
Payer: MEDICARE

## 2025-01-22 NOTE — TELEPHONE ENCOUNTER
PATIENT HAS BEEN CALLED, HE STATED THAT HE HAD THE ANGIOGRAM DONE AT THE [facility].  DR. LORNA DRAPER.

## 2025-01-22 NOTE — TELEPHONE ENCOUNTER
PATIENT HAS CALLED, HE STATED THAT HE IS NOT FEELING HIMSELF TODAY, DIZZY, CAN'T THINK CLEARLY.    HE STATED THAT IT FEELS LIKE WHEN HE CAME OUT OF THE ANGIOGRAM ON MONDAY.   PATIENT WOULD LIKE TO BE SEEN SOON IF POSSIBLE.   I SEE NO OPENINGS THIS WEEK.

## 2025-01-22 NOTE — TELEPHONE ENCOUNTER
There is space tomorrow.     Where did he have an angiogram? I don't see that result. Want to be sure we are aware of what had been done to get those records.

## 2025-01-23 ENCOUNTER — OFFICE VISIT (OUTPATIENT)
Dept: INTERNAL MEDICINE | Facility: CLINIC | Age: 80
End: 2025-01-23
Payer: MEDICARE

## 2025-01-23 VITALS
DIASTOLIC BLOOD PRESSURE: 95 MMHG | BODY MASS INDEX: 23.85 KG/M2 | OXYGEN SATURATION: 98 % | SYSTOLIC BLOOD PRESSURE: 174 MMHG | WEIGHT: 170.4 LBS | HEART RATE: 55 BPM | RESPIRATION RATE: 16 BRPM | HEIGHT: 71 IN

## 2025-01-23 DIAGNOSIS — N13.30 HYDRONEPHROSIS, UNSPECIFIED HYDRONEPHROSIS TYPE: ICD-10-CM

## 2025-01-23 DIAGNOSIS — R42 DIZZINESS: ICD-10-CM

## 2025-01-23 DIAGNOSIS — T88.59XA: Primary | ICD-10-CM

## 2025-01-23 DIAGNOSIS — Z78.9 SELF-CATHETERIZES URINARY BLADDER: ICD-10-CM

## 2025-01-23 PROCEDURE — G2211 COMPLEX E/M VISIT ADD ON: HCPCS | Performed by: INTERNAL MEDICINE

## 2025-01-23 PROCEDURE — 1125F AMNT PAIN NOTED PAIN PRSNT: CPT | Performed by: INTERNAL MEDICINE

## 2025-01-23 PROCEDURE — 1159F MED LIST DOCD IN RCRD: CPT | Performed by: INTERNAL MEDICINE

## 2025-01-23 PROCEDURE — 99213 OFFICE O/P EST LOW 20 MIN: CPT | Performed by: INTERNAL MEDICINE

## 2025-01-23 PROCEDURE — 3077F SYST BP >= 140 MM HG: CPT | Performed by: INTERNAL MEDICINE

## 2025-01-23 PROCEDURE — 3080F DIAST BP >= 90 MM HG: CPT | Performed by: INTERNAL MEDICINE

## 2025-01-23 PROCEDURE — 1160F RVW MEDS BY RX/DR IN RCRD: CPT | Performed by: INTERNAL MEDICINE

## 2025-01-23 NOTE — TELEPHONE ENCOUNTER
SPOKE TO THE MEDICAL RECORDS OFFICE FOR DR. LORNA BELCHER OFFICE [phone]  RECORDS ARE BEING FAXED PER ANGELA.

## 2025-01-23 NOTE — PROGRESS NOTES
CC: dizziness    History:  Sonu Bravo Jr. is a 79 y.o. male   History of Present Illness  The patient presents for evaluation of post-procedural symptoms, urinary retention, and visual disturbances.    The patient underwent a procedure on Monday, subsequent to which he experienced marked fatigue, necessitating extended periods of sleep. He received Versed for sedation. The angiogram showed satisfactory findings in the brain with no further intervention recommended.     Upon awakening, he reported severe symptoms, including ataxia and visual disturbances in his left eye, described as resembling the appearance of smoke. Additionally, he experienced confusion, particularly when attempting to manage his medications. His appetite and sleep patterns remain unaffected. He has been increasing his water intake since returning home. The patient has a history of spinal surgeries and has previously experienced similar visual disturbances, which were evaluated by an ophthalmologist and deemed non-concerning. He has a scheduled appointment with his ophthalmologist on 04/04/2024.    The patient has been experiencing difficulties with self-catheterization, including episodes of hematuria within the catheter. Despite these challenges, he was able to void independently several times yesterday and this morning. He has a scheduled renal ultrasound on 01/30/2024, prompted by a previous radiographic study indicating hydronephrosis, though renal function has remained stable.         ROS:  Review of Systems   Constitutional:  Positive for fatigue. Negative for chills and fever.   Respiratory:  Negative for cough and shortness of breath.    Cardiovascular:  Negative for chest pain and palpitations.   Gastrointestinal:  Negative for abdominal pain and constipation.   Genitourinary:  Negative for difficulty urinating and dysuria.   Neurological:  Positive for dizziness.   Psychiatric/Behavioral:  Positive for confusion. Negative for  dysphoric mood.         reports that he has been smoking cigarettes. He started smoking about 58 years ago. He has a 114.9 pack-year smoking history. He has been exposed to tobacco smoke. He has never used smokeless tobacco. He reports current alcohol use. He reports that he does not use drugs.      Current Outpatient Medications:     apixaban (Eliquis) 5 MG tablet tablet, Take 1 tablet by mouth 2 (Two) Times a Day., Disp: 180 tablet, Rfl: 1    azelastine (ASTELIN) 0.1 % nasal spray, Administer 2 sprays into the nostril(s) as directed by provider 2 (Two) Times a Day. Use in each nostril as directed, Disp: 30 mL, Rfl: 11    bisoprolol (ZEBeta) 5 MG tablet, Take 1 tablet by mouth Daily., Disp: 90 tablet, Rfl: 1    Cholecalciferol 100 MCG (4000 UT) capsule, Take 4,000 Units by mouth Daily. Indications: Vitamin D Deficiency, Disp: , Rfl:     coenzyme Q10 100 MG capsule, Take 1 capsule by mouth Daily. Indications: Heart Rhythm Disorder, Disp: , Rfl:     Cyanocobalamin 2500 MCG sublingual tablet, Take 2,500 mcg by mouth Daily. Indications: Inadequate Vitamin B12, Disp: , Rfl:     fexofenadine (ALLEGRA) 180 MG tablet, Take 1 tablet by mouth Daily. Indications: Hayfever, Disp: , Rfl:     fluticasone (FLONASE) 50 MCG/ACT nasal spray, 1 spray into the nostril(s) as directed by provider Daily. Indications: Stuffy Nose, Disp: 32 g, Rfl: 3    HYDROcodone-acetaminophen (NORCO)  MG per tablet, Take 1 tablet by mouth Daily As Needed., Disp: , Rfl:     ibuprofen (ADVIL,MOTRIN) 200 MG tablet, Take 1 tablet by mouth Every 6 (Six) Hours As Needed for Mild Pain., Disp: , Rfl:     levothyroxine (SYNTHROID, LEVOTHROID) 125 MCG tablet, Take 1 tablet by mouth Every Morning., Disp: 90 tablet, Rfl: 0    losartan (Cozaar) 100 MG tablet, Take 1 tablet by mouth Daily., Disp: 90 tablet, Rfl: 1    Magnesium 250 MG tablet, Take  by mouth., Disp: , Rfl:     NON FORMULARY, Apply 1 Units topically to the appropriate area as directed 2 (Two)  "Times a Day., Disp: , Rfl:     omeprazole (priLOSEC) 20 MG capsule, Take 1 capsule by mouth Daily., Disp: 90 capsule, Rfl: 3    polyethylene glycol (MiraLax) 17 GM/SCOOP powder, Take 17 g by mouth As Needed., Disp: , Rfl:     pramipexole (MIRAPEX) 0.5 MG tablet, Take 1 tablet by mouth Every Night., Disp: 90 tablet, Rfl: 1    rosuvastatin (CRESTOR) 20 MG tablet, Take 1 tablet by mouth Daily., Disp: 90 tablet, Rfl: 3    terazosin (HYTRIN) 2 MG capsule, Take 1 capsule by mouth Every Night., Disp: , Rfl:     Zinc 50 MG capsule, Take 1 capsule by mouth Daily. Indications: Zinc Deficiency, Disp: , Rfl:     OBJECTIVE:  /95 (BP Location: Left arm, Patient Position: Sitting, Cuff Size: Adult)   Pulse 55   Resp 16   Ht 180.3 cm (71\")   Wt 77.3 kg (170 lb 6.4 oz)   SpO2 98%   BMI 23.77 kg/m²    Physical Exam  Constitutional:       General: He is not in acute distress.  Pulmonary:      Effort: Pulmonary effort is normal. No respiratory distress.   Neurological:      Mental Status: He is alert and oriented to person, place, and time.   Psychiatric:         Mood and Affect: Mood normal.         Behavior: Behavior normal.     CT Abdomen Pelvis Without Contrast (10/09/2024 16:26)    OPERATIVE/PROCEDURE REPORT - SCAN - PROCEDURE REPORT [facility] 01/23/2025 (01/23/2025)      Assessment/Plan     Diagnoses and all orders for this visit:    1. Delayed emergence from anesthesia, initial encounter (Primary)    2. Dizziness    3. Hydronephrosis, unspecified hydronephrosis type    4. Self-catheterizes urinary bladder      Assessment & Plan  1. Post-procedural symptoms: Stable. The symptoms are likely attributable to the administration of Versed during the recent procedure, which has a prolonged half-life. The observed delirium is not uncommon post-procedure and post-anesthesia. The surgical site appears to be healing well, and appetite remains robust. Renal function tests have consistently shown slightly elevated levels, " but this is not a cause for concern at present. Elevated blood pressure readings today.  - Re-evaluate blood pressure today  - If symptoms persist into next week, consult ophthalmologist to rule out any significant changes  - If confusion and fogginess persist into next week, repeat blood work to ensure no other underlying conditions    2. Urinary retention: Experiencing difficulty with catheterization, which may be due to the catheters themselves or post-anesthesia retention issues. Reported some blood in the tube last night but was able to catheterize successfully this morning. Upcoming ultrasound of kidneys scheduled for 07/30/2024.  - Monitor urinary output and catheterization issues  - If ultrasound shows any abnormalities, consider referral to a urologist    Follow-up  - Ultrasound of kidneys on 07/30/2024  - CAT scan for lungs on 07/30/2024    PROCEDURE  The patient underwent a procedure on Monday.        An After Visit Summary was printed and given to the patient at discharge.  Return for Next scheduled follow up.      Patient or patient representative verbalized consent for the use of Ambient Listening during the visit with  Vaughn Kramer DO for chart documentation. 1/23/2025  21:29 CST    Vaughn Kramer D.O. 1/23/2025   Electronically signed.

## 2025-01-30 NOTE — PROGRESS NOTES
Subjective    Mr. Bravo is 79 y.o. male    Chief Complaint: Issues with Self-Cathing    History of Present Illness  Patient who underwent laminectomy surgery in 01/20/2023 has urinary retention since although he did state he does void in between.  He has to perform self catheterizations 3 times daily.  Urodynamics done elsewhere showed a bladder sensation and increased bladder capacity.  There is no bladder or outlet obstruction patient is having increased difficulty inserting the catheter and also developed hematuria in the same setting.  He saw his PCP Dr. Kramer and urine look suspicious for infection urine culture E. coli and he was treated with appropriate antibiotic patient states he is now having no difficulties inserting the catheter has not seen any hematuria since.      The following portions of the patient's history were reviewed and updated as appropriate: allergies, current medications, past family history, past medical history, past social history, past surgical history and problem list.    Review of Systems   Constitutional:  Negative for chills and fever.   Genitourinary:  Positive for difficulty urinating and hematuria.         Current Outpatient Medications:     apixaban (Eliquis) 5 MG tablet tablet, Take 1 tablet by mouth 2 (Two) Times a Day., Disp: 180 tablet, Rfl: 1    azelastine (ASTELIN) 0.1 % nasal spray, Administer 2 sprays into the nostril(s) as directed by provider 2 (Two) Times a Day. Use in each nostril as directed, Disp: 30 mL, Rfl: 11    bisoprolol (ZEBeta) 5 MG tablet, Take 1 tablet by mouth Daily., Disp: 90 tablet, Rfl: 1    cefuroxime (CEFTIN) 250 MG tablet, Take 1 tablet by mouth 2 (Two) Times a Day., Disp: , Rfl:     Cholecalciferol 100 MCG (4000 UT) capsule, Take 4,000 Units by mouth Daily. Indications: Vitamin D Deficiency, Disp: , Rfl:     coenzyme Q10 100 MG capsule, Take 1 capsule by mouth Daily. Indications: Heart Rhythm Disorder, Disp: , Rfl:     Cyanocobalamin 2500 MCG  sublingual tablet, Take 2,500 mcg by mouth Daily. Indications: Inadequate Vitamin B12, Disp: , Rfl:     fexofenadine (ALLEGRA) 180 MG tablet, Take 1 tablet by mouth Daily. Indications: Hayfever, Disp: , Rfl:     fluticasone (FLONASE) 50 MCG/ACT nasal spray, 1 spray into the nostril(s) as directed by provider Daily. Indications: Stuffy Nose, Disp: 32 g, Rfl: 3    HYDROcodone-acetaminophen (NORCO)  MG per tablet, Take 1 tablet by mouth Daily As Needed., Disp: , Rfl:     ibuprofen (ADVIL,MOTRIN) 200 MG tablet, Take 1 tablet by mouth Every 6 (Six) Hours As Needed for Mild Pain., Disp: , Rfl:     levothyroxine (SYNTHROID, LEVOTHROID) 125 MCG tablet, Take 1 tablet by mouth Every Morning., Disp: 90 tablet, Rfl: 0    losartan (Cozaar) 100 MG tablet, Take 1 tablet by mouth Daily., Disp: 90 tablet, Rfl: 1    Magnesium 250 MG tablet, Take  by mouth., Disp: , Rfl:     NON FORMULARY, Apply 1 Units topically to the appropriate area as directed 2 (Two) Times a Day., Disp: , Rfl:     omeprazole (priLOSEC) 20 MG capsule, Take 1 capsule by mouth Daily., Disp: 90 capsule, Rfl: 3    polyethylene glycol (MiraLax) 17 GM/SCOOP powder, Take 17 g by mouth As Needed., Disp: , Rfl:     pramipexole (MIRAPEX) 0.5 MG tablet, Take 1 tablet by mouth Every Night., Disp: 90 tablet, Rfl: 1    rosuvastatin (CRESTOR) 20 MG tablet, Take 1 tablet by mouth Daily., Disp: 90 tablet, Rfl: 3    terazosin (HYTRIN) 2 MG capsule, Take 1 capsule by mouth Every Night., Disp: , Rfl:     Zinc 50 MG capsule, Take 1 capsule by mouth Daily. Indications: Zinc Deficiency, Disp: , Rfl:     tamsulosin (FLOMAX) 0.4 MG capsule 24 hr capsule, Take 1 capsule by mouth Every Night for 30 days., Disp: 30 capsule, Rfl: 0    tamsulosin (FLOMAX) 0.4 MG capsule 24 hr capsule, Take 1 capsule by mouth Every Night for 360 days., Disp: 90 capsule, Rfl: 3    Past Medical History:   Diagnosis Date    Arthritis     Atrial fibrillation 08/31/2021    Cataracts, bilateral     Disease of  thyroid gland     Diverticulitis     GERD (gastroesophageal reflux disease)     Hyperlipidemia     Hypertension     Low back pain     Mononucleosis 1964       Past Surgical History:   Procedure Laterality Date    ANTERIOR CERVICAL DISCECTOMY W/ FUSION N/A 10/11/2022    Procedure: CERVICAL DISCECTOMY ANTERIOR WITH FUSION, Cervical 4/5 and 5/6;  Surgeon: Durga Cifuentes MD;  Location:  [facility];  Service: Neurosurgery;  Laterality: N/A;    BACK SURGERY N/A     CATARACT EXTRACTION Right     CEREBRAL ANGIOGRAM      COLON SURGERY  01/2022    Dr Fairbanks at [facility]    COLONOSCOPY N/A 09/28/2021    Procedure: COLONOSCOPY WITH ANESTHESIA;  Surgeon: Thomas Dc DO;  Location:  [facility];  Service: Gastroenterology;  Laterality: N/A;  pre op; abnormal ct scan  post op: diverticulosis ; polyps   PCP: Vaughn Kramer DO    COLONOSCOPY N/A 01/23/2024    Procedure: COLONOSCOPY WITH ANESTHESIA;  Surgeon: Thomas Dc DO;  Location:  [facility];  Service: Gastroenterology;  Laterality: N/A;  Pre: Altered bowel function;  Post: Polyps;  Vaughn Kramer DO    HERNIA REPAIR      LUMBAR DIRECT LATERAL INTERBODY FUSION N/A 01/24/2023    Procedure: LUMBAR DIRECT LATERAL INTERBODY FUSION; LUMBAR 2-5;  Surgeon: Durga Cifuentes MD;  Location:  [facility];  Service: Neurosurgery;  Laterality: N/A;    LUMBAR FUSION N/A 01/24/2023    Procedure: LUMBAR DIRECT LATERAL INTERBODY FUSION; LUMBAR 2-5, LUMBAR LAMINECTOMY TRANSFORAMINAL LUMBAR INTERBODY FUSION; LUMBAR 2-5;  Surgeon: Durga Cifuentes MD;  Location:  [facility];  Service: Robotics - Neuro;  Laterality: N/A;    SKIN SURGERY      Skin cancer surgery.       Social History     Socioeconomic History    Marital status:    Tobacco Use    Smoking status: Every Day     Current packs/day: 0.50     Average packs/day: 2.0 packs/day for 58.1 years (114.9 ttl pk-yrs)     Types: Cigarettes     Start date: 1967     Passive  "exposure: Current    Smokeless tobacco: Never    Tobacco comments:     down to 1/2 pack a day 1/14/25   Vaping Use    Vaping status: Never Used   Substance and Sexual Activity    Alcohol use: Yes     Comment: rare    Drug use: Never    Sexual activity: Defer       Family History   Problem Relation Age of Onset    Lung cancer Mother     Brain cancer Mother     Heart disease Father     Hyperlipidemia Father     Hypertension Father     Prostate cancer Maternal Aunt     Colon cancer Maternal Uncle     Colon polyps Neg Hx     Esophageal cancer Neg Hx     Liver cancer Neg Hx        Objective    Temp 97.6 °F (36.4 °C) (Infrared)   Ht 180.3 cm (71\")   Wt 76.1 kg (167 lb 11.2 oz)   BMI 23.39 kg/m²     Physical Exam  Constitutional:       Appearance: Normal appearance.   HENT:      Head: Normocephalic and atraumatic.   Pulmonary:      Effort: Pulmonary effort is normal.   Skin:     Coloration: Skin is not pale.   Neurological:      Mental Status: He is alert.   Psychiatric:         Mood and Affect: Mood normal.         Behavior: Behavior normal.             Results for orders placed or performed in visit on 01/14/25   Urine Culture - Urine, Urine, Catheter In/Out    Collection Time: 01/14/25  8:55 AM    Specimen: Urine, Catheter In/Out   Result Value Ref Range    Urine Culture >100,000 CFU/mL Escherichia coli (A)        Susceptibility    Escherichia coli - JESSIKA     Amoxicillin + Clavulanate  Intermediate ug/ml     Ampicillin  Resistant ug/ml     Ampicillin + Sulbactam  Resistant ug/ml     Cefazolin (Urine)  Susceptible ug/ml     Cefepime  Susceptible ug/ml     Ceftazidime  Susceptible ug/ml     Ceftriaxone  Susceptible ug/ml     Ciprofloxacin  Resistant ug/ml     Gentamicin  Susceptible ug/ml     Levofloxacin  Resistant ug/ml     Nitrofurantoin  Susceptible ug/ml     Piperacillin + Tazobactam  Susceptible ug/ml     Trimethoprim + Sulfamethoxazole  Susceptible ug/ml   Lipid Panel    Collection Time: 01/14/25  8:55 AM    " Specimen: Blood   Result Value Ref Range    Total Cholesterol 101 0 - 200 mg/dL    Triglycerides 113 0 - 150 mg/dL    HDL Cholesterol 45 40 - 60 mg/dL    LDL Cholesterol  35 0 - 100 mg/dL    VLDL Cholesterol 21 5 - 40 mg/dL    LDL/HDL Ratio 0.74    Comprehensive Metabolic Panel    Collection Time: 01/14/25  8:55 AM    Specimen: Blood   Result Value Ref Range    Glucose 123 (H) 65 - 99 mg/dL    BUN 23 8 - 23 mg/dL    Creatinine 1.23 0.76 - 1.27 mg/dL    Sodium 144 136 - 145 mmol/L    Potassium 4.0 3.5 - 5.2 mmol/L    Chloride 106 98 - 107 mmol/L    CO2 24.0 22.0 - 29.0 mmol/L    Calcium 9.2 8.6 - 10.5 mg/dL    Total Protein 7.2 6.0 - 8.5 g/dL    Albumin 4.1 3.5 - 5.2 g/dL    ALT (SGPT) 15 1 - 41 U/L    AST (SGOT) 21 1 - 40 U/L    Alkaline Phosphatase 64 39 - 117 U/L    Total Bilirubin 0.4 0.0 - 1.2 mg/dL    Globulin 3.1 gm/dL    A/G Ratio 1.3 g/dL    BUN/Creatinine Ratio 18.7 7.0 - 25.0    Anion Gap 14.0 5.0 - 15.0 mmol/L    eGFR 59.3 (L) >60.0 mL/min/1.73   Hemoglobin A1c    Collection Time: 01/14/25  8:55 AM    Specimen: Blood   Result Value Ref Range    Hemoglobin A1C 6.10 (H) 4.80 - 5.60 %   CBC (No Diff)    Collection Time: 01/14/25  8:55 AM    Specimen: Blood   Result Value Ref Range    WBC 9.13 3.40 - 10.80 10*3/mm3    RBC 4.94 4.14 - 5.80 10*6/mm3    Hemoglobin 12.3 (L) 13.0 - 17.7 g/dL    Hematocrit 40.4 37.5 - 51.0 %    MCV 81.8 79.0 - 97.0 fL    MCH 24.9 (L) 26.6 - 33.0 pg    MCHC 30.4 (L) 31.5 - 35.7 g/dL    RDW 18.7 (H) 12.3 - 15.4 %    RDW-SD 55.4 (H) 37.0 - 54.0 fl    MPV 9.6 6.0 - 12.0 fL    Platelets 221 140 - 450 10*3/mm3   TSH    Collection Time: 01/14/25  8:55 AM    Specimen: Blood   Result Value Ref Range    TSH 6.070 (H) 0.270 - 4.200 uIU/mL   Urinalysis With Culture If Indicated - Urine, Catheter In/Out    Collection Time: 01/14/25  8:55 AM    Specimen: Urine, Catheter In/Out   Result Value Ref Range    Color, UA Dark Yellow (A) Yellow, Straw    Appearance, UA Turbid (A) Clear    pH, UA 6.0 5.0  - 8.0    Specific Gravity, UA 1.019 1.005 - 1.030    Glucose, UA Negative Negative    Ketones, UA Trace (A) Negative    Bilirubin, UA Negative Negative    Blood, UA Moderate (2+) (A) Negative    Protein,  mg/dL (2+) (A) Negative    Leuk Esterase, UA Large (3+) (A) Negative    Nitrite, UA Negative Negative    Urobilinogen, UA 1.0 E.U./dL 0.2 - 1.0 E.U./dL   Urinalysis, Microscopic Only - Urine, Catheter In/Out    Collection Time: 01/14/25  8:55 AM    Specimen: Urine, Catheter In/Out   Result Value Ref Range    RBC, UA 6-10 (A) None Seen, 0-2 /HPF    WBC, UA Too Numerous to Count (A) None Seen, 0-2 /HPF    Bacteria, UA 4+ (A) None Seen /HPF    Squamous Epithelial Cells, UA 0-2 None Seen, 0-2 /HPF    Hyaline Casts, UA None Seen None Seen /LPF    Methodology Automated Microscopy      Assessment and Plan    Diagnoses and all orders for this visit:    1. Neurogenic bladder (Primary)  -     tamsulosin (FLOMAX) 0.4 MG capsule 24 hr capsule; Take 1 capsule by mouth Every Night for 30 days.  Dispense: 30 capsule; Refill: 0  -     tamsulosin (FLOMAX) 0.4 MG capsule 24 hr capsule; Take 1 capsule by mouth Every Night for 360 days.  Dispense: 90 capsule; Refill: 3    2. Hydronephrosis with ureteropelvic junction (UPJ) obstruction    3. Retention of urine  -     Urine Culture - Urine, Urine, Random Void      Patient had urinary tract infection in the setting of having difficulty cathing and hematuria this is since resolved since he took course of antibiotics urine culture grew E. coli will repeat culture to ensure no further bacteria noted.    Patient is back to his baseline he does need refills on tamsulosin.  He will do a 30-day prescription and then a 90-day prescription.    Patient to follow-up in months.

## 2025-01-31 ENCOUNTER — HOSPITAL ENCOUNTER (OUTPATIENT)
Dept: CT IMAGING | Facility: HOSPITAL | Age: 80
Discharge: HOME | End: 2025-01-31
Payer: MEDICARE

## 2025-01-31 ENCOUNTER — HOSPITAL ENCOUNTER (OUTPATIENT)
Dept: ULTRASOUND IMAGING | Facility: HOSPITAL | Age: 80
Discharge: HOME | End: 2025-01-31
Payer: MEDICARE

## 2025-01-31 DIAGNOSIS — R91.8 LUNG NODULES: ICD-10-CM

## 2025-01-31 DIAGNOSIS — R33.9 RETENTION OF URINE, UNSPECIFIED: ICD-10-CM

## 2025-01-31 PROCEDURE — 76775 US EXAM ABDO BACK WALL LIM: CPT

## 2025-01-31 PROCEDURE — 71250 CT THORAX DX C-: CPT

## 2025-02-01 PROBLEM — R91.8 LUNG NODULES: Status: ACTIVE | Noted: 2025-02-01

## 2025-02-03 ENCOUNTER — OFFICE VISIT (OUTPATIENT)
Dept: UROLOGY | Facility: CLINIC | Age: 80
End: 2025-02-03
Payer: MEDICARE

## 2025-02-03 ENCOUNTER — TRANSCRIBE ORDERS (OUTPATIENT)
Dept: ADMINISTRATIVE | Facility: HOSPITAL | Age: 80
End: 2025-02-03
Payer: MEDICARE

## 2025-02-03 VITALS — BODY MASS INDEX: 23.48 KG/M2 | HEIGHT: 71 IN | TEMPERATURE: 97.6 F | WEIGHT: 167.7 LBS

## 2025-02-03 DIAGNOSIS — R33.9 RETENTION OF URINE: ICD-10-CM

## 2025-02-03 DIAGNOSIS — R33.9 URINARY RETENTION: Primary | ICD-10-CM

## 2025-02-03 DIAGNOSIS — Q62.11 HYDRONEPHROSIS WITH URETEROPELVIC JUNCTION (UPJ) OBSTRUCTION: ICD-10-CM

## 2025-02-03 DIAGNOSIS — N31.9 NEUROGENIC BLADDER: Primary | ICD-10-CM

## 2025-02-03 PROCEDURE — 87086 URINE CULTURE/COLONY COUNT: CPT | Performed by: PHYSICIAN ASSISTANT

## 2025-02-03 PROCEDURE — 87186 SC STD MICRODIL/AGAR DIL: CPT | Performed by: PHYSICIAN ASSISTANT

## 2025-02-03 PROCEDURE — 87077 CULTURE AEROBIC IDENTIFY: CPT | Performed by: PHYSICIAN ASSISTANT

## 2025-02-03 RX ORDER — TAMSULOSIN HYDROCHLORIDE 0.4 MG/1
1 CAPSULE ORAL NIGHTLY
Qty: 30 CAPSULE | Refills: 0 | Status: SHIPPED | OUTPATIENT
Start: 2025-02-03 | End: 2025-02-10 | Stop reason: SDUPTHER

## 2025-02-03 RX ORDER — TAMSULOSIN HYDROCHLORIDE 0.4 MG/1
1 CAPSULE ORAL NIGHTLY
Qty: 90 CAPSULE | Refills: 3 | Status: SHIPPED | OUTPATIENT
Start: 2025-02-03 | End: 2026-01-29

## 2025-02-03 RX ORDER — TAMSULOSIN HYDROCHLORIDE 0.4 MG/1
1 CAPSULE ORAL NIGHTLY
Qty: 90 CAPSULE | Refills: 3 | Status: CANCELLED | OUTPATIENT
Start: 2025-02-03 | End: 2026-01-29

## 2025-02-03 RX ORDER — CEFUROXIME AXETIL 250 MG/1
250 TABLET ORAL 2 TIMES DAILY
COMMUNITY
End: 2025-02-10

## 2025-02-05 ENCOUNTER — TELEPHONE (OUTPATIENT)
Dept: UROLOGY | Facility: CLINIC | Age: 80
End: 2025-02-05
Payer: MEDICARE

## 2025-02-05 DIAGNOSIS — N39.0 URINARY TRACT INFECTION WITHOUT HEMATURIA, SITE UNSPECIFIED: Primary | ICD-10-CM

## 2025-02-05 LAB — BACTERIA SPEC AEROBE CULT: ABNORMAL

## 2025-02-05 RX ORDER — NITROFURANTOIN 25; 75 MG/1; MG/1
100 CAPSULE ORAL 2 TIMES DAILY
Qty: 20 CAPSULE | Refills: 0 | Status: SHIPPED | OUTPATIENT
Start: 2025-02-05 | End: 2025-02-15

## 2025-02-05 NOTE — TELEPHONE ENCOUNTER
Attempted to call patient about test results, unable to reach, left voicemail for patient to return a call to the office to discuss results

## 2025-02-05 NOTE — TELEPHONE ENCOUNTER
----- Message from Wyatt Clay sent at 2/5/2025 12:36 PM CST -----  Regarding: Urine culture  Sent prescription for Macrobid to his pharmacy which should be effective for the bacteria that grew I sent it to [facility]  ----- Message -----  From: Lab, Background User  Sent: 2/4/2025  11:07 AM CST  To: JOANN Carney

## 2025-02-07 ENCOUNTER — TELEPHONE (OUTPATIENT)
Dept: UROLOGY | Facility: CLINIC | Age: 80
End: 2025-02-07
Payer: MEDICARE

## 2025-02-07 NOTE — TELEPHONE ENCOUNTER
----- Message from Wyatt Clay sent at 2/5/2025 12:36 PM CST -----  Regarding: Urine culture  Sent prescription for Macrobid to his pharmacy which should be effective for the bacteria that grew I sent it to [facility]  ----- Message -----  From: Lab, Background User  Sent: 2/4/2025  11:07 AM CST  To: JOANN Carney

## 2025-02-07 NOTE — TELEPHONE ENCOUNTER
Spoke with patient's wife after being unable to reach the patient and she verbalized understanding.

## 2025-02-10 ENCOUNTER — OFFICE VISIT (OUTPATIENT)
Dept: INTERNAL MEDICINE | Facility: CLINIC | Age: 80
End: 2025-02-10
Payer: MEDICARE

## 2025-02-10 ENCOUNTER — LAB (OUTPATIENT)
Dept: LAB | Facility: HOSPITAL | Age: 80
End: 2025-02-10
Payer: MEDICARE

## 2025-02-10 VITALS
HEART RATE: 55 BPM | OXYGEN SATURATION: 100 % | WEIGHT: 166 LBS | HEIGHT: 71 IN | BODY MASS INDEX: 23.24 KG/M2 | TEMPERATURE: 98.1 F | DIASTOLIC BLOOD PRESSURE: 87 MMHG | RESPIRATION RATE: 16 BRPM | SYSTOLIC BLOOD PRESSURE: 191 MMHG

## 2025-02-10 DIAGNOSIS — R41.89 DISTURBED COGNITION: ICD-10-CM

## 2025-02-10 DIAGNOSIS — R09.81 NASAL CONGESTION: ICD-10-CM

## 2025-02-10 DIAGNOSIS — R42 DIZZINESS: Primary | ICD-10-CM

## 2025-02-10 DIAGNOSIS — H53.8 BLURRED VISION, LEFT EYE: ICD-10-CM

## 2025-02-10 DIAGNOSIS — J34.89 SINUS PRESSURE: ICD-10-CM

## 2025-02-10 DIAGNOSIS — I10 PRIMARY HYPERTENSION: ICD-10-CM

## 2025-02-10 DIAGNOSIS — R27.0 ATAXIA: ICD-10-CM

## 2025-02-10 DIAGNOSIS — R42 DIZZINESS: ICD-10-CM

## 2025-02-10 LAB
ALBUMIN SERPL-MCNC: 4.2 G/DL (ref 3.5–5.2)
ALBUMIN/GLOB SERPL: 1.4 G/DL
ALP SERPL-CCNC: 66 U/L (ref 39–117)
ALT SERPL W P-5'-P-CCNC: 23 U/L (ref 1–41)
ANION GAP SERPL CALCULATED.3IONS-SCNC: 10 MMOL/L (ref 5–15)
AST SERPL-CCNC: 30 U/L (ref 1–40)
BASOPHILS # BLD AUTO: 0.01 10*3/MM3 (ref 0–0.2)
BASOPHILS NFR BLD AUTO: 0.2 % (ref 0–1.5)
BILIRUB SERPL-MCNC: 0.2 MG/DL (ref 0–1.2)
BUN SERPL-MCNC: 23 MG/DL (ref 8–23)
BUN/CREAT SERPL: 23.5 (ref 7–25)
CALCIUM SPEC-SCNC: 9.5 MG/DL (ref 8.6–10.5)
CHLORIDE SERPL-SCNC: 105 MMOL/L (ref 98–107)
CO2 SERPL-SCNC: 27 MMOL/L (ref 22–29)
CREAT SERPL-MCNC: 0.98 MG/DL (ref 0.76–1.27)
CRP SERPL-MCNC: 0.58 MG/DL (ref 0–0.5)
DEPRECATED RDW RBC AUTO: 52.4 FL (ref 37–54)
EGFRCR SERPLBLD CKD-EPI 2021: 78 ML/MIN/1.73
EOSINOPHIL # BLD AUTO: 0.06 10*3/MM3 (ref 0–0.4)
EOSINOPHIL NFR BLD AUTO: 1 % (ref 0.3–6.2)
ERYTHROCYTE [DISTWIDTH] IN BLOOD BY AUTOMATED COUNT: 18.6 % (ref 12.3–15.4)
GLOBULIN UR ELPH-MCNC: 2.9 GM/DL
GLUCOSE SERPL-MCNC: 91 MG/DL (ref 65–99)
HCT VFR BLD AUTO: 36 % (ref 37.5–51)
HGB BLD-MCNC: 11.1 G/DL (ref 13–17.7)
IMM GRANULOCYTES # BLD AUTO: 0.01 10*3/MM3 (ref 0–0.05)
IMM GRANULOCYTES NFR BLD AUTO: 0.2 % (ref 0–0.5)
LYMPHOCYTES # BLD AUTO: 1.08 10*3/MM3 (ref 0.7–3.1)
LYMPHOCYTES NFR BLD AUTO: 18.4 % (ref 19.6–45.3)
MCH RBC QN AUTO: 24.2 PG (ref 26.6–33)
MCHC RBC AUTO-ENTMCNC: 30.8 G/DL (ref 31.5–35.7)
MCV RBC AUTO: 78.6 FL (ref 79–97)
MONOCYTES # BLD AUTO: 0.49 10*3/MM3 (ref 0.1–0.9)
MONOCYTES NFR BLD AUTO: 8.4 % (ref 5–12)
NEUTROPHILS NFR BLD AUTO: 4.21 10*3/MM3 (ref 1.7–7)
NEUTROPHILS NFR BLD AUTO: 71.8 % (ref 42.7–76)
NRBC BLD AUTO-RTO: 0 /100 WBC (ref 0–0.2)
PLATELET # BLD AUTO: 153 10*3/MM3 (ref 140–450)
PMV BLD AUTO: 9.7 FL (ref 6–12)
POTASSIUM SERPL-SCNC: 3.7 MMOL/L (ref 3.5–5.2)
PROT SERPL-MCNC: 7.1 G/DL (ref 6–8.5)
RBC # BLD AUTO: 4.58 10*6/MM3 (ref 4.14–5.8)
SODIUM SERPL-SCNC: 142 MMOL/L (ref 136–145)
TSH SERPL DL<=0.05 MIU/L-ACNC: 0.96 UIU/ML (ref 0.27–4.2)
WBC NRBC COR # BLD AUTO: 5.86 10*3/MM3 (ref 3.4–10.8)

## 2025-02-10 PROCEDURE — G2211 COMPLEX E/M VISIT ADD ON: HCPCS

## 2025-02-10 PROCEDURE — 80053 COMPREHEN METABOLIC PANEL: CPT

## 2025-02-10 PROCEDURE — 1159F MED LIST DOCD IN RCRD: CPT

## 2025-02-10 PROCEDURE — 1160F RVW MEDS BY RX/DR IN RCRD: CPT

## 2025-02-10 PROCEDURE — 85025 COMPLETE CBC W/AUTO DIFF WBC: CPT

## 2025-02-10 PROCEDURE — 36415 COLL VENOUS BLD VENIPUNCTURE: CPT

## 2025-02-10 PROCEDURE — 1125F AMNT PAIN NOTED PAIN PRSNT: CPT

## 2025-02-10 PROCEDURE — 3079F DIAST BP 80-89 MM HG: CPT

## 2025-02-10 PROCEDURE — 99214 OFFICE O/P EST MOD 30 MIN: CPT

## 2025-02-10 PROCEDURE — 3077F SYST BP >= 140 MM HG: CPT

## 2025-02-10 PROCEDURE — 86140 C-REACTIVE PROTEIN: CPT

## 2025-02-10 PROCEDURE — 84443 ASSAY THYROID STIM HORMONE: CPT

## 2025-02-10 RX ORDER — AZITHROMYCIN 250 MG/1
TABLET, FILM COATED ORAL
Qty: 6 TABLET | Refills: 0 | Status: SHIPPED | OUTPATIENT
Start: 2025-02-10 | End: 2025-02-10

## 2025-02-10 RX ORDER — AZITHROMYCIN 250 MG/1
TABLET, FILM COATED ORAL
Qty: 6 TABLET | Refills: 0 | Status: SHIPPED | OUTPATIENT
Start: 2025-02-10

## 2025-02-10 RX ORDER — HYDROCHLOROTHIAZIDE 25 MG/1
25 TABLET ORAL DAILY
Qty: 30 TABLET | Refills: 0 | Status: SHIPPED | OUTPATIENT
Start: 2025-02-10

## 2025-02-10 NOTE — PROGRESS NOTES
Chief Complaint  Sinus Problem (Post nasal drip and sinus pressure behind the eyes)    Subjective    History of Present Illness      Patient presents to [facility] for   History of Present Illness     History of Present Illness  The patient is an 79-year-old male who presents for evaluation of visual disturbances, balance issues, elevated blood pressure, sinus congestion, atrial fibrillation, and urinary tract infection. He is accompanied by his wife.    He has been experiencing visual disturbances, particularly in his left eye, characterized by difficulty focusing, dryness, and a sensation of fuzziness. These symptoms have been present since undergoing an angiogram in January 2025. He also reports cognitive difficulties, such as processing numbers. Despite these symptoms, he does not report any other neurological deficits. He has a teleconference scheduled with Dr. Barros tomorrow morning. He admits to not drinking enough water after the procedure. He is concerned if he could have had a mini stroke. An ophthalmologist has evaluated his eyes and found no abnormalities, suggesting that the issue may be neurological in nature.    He has been experiencing elevated blood pressure readings during his last two visits, with the diastolic reading initially high and subsequently normalizing, while the systolic reading remains high. He does not monitor his blood pressure at home and reports no symptoms related to hypertension. He acknowledges that the current situation has caused him significant stress, which may be contributing to his elevated blood pressure. His blood pressure medication was adjusted from  losartan was increased from 50 mg to 100 mg, which seemed to stabilize his blood pressure.    He has been experiencing sinus congestion, ear soreness, and headaches located behind his eyes. He also reports a runny nose, tooth pain, ear popping, and pressure. He has a history of sinus  issues since relocating to [place] and recalls an incident where he was unable to clear his ears for two days following a flight from [place] to [place]. He has been using cough drops to manage a sore throat, which he attributes to postnasal drainage and dryness. He received a steroid injection from his physician, which resolved the issue without any adverse reactions.    He has been managing recurrent urinary tract infections (UTIs) with antibiotics prescribed by Dr. Kramer Subsequently, he was referred to a urologist, Dr. Boyer, who initiated a stronger antibiotic regimen, Macrobid, following a culture test. However, he reports experiencing side effects from this medication.    He is currently on Eliquis for atrial fibrillation (AFib). He was informed that he was borderline AFib prior to his angiogram, during which he had to discontinue Eliquis for 24 hours. He questions whether his AFib could be contributing to his elevated blood pressure. He has not been consistent with his AFib and does not experience any symptoms when he is in AFib.    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 He was referred to Dr. Barros in [place], who performed a procedure. He had a follow-up angiogram in January 2025.    SOCIAL HISTORY  He does not drink coffee but drinks iced tea.    ALLERGIES  The patient is allergic to PENICILLINS.    MEDICATIONS  Current: Eliquis, losartan  Past: lisinopril    Review of Systems   HENT:  Positive for congestion and sinus pressure.    Eyes:  Positive for blurred vision and visual disturbance.   Neurological:  Positive for dizziness.   All other systems reviewed and are negative.      I have reviewed and agree with the HPI and ROS information as above.  Ruchi Heart, MARY     Objective   Vital Signs:   BP (!) 191/87 (BP Location: Left arm, Patient Position: Sitting, Cuff Size: Other (Comment))   Pulse 55   Temp 98.1 °F (36.7 °C) (Temporal)   " Resp 16   Ht 180.3 cm (71\")   Wt 75.3 kg (166 lb)   SpO2 100%   BMI 23.15 kg/m²     BMI is within normal parameters. No other follow-up for BMI required.      Physical Exam  Constitutional:       Appearance: Normal appearance. He is well-developed.   HENT:      Head: Normocephalic and atraumatic.      Right Ear: Tympanic membrane, ear canal and external ear normal.      Left Ear: Tympanic membrane, ear canal and external ear normal.      Nose: Nose normal. Congestion present. No septal deviation or nasal tenderness.      Mouth/Throat:      Lips: Pink. No lesions.      Mouth: Mucous membranes are moist. No oral lesions.      Dentition: Normal dentition.      Pharynx: Oropharynx is clear. No pharyngeal swelling, oropharyngeal exudate or posterior oropharyngeal erythema.      Comments: Post nasal drip   Eyes:      General: Lids are normal. Vision grossly intact. No scleral icterus.        Right eye: No discharge.         Left eye: No discharge.      Extraocular Movements: Extraocular movements intact.      Conjunctiva/sclera: Conjunctivae normal.      Right eye: Right conjunctiva is not injected.      Left eye: Left conjunctiva is not injected.      Pupils: Pupils are equal, round, and reactive to light.   Neck:      Thyroid: No thyroid mass.      Trachea: Trachea normal.   Cardiovascular:      Rate and Rhythm: Normal rate and regular rhythm.      Heart sounds: Normal heart sounds. No murmur heard.     No gallop.   Pulmonary:      Effort: Pulmonary effort is normal.      Breath sounds: Normal breath sounds and air entry. No wheezing, rhonchi or rales.   Abdominal:      General: There is no distension.      Palpations: Abdomen is soft. There is no mass.      Tenderness: There is no abdominal tenderness. There is no right CVA tenderness, left CVA tenderness, guarding or rebound.   Musculoskeletal:         General: No tenderness or deformity. Normal range of motion.      Cervical back: Full passive range of motion " without pain, normal range of motion and neck supple.      Thoracic back: Normal.      Right lower leg: No edema.      Left lower leg: No edema.   Skin:     General: Skin is warm and dry.      Coloration: Skin is not jaundiced.      Findings: No rash.   Neurological:      Mental Status: He is alert and oriented to person, place, and time.      Sensory: Sensation is intact.      Motor: Motor function is intact.      Coordination: Coordination is intact.      Gait: Gait is intact.      Deep Tendon Reflexes: Reflexes are normal and symmetric.   Psychiatric:         Mood and Affect: Mood and affect normal.         Judgment: Judgment normal.          TRISH-7:      PHQ-2 Depression Screening    Little interest or pleasure in doing things?     Feeling down, depressed, or hopeless?     PHQ-2 Total Score        PHQ-9 Depression Screening  Little interest or pleasure in doing things?     Feeling down, depressed, or hopeless?     PHQ-2 Total Score     Trouble falling or staying asleep, or sleeping too much?     Feeling tired or having little energy?     Poor appetite or overeating?     Feeling bad about yourself - or that you are a failure or have let yourself or your family down?     Trouble concentrating on things, such as reading the newspaper or watching television?     Moving or speaking so slowly that other people could have noticed? Or the opposite - being so fidgety or restless that you have been moving around a lot more than usual?     Thoughts that you would be better off dead, or of hurting yourself in some way?     PHQ-9 Total Score     If you checked off any problems, how difficult have these problems made it for you to do your work, take care of things at home, or get along with other people?             Result Review  Data Reviewed:                   Assessment and Plan      Diagnoses and all orders for this visit:    1. Dizziness (Primary)  -     MRI Brain With & Without Contrast; Future  -     CBC w AUTO  Differential; Future  -     Comprehensive metabolic panel; Future  -     C-reactive protein; Future  -     Urinalysis With Culture If Indicated -; Future  -     TSH; Future    2. Blurred vision, left eye  -     MRI Brain With & Without Contrast; Future  -     CBC w AUTO Differential; Future  -     Comprehensive metabolic panel; Future  -     C-reactive protein; Future  -     Urinalysis With Culture If Indicated -; Future  -     TSH; Future    3. Ataxia  -     MRI Brain With & Without Contrast; Future  -     CBC w AUTO Differential; Future  -     Comprehensive metabolic panel; Future  -     C-reactive protein; Future  -     Urinalysis With Culture If Indicated -; Future  -     TSH; Future    4. Disturbed cognition  -     MRI Brain With & Without Contrast; Future  -     CBC w AUTO Differential; Future  -     Comprehensive metabolic panel; Future  -     C-reactive protein; Future  -     Urinalysis With Culture If Indicated -; Future  -     TSH; Future    5. Nasal congestion  -     Discontinue: azithromycin (Zithromax Z-Fahad) 250 MG tablet; Take 2 tablets by mouth on day 1, then 1 tablet daily on days 2-5  Dispense: 6 tablet; Refill: 0  -     TSH; Future  -     azithromycin (Zithromax Z-Fahad) 250 MG tablet; Take 2 tablets by mouth on day 1, then 1 tablet daily on days 2-5  Dispense: 6 tablet; Refill: 0    6. Sinus pressure  -     Discontinue: azithromycin (Zithromax Z-Fahad) 250 MG tablet; Take 2 tablets by mouth on day 1, then 1 tablet daily on days 2-5  Dispense: 6 tablet; Refill: 0  -     TSH; Future  -     azithromycin (Zithromax Z-Fahad) 250 MG tablet; Take 2 tablets by mouth on day 1, then 1 tablet daily on days 2-5  Dispense: 6 tablet; Refill: 0    7. Primary hypertension  -     TSH; Future    Other orders  -     hydroCHLOROthiazide 25 MG tablet; Take 1 tablet by mouth Daily.  Dispense: 30 tablet; Refill: 0        Assessment & Plan  1. Visual disturbances.  He reports difficulty focusing his eyes, seeing multiple  images, and experiencing balance issues since undergoing an angiogram in January 2025. An ophthalmologist has ruled out ocular causes, suggesting a potential neurological issue. An MRI of the head will be ordered to investigate any possible complications related to anesthesia or other neurological causes.    2. Elevated blood pressure.  His blood pressure readings have been inconsistent, with the most recent measurement being 174/95. He does not monitor his blood pressure at home and reports no symptoms related to hypertension. He is currently on losartan 100 mg. A consultation with Dr. Kramer will be conducted to determine whether an additional antihypertensive medication should be introduced or if the current regimen should be maintained until his next visit.  We will add HCTZ to help as well/     3. Sinus congestion.  He reports sinus congestion, ear pressure, and a sore throat, likely due to sinus drainage. A prescription for Z-Fahad (azithromycin) will be provided to alleviate the sinus pressure and congestion. He has been informed that Z-Fahad is an antibiotic and not a steroid, and it is safe to take with his current medications.    4. Urinary tract infection (UTI).  He has been managing recurrent UTIs with antibiotics, including Macrobid, which has caused some side effects.    5. Atrial fibrillation.  He is currently on Eliquis for atrial fibrillation. He had to hold the medication for 24 hours before his angiogram. There is no indication that his atrial fibrillation is contributing to his current symptoms.    6. History of back surgeries.  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 He was referred to Dr. Barros in [place], who performed a procedure. He had a follow-up angiogram in January 2025.    PROCEDURE  The patient underwent an angiogram in January 2025.        Follow Up   No follow-ups on file.  Patient was given instructions and counseling regarding  his condition or for health maintenance advice. Please see specific information pulled into the AVS if appropriate.     Patient or patient representative verbalized consent for the use of Ambient Listening during the visit with  MARY Virgen for chart documentation. 2/11/2025  07:11 CST

## 2025-02-12 ENCOUNTER — TELEPHONE (OUTPATIENT)
Dept: INTERNAL MEDICINE | Facility: CLINIC | Age: 80
End: 2025-02-12
Payer: MEDICARE

## 2025-02-12 NOTE — TELEPHONE ENCOUNTER
Caller: Sonu Bravo Jr.    Relationship to patient: Self    Best call back number: [phone]     Patient is needing: PATIENT SAID THEY DISCUSSED BLOOD PRESSURE MEDS AT DR VALERIO, BUT HE DIDN'T GET ANYTHING NEW CALLED IN. PLEASE UPDATE HIM ON STATUS OF THAT.

## 2025-02-12 NOTE — TELEPHONE ENCOUNTER
Patient's wife informed hctz 25 mg was called in for patient.  She stated they did not know that medication was for his blood pressure and patient has been having dizziness since starting it.  Patient's wife was informed, per Ruchi, he needs to take 1/2 of the pill daily (12.5 mg) to see if that helps him.  Per Ruchi, patient's wife informed they need to check his blood pressure 1-2 times daily, once before he takes the hctz and if his BP is not >150/90, he needs to hold the medication.  Patient's wife stated they do not have a blood pressure monitor, but they will buy one.

## 2025-02-13 ENCOUNTER — TELEPHONE (OUTPATIENT)
Dept: INTERNAL MEDICINE | Facility: CLINIC | Age: 80
End: 2025-02-13
Payer: MEDICARE

## 2025-02-18 ENCOUNTER — TELEPHONE (OUTPATIENT)
Dept: INTERNAL MEDICINE | Facility: CLINIC | Age: 80
End: 2025-02-18
Payer: MEDICARE

## 2025-02-18 NOTE — TELEPHONE ENCOUNTER
Patient reported the following readings since taking 1/2 of the hctz 25 mg tab daily:    2/13  AM - 181/90   PM - 156/109    2/14  AM - 143/82   PM - 168/92    2/15  AM - 158/95   PM - 148/80    2/16  AM - 137/75   PM - 156/79    2/17  AM - 127/74   PM - 129/76

## 2025-02-20 ENCOUNTER — TELEPHONE (OUTPATIENT)
Dept: FAMILY MEDICINE CLINIC | Facility: CLINIC | Age: 80
End: 2025-02-20
Payer: MEDICARE

## 2025-02-20 ENCOUNTER — HOSPITAL ENCOUNTER (OUTPATIENT)
Dept: MRI IMAGING | Facility: HOSPITAL | Age: 80
Discharge: HOME | End: 2025-02-20
Payer: MEDICARE

## 2025-02-20 DIAGNOSIS — R41.89 DISTURBED COGNITION: ICD-10-CM

## 2025-02-20 DIAGNOSIS — R27.0 ATAXIA: ICD-10-CM

## 2025-02-20 DIAGNOSIS — H53.8 BLURRED VISION, LEFT EYE: ICD-10-CM

## 2025-02-20 DIAGNOSIS — R42 DIZZINESS: ICD-10-CM

## 2025-02-20 LAB — CREAT BLDA-MCNC: 1.6 MG/DL (ref 0.6–1.3)

## 2025-02-20 PROCEDURE — A9579 GAD-BASE MR CONTRAST NOS,1ML: HCPCS

## 2025-02-20 PROCEDURE — 70553 MRI BRAIN STEM W/O & W/DYE: CPT

## 2025-02-20 PROCEDURE — 82565 ASSAY OF CREATININE: CPT

## 2025-02-20 PROCEDURE — 25510000001 GADOPICLENOL 0.5 MMOL/ML SOLUTION

## 2025-02-20 RX ADMIN — GADOPICLENOL 7.5 ML: 485.1 INJECTION INTRAVENOUS at 09:21

## 2025-02-20 NOTE — TELEPHONE ENCOUNTER
Attempted to contact pt regarding lab results but was unsuccessful; left voicemail and sent a [employer] message.

## 2025-02-21 DIAGNOSIS — R90.89 ABNORMAL FINDING ON MRI OF BRAIN: Primary | ICD-10-CM

## 2025-02-21 NOTE — TELEPHONE ENCOUNTER
Contacted pt, verified name and , informed of MRI results Please let patient know that there are some abnormalities on his MRI. I would like for him to see neurology regarding this. I will place referral now. Referral in place. Pt's questions and concerns answered. Pt vu and thanked staff.

## 2025-02-27 DIAGNOSIS — E03.9 ACQUIRED HYPOTHYROIDISM: ICD-10-CM

## 2025-02-27 DIAGNOSIS — I48.0 PAROXYSMAL ATRIAL FIBRILLATION: ICD-10-CM

## 2025-02-27 DIAGNOSIS — Z86.718 HISTORY OF DVT (DEEP VEIN THROMBOSIS): ICD-10-CM

## 2025-02-27 DIAGNOSIS — E78.2 MIXED HYPERLIPIDEMIA: ICD-10-CM

## 2025-02-27 RX ORDER — ROSUVASTATIN CALCIUM 20 MG/1
20 TABLET, COATED ORAL DAILY
Qty: 90 TABLET | Refills: 3 | Status: SHIPPED | OUTPATIENT
Start: 2025-02-27

## 2025-02-27 RX ORDER — LEVOTHYROXINE SODIUM 125 UG/1
125 TABLET ORAL
Qty: 90 TABLET | Refills: 0 | Status: SHIPPED | OUTPATIENT
Start: 2025-02-27

## 2025-02-27 NOTE — TELEPHONE ENCOUNTER
Rx Refill Note  Requested Prescriptions     Pending Prescriptions Disp Refills    apixaban (Eliquis) 5 MG tablet tablet 180 tablet 1     Sig: Take 1 tablet by mouth 2 (Two) Times a Day.    levothyroxine (SYNTHROID, LEVOTHROID) 125 MCG tablet 90 tablet 0     Sig: Take 1 tablet by mouth Every Morning.    rosuvastatin (CRESTOR) 20 MG tablet 90 tablet 3     Sig: Take 1 tablet by mouth Daily.      Last office visit with prescribing clinician: 1/23/2025   Last telemedicine visit with prescribing clinician: Visit date not found   Next office visit with prescribing clinician: Visit date not found                         Would you like a call back once the refill request has been completed: [] Yes [] No    If the office needs to give you a call back, can they leave a voicemail: [] Yes [] No    Jairo Cano MA  02/27/25, 15:35 CST

## 2025-03-03 ENCOUNTER — TELEPHONE (OUTPATIENT)
Dept: INTERNAL MEDICINE | Facility: CLINIC | Age: 80
End: 2025-03-03
Payer: MEDICARE

## 2025-03-03 PROBLEM — Z86.73 HISTORY OF STROKE: Status: ACTIVE | Noted: 2025-03-03

## 2025-03-03 NOTE — TELEPHONE ENCOUNTER
PATIENTS WIFE HAS CALLED, SHE STATED THAT THE HAVE BOUGHT A BLOOD PRESSURE CUFF AND HAS BEEN MONITORING IT.      02/28/2025  152/91 AT 1pm   02/28/2025  179/91 AT 9pm  03/01/2025  129/82 AT 12pm  03/01/2025  182/100 AT 950pm  03/02/2025  180/95  AT 1240pm  03/02/2025  166/107 AT 845pm    SHE STATED THAT ITS ALL OVER THE PLACE.  PATIENT IS TAKING HIS BLOOD PRESSURE MEDICATION BUT HAS HELD THE HCTZ BECAUSE OF DIZZINESS.

## 2025-03-04 NOTE — TELEPHONE ENCOUNTER
Restart HCTZ. I don't think the dizziness is related to BP, but we definitely need to control BP more than we are now. I'd like to sit down to discuss more about what is going on, MRI results, and what we might do from here. Could he come this Friday at 4pm?

## 2025-03-07 ENCOUNTER — OFFICE VISIT (OUTPATIENT)
Dept: INTERNAL MEDICINE | Facility: CLINIC | Age: 80
End: 2025-03-07
Payer: MEDICARE

## 2025-03-07 VITALS
SYSTOLIC BLOOD PRESSURE: 148 MMHG | RESPIRATION RATE: 16 BRPM | HEIGHT: 71 IN | DIASTOLIC BLOOD PRESSURE: 92 MMHG | BODY MASS INDEX: 22.46 KG/M2 | HEART RATE: 59 BPM | OXYGEN SATURATION: 97 % | WEIGHT: 160.4 LBS

## 2025-03-07 DIAGNOSIS — I10 PRIMARY HYPERTENSION: ICD-10-CM

## 2025-03-07 DIAGNOSIS — E78.2 MIXED HYPERLIPIDEMIA: ICD-10-CM

## 2025-03-07 DIAGNOSIS — I48.0 PAROXYSMAL ATRIAL FIBRILLATION: ICD-10-CM

## 2025-03-07 DIAGNOSIS — Z86.73 HISTORY OF EMBOLIC STROKE: Primary | ICD-10-CM

## 2025-03-07 RX ORDER — CARVEDILOL 6.25 MG/1
6.25 TABLET ORAL 2 TIMES DAILY WITH MEALS
Qty: 180 TABLET | Refills: 1 | Status: SHIPPED | OUTPATIENT
Start: 2025-03-07

## 2025-03-07 RX ORDER — ASPIRIN 81 MG/1
81 TABLET ORAL DAILY
Qty: 90 TABLET | Refills: 3 | Status: SHIPPED | OUTPATIENT
Start: 2025-03-07

## 2025-03-07 RX ORDER — ROSUVASTATIN CALCIUM 40 MG/1
40 TABLET, COATED ORAL DAILY
Qty: 90 TABLET | Refills: 1 | Status: SHIPPED | OUTPATIENT
Start: 2025-03-07

## 2025-03-08 NOTE — PROGRESS NOTES
CC: dizziness    History:  Sonu Bravo Jr. is a 79 y.o. male   History of Present Illness  The patient is an 79-year-old male who came in for managing his stroke, blood pressure, and cholesterol. He was recently prescribed tamsulosin for BPH but has been hesitant to start it because he's worried about dizziness. He's been on terazosin for a while and isn't sure if the tamsulosin is supposed to replace it. He's been feeling dizzy, which he thought might be due to hydrochlorothiazide, so he stopped taking it for a while, but the dizziness didn't go away.    A recent MRI showed he had a stroke in the past. He had sudden onset dizziness and visual disturbance following an intracerebral stent placement on 1/20/25. He's having trouble with his vision, including seeing things that aren't there, especially when driving, so he has stopped driving. An eye doctor confirmed his eyes are healthy and suggested the problem is in his brain. He sometimes gets fuzziness in his left eye and finds it hard to do tasks like balancing his bank statements, especially in the morning, though it gets better as the day goes on. He's been on bisoprolol 5 mg since 1999 and recently got a refill.    He's currently taking Crestor 20 mg for his cholesterol. He finished a course of antibiotics for a UTI last month, which cleared up. He also uses hearing aids.        ROS:  Review of Systems   Constitutional:  Negative for chills and fever.   Eyes:  Positive for visual disturbance.   Respiratory:  Negative for cough and shortness of breath.    Cardiovascular:  Negative for chest pain and palpitations.   Gastrointestinal:  Negative for abdominal pain and constipation.   Genitourinary:  Negative for difficulty urinating and dysuria.   Neurological:  Positive for dizziness.        reports that he has been smoking cigarettes. He started smoking about 58 years ago. He has a 115 pack-year smoking history. He has been exposed to tobacco smoke. He has  never used smokeless tobacco. He reports current alcohol use. He reports that he does not use drugs.      Current Outpatient Medications:     apixaban (Eliquis) 5 MG tablet tablet, Take 1 tablet by mouth 2 (Two) Times a Day., Disp: 180 tablet, Rfl: 1    azelastine (ASTELIN) 0.1 % nasal spray, Administer 2 sprays into the nostril(s) as directed by provider 2 (Two) Times a Day. Use in each nostril as directed, Disp: 30 mL, Rfl: 11    Cholecalciferol 100 MCG (4000 UT) capsule, Take 4,000 Units by mouth Daily. Indications: Vitamin D Deficiency, Disp: , Rfl:     coenzyme Q10 100 MG capsule, Take 1 capsule by mouth Daily. Indications: Heart Rhythm Disorder, Disp: , Rfl:     Cyanocobalamin 2500 MCG sublingual tablet, Take 2,500 mcg by mouth Daily. Indications: Inadequate Vitamin B12, Disp: , Rfl:     fexofenadine (ALLEGRA) 180 MG tablet, Take 1 tablet by mouth Daily. Indications: Hayfever, Disp: , Rfl:     fluticasone (FLONASE) 50 MCG/ACT nasal spray, 1 spray into the nostril(s) as directed by provider Daily. Indications: Stuffy Nose, Disp: 32 g, Rfl: 3    hydroCHLOROthiazide 25 MG tablet, Take 1 tablet by mouth Daily., Disp: 30 tablet, Rfl: 0    HYDROcodone-acetaminophen (NORCO)  MG per tablet, Take 1 tablet by mouth Daily As Needed., Disp: , Rfl:     ibuprofen (ADVIL,MOTRIN) 200 MG tablet, Take 1 tablet by mouth Every 6 (Six) Hours As Needed for Mild Pain., Disp: , Rfl:     levothyroxine (SYNTHROID, LEVOTHROID) 125 MCG tablet, Take 1 tablet by mouth Every Morning., Disp: 90 tablet, Rfl: 0    losartan (Cozaar) 100 MG tablet, Take 1 tablet by mouth Daily., Disp: 90 tablet, Rfl: 1    Magnesium 250 MG tablet, Take  by mouth., Disp: , Rfl:     NON FORMULARY, Apply 1 Units topically to the appropriate area as directed 2 (Two) Times a Day., Disp: , Rfl:     omeprazole (priLOSEC) 20 MG capsule, Take 1 capsule by mouth Daily., Disp: 90 capsule, Rfl: 3    polyethylene glycol (MiraLax) 17 GM/SCOOP powder, Take 17 g by mouth  "As Needed., Disp: , Rfl:     pramipexole (MIRAPEX) 0.5 MG tablet, Take 1 tablet by mouth Every Night., Disp: 90 tablet, Rfl: 1    rosuvastatin (CRESTOR) 40 MG tablet, Take 1 tablet by mouth Daily., Disp: 90 tablet, Rfl: 1    Zinc 50 MG capsule, Take 1 capsule by mouth Daily. Indications: Zinc Deficiency, Disp: , Rfl:     aspirin 81 MG EC tablet, Take 1 tablet by mouth Daily., Disp: 90 tablet, Rfl: 3    carvedilol (Coreg) 6.25 MG tablet, Take 1 tablet by mouth 2 (Two) Times a Day With Meals., Disp: 180 tablet, Rfl: 1    tamsulosin (FLOMAX) 0.4 MG capsule 24 hr capsule, Take 1 capsule by mouth Every Night for 360 days. (Patient not taking: Reported on 3/7/2025), Disp: 90 capsule, Rfl: 3    OBJECTIVE:  /92 (BP Location: Left arm, Patient Position: Sitting, Cuff Size: Adult)   Pulse 59   Resp 16   Ht 180.3 cm (71\")   Wt 72.8 kg (160 lb 6.4 oz)   SpO2 97%   BMI 22.37 kg/m²    Physical Exam    MRI Brain With & Without Contrast (02/20/2025 09:20)  Assessment/Plan     Diagnoses and all orders for this visit:    1. History of embolic stroke (Primary)  -     aspirin 81 MG EC tablet; Take 1 tablet by mouth Daily.  Dispense: 90 tablet; Refill: 3    2. Mixed hyperlipidemia  -     rosuvastatin (CRESTOR) 40 MG tablet; Take 1 tablet by mouth Daily.  Dispense: 90 tablet; Refill: 1    3. Primary hypertension  -     Enroll patient in hypertension care plan; Future  -     Enroll patient in hypertension care plan  -     carvedilol (Coreg) 6.25 MG tablet; Take 1 tablet by mouth 2 (Two) Times a Day With Meals.  Dispense: 180 tablet; Refill: 1    4. Paroxysmal atrial fibrillation  -     carvedilol (Coreg) 6.25 MG tablet; Take 1 tablet by mouth 2 (Two) Times a Day With Meals.  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 Condition unlikely to reverse but not " expected to worsen unless further insult occurs. Furthermore, stent placement should decrease risk of ischemia in the future. At this time, we would advise against driving unless further evaluated. Neurology referral is pending. MRI is reviewed directly with the patient and his wife.   - Continue Eliquis  - Add baby aspirin 81 mg daily for APT  - Increase Crestor to 40 mg to provide high intensity therapy, take two 20 mg tablets until depleted, then switch to 40 mg tablets  - Maintain hydrochlorothiazide and losartan  - Discontinue terazosin as he starts tamsulosin  - Discontinue bisoprolol, replace with carvedilol twice daily for alpha activity to control BP further.   - Continue bisoprolol until carvedilol received    2. Blood pressure management: Blood pressure improved.  - Monitor daily for a few weeks, then reduce to a few times per week. Med changes as above.   - Input readings into [employer]  - Nurse navigator will contact with further information  - Access educational materials on blood pressure control through diet and exercise via [employer]    3. Cholesterol management.  - Increase Crestor to 40 mg, take two 20 mg tablets until depleted, then switch to 40 mg tablets    Follow-up  - Scheduled for follow-up in late April 2025 to monitor blood pressure and ensure stability.        An After Visit Summary was printed and given to the patient at discharge.  Return for Next scheduled follow up.      Patient or patient representative verbalized consent for the use of Ambient Listening during the visit with  Vaughn Kramer DO for chart documentation. 3/8/2025  14:57 CST    Vaughn Kramer D.O. 3/8/2025   Electronically signed.

## 2025-03-10 RX ORDER — HYDROCHLOROTHIAZIDE 25 MG/1
25 TABLET ORAL DAILY
Qty: 30 TABLET | Refills: 3 | Status: SHIPPED | OUTPATIENT
Start: 2025-03-10

## 2025-03-10 NOTE — TELEPHONE ENCOUNTER
Rx Refill Note  Requested Prescriptions     Pending Prescriptions Disp Refills    hydroCHLOROthiazide 25 MG tablet [Pharmacy Med Name: HYDROCHLOROTHIAZIDE 25 MG TAB] 30 tablet 0     Sig: TAKE 1 TABLET BY MOUTH EVERY DAY      Last office visit with prescribing clinician: 2/10/2025   Last telemedicine visit with prescribing clinician: Visit date not found   Next office visit with prescribing clinician: Visit date not found                         Would you like a call back once the refill request has been completed: [] Yes [] No    If the office needs to give you a call back, can they leave a voicemail: [] Yes [] No    Jairo Cano MA  03/10/25, 10:01 CDT

## 2025-03-13 ENCOUNTER — TELEPHONE (OUTPATIENT)
Dept: INTERNAL MEDICINE | Facility: CLINIC | Age: 80
End: 2025-03-13
Payer: MEDICARE

## 2025-03-13 ENCOUNTER — PATIENT OUTREACH (OUTPATIENT)
Dept: CASE MANAGEMENT | Facility: OTHER | Age: 80
End: 2025-03-13
Payer: MEDICARE

## 2025-03-13 DIAGNOSIS — I69.398 VISUAL DISTURBANCE AS COMPLICATION OF STROKE: ICD-10-CM

## 2025-03-13 DIAGNOSIS — I10 PRIMARY HYPERTENSION: Primary | ICD-10-CM

## 2025-03-13 DIAGNOSIS — Z86.73 HISTORY OF STROKE: ICD-10-CM

## 2025-03-13 DIAGNOSIS — H53.9 VISUAL DISTURBANCE AS COMPLICATION OF STROKE: ICD-10-CM

## 2025-03-13 DIAGNOSIS — Z86.73 HISTORY OF STROKE: Primary | ICD-10-CM

## 2025-03-13 DIAGNOSIS — Z86.73 H/O ISCHEMIC RIGHT PCA STROKE: ICD-10-CM
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
## 2025-03-13 NOTE — TELEPHONE ENCOUNTER
The vision issues are related to his stroke. Because of this, further evaluation would be best through a Neuro-Ophthalmologist who specializes in both brain and eye issues. This would be out of town at a place like [facility] or [place]. If you have  a preference on location, we can make the referral.

## 2025-03-13 NOTE — OUTREACH NOTE
AMBULATORY CASE MANAGEMENT NOTE    Names and Relationships of Patient/Support Persons: Contact: Sonu Bravo Jr.; Relationship: Self -     CCM Interim Update    Contacted patient to introduce and offer CCM. Patient is interested and consented. Discussed MCCP. Patient will begin checking his blood pressure twice per day and record it in MyChart. Patient takes him medications in the evening and will check his BP one hour after taking meds.    Adult Patient Profile  Questions/Answers      Flowsheet Row Most Recent Value   Symptoms/Conditions Managed at Home cardiovascular, neurological   Barriers to Managing Health stress of chronic illness   Cardiovascular Symptoms/Conditions hypertension   Cardiovascular Management Strategies medication therapy, diet modification   Neurological Symptoms/Conditions stroke, ischemic   Neurological Management Strategies activity, diet modification, exercise, medication therapy   Identifying Health Goals keep illness under control, making sure to get medicine, learning to care for self, managing stress, anxiety or depression              Education Documentation  Unresolved/Worsening Symptoms, taught by Syl Odonnell, RN at 3/13/2025  4:31 PM.  Learner: Patient  Readiness: Acceptance  Method: Explanation  Response: Verbalizes Understanding    Self-Care, taught by Syl Odonnell, RN at 3/13/2025  4:31 PM.  Learner: Patient  Readiness: Acceptance  Method: Explanation  Response: Verbalizes Understanding    Provider Follow-Up, taught by Syl Odonnell, RN at 3/13/2025  4:31 PM.  Learner: Patient  Readiness: Acceptance  Method: Explanation  Response: Verbalizes Understanding    Medication Management, taught by Syl Odonnell, RN at 3/13/2025  4:31 PM.  Learner: Patient  Readiness: Acceptance  Method: Explanation  Response: Verbalizes Understanding    Blood Pressure Monitoring, taught by Syl Odonnell, RN at 3/13/2025  4:31 PM.  Learner: Patient  Readiness:  Acceptance  Method: Explanation  Response: Verbalizes Understanding    Risk Factors, taught by Syl Odonnell, RN at 3/13/2025  4:31 PM.  Learner: Patient  Readiness: Acceptance  Method: Explanation  Response: Verbalizes Understanding    Description, taught by Syl Odonnell, RN at 3/13/2025  4:31 PM.  Learner: Patient  Readiness: Acceptance  Method: Explanation  Response: Verbalizes Understanding          Syl NORMAN  Ambulatory Case Management    3/13/2025, 16:31 CDT

## 2025-03-13 NOTE — TELEPHONE ENCOUNTER
Caller: ISA RENEE    Relationship: Emergency Contact    Best call back number: [phone]    What is the best time to reach you: SOON PLEASE     Who are you requesting to speak with (clinical staff, provider,  specific staff member): PROVIDER OR CLINICAL STAFF         What was the call regarding: PATIENTS WIFE REQUESTING A CALL BACK TO DISCUSS ANY RECOMMENDATIONS PROVIDER MAY HAVE FOR THERAPY ON PATIENTS EYE   PATIENTS CURRENT EYE DOCTOR DOES NOT DO SUCH THERAPY    Is it okay if the provider responds through MyChart: PREFERS A CALL BACK

## 2025-03-14 NOTE — TELEPHONE ENCOUNTER
Patient's wife informed of Dr. Kramer's message.  She was out running errands.  She stated she will talk to patient when she gets home and will call us back later.

## 2025-03-14 NOTE — TELEPHONE ENCOUNTER
Referral sent to [place]. Need to be sure it is with the Neuro-Ophthalmology department, not just general Neuro. Thanks!

## 2025-03-14 NOTE — TELEPHONE ENCOUNTER
Caller: ISA RENEE    Relationship to patient: Emergency Contact    Best call back number: [phone]        Patient is needing: TO LET PROVIDER KNOW THAT THEY WOULD LIKE THE REFERRAL SENT TO [facility]. PLEASE CALL BACK WITH INFORMATION ON WHERE EXACTLY AND WHO THE PROVIDER WILL BE IN [facility].

## 2025-03-17 ENCOUNTER — NURSE TRIAGE (OUTPATIENT)
Dept: CALL CENTER | Facility: HOSPITAL | Age: 80
End: 2025-03-17
Payer: MEDICARE

## 2025-03-17 NOTE — TELEPHONE ENCOUNTER
Recommend to continue medications. Those BP levels are MUCH better and I continue to think that his dizzy feelings are more related to his vision issues related to the stroke. As long as we are able to avoid falls, I think we are right to continue the medication as the risk of running a higher blood pressure is quite high.

## 2025-03-17 NOTE — TELEPHONE ENCOUNTER
"[facility]- 03/14- 147/77  03/15- 134/61  03/16- 131/72    Today his BP is 115/77- He is dizzy and not steady on his feet. He started on new BP medication last week. He increased the Crestor as well. He has been having the dizzy spells for a couple of months.  He thinks it is related to the new BP medication. Will call the office. Did not reach, will send urgent message to the office. Will send urgent message to the office. Declined Er and or triage.    Reason for Disposition   [1] Caller requests to speak ONLY to PCP AND [2] URGENT question    Additional Information   Negative: Lab calling with strep throat test results and triager can call in prescription   Negative: Lab calling with urinalysis test results and triager can call in prescription   Negative: Medication questions   Negative: Medication renewal and refill questions   Negative: Pre-operative or pre-procedural questions   Negative: ED call to PCP (i.e., primary care provider; doctor, NP, or PA)   Negative: Doctor (or NP/PA) call to PCP   Negative: Call about patient who is currently hospitalized   Negative: Lab or radiology calling with CRITICAL test results   Negative: [1] Follow-up call from patient regarding patient's clinical status AND [2] information urgent    Answer Assessment - Initial Assessment Questions  1. REASON FOR CALL or QUESTION: \"What is your reason for calling today?\" or \"How can I best  help you?\" or \"What question do you have that I can help answer?\"      He would like to speak to the office about his BP medication.   2. CALLER: Document the source of call. (e.g., laboratory, patient).      Patient.    Protocols used: PCP Call - No Triage-ADULT-    "

## 2025-03-17 NOTE — TELEPHONE ENCOUNTER
Patient stated he just picked up the carvedilol from pharmacy in the middle of last week and since starting it, he has been having dizziness and trouble with balance.  He has not changed the way he takes the medication or stopped it.  He has been taking it as prescribed.  He said he doesn't know what else could be causing his symptoms.  He reported the following blood pressures from last week:    3/13  147/77    3/14  112/67    3/15  134/61

## 2025-03-18 NOTE — TELEPHONE ENCOUNTER
Patient was informed voice understanding. He states that it's miserable being dizzy, but will try to deal with it maybe not to happy about it.   He states that he hasn't heard anything from [facility].

## 2025-03-18 NOTE — TELEPHONE ENCOUNTER
This was faxed out on 3/14, so if he has not heard anything by the beginning of next week, do let us know.

## 2025-03-18 NOTE — TELEPHONE ENCOUNTER
Patient informed of Dr. Kramer's message.  He was upset and stated he does not want to just be stumbling around all the time and something should be able to be done about the medication.

## 2025-03-18 NOTE — TELEPHONE ENCOUNTER
We certainly do not want him stumbling and at risk for falling either, but the dizziness has persisted before and after his appointment and has been present through multiple medications.  I think we have to be careful and blaming this on medication, when it is more likely in my discussions with the neurologist that his symptoms are related to the visual deficits he has after the stroke.  We certainly want to avoid elevated blood pressure as it could put him at risk for more events, so given these blood pressure numbers, I think the carvedilol is wise to continue.  If the dizzy has not changed or gotten worse, I do not think we can blame it on that medication in particular.    Now, with regard to the symptoms itself, that is the importance of getting to [place] to see the neuro-ophthalmologist.  Hopefully they will have some plans on a direction to take things. Has their office been in touch with you?

## 2025-03-21 ENCOUNTER — PATIENT OUTREACH (OUTPATIENT)
Dept: CASE MANAGEMENT | Facility: OTHER | Age: 80
End: 2025-03-21
Payer: MEDICARE

## 2025-03-21 ENCOUNTER — TELEPHONE (OUTPATIENT)
Dept: INTERNAL MEDICINE | Facility: CLINIC | Age: 80
End: 2025-03-21
Payer: MEDICARE

## 2025-03-21 DIAGNOSIS — Z86.73 HISTORY OF STROKE: ICD-10-CM

## 2025-03-21 DIAGNOSIS — I10 PRIMARY HYPERTENSION: Primary | ICD-10-CM

## 2025-03-21 NOTE — TELEPHONE ENCOUNTER
That will lower blood pressure too, but if it doesn't cause dizziness, I think we can be OK with that.

## 2025-03-21 NOTE — TELEPHONE ENCOUNTER
PATIENTS WIFE HAS RETURNED CALL, GIVEN MESSAGE AND UNDERSTANDING VOICED.     PATIENTS WIFE STATED THAT THE PATIENT DID TAKE THE BISOPROLOL INSTEAD OF THE CARVEDILOL.

## 2025-03-21 NOTE — TELEPHONE ENCOUNTER
PATIENTS WIFE HAS CALLED, SHE STATED THAT PATIENT HAD SOME DIZZINESS THAT STARTED ON 03/20/2025 AND IS STILL DIZZY THIS MORNING.     PATIENTS BLOOD PRESSURE IS  80/56 AT 10am  03/21/2025  93/56 AT 10am  03/21/2025  106/57 at 1005am HR 60 03/21/2025    PATIENT FEELS THAT THE CARVEDILOL IS CAUSING THE DIZZINESS.  PATIENT HAS NOT TAKEN IT THIS MORNING.

## 2025-03-21 NOTE — OUTREACH NOTE
AMBULATORY CASE MANAGEMENT NOTE    Names and Relationships of Patient/Support Persons: Contact: Sonu Bravo Jr.; Relationship: Self -     CCM Interim Update    Contacted patient to discuss his BP. Patient will continue to take his meds as prescribed and record his blood pressure. He has not missed doses and has not taken extra or duplicated meds.        Education Documentation  No documentation found.        Syl NORMAN  Ambulatory Case Management    3/21/2025, 14:53 CDT

## 2025-03-21 NOTE — TELEPHONE ENCOUNTER
Let's monitor for the weekend off of the medication. Those BP numbers are different than what we had seen previously. Keep us posted on BP and symptoms on Monday.

## 2025-03-24 ENCOUNTER — TELEPHONE (OUTPATIENT)
Dept: INTERNAL MEDICINE | Facility: CLINIC | Age: 80
End: 2025-03-24
Payer: MEDICARE

## 2025-03-24 NOTE — TELEPHONE ENCOUNTER
I am pleased with these levels. He can back off to checking every other day as long as numbers are consistently below 140/90.     Clinical staff, please be sure to put bisoprolol back into his med list.

## 2025-03-24 NOTE — PROGRESS NOTES
[facility] - PODIATRY    Today's Date: 03/31/2025     Patient Name: Sonu Bravo Jr.  MRN: [id number]  CSN: [id number]  PCP: Vaughn Kramer DO  Referring Provider: No ref. provider found    SUBJECTIVE     Chief Complaint   Patient presents with    Follow-up     Vaughn Kramer DO 03/07/25   Return in about 3 months Follow-up   Pt here for nail/foot care. Pt states having pain in his second toe on his left foot. Pt states has neuropathy.     HPI: Sonu Bravo Jr., a 79 y.o.male, comes to clinic as a(n) established patient complaining of toenail/callus issues. Patient has h/o arthritis, AFib, GERD, HLD, HTN . Patient is non-diabetic.  Patient presents with thick, dystrophic nails. Patient also reports pain to his 2nd , 3rd and 4th dorsal toes on he left foot.  Callus noted to left 3rd toe.  Patient reports that he has been going to a nail salon in between visits at podiatry.  Patient also reports he has attempted to trim his own nails and caused multiple injuries to his toes.  Patient reports neuropathy that is caused by low back issues.  Reports he has had multiple back surgeries in the past. Reports numbness and tingling.  Denies any open sores or wounds today.  Using cane for ambulation assistance.  Denies pain. Relates previous treatment(s) including PT . Denies any constitutional symptoms. No other pedal complaints at this time.    Past Medical History:   Diagnosis Date    Arthritis     Atrial fibrillation 08/31/2021    Cataracts, bilateral     Disease of thyroid gland     Diverticulitis     GERD (gastroesophageal reflux disease)     Hyperlipidemia     Hypertension     Low back pain     Mononucleosis 1964     Past Surgical History:   Procedure Laterality Date    ANTERIOR CERVICAL DISCECTOMY W/ FUSION N/A 10/11/2022    Procedure: CERVICAL DISCECTOMY ANTERIOR WITH FUSION, Cervical 4/5 and 5/6;  Surgeon: Durga Cifuentes MD;  Location: [facility];  Service: Neurosurgery;   Laterality: N/A;    BACK SURGERY N/A     CATARACT EXTRACTION Right     CEREBRAL ANGIOGRAM      COLON SURGERY  01/2022    Dr Fairbanks at [facility]    COLONOSCOPY N/A 09/28/2021    Procedure: COLONOSCOPY WITH ANESTHESIA;  Surgeon: Thomas Dc DO;  Location: [facility] ENDOSCOPY;  Service: Gastroenterology;  Laterality: N/A;  pre op; abnormal ct scan  post op: diverticulosis ; polyps   PCP: Vaughn Kramer DO    COLONOSCOPY N/A 01/23/2024    Procedure: COLONOSCOPY WITH ANESTHESIA;  Surgeon: Thomas Dc DO;  Location: [facility] ENDOSCOPY;  Service: Gastroenterology;  Laterality: N/A;  Pre: Altered bowel function;  Post: Polyps;  Vaughn Kramer DO    HERNIA REPAIR      LUMBAR DIRECT LATERAL INTERBODY FUSION N/A 01/24/2023    Procedure: LUMBAR DIRECT LATERAL INTERBODY FUSION; LUMBAR 2-5;  Surgeon: Durga Cifuentes MD;  Location:  [facility] OR;  Service: Neurosurgery;  Laterality: N/A;    LUMBAR FUSION N/A 01/24/2023    Procedure: LUMBAR DIRECT LATERAL INTERBODY FUSION; LUMBAR 2-5, LUMBAR LAMINECTOMY TRANSFORAMINAL LUMBAR INTERBODY FUSION; LUMBAR 2-5;  Surgeon: Durga Cifuentes MD;  Location:  [facility] OR;  Service: Robotics - Neuro;  Laterality: N/A;    SKIN SURGERY      Skin cancer surgery.     Family History   Problem Relation Age of Onset    Lung cancer Mother     Brain cancer Mother     Heart disease Father     Hyperlipidemia Father     Hypertension Father     Prostate cancer Maternal Aunt     Colon cancer Maternal Uncle     Colon polyps Neg Hx     Esophageal cancer Neg Hx     Liver cancer Neg Hx      Social History     Socioeconomic History    Marital status:    Tobacco Use    Smoking status: Every Day     Current packs/day: 0.50     Average packs/day: 2.0 packs/day for 58.2 years (115.0 ttl pk-yrs)     Types: Cigarettes     Start date: 1967     Passive exposure: Current    Smokeless tobacco: Never    Tobacco comments:     down to 1/2 pack a day 1/14/25   Vaping Use     Vaping status: Never Used   Substance and Sexual Activity    Alcohol use: Yes     Comment: rare    Drug use: Never    Sexual activity: Defer     Allergies   Allergen Reactions    Penicillins Hives    Sulfa Antibiotics Hives, Itching and Rash    Sulfamethoxazole-Trimethoprim Hives, Itching and Rash    Diphenhydramine Mental Status Change     hiper     Current Outpatient Medications   Medication Sig Dispense Refill    apixaban (Eliquis) 5 MG tablet tablet Take 1 tablet by mouth 2 (Two) Times a Day. 180 tablet 1    aspirin 81 MG EC tablet Take 1 tablet by mouth Daily. 90 tablet 3    azelastine (ASTELIN) 0.1 % nasal spray Administer 2 sprays into the nostril(s) as directed by provider 2 (Two) Times a Day. Use in each nostril as directed 30 mL 11    carvedilol (Coreg) 6.25 MG tablet Take 1 tablet by mouth 2 (Two) Times a Day With Meals. 180 tablet 1    Cholecalciferol 100 MCG (4000 UT) capsule Take 4,000 Units by mouth Daily. Indications: Vitamin D Deficiency      coenzyme Q10 100 MG capsule Take 1 capsule by mouth Daily. Indications: Heart Rhythm Disorder      Cyanocobalamin 2500 MCG sublingual tablet Take 2,500 mcg by mouth Daily. Indications: Inadequate Vitamin B12      fexofenadine (ALLEGRA) 180 MG tablet Take 1 tablet by mouth Daily. Indications: Hayfever      fluticasone (FLONASE) 50 MCG/ACT nasal spray 1 spray into the nostril(s) as directed by provider Daily. Indications: Stuffy Nose 32 g 3    hydroCHLOROthiazide 25 MG tablet Take 1 tablet by mouth Daily. 30 tablet 3    HYDROcodone-acetaminophen (NORCO)  MG per tablet Take 1 tablet by mouth Daily As Needed.      ibuprofen (ADVIL,MOTRIN) 200 MG tablet Take 1 tablet by mouth Every 6 (Six) Hours As Needed for Mild Pain.      levothyroxine (SYNTHROID, LEVOTHROID) 125 MCG tablet Take 1 tablet by mouth Every Morning. 90 tablet 0    losartan (Cozaar) 100 MG tablet Take 1 tablet by mouth Daily. 90 tablet 1    Magnesium 250 MG tablet Take  by mouth.      NON  FORMULARY Apply 1 Units topically to the appropriate area as directed 2 (Two) Times a Day.      omeprazole (priLOSEC) 20 MG capsule Take 1 capsule by mouth Daily. 90 capsule 3    polyethylene glycol (MiraLax) 17 GM/SCOOP powder Take 17 g by mouth As Needed.      pramipexole (MIRAPEX) 0.5 MG tablet Take 1 tablet by mouth Every Night. 90 tablet 1    rosuvastatin (CRESTOR) 40 MG tablet Take 1 tablet by mouth Daily. 90 tablet 1    Zinc 50 MG capsule Take 1 capsule by mouth Daily. Indications: Zinc Deficiency      tamsulosin (FLOMAX) 0.4 MG capsule 24 hr capsule Take 1 capsule by mouth Every Night for 360 days. (Patient not taking: Reported on 3/31/2025) 90 capsule 3     No current facility-administered medications for this visit.     Review of Systems   Constitutional:  Negative for chills and fever.   HENT:  Negative for congestion.    Respiratory:  Negative for shortness of breath.    Cardiovascular:  Negative for chest pain and leg swelling.   Gastrointestinal:  Negative for constipation, diarrhea, nausea and vomiting.   Musculoskeletal:  Positive for arthralgias and gait problem.        Foot pain   Skin:  Negative for wound.   Neurological:  Positive for numbness.       OBJECTIVE     Vitals:    03/31/25 1013   BP: 136/76   Pulse: 80   SpO2: 99%         PHYSICAL EXAM  GEN:   Accompanied by none.     Foot/Ankle Exam    GENERAL  Orientation:  AAOx3  Affect:  appropriate  Gait:  shuffling  Assistance:  cane use  Right shoe gear: casual shoe  Left shoe gear: casual shoe    VASCULAR     Right Foot Vascularity   Dorsalis pedis:  2+  Posterior tibial:  2+  Skin temperature:  warm  Edema grading:  Trace  CFT:  3  Pedal hair growth:  Present  Varicosities:  mild varicosities (venous congestion to toes)     Left Foot Vascularity   Dorsalis pedis:  2+  Posterior tibial:  2+  Skin temperature:  warm  Edema grading:  Trace  CFT:  3  Pedal hair growth:  Present  Varicosities:  mild varicosities (venous congestion to toes)      NEUROLOGIC     Right Foot Neurologic   Light touch sensation: diminished  Vibratory sensation: normal  Hot/Cold sensation: normal     Left Foot Neurologic   Light touch sensation: diminished  Vibratory sensation: normal  Hot/Cold sensation:  normal    MUSCULOSKELETAL     Right Foot Musculoskeletal   Tenderness:  toe 3 tenderness    Arch:  Normal  Hammertoe:  Second toe, third toe, fourth toe and fifth toe     Left Foot Musculoskeletal   Tenderness:  toe 3 tenderness  Arch:  Normal  Hammertoe:  Second toe, third toe, fourth toe and fifth toe    MUSCLE STRENGTH     Right Foot Muscle Strength   Foot dorsiflexion:  4+  Foot plantar flexion:  5  Foot inversion:  5  Foot eversion:  5     Left Foot Muscle Strength   Foot dorsiflexion:  4+  Foot plantar flexion:  5  Foot inversion:  5  Foot eversion:  5    RANGE OF MOTION     Right Foot Range of Motion   Foot and ankle ROM within normal limits       Left Foot Range of Motion   Foot and ankle ROM within normal limits      DERMATOLOGIC      Right Foot Dermatologic   Skin  Right foot skin is intact. (discoloration to toes)  Nails  1.  Positive for elongated and abnormal thickness.  2.  Positive for elongated and abnormal thickness.  3.  Positive for elongated and abnormal thickness.  4.  Positive for elongated and abnormal thickness.  5.  Positive for elongated and abnormal thickness.     Left Foot Dermatologic   Skin  Left foot skin is intact. (discoloration to toes)   Nails  1.  Positive for elongated and abnormal thickness.  2.  Positive for elongated and abnormal thickness.  3.  Positive for elongated and abnormal thickness.  4.  Positive for elongated and abnormally thick.  5.  Positive for elongated and abnormally thick.    Image:       RADIOLOGY/NUCLEAR:  No results found.    LABORATORY/CULTURE RESULTS:      PATHOLOGY RESULTS:       ASSESSMENT/PLAN     Diagnoses and all orders for this visit:    1. Thickened nails (Primary)    2. Gait abnormality    3. Neuropathy  involving both lower extremities    4. Foot callus    5. Hammertoes of both feet      Comprehensive lower extremity examination and evaluation was performed.  Discussed findings and treatment plan including risks, benefits, and treatment options with patient in detail. Patient agreed with treatment plan.  Pain to left toes are consistent with hammertoe deformity.   After verbal consent obtained, nail(s) x10 debrided of length and thickness with nail nipper without incidence  After verbal consent obtained, calluses x1 pared utilizing dermal curette and/or scalpel without incidence  Patient may maintain nails and calluses at home utilizing emery board or pumice stone between visits as needed  Continue use of cane for ambulation assistance.  Continue use of compounded pain cream for feet.  Toe crest x 2 dispensed.  Discussed with patient that an xray may be ordered of the left foot if he continues to have pain.   I spent 22 minutes caring for Sonu on this date of service. This time includes time spent by me in the following activities: preparing for the visit, reviewing tests, performing a medically appropriate examination and/or evaluation, counseling and educating the patient/family/caregiver, and documenting information in the medical record  I spent an additional 8 minutes caring for Sonu on this date of service.  This time includes time spent by me in nail debridement and callus paring.    An After Visit Summary was printed and given to the patient at discharge, including (if requested) any available informative/educational handouts regarding diagnosis, treatment, or medications. All questions were answered to patient/family satisfaction. Should symptoms fail to improve or worsen they agree to call or return to clinic or to go to the Emergency Department. Discussed the importance of following up with any needed screening tests/labs/specialist appointments and any requested follow-up recommended by me today.  Importance of maintaining follow-up discussed and patient accepts that missed appointments can delay diagnosis and potentially lead to worsening of conditions.  Return in about 9 weeks (around 6/2/2025) for Schedule Foot Care Clinic, With Taina HERNANDEZ., or sooner if acute issues arise.    Lab Frequency Next Occurrence   Diet: Once 09/28/2021       This document has been electronically signed by MARY Gatica on March 31, 2025 12:07 CDT

## 2025-03-24 NOTE — TELEPHONE ENCOUNTER
Could you please fax MRI and OV result to Dr. Malone listed in Care Team.    Also, please contact Dr. Guillory's office. Kacey requires a referral from Ophthalmology before they will schedule him. Let them know we can help provide more information if needed and send them MRI if they do not have it (I believe I have sent it at his visit here).

## 2025-03-24 NOTE — TELEPHONE ENCOUNTER
Caller: ISA RENEE    Relationship: Emergency Contact    Best call back number: [phone]     What is the best time to reach you: ANY    Who are you requesting to speak with (clinical staff, provider,  specific staff member): CLINICAL    Do you know the name of the person who called: WIFE    What was the call regarding: WAS TOLD TO CALL BACK WITH THE # TO FAX TO IS AT [facility] [phone] WITH INFORMATION DISCUSSED PREVIOUSLY.. DR GALICIA     Is it okay if the provider responds through MyChart: CALL BACK

## 2025-03-24 NOTE — TELEPHONE ENCOUNTER
PATIENT HAS CALLED, HE STATED HIS BLOOD PRESSURES HAVE BEEN.     03/19/2025/   160/98   AT 250pm  03/19/2025    134/80   AT 10pm  03/20/25        130/72   AT 1245pm  03/20/25        123/67   AT 10pm  03/21/25          80/56  AT  10am  03/21/25          93/56  AT  10pm      HR 67  03/22/25        147/77  AT  12pm      HR 68  03/22/25        127/72  AT    7pm      HR 65  03/23/25        140/70  AT  130pm    HR 67  03/23/25        136/79 AT    650pm   HR 63  03/24/25        124/68 AT    1003am HR 67    PATIENT STATED THAT HE HAS TAKEN THE BISOPROLOL AND LOSARTAN.

## 2025-03-25 NOTE — TELEPHONE ENCOUNTER
Spoke with Radha at Dr Guillory's office regarding the patients MRI and the referral she stated that they received the MRI on 3/11/25 she took the info for the referral and the fax number to Dr Erazo office as well, she stated she would send the information back to Dr Guillory. I faxed the MRI and the last office notes to Dr Malone office at [phone].

## 2025-03-31 ENCOUNTER — OFFICE VISIT (OUTPATIENT)
Age: 80
End: 2025-03-31
Payer: MEDICARE

## 2025-03-31 ENCOUNTER — PATIENT OUTREACH (OUTPATIENT)
Dept: CASE MANAGEMENT | Facility: OTHER | Age: 80
End: 2025-03-31
Payer: MEDICARE

## 2025-03-31 VITALS
WEIGHT: 160 LBS | HEART RATE: 80 BPM | BODY MASS INDEX: 22.4 KG/M2 | HEIGHT: 71 IN | DIASTOLIC BLOOD PRESSURE: 76 MMHG | OXYGEN SATURATION: 99 % | SYSTOLIC BLOOD PRESSURE: 136 MMHG

## 2025-03-31 DIAGNOSIS — Z86.73 HISTORY OF STROKE: ICD-10-CM

## 2025-03-31 DIAGNOSIS — L60.2 THICKENED NAILS: Primary | ICD-10-CM

## 2025-03-31 DIAGNOSIS — R26.9 GAIT ABNORMALITY: ICD-10-CM

## 2025-03-31 DIAGNOSIS — M20.41 HAMMERTOES OF BOTH FEET: ICD-10-CM

## 2025-03-31 DIAGNOSIS — G57.93 NEUROPATHY INVOLVING BOTH LOWER EXTREMITIES: ICD-10-CM

## 2025-03-31 DIAGNOSIS — M20.42 HAMMERTOES OF BOTH FEET: ICD-10-CM

## 2025-03-31 DIAGNOSIS — L84 FOOT CALLUS: ICD-10-CM

## 2025-03-31 DIAGNOSIS — I10 PRIMARY HYPERTENSION: Primary | ICD-10-CM

## 2025-03-31 PROCEDURE — 1159F MED LIST DOCD IN RCRD: CPT | Performed by: NURSE PRACTITIONER

## 2025-03-31 PROCEDURE — 3075F SYST BP GE 130 - 139MM HG: CPT | Performed by: NURSE PRACTITIONER

## 2025-03-31 PROCEDURE — 3078F DIAST BP <80 MM HG: CPT | Performed by: NURSE PRACTITIONER

## 2025-03-31 PROCEDURE — 99213 OFFICE O/P EST LOW 20 MIN: CPT | Performed by: NURSE PRACTITIONER

## 2025-03-31 PROCEDURE — 11721 DEBRIDE NAIL 6 OR MORE: CPT | Performed by: NURSE PRACTITIONER

## 2025-03-31 PROCEDURE — 11055 PARING/CUTG B9 HYPRKER LES 1: CPT | Performed by: NURSE PRACTITIONER

## 2025-03-31 PROCEDURE — 1160F RVW MEDS BY RX/DR IN RCRD: CPT | Performed by: NURSE PRACTITIONER

## 2025-03-31 NOTE — OUTREACH NOTE
[facility] End of Month Documentation    This Chronic Medical Management Care Plan for Sonu Bravo Jr., 79 y.o. male, has been a new plan of care implemented and a new plan of care implemented for the month of March.  A cumulative time of 29  minutes was spent on this patient record this month, including phone call with patient; chart review; electronic communication with primary care provider.    Regarding the patient's problems: has Abdominal aortic ectasia; Vitamin D deficiency; Mixed hyperlipidemia; Acquired hypothyroidism; Gastro-esophageal reflux disease with esophagitis; Primary hypertension; Former smoker; Paroxysmal atrial fibrillation; Current use of long term anticoagulation; History of DVT (deep vein thrombosis); Cervical spondylosis with myelopathy; Spinal stenosis, lumbar region with neurogenic claudication; Impaired glucose tolerance; Dural arteriovenous fistula of spinal cord; Neurogenic bladder; Self-catheterizes urinary bladder; Altered bowel function; Neuropathy involving both lower extremities; Vitreous degeneration; Retinal drusen; Pseudophakia; Benign neoplasm of choroid; Age-related nuclear cataract, left eye; Lung nodules; and History of stroke on their problem list., the following items were addressed: medical records; changes to medical care and any changes can be found within the plan section of the note.  A detailed listing of time spent for chronic care management is tracked within each outreach encounter.  Current medications include:  has a current medication list which includes the following prescription(s): apixaban, aspirin, azelastine, carvedilol, cholecalciferol, coenzyme q10, cyanocobalamin, fexofenadine, fluticasone, hydrochlorothiazide, hydrocodone-acetaminophen, ibuprofen, levothyroxine, losartan, magnesium, NON FORMULARY, omeprazole, polyethylene glycol, pramipexole, rosuvastatin, tamsulosin, and zinc. and the patient is reported to be patient is compliant with medication  protocol,  Medications are reported to be effective.  Regarding these diagnoses, referrals were made to the following provider(s):  n/a.  All notes on chart for PCP to review.    The patient was monitored remotely for blood pressure; medications.    The patient's physical needs include:  physical healthcare.     The patient's mental support needs include:  continued support    The patient's cognitive support needs include:  continued support    The patient's psychosocial support needs include:  continued support    The patient's functional needs include: physical healthcare    The patient's environmental needs include:  not applicable    Care Plan overall comments:  No data recorded    Refer to previous outreach notes for more information on the areas listed above.    Monthly Billing Diagnoses  (I10) Primary hypertension    (Z86.73) History of stroke    Medications   Medications have been reconciled    Care Plan progress this month:      Recently Modified Care Plans Updates made since 2/28/2025 12:00 AM      No recently modified care plans.             Hypertension       Make Healthy Diet Choices       Start:  03/13/25    Expected End:  06/11/25         My Healthy Diet To Do List       Start:  03/13/25       Why is this important?Taking small steps to change how you eat is a good place to start.            Track and Manage My Blood Pressure       Start:  03/13/25    Expected End:  06/11/25         My Blood Pressure Management To Do List       Start:  03/13/25       Why is this important?You may not be able to feel high blood pressure, but it can still hurt your body.High blood pressure can cause heart or kidney problems. It can also cause a stroke.Checking your blood pressure at home and at different times of the day can help to control blood pressure.            [facility] My Medicine       Start:  03/13/25    Expected End:  06/11/25         My Medication Management To Do List       Start:  03/13/25       Why is this  important?It is important to take your medicine so that you can control your condition and prevent problems.Even if you do not feel your high blood pressure, it is still important to take your medicine.Call the office if you cannot afford the medicine or if you have questions about it.            Manage My Stress       Start:  03/13/25    Expected End:  06/11/25         My Stress Management To Do List       Start:  03/13/25       Why is this important?The changes that you are asked to make may be hard to do.When you are stressed, down or upset, your body reacts too. For example, your blood pressure may get higher.            Optimal Care Coordination of a Patient Experiencing Hypertension       Start:  03/13/25    Expected End:  06/11/25         Identify and Monitor Blood Pressure Elevation       Start:  03/13/25            Mutually Develop and Foster Achievement of Patient Goals       Start:  03/13/25            Facilitate Adherence to Lifestyle Change       Start:  03/13/25            Alleviate Barriers to Medication Regimen       Start:  03/13/25                Current Specialty Plan of Care Status signed by both patient and provider    Instructions   Patient was provided an electronic copy of care plan  CCM services were explained and offered and patient has accepted these services.  Patient has given their written consent to receive CCM services and understands that this includes the authorization of electronic communication of medical information with the other treating providers.  Patient understands that they may stop CCM services at any time and these changes will be effective at the end of the calendar month and will effectively revocate the agreement of CCM services.  Patient understands that only one practitioner can furnish and be paid for CCM services during one calendar month.  Patient also understands that there may be co-payment or deductible fees in association with CCM services.  Patient will  continue with at least monthly follow-up calls with the Ambulatory .    Syl NORMAN  Ambulatory Case Management    3/31/2025, 11:00 CDT

## 2025-04-09 ENCOUNTER — TELEPHONE (OUTPATIENT)
Dept: INTERNAL MEDICINE | Facility: CLINIC | Age: 80
End: 2025-04-09
Payer: MEDICARE

## 2025-04-09 NOTE — TELEPHONE ENCOUNTER
PATIENT HAS BEEN CALLED, HE STATED THAT HE IS STILL HAVING DIZZY SPELLS IN THE MORNING (SOMETIMES).     PATIENTS HEART RATE HAS BEEN RUNNING BETWEEN 55 AND 60.

## 2025-04-09 NOTE — TELEPHONE ENCOUNTER
PATIENT HAS CALLED, HE STATED THAT HIS BLOOD PRESSURE HAS BEEN UP.     180/101 AT 130pm. 04/09/2025  192/97   AT 130pm  193/101 AT 125pm  188/101 AT 125pm    173/97 AT 1050am  04/08/2025  173/94 AT 1055am    149/70 AT 11am      04/07/2025  149/74 AT 1105am

## 2025-04-10 ENCOUNTER — OFFICE VISIT (OUTPATIENT)
Dept: INTERNAL MEDICINE | Facility: CLINIC | Age: 80
End: 2025-04-10
Payer: MEDICARE

## 2025-04-10 VITALS
DIASTOLIC BLOOD PRESSURE: 63 MMHG | HEIGHT: 71 IN | BODY MASS INDEX: 22.54 KG/M2 | TEMPERATURE: 97.2 F | HEART RATE: 76 BPM | RESPIRATION RATE: 19 BRPM | WEIGHT: 161 LBS | SYSTOLIC BLOOD PRESSURE: 128 MMHG | OXYGEN SATURATION: 97 %

## 2025-04-10 DIAGNOSIS — G25.81 RLS (RESTLESS LEGS SYNDROME): ICD-10-CM

## 2025-04-10 DIAGNOSIS — I10 PRIMARY HYPERTENSION: Primary | ICD-10-CM

## 2025-04-10 RX ORDER — BISOPROLOL FUMARATE 10 MG/1
10 TABLET, FILM COATED ORAL DAILY
COMMUNITY
End: 2025-04-14 | Stop reason: SDUPTHER

## 2025-04-10 RX ORDER — PRAMIPEXOLE DIHYDROCHLORIDE 0.5 MG/1
0.5 TABLET ORAL NIGHTLY
Qty: 90 TABLET | Refills: 1 | Status: SHIPPED | OUTPATIENT
Start: 2025-04-10

## 2025-04-10 NOTE — PROGRESS NOTES
Chief Complaint  Hypertension and Dizziness (Patient stated that he has been getting dizzy after he eats in the morning and after taking he's morning medication.)    Subjective        Sonu Micki Bravo Jr. is a 79 y.o. male who presents today for evaluation of the above problems.      History of Present Illness  The patient is an 79-year-old male who presents for evaluation of blood pressure management, atrial fibrillation, stroke, and dizziness. His spouse is present with him.     He reports that his blood pressure was highest yesterday, but it decreased last night to 157/84. His blood pressure readings have been inconsistent over the past month, with a reading of 173/97 on 04/07/2025, 149/70 on 04/05/2025, 158/81 on 04/04/2025, 144/63 on 04/03/2025, 134/76 on 04/02/2025, and 158/79 on 04/01/2025. He reports no unusual symptoms or pain yesterday but admits to experiencing stress due to an ongoing house move. He also reports knee and lower back pain, which he attributes to previous surgeries. He describes a sensation of fogginess behind his eyes and in his head, which he believes may be related to his stroke. He has been taken off Coreg due to dizziness and is currently on losartan. He is also taking bisoprolol 5 mg, but accidentally took 10 mg this morning as he did not swallow the first dose properly. He has previously taken higher doses of bisoprolol. Blood pressure is at goal today.     He has an upcoming appointment with neurology next week on 4/16/25. He has been informed that he had a stroke, which he was previously unaware of. He has noticed a decline in his cognitive function, particularly when balancing his checkbook. His symptom of dizziness is also thought to be caused from the stroke.     He reports no leg swelling or new cardiac symptoms.     Needs refill of Mirapex which he takes for RLS. Says it is working well for him.     Review of Systems - History obtained from the patient  General ROS:  "negative  Respiratory ROS: no cough, shortness of breath, or wheezing  Cardiovascular ROS: no chest pain or dyspnea on exertion  Gastrointestinal ROS: no abdominal pain, change in bowel habits, or black or bloody stools  Neurological ROS: no TIA or stroke symptoms    Objective   Vital Signs:  /63 (BP Location: Left arm, Patient Position: Sitting, Cuff Size: Adult)   Pulse 76   Temp 97.2 °F (36.2 °C) (Infrared)   Resp 19   Ht 180.3 cm (70.98\")   Wt 73 kg (161 lb)   SpO2 97%   BMI 22.47 kg/m²   Estimated body mass index is 22.47 kg/m² as calculated from the following:    Height as of this encounter: 180.3 cm (70.98\").    Weight as of this encounter: 73 kg (161 lb).       Physical Exam  Vitals and nursing note reviewed.   Constitutional:       Appearance: Normal appearance. He is normal weight.   HENT:      Mouth/Throat:      Mouth: Mucous membranes are moist.   Cardiovascular:      Rate and Rhythm: Normal rate and regular rhythm.      Pulses: Normal pulses.      Heart sounds: Normal heart sounds. No murmur heard.     No friction rub. No gallop.   Pulmonary:      Effort: Pulmonary effort is normal. No respiratory distress.      Breath sounds: Normal breath sounds. No wheezing.   Musculoskeletal:         General: Normal range of motion.      Cervical back: Normal range of motion and neck supple.      Right lower leg: No edema.      Left lower leg: No edema.   Skin:     General: Skin is warm and dry.      Capillary Refill: Capillary refill takes less than 2 seconds.   Neurological:      General: No focal deficit present.      Mental Status: He is alert and oriented to person, place, and time.   Psychiatric:         Mood and Affect: Mood normal.         Behavior: Behavior normal.         Thought Content: Thought content normal.         Judgment: Judgment normal.          Result Review :                 Assessment and Plan   Diagnoses and all orders for this visit:    1. Primary hypertension " (Primary)  Assessment & Plan:  Blood pressure today is at goal.  Continue bisoprolol 10 mg daily.  May had to increase to 20 mg daily.  Monitor readings for the next 3 to 4 days and then send a message via [employer] to update.  Goal blood pressures less than 150/90 per JNC 8 guidelines.      2. RLS (restless legs syndrome)  Comments:  Stable.  Mirapex refilled.  Reassess next appointment  Orders:  -     pramipexole (MIRAPEX) 0.5 MG tablet; Take 1 tablet by mouth Every Night.  Dispense: 90 tablet; Refill: 1    I will be providing care over continum for this patient including management of both chronic and acute medical conditions            Follow Up   Return if symptoms worsen or fail to improve.  Patient was given instructions and counseling regarding his condition or for health maintenance advice. Please see specific information pulled into the AVS if appropriate.       Patient or patient representative verbalized consent for the use of Ambient Listening during the visit with  MARY Chase for chart documentation. 4/10/2025  11:40 CDT

## 2025-04-11 ENCOUNTER — TELEPHONE (OUTPATIENT)
Dept: INTERNAL MEDICINE | Facility: CLINIC | Age: 80
End: 2025-04-11
Payer: MEDICARE

## 2025-04-14 RX ORDER — BISOPROLOL FUMARATE 10 MG/1
10 TABLET, FILM COATED ORAL DAILY
Qty: 90 TABLET | Refills: 1 | Status: SHIPPED | OUTPATIENT
Start: 2025-04-14

## 2025-04-14 NOTE — TELEPHONE ENCOUNTER
Rx Refill Note  Requested Prescriptions     Pending Prescriptions Disp Refills    bisoprolol (ZEBeta) 10 MG tablet 30 tablet 3     Sig: Take 1 tablet by mouth Daily.      Last office visit with prescribing clinician: 3/7/2025   Last telemedicine visit with prescribing clinician: Visit date not found   Next office visit with prescribing clinician: Visit date not found                         Would you like a call back once the refill request has been completed: [] Yes [] No    If the office needs to give you a call back, can they leave a voicemail: [] Yes [] No    Jairo Cano MA  04/14/25, 10:33 CDT      IT DOESN'T APPEAR THAT THIS WAS SENT TO THE PHARMACY.

## 2025-04-16 ENCOUNTER — OFFICE VISIT (OUTPATIENT)
Dept: NEUROLOGY | Facility: CLINIC | Age: 80
End: 2025-04-16
Payer: MEDICARE

## 2025-04-16 VITALS
DIASTOLIC BLOOD PRESSURE: 80 MMHG | HEART RATE: 63 BPM | BODY MASS INDEX: 22.54 KG/M2 | WEIGHT: 161 LBS | SYSTOLIC BLOOD PRESSURE: 152 MMHG | HEIGHT: 71 IN | OXYGEN SATURATION: 97 %

## 2025-04-16 DIAGNOSIS — I69.30 HISTORY OF STROKE WITH RESIDUAL EFFECTS: Primary | ICD-10-CM

## 2025-04-16 DIAGNOSIS — I10 PRIMARY HYPERTENSION: ICD-10-CM

## 2025-04-16 DIAGNOSIS — H53.462 LEFT HOMONYMOUS INFERIOR QUADRANTANOPIA: ICD-10-CM

## 2025-04-16 DIAGNOSIS — E78.5 HYPERLIPIDEMIA LDL GOAL <70: ICD-10-CM

## 2025-04-16 DIAGNOSIS — I48.0 PAROXYSMAL ATRIAL FIBRILLATION: ICD-10-CM
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
## 2025-04-16 DIAGNOSIS — Z79.01 CHRONIC ANTICOAGULATION: ICD-10-CM

## 2025-04-16 DIAGNOSIS — Z91.81 AT RISK FOR FALLS: ICD-10-CM

## 2025-04-16 RX ORDER — TERAZOSIN 2 MG/1
CAPSULE ORAL
COMMUNITY
Start: 2025-04-14

## 2025-04-16 NOTE — PROGRESS NOTES
Neurology Consult Note    [facility]  [address], [address]  [place] [address]  Phone: [phone]  Fax: [phone]    Referring Provider:   No ref. provider found  Primary Care Provider:  Vaughn Kramer DO    Reason for Consult:  Abnormal finding on MRI of brain  Subjective      Chief Complaint   Patient presents with    Stroke     MRI showed a stroke.     History of Present Illness  79-year-old male referred to our clinic by MARY Byrnes for the evaluation of abnormal finding on MRI brain.  Patient saw Ms. Heart on 2/10/2025 for the evaluation of visual disturbances, balance issues, elevated blood pressure, sinus congestion, atrial fibrillation and urinary tract infection.  Patient reporting visual disturbances particular of his left eye.  Described as difficulty focusing, dryness and sensation of fuzziness.  Reported symptoms have been present since undergoing an angiogram in January 2025.  He also reported some cognitive difficulties such as processing numbers.  Reportedly he saw an ophthalmologist to evaluate his eyes and found no abnormalities and suggested this issue may be neurological in nature.  He does additionally note he has been experiencing episodes of elevated blood pressure.  Patient does have a history of atrial fibrillation and is on Eliquis.  During the angiogram he had to discontinue Eliquis for approximately 24 hours.    Has been under the care of Dr. Mynor Barros of [facility] neurosurgery.  Patient was referred for a cerebrovascular dural AV fistula.  Patient began seeing Dr. Craven on 11/14/2023.  He last saw Dr. Barros on 2/11/2025 via telemedicine.  Patient does have a history of D aVF status post partial embolism in November 2023 as surgical ligation and December 2023.  Patient had a long-term follow-up craniospinal angiogram performed on January 20, 2025.  During this procedure he had no intervention performed only diagnostic angiogram.   "Patient noted he was having dizziness and visual disturbances since.    Patient did see Dr. Guillory of ophthalmology on 1/28/2025.  Patient was reporting \"smoke that drifts away\".  Also noting seeing multiple objects, seeing objects that are not there, silhouettes of people, 3 arms.  No ophthalmology cause identified.    Today patient presents with his spouse.  Reports to me improvement since the onset of his stroke symptoms.  He does confirm he had the angiogram through [place] at the end of January.  He noted a few days afterwards he had symptoms of of dizziness and vision changes.  Initially they thought this related to effects of anesthesia.  However failed to improve.  The vision changes he described as \"images in my left eye\".  He does note seeing multiple of objects such as 3 arms or that he may have seen someone walk by.  He notes all of his vision issues affect the left eye and in the peripheral.     He does report a fall this week reportedly he was walking to his car which was parked on incline.  He went open his door to sit in and lost his balance.  He did strike the right front of his face.  He did see his ophthalmology due to bruising around the eye.  They noted no injury.    Denies any family history of stroke.    Patient Active Problem List   Diagnosis    Abdominal aortic ectasia    Vitamin D deficiency    Mixed hyperlipidemia    Acquired hypothyroidism    Gastro-esophageal reflux disease with esophagitis    Primary hypertension    Former smoker    Paroxysmal atrial fibrillation    Current use of long term anticoagulation    History of DVT (deep vein thrombosis)    Cervical spondylosis with myelopathy    Spinal stenosis, lumbar region with neurogenic claudication    Impaired glucose tolerance    Dural arteriovenous fistula of spinal cord    Neurogenic bladder    Self-catheterizes urinary bladder    Altered bowel function    Neuropathy involving both lower extremities    Vitreous degeneration    " Retinal drusen    Pseudophakia    Benign neoplasm of choroid    Age-related nuclear cataract, left eye    Lung nodules    History of stroke        Past Medical History:   Diagnosis Date    Arthritis     Atrial fibrillation 08/31/2021    Cataracts, bilateral     Disease of thyroid gland     Diverticulitis     GERD (gastroesophageal reflux disease)     Hyperlipidemia     Hypertension     Low back pain     Mononucleosis 1964        Social History     Socioeconomic History    Marital status:    Tobacco Use    Smoking status: Every Day     Current packs/day: 0.50     Average packs/day: 2.0 packs/day for 58.3 years (115.0 ttl pk-yrs)     Types: Cigarettes     Start date: 1967     Passive exposure: Current    Smokeless tobacco: Never    Tobacco comments:     down to 1/2 pack a day 1/14/25   Vaping Use    Vaping status: Never Used   Substance and Sexual Activity    Alcohol use: Yes     Comment: rare    Drug use: Never    Sexual activity: Defer        Allergies   Allergen Reactions    Penicillins Hives    Sulfa Antibiotics Hives, Itching and Rash    Sulfamethoxazole-Trimethoprim Hives, Itching and Rash    Diphenhydramine Mental Status Change     hiper          Current Outpatient Medications:     apixaban (Eliquis) 5 MG tablet tablet, Take 1 tablet by mouth 2 (Two) Times a Day., Disp: 180 tablet, Rfl: 1    aspirin 81 MG EC tablet, Take 1 tablet by mouth Daily., Disp: 90 tablet, Rfl: 3    azelastine (ASTELIN) 0.1 % nasal spray, Administer 2 sprays into the nostril(s) as directed by provider 2 (Two) Times a Day. Use in each nostril as directed, Disp: 30 mL, Rfl: 11    bisoprolol (ZEBeta) 10 MG tablet, Take 1 tablet by mouth Daily., Disp: 90 tablet, Rfl: 1    Cholecalciferol 100 MCG (4000 UT) capsule, Take 4,000 Units by mouth Daily. Indications: Vitamin D Deficiency, Disp: , Rfl:     coenzyme Q10 100 MG capsule, Take 1 capsule by mouth Daily. Indications: Heart Rhythm Disorder, Disp: , Rfl:     Cyanocobalamin 2500 MCG  sublingual tablet, Take 2,500 mcg by mouth Daily. Indications: Inadequate Vitamin B12, Disp: , Rfl:     fexofenadine (ALLEGRA) 180 MG tablet, Take 1 tablet by mouth Daily. Indications: Hayfever, Disp: , Rfl:     fluticasone (FLONASE) 50 MCG/ACT nasal spray, 1 spray into the nostril(s) as directed by provider Daily. Indications: Stuffy Nose, Disp: 32 g, Rfl: 3    HYDROcodone-acetaminophen (NORCO)  MG per tablet, Take 1 tablet by mouth Daily As Needed., Disp: , Rfl:     ibuprofen (ADVIL,MOTRIN) 200 MG tablet, Take 1 tablet by mouth Every 6 (Six) Hours As Needed for Mild Pain., Disp: , Rfl:     levothyroxine (SYNTHROID, LEVOTHROID) 125 MCG tablet, Take 1 tablet by mouth Every Morning., Disp: 90 tablet, Rfl: 0    losartan (Cozaar) 100 MG tablet, Take 1 tablet by mouth Daily., Disp: 90 tablet, Rfl: 1    omeprazole (priLOSEC) 20 MG capsule, Take 1 capsule by mouth Daily., Disp: 90 capsule, Rfl: 3    polyethylene glycol (MiraLax) 17 GM/SCOOP powder, Take 17 g by mouth As Needed., Disp: , Rfl:     pramipexole (MIRAPEX) 0.5 MG tablet, Take 1 tablet by mouth Every Night., Disp: 90 tablet, Rfl: 1    rosuvastatin (CRESTOR) 40 MG tablet, Take 1 tablet by mouth Daily., Disp: 90 tablet, Rfl: 1    terazosin (HYTRIN) 2 MG capsule, , Disp: , Rfl:     Zinc 50 MG capsule, Take 1 capsule by mouth Daily. Indications: Zinc Deficiency, Disp: , Rfl:     hydroCHLOROthiazide 25 MG tablet, Take 1 tablet by mouth Daily. (Patient not taking: Reported on 4/16/2025), Disp: 30 tablet, Rfl: 3    Magnesium 250 MG tablet, Take  by mouth. (Patient not taking: Reported on 4/16/2025), Disp: , Rfl:     NON FORMULARY, Apply 1 Units topically to the appropriate area as directed 2 (Two) Times a Day. (Patient not taking: Reported on 4/16/2025), Disp: , Rfl:     tamsulosin (FLOMAX) 0.4 MG capsule 24 hr capsule, Take 1 capsule by mouth Every Night for 360 days. (Patient not taking: Reported on 4/16/2025), Disp: 90 capsule, Rfl: 3       Objective   Vital  "Signs:   /80   Pulse 63   Ht 180.3 cm (70.98\")   Wt 73 kg (161 lb)   SpO2 97%   BMI 22.47 kg/m²       Physical Exam  Vitals and nursing note reviewed.   Constitutional:       Appearance: Normal appearance.   HENT:      Head: Normocephalic.   Eyes:      General: Lids are normal.      Extraocular Movements: Extraocular movements intact.      Conjunctiva/sclera:      Right eye: Hemorrhage present.      Pupils: Pupils are equal, round, and reactive to light.        Comments: Bruising around right eye   Neck:      Vascular: No carotid bruit.   Cardiovascular:      Rate and Rhythm: Normal rate and regular rhythm.      Heart sounds: Normal heart sounds.   Pulmonary:      Effort: Pulmonary effort is normal. No respiratory distress.   Skin:     General: Skin is warm and dry.   Neurological:      Mental Status: He is alert.      Motor: Motor strength is normal.     Deep Tendon Reflexes: Reflexes are normal and symmetric.   Psychiatric:         Speech: Speech normal.        Neurological Exam  Mental Status  Alert. Oriented to person, place, time and situation. Speech is normal. Language is fluent with no aphasia.    Cranial Nerves  CN II: Vision test: Bruising around right eye. Left inferior quadrantanopsia.  CN III, IV, VI: Extraocular movements intact bilaterally. Normal lids and orbits bilaterally. Pupils equal round and reactive to light bilaterally.  CN V: Facial sensation is normal.  CN VII: Full and symmetric facial movement.  CN IX, X: Palate elevates symmetrically. Normal gag reflex.  CN XI: Shoulder shrug strength is normal.  CN XII: Tongue midline without atrophy or fasciculations.    Motor  Normal muscle bulk throughout. No fasciculations present. Normal muscle tone. No abnormal involuntary movements. Strength is 5/5 throughout all four extremities.    Sensory  Light touch is normal in upper and lower extremities.     Reflexes  Deep tendon reflexes are 2+ and symmetric in all four " extremities.    Gait  Casual gait is normal including stance, stride, and arm swing.      Result Review :   The following data was reviewed by: MARY Matute on 04/16/2025:  CMP          1/14/2025    08:55 2/10/2025    16:36 2/20/2025    08:42   CMP   Glucose 123  91     BUN 23  23     Creatinine 1.23  0.98  1.60    EGFR 59.3  78.0     Sodium 144  142     Potassium 4.0  3.7     Chloride 106  105     Calcium 9.2  9.5     Total Protein 7.2  7.1     Albumin 4.1  4.2     Globulin 3.1  2.9     Total Bilirubin 0.4  0.2     Alkaline Phosphatase 64  66     AST (SGOT) 21  30     ALT (SGPT) 15  23     Albumin/Globulin Ratio 1.3  1.4     BUN/Creatinine Ratio 18.7  23.5     Anion Gap 14.0  10.0       CBC w/diff          1/14/2025    08:55 2/10/2025    16:36   CBC w/Diff   WBC 9.13  5.86    RBC 4.94  4.58    Hemoglobin 12.3  11.1    Hematocrit 40.4  36.0    MCV 81.8  78.6    MCH 24.9  24.2    MCHC 30.4  30.8    RDW 18.7  18.6    Platelets 221  153    Neutrophil Rel %  71.8    Immature Granulocyte Rel %  0.2    Lymphocyte Rel %  18.4    Monocyte Rel %  8.4    Eosinophil Rel %  1.0    Basophil Rel %  0.2      Lipid Panel          1/14/2025    08:55   Lipid Panel   Total Cholesterol 101    Triglycerides 113    HDL Cholesterol 45    VLDL Cholesterol 21    LDL Cholesterol  35    LDL/HDL Ratio 0.74      TSH          1/14/2025    08:55 2/10/2025    16:36   TSH   TSH 6.070  0.957      A1C Last 3 Results          1/14/2025    08:55   HGBA1C Last 3 Results   Hemoglobin A1C 6.10          MRI Brain With & Without Contrast (02/20/2025 09:20)     Study Result    Narrative & Impression   MRI BRAIN W WO CONTRAST- 2/20/2025 7:52 AM     HISTORY: ataxia, dizziness, blurred vision, delayed cognition, post  procedure; R42-Dizziness and giddiness; H53.8-Other visual disturbances;  R27.0-Ataxia, unspecified; R41.89-Other symptoms and signs involving  cognitive functions and awareness     COMPARISON: None.       TECHNIQUE: Multiplanar imaging  of the brain was performed in a routine  fashion before and after the intravenous injection of gadolinium  contrast.     FINDINGS:  Diffusion: Gyriform increased diffusion signal is noted within the right  occipital lobe. There is associated increased signal on the ADC mapping  suggesting this represents T2 shine through. No additional sites of  abnormal diffusion restriction present.     Midline structures: Nondisplaced. An empty sella is present.     Ventricles: Normal in configuration and symmetric in size. There is mild  atrophy of the brain with prominence of the subarachnoid spaces and  ventricular enlargement.     Masses: No masses or mass effect.     Basilar cisterns: Maintained.     Extra axial space: No abnormal extra-axial fluid.     Gray-white matter signal: Scattered foci of abnormal T2 signal are noted  involving the periventricular and higher white matter tracts suggesting  moderate small vessel ischemic disease. There is evidence of a previous  right PCA territory infarct in the occipital lobe with encephalomalacia  and gliosis. There was associated cortical laminar necrosis with  abnormal gyriform high signal on T1 imaging. No associated abnormal  enhancement.     Cerebellum: Normal.     Brainstem: Normal.     Enhancement: No abnormal enhancement.     Other: Proximal cervical spinal cord is normal. Bilateral globes and  orbits are normal in appearance. Normal cerebrovascular flow voids  noted. No abnormal signal in the mastoid air cells or paranasal sinuses.     IMPRESSION:  1. Evidence of a previous infarct involving the right posterior cerebral  artery distribution with associated cortical laminar necrosis. There is  increased signal on diffusion-weighted imaging in this distribution with  associated increased signal on ADC mapping suggesting T2 shine through.  Linear gyriform increased signal on T1 sequencing in this infarct bed  suggest previous cortical laminar necrosis.  2. No additional sites  of diffusion restriction to suggest acute  ischemia or infarct. No mass effect or shift of the midline.  3. Atrophy with moderate small vessel ischemic disease.           This report was signed and finalized on 2025 9:40 AM by Dr. Hector Worrell MD.       Holter Monitor - 72 Hour Up To 15 Days (2022 11:29)     Interpretation Summary    There are episodes of atrial fibrillation with a burden of less than 1%. Average heart rate while in atrial fibrillation was 89 bpm.  There were frequent PACs with burden of 7.5%.  There were occasional PVCs with a burden of 1.1%.  There is a 5 beat run of nonsustained VT.  The patient did not report any symptoms.    Progress Notes by Ruchi Heart, MARY (02/10/2025 15:15)     Progress Notes  - documented in this encounter   Mynor Barros MD - 2025 9:00 AM EST  Formatting of this note might be different from the original.  [facility] NOTE    Patient: Sonu Bravo Jr.  Age: 79 y.o.  Sex: male  : 1945  MRN: [id number]  Visit Type: Follow-up    No chief complaint on file.    History of Present Illness:  HPI Patient with history of left T12 spinal DAVF s/p partial embolization 23 and surgical ligation 12/3/23 who presents for Telehealth visit to discuss dizziness and visual disturbances following recent long-term follow-up craniospinal angiogram 25.    Past Medical, Social and Family History:  The following portions of the chart were reviewed this encounter and updated as appropriate:    Review of Systems: Review of Systems    Vitals:  There were no vitals filed for this visit.    Physical Exam: Not performed due to Telehealth visit.    Data: NSG Imaging  Diagnostic craniospinal angiogram 25:  1. The previously ligated left T12 spinal dural arteriovenous fistula remains completely obliterated without residual arteriovenous shunting.  2. The previously coiled left T12 segmental artery distal main trunk remains  completely occluded.  3. No evidence of an intracranial vascular abnormality.    Visit Diagnoses:  No diagnosis found.  Assessment, Management and Plan:  A and P  Sonu Bravo Jr is an 79 year-old male with a history of 2-level ACDF in October 2022 and lumbar fusion in January 2023 by Dr. Cifuentes at [facility] who presented with progressively worsening BLE paresthesias, difficulty ambulating, and urinary retention since July 2023. 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 He underwent an initial diagnostic spinal angiogram 11/29/23, which revealed a left T12 spinal DAVF, and the DAVF was partially embolized with coiling of the left T12 segmental artery distal main trunk. The patient underwent surgical DAVF ligation 14 monhts ago 12/3/23, and postoperative diagnostic spinal angiography showed complete DAVF obliteration. He recently underwent routine long-term interval angiography at 13.5 months follow-up 1/20/25, which showed the angiographic findings detailed above. He has had dizziness and visual disturbances following the angiogram, and a recent ophthalmological evaluation showed no new visual deficits.    Plan:  Brain MRI to evaluate for acute/subacute ischemia.    Discussions/Consents  I counseled the patient regarding his symptoms. Although he sounds neurologically non-focal, I would like to rule out any strokes following the recent craniospinal angiogram with a brain MRI. Depending on the results of the brain MRI, I may perform additional clinical follow-up for further evaluation. If the brain MRI is negative for acute/subacute ischemia, I recommended a medical evaluation by his PCP for possible other causes of his symptoms.    Mynor Barros MD  [facility]  2/26/2025  Electronically signed by Mynor Barros MD at 02/27/2025 2:34 PM EST    Progress Notes by Veda Black APRN (04/10/2025 11:30)     OPHTHALMOLOGY - SCAN - PROGRESS NOTE, [facility],  1-28-25 (01/28/2025)                     Impression:  Sonu Bravo Jr. is a 79 y.o. male who presents for follow-up of stroke.  Etiology behind stroke is likely cardioembolic from atrial fibrillation in the setting of holding his anticoagulation for procedure.  The stroke affected the right occipital lobe which is fed through the right posterior cerebral artery.  I do expect to see vision changes.  I do feel his complaints of dizziness are likely related to the vision abnormalities as well as recent adjustments to blood pressure medications.  The actual location of the stroke would not typically cause dizziness as an initial symptom.  In regards to his vision abnormalities, he is reporting improvement.  They do correlate with the stroke.  Regards to workup, would recommend proceeding with a CTA of his head and neck to assess the vertebrobasilar system as well as carotid arteries for any stenosis.  I will also perform a plain CT head to ensure no signs of intracerebral hemorrhage due to the fall as well as being on anticoagulation in the setting of the stroke.  His anticoagulation was continued after the procedure.  Additionally recommend proceed with a echocardiogram to ensure no intracardiac thrombus or valvular cause of his stroke.  No need for Holter monitor as he does have a diagnosis of atrial fibrillation already.    Diagnoses and all orders for this visit:    1. History of stroke with residual effects (Primary)  -     CT Angiogram Carotids; Future  -     CT Angiogram Head; Future  -     Adult Transthoracic Echo Complete W/ Cont if Necessary Per Protocol; Future  -     CT Head Without Contrast; Future    2. Paroxysmal atrial fibrillation  -     Adult Transthoracic Echo Complete W/ Cont if Necessary Per Protocol; Future    3. Chronic anticoagulation  -     CT Head Without Contrast; Future    4. Fall, sequela  -     CT Head Without Contrast; Future    5. Left homonymous inferior quadrantanopia    6. Primary  hypertension    7. Hyperlipidemia LDL goal <70    8. At risk for falls        Plan:  CT head without contrast  CTA carotids  CTA head  Cardiac echo with bubble study  Continue apixaban 5 mg tablet, 1 tablet twice daily for stroke prevention in the setting of atrial fibrillation  Continue aspirin 81 mg daily.  Pending workup above may consider discontinuing  Continue rosuvastatin 40 mg nightly  LDL goal less than 70.  Currently at goal with 35.  A1c goal less than 6.5.  Currently at goal with 6.1.  BP goal less than 140/80.  Currently 152/80 in clinic.  BP goal may be adjusted pending CTA head and neck.  Recommend Mediterranean-style diet  Activities as tolerated fall precautions  No driving  Return to emergency room for any new stroke symptoms  Follow-up with neuro-ophthalmology as scheduled through [facility]  Follow-up with primary care as scheduled  Follow-up in my clinic 3 months or sooner if needed    The patient and I have discussed the plan of care and he is in full agreement at this time.   I spent a total of 60 minutes on this encounter today.  This includes reviewing prior records, obtaining a thorough HPI, assessment of patient, developing a plan of care with the patient and his spouse, patient spouse discussion, patient spouse education as well as documentation.  I spent greater than 25 minutes with the patient face-to-face on this encounter today.  Follow Up   Return in about 3 months (around 7/16/2025), or if symptoms worsen or fail to improve, for Stroke/TIA.            Idania Aguilera, MARY  04/16/25  09:51 CDT

## 2025-04-21 NOTE — PROGRESS NOTES
"Chief Complaint  6 month follow up, RLS    Subjective        Sonu Micki Bravo Jr. is a 79 y.o. male who presents today for evaluation of the above problems.    History of Present Illness  The patient is an 79-year-old male who presents for a follow-up visit.    Persistent dizziness is reported, fluctuating in severity. Neurology was consulted recently, and several tests were ordered, including CTA of head and neck which are still awaiting scheduling. Denies any worsening or new symptoms related to the dizziness.      Mirapex 0.5 mg two tablets has been taken for the past two nights, as advised by the neurologist, with no exacerbation of symptoms or leg spasms. Was having worsening leg cramps but says two tablets significantly decrease the pain so he is inquiring about doseage increase.     A recent fall occurred due to windy conditions causing the car door to move unexpectedly, resulting in a loss of balance. The fall caused damage to his glasses and some redness of his right eye, which has since resolved. A follow-up appointment with the ophthalmologist is scheduled.    In February 2025, kidney function and routine labs were performed, showing overall normal results but mild anemia. Rechecking labs to ensure anemia has not worsened is planned as well as iron which could be a cause for worsening leg cramps. He is also due for repeat TSH.     Review of Systems - History obtained from the patient  General ROS: negative  Respiratory ROS: no cough, shortness of breath, or wheezing  Cardiovascular ROS: no chest pain or dyspnea on exertion    Objective   Vital Signs:  /76 (BP Location: Left arm, Patient Position: Sitting, Cuff Size: Other (Comment))   Pulse 61   Temp 97.9 °F (36.6 °C) (Temporal)   Resp 16   Ht 180.3 cm (70.98\")   Wt 75.3 kg (166 lb)   SpO2 99%   BMI 23.17 kg/m²   Estimated body mass index is 23.17 kg/m² as calculated from the following:    Height as of this encounter: 180.3 cm (70.98\").    " Weight as of this encounter: 75.3 kg (166 lb).           Physical Exam  Vitals and nursing note reviewed.   Constitutional:       Appearance: Normal appearance. He is normal weight.   HENT:      Mouth/Throat:      Mouth: Mucous membranes are moist.   Cardiovascular:      Rate and Rhythm: Normal rate and regular rhythm.      Pulses: Normal pulses.      Heart sounds: Normal heart sounds. No murmur heard.     No friction rub. No gallop.   Pulmonary:      Effort: Pulmonary effort is normal. No respiratory distress.      Breath sounds: Normal breath sounds. No wheezing.   Musculoskeletal:         General: Normal range of motion.      Cervical back: Normal range of motion and neck supple.      Right lower leg: No edema.      Left lower leg: Edema present.   Skin:     General: Skin is warm and dry.      Capillary Refill: Capillary refill takes less than 2 seconds.   Neurological:      General: No focal deficit present.      Mental Status: He is alert and oriented to person, place, and time.   Psychiatric:         Mood and Affect: Mood normal.         Behavior: Behavior normal.         Thought Content: Thought content normal.         Judgment: Judgment normal.          Result Review :  The following data was reviewed by: MARY Chase on 04/22/2025:  Common labs          1/14/2025    08:55 2/10/2025    16:36 2/20/2025    08:42   Common Labs   Glucose 123  91     BUN 23  23     Creatinine 1.23  0.98  1.60    Sodium 144  142     Potassium 4.0  3.7     Chloride 106  105     Calcium 9.2  9.5     Albumin 4.1  4.2     Total Bilirubin 0.4  0.2     Alkaline Phosphatase 64  66     AST (SGOT) 21  30     ALT (SGPT) 15  23     WBC 9.13  5.86     Hemoglobin 12.3  11.1     Hematocrit 40.4  36.0     Platelets 221  153     Total Cholesterol 101      Triglycerides 113      HDL Cholesterol 45      LDL Cholesterol  35      Hemoglobin A1C 6.10        Data reviewed : Consultant notes        Office Visit with Idania Aguilera APRN  (04/16/2025)        Assessment and Plan   Diagnoses and all orders for this visit:    1. Primary hypertension (Primary)  Assessment & Plan:  Hypertension is stable and controlled  Continue current treatment regimen.  Blood pressure will be reassessed in 6 months  Meets blood pressure goal of less than 150/90 per JNC 8 guidelines.      2. Acquired hypothyroidism  -     TSH; Future    3. Anemia due to other cause, not classified  -     CBC (No Diff); Future  -     Comprehensive Metabolic Panel; Future  -     Iron and TIBC; Future  - Found to be slightly anemic in 02/2025.  - Labs will be drawn today to recheck anemia status and assess thyroid function.  - Previous kidney function and routine tests in 02/2025 were normal.  - Results of today's labs will be reviewed to ensure anemia has not worsened    4. Other abnormality of red blood cells  -     Iron and TIBC; Future    5. RLS (restless legs syndrome)  Comments:  Stable.  Mirapex refilled.  Reassess next appointment  Orders:  -     pramipexole (MIRAPEX) 1 MG tablet; Take 1 tablet by mouth Every Night.  Dispense: 90 tablet; Refill: 1  - Dosage of Mirapex will be increased to 1 mg, and a prescription has been sent to his mail-order pharmacy. Further adjustments will be considered if the current dosage proves ineffective.    6. Dizziness  - Reports dizziness is still present and fluctuates, with some days being better than others.  - Neurology has ordered tests, including CAT scans and an angiogram, to check the carotids and head.    7. Fall  - Experienced a fall recently due to windy conditions causing his car door to move unexpectedly, resulting in a loss of balance.  - Fall resulted in damage to his glasses and some redness in his pupils, but these issues have since resolved.  - Scheduled follow-up appointment with ophthalmologist to monitor eye health.  - No additional injuries reported; lungs sound clear on examination.     I spent 30 minutes caring for Sonu on  this date of service. This time includes time spent by me in the following activities:preparing for the visit, reviewing tests, obtaining and/or reviewing a separately obtained history, performing a medically appropriate examination and/or evaluation , counseling and educating the patient/family/caregiver, ordering medications, tests, or procedures, documenting information in the medical record, and independently interpreting results and communicating that information with the patient/family/caregiver  Follow Up   Return in about 6 months (around 10/22/2025) for Medicare Wellness.  Patient was given instructions and counseling regarding his condition or for health maintenance advice. Please see specific information pulled into the AVS if appropriate.     I will be providing care over continum for this patient including management of both chronic and acute medical conditions     Patient or patient representative verbalized consent for the use of Ambient Listening during the visit with  MARY Chase for chart documentation. 4/22/2025  10:36 CDT

## 2025-04-22 ENCOUNTER — OFFICE VISIT (OUTPATIENT)
Dept: INTERNAL MEDICINE | Facility: CLINIC | Age: 80
End: 2025-04-22
Payer: MEDICARE

## 2025-04-22 ENCOUNTER — LAB (OUTPATIENT)
Dept: LAB | Facility: HOSPITAL | Age: 80
End: 2025-04-22
Payer: MEDICARE

## 2025-04-22 VITALS
TEMPERATURE: 97.9 F | HEIGHT: 71 IN | HEART RATE: 61 BPM | SYSTOLIC BLOOD PRESSURE: 125 MMHG | RESPIRATION RATE: 16 BRPM | WEIGHT: 166 LBS | BODY MASS INDEX: 23.24 KG/M2 | OXYGEN SATURATION: 99 % | DIASTOLIC BLOOD PRESSURE: 76 MMHG

## 2025-04-22 DIAGNOSIS — R71.8 OTHER ABNORMALITY OF RED BLOOD CELLS: ICD-10-CM
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
## 2025-04-22 DIAGNOSIS — E03.9 ACQUIRED HYPOTHYROIDISM: ICD-10-CM

## 2025-04-22 DIAGNOSIS — D64.89 ANEMIA DUE TO OTHER CAUSE, NOT CLASSIFIED: ICD-10-CM

## 2025-04-22 DIAGNOSIS — I10 PRIMARY HYPERTENSION: Primary | ICD-10-CM

## 2025-04-22 DIAGNOSIS — G25.81 RLS (RESTLESS LEGS SYNDROME): ICD-10-CM

## 2025-04-22 DIAGNOSIS — R42 DIZZINESS: ICD-10-CM

## 2025-04-22 LAB
ALBUMIN SERPL-MCNC: 3.7 G/DL (ref 3.5–5.2)
ALBUMIN/GLOB SERPL: 1.3 G/DL
ALP SERPL-CCNC: 60 U/L (ref 39–117)
ALT SERPL W P-5'-P-CCNC: 17 U/L (ref 1–41)
ANION GAP SERPL CALCULATED.3IONS-SCNC: 11 MMOL/L (ref 5–15)
AST SERPL-CCNC: 17 U/L (ref 1–40)
BILIRUB SERPL-MCNC: 0.3 MG/DL (ref 0–1.2)
BUN SERPL-MCNC: 25 MG/DL (ref 8–23)
BUN/CREAT SERPL: 18.8 (ref 7–25)
CALCIUM SPEC-SCNC: 9.1 MG/DL (ref 8.6–10.5)
CHLORIDE SERPL-SCNC: 104 MMOL/L (ref 98–107)
CO2 SERPL-SCNC: 25 MMOL/L (ref 22–29)
CREAT SERPL-MCNC: 1.33 MG/DL (ref 0.76–1.27)
DEPRECATED RDW RBC AUTO: 62.1 FL (ref 37–54)
EGFRCR SERPLBLD CKD-EPI 2021: 54 ML/MIN/1.73
ERYTHROCYTE [DISTWIDTH] IN BLOOD BY AUTOMATED COUNT: 21.8 % (ref 12.3–15.4)
GLOBULIN UR ELPH-MCNC: 2.8 GM/DL
GLUCOSE SERPL-MCNC: 109 MG/DL (ref 65–99)
HCT VFR BLD AUTO: 37.6 % (ref 37.5–51)
HGB BLD-MCNC: 11.4 G/DL (ref 13–17.7)
IRON 24H UR-MRATE: 30 MCG/DL (ref 59–158)
IRON SATN MFR SERPL: 8 % (ref 20–50)
MCH RBC QN AUTO: 23.9 PG (ref 26.6–33)
MCHC RBC AUTO-ENTMCNC: 30.3 G/DL (ref 31.5–35.7)
MCV RBC AUTO: 79 FL (ref 79–97)
PLATELET # BLD AUTO: 221 10*3/MM3 (ref 140–450)
PMV BLD AUTO: 9.2 FL (ref 6–12)
POTASSIUM SERPL-SCNC: 4.1 MMOL/L (ref 3.5–5.2)
PROT SERPL-MCNC: 6.5 G/DL (ref 6–8.5)
RBC # BLD AUTO: 4.76 10*6/MM3 (ref 4.14–5.8)
SODIUM SERPL-SCNC: 140 MMOL/L (ref 136–145)
TIBC SERPL-MCNC: 399 MCG/DL (ref 298–536)
TRANSFERRIN SERPL-MCNC: 268 MG/DL (ref 200–360)
TSH SERPL DL<=0.05 MIU/L-ACNC: 0.66 UIU/ML (ref 0.27–4.2)
WBC NRBC COR # BLD AUTO: 6.53 10*3/MM3 (ref 3.4–10.8)

## 2025-04-22 PROCEDURE — 36415 COLL VENOUS BLD VENIPUNCTURE: CPT

## 2025-04-22 PROCEDURE — 83540 ASSAY OF IRON: CPT

## 2025-04-22 PROCEDURE — 84466 ASSAY OF TRANSFERRIN: CPT

## 2025-04-22 PROCEDURE — 80053 COMPREHEN METABOLIC PANEL: CPT

## 2025-04-22 PROCEDURE — 85027 COMPLETE CBC AUTOMATED: CPT

## 2025-04-22 PROCEDURE — 84443 ASSAY THYROID STIM HORMONE: CPT

## 2025-04-22 RX ORDER — PRAMIPEXOLE DIHYDROCHLORIDE 1 MG/1
1 TABLET ORAL NIGHTLY
Qty: 90 TABLET | Refills: 1 | Status: SHIPPED | OUTPATIENT
Start: 2025-04-22

## 2025-05-01 ENCOUNTER — TELEPHONE (OUTPATIENT)
Dept: CASE MANAGEMENT | Facility: OTHER | Age: 80
End: 2025-05-01
Payer: MEDICARE

## 2025-05-02 ENCOUNTER — TELEPHONE (OUTPATIENT)
Dept: CASE MANAGEMENT | Facility: OTHER | Age: 80
End: 2025-05-02
Payer: MEDICARE

## 2025-05-16 ENCOUNTER — TELEPHONE (OUTPATIENT)
Dept: CASE MANAGEMENT | Facility: OTHER | Age: 80
End: 2025-05-16
Payer: MEDICARE

## 2025-05-16 DIAGNOSIS — Z86.73 HISTORY OF STROKE: ICD-10-CM

## 2025-05-16 DIAGNOSIS — I10 PRIMARY HYPERTENSION: Primary | ICD-10-CM

## 2025-05-16 NOTE — TELEPHONE ENCOUNTER
GABINO CM outreach with Patient; left a voicemail requesting a return call to RAFY Abrams with [facility] at [phone].

## 2025-05-19 ENCOUNTER — HOSPITAL ENCOUNTER (OUTPATIENT)
Dept: CARDIOLOGY | Facility: HOSPITAL | Age: 80
Discharge: HOME | End: 2025-05-19
Payer: MEDICARE

## 2025-05-19 VITALS
DIASTOLIC BLOOD PRESSURE: 76 MMHG | HEIGHT: 71 IN | BODY MASS INDEX: 23.24 KG/M2 | SYSTOLIC BLOOD PRESSURE: 125 MMHG | WEIGHT: 166 LBS

## 2025-05-19 DIAGNOSIS — I69.30 HISTORY OF STROKE WITH RESIDUAL EFFECTS: ICD-10-CM

## 2025-05-19 DIAGNOSIS — I48.0 PAROXYSMAL ATRIAL FIBRILLATION: ICD-10-CM

## 2025-05-19 PROCEDURE — 93306 TTE W/DOPPLER COMPLETE: CPT

## 2025-05-20 ENCOUNTER — HOSPITAL ENCOUNTER (OUTPATIENT)
Dept: CT IMAGING | Facility: HOSPITAL | Age: 80
Discharge: HOME | End: 2025-05-20
Payer: MEDICARE

## 2025-05-20 DIAGNOSIS — I69.30 HISTORY OF STROKE WITH RESIDUAL EFFECTS: ICD-10-CM

## 2025-05-20 LAB
AORTIC DIMENSIONLESS INDEX: 0.62 (DI)
AV MEAN PRESS GRAD SYS DOP V1V2: 2.41 MMHG
AV VMAX SYS DOP: 102.8 CM/SEC
BH CV ECHO MEAS - AI P1/2T: 844.6 MSEC
BH CV ECHO MEAS - AO MAX PG: 4.2 MMHG
BH CV ECHO MEAS - AO ROOT DIAM: 3.1 CM
BH CV ECHO MEAS - AO V2 VTI: 27.1 CM
BH CV ECHO MEAS - AVA(I,D): 2.08 CM2
BH CV ECHO MEAS - EDV(CUBED): 94.5 ML
BH CV ECHO MEAS - EDV(MOD-SP2): 73.6 ML
BH CV ECHO MEAS - EDV(MOD-SP4): 89.5 ML
BH CV ECHO MEAS - EF(MOD-SP2): 68.8 %
BH CV ECHO MEAS - EF(MOD-SP4): 66.6 %
BH CV ECHO MEAS - ESV(CUBED): 36.1 ML
BH CV ECHO MEAS - ESV(MOD-SP2): 23 ML
BH CV ECHO MEAS - ESV(MOD-SP4): 29.9 ML
BH CV ECHO MEAS - FS: 27.5 %
BH CV ECHO MEAS - IVS/LVPW: 1.11 CM
BH CV ECHO MEAS - IVSD: 1.27 CM
BH CV ECHO MEAS - LA DIMENSION: 4 CM
BH CV ECHO MEAS - LAT PEAK E' VEL: 11.3 CM/SEC
BH CV ECHO MEAS - LV DIASTOLIC VOL/BSA (35-75): 46.2 CM2
BH CV ECHO MEAS - LV MASS(C)D: 203.5 GRAMS
BH CV ECHO MEAS - LV MAX PG: 1.99 MMHG
BH CV ECHO MEAS - LV MEAN PG: 1.05 MMHG
BH CV ECHO MEAS - LV SYSTOLIC VOL/BSA (12-30): 15.4 CM2
BH CV ECHO MEAS - LV V1 MAX: 70.5 CM/SEC
BH CV ECHO MEAS - LV V1 VTI: 16.9 CM
BH CV ECHO MEAS - LVIDD: 4.6 CM
BH CV ECHO MEAS - LVIDS: 3.3 CM
BH CV ECHO MEAS - LVOT AREA: 3.3 CM2
BH CV ECHO MEAS - LVOT DIAM: 2.06 CM
BH CV ECHO MEAS - LVPWD: 1.14 CM
BH CV ECHO MEAS - MED PEAK E' VEL: 7 CM/SEC
BH CV ECHO MEAS - MR MAX PG: 107.9 MMHG
BH CV ECHO MEAS - MR MAX VEL: 519.4 CM/SEC
BH CV ECHO MEAS - MR MEAN PG: 78.3 MMHG
BH CV ECHO MEAS - MR MEAN VEL: 422.8 CM/SEC
BH CV ECHO MEAS - MR VTI: 228.5 CM
BH CV ECHO MEAS - MV A MAX VEL: 44 CM/SEC
BH CV ECHO MEAS - MV DEC SLOPE: 305.5 CM/SEC2
BH CV ECHO MEAS - MV DEC TIME: 0.24 SEC
BH CV ECHO MEAS - MV E MAX VEL: 72.1 CM/SEC
BH CV ECHO MEAS - MV E/A: 1.64
BH CV ECHO MEAS - MV MAX PG: 2.7 MMHG
BH CV ECHO MEAS - MV MEAN PG: 0.82 MMHG
BH CV ECHO MEAS - MV V2 VTI: 30.3 CM
BH CV ECHO MEAS - MVA(VTI): 1.86 CM2
BH CV ECHO MEAS - PA V2 MAX: 72.1 CM/SEC
BH CV ECHO MEAS - RAP SYSTOLE: 8 MMHG
BH CV ECHO MEAS - RVSP: 15.9 MMHG
BH CV ECHO MEAS - SV(LVOT): 56.2 ML
BH CV ECHO MEAS - SV(MOD-SP2): 50.7 ML
BH CV ECHO MEAS - SV(MOD-SP4): 59.6 ML
BH CV ECHO MEAS - SVI(LVOT): 29 ML/M2
BH CV ECHO MEAS - SVI(MOD-SP2): 26.1 ML/M2
BH CV ECHO MEAS - SVI(MOD-SP4): 30.7 ML/M2
BH CV ECHO MEAS - TAPSE (>1.6): 2.7 CM
BH CV ECHO MEAS - TR MAX PG: 7.9 MMHG
BH CV ECHO MEAS - TR MAX VEL: 140.4 CM/SEC
BH CV ECHO MEASUREMENTS AVERAGE E/E' RATIO: 7.88
LEFT ATRIUM VOLUME INDEX: 47 ML/M2
LEFT ATRIUM VOLUME: 91 ML

## 2025-05-20 PROCEDURE — 25510000001 IOPAMIDOL PER 1 ML

## 2025-05-20 PROCEDURE — 70498 CT ANGIOGRAPHY NECK: CPT

## 2025-05-20 PROCEDURE — 70496 CT ANGIOGRAPHY HEAD: CPT

## 2025-05-20 RX ORDER — IOPAMIDOL 755 MG/ML
100 INJECTION, SOLUTION INTRAVASCULAR
Status: COMPLETED | OUTPATIENT
Start: 2025-05-20 | End: 2025-05-20

## 2025-05-20 RX ADMIN — IOPAMIDOL 100 ML: 755 INJECTION, SOLUTION INTRAVENOUS at 14:36

## 2025-05-27 NOTE — PROGRESS NOTES
[facility] - PODIATRY    Today's Date: 06/02/2025     Patient Name: Sonu Bravo Jr.  MRN: [id number]  CSN: [id number]  PCP: Vaughn Kramer DO  Referring Provider: No ref. provider found    SUBJECTIVE     Chief Complaint   Patient presents with    Follow-up     Vaughn Kramer DO 04/22/25  9 weeks for Foot Care Clinic   Pt here for nail/foot care. Pt states has neuropathy.      HPI: Sonu Bravo Jr., a 79 y.o.male, comes to clinic as a(n) established patient complaining of toenail/callus issues. Patient has h/o arthritis, AFib, GERD, HLD, HTN. Patient is non-diabetic.  Patient presents with thick, dystrophic nails. Patient reports that he has had significant improvement in callusing with use of toe crest regularly.  Callus noted to left 3rd toe.  Patient reports that he has been going to a nail salon in between visits at podiatry.  Patient also reports he has attempted to trim his own nails and caused multiple injuries to his toes.  Patient reports neuropathy that is caused by low back issues.  Reports he has had multiple back surgeries in the past. Reports numbness and tingling.  Denies any open sores or wounds today.  Using cane for ambulation assistance.  Patient reports that he is moving out of the area and will not need a follow-up appointment.  Denies pain. Relates previous treatment(s) including PT. Denies any constitutional symptoms. No other pedal complaints at this time.    Past Medical History:   Diagnosis Date    Arthritis     Atrial fibrillation 08/31/2021    Cataracts, bilateral     Disease of thyroid gland     Diverticulitis     GERD (gastroesophageal reflux disease)     Hyperlipidemia     Hypertension     Low back pain     Mononucleosis 1964     Past Surgical History:   Procedure Laterality Date    ANTERIOR CERVICAL DISCECTOMY W/ FUSION N/A 10/11/2022    Procedure: CERVICAL DISCECTOMY ANTERIOR WITH FUSION, Cervical 4/5 and 5/6;  Surgeon: Durga Cifuentes MD;   Location:  [facility] OR;  Service: Neurosurgery;  Laterality: N/A;    BACK SURGERY N/A     CATARACT EXTRACTION Right     CEREBRAL ANGIOGRAM      COLON SURGERY  01/2022    Dr Fairbanks at [facility]    COLONOSCOPY N/A 09/28/2021    Procedure: COLONOSCOPY WITH ANESTHESIA;  Surgeon: Thomas Dc DO;  Location: [facility] ENDOSCOPY;  Service: Gastroenterology;  Laterality: N/A;  pre op; abnormal ct scan  post op: diverticulosis ; polyps   PCP: Vaughn Kraemr DO    COLONOSCOPY N/A 01/23/2024    Procedure: COLONOSCOPY WITH ANESTHESIA;  Surgeon: Thomas Dc DO;  Location: [facility] ENDOSCOPY;  Service: Gastroenterology;  Laterality: N/A;  Pre: Altered bowel function;  Post: Polyps;  Vaughn Kramer DO    HERNIA REPAIR      LUMBAR DIRECT LATERAL INTERBODY FUSION N/A 01/24/2023    Procedure: LUMBAR DIRECT LATERAL INTERBODY FUSION; LUMBAR 2-5;  Surgeon: Durga Cifuentes MD;  Location:  [facility] OR;  Service: Neurosurgery;  Laterality: N/A;    LUMBAR FUSION N/A 01/24/2023    Procedure: LUMBAR DIRECT LATERAL INTERBODY FUSION; LUMBAR 2-5, LUMBAR LAMINECTOMY TRANSFORAMINAL LUMBAR INTERBODY FUSION; LUMBAR 2-5;  Surgeon: Durga Cifuentes MD;  Location: [facility] OR;  Service: Robotics - Neuro;  Laterality: N/A;    SKIN SURGERY      Skin cancer surgery.     Family History   Problem Relation Age of Onset    Lung cancer Mother     Brain cancer Mother     Heart disease Father     Hyperlipidemia Father     Hypertension Father     Prostate cancer Maternal Aunt     Colon cancer Maternal Uncle     Colon polyps Neg Hx     Esophageal cancer Neg Hx     Liver cancer Neg Hx      Social History     Socioeconomic History    Marital status:    Tobacco Use    Smoking status: Every Day     Current packs/day: 0.50     Average packs/day: 2.0 packs/day for 58.4 years (115.1 ttl pk-yrs)     Types: Cigarettes     Start date: 1967     Passive exposure: Current    Smokeless tobacco: Never    Tobacco comments:      down to 1/2 pack a day 1/14/25   Vaping Use    Vaping status: Never Used   Substance and Sexual Activity    Alcohol use: Yes     Comment: rare    Drug use: Never    Sexual activity: Defer     Allergies   Allergen Reactions    Penicillins Hives    Sulfa Antibiotics Hives, Itching and Rash    Sulfamethoxazole-Trimethoprim Hives, Itching and Rash    Diphenhydramine Mental Status Change     hiper     Current Outpatient Medications   Medication Sig Dispense Refill    apixaban (Eliquis) 5 MG tablet tablet Take 1 tablet by mouth 2 (Two) Times a Day. 180 tablet 1    aspirin 81 MG EC tablet Take 1 tablet by mouth Daily. 90 tablet 3    azelastine (ASTELIN) 0.1 % nasal spray Administer 2 sprays into the nostril(s) as directed by provider 2 (Two) Times a Day. Use in each nostril as directed 30 mL 11    bisoprolol (ZEBeta) 10 MG tablet Take 1 tablet by mouth Daily. 90 tablet 1    Cholecalciferol 100 MCG (4000 UT) capsule Take 4,000 Units by mouth Daily. Indications: Vitamin D Deficiency      coenzyme Q10 100 MG capsule Take 1 capsule by mouth Daily. Indications: Heart Rhythm Disorder      Cyanocobalamin 2500 MCG sublingual tablet Take 2,500 mcg by mouth Daily. Indications: Inadequate Vitamin B12      fexofenadine (ALLEGRA) 180 MG tablet Take 1 tablet by mouth Daily. Indications: Hayfever      fluticasone (FLONASE) 50 MCG/ACT nasal spray 1 spray into the nostril(s) as directed by provider Daily. Indications: Stuffy Nose 32 g 3    hydroCHLOROthiazide 25 MG tablet Take 1 tablet by mouth Daily. 30 tablet 3    HYDROcodone-acetaminophen (NORCO)  MG per tablet Take 1 tablet by mouth Daily As Needed.      ibuprofen (ADVIL,MOTRIN) 200 MG tablet Take 1 tablet by mouth Every 6 (Six) Hours As Needed for Mild Pain.      levothyroxine (SYNTHROID, LEVOTHROID) 125 MCG tablet Take 1 tablet by mouth Every Morning. 90 tablet 3    losartan (Cozaar) 100 MG tablet Take 1 tablet by mouth Daily. 90 tablet 1    omeprazole (priLOSEC) 20 MG capsule  Take 1 capsule by mouth Daily. 90 capsule 3    polyethylene glycol (MiraLax) 17 GM/SCOOP powder Take 17 g by mouth As Needed.      pramipexole (MIRAPEX) 1 MG tablet Take 1 tablet by mouth Every Night. 90 tablet 1    rosuvastatin (CRESTOR) 40 MG tablet Take 1 tablet by mouth Daily. 90 tablet 1    terazosin (HYTRIN) 2 MG capsule       Zinc 50 MG capsule Take 1 capsule by mouth Daily. Indications: Zinc Deficiency       No current facility-administered medications for this visit.     Review of Systems   Constitutional:  Negative for chills and fever.   HENT:  Negative for congestion.    Respiratory:  Negative for shortness of breath.    Cardiovascular:  Negative for chest pain and leg swelling.   Gastrointestinal:  Negative for constipation, diarrhea, nausea and vomiting.   Musculoskeletal:  Positive for arthralgias and gait problem.        Foot pain   Skin:  Negative for wound.   Neurological:  Positive for numbness.       OBJECTIVE     Vitals:    06/02/25 1041   BP: 118/80   Pulse: 50   SpO2: 100%       PHYSICAL EXAM  GEN:   Accompanied by none.     Foot/Ankle Exam    GENERAL  Orientation:  AAOx3  Affect:  appropriate  Gait:  shuffling  Assistance:  cane use  Right shoe gear: casual shoe  Left shoe gear: casual shoe    VASCULAR     Right Foot Vascularity   Dorsalis pedis:  2+  Posterior tibial:  2+  Skin temperature:  warm  Edema grading:  Trace  CFT:  3  Pedal hair growth:  Present  Varicosities:  mild varicosities (venous congestion to toes)     Left Foot Vascularity   Dorsalis pedis:  2+  Posterior tibial:  2+  Skin temperature:  warm  Edema grading:  Trace  CFT:  3  Pedal hair growth:  Present  Varicosities:  mild varicosities (venous congestion to toes)     NEUROLOGIC     Right Foot Neurologic   Light touch sensation: diminished  Vibratory sensation: normal  Hot/Cold sensation: normal     Left Foot Neurologic   Light touch sensation: diminished  Vibratory sensation: normal  Hot/Cold sensation:   normal    MUSCULOSKELETAL     Right Foot Musculoskeletal   Tenderness:  toe 3 tenderness    Arch:  Normal  Hammertoe:  Second toe, third toe, fourth toe and fifth toe     Left Foot Musculoskeletal   Tenderness:  toe 3 tenderness  Arch:  Normal  Hammertoe:  Second toe, third toe, fourth toe and fifth toe    MUSCLE STRENGTH     Right Foot Muscle Strength   Foot dorsiflexion:  4+  Foot plantar flexion:  5  Foot inversion:  5  Foot eversion:  5     Left Foot Muscle Strength   Foot dorsiflexion:  4+  Foot plantar flexion:  5  Foot inversion:  5  Foot eversion:  5    RANGE OF MOTION     Right Foot Range of Motion   Foot and ankle ROM within normal limits       Left Foot Range of Motion   Foot and ankle ROM within normal limits      DERMATOLOGIC      Right Foot Dermatologic   Skin  Right foot skin is intact. (discoloration to toes)  Nails  1.  Positive for elongated and abnormal thickness.  2.  Positive for elongated and abnormal thickness.  3.  Positive for elongated and abnormal thickness.  4.  Positive for elongated and abnormal thickness.  5.  Positive for elongated and abnormal thickness.     Left Foot Dermatologic   Skin  Left foot skin is intact. (discoloration to toes)   Nails  1.  Positive for elongated and abnormal thickness.  2.  Positive for elongated and abnormal thickness.  3.  Positive for elongated and abnormal thickness.  4.  Positive for elongated and abnormally thick.  5.  Positive for elongated and abnormally thick.    Image:       RADIOLOGY/NUCLEAR:    LABORATORY/CULTURE RESULTS:      PATHOLOGY RESULTS:       ASSESSMENT/PLAN     Diagnoses and all orders for this visit:    1. Thickened nails (Primary)    2. Gait abnormality    3. Neuropathy involving both lower extremities    4. Foot callus    5. Hammertoes of both feet      Comprehensive lower extremity examination and evaluation was performed.  Discussed findings and treatment plan including risks, benefits, and treatment options with patient in  detail. Patient agreed with treatment plan.  CMP reviewed.  PCP note reviewed.  Neurology note reviewed.  Pain to left toes are consistent with hammertoe deformity.  It is recommended that patient continue to use toe crest to bilateral feet.  Also discussed with patient that is important for him to establish care with a PCP and get a referral to a podiatrist once he removes from the area.  Patient may maintain callused areas by using a pumice stone or emery board after getting out of the shower.  After filing the areas down, a good moisturizer should be applied to the areas.  It is recommended that the patient continue to monitor the areas for any drainage or signs of infection.  Callused areas may turn into a wound or open sore and should be monitored closely.   After verbal consent obtained, nail(s) x10 debrided of length and thickness with nail nipper without incidence  After verbal consent obtained, calluses x1 pared utilizing dermal curette and/or scalpel without incidence  Patient may maintain nails and calluses at home utilizing emery board or pumice stone between visits as needed  Continue use of cane for ambulation assistance.  Continue use of compounded pain cream for feet.  Toe crest x 2 dispensed.   Encourage patient to call with any questions or concerns.  Also encourage patient to call and get a appointment rescheduled if he is still in the area in the next 3 months.  An After Visit Summary was printed and given to the patient at discharge, including (if requested) any available informative/educational handouts regarding diagnosis, treatment, or medications. All questions were answered to patient/family satisfaction. Should symptoms fail to improve or worsen they agree to call or return to clinic or to go to the Emergency Department. Discussed the importance of following up with any needed screening tests/labs/specialist appointments and any requested follow-up recommended by me today. Importance of  maintaining follow-up discussed and patient accepts that missed appointments can delay diagnosis and potentially lead to worsening of conditions.  Return in about 9 weeks (around 8/4/2025) for Schedule Foot Care Clinic, With Taina HERNANDEZ., or sooner if acute issues arise.    Lab Frequency Next Occurrence   Diet: Once 09/28/2021       This document has been electronically signed by MARY Gatica on June 2, 2025 12:59 CDT

## 2025-05-28 DIAGNOSIS — E03.9 ACQUIRED HYPOTHYROIDISM: ICD-10-CM

## 2025-05-28 RX ORDER — LEVOTHYROXINE SODIUM 125 UG/1
125 TABLET ORAL
Qty: 90 TABLET | Refills: 3 | Status: SHIPPED | OUTPATIENT
Start: 2025-05-28

## 2025-06-02 ENCOUNTER — OFFICE VISIT (OUTPATIENT)
Age: 80
End: 2025-06-02
Payer: MEDICARE

## 2025-06-02 VITALS
BODY MASS INDEX: 22.4 KG/M2 | OXYGEN SATURATION: 100 % | WEIGHT: 160 LBS | HEIGHT: 71 IN | SYSTOLIC BLOOD PRESSURE: 118 MMHG | DIASTOLIC BLOOD PRESSURE: 80 MMHG | HEART RATE: 50 BPM

## 2025-06-02 DIAGNOSIS — L60.2 THICKENED NAILS: Primary | ICD-10-CM

## 2025-06-02 DIAGNOSIS — M20.42 HAMMERTOES OF BOTH FEET: ICD-10-CM

## 2025-06-02 DIAGNOSIS — G25.81 RLS (RESTLESS LEGS SYNDROME): ICD-10-CM

## 2025-06-02 DIAGNOSIS — G57.93 NEUROPATHY INVOLVING BOTH LOWER EXTREMITIES: ICD-10-CM

## 2025-06-02 DIAGNOSIS — L84 FOOT CALLUS: ICD-10-CM

## 2025-06-02 DIAGNOSIS — M20.41 HAMMERTOES OF BOTH FEET: ICD-10-CM

## 2025-06-02 DIAGNOSIS — R26.9 GAIT ABNORMALITY: ICD-10-CM

## 2025-06-02 PROCEDURE — 1159F MED LIST DOCD IN RCRD: CPT | Performed by: NURSE PRACTITIONER

## 2025-06-02 PROCEDURE — 3074F SYST BP LT 130 MM HG: CPT | Performed by: NURSE PRACTITIONER

## 2025-06-02 PROCEDURE — 11721 DEBRIDE NAIL 6 OR MORE: CPT | Performed by: NURSE PRACTITIONER

## 2025-06-02 PROCEDURE — 3079F DIAST BP 80-89 MM HG: CPT | Performed by: NURSE PRACTITIONER

## 2025-06-02 PROCEDURE — 11055 PARING/CUTG B9 HYPRKER LES 1: CPT | Performed by: NURSE PRACTITIONER

## 2025-06-02 PROCEDURE — 99213 OFFICE O/P EST LOW 20 MIN: CPT | Performed by: NURSE PRACTITIONER

## 2025-06-02 PROCEDURE — 1160F RVW MEDS BY RX/DR IN RCRD: CPT | Performed by: NURSE PRACTITIONER

## 2025-06-02 RX ORDER — PRAMIPEXOLE DIHYDROCHLORIDE 0.25 MG/1
.25-.5 TABLET ORAL NIGHTLY
Qty: 30 TABLET | Refills: 0 | OUTPATIENT
Start: 2025-06-02

## (undated) DEVICE — MASK,OXYGEN,MED CONC,ADLT,7' TUB, UC: Brand: PENDING

## (undated) DEVICE — SPHR MARKR STEALTH STATION

## (undated) DEVICE — GLV SURG DERMASSURE GRN LF PF 8.0

## (undated) DEVICE — CONN FLX BREATHE CIRCT

## (undated) DEVICE — GLV SURG BIOGEL LTX PF 6 1/2

## (undated) DEVICE — SPONGE,DISSECTOR,ROUND CHERRY,XR,ST,5/PK: Brand: MEDLINE

## (undated) DEVICE — NEEDLE, QUINCKE, 18GX3.5": Brand: MEDLINE

## (undated) DEVICE — SUT MNCRYL 4/0 PS2 27IN UD MCP426H

## (undated) DEVICE — THE SINGLE USE ETRAP – POLYP TRAP IS USED FOR SUCTION RETRIEVAL OF ENDOSCOPICALLY REMOVED POLYPS.: Brand: ETRAP

## (undated) DEVICE — SNAR POLYP CAPTIVATOR MICROHEX 13 240CM

## (undated) DEVICE — GLV SURG DERMASSURE GRN LF PF 7.0

## (undated) DEVICE — DRP C/ARMOR

## (undated) DEVICE — ADHS SKIN PREMIERPRO EXOFIN TOPICAL HI/VISC .5ML

## (undated) DEVICE — PIN DISTRACT TI 14MM STRL

## (undated) DEVICE — APPL CHLORAPREP HI/LITE 26ML ORNG

## (undated) DEVICE — Device

## (undated) DEVICE — HALTR TRACT HD CERV STD UNIV

## (undated) DEVICE — PK SPINE LAT 30

## (undated) DEVICE — HANDPIECE SET WITH HIGH FLOW TIP AND SUCTION TUBE: Brand: INTERPULSE

## (undated) DEVICE — CVR UNIV C/ARM

## (undated) DEVICE — TRAP FLD MINIVAC MEGADYNE 100ML

## (undated) DEVICE — ANTIBACTERIAL UNDYED BRAIDED (POLYGLACTIN 910), SYNTHETIC ABSORBABLE SUTURE: Brand: COATED VICRYL

## (undated) DEVICE — PROXIMATE RH ROTATING HEAD SKIN STAPLERS (35 WIDE) CONTAINS 35 STAINLESS STEEL STAPLES: Brand: PROXIMATE

## (undated) DEVICE — LT SOURCE STR TP

## (undated) DEVICE — BAPTIST TURNOVER KIT: Brand: MEDLINE INDUSTRIES, INC.

## (undated) DEVICE — DRN JP FLT NO TROC SIL FUL/PERF 7MM

## (undated) DEVICE — THE CHANNEL CLEANING BRUSH IS A NYLON FLEXI BRUSH ATTACHED TO A FLEXIBLE PLASTIC SHEATH DESIGNED TO SAFELY REMOVE DEBRIS FROM FLEXIBLE ENDOSCOPES.

## (undated) DEVICE — Device: Brand: DEFENDO AIR/WATER/SUCTION AND BIOPSY VALVE

## (undated) DEVICE — CLTH CLENS READYCLEANSE PERI CARE PK/5

## (undated) DEVICE — DRSNG SURESITE WNDW 4X4.5

## (undated) DEVICE — DISSCT SECTO 1PC 1P/U 5MMX35CM STRL

## (undated) DEVICE — SENSR O2 OXIMAX FNGR A/ 18IN NONSTR

## (undated) DEVICE — PENCL ES MEGADINE EZ/CLEAN BUTN W/HOLSTR 10FT

## (undated) DEVICE — PACK,UNIVERSAL,NO GOWNS: Brand: MEDLINE

## (undated) DEVICE — PK SPINE POST 30

## (undated) DEVICE — KNIF BAYO METRX DISECT

## (undated) DEVICE — THE MILL DISPOSABLE - MEDIUM

## (undated) DEVICE — ELECTRD BLD EZ CLN MOD 6.5IN

## (undated) DEVICE — SPK10277 JACKSON/PRO-AXIS KIT: Brand: SPK10277 JACKSON/PRO-AXIS KIT

## (undated) DEVICE — DILATOR SET 945NSD2750 NIM STIMULATED

## (undated) DEVICE — RESERVOIR,SUCTION,100CC,SILICONE: Brand: MEDLINE

## (undated) DEVICE — ELECTRD BLD EZ CLN MOD XLNG 2.75IN

## (undated) DEVICE — 3.0MM PRECISION NEURO (MATCH HEAD)

## (undated) DEVICE — CATH IV ANGIO FEP 12G 3IN LTBLU 10PK

## (undated) DEVICE — YANKAUER,BULB TIP WITH VENT: Brand: ARGYLE

## (undated) DEVICE — TOOL MR8-15TA23L MR8 15CM TAPER L 2.3MM: Brand: MIDAS REX MR8

## (undated) DEVICE — PROB CADWELL 275MM

## (undated) DEVICE — SUT VIC 0 MO4 CR8 18IN VCP701D

## (undated) DEVICE — 4-PORT MANIFOLD: Brand: NEPTUNE 2

## (undated) DEVICE — SHEET,DRAPE,53X77,STERILE: Brand: MEDLINE

## (undated) DEVICE — APPL DURAPREP IODOPHOR APL 26ML

## (undated) DEVICE — DRSNG GZ CURAD XEROFORM NONADHS 5X9IN STRL

## (undated) DEVICE — TOOL MR8-14MH30 MR8 14CM MATCH 3MM: Brand: MIDAS REX MR8

## (undated) DEVICE — PK SPINE CERV ANT 30

## (undated) DEVICE — TOTAL TRAY, 16FR 10ML SIL FOLEY, URN: Brand: MEDLINE

## (undated) DEVICE — NEEDLE, QUINCKE 22GX3.5": Brand: MEDLINE INDUSTRIES, INC.

## (undated) DEVICE — GLV SURG SENSICARE W/ALOE PF LF 7.5 STRL

## (undated) DEVICE — SPONGE,DISSECTOR,K,XRAY,9/16"X1/4",STRL: Brand: MEDLINE

## (undated) DEVICE — SUREFIT, DUAL DISPERSIVE ELECTRODE, CONTACT QUALITY MONITOR: Brand: SUREFIT

## (undated) DEVICE — TRAP,MUCUS SPECIMEN, 80CC: Brand: MEDLINE

## (undated) DEVICE — DISPOSABLE DRAPE, STERILE, FOR A CDS-3072 6 FOOT TABLE: Brand: PEDIGO PRODUCTS, INC.

## (undated) DEVICE — ELECTRD BLD EZ CLN MOD 4IN

## (undated) DEVICE — TBG SMPL FLTR LINE NASL 02/C02 A/ BX/100

## (undated) DEVICE — 3M™ STERI-DRAPE™ INSTRUMENT POUCH 1018: Brand: STERI-DRAPE™